# Patient Record
Sex: FEMALE | Race: WHITE | Employment: UNEMPLOYED | URBAN - METROPOLITAN AREA
[De-identification: names, ages, dates, MRNs, and addresses within clinical notes are randomized per-mention and may not be internally consistent; named-entity substitution may affect disease eponyms.]

---

## 2017-01-03 ENCOUNTER — HOSPITAL ENCOUNTER (OUTPATIENT)
Dept: MRI IMAGING | Age: 62
Discharge: HOME | End: 2017-01-03
Attending: INTERNAL MEDICINE
Payer: COMMERCIAL

## 2017-01-03 DIAGNOSIS — M62.81 MUSCULAR WEAKNESS: ICD-10-CM

## 2017-01-03 PROCEDURE — 70551 MRI BRAIN STEM W/O DYE: CPT

## 2017-01-13 ENCOUNTER — HOSPITAL ENCOUNTER (OUTPATIENT)
Dept: WOUND CARE | Age: 62
Discharge: HOME | End: 2017-01-13
Payer: COMMERCIAL

## 2017-01-13 PROCEDURE — 97597 DBRDMT OPN WND 1ST 20 CM/<: CPT | Performed by: PODIATRIST

## 2017-01-30 ENCOUNTER — HOSPITAL ENCOUNTER (OUTPATIENT)
Dept: MRI IMAGING | Age: 62
Discharge: HOME | End: 2017-01-30
Attending: PODIATRIST
Payer: COMMERCIAL

## 2017-01-30 DIAGNOSIS — L97.519: ICD-10-CM

## 2017-01-30 PROCEDURE — 73718 MRI LOWER EXTREMITY W/O DYE: CPT

## 2017-02-01 ENCOUNTER — HOSPITAL ENCOUNTER (OUTPATIENT)
Dept: WOUND CARE | Age: 62
Discharge: HOME | End: 2017-02-01
Payer: COMMERCIAL

## 2017-02-01 DIAGNOSIS — Z71.2 ENCOUNTER TO DISCUSS TEST RESULTS: ICD-10-CM

## 2017-02-01 DIAGNOSIS — L97.519 DIABETIC ULCER OF RIGHT FOOT ASSOCIATED WITH OTHER SPECIFIED DIABETES MELLITUS: ICD-10-CM

## 2017-02-01 DIAGNOSIS — I10 HYPERTENSION, UNCONTROLLED: ICD-10-CM

## 2017-02-01 DIAGNOSIS — L89.95: ICD-10-CM

## 2017-02-01 DIAGNOSIS — E13.621 DIABETIC ULCER OF RIGHT FOOT ASSOCIATED WITH OTHER SPECIFIED DIABETES MELLITUS: ICD-10-CM

## 2017-02-01 PROCEDURE — 11042 DBRDMT SUBQ TIS 1ST 20SQCM/<: CPT | Performed by: FAMILY MEDICINE

## 2017-02-01 PROCEDURE — 11721 DEBRIDE NAIL 6 OR MORE: CPT | Performed by: FAMILY MEDICINE

## 2017-02-01 RX ORDER — LIDOCAINE HYDROCHLORIDE 20 MG/ML
JELLY TOPICAL
Status: COMPLETED | OUTPATIENT
Start: 2017-02-01 | End: 2017-02-01

## 2017-02-01 RX ADMIN — LIDOCAINE HYDROCHLORIDE: 20 JELLY TOPICAL at 15:16

## 2017-02-02 NOTE — PROGRESS NOTES
[address] [place]   WOUND CARE PROGRESS NOTE       Name:  Livia Coon   MR#:  [id number]   :  1955   Account #:  [de-identified]        Date of Adm:  2017       DATE OF SERVICE:  2017    SUBJECTIVE: The patient comes in today. She is a patient of   Dr. River Wright, who was not able to see her because of her 's   work schedule who drives her to her appointment. She is staying off of   her feet as much as she can. She has been using Medihoney gel and   is reporting no significant tenderness or pain in the foot. Her blood   sugars are running in the 220s and she notes that she had her blood   sugar medication readjusted this previous week and she accidentally   forgot to take her blood pressure medication today. She is not having   any other significant concerns but would like to know the results of her   MRI that was just done. ALLERGIES: AS PER [employer]. PAST MEDICAL HISTORY/FAMILY HISTORY/SURGICAL HISTORY:   Reviewed, as per [facility]. OBJECTIVE   VITAL SIGNS: Temperature 97.6, pulse 93, respiratory 14, blood   pressure 178/84. EXTREMITIES: The right lateral toe measures at the last office visit   was 0.8 x 0.4 x 0.1; today measuring 0.6 x 0.4 x 0.1. The right medial   4th and 5th toe and the inner web at the last office visit was 0.2 x 0.1 x   0.1; today it is measuring larger at 0.6 x 0.2 x 0.1, however, this is not   a wound. It is scabbed over quite nicely and is more consistent with a   pressure injury, on the 5th toe greater than the 4th inner toe. ASSESSMENT    1. This is a diabetic foot ulcer. 2. Diabetes mellitus uncontrolled. 3. Hypertension, uncontrolled.        PROCEDURE: Excisional debridement was done of the diabetic foot   wound with Betadine and the patient is advised to get better control of   her blood sugars, advised a blood sugar to help with healing between   6 and 6.5, and advised her to go home and take her blood pressure   medication. MRI reviewed. There is evidence of osteomyelitis and she   will follow up in 2 to 3 weeks. PROCEDURE NOTE: The patient was prepped and draped in the   usual manner for the [address] [place] [address]. Using a 5 mm curet the   wound was excisionally debrided of all necrotic tissue to subcutaneous   level. The preop measurement was 0.6 x 0.4 x 01 with postop   measurement of 0.65 x 0.45 x 0.15. She tolerated the procedure very   well. Betadine was applied to the wound and advised to offload the   pressure in-between the 4th and the 5th digit to get a small either piece   of gauze or to get a callus-like sponge to help with some of the   pressure, to monitor it very closely over the next couple of days while   she is  Taking it and if she has anly allergic reaction it itch she is to stop it immediately.          MD BRENDA Purvis / JOSH   D:  02/01/2017   16:49   T:  02/02/2017   00:18   Job #:  [id number]

## 2017-02-15 ENCOUNTER — HOSPITAL ENCOUNTER (OUTPATIENT)
Dept: WOUND CARE | Age: 62
Discharge: HOME | End: 2017-02-15
Payer: COMMERCIAL

## 2017-02-15 DIAGNOSIS — L89.90 PRESSURE INJURY OF SKIN: ICD-10-CM

## 2017-02-15 DIAGNOSIS — L60.8 ACQUIRED DEFORMITY OF TOENAIL: ICD-10-CM

## 2017-02-15 DIAGNOSIS — E08.621 DIABETIC FOOT ULCER ASSOCIATED WITH DIABETES MELLITUS DUE TO UNDERLYING CONDITION, UNSPECIFIED LATERALITY: ICD-10-CM

## 2017-02-15 DIAGNOSIS — E13.42 DIABETIC POLYNEUROPATHY ASSOCIATED WITH OTHER SPECIFIED DIABETES MELLITUS (HCC): ICD-10-CM

## 2017-02-15 DIAGNOSIS — L97.509 DIABETIC FOOT ULCER ASSOCIATED WITH DIABETES MELLITUS DUE TO UNDERLYING CONDITION, UNSPECIFIED LATERALITY: ICD-10-CM

## 2017-02-15 PROCEDURE — 11042 DBRDMT SUBQ TIS 1ST 20SQCM/<: CPT | Performed by: FAMILY MEDICINE

## 2017-02-15 RX ORDER — LIDOCAINE HYDROCHLORIDE 20 MG/ML
JELLY TOPICAL AS NEEDED
Status: DISCONTINUED | OUTPATIENT
Start: 2017-02-15 | End: 2017-02-19 | Stop reason: HOSPADM

## 2017-02-15 RX ADMIN — LIDOCAINE HYDROCHLORIDE: 20 JELLY TOPICAL at 14:22

## 2017-02-15 NOTE — PROGRESS NOTES
[address] [place]   WOUND CARE PROGRESS NOTE       Name:  Clint Graham   MR#:  [id number]   :  1955   Account #:  [de-identified]        Date of Adm:  02/15/2017       DATE OF SERVICE:  02/15/2017    SUBJECTIVE: The patient comes in today for followup of her diabetic   foot ulcers. She has been using Betadine and trying to offload the   pressure with a callus cushion in between her 4th and 5th toes. She is   not having any significant fevers, no chills and no other concerns. ALLERGIES: ZESTORETIC. REVIEW OF SYSTEMS: As per HPI above. Otherwise, nothing new to   add. OBJECTIVE: Temperature 97.4, respirations 18, blood pressure   155/82, pulse 85. Right lateral 5th toe measurement at the last office visit was 0.6 x 0.4 x   0.1, today measuring 0.5 x 0.6 x 0.1. In the right web of the 5th toe and   the 4th toe at the last office visit was 0.8 x 0.6 x 0.1, today is   epithelialized quite nicely. We will record the wound measurements as   zero. The wounds actually look to be fairly dry with no   significant MASD (moisture-associated skin damage). Pulses are   excellent. ASSESSMENT: Diabetic foot ulcer and pressure injury. PLAN: We will continue Betadine. The pressure injury in between her   toes is healed quite nicely. Advised to continue using the callus   cushion, and would like to get patient set up with Dr. Rebecca Guerrero so   she can have some adequate toenail care as well as appropriate   shoes that we can offload some of the pressure that is on her toes. PROCEDURE NOTE: The patient was prepped and draped in the usual   manner for the wound care center and using a 5 mm ring curette, the   wound was excisionally debrided of all fibrinous, necrotic tissue to the   subcutaneous level. She tolerated the procedure very well.    Preoperative measurement was 0.5 x 0.6 x 0.1 with the postop   measurement of 0.6 x 0.7 x 0.2. We will cover with Betadine and   followup in 2 weeks would likely be helpful.          MD BRENDA Hood / JESSICA   D:  02/15/2017   15:53   T:  02/15/2017   16:34   Job #:  [id number]

## 2017-03-01 ENCOUNTER — HOSPITAL ENCOUNTER (OUTPATIENT)
Dept: WOUND CARE | Age: 62
Discharge: HOME | End: 2017-03-01
Payer: COMMERCIAL

## 2017-03-01 DIAGNOSIS — L97.509 DIABETIC FOOT ULCER ASSOCIATED WITH DIABETES MELLITUS DUE TO UNDERLYING CONDITION, UNSPECIFIED LATERALITY: ICD-10-CM

## 2017-03-01 DIAGNOSIS — L89.90 PRESSURE INJURY OF SKIN: ICD-10-CM

## 2017-03-01 DIAGNOSIS — E66.01 MORBID OBESITY DUE TO EXCESS CALORIES (HCC): ICD-10-CM

## 2017-03-01 DIAGNOSIS — E08.621 DIABETIC FOOT ULCER ASSOCIATED WITH DIABETES MELLITUS DUE TO UNDERLYING CONDITION, UNSPECIFIED LATERALITY: ICD-10-CM

## 2017-03-01 DIAGNOSIS — B35.1 ONYCHOMYCOSIS OF TOENAIL: ICD-10-CM

## 2017-03-01 DIAGNOSIS — M20.61 TOE DEFORMITY, RIGHT: ICD-10-CM

## 2017-03-01 PROCEDURE — 97602 WOUND(S) CARE NON-SELECTIVE: CPT | Performed by: FAMILY MEDICINE

## 2017-03-01 RX ORDER — LIDOCAINE HYDROCHLORIDE 20 MG/ML
JELLY TOPICAL AS NEEDED
Status: DISCONTINUED | OUTPATIENT
Start: 2017-03-01 | End: 2017-03-05 | Stop reason: HOSPADM

## 2017-03-15 ENCOUNTER — HOSPITAL ENCOUNTER (OUTPATIENT)
Dept: WOUND CARE | Age: 62
Discharge: HOME | End: 2017-03-15
Payer: COMMERCIAL

## 2017-03-15 DIAGNOSIS — E11.8 UNCONTROLLED TYPE 2 DIABETES MELLITUS WITH COMPLICATION, UNSPECIFIED LONG TERM INSULIN USE STATUS: ICD-10-CM

## 2017-03-15 DIAGNOSIS — L97.503: ICD-10-CM

## 2017-03-15 DIAGNOSIS — Z71.6 TOBACCO ABUSE COUNSELING: ICD-10-CM

## 2017-03-15 DIAGNOSIS — E11.621: ICD-10-CM

## 2017-03-15 DIAGNOSIS — E11.65 UNCONTROLLED TYPE 2 DIABETES MELLITUS WITH COMPLICATION, UNSPECIFIED LONG TERM INSULIN USE STATUS: ICD-10-CM

## 2017-03-15 DIAGNOSIS — E66.01 MORBID OBESITY DUE TO EXCESS CALORIES (HCC): ICD-10-CM

## 2017-03-15 DIAGNOSIS — I10 UNCONTROLLED HYPERTENSION: ICD-10-CM

## 2017-03-15 DIAGNOSIS — I87.2 STASIS DERMATITIS OF BOTH LEGS: ICD-10-CM

## 2017-03-15 PROCEDURE — 11043 DBRDMT MUSC&/FSCA 1ST 20/<: CPT | Performed by: FAMILY MEDICINE

## 2017-03-15 RX ORDER — INSULIN LISPRO 100 [IU]/ML
22 INJECTION, SOLUTION INTRAVENOUS; SUBCUTANEOUS
Status: ON HOLD | COMMUNITY
End: 2017-11-02

## 2017-03-15 RX ORDER — LIDOCAINE HYDROCHLORIDE 40 MG/ML
SOLUTION TOPICAL AS NEEDED
Status: DISCONTINUED | OUTPATIENT
Start: 2017-03-15 | End: 2017-03-19 | Stop reason: HOSPADM

## 2017-03-15 RX ADMIN — LIDOCAINE HYDROCHLORIDE: 40 SOLUTION TOPICAL at 15:21

## 2017-03-15 NOTE — PROGRESS NOTES
[address]   WOUND CARE PROGRESS NOTE       Name:  Rigo Sosa   MR#:  [id number]   :  1955   Account #:  [de-identified]        Date of Adm:  03/15/2017       DATE OF SERVICE:      SUBJECTIVE: The patient comes in today for followup of her non-  healing diabetic foot ulcer. She states that she has been using the   Santyl and has a zero copay per her  who thinks that there is   a new blister forming from the callus pad adjacent to the wound in-  between her fourth and fifth toes. She is not having any other   significant symptoms. Her pain is well controlled and her    states that she saw her PCP about a week ago. REVIEW OF SYSTEMS: She denies any chest pain. No shortness of   breath. No back pain. No neurological symptoms. Otherwise, review of   systems are negative as per HPI above. OBJECTIVE: Temperature 98.4, pulse of 81, respiratory 16, blood   pressure of 180/101 on the left with a blood pressure measurement of   180/82 on the right. The fight foot toe actually looks much better. The   fibrinous necrotic tissue was starting to improve. The last office visit   measurement was 0.5 x 0.5 x 0.1, today measuring 0.4 x 0.4 x 0.1. There is quite a bit of fibrinous necrotic tissue . ASSESSMENT AND PLAN:   1. Diabetic foot ulcer. 2. Uncontrolled hypertension. 3. Uncontrolled diabetes mellitus. 4. Morbid obesity. PLAN: We will continue Santyl. She is to offload as much as she is   able. We will stop the callus pad and change to gauze in between the   toes. She is advised to see her PCP ASAP regarding her uncontrolled   hypertension and for further management of her diabetes. Weight loss,   of course, is encouraged.      PROCEDURE NOTE: The patient was prepped and draped in the usual   manner for the wound care center and using a 5 mm ring curette, the   wound was excisionally debrided of all fibrinous and necrotic tissue. Excisional debridement was done to the wound with a preop measurement   of 0.4 x 0.4 x 0.1 with a postop measurement of 0.5 x 0.5 x 0.2. She   tolerated the procedure very well, was given written as well as verbal   instructions, and will follow up in 2 weeks.          MD Alex Manning / Del.Spray   D:  03/15/2017   15:46   T:  03/15/2017   16:06   Job #:  [id number]

## 2017-03-29 ENCOUNTER — HOSPITAL ENCOUNTER (OUTPATIENT)
Dept: WOUND CARE | Age: 62
Discharge: HOME | End: 2017-03-29
Payer: COMMERCIAL

## 2017-03-29 DIAGNOSIS — E08.621 DIABETIC ULCER OF RIGHT FOOT ASSOCIATED WITH DIABETES MELLITUS DUE TO UNDERLYING CONDITION (HCC): ICD-10-CM

## 2017-03-29 DIAGNOSIS — L97.519 DIABETIC ULCER OF RIGHT FOOT ASSOCIATED WITH DIABETES MELLITUS DUE TO UNDERLYING CONDITION (HCC): ICD-10-CM

## 2017-03-29 PROCEDURE — 11043 DBRDMT MUSC&/FSCA 1ST 20/<: CPT | Performed by: FAMILY MEDICINE

## 2017-03-29 PROCEDURE — 11042 DBRDMT SUBQ TIS 1ST 20SQCM/<: CPT | Performed by: FAMILY MEDICINE

## 2017-03-30 RX ORDER — GABAPENTIN 300 MG/1
300 CAPSULE ORAL 3 TIMES DAILY
COMMUNITY
End: 2017-07-24

## 2017-03-30 NOTE — PROGRESS NOTES
[address] [address]   WOUND CARE PROGRESS NOTE       Name:  Dharmesh Fisher   MR#:  [id number]   :  1955   Account #:  [de-identified]        Date of Adm:  2017       DATE OF SERVICE:  2017    SUBJECTIVE: The patient comes in today for followup of her   nonhealing wound on her right lower extremity, diabetic ulcer and foot   wound. States that she saw Dr. Andrew Camp who pared down 1 of her   wounds that had been healed, but she is not having any other   significant issues. She has been cleansing her wound, or actually her    has been cleansing her wound with Betadine and using   Santyl to the big toe as directed. He has been using gauze in between   the toes, but there was some slight amount of moisture in the toe, but   not soaking wet, the gauze was just slightly moistened. She is not   having any significant pain and has no other concerns. ALLERGIES: AS PER [employer]. REVIEW OF SYSTEMS: As per HPI above, otherwise nothing new to   add. OBJECTIVE   VITAL SIGNS: Temperature is 98.2, heart rate is 78, respiratory rate is   16 and a blood pressure of 162/82. EXTREMITIES: The right lateral 5th toe at the last office visit was 0.4 x   0.4 x 0.1, today measuring 1.0 x 0.8 x 0.1. The right 4th toe at the last   office visit had healed, but today is measuring 0.9 x 0.7 x 0.2; and the   right 5th toe had also healed, today measuring 1.4 x 0.8 x 0.1. There is   some slight moisture associated skin damage noted between the 4th   and the 5th toe. No signs of significant infection, but there is a   significant amount of fibrinous, necrotic tissue to the wound. ASSESSMENT: Diabetic foot ulcer to the 5th toe along with a pressure   injury to the 4th and 5th toe interweb. PLAN: Excisional debridement was done, subcutaneous and muscle   layer. Advised the patient to continue using the Santyl on the 5th toe.    Will continue using Betadine and saline solution to clean in between   the 4th and the 5th toe. Will likely need to change to EndoForm at the   next office visit if it continues to look as well as it is now. She is advised   to follow up in 1 week. PROCEDURE NOTE: The patient was prepped and draped in the   usual manner for the wound care center and using a 5-mm ring   curette, the wound was excisionally debrided of all fibrinous and   necrotic tissue to reveal a fresh pink wound bed. The lateral 5th toe   measured 1.0 x 0.8 x 0.1, it was excised to the muscle measuring 1.1 x   0.9 x 0.1. The right 4th toe was excised to the subcutaneous level   measuring 0.9 x 0.7 x 0.2 with a postop measurement of 0.9 x 0.75 x   0.25. The right 5th toe with a measurement of 1.4 x 0.8 x 0.1 was   excised with a postop measurement of 1.5 x 0.9 x 0.2 to the muscular   layer. She tolerated procedure very well. Will apply Santyl to the 5th   toe, as well as Betadine to the interweb of the 4th and 5th digit. She was given written as well as verbal instructions, follow-up in 1   week.         MD BRENDA Torres / CULLEN   D:  03/29/2017   17:12   T:  03/30/2017   01:35   Job #:  [id number]

## 2017-04-05 ENCOUNTER — HOSPITAL ENCOUNTER (OUTPATIENT)
Dept: WOUND CARE | Age: 62
Discharge: HOME | End: 2017-04-05
Payer: COMMERCIAL

## 2017-04-05 DIAGNOSIS — L97.502 DIABETIC ULCER OF OTHER PART OF FOOT ASSOCIATED WITH DIABETES MELLITUS DUE TO UNDERLYING CONDITION, WITH FAT LAYER EXPOSED, UNSPECIFIED LATERALITY (HCC): ICD-10-CM

## 2017-04-05 DIAGNOSIS — E08.621 DIABETIC ULCER OF OTHER PART OF FOOT ASSOCIATED WITH DIABETES MELLITUS DUE TO UNDERLYING CONDITION, WITH FAT LAYER EXPOSED, UNSPECIFIED LATERALITY (HCC): ICD-10-CM

## 2017-04-05 PROCEDURE — 11042 DBRDMT SUBQ TIS 1ST 20SQCM/<: CPT | Performed by: FAMILY MEDICINE

## 2017-04-05 RX ORDER — LIDOCAINE HYDROCHLORIDE 20 MG/ML
JELLY TOPICAL AS NEEDED
Status: DISCONTINUED | OUTPATIENT
Start: 2017-04-05 | End: 2017-04-09 | Stop reason: HOSPADM

## 2017-04-05 RX ADMIN — LIDOCAINE HYDROCHLORIDE: 20 JELLY TOPICAL at 16:01

## 2017-04-19 ENCOUNTER — HOSPITAL ENCOUNTER (OUTPATIENT)
Dept: WOUND CARE | Age: 62
Discharge: HOME | End: 2017-04-19
Payer: COMMERCIAL

## 2017-04-19 DIAGNOSIS — L97.909 VENOUS STASIS ULCER (HCC): ICD-10-CM

## 2017-04-19 DIAGNOSIS — E13.42 DIABETIC POLYNEUROPATHY ASSOCIATED WITH OTHER SPECIFIED DIABETES MELLITUS (HCC): ICD-10-CM

## 2017-04-19 DIAGNOSIS — I83.009 VENOUS STASIS ULCER (HCC): ICD-10-CM

## 2017-04-19 PROCEDURE — 11042 DBRDMT SUBQ TIS 1ST 20SQCM/<: CPT | Performed by: FAMILY MEDICINE

## 2017-04-19 RX ORDER — LIDOCAINE HYDROCHLORIDE 20 MG/ML
JELLY TOPICAL ONCE
Status: COMPLETED | OUTPATIENT
Start: 2017-04-19 | End: 2017-04-19

## 2017-04-19 RX ADMIN — LIDOCAINE HYDROCHLORIDE: 20 JELLY TOPICAL at 16:00

## 2017-05-03 ENCOUNTER — HOSPITAL ENCOUNTER (OUTPATIENT)
Dept: WOUND CARE | Age: 62
Discharge: HOME | End: 2017-05-03
Payer: COMMERCIAL

## 2017-05-03 DIAGNOSIS — E11.49 DIABETIC NEUROPATHY WITH NEUROLOGIC COMPLICATION (HCC): ICD-10-CM

## 2017-05-03 DIAGNOSIS — L97.502 DIABETIC ULCER OF TOE ASSOCIATED WITH DIABETES MELLITUS DUE TO UNDERLYING CONDITION, WITH FAT LAYER EXPOSED, UNSPECIFIED LATERALITY (HCC): ICD-10-CM

## 2017-05-03 DIAGNOSIS — E08.621 DIABETIC ULCER OF TOE ASSOCIATED WITH DIABETES MELLITUS DUE TO UNDERLYING CONDITION, WITH FAT LAYER EXPOSED, UNSPECIFIED LATERALITY (HCC): ICD-10-CM

## 2017-05-03 DIAGNOSIS — E11.40 DIABETIC NEUROPATHY WITH NEUROLOGIC COMPLICATION (HCC): ICD-10-CM

## 2017-05-03 PROCEDURE — 11042 DBRDMT SUBQ TIS 1ST 20SQCM/<: CPT | Performed by: FAMILY MEDICINE

## 2017-05-03 RX ORDER — LIDOCAINE HYDROCHLORIDE 20 MG/ML
JELLY TOPICAL ONCE
Status: COMPLETED | OUTPATIENT
Start: 2017-05-03 | End: 2017-05-03

## 2017-05-03 RX ADMIN — LIDOCAINE HYDROCHLORIDE: 20 JELLY TOPICAL at 16:00

## 2017-05-03 NOTE — PROGRESS NOTES
[address]   WOUND CARE PROGRESS NOTE       Name:  Nolan Sims   MR#:  [id number]   :  1955   Account #:  [de-identified]        Date of Adm:  2017       DATE OF SERVICE:  2017    SUBJECTIVE:  The patient comes in today for followup of her   nonhealing wounds on her foot, diabetic ulcers. She has been using   the Betadine as directed, not having any significant concerns and her    has been doing her wound care without any significant   issues. OBJECTIVE:  Temperature 97.8, pulse 77, respiratory 16, blood   pressure 156/82. The right lateral 5th toe at the last office visit was 0.8 x 1.0 x 0.1. Today, is healed. The lateral 4th toe at the last office visit was 0.4 x 0.5   x 0.2. Today, measuring 0.4 x 0.4 x 0.2. The medial 5th toe at the last   office visit was 0.5 x 0.7 x 0.1. Today, is healed. The lateral 4th toe still has a significant amount of necrotic fibrinous   tissue noted. There is no significant tenderness and pulses are intact. ASSESSMENT AND PLAN:  Diabetic foot ulcer. The medial fifth toe   and the lateral fifth toe on the right are healed. However, the lateral   fourth toe is not. Excisional debridement was done to the   subcutaneous level. She is to continue following the current treatment   plan and will follow up in 2 weeks. PROCEDURE NOTE:  The patient was prepped and draped in the   usual manner for the [address] [place] [address], and using a 5 mm ring   curette, the wound was excisionally debrided of all fibrinous necrotic   tissue revealing fresh pink wound bed to the subcutaneous level. The   measurement postop was 0.45 x 0.45 x 0.25 with a preop   measurement of 0.4 x 0.4 x 0.2. Subcutaneous excisional debridement   is the procedure. Complications:  None. Blood loss is scant. The   procedure was well tolerated.   She was given written as well as verbal instructions. Will follow up in 2 weeks.          MD BRENDA López / NB   D:  05/03/2017   09:37   T:  05/03/2017   10:26   Job #:  [id number]

## 2017-05-24 ENCOUNTER — HOSPITAL ENCOUNTER (OUTPATIENT)
Dept: WOUND CARE | Age: 62
End: 2017-05-24
Payer: COMMERCIAL

## 2017-06-19 ENCOUNTER — OFFICE VISIT (OUTPATIENT)
Dept: SURGERY | Age: 62
End: 2017-06-19

## 2017-06-19 VITALS
HEIGHT: 61 IN | HEART RATE: 84 BPM | OXYGEN SATURATION: 100 % | SYSTOLIC BLOOD PRESSURE: 187 MMHG | RESPIRATION RATE: 14 BRPM | DIASTOLIC BLOOD PRESSURE: 100 MMHG | WEIGHT: 226 LBS | BODY MASS INDEX: 42.67 KG/M2

## 2017-06-19 DIAGNOSIS — L03.315 CELLULITIS OF PERINEUM: Primary | ICD-10-CM

## 2017-06-19 RX ORDER — SULFAMETHOXAZOLE AND TRIMETHOPRIM 800; 160 MG/1; MG/1
1 TABLET ORAL 2 TIMES DAILY
Qty: 20 TAB | Refills: 0 | Status: SHIPPED | OUTPATIENT
Start: 2017-06-19 | End: 2017-06-29

## 2017-06-19 NOTE — MR AVS SNAPSHOT
Visit Information Date & Time Provider Department Dept. Phone Encounter #  
 6/19/2017  4:00 PM Avery Schrader MD [facility] [id number] Your Appointments 7/24/2017  9:00 AM  
FOLLOW UP 10 with [facility] ([facility]) Appt Note: 6w fasting [address] [id number] [address] [facility] [address] [place] [address] Upcoming Health Maintenance Date Due Hepatitis C Screening 1955 LIPID PANEL Q1 1955 FOOT EXAM Q1 6/3/1964 MICROALBUMIN Q1 6/3/1964 EYE EXAM RETINAL OR DILATED Q1 6/3/1964 DTaP/Tdap/Td series (1 - Tdap) 6/3/1975 PAP AKA CERVICAL CYTOLOGY 6/3/1975 FOBT Q 1 YEAR AGE 50-75 6/3/2004 ZOSTER VACCINE AGE 60> 6/3/2014 BREAST CANCER SCRN MAMMOGRAM 6/11/2015 HEMOGLOBIN A1C Q6M 4/1/2016 INFLUENZA AGE 9 TO ADULT 8/1/2017 Allergies as of 6/19/2017  Review Complete On: 6/19/2017 By: Aurlamonte Glassing Severity Noted Reaction Type Reactions Zestril [Lisinopril]  01/26/2011    Swelling Current Immunizations  Reviewed on 10/3/2015 Name Date Influenza Vaccine (Quad) PF 10/7/2015  9:51 AM  
 Pneumococcal Vaccine (Unspecified Type) 1/1/2011 Not reviewed this visit Vitals BP Pulse Resp Height(growth percentile) Weight(growth percentile) SpO2  
 (!) 187/100 (BP 1 Location: Left arm, BP Patient Position: Sitting) 84 14 5' 1\" (1.549 m) 226 lb (102.5 kg) 100% BMI OB Status Smoking Status 42.7 kg/m2 Postmenopausal Never Smoker BMI and BSA Data Body Mass Index Body Surface Area 42.7 kg/m 2 2.1 m 2 Preferred Pharmacy Pharmacy Name Phone [phone] - [facility] [place], [address] AT [place] [address] [phone] Your Updated Medication List  
  
   
 This list is accurate as of: 6/19/17  5:05 PM.  Always use your most recent med list.  
  
  
  
  
 acetaminophen 325 mg tablet Commonly known as:  TYLENOL Take 2 Tabs by mouth every four (4) hours as needed. AGGRENOX  mg per SR capsule Generic drug:  aspirin-dipyridamole Take 1 Cap by mouth two (2) times a day. allopurinol 300 mg tablet Commonly known as:  Gerold See Take  by mouth daily. atenolol 50 mg tablet Commonly known as:  TENORMIN Take  by mouth daily. atorvastatin 40 mg tablet Commonly known as:  LIPITOR Take  by mouth daily. bumetanide 0.5 mg tablet Commonly known as:  Kyle Felecia Take  by mouth two (2) times a day. CARDIZEM  mg ER capsule Generic drug:  dilTIAZem Take 360 mg by mouth daily. Cholecalciferol (Vitamin D3) 2,000 unit Cap capsule Commonly known as:  VITAMIN D3 Take 1 Cap by mouth daily. collagenase 250 unit/gram ointment Commonly known as:  SANTYL Apply 1 Tube to affected area daily. gabapentin 300 mg capsule Commonly known as:  NEURONTIN Take 300 mg by mouth three (3) times daily. glipiZIDE 10 mg tablet Commonly known as:  Aaliyah Nailer Take 10 mg by mouth two (2) times a day. hydrALAZINE 50 mg tablet Commonly known as:  APRESOLINE Take 1 Tab by mouth three (3) times daily. insulin lispro 100 unit/mL kwikpen Commonly known as:  HUMALOG  
22 Units by SubCUTAneous route three (3) times daily (with meals). L. acidoph & paracasei- S therm- Bifido 8 billion cell Cap cap Commonly known as:  ELVA-Q/RISAQUAD Take 1 Cap by mouth daily. LEVEMIR 100 unit/mL injection Generic drug:  insulin detemir 60 Units by SubCUTAneous route two (2) times a day. LYRICA 75 mg capsule Generic drug:  pregabalin Take 75 mg by mouth two (2) times a day. omeprazole 40 mg capsule Commonly known as:  PRILOSEC Take 40 mg by mouth daily. trimethoprim-sulfamethoxazole 160-800 mg per tablet Commonly known as:  BACTRIM DS, SEPTRA DS Take 1 Tab by mouth two (2) times a day for 10 days. Prescriptions Sent to Pharmacy Refills  
 trimethoprim-sulfamethoxazole (BACTRIM DS, SEPTRA DS) 160-800 mg per tablet 0 Sig: Take 1 Tab by mouth two (2) times a day for 10 days. Class: Normal  
 Pharmacy: [facility] [address] [place] [address] [address] [place] [address] #: [phone] Route: Oral  
  
Introducing [facility]! Mariana Murrieta introduces [email or web address] patient portal. Now you can access parts of your medical record, email your doctor's office, and request medication refills online. 1. In your internet browser, go to [email or web address] 2. Click on the First Time User? Click Here link in the Sign In box. You will see the New Member Sign Up page. 3. Enter your [email or web address] Access Code exactly as it appears below. You will not need to use this code after youve completed the sign-up process. If you do not sign up before the expiration date, you must request a new code. · [email or web address] Access Code: [email or web address] Expires: 9/17/2017  4:40 PM 
 
4. Enter the last four digits of your Social Security Number (xxxx) and Date of Birth (mm/dd/yyyy) as indicated and click Submit. You will be taken to the next sign-up page. 5. Create a Outline Appt ID. This will be your [employer] login ID and cannot be changed, so think of one that is secure and easy to remember. 6. Create a [employer] password. You can change your password at any time. 7. Enter your Password Reset Question and Answer. This can be used at a later time if you forget your password. 8. Enter your e-mail address. You will receive e-mail notification when new information is available in [address] 9. Click Sign Up. You can now view and download portions of your medical record. 10. Click the Download Summary menu link to download a portable copy of your medical information. If you have questions, please visit the Frequently Asked Questions section of the [employer] website. Remember, [employer] is NOT to be used for urgent needs. For medical emergencies, dial 911. Now available from your iPhone and Android! Please provide this summary of care documentation to your next provider. Your primary care clinician is listed as Cain Manzano. If you have any questions after today's visit, please call [phone].

## 2017-06-19 NOTE — PROGRESS NOTES
Surgery H&P    Subjective:   Patient 60 y.o.  female wheelchair bound presents with painful erythematous swelling in the right perineal area 5 days ago. patient has been putting warm compresses but no drainage. Patient was seen in PCP office 3 days ago and was started on Keflex. No F/C/S. Patient reports no improvement on Keflex. Denies any recent trauma to this area. Past Medical & Surgical History:  Past Medical History:   Diagnosis Date    CKD (chronic kidney disease) stage 2, GFR 60-89 ml/min     Diabetes (HCC)     Gastroparesis     GERD (gastroesophageal reflux disease)     Gout     HTN (hypertension)     Hyperlipemia     Hyperparathyroidism ([address]     Nausea & vomiting     Obesity     Sciatica     right    Stroke ([facility]     right parietal, left caudate    Thyroid disease     para thyroid(high calcium)      Past Surgical History:   Procedure Laterality Date    CHEST SURGERY PROCEDURE UNLISTED  10/24/13    PARATHYROID EXPLORATION     DELIVERY       HX  SECTION      HX CHOLECYSTECTOMY      REMOVAL GALLBLADDER         Social History:  Social History     Social History    Marital status:      Spouse name: N/A    Number of children: N/A    Years of education: N/A     Occupational History    Not on file.      Social History Main Topics    Smoking status: Never Smoker    Smokeless tobacco: Never Used    Alcohol use Yes      Comment: rare    Drug use: Yes     Special: Prescription, OTC    Sexual activity: Not on file     Other Topics Concern    Not on file     Social History Narrative        Family History:  Family History   Problem Relation Age of Onset    Cancer Mother      cervical    Diabetes Mother     Hypertension Mother     Stroke Mother 79    Diabetes Father     Hypertension Father     Kidney Disease Father     Diabetes Brother     Hypertension Brother     Stroke Brother 60        Medications:  Current Outpatient Prescriptions Medication Sig    trimethoprim-sulfamethoxazole (BACTRIM DS, SEPTRA DS) 160-800 mg per tablet Take 1 Tab by mouth two (2) times a day for 10 days.  gabapentin (NEURONTIN) 300 mg capsule Take 300 mg by mouth three (3) times daily.  insulin detemir (LEVEMIR) 100 unit/mL injection 60 Units by SubCUTAneous route two (2) times a day.  insulin lispro (HUMALOG) 100 unit/mL kwikpen 22 Units by SubCUTAneous route three (3) times daily (with meals).  collagenase (SANTYL) 250 unit/gram ointment Apply 1 Tube to affected area daily.  hydrALAZINE (APRESOLINE) 50 mg tablet Take 1 Tab by mouth three (3) times daily. (Patient taking differently: Take 50 mg by mouth two (2) times a day.)    acetaminophen (TYLENOL) 325 mg tablet Take 2 Tabs by mouth every four (4) hours as needed.  L. acidoph & paracasei- S therm- Bifido (ELVA-Q) 8 billion cell cap cap Take 1 Cap by mouth daily.  allopurinol (ZYLOPRIM) 300 mg tablet Take  by mouth daily.  atenolol (TENORMIN) 50 mg tablet Take  by mouth daily.  pregabalin (LYRICA) 75 mg capsule Take 75 mg by mouth two (2) times a day.  atorvastatin (LIPITOR) 40 mg tablet Take  by mouth daily.  bumetanide (BUMEX) 0.5 mg tablet Take  by mouth two (2) times a day.  Cholecalciferol, Vitamin D3, 2,000 unit cap Take 1 Cap by mouth daily.  omeprazole (PRILOSEC) 40 mg capsule Take 40 mg by mouth daily.  dipyridamole-aspirin (AGGRENOX) 200-25 mg per SR capsule Take 1 Cap by mouth two (2) times a day.  diltiazem (CARDIZEM CD) 360 mg ER capsule Take 360 mg by mouth daily.  glipiZIDE (GLUCOTROL) 10 mg tablet Take 10 mg by mouth two (2) times a day. No current facility-administered medications for this visit. Allergies: Allergies   Allergen Reactions    Zestril [Lisinopril] Swelling       Review of Systems  A comprehensive review of systems was negative except for that written in the HPI.     Objective:     Exam:    Visit Vitals    BP (!) 187/100 (BP 1 Location: Left arm, BP Patient Position: Sitting)    Pulse 84    Resp 14    Ht 5' 1\" (1.549 m)    Wt 102.5 kg (226 lb)    SpO2 100%    BMI 42.7 kg/m2     General appearance: alert, cooperative, no distress, appears stated age  Lungs: clear to auscultation bilaterally  Heart: regular rate and rhythm  Skin: Skin color, texture, turgor normal  Right posterior perineal area with 2 x 3 cm induration with erythema. No fluctuance. 2 mm ulcer at the center. No drainage. Bedside US performed and no fluid collection noted. Assessment:     Right perineal cellulitis    Plan:     No obvious abscess to drain.   Add Bactrim  Continue warm compress  RTC if no improvement

## 2017-07-24 ENCOUNTER — OFFICE VISIT (OUTPATIENT)
Dept: INTERNAL MEDICINE CLINIC | Age: 62
End: 2017-07-24

## 2017-07-24 VITALS — DIASTOLIC BLOOD PRESSURE: 78 MMHG | HEIGHT: 61 IN | SYSTOLIC BLOOD PRESSURE: 128 MMHG

## 2017-07-24 DIAGNOSIS — E13.42 DIABETIC POLYNEUROPATHY ASSOCIATED WITH OTHER SPECIFIED DIABETES MELLITUS (HCC): ICD-10-CM

## 2017-07-24 DIAGNOSIS — E78.5 HYPERLIPIDEMIA, UNSPECIFIED HYPERLIPIDEMIA TYPE: ICD-10-CM

## 2017-07-24 DIAGNOSIS — I10 HTN (HYPERTENSION), MALIGNANT: ICD-10-CM

## 2017-07-24 DIAGNOSIS — I63.9 CEREBROVASCULAR ACCIDENT (CVA), UNSPECIFIED MECHANISM (HCC): ICD-10-CM

## 2017-07-24 PROBLEM — G62.9 PERIPHERAL NEUROPATHY: Status: ACTIVE | Noted: 2017-07-24

## 2017-07-24 PROBLEM — L97.519 ULCER OF RIGHT FIFTH TOE DUE TO DIABETES MELLITUS (HCC): Status: ACTIVE | Noted: 2017-07-24

## 2017-07-24 PROBLEM — R60.0 BILATERAL EDEMA OF LOWER EXTREMITY: Status: ACTIVE | Noted: 2017-07-24

## 2017-07-24 PROBLEM — K76.0 FATTY LIVER: Status: ACTIVE | Noted: 2017-07-24

## 2017-07-24 PROBLEM — E11.621 ULCER OF RIGHT FIFTH TOE DUE TO DIABETES MELLITUS (HCC): Status: ACTIVE | Noted: 2017-07-24

## 2017-07-24 PROBLEM — R10.31 SEVERE RIGHT GROIN PAIN: Status: ACTIVE | Noted: 2017-07-24

## 2017-07-24 PROBLEM — R11.2 N&V (NAUSEA AND VOMITING): Status: ACTIVE | Noted: 2017-07-24

## 2017-07-24 PROBLEM — I12.9 STAGE 2 CHRONIC KIDNEY DISEASE DUE TO BENIGN HYPERTENSION: Status: ACTIVE | Noted: 2017-07-24

## 2017-07-24 PROBLEM — I50.41: Status: ACTIVE | Noted: 2017-07-24

## 2017-07-24 PROBLEM — E11.22 CKD STAGE 2 DUE TO TYPE 2 DIABETES MELLITUS (HCC): Status: ACTIVE | Noted: 2017-07-24

## 2017-07-24 PROBLEM — I35.0 AORTIC STENOSIS, MILD: Status: ACTIVE | Noted: 2017-07-24

## 2017-07-24 PROBLEM — I50.32 CHRONIC DIASTOLIC CHF (CONGESTIVE HEART FAILURE), NYHA CLASS 3 (HCC): Status: ACTIVE | Noted: 2017-07-24

## 2017-07-24 PROBLEM — R00.0 SINUS TACHYCARDIA: Status: ACTIVE | Noted: 2017-07-24

## 2017-07-24 PROBLEM — N18.2 CKD STAGE 2 DUE TO TYPE 2 DIABETES MELLITUS (HCC): Status: ACTIVE | Noted: 2017-07-24

## 2017-07-24 PROBLEM — M54.50 LOW BACK PAIN: Status: ACTIVE | Noted: 2017-07-24

## 2017-07-24 PROBLEM — N18.2 STAGE 2 CHRONIC KIDNEY DISEASE DUE TO BENIGN HYPERTENSION: Status: ACTIVE | Noted: 2017-07-24

## 2017-07-24 PROBLEM — D64.9 ANEMIA: Status: ACTIVE | Noted: 2017-07-24

## 2017-07-24 PROBLEM — M25.559 PELVIC JOINT PAIN: Status: ACTIVE | Noted: 2017-07-24

## 2017-07-24 PROBLEM — G47.00 INSOMNIA: Status: ACTIVE | Noted: 2017-07-24

## 2017-07-24 PROBLEM — I87.2 STASIS DERMATITIS: Status: ACTIVE | Noted: 2017-07-24

## 2017-07-24 LAB
ALBUMIN SERPL-MCNC: 3.4 G/DL (ref 3.9–5.4)
ALKALINE PHOS POC: 139 U/L (ref 38–126)
ALT SERPL-CCNC: 28 U/L (ref 9–52)
AST SERPL-CCNC: 16 U/L (ref 14–36)
BUN BLD-MCNC: 42 MG/DL (ref 7–17)
CALCIUM BLD-MCNC: 9.6 MG/DL (ref 8.4–10.2)
CHLORIDE BLD-SCNC: 107 MMOL/L (ref 98–107)
CHOLEST SERPL-MCNC: 155 MG/DL (ref 0–200)
CO2 POC: 27 MMOL/L (ref 22–32)
CREAT BLD-MCNC: 1.1 MG/DL (ref 0.7–1.2)
EGFR (POC): 53.4
GLUCOSE POC: 201 MG/DL (ref 65–105)
HDLC SERPL-MCNC: 32 MG/DL (ref 35–130)
HGB BLD-MCNC: 8.5 G/DL
LDL CHOLESTEROL POC: 68 MG/DL (ref 0–130)
POTASSIUM SERPL-SCNC: 5.3 MMOL/L (ref 3.6–5)
PROT SERPL-MCNC: 6 G/DL (ref 6.3–8.2)
SODIUM SERPL-SCNC: 145 MMOL/L (ref 137–145)
TCHOL/HDL RATIO (POC): 4.8 (ref 0–4)
TOTAL BILIRUBIN POC: 0.5 MG/DL (ref 0.2–1.3)
TRIGL SERPL-MCNC: 275 MG/DL (ref 0–200)
VLDLC SERPL CALC-MCNC: 55 MG/DL

## 2017-07-24 RX ORDER — MONTELUKAST SODIUM 4 MG/1
1 TABLET, CHEWABLE ORAL 2 TIMES DAILY
COMMUNITY
End: 2018-02-24 | Stop reason: SDUPTHER

## 2017-07-24 RX ORDER — MUPIROCIN 20 MG/G
OINTMENT TOPICAL DAILY
COMMUNITY
End: 2017-07-24

## 2017-07-24 RX ORDER — CYCLOBENZAPRINE HCL 5 MG
5 TABLET ORAL 2 TIMES DAILY
COMMUNITY
End: 2017-11-02

## 2017-07-24 NOTE — PROGRESS NOTES
Could not reconcile medications, because patient did not know them without her list.        1. Have you been to the ER, urgent care clinic since your last visit? Hospitalized since your last visit? No    2. Have you seen or consulted any other health care providers outside of the [address] [place] [address] since your last visit? Include any pap smears or colon screening.  No

## 2017-07-24 NOTE — PROGRESS NOTES
Subjective:   Shirley Hallman is a 60 y.o. female      Chief Complaint   Patient presents with    Follow-up     6 wk        History of present illness:  Ms. Elton Calhoun returns to the office for follow up. In some ways she is better and in some ways not. She actually looks better. Her blood pressure is improved. She says her blood sugars have been improved as well, although she just recently returned from vacation, where she was not as prudent with her diet. She is still not ambulating, remains in a wheelchair, and I do not find her very motivated to pursue physical therapy and get stronger. She will still go to physical therapy occasionally, but not on a regular basis. She has now developed an ulcer on the lateral aspect of the right fifth toe, which looks as though it needs debridement. She has a routine podiatrist, whom she is scheduled to see and that will really have to be in his purview to be taken care of. In the meantime, they are cleansing it daily and applying betadine as a drying agent. I think this is adequate therapy for right now. If she does not get this attended to she could lose the toe, however. I have made this very clear to her. We have also talked at length about the need to pursue physical therapy and try to get stronger, or she will be wheelchair bound with all the attendant potential problems that entails, including pressure sores, etc.  I hope that I was graphic enough with her today that it will strike a cord and she will begin to try to rehabilitate. She denies new problems with chest pain or shortness of breath. She has noted no increasing lower extremity edema. She denies symptoms of hyper or hypoglycemia. She denies other cardiorespiratory, GI or  symptoms.         Patient Active Problem List   Diagnosis Code    Hypercalcemia E83.52    Diabetes ([facility] E11.9    Obesity E66.9    Right leg weakness R29.898    Diabetes mellitus type II, uncontrolled ([address] E11.65    HTN (hypertension), malignant I10    CKD (chronic kidney disease) stage 3, GFR 30-59 ml/min N18.3    Hyperlipemia E78.5    Diabetic polyneuropathy (HCC) E11.42    Stroke ([facility]) I63.9    ASCVD (arteriosclerotic cardiovascular disease) I25.10    Acute encephalopathy G93.40    Aphasia R47.01    SBO (small bowel obstruction) ([facility]) K56.69    Insomnia G47.00    Sinus tachycardia R00.0    Anemia D64.9    Severe right groin pain R10.30    N&V (nausea and vomiting) R11.2    Ulcer of right fifth toe due to diabetes mellitus ([facility]) E11.621, L97.519    Peripheral neuropathy ([facility]) G62.9    Chronic diastolic CHF (congestive heart failure), NYHA class 3 ([facility]) I50.32    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 2 diabetes mellitus ([facility]) E11.22, N18.2    Stage 2 chronic kidney disease due to benign hypertension I12.9, N18.2    Hypertension I10    BMI 45.0-49.9, adult ([facility]) Z68.42    Acute combined systolic and diastolic heart failure, NYHA class 2 ([facility]) I50.41    Fatty liver K76.0    Stasis dermatitis I87.2    Low back pain M54.5    Aortic stenosis, mild I35.0    Pelvic joint pain M25.559    Bilateral edema of lower extremity R60.0      Past Medical History:   Diagnosis Date    Acute combined systolic and diastolic heart failure, NYHA class 2 ([address] 7/24/2017    Anemia 7/24/2017    Aortic stenosis, mild 7/24/2017    Bilateral edema of lower extremity 7/24/2017    BMI 45.0-49.9, adult ([facility]) 7/24/2017    Chronic diastolic CHF (congestive heart failure), NYHA class 3 ([address] 7/24/2017    CKD (chronic kidney disease) stage 2, GFR 60-89 ml/min     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 2 diabetes mellitus ([address] 7/24/2017    Diabetes ([address]     Fatty liver 7/24/2017    Gastroparesis     GERD (gastroesophageal reflux disease)     Gout     HTN (hypertension)     Hyperlipemia     Hyperparathyroidism ([address]     Hypertension 7/24/2017    Insomnia 7/24/2017    Low back pain 7/24/2017    N&V (nausea and vomiting) 7/24/2017    Nausea & vomiting     Obesity     Pelvic joint pain 7/24/2017    Peripheral neuropathy ([facility] 7/24/2017    Sciatica     right    Severe right groin pain 7/24/2017    Sinus tachycardia 7/24/2017    Stage 2 chronic kidney disease due to benign hypertension 7/24/2017    Stasis dermatitis 7/24/2017    Stroke ([facility] 2001    right parietal, left caudate    Thyroid disease     para thyroid(high calcium)    Ulcer of right fifth toe due to diabetes mellitus ([facility] 7/24/2017      Allergies   Allergen Reactions    Zestril [Lisinopril] Swelling      Family History   Problem Relation Age of Onset    Cancer Mother      cervical    Diabetes Mother     Hypertension Mother     Stroke Mother 79    Diabetes Father     Hypertension Father     Kidney Disease Father     Diabetes Brother     Hypertension Brother     Stroke Brother 61      Social History     Social History    Marital status:      Spouse name: N/A    Number of children: N/A    Years of education: N/A     Occupational History    Not on file. Social History Main Topics    Smoking status: Never Smoker    Smokeless tobacco: Never Used    Alcohol use Yes      Comment: rare    Drug use: Yes     Special: Prescription, OTC    Sexual activity: Not on file     Other Topics Concern    Not on file     Social History Narrative     Prior to Admission medications    Medication Sig Start Date End Date Taking? Authorizing Provider   PEN NEEDLE by Does Not Apply route. Yes Historical Provider   SYRINGE AND NEEDLE,INSULIN,1ML (BD INSULIN SYRINGE) by Does Not Apply route. Yes Historical Provider   BLOOD SUGAR DIAGNOSTIC (FREESTYLE LITE STRIPS) by In Vitro route. Yes Historical Provider   cyclobenzaprine (FLEXERIL) 5 mg tablet Take 5 mg by mouth two (2) times a day. Yes Historical Provider   colestipol (COLESTID) 1 gram tablet Take 1 g by mouth two (2) times a day. Yes Historical Provider   Bifidobacterium Infantis (ALIGN) 4 mg cap Take  by mouth.    Yes Historical Provider   insulin detemir (LEVEMIR) 100 unit/mL injection 60 Units by SubCUTAneous route two (2) times a day. Yes Historical Provider   insulin lispro (HUMALOG) 100 unit/mL kwikpen 22 Units by SubCUTAneous route three (3) times daily (with meals). Yes Historical Provider   hydrALAZINE (APRESOLINE) 50 mg tablet Take 1 Tab by mouth three (3) times daily. Patient taking differently: Take 50 mg by mouth two (2) times a day. 9/16/16  Yes Penelope Odonnell II, MD   acetaminophen (TYLENOL) 325 mg tablet Take 2 Tabs by mouth every four (4) hours as needed. 10/13/15  Yes Humble Olivia MD   allopurinol (ZYLOPRIM) 300 mg tablet Take  by mouth daily. Yes Historical Provider   atenolol (TENORMIN) 50 mg tablet Take  by mouth daily. Yes Historical Provider   pregabalin (LYRICA) 75 mg capsule Take 75 mg by mouth two (2) times a day. Yes Historical Provider   atorvastatin (LIPITOR) 40 mg tablet Take  by mouth daily. Yes Historical Provider   bumetanide (BUMEX) 0.5 mg tablet Take  by mouth two (2) times a day. Yes Historical Provider   Cholecalciferol, Vitamin D3, 2,000 unit cap Take 1 Cap by mouth daily. Yes Historical Provider   omeprazole (PRILOSEC) 40 mg capsule Take 40 mg by mouth daily. Yes Historical Provider   dipyridamole-aspirin (AGGRENOX) 200-25 mg per SR capsule Take 1 Cap by mouth two (2) times a day. Yes Harjeet Corley MD   diltiazem (CARDIZEM CD) 360 mg ER capsule Take 360 mg by mouth daily. Yes Historical Provider   glipiZIDE (GLUCOTROL) 10 mg tablet Take 10 mg by mouth two (2) times a day. Yes Historical Provider        Review of Systems              Constitutional:  She denies fever, weight loss, sweats or fatigue. HEENT:  No blurred or double vision, headache or dizziness. No difficulty with swallowing, taste, speech or smell. Respiratory:  No cough, wheezing or shortness of breath. No sputum production.   Cardiac:  Denies chest pain, palpitations, unexplained indigestion, syncope, edema, PND or orthopnea. GI:  No changes in bowel movements, no abdominal pain, no bloating, anorexia, nausea, vomiting or heartburn. :  No frequency or dysuria. Denies incontinence or sexual dysfunction. Extremities:  No joint pain, stiffness or swelling. Skin:  No recent rashes or mole changes. Ulcer right 5th toe  Neurological:  Weakness right leg; numbness feet    Objective:     Vitals:    07/24/17 0941   BP: 128/78   Height: 5' 1\" (1.549 m)       There is no height or weight on file to calculate BMI. Physical Examination:              General Appearance:  Well-developed, well-nourished, no acute  distress. HEENT:      Ears:  The TMs and ear canals were clear. Eyes:  The pupillary responses were normal.  Extraocular muscle function intact. Lids and conjunctiva not injected. Funduscopic exam revealed sharp disc margins. Pharynx:  Clear with teeth in good repair. No masses were noted. Neck:  Supple without thyromegaly or adenopathy. No JVD noted. No carotid                bruits. Lungs:  Clear to auscultation and percussion. Cardiac:  Regular rate and rhythm without murmur. PMI not displaced. No gallop, rub or click. GI: nontender w/o mass. Normal BS's. Extremities:  No clubbing, cyanosis or edema. Ulcer lateral aspect 5th toe  Skin:  No rash or unusual mole changes noted. Lymph Nodes:  None felt in the cervical, supraclavicular, axillary or inguinal region. Neurological:  Cranial nerves II-XII grossly intact. Weakness 4/5 right leg. Unable to ambulate. Assessment/Plan:   Impressions:      ICD-10-CM ICD-9-CM    1. Uncontrolled type 2 diabetes mellitus with other specified complication (HCC) D08.96 250.82 AMB POC COMPREHENSIVE METABOLIC PANEL    K36.28  AMB POC HEMOGLOBIN (HGB)   2. Hyperlipidemia, unspecified hyperlipidemia type E78.5 272.4 AMB POC LIPID PROFILE      AMB POC HEMOGLOBIN (HGB)   3.  HTN (hypertension), malignant I10 401.0    4. Diabetic polyneuropathy associated with other specified diabetes mellitus (HCC) E13.42 250.60 AMB POC HEMOGLOBIN (HGB)     357.2    5. Cerebrovascular accident (CVA), unspecified mechanism ([facility] I63.9 434.91 AMB POC HEMOGLOBIN (HGB)        Plan:  1. Continue present meds  2. Lifestyle modifications including Na restriction, low carb/fat diet, weight reduction and exercise (at least a walking program). 3. Return in about 3 months (around 10/24/2017) for Diabetes, BP check. 4. Resume PT  5. See podiatrist  6.  Betadine to 5th toe ulcer until sees podiatrist    Orders Placed This Encounter    AMB POC LIPID PROFILE    AMB POC COMPREHENSIVE METABOLIC PANEL    AMB POC HEMOGLOBIN (HGB)       Samara Choe MD

## 2017-07-28 NOTE — PROGRESS NOTES
Tried to leave a message to have patient call back, patient's voice mail box is full on her cell phone and her home phone there is no answer.

## 2017-07-31 RX ORDER — ASPIRIN AND DIPYRIDAMOLE 25; 200 MG/1; MG/1
CAPSULE, EXTENDED RELEASE ORAL
Qty: 60 CAP | Refills: 0 | Status: SHIPPED | OUTPATIENT
Start: 2017-07-31 | End: 2017-09-03 | Stop reason: SDUPTHER

## 2017-08-01 NOTE — PROGRESS NOTES
Patient informed that A1C improved to 8. 5! .  Blood sugar, liver and kidney function normal.  Lipids excellent.  LDL 68.

## 2017-09-24 RX ORDER — HYDRALAZINE HYDROCHLORIDE 50 MG/1
TABLET, FILM COATED ORAL
Qty: 100 TAB | Refills: 0 | Status: SHIPPED | OUTPATIENT
Start: 2017-09-24 | End: 2017-11-19 | Stop reason: SDUPTHER

## 2017-10-11 ENCOUNTER — OFFICE VISIT (OUTPATIENT)
Dept: INTERNAL MEDICINE CLINIC | Age: 62
End: 2017-10-11

## 2017-10-11 VITALS
SYSTOLIC BLOOD PRESSURE: 144 MMHG | OXYGEN SATURATION: 94 % | TEMPERATURE: 98.3 F | HEIGHT: 61 IN | DIASTOLIC BLOOD PRESSURE: 72 MMHG | HEART RATE: 74 BPM

## 2017-10-11 DIAGNOSIS — I10 ESSENTIAL HYPERTENSION: ICD-10-CM

## 2017-10-11 DIAGNOSIS — N18.30 CKD (CHRONIC KIDNEY DISEASE) STAGE 3, GFR 30-59 ML/MIN (HCC): ICD-10-CM

## 2017-10-11 DIAGNOSIS — I87.2 VENOUS STASIS DERMATITIS OF BOTH LOWER EXTREMITIES: ICD-10-CM

## 2017-10-11 DIAGNOSIS — I50.32 CHRONIC DIASTOLIC CHF (CONGESTIVE HEART FAILURE), NYHA CLASS 3 (HCC): ICD-10-CM

## 2017-10-11 DIAGNOSIS — L03.119 CELLULITIS OF LOWER EXTREMITY, UNSPECIFIED LATERALITY: Primary | ICD-10-CM

## 2017-10-11 PROBLEM — M10.9 GOUT: Status: ACTIVE | Noted: 2017-10-11

## 2017-10-11 PROBLEM — Z00.00 PE (PHYSICAL EXAM), ANNUAL: Status: ACTIVE | Noted: 2017-10-11

## 2017-10-11 PROBLEM — R11.2 N&V (NAUSEA AND VOMITING): Status: RESOLVED | Noted: 2017-07-24 | Resolved: 2017-10-11

## 2017-10-11 PROBLEM — K21.9 GERD (GASTROESOPHAGEAL REFLUX DISEASE): Status: ACTIVE | Noted: 2017-10-11

## 2017-10-11 PROBLEM — Z86.39 HISTORY OF HYPERPARATHYROIDISM: Status: ACTIVE | Noted: 2017-10-11

## 2017-10-11 PROBLEM — I50.41: Status: RESOLVED | Noted: 2017-07-24 | Resolved: 2017-10-11

## 2017-10-11 LAB
BUN BLD-MCNC: 44 MG/DL (ref 7–17)
CALCIUM BLD-MCNC: 9.2 MG/DL (ref 8.4–10.2)
CHLORIDE BLD-SCNC: 112 MMOL/L (ref 98–107)
CO2 POC: 23 MMOL/L (ref 22–32)
CREAT BLD-MCNC: 1.4 MG/DL (ref 0.7–1.2)
EGFR (POC): 39.9
GLUCOSE POC: 128 MG/DL (ref 65–105)
GRAN# POC: 8.3 K/UL (ref 2–7.8)
GRAN% POC: 82.7 % (ref 37–92)
HCT VFR BLD CALC: 32.7 % (ref 37–51)
HGB BLD-MCNC: 10.5 G/DL (ref 12–18)
LY# POC: 1.1 K/UL (ref 0.6–4.1)
LY% POC: 11.7 % (ref 10–58.5)
MCH RBC QN: 30 PG (ref 26–32)
MCHC RBC-ENTMCNC: 32 G/DL (ref 30–36)
MCV RBC: 94 FL (ref 80–97)
MID #, POC: 0.5 K/UL (ref 0–1.8)
MID% POC: 5.6 % (ref 0.1–24)
PLATELET # BLD: 308 K/UL (ref 140–440)
POTASSIUM SERPL-SCNC: 4.9 MMOL/L (ref 3.6–5)
RBC # BLD: 3.49 M/UL (ref 4.2–6.3)
SODIUM SERPL-SCNC: 151 MMOL/L (ref 137–145)
WBC # BLD: 9.9 K/UL (ref 4.1–10.9)

## 2017-10-11 RX ORDER — CEPHALEXIN 500 MG/1
500 CAPSULE ORAL 4 TIMES DAILY
Qty: 40 CAP | Refills: 0 | Status: SHIPPED | OUTPATIENT
Start: 2017-10-11 | End: 2017-10-19

## 2017-10-11 RX ORDER — TRIAMCINOLONE ACETONIDE 1 MG/G
CREAM TOPICAL 2 TIMES DAILY
Qty: 80 G | Refills: 3 | Status: SHIPPED | OUTPATIENT
Start: 2017-10-11 | End: 2017-11-02

## 2017-10-11 NOTE — PROGRESS NOTES
Reviewed record in preparation for visit and have obtained necessary documentation. Identified pt with two pt identifiers(name and ). Chief Complaint   Patient presents with    Leg Pain     red and blisters        Coordination of Care Questionnaire:  :     1) Have you been to an emergency room, urgent care clinic since your last visit? No     Hospitalized since your last visit? No               2) Have you seen or consulted any other health care providers outside of [address] [place] [address] since your last visit?  No

## 2017-10-11 NOTE — PROGRESS NOTES
Subjective:   Gricel Acosta is a 60 y.o. female      Chief Complaint   Patient presents with    Leg Pain     red and blisters        History of present illness:  Ms. Shawn Gutierrez returns shortly before her regularly scheduled appointment because of increasing problems with a red, blistering rash of her lower extremities. She's always had moderate edema, which is more brawny than anything. She does have what appears to be a stasis dermatitis with blisters that are now weeping. There is probably some infectious component to this as well. She's had no fever or chills. She's not had any more significant edema than we're seeing today. She's not been doing anything local to her legs at this point. Today the legs were scrubbed vigorously so that all the blisters were essentially broken and good granulation base was noted beneath the blisters. Her  was instructed in similar care and then application of triamcinolone cream and we will also give her an antibiotic. She was also complaining of shortness of breath at the time of the visit and chest xray was obtained, which did not really reveal anything consistent with pneumonia or congestive heart failure. I suspect this is more deconditioning/hypoventilation related to her obesity. At this point I'd like to see her back in a week's time to see how they've done with the current wound care and medications. Her  will help her with the wound care. I've asked them to get back on a probiotic since she's had issues with C-difficile in the past.  We also asked her to increase her Colestid back to twice a day, whereas she had only been taking one tablet maximum daily. She's not been checking her blood sugars very frequently, but says that they are better than usual.  That will be checked again when she returns for her regular follow up. For now we will just go forward with the wound care and antibiotics and see her back in a week's time.         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 2 diabetes mellitus ([facility] 07/24/2017     Priority: 2 - Two    Stage 2 chronic kidney disease due to benign hypertension 07/24/2017     Priority: 2 - Two    Hypertension 07/24/2017     Priority: 2 - Two    Bilateral edema of lower extremity 07/24/2017     Priority: 2 - Two    Acute encephalopathy 10/01/2015     Priority: 2 - Two    ASCVD (arteriosclerotic cardiovascular disease) 05/31/2014     Priority: 2 - Two    Diabetic polyneuropathy ([facility] 04/20/2013     Priority: 2 - Two    Diabetes mellitus type II, uncontrolled ([facility] 04/18/2013     Priority: 2 - Two    Diabetic gastroparesis ([facility]      Priority: 2 - Two    Diabetes ([facility] 01/26/2011     Priority: 2 - Two    GERD (gastroesophageal reflux disease) 10/11/2017     Priority: 3 - Three    Severe right groin pain 07/24/2017     Priority: 3 - Three    Fatty liver 07/24/2017     Priority: 3 - Three    Stasis dermatitis 07/24/2017     Priority: 3 - Three    Aortic stenosis, mild 07/24/2017     Priority: 3 - Three    CKD (chronic kidney disease) stage 3, GFR 30-59 ml/min      Priority: 3 - Three    Hyperlipemia      Priority: 3 - Three    Obesity 01/26/2011     Priority: 3 - Three    Gout 10/11/2017     Priority: 4 - Four    History of hyperparathyroidism 10/11/2017     Priority: 4 - Four    Sinus tachycardia 07/24/2017     Priority: 4 - Four    Low back pain 07/24/2017     Priority: 4 - Four    Insomnia 07/24/2017     Priority: 5 - Five    BMI 45.0-49.9, adult ([facility] 07/24/2017 Priority: 5 - Five    PE (physical exam), annual 10/11/2017      Past Surgical History:   Procedure Laterality Date    CHEST SURGERY PROCEDURE UNLISTED  10/24/13    PARATHYROID EXPLORATION     DELIVERY       HX  SECTION      HX CHOLECYSTECTOMY      HX OTHER SURGICAL      I&D right thigh abscess      Allergies   Allergen Reactions    Zestril [Lisinopril] Swelling      Family History   Problem Relation Age of Onset    Cancer Mother      cervical    Diabetes Mother     Hypertension Mother     Stroke Mother 79    Diabetes Father     Hypertension Father     Kidney Disease Father     Diabetes Brother     Hypertension Brother     Stroke Brother 61      Social History     Social History    Marital status:      Spouse name: N/A    Number of children: N/A    Years of education: N/A     Occupational History    Not on file. Social History Main Topics    Smoking status: Never Smoker    Smokeless tobacco: Never Used    Alcohol use Yes      Comment: rare    Drug use: Yes     Special: Prescription, OTC    Sexual activity: Not on file     Other Topics Concern    Not on file     Social History Narrative     Outpatient Prescriptions Marked as Taking for the 10/11/17 encounter (Office Visit) with Karin Powers MD   Medication Sig Dispense Refill    cephALEXin (KEFLEX) 500 mg capsule Take 1 Cap by mouth four (4) times daily. 40 Cap 0    triamcinolone acetonide (KENALOG) 0.1 % topical cream Apply  to affected area two (2) times a day. use thin layer 80 g 3    hydrALAZINE (APRESOLINE) 50 mg tablet TAKE 1 TABLET BY MOUTH THREE TIMES DAILY 100 Tab 0    aspirin-dipyridamole (AGGRENOX)  mg per SR capsule TAKE 1 CAPSULE BY MOUTH TWICE DAILY( EVERY TWELVE HOURS) 60 Cap 5    PEN NEEDLE by Does Not Apply route.  SYRINGE AND NEEDLE,INSULIN,1ML (BD INSULIN SYRINGE) by Does Not Apply route.  BLOOD SUGAR DIAGNOSTIC (FREESTYLE LITE STRIPS) by In Vitro route.       cyclobenzaprine (FLEXERIL) 5 mg tablet Take 5 mg by mouth two (2) times a day.  colestipol (COLESTID) 1 gram tablet Take 1 g by mouth two (2) times a day.  Bifidobacterium Infantis (ALIGN) 4 mg cap Take  by mouth.  insulin detemir (LEVEMIR) 100 unit/mL injection 60 Units by SubCUTAneous route two (2) times a day.  insulin lispro (HUMALOG) 100 unit/mL kwikpen 22 Units by SubCUTAneous route three (3) times daily (with meals).  acetaminophen (TYLENOL) 325 mg tablet Take 2 Tabs by mouth every four (4) hours as needed.  allopurinol (ZYLOPRIM) 300 mg tablet Take  by mouth daily.  atenolol (TENORMIN) 50 mg tablet Take  by mouth daily.  pregabalin (LYRICA) 75 mg capsule Take 75 mg by mouth two (2) times a day.  atorvastatin (LIPITOR) 40 mg tablet Take  by mouth daily.  bumetanide (BUMEX) 0.5 mg tablet Take  by mouth two (2) times a day.  Cholecalciferol, Vitamin D3, 2,000 unit cap Take 1 Cap by mouth daily.  omeprazole (PRILOSEC) 40 mg capsule Take 40 mg by mouth daily.  diltiazem (CARDIZEM CD) 360 mg ER capsule Take 360 mg by mouth daily.  glipiZIDE (GLUCOTROL) 10 mg tablet Take 10 mg by mouth two (2) times a day. Review of Systems              Constitutional:  She denies fever, weight loss, sweats or fatigue. HEENT:  No blurred or double vision, headache or dizziness. No difficulty with swallowing, taste, speech or smell. Respiratory:  No cough, wheezing or shortness of breath. No sputum production. Cardiac:  Denies chest pain, palpitations, unexplained indigestion, syncope, edema, PND or orthopnea. GI:  No changes in bowel movements, no abdominal pain, no bloating, anorexia, nausea, vomiting or heartburn. :  No frequency or dysuria. Denies incontinence. Extremities:  No joint pain, stiffness or swelling. Skin: blistering red rash on legs.   Neurological: lower extremity weakness R>L leg    Objective:     Vitals:    10/11/17 1308 BP: 144/72   Pulse: 74   Temp: 98.3 °F (36.8 °C)   TempSrc: Oral   SpO2: 94%   Height: 5' 1\" (1.549 m)   PainSc:   0 - No pain       There is no height or weight on file to calculate BMI. Physical Examination:              General Appearance:  Well-developed, well-nourished, no acute  distress. HEENT:      Ears:  The TMs and ear canals were clear. Eyes:  The pupillary responses were normal.  Extraocular muscle function intact. Lids and conjunctiva not injected. Neck:  Supple without thyromegaly or adenopathy. No JVD noted. Lungs:  Clear to auscultation and percussion. Cardiac:  Regular rate and rhythm without murmur. GI: nontender w/o mass. Normal BS's. Extremities:  No clubbing, cyanosis or edema. Skin:  Stasis dermatitis and cellulitis both legs with bullae and weeping. Bullae scrubbed away. Neurological:  Grossly normal.            Assessment/Plan:   Impressions:      ICD-10-CM ICD-9-CM    1. Cellulitis of lower extremity, unspecified laterality L03.119 682.6 AMB POC COMPLETE CBC,AUTOMATED ENTER   2. Venous stasis dermatitis of both lower extremities I87.2 454.1 AMB POC COMPLETE CBC,AUTOMATED ENTER   3. Chronic diastolic CHF (congestive heart failure), NYHA class 3 ([facility]) I50.32 428.32 AMB POC BASIC METABOLIC PANEL      XR CHEST SNGL V   4. CKD (chronic kidney disease) stage 3, GFR 30-59 ml/min N18.3 585.3 AMB POC BASIC METABOLIC PANEL   5. Essential hypertension I10 401.9 AMB POC BASIC METABOLIC PANEL   6. Uncontrolled type 2 diabetes mellitus with other specified complication, with long-term current use of insulin ([facility]) E11.69 250.82 AMB POC BASIC METABOLIC PANEL    G57.68 Z84.45     Z79.4          Plan:  1. Continue present meds  2. Lifestyle modifications including Na restriction, low carb/fat diet, weight reduction and exercise (at least a walking program).   3. Wound care with srudding and drying legs and applying Triamcinilone BID        Follow-up Disposition:  Return in about 1 week (around 10/18/2017). Orders Placed This Encounter    XR CHEST SNGL V     Standing Status:   Future     Number of Occurrences:   1     Standing Expiration Date:   11/11/2018     Order Specific Question:   Reason for Exam     Answer:   sob    AMB POC BASIC METABOLIC PANEL    AMB POC COMPLETE CBC,AUTOMATED ENTER    cephALEXin (KEFLEX) 500 mg capsule     Sig: Take 1 Cap by mouth four (4) times daily. Dispense:  40 Cap     Refill:  0    triamcinolone acetonide (KENALOG) 0.1 % topical cream     Sig: Apply  to affected area two (2) times a day.  use thin layer     Dispense:  80 g     Refill:  3       Bharathi Paez MD

## 2017-10-13 NOTE — PROGRESS NOTES
Patient informed that . Kidney's OK. Hgb a bit low. Needs Slow FE daily. Na a bit high.   Push fluids

## 2017-10-16 RX ORDER — ATORVASTATIN CALCIUM 40 MG/1
TABLET, FILM COATED ORAL
Qty: 90 TAB | Refills: 0 | Status: SHIPPED | OUTPATIENT
Start: 2017-10-16 | End: 2018-01-27 | Stop reason: SDUPTHER

## 2017-10-19 ENCOUNTER — OFFICE VISIT (OUTPATIENT)
Dept: INTERNAL MEDICINE CLINIC | Age: 62
End: 2017-10-19

## 2017-10-19 VITALS
OXYGEN SATURATION: 95 % | TEMPERATURE: 97.8 F | SYSTOLIC BLOOD PRESSURE: 140 MMHG | DIASTOLIC BLOOD PRESSURE: 84 MMHG | HEART RATE: 76 BPM

## 2017-10-19 DIAGNOSIS — R60.0 BILATERAL EDEMA OF LOWER EXTREMITY: ICD-10-CM

## 2017-10-19 DIAGNOSIS — I87.2 VENOUS STASIS DERMATITIS OF BOTH LOWER EXTREMITIES: ICD-10-CM

## 2017-10-19 DIAGNOSIS — G63 POLYNEUROPATHY ASSOCIATED WITH UNDERLYING DISEASE (HCC): ICD-10-CM

## 2017-10-19 DIAGNOSIS — Z23 ENCOUNTER FOR IMMUNIZATION: Primary | ICD-10-CM

## 2017-10-19 NOTE — PROGRESS NOTES
Reviewed record in preparation for visit and have obtained necessary documentation. Identified pt with two pt identifiers(name and ). Chief Complaint   Patient presents with    Follow-up     1 wk        Coordination of Care Questionnaire:  :     1) Have you been to an emergency room, urgent care clinic since your last visit? No    Hospitalized since your last visit? No              2) Have you seen or consulted any other health care providers outside of [address] [place] [address] since your last visit? No            Velind Citizen who present for routine immunization, flu shot. She denies any symptoms , reactions or allergies that would exclude them from being immunized today. Risks and adverse reactions were discussed and the VIS was given to them. All questions were addressed. She was observed for 15 min post injection. There were no reactions observed.     Cathy Akins LPN

## 2017-10-19 NOTE — PROGRESS NOTES
Subjective:   Yenifer Esparza is a 60 y.o. female      Chief Complaint   Patient presents with    Follow-up     1 wk        History of present illness:  Ms. Sharyle Shorter returns in follow up. She was in last week with very significant stasis dermatitis changes, particularly involving her left leg more so than her right. She continues with weakness in both legs, right more so than left.  has been scrubbing her legs with soap and water daily and applying Triamcinolone. She is also completing a course of Keflex for any superimposed cellulitis. She's had no fever or chills. We did get lab work last visit and found her hemoglobin down to 10.9 and I suggested she start Slow Fe, which she has not done as yet. Her sodium was a bit high and we asked her to push fluids as well. For now will continue the local care. She is due back in three weeks time, at which point she'll be due check of her A1c, kidney function, sodium and hemoglobin again. She has remained compliant with her other medications. She has no other new issues today.         Patient Active Problem List    Diagnosis Date Noted    Chronic diastolic CHF (congestive heart failure), NYHA class 3 ([facility] 07/24/2017     Priority: 1 - One    Stroke ([facility] 05/30/2014     Priority: 1 - One    Right leg weakness 04/18/2013     Priority: 1 - One    Anemia 07/24/2017     Priority: 2 - Two    Ulcer of right fifth toe due to diabetes mellitus ([facility] 07/24/2017     Priority: 2 - Two    Peripheral neuropathy 07/24/2017     Priority: 2 - Two    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 2 diabetes mellitus ([facility] 07/24/2017     Priority: 2 - Two    Stage 2 chronic kidney disease due to benign hypertension 07/24/2017     Priority: 2 - Two    Hypertension 07/24/2017     Priority: 2 - Two    Bilateral edema of lower extremity 07/24/2017     Priority: 2 - Two    Acute encephalopathy 10/01/2015     Priority: 2 - Two    ASCVD (arteriosclerotic cardiovascular disease) 05/31/2014 Priority: 2 - Two    Diabetic polyneuropathy ([facility] 2013     Priority: 2 - Two    Diabetes mellitus type II, uncontrolled ([facility] 2013     Priority: 2 - Two    Diabetic gastroparesis ([facility]      Priority: 2 - Two    Diabetes ([facility] 2011     Priority: 2 - Two    GERD (gastroesophageal reflux disease) 10/11/2017     Priority: 3 - Three    Severe right groin pain 2017     Priority: 3 - Three    Fatty liver 2017     Priority: 3 - Three    Stasis dermatitis 2017     Priority: 3 - Three    Aortic stenosis, mild 2017     Priority: 3 - Three    CKD (chronic kidney disease) stage 3, GFR 30-59 ml/min      Priority: 3 - Three    Hyperlipemia      Priority: 3 - Three    Obesity 2011     Priority: 3 - Three    Gout 10/11/2017     Priority: 4 - Four    History of hyperparathyroidism 10/11/2017     Priority: 4 - Four    Sinus tachycardia 2017     Priority: 4 - Four    Low back pain 2017     Priority: 4 - Four    Insomnia 2017     Priority: 5 - Five    BMI 45.0-49.9, adult ([facility] 2017     Priority: 5 - Five    PE (physical exam), annual 10/11/2017      Past Surgical History:   Procedure Laterality Date    CHEST SURGERY PROCEDURE UNLISTED  10/24/13    PARATHYROID EXPLORATION     DELIVERY       HX  SECTION      HX CHOLECYSTECTOMY      HX OTHER SURGICAL      I&D right thigh abscess      Allergies   Allergen Reactions    Zestril [Lisinopril] Swelling      Family History   Problem Relation Age of Onset    Cancer Mother      cervical    Diabetes Mother     Hypertension Mother     Stroke Mother 79    Diabetes Father     Hypertension Father     Kidney Disease Father     Diabetes Brother     Hypertension Brother     Stroke Brother 61      Social History     Social History    Marital status:      Spouse name: N/A    Number of children: N/A    Years of education: N/A     Occupational History    Not on file. Social History Main Topics    Smoking status: Never Smoker    Smokeless tobacco: Never Used    Alcohol use Yes      Comment: rare    Drug use: Yes     Special: Prescription, OTC    Sexual activity: Not on file     Other Topics Concern    Not on file     Social History Narrative          Review of Systems              Constitutional:  She denies fever, weight loss, sweats or fatigue. HEENT:  No blurred or double vision, headache or dizziness. No difficulty with swallowing, taste, speech or smell. Respiratory:  No cough, wheezing or shortness of breath. No sputum production. Cardiac:  Denies chest pain, palpitations, unexplained indigestion, syncope, edema, PND or orthopnea. GI:  No changes in bowel movements, no abdominal pain, no bloating, anorexia, nausea, vomiting or heartburn. :  No frequency or dysuria. Denies incontinence. Extremities:  No joint pain, stiffness or swelling. Skin:  Rash on legs. Neurological:  Leg weakness    Objective:     Vitals:    10/19/17 1613   BP: 140/84   Pulse: 76   Temp: 97.8 °F (36.6 °C)   TempSrc: Oral   SpO2: 95%       There is no height or weight on file to calculate BMI. Physical Examination:              General Appearance:  Well-developed, well-nourished, no acute  distress. HEENT:      Ears:  The TMs and ear canals were clear. Eyes:  The pupillary responses were normal.  Extraocular muscle function intact. Lids and conjunctiva not injected. Neck:  Supple without thyromegaly or adenopathy. No JVD noted. Lungs:  Clear to auscultation and percussion. Cardiac:  Regular rate and rhythm without murmur. GI: nontender w/o mass. Normal BS's. Extremities:  No clubbing, cyanosis or edema. Skin:  marked stasis changes persist    Neurological:  Leg weakness R>L            Assessment/Plan:   Impressions:      ICD-10-CM ICD-9-CM    1. Encounter for immunization Z23 V03.89 INFLUENZA VIRUS VACCINE, HIGH DOSE SEASONAL, PRESERVATIVE FREE   2. Venous stasis dermatitis of both lower extremities I87.2 454.1    3. [facility]urop[facility] with [facility] disease [facility] G63 357.4    4. Bilateral edema of lower extremity R60.0 782. 3         Plan:  1. Continue present meds  2. Lifestyle modifications including Na restriction, low carb/fat diet, weight reduction and exercise (at least a walking program). Follow-up Disposition:  Return in about 3 weeks (around 11/9/2017) for Diabetes, BP check.       Orders Placed This Encounter    Influenza virus vaccine (FLUZONE HIGH-DOSE) 65 years and older       Sebastian Irwin MD

## 2017-10-19 NOTE — PATIENT INSTRUCTIONS
Vaccine Information Statement    Influenza (Flu) Vaccine (Inactivated or Recombinant): What you need to know    Many Vaccine Information Statements are available in Portuguese and other languages. See www.immunize.org/vis  Hojas de Información Sobre Vacunas están disponibles en Español y en muchos otros idiomas. Visite www.immunize.org/vis    1. Why get vaccinated? Influenza (flu) is a contagious disease that spreads around the [place] every year, usually between October and May. Flu is caused by influenza viruses, and is spread mainly by coughing, sneezing, and close contact. Anyone can get flu. Flu strikes suddenly and can last several days. Symptoms vary by age, but can include:   fever/chills   sore throat   muscle aches   fatigue   cough   headache    runny or stuffy nose    Flu can also lead to pneumonia and blood infections, and cause diarrhea and seizures in children. If you have a medical condition, such as heart or lung disease, flu can make it worse. Flu is more dangerous for some people. Infants and young children, people 72years of age and older, pregnant women, and people with certain health conditions or a weakened immune system are at greatest risk. Each year thousands of people in the [facility] die from flu, and many more are hospitalized. Flu vaccine can:   keep you from getting flu,   make flu less severe if you do get it, and   keep you from spreading flu to your family and other people. 2. Inactivated and recombinant flu vaccines    A dose of flu vaccine is recommended every flu season. Children 6 months through 6years of age may need two doses during the same flu season. Everyone else needs only one dose each flu season.        Some inactivated flu vaccines contain a very small amount of a mercury-based preservative called thimerosal. Studies have not shown thimerosal in vaccines to be harmful, but flu vaccines that do not contain thimerosal are available. There is no live flu virus in flu shots. They cannot cause the flu. There are many flu viruses, and they are always changing. Each year a new flu vaccine is made to protect against three or four viruses that are likely to cause disease in the upcoming flu season. But even when the vaccine doesnt exactly match these viruses, it may still provide some protection    Flu vaccine cannot prevent:   flu that is caused by a virus not covered by the vaccine, or   illnesses that look like flu but are not. It takes about 2 weeks for protection to develop after vaccination, and protection lasts through the flu season. 3. Some people should not get this vaccine    Tell the person who is giving you the vaccine:     If you have any severe, life-threatening allergies. If you ever had a life-threatening allergic reaction after a dose of flu vaccine, or have a severe allergy to any part of this vaccine, you may be advised not to get vaccinated. Most, but not all, types of flu vaccine contain a small amount of egg protein.  If you ever had Guillain-Barré Syndrome (also called GBS). Some people with a history of GBS should not get this vaccine. This should be discussed with your doctor.  If you are not feeling well. It is usually okay to get flu vaccine when you have a mild illness, but you might be asked to come back when you feel better. 4. Risks of a vaccine reaction    With any medicine, including vaccines, there is a chance of reactions. These are usually mild and go away on their own, but serious reactions are also possible. Most people who get a flu shot do not have any problems with it.      Minor problems following a flu shot include:    soreness, redness, or swelling where the shot was given     hoarseness   sore, red or itchy eyes   cough   fever   aches   headache   itching   fatigue  If these problems occur, they usually begin soon after the shot and last 1 or 2 days. More serious problems following a flu shot can include the following:     There may be a small increased risk of Guillain-Barré Syndrome (GBS) after inactivated flu vaccine. This risk has been estimated at 1 or 2 additional cases per million people vaccinated. This is much lower than the risk of severe complications from flu, which can be prevented by flu vaccine.  Young children who get the flu shot along with pneumococcal vaccine (PCV13) and/or DTaP vaccine at the same time might be slightly more likely to have a seizure caused by fever. Ask your doctor for more information. Tell your doctor if a child who is getting flu vaccine has ever had a seizure. Problems that could happen after any injected vaccine:      People sometimes faint after a medical procedure, including vaccination. Sitting or lying down for about 15 minutes can help prevent fainting, and injuries caused by a fall. Tell your doctor if you feel dizzy, or have vision changes or ringing in the ears.  Some people get severe pain in the shoulder and have difficulty moving the arm where a shot was given. This happens very rarely.  Any medication can cause a severe allergic reaction. Such reactions from a vaccine are very rare, estimated at about 1 in a million doses, and would happen within a few minutes to a few hours after the vaccination. As with any medicine, there is a very remote chance of a vaccine causing a serious injury or death. The safety of vaccines is always being monitored. For more information, visit: www.cdc.gov/vaccinesafety/    5. What if there is a serious reaction? What should I look for?  Look for anything that concerns you, such as signs of a severe allergic reaction, very high fever, or unusual behavior.     Signs of a severe allergic reaction can include hives, swelling of the face and throat, difficulty breathing, a fast heartbeat, dizziness, and weakness - usually within a few minutes to a few hours after the vaccination. What should I do?  If you think it is a severe allergic reaction or other emergency that cant wait, call 9-1-1 and get the person to the nearest hospital. Otherwise, call your doctor.  Reactions should be reported to the Vaccine Adverse Event Reporting System (VAERS). Your doctor should file this report, or you can do it yourself through  the VAERS web site at www.vaers. [facility].gov, or by calling 4-846.228.5992. VAERS does not give medical advice. 6. The National Vaccine Injury Compensation Program    The [facility] Vaccine Injury Compensation Program (VICP) is a federal program that was created to compensate people who may have been injured by certain vaccines. Persons who believe they may have been injured by a vaccine can learn about the program and about filing a claim by calling 8-103.431.6783 or visiting the [employer] website at www.[facility].gov/vaccinecompensation. There is a time limit to file a claim for compensation. 7. How can I learn more?  Ask your healthcare provider. He or she can give you the vaccine package insert or suggest other sources of information.  Call your local or state health department.  Contact the Centers for Disease Control and Prevention (CDC):  - Call 1-790.733.7171 (1-800-CDC-INFO) or  - Visit CDCs website at www.cdc.gov/flu    Vaccine Information Statement   Inactivated Influenza Vaccine   8/7/2015  42 GIANCARLO Leo Vallejo [id number]    Department of Health and Human Services  Centers for Disease Control and Prevention    Office Use Only

## 2017-10-26 ENCOUNTER — OFFICE VISIT (OUTPATIENT)
Dept: INTERNAL MEDICINE CLINIC | Age: 62
End: 2017-10-26

## 2017-10-26 ENCOUNTER — APPOINTMENT (OUTPATIENT)
Dept: GENERAL RADIOLOGY | Age: 62
DRG: 603 | End: 2017-10-26
Attending: INTERNAL MEDICINE
Payer: COMMERCIAL

## 2017-10-26 ENCOUNTER — HOSPITAL ENCOUNTER (INPATIENT)
Age: 62
LOS: 7 days | Discharge: HOME | DRG: 603 | End: 2017-11-02
Attending: INTERNAL MEDICINE | Admitting: INTERNAL MEDICINE
Payer: COMMERCIAL

## 2017-10-26 VITALS
HEART RATE: 80 BPM | TEMPERATURE: 98.1 F | OXYGEN SATURATION: 94 % | SYSTOLIC BLOOD PRESSURE: 140 MMHG | DIASTOLIC BLOOD PRESSURE: 76 MMHG

## 2017-10-26 DIAGNOSIS — R60.0 BILATERAL EDEMA OF LOWER EXTREMITY: ICD-10-CM

## 2017-10-26 DIAGNOSIS — L02.416 CELLULITIS AND ABSCESS OF LEFT LOWER EXTREMITY: ICD-10-CM

## 2017-10-26 DIAGNOSIS — E11.620: ICD-10-CM

## 2017-10-26 DIAGNOSIS — E53.8 B12 DEFICIENCY: ICD-10-CM

## 2017-10-26 DIAGNOSIS — R29.898 RIGHT LEG WEAKNESS: ICD-10-CM

## 2017-10-26 DIAGNOSIS — L03.119 CELLULITIS AND ABSCESS OF LOWER EXTREMITY: Primary | ICD-10-CM

## 2017-10-26 DIAGNOSIS — I50.30 (HFPEF) HEART FAILURE WITH PRESERVED EJECTION FRACTION (HCC): ICD-10-CM

## 2017-10-26 DIAGNOSIS — L02.419 CELLULITIS AND ABSCESS OF LOWER EXTREMITY: Primary | ICD-10-CM

## 2017-10-26 DIAGNOSIS — L03.116 CELLULITIS AND ABSCESS OF LEFT LOWER EXTREMITY: ICD-10-CM

## 2017-10-26 DIAGNOSIS — I63.9 CEREBROVASCULAR ACCIDENT (CVA), UNSPECIFIED MECHANISM (HCC): ICD-10-CM

## 2017-10-26 DIAGNOSIS — E11.65 TYPE 2 DIABETES MELLITUS WITH HYPERGLYCEMIA, WITH LONG-TERM CURRENT USE OF INSULIN (HCC): ICD-10-CM

## 2017-10-26 DIAGNOSIS — R53.1 WEAKNESS OF ONE SIDE OF BODY: ICD-10-CM

## 2017-10-26 DIAGNOSIS — Z79.4 TYPE 2 DIABETES MELLITUS WITH HYPERGLYCEMIA, WITH LONG-TERM CURRENT USE OF INSULIN (HCC): ICD-10-CM

## 2017-10-26 DIAGNOSIS — D64.9 ANEMIA, UNSPECIFIED TYPE: ICD-10-CM

## 2017-10-26 DIAGNOSIS — I10 ESSENTIAL HYPERTENSION: ICD-10-CM

## 2017-10-26 DIAGNOSIS — I50.32 CHRONIC DIASTOLIC CHF (CONGESTIVE HEART FAILURE), NYHA CLASS 3 (HCC): ICD-10-CM

## 2017-10-26 PROBLEM — R10.31 SEVERE RIGHT GROIN PAIN: Status: RESOLVED | Noted: 2017-07-24 | Resolved: 2017-10-26

## 2017-10-26 LAB
ALBUMIN SERPL-MCNC: 3.2 G/DL (ref 3.5–5)
ALBUMIN/GLOB SERPL: 0.8 {RATIO} (ref 1.1–2.2)
ALP SERPL-CCNC: 114 U/L (ref 45–117)
ALT SERPL-CCNC: 18 U/L (ref 12–78)
ANION GAP SERPL CALC-SCNC: 6 MMOL/L (ref 5–15)
APPEARANCE UR: CLEAR
APTT PPP: 24 SEC (ref 22.1–32.5)
AST SERPL-CCNC: 13 U/L (ref 15–37)
BACTERIA URNS QL MICRO: NEGATIVE /HPF
BASOPHILS # BLD: 0 K/UL (ref 0–0.1)
BASOPHILS NFR BLD: 0 % (ref 0–1)
BILIRUB SERPL-MCNC: 0.2 MG/DL (ref 0.2–1)
BILIRUB UR QL: NEGATIVE
BUN SERPL-MCNC: 44 MG/DL (ref 6–20)
BUN/CREAT SERPL: 32 (ref 12–20)
CALCIUM SERPL-MCNC: 8.8 MG/DL (ref 8.5–10.1)
CHLORIDE SERPL-SCNC: 111 MMOL/L (ref 97–108)
CO2 SERPL-SCNC: 28 MMOL/L (ref 21–32)
COLOR UR: ABNORMAL
CREAT SERPL-MCNC: 1.39 MG/DL (ref 0.55–1.02)
EOSINOPHIL # BLD: 0.4 K/UL (ref 0–0.4)
EOSINOPHIL NFR BLD: 3 % (ref 0–7)
EPITH CASTS URNS QL MICRO: ABNORMAL /LPF
ERYTHROCYTE [DISTWIDTH] IN BLOOD BY AUTOMATED COUNT: 17.7 % (ref 11.5–14.5)
GLOBULIN SER CALC-MCNC: 3.9 G/DL (ref 2–4)
GLUCOSE BLD STRIP.AUTO-MCNC: 123 MG/DL (ref 65–100)
GLUCOSE BLD STRIP.AUTO-MCNC: 159 MG/DL (ref 65–100)
GLUCOSE SERPL-MCNC: 133 MG/DL (ref 65–100)
GLUCOSE UR STRIP.AUTO-MCNC: NEGATIVE MG/DL
HCT VFR BLD AUTO: 34.4 % (ref 35–47)
HGB BLD-MCNC: 10.3 G/DL (ref 11.5–16)
HGB UR QL STRIP: NEGATIVE
INR PPP: 0.9 (ref 0.9–1.1)
KETONES UR QL STRIP.AUTO: NEGATIVE MG/DL
LEUKOCYTE ESTERASE UR QL STRIP.AUTO: NEGATIVE
LYMPHOCYTES # BLD: 1 K/UL (ref 0.8–3.5)
LYMPHOCYTES NFR BLD: 9 % (ref 12–49)
MAGNESIUM SERPL-MCNC: 2.2 MG/DL (ref 1.6–2.4)
MCH RBC QN AUTO: 28.9 PG (ref 26–34)
MCHC RBC AUTO-ENTMCNC: 29.9 G/DL (ref 30–36.5)
MCV RBC AUTO: 96.6 FL (ref 80–99)
MONOCYTES # BLD: 0.6 K/UL (ref 0–1)
MONOCYTES NFR BLD: 5 % (ref 5–13)
NEUTS SEG # BLD: 10.1 K/UL (ref 1.8–8)
NEUTS SEG NFR BLD: 83 % (ref 32–75)
NITRITE UR QL STRIP.AUTO: NEGATIVE
PH UR STRIP: 7.5 [PH] (ref 5–8)
PLATELET # BLD AUTO: 318 K/UL (ref 150–400)
POTASSIUM SERPL-SCNC: 4.8 MMOL/L (ref 3.5–5.1)
PROT SERPL-MCNC: 7.1 G/DL (ref 6.4–8.2)
PROT UR STRIP-MCNC: 300 MG/DL
PROTHROMBIN TIME: 9.4 SEC (ref 9–11.1)
RBC # BLD AUTO: 3.56 M/UL (ref 3.8–5.2)
RBC #/AREA URNS HPF: ABNORMAL /HPF (ref 0–5)
SERVICE CMNT-IMP: ABNORMAL
SERVICE CMNT-IMP: ABNORMAL
SODIUM SERPL-SCNC: 145 MMOL/L (ref 136–145)
SP GR UR REFRACTOMETRY: 1.02 (ref 1–1.03)
THERAPEUTIC RANGE,PTTT: NORMAL SECS (ref 58–77)
TSH SERPL DL<=0.05 MIU/L-ACNC: 4.75 UIU/ML (ref 0.36–3.74)
UROBILINOGEN UR QL STRIP.AUTO: 0.2 EU/DL (ref 0.2–1)
WBC # BLD AUTO: 12.2 K/UL (ref 3.6–11)
WBC URNS QL MICRO: ABNORMAL /HPF (ref 0–4)

## 2017-10-26 PROCEDURE — 85025 COMPLETE CBC W/AUTO DIFF WBC: CPT | Performed by: INTERNAL MEDICINE

## 2017-10-26 PROCEDURE — 74011000258 HC RX REV CODE- 258: Performed by: INTERNAL MEDICINE

## 2017-10-26 PROCEDURE — 83735 ASSAY OF MAGNESIUM: CPT | Performed by: INTERNAL MEDICINE

## 2017-10-26 PROCEDURE — 71020 XR CHEST PA LAT: CPT

## 2017-10-26 PROCEDURE — 80053 COMPREHEN METABOLIC PANEL: CPT | Performed by: INTERNAL MEDICINE

## 2017-10-26 PROCEDURE — 85610 PROTHROMBIN TIME: CPT | Performed by: INTERNAL MEDICINE

## 2017-10-26 PROCEDURE — 65270000029 HC RM PRIVATE

## 2017-10-26 PROCEDURE — 87186 SC STD MICRODIL/AGAR DIL: CPT | Performed by: INTERNAL MEDICINE

## 2017-10-26 PROCEDURE — 84443 ASSAY THYROID STIM HORMONE: CPT | Performed by: INTERNAL MEDICINE

## 2017-10-26 PROCEDURE — 73620 X-RAY EXAM OF FOOT: CPT

## 2017-10-26 PROCEDURE — 74011636637 HC RX REV CODE- 636/637: Performed by: INTERNAL MEDICINE

## 2017-10-26 PROCEDURE — 73630 X-RAY EXAM OF FOOT: CPT

## 2017-10-26 PROCEDURE — 93005 ELECTROCARDIOGRAM TRACING: CPT

## 2017-10-26 PROCEDURE — 82962 GLUCOSE BLOOD TEST: CPT

## 2017-10-26 PROCEDURE — 87086 URINE CULTURE/COLONY COUNT: CPT | Performed by: INTERNAL MEDICINE

## 2017-10-26 PROCEDURE — 85730 THROMBOPLASTIN TIME PARTIAL: CPT | Performed by: INTERNAL MEDICINE

## 2017-10-26 PROCEDURE — 73610 X-RAY EXAM OF ANKLE: CPT

## 2017-10-26 PROCEDURE — 74011250636 HC RX REV CODE- 250/636: Performed by: INTERNAL MEDICINE

## 2017-10-26 PROCEDURE — 87040 BLOOD CULTURE FOR BACTERIA: CPT | Performed by: INTERNAL MEDICINE

## 2017-10-26 PROCEDURE — 81001 URINALYSIS AUTO W/SCOPE: CPT | Performed by: INTERNAL MEDICINE

## 2017-10-26 PROCEDURE — 74011250637 HC RX REV CODE- 250/637: Performed by: INTERNAL MEDICINE

## 2017-10-26 PROCEDURE — 87077 CULTURE AEROBIC IDENTIFY: CPT | Performed by: INTERNAL MEDICINE

## 2017-10-26 PROCEDURE — 36415 COLL VENOUS BLD VENIPUNCTURE: CPT | Performed by: INTERNAL MEDICINE

## 2017-10-26 RX ORDER — ENOXAPARIN SODIUM 100 MG/ML
40 INJECTION SUBCUTANEOUS EVERY 24 HOURS
Status: DISCONTINUED | OUTPATIENT
Start: 2017-10-26 | End: 2017-10-27

## 2017-10-26 RX ORDER — CEFEPIME HYDROCHLORIDE 1 G/1
1 INJECTION, POWDER, FOR SOLUTION INTRAMUSCULAR; INTRAVENOUS EVERY 12 HOURS
Status: DISCONTINUED | OUTPATIENT
Start: 2017-10-26 | End: 2017-10-26

## 2017-10-26 RX ORDER — GLIPIZIDE 5 MG/1
10 TABLET ORAL 2 TIMES DAILY
Status: DISCONTINUED | OUTPATIENT
Start: 2017-10-26 | End: 2017-10-27

## 2017-10-26 RX ORDER — MONTELUKAST SODIUM 4 MG/1
1 TABLET, CHEWABLE ORAL 2 TIMES DAILY
Status: DISCONTINUED | OUTPATIENT
Start: 2017-10-26 | End: 2017-11-02 | Stop reason: HOSPADM

## 2017-10-26 RX ORDER — SODIUM CHLORIDE 0.9 % (FLUSH) 0.9 %
5-10 SYRINGE (ML) INJECTION AS NEEDED
Status: DISCONTINUED | OUTPATIENT
Start: 2017-10-26 | End: 2017-10-30

## 2017-10-26 RX ORDER — ASPIRIN AND DIPYRIDAMOLE 25; 200 MG/1; MG/1
1 CAPSULE, EXTENDED RELEASE ORAL 2 TIMES DAILY
Status: DISCONTINUED | OUTPATIENT
Start: 2017-10-26 | End: 2017-11-02 | Stop reason: HOSPADM

## 2017-10-26 RX ORDER — MAGNESIUM SULFATE 100 %
4 CRYSTALS MISCELLANEOUS AS NEEDED
Status: DISCONTINUED | OUTPATIENT
Start: 2017-10-26 | End: 2017-11-02 | Stop reason: HOSPADM

## 2017-10-26 RX ORDER — HYDRALAZINE HYDROCHLORIDE 50 MG/1
50 TABLET, FILM COATED ORAL 3 TIMES DAILY
Status: DISCONTINUED | OUTPATIENT
Start: 2017-10-26 | End: 2017-11-02 | Stop reason: HOSPADM

## 2017-10-26 RX ORDER — DILTIAZEM HYDROCHLORIDE 180 MG/1
360 CAPSULE, COATED, EXTENDED RELEASE ORAL DAILY
Status: DISCONTINUED | OUTPATIENT
Start: 2017-10-27 | End: 2017-11-02 | Stop reason: HOSPADM

## 2017-10-26 RX ORDER — ACETAMINOPHEN 325 MG/1
650 TABLET ORAL
Status: DISCONTINUED | OUTPATIENT
Start: 2017-10-26 | End: 2017-11-02 | Stop reason: HOSPADM

## 2017-10-26 RX ORDER — INSULIN GLARGINE 100 [IU]/ML
50 INJECTION, SOLUTION SUBCUTANEOUS 2 TIMES DAILY
Status: DISCONTINUED | OUTPATIENT
Start: 2017-10-26 | End: 2017-10-28

## 2017-10-26 RX ORDER — DEXTROSE 50 % IN WATER (D50W) INTRAVENOUS SYRINGE
12.5-25 AS NEEDED
Status: DISCONTINUED | OUTPATIENT
Start: 2017-10-26 | End: 2017-11-02 | Stop reason: HOSPADM

## 2017-10-26 RX ORDER — INSULIN LISPRO 100 [IU]/ML
INJECTION, SOLUTION INTRAVENOUS; SUBCUTANEOUS
Status: DISCONTINUED | OUTPATIENT
Start: 2017-10-26 | End: 2017-11-02 | Stop reason: HOSPADM

## 2017-10-26 RX ORDER — VANCOMYCIN 2 GRAM/500 ML IN 0.9 % SODIUM CHLORIDE INTRAVENOUS
2000 ONCE
Status: DISCONTINUED | OUTPATIENT
Start: 2017-10-26 | End: 2017-10-26 | Stop reason: DRUGHIGH

## 2017-10-26 RX ORDER — ATENOLOL 50 MG/1
50 TABLET ORAL DAILY
Status: DISCONTINUED | OUTPATIENT
Start: 2017-10-27 | End: 2017-11-02 | Stop reason: HOSPADM

## 2017-10-26 RX ORDER — ATORVASTATIN CALCIUM 40 MG/1
40 TABLET, FILM COATED ORAL
Status: DISCONTINUED | OUTPATIENT
Start: 2017-10-26 | End: 2017-11-02 | Stop reason: HOSPADM

## 2017-10-26 RX ORDER — ALLOPURINOL 300 MG/1
300 TABLET ORAL DAILY
Status: DISCONTINUED | OUTPATIENT
Start: 2017-10-27 | End: 2017-11-02 | Stop reason: HOSPADM

## 2017-10-26 RX ORDER — BUMETANIDE 1 MG/1
0.5 TABLET ORAL 2 TIMES DAILY
Status: DISCONTINUED | OUTPATIENT
Start: 2017-10-26 | End: 2017-10-27

## 2017-10-26 RX ORDER — PREGABALIN 25 MG/1
75 CAPSULE ORAL 2 TIMES DAILY
Status: DISCONTINUED | OUTPATIENT
Start: 2017-10-26 | End: 2017-10-30

## 2017-10-26 RX ORDER — PANTOPRAZOLE SODIUM 40 MG/1
40 TABLET, DELAYED RELEASE ORAL
Status: DISCONTINUED | OUTPATIENT
Start: 2017-10-27 | End: 2017-11-02 | Stop reason: HOSPADM

## 2017-10-26 RX ORDER — SODIUM CHLORIDE 0.9 % (FLUSH) 0.9 %
5-10 SYRINGE (ML) INJECTION EVERY 8 HOURS
Status: DISCONTINUED | OUTPATIENT
Start: 2017-10-26 | End: 2017-11-02 | Stop reason: HOSPADM

## 2017-10-26 RX ADMIN — ACETAMINOPHEN 650 MG: 325 TABLET ORAL at 22:25

## 2017-10-26 RX ADMIN — PREGABALIN 75 MG: 25 CAPSULE ORAL at 22:04

## 2017-10-26 RX ADMIN — VANCOMYCIN HYDROCHLORIDE 3000 MG: 10 INJECTION, POWDER, LYOPHILIZED, FOR SOLUTION INTRAVENOUS at 23:33

## 2017-10-26 RX ADMIN — INSULIN GLARGINE 50 UNITS: 100 INJECTION, SOLUTION SUBCUTANEOUS at 20:51

## 2017-10-26 RX ADMIN — GLIPIZIDE 10 MG: 5 TABLET ORAL at 20:28

## 2017-10-26 RX ADMIN — COLESTIPOL HYDROCHLORIDE 1 G: 1 TABLET, FILM COATED ORAL at 22:04

## 2017-10-26 RX ADMIN — ASPIRIN AND DIPYRIDAMOLE 1 CAPSULE: 25; 200 CAPSULE, EXTENDED RELEASE ORAL at 20:28

## 2017-10-26 RX ADMIN — BUMETANIDE 0.5 MG: 1 TABLET ORAL at 20:28

## 2017-10-26 RX ADMIN — HYDRALAZINE HYDROCHLORIDE 50 MG: 50 TABLET, FILM COATED ORAL at 20:38

## 2017-10-26 RX ADMIN — Medication 10 ML: at 22:06

## 2017-10-26 RX ADMIN — ATORVASTATIN CALCIUM 40 MG: 40 TABLET, FILM COATED ORAL at 20:38

## 2017-10-26 RX ADMIN — CEFEPIME HYDROCHLORIDE 2 G: 2 INJECTION, POWDER, FOR SOLUTION INTRAVENOUS at 22:06

## 2017-10-26 RX ADMIN — ENOXAPARIN SODIUM 40 MG: 40 INJECTION SUBCUTANEOUS at 20:40

## 2017-10-26 NOTE — PROGRESS NOTES
Patient arrived to room 3238. Dual skin assessement preformed with  . Red blanchable marks under breasts and on abdomen. Bilateral legs are weeping and blistering. Un healed areas on inner bilateral feet. Wound care consult placed per protocol  Called Dr Zina León office for orders. Dr Gus Hand called back. Stated he would let Dr Luly Washington know that patient had arrived.

## 2017-10-26 NOTE — IP AVS SNAPSHOT
[address] [facility] 
[phone] Patient: Azar Stephens MRN: [id number] DZA:[id number] About your hospitalization You were admitted on:  October 26, 2017 You last received care in the:  [facility] 2 GENERAL SURGERY You were discharged on:  November 2, 2017 Why you were hospitalized Your primary diagnosis was:  Cellulitis And Abscess Of Left Lower Extremity Your diagnoses also included:  Diabetes Mellitus Type Ii, Uncontrolled (Hcc), Ckd (Chronic Kidney Disease) Stage 3, Gfr 30-59 Ml/Min, Hyperlipemia, Diabetic Polyneuropathy (Hcc), Ascvd (Arteriosclerotic Cardiovascular Disease), Peripheral Neuropathy, Chronic Diastolic Chf (Congestive Heart Failure), Nyha Class 3 (Hcc), Stroke (Hcc), Dm Necrobiosis Lipoidica (Hcc), B12 Deficiency, (Hfpef) Heart Failure With Preserved Ejection Fraction (Hcc), Morbid Obesity (Hcc) Things You Need To Do (next 8 weeks) Wednesday Nov 08, 2017 WOUND CARE NEW PATIENT with Dharmesh Hanna MD at  8:15 AM  
Where:  [facility] OP WOUND CARE ([address]) Follow up with [address] [place] [address] Dr Manzo  
8:15AM; this is your outpatient wound-care clinic post-acute care follow-up appointment. Phone:  [phone] Where:  [facility], [address] [place] [id number] Thursday Nov 09, 2017 FOLLOW UP 10 with Lupe Velázquez MD at  3:30 PM  
Where:  [address] ([facility]) Discharge Orders None A check reilly indicates which time of day the medication should be taken. My Medications STOP taking these medications   
 cyclobenzaprine 5 mg tablet Commonly known as:  FLEXERIL  
   
  
 triamcinolone acetonide 0.1 % topical cream  
Commonly known as:  KENALOG  
   
  
  
TAKE these medications as instructed  Instructions Each Dose to Equal  
 Morning Noon Evening Bedtime  
 acetaminophen 325 mg tablet Commonly known as:  TYLENOL Take 2 Tabs by mouth every four (4) hours as needed. 650 mg  
    
   
   
   
  
 ALIGN 4 mg Cap Generic drug:  Bifidobacterium Infantis Your last dose was: Today at 0900. Notes to Patient:  You took Kemal Kidd this morning. Take  by mouth. allopurinol 300 mg tablet Commonly known as:  Layne Bibber Your last dose was: Today at 0900. Take  by mouth daily. ammonium lactate 12 % lotion Commonly known as:  LAC-HYDRIN  
   
 rub in to affected area well  
     
   
   
   
  
 aspirin-dipyridamole  mg per SR capsule Commonly known as:  AGGRENOX Your last dose was: Today at 0900. TAKE 1 CAPSULE BY MOUTH TWICE DAILY( EVERY TWELVE HOURS)  
     
   
   
   
  
 atenolol 50 mg tablet Commonly known as:  TENORMIN Your last dose was: Today at 0900. Take  by mouth daily. atorvastatin 40 mg tablet Commonly known as:  LIPITOR  
   
 TAKE 1 TABLET BY MOUTH EVERY DAY  
     
   
   
   
  
 BD INSULIN SYRINGE  
   
 by Does Not Apply route. bumetanide 0.5 mg tablet Commonly known as:  Cayden Gordon Notes to Patient:  Medication not given this morning. Take 4 Tabs by mouth two (2) times a day. 2 mg CARDIZEM  mg ER capsule Generic drug:  dilTIAZem Your last dose was: Today at 0900. Take 360 mg by mouth daily. 360 mg  
    
   
   
   
  
 cephALEXin 500 mg capsule Commonly known as:  Dellia Cons Take 1 Cap by mouth three (3) times daily for 3 days. 500 mg Cholecalciferol (Vitamin D3) 2,000 unit Cap capsule Commonly known as:  VITAMIN D3 Take 1 Cap by mouth daily. 1 Cap  
    
   
   
   
  
 colestipol 1 gram tablet Commonly known as:  COLESTID Take 1 g by mouth two (2) times a day. 1 g  
    
   
   
   
  
 cyanocobalamin 1,000 mcg/mL injection Commonly known as:  VITAMIN B12 Notes to Patient:  Not given this morning. 1 mL by IntraMUSCular route every seven (7) days. 1000 mcg FREESTYLE LITE STRIPS  
   
 by In Vitro route. glipiZIDE 10 mg tablet Commonly known as:  Birdie Hector Take 10 mg by mouth two (2) times a day. 10 mg  
    
   
   
   
  
 hydrALAZINE 50 mg tablet Commonly known as:  APRESOLINE Your last dose was: Today at 0900. TAKE 1 TABLET BY MOUTH THREE TIMES DAILY  
     
   
   
   
  
 insulin lispro 100 unit/mL kwikpen Commonly known as:  HUMALOG Your last dose was: Today at 0900.  
   
 10 Units by SubCUTAneous route three (3) times daily (with meals). 10 Units LEVEMIR 100 unit/mL injection Generic drug:  insulin detemir Your last dose was: Today at 0900.  
   
 60 Units by SubCUTAneous route nightly. 60 Units LYRICA 75 mg capsule Generic drug:  pregabalin Take 75 mg by mouth two (2) times a day. 75 mg  
    
   
   
   
  
 nystatin topical cream  
Commonly known as:  MYCOSTATIN Apply  to affected area two (2) times a day. Use for groin rash  
     
   
   
   
  
 omeprazole 40 mg capsule Commonly known as:  PRILOSEC Notes to Patient:  Protonix given this morning. Take 40 mg by mouth daily. 40 mg  
    
   
   
   
  
 PEN NEEDLE  
   
 by Does Not Apply route. SLOW  mg (45 mg iron) ER tablet Generic drug:  ferrous sulfate Take 142 mg by mouth Daily (before breakfast). 142 mg Where to Get Your Medications These medications were sent to [address]  [address]  [address] [facility], [address] [place] [id number] Phone:  [phone]  
  cephALEXin 500 mg capsule  
 cyanocobalamin 1,000 mcg/mL injection  
 nystatin topical cream  
  
  
  
  
Discharge Instructions [address] [id number] [phone] Patient Discharge Instructions [employer] / [id number] : 1955 Admitted 10/26/2017 Discharged: 2017 Principal Problem: 
  Cellulitis and abscess of left lower extremity (10/26/2017) Active Problems: 
  Stroke ([address] (2014) Overview: Recurrent, bilateral 
 
  Chronic diastolic CHF (congestive heart failure), NYHA class 3 ([address] (2017) Diabetes mellitus type II, uncontrolled ([address] (2013) Diabetic polyneuropathy ([address] (2013) ASCVD (arteriosclerotic cardiovascular disease) (2014) Peripheral neuropathy (2017) (HFpEF) heart failure with preserved ejection fraction ([address] (10/31/2017) Morbid obesity ([address] (2011) CKD (chronic kidney disease) stage 3, GFR 30-59 ml/min () Hyperlipemia () 
 
  B12 deficiency (10/31/2017) Allergies Allergen Reactions  Zestril [Lisinopril] Swelling · It is important that you take the medication exactly as they are prescribed. · Do not take other medications without consulting your doctor. What to do at Next Level of Care Disposition:  Home Recommended diet: Diabetic Diet Recommended activity: Activity as tolerated Wound Care:  See below Lab:  none Other:  none Oxygen:  none Information obtained by : 
I understand that if any problems occur once I am at home I am to contact my physician. I understand and acknowledge receipt of the instructions indicated above. Physician's or R.N.'s Signature                                                                  Date/Time Patient or Representative Signature                                                          Date/Time Wound Care for the lower legs: You received two bottles of the Chlorhexidine soap. Only a small amount is needed to wash the legs and then rinse with water wet wash cloth and allow the skin to air dry. If there are open wounds that need to be debrided, the open wounds need daily cleansing until the wound bed is pink, healing. Apply a foam dressing daily to these areas daily. Apply the lotion when the legs are almost completely dry for the calloused areas. Introducing \[facility] [place] [facility]\"" & HEALTH SERVICES! Lisa Mcneal introduces Pixim patient portal. Now you can access parts of your medical record, email your doctor's office, and request medication refills online. 1. In your internet browser, go to [email or web address] 2. Click on the First Time User? Click Here link in the Sign In box. You will see the New Member Sign Up page. 3. Enter your Pixim Access Code exactly as it appears below. You will not need to use this code after youve completed the sign-up process. If you do not sign up before the expiration date, you must request a new code. · Pixim Access Code: [email or web address] Expires: 1/9/2018  2:24 PM 
 
4. Enter the last four digits of your Social Security Number (xxxx) and Date of Birth (mm/dd/yyyy) as indicated and click Submit. You will be taken to the next sign-up page. 5. Create a ParentingInformert ID. This will be your ParentingInformert login ID and cannot be changed, so think of one that is secure and easy to remember. 6. Create a Lazada Indonesiahart password. You can change your password at any time. 7. Enter your Password Reset Question and Answer. This can be used at a later time if you forget your password. 8. Enter your e-mail address. You will receive e-mail notification when new information is available in [address] 9. Click Sign Up. You can now view and download portions of your medical record. 10. Click the Download Summary menu link to download a portable copy of your medical information. If you have questions, please visit the Frequently Asked Questions section of the GradFly website. Remember, GradFly is NOT to be used for urgent needs. For medical emergencies, dial 911. Now available from your iPhone and Android! Providers Seen During Your Hospitalization Provider Specialty Primary office phone Enrike Henao MD Internal Medicine [phone] Your Primary Care Physician (PCP) Primary Care Physician Office Phone Office Fax Norman Alley [phone] You are allergic to the following Allergen Reactions Zestril (Lisinopril) Swelling Recent Documentation Weight Breastfeeding? BMI OB Status Smoking Status 126.1 kg No 52.53 kg/m2 Postmenopausal Never Smoker Emergency Contacts Name Discharge Info Relation Home Work Mobile Grady Armas DISCHARGE CAREGIVER [3] Spouse [3] [phone] Cas Jacobson DISCHARGE CAREGIVER [3] Child [2]   [phone] Patient Belongings The following personal items are in your possession at time of discharge: 
  Dental Appliances: None  Visual Aid: None      Home Medications: None   Jewelry: None  Clothing: None    Other Valuables: None Please provide this summary of care documentation to your next provider. Signatures-by signing, you are acknowledging that this After Visit Summary has been reviewed with you and you have received a copy.   
  
 
  
    
    
 Patient Signature: ____________________________________________________________ Date:  ____________________________________________________________  
  
Becka Moulds Provider Signature:  ____________________________________________________________ Date:  ____________________________________________________________

## 2017-10-26 NOTE — PROGRESS NOTES
Reviewed record in preparation for visit and have obtained necessary documentation. Identified pt with two pt identifiers(name and ). Chief Complaint   Patient presents with    Skin Problem     blisters on feet        Coordination of Care Questionnaire:  :     1) Have you been to an emergency room, urgent care clinic since your last visit? No    Hospitalized since your last visit? No              2) Have you seen or consulted any other health care providers outside of [address] [place] [address] since your last visit?  No

## 2017-10-26 NOTE — PROGRESS NOTES
Spoke with Dr Naomi Tee with regard to no IV site tonight get Central line placed or PICc line in AM if not central line able to be placed tonight

## 2017-10-27 LAB
ANION GAP SERPL CALC-SCNC: 6 MMOL/L (ref 5–15)
ATRIAL RATE: 80 BPM
BUN SERPL-MCNC: 42 MG/DL (ref 6–20)
BUN/CREAT SERPL: 34 (ref 12–20)
CALCIUM SERPL-MCNC: 8.7 MG/DL (ref 8.5–10.1)
CALCULATED P AXIS, ECG09: 53 DEGREES
CALCULATED R AXIS, ECG10: 19 DEGREES
CALCULATED T AXIS, ECG11: 83 DEGREES
CHLORIDE SERPL-SCNC: 114 MMOL/L (ref 97–108)
CO2 SERPL-SCNC: 26 MMOL/L (ref 21–32)
CREAT SERPL-MCNC: 1.24 MG/DL (ref 0.55–1.02)
DIAGNOSIS, 93000: NORMAL
ERYTHROCYTE [DISTWIDTH] IN BLOOD BY AUTOMATED COUNT: 17.9 % (ref 11.5–14.5)
EST. AVERAGE GLUCOSE BLD GHB EST-MCNC: 171 MG/DL
FERRITIN SERPL-MCNC: 48 NG/ML (ref 8–252)
FOLATE SERPL-MCNC: 19.6 NG/ML (ref 5–21)
GLUCOSE BLD STRIP.AUTO-MCNC: 100 MG/DL (ref 65–100)
GLUCOSE BLD STRIP.AUTO-MCNC: 122 MG/DL (ref 65–100)
GLUCOSE BLD STRIP.AUTO-MCNC: 137 MG/DL (ref 65–100)
GLUCOSE BLD STRIP.AUTO-MCNC: 150 MG/DL (ref 65–100)
GLUCOSE SERPL-MCNC: 111 MG/DL (ref 65–100)
HBA1C MFR BLD: 7.6 % (ref 4.2–6.3)
HCT VFR BLD AUTO: 32.2 % (ref 35–47)
HGB BLD-MCNC: 9.9 G/DL (ref 11.5–16)
IRON SATN MFR SERPL: 18 % (ref 20–50)
IRON SERPL-MCNC: 45 UG/DL (ref 35–150)
MCH RBC QN AUTO: 29.4 PG (ref 26–34)
MCHC RBC AUTO-ENTMCNC: 30.7 G/DL (ref 30–36.5)
MCV RBC AUTO: 95.5 FL (ref 80–99)
P-R INTERVAL, ECG05: 164 MS
PLATELET # BLD AUTO: 294 K/UL (ref 150–400)
POTASSIUM SERPL-SCNC: 4.7 MMOL/L (ref 3.5–5.1)
Q-T INTERVAL, ECG07: 384 MS
QRS DURATION, ECG06: 76 MS
QTC CALCULATION (BEZET), ECG08: 442 MS
RBC # BLD AUTO: 3.37 M/UL (ref 3.8–5.2)
SERVICE CMNT-IMP: ABNORMAL
SERVICE CMNT-IMP: NORMAL
SODIUM SERPL-SCNC: 146 MMOL/L (ref 136–145)
TIBC SERPL-MCNC: 244 UG/DL (ref 250–450)
VENTRICULAR RATE, ECG03: 80 BPM
VIT B12 SERPL-MCNC: 241 PG/ML (ref 211–911)
WBC # BLD AUTO: 10.2 K/UL (ref 3.6–11)

## 2017-10-27 PROCEDURE — 93306 TTE W/DOPPLER COMPLETE: CPT

## 2017-10-27 PROCEDURE — 74011000250 HC RX REV CODE- 250: Performed by: INTERNAL MEDICINE

## 2017-10-27 PROCEDURE — 88305 TISSUE EXAM BY PATHOLOGIST: CPT | Performed by: INTERNAL MEDICINE

## 2017-10-27 PROCEDURE — 77030037878 HC DRSG MEPILEX >48IN BORD MOLN -B

## 2017-10-27 PROCEDURE — 77010033678 HC OXYGEN DAILY

## 2017-10-27 PROCEDURE — 36569 INSJ PICC 5 YR+ W/O IMAGING: CPT | Performed by: INTERNAL MEDICINE

## 2017-10-27 PROCEDURE — 82607 VITAMIN B-12: CPT | Performed by: INTERNAL MEDICINE

## 2017-10-27 PROCEDURE — 74011636637 HC RX REV CODE- 636/637: Performed by: INTERNAL MEDICINE

## 2017-10-27 PROCEDURE — 74011000258 HC RX REV CODE- 258: Performed by: INTERNAL MEDICINE

## 2017-10-27 PROCEDURE — 82746 ASSAY OF FOLIC ACID SERUM: CPT | Performed by: INTERNAL MEDICINE

## 2017-10-27 PROCEDURE — 77030018719 HC DRSG PTCH ANTIMIC J&J -A

## 2017-10-27 PROCEDURE — 74011250637 HC RX REV CODE- 250/637: Performed by: INTERNAL MEDICINE

## 2017-10-27 PROCEDURE — 77030011256 HC DRSG MEPILEX <16IN NO BORD MOLN -A

## 2017-10-27 PROCEDURE — C1751 CATH, INF, PER/CENT/MIDLINE: HCPCS

## 2017-10-27 PROCEDURE — 83540 ASSAY OF IRON: CPT | Performed by: INTERNAL MEDICINE

## 2017-10-27 PROCEDURE — 74011000250 HC RX REV CODE- 250: Performed by: SURGERY

## 2017-10-27 PROCEDURE — 82728 ASSAY OF FERRITIN: CPT | Performed by: INTERNAL MEDICINE

## 2017-10-27 PROCEDURE — 85027 COMPLETE CBC AUTOMATED: CPT | Performed by: INTERNAL MEDICINE

## 2017-10-27 PROCEDURE — 36415 COLL VENOUS BLD VENIPUNCTURE: CPT | Performed by: INTERNAL MEDICINE

## 2017-10-27 PROCEDURE — 74011250636 HC RX REV CODE- 250/636: Performed by: INTERNAL MEDICINE

## 2017-10-27 PROCEDURE — 76937 US GUIDE VASCULAR ACCESS: CPT

## 2017-10-27 PROCEDURE — 77030018786 HC NDL GD F/USND BARD -B

## 2017-10-27 PROCEDURE — 83036 HEMOGLOBIN GLYCOSYLATED A1C: CPT | Performed by: INTERNAL MEDICINE

## 2017-10-27 PROCEDURE — 82962 GLUCOSE BLOOD TEST: CPT

## 2017-10-27 PROCEDURE — 80048 BASIC METABOLIC PNL TOTAL CA: CPT | Performed by: INTERNAL MEDICINE

## 2017-10-27 PROCEDURE — 0HBKXZX EXCISION OF RIGHT LOWER LEG SKIN, EXTERNAL APPROACH, DIAGNOSTIC: ICD-10-PCS | Performed by: SURGERY

## 2017-10-27 PROCEDURE — 77030018836 HC SOL IRR NACL ICUM -A

## 2017-10-27 PROCEDURE — 65270000029 HC RM PRIVATE

## 2017-10-27 RX ORDER — SODIUM CHLORIDE 0.9 % (FLUSH) 0.9 %
10-40 SYRINGE (ML) INJECTION EVERY 8 HOURS
Status: DISCONTINUED | OUTPATIENT
Start: 2017-10-27 | End: 2017-11-02 | Stop reason: HOSPADM

## 2017-10-27 RX ORDER — SODIUM CHLORIDE 0.9 % (FLUSH) 0.9 %
SYRINGE (ML) INJECTION
Status: DISCONTINUED
Start: 2017-10-27 | End: 2017-11-02 | Stop reason: HOSPADM

## 2017-10-27 RX ORDER — VANCOMYCIN/0.9 % SOD CHLORIDE 1.5G/250ML
1500 PLASTIC BAG, INJECTION (ML) INTRAVENOUS EVERY 24 HOURS
Status: DISCONTINUED | OUTPATIENT
Start: 2017-10-28 | End: 2017-10-30

## 2017-10-27 RX ORDER — BUMETANIDE 0.25 MG/ML
1 INJECTION INTRAMUSCULAR; INTRAVENOUS 2 TIMES DAILY
Status: DISCONTINUED | OUTPATIENT
Start: 2017-10-27 | End: 2017-10-27

## 2017-10-27 RX ORDER — FUROSEMIDE 10 MG/ML
40 INJECTION INTRAMUSCULAR; INTRAVENOUS 2 TIMES DAILY
Status: DISCONTINUED | OUTPATIENT
Start: 2017-10-27 | End: 2017-11-01

## 2017-10-27 RX ORDER — MICONAZOLE NITRATE 20 MG/G
CREAM TOPICAL 2 TIMES DAILY
Status: DISCONTINUED | OUTPATIENT
Start: 2017-10-27 | End: 2017-11-02 | Stop reason: HOSPADM

## 2017-10-27 RX ORDER — SODIUM CHLORIDE 0.9 % (FLUSH) 0.9 %
10 SYRINGE (ML) INJECTION EVERY 24 HOURS
Status: DISCONTINUED | OUTPATIENT
Start: 2017-10-27 | End: 2017-11-02 | Stop reason: HOSPADM

## 2017-10-27 RX ORDER — HEPARIN 100 UNIT/ML
300 SYRINGE INTRAVENOUS AS NEEDED
Status: DISCONTINUED | OUTPATIENT
Start: 2017-10-27 | End: 2017-11-02 | Stop reason: HOSPADM

## 2017-10-27 RX ORDER — ENOXAPARIN SODIUM 100 MG/ML
40 INJECTION SUBCUTANEOUS EVERY 12 HOURS
Status: DISCONTINUED | OUTPATIENT
Start: 2017-10-27 | End: 2017-11-02 | Stop reason: HOSPADM

## 2017-10-27 RX ORDER — GLIPIZIDE 5 MG/1
10 TABLET ORAL
Status: DISCONTINUED | OUTPATIENT
Start: 2017-10-28 | End: 2017-10-28

## 2017-10-27 RX ORDER — SODIUM CHLORIDE 0.9 % (FLUSH) 0.9 %
10-30 SYRINGE (ML) INJECTION AS NEEDED
Status: DISCONTINUED | OUTPATIENT
Start: 2017-10-27 | End: 2017-11-02 | Stop reason: HOSPADM

## 2017-10-27 RX ORDER — LIDOCAINE HYDROCHLORIDE 10 MG/ML
2 INJECTION, SOLUTION EPIDURAL; INFILTRATION; INTRACAUDAL; PERINEURAL ONCE
Status: COMPLETED | OUTPATIENT
Start: 2017-10-27 | End: 2017-10-27

## 2017-10-27 RX ADMIN — MICONAZOLE NITRATE: 20 CREAM TOPICAL at 15:45

## 2017-10-27 RX ADMIN — CEFEPIME HYDROCHLORIDE 2 G: 2 INJECTION, POWDER, FOR SOLUTION INTRAVENOUS at 21:23

## 2017-10-27 RX ADMIN — DILTIAZEM HYDROCHLORIDE 360 MG: 180 CAPSULE, COATED, EXTENDED RELEASE ORAL at 09:04

## 2017-10-27 RX ADMIN — ASPIRIN AND DIPYRIDAMOLE 1 CAPSULE: 25; 200 CAPSULE, EXTENDED RELEASE ORAL at 09:50

## 2017-10-27 RX ADMIN — ENOXAPARIN SODIUM 40 MG: 40 INJECTION SUBCUTANEOUS at 21:24

## 2017-10-27 RX ADMIN — LIDOCAINE HYDROCHLORIDE 2 ML: 10 INJECTION, SOLUTION EPIDURAL; INFILTRATION; INTRACAUDAL; PERINEURAL at 15:46

## 2017-10-27 RX ADMIN — Medication 10 ML: at 09:53

## 2017-10-27 RX ADMIN — ACETAMINOPHEN 650 MG: 325 TABLET ORAL at 21:38

## 2017-10-27 RX ADMIN — COLESTIPOL HYDROCHLORIDE 1 G: 1 TABLET, FILM COATED ORAL at 09:04

## 2017-10-27 RX ADMIN — MICONAZOLE NITRATE: 20 CREAM TOPICAL at 21:26

## 2017-10-27 RX ADMIN — HYDRALAZINE HYDROCHLORIDE 50 MG: 50 TABLET, FILM COATED ORAL at 09:52

## 2017-10-27 RX ADMIN — ATORVASTATIN CALCIUM 40 MG: 40 TABLET, FILM COATED ORAL at 21:23

## 2017-10-27 RX ADMIN — ASPIRIN AND DIPYRIDAMOLE 1 CAPSULE: 25; 200 CAPSULE, EXTENDED RELEASE ORAL at 17:39

## 2017-10-27 RX ADMIN — HYDRALAZINE HYDROCHLORIDE 50 MG: 50 TABLET, FILM COATED ORAL at 21:24

## 2017-10-27 RX ADMIN — HYDRALAZINE HYDROCHLORIDE 50 MG: 50 TABLET, FILM COATED ORAL at 15:25

## 2017-10-27 RX ADMIN — Medication 1 CAPSULE: at 09:52

## 2017-10-27 RX ADMIN — Medication 10 ML: at 11:54

## 2017-10-27 RX ADMIN — ACETAMINOPHEN 650 MG: 325 TABLET ORAL at 10:16

## 2017-10-27 RX ADMIN — COLESTIPOL HYDROCHLORIDE 1 G: 1 TABLET, FILM COATED ORAL at 17:39

## 2017-10-27 RX ADMIN — FUROSEMIDE 40 MG: 10 INJECTION, SOLUTION INTRAMUSCULAR; INTRAVENOUS at 15:25

## 2017-10-27 RX ADMIN — ACETAMINOPHEN 650 MG: 325 TABLET ORAL at 15:25

## 2017-10-27 RX ADMIN — INSULIN LISPRO 3 UNITS: 100 INJECTION, SOLUTION INTRAVENOUS; SUBCUTANEOUS at 17:38

## 2017-10-27 RX ADMIN — ALLOPURINOL 300 MG: 300 TABLET ORAL at 09:49

## 2017-10-27 RX ADMIN — INSULIN GLARGINE 50 UNITS: 100 INJECTION, SOLUTION SUBCUTANEOUS at 09:04

## 2017-10-27 RX ADMIN — INSULIN GLARGINE 50 UNITS: 100 INJECTION, SOLUTION SUBCUTANEOUS at 17:38

## 2017-10-27 RX ADMIN — ATENOLOL 50 MG: 50 TABLET ORAL at 09:50

## 2017-10-27 RX ADMIN — BUMETANIDE 1 MG: 0.25 INJECTION INTRAMUSCULAR; INTRAVENOUS at 10:15

## 2017-10-27 RX ADMIN — ENOXAPARIN SODIUM 40 MG: 40 INJECTION SUBCUTANEOUS at 11:52

## 2017-10-27 RX ADMIN — PREGABALIN 75 MG: 25 CAPSULE ORAL at 09:53

## 2017-10-27 RX ADMIN — CEFEPIME HYDROCHLORIDE 2 G: 2 INJECTION, POWDER, FOR SOLUTION INTRAVENOUS at 09:51

## 2017-10-27 RX ADMIN — PREGABALIN 75 MG: 25 CAPSULE ORAL at 17:39

## 2017-10-27 RX ADMIN — PANTOPRAZOLE SODIUM 40 MG: 40 TABLET, DELAYED RELEASE ORAL at 07:10

## 2017-10-27 NOTE — PROGRESS NOTES
PICC (Peripherally Inserted Central Catheter) line insertion  procedure note :     Procedure explained to patient along with risks and benefits  and patient agreed to proceed. Informed consent obtained from  Patient Ashok Mcgovern. Patient teaching completed. Timeout completed. Pre-procedure assessment done. Maximum sterile barrier precautions observed throughout procedure. Lidocaine 1%  4    ml sq given prior to cannulation. Cannulated basilic  vein using ultrasound guidance and modified seldinger technique. Inserted 5  [place]ozambican dual  lumen PICC to right arm using RidePal Tip Location System and  [address]. Pt has    sinus   rhythm. PICC tip location was confirmed by 3 CG tip positioning system, indicating tall P wave and no negative deflection before P wave which would indicate that the PICC tip is properly placed in the distal SVC or at the [place]. PICC tip location was  confirmed by 2 PICC nurses and 3CG printout placed on patient's chart. Blood return verified and flushed with 20 ml normal saline in each port. Sterile dressing applied with biopatch, statLock and occlusive dressing as per protocol. Curos caps applied to each port. Patient tolerated procedure well with minimal blood loss ( less than 5 ml.)   Patient education material provided. PICC procedure performed by  : Burke Sheridan RN. PICC nurse  Assisted by :   Francisco Berumen RN  PICC nurse  Reason for access : reliable access / MD order /   /  Yanick barajas IV medication administration / Poor vascular access  Complications related to insertion  : none  X-Ray : not applicable  Notified primary nurse  Ana Vergara RN  that  PICC line can be used.    Total Trimmed Length :  39   cm   External Length : at hub  cm   PICC line site arm circumference:  37    cm   PICC catheter occupies  20   % of vein  Type of PICC: Bard Solo Power PICC   Ref # :      [id number]     Lot # :  [id number]   Expiration Date :    2018-10-31 Anitra Koch RN PICC Nurse, Vascular Access Team.

## 2017-10-27 NOTE — PROGRESS NOTES
Pharmacy Automatic Renal Dosing Protocol - Antimicrobials    Indication for Antimicrobials: cellulitis and abcess of left lower extremity    Current Regimen of Each Antimicrobial:  Vancomycin-pharmacy to dose (Start Date 10/27; Day # 2)  Cefepime 2 grams x1, then 2 gram IV q 12 hours (start date 10/26; Day #2    Previous Antimicrobial Therapy:  none    Goal Level: VANCOMYCIN TROUGH GOAL RANGE  Vancomycin goal trough: 15-20 (since patient has abcess)      Significant Cultures:   10/26: Blood: (pending)  10/26: Urine: (pending)      Paralysis, amputations, malnutrition: no    Labs:  Recent Labs      10/27/17   0212  10/26/17   1958   CREA  1.24*  1.39*   BUN  42*  44*   WBC  10.2  12.2*     Temp (24hrs), Av.4 °F (36.9 °C), Min:98 °F (36.7 °C), Max:98.8 °F (37.1 °C)      Creatinine Clearance (mL/min) or Dialysis: 31  No results found for: PCT    Impression/Plan:   - Pt received 400 ml of vancomycin 3000 mg in ns 500mls ( Actual dose received was 2400 mg) and then lost iv access. - Vancomycin 1.75gm IV q24h; will start at 9pm (2.5hr early) to compensate for incomplete load  - Continue Cefepime 2gm IV q12h  - Daily [facility]     Pharmacy will follow daily and adjust medications as appropriate for renal function and/or serum levels. Thank you,  Juanjo Redd [facility]          Recommended duration of therapy  http://[facility]/[place]/[facility]/Pharmacy/Clinical%20Companion/Duration%20of%20ABX%20therapy. docx    Renal Dosing  http://[facility]/[place]/[facility]/Pharmacy/Clinical%20Companion/Renal%20Dosing%[id number]. pdf

## 2017-10-27 NOTE — H&P
[address]   HISTORY AND PHYSICAL       Name:  Prema Hopkins   MR#:  [id number]   :  1955   Account #:  [de-identified]        Date of Adm:  10/26/2017       PATIENT PROFILE: This patient is a 79-year-old, ,    female admitted with cellulitis of both lower extremities, possibly   superimposed on necrobiosis lipoidica. HISTORY OF PRESENT ILLNESS: This patient was in her usual state   of relatively poor health until several weeks prior to an office visit on   10/11/2017 when she presented with brawny lower extremity edema   and a vesicular, red rash of both lower extremities over the anterior   tibial surfaces. It was felt consistent with a stasis dermatitis with   associated mild cellulitis. She and her  were instructed in daily   cleaning with soap and water, thorough drying, and application of   triamcinolone cream. She was also started on Keflex. She returned with modest improvement on 10/19/2017 and was   advised to continue the local wound care, but her antibiotics were   stopped for fear of redevelopment of C. difficile colitis, which she had   had for an extended period of time after a hospitalization in late    for an abscess in her right thigh associated with necrotizing fasciitis. She returned on the day of admission with plaque-like lesions with   ulcerations circumferentially around each leg below the knee as well as   erythema and large blisters on both feet. She had not been   experiencing fever or chills. Pain was relatively modest, but she does   have a peripheral neuropathy. Her pulses were felt to be intact. One of   the blisters was drained of serous fluid and cultured. She is   subsequently referred for admission. PAST MEDICAL HISTORY AND PAST SURGICAL HISTORY: Include    1.  Type 2 diabetes with poor control and associated retinopathy,   neuropathy, gastroparesis and mild nephropathy. 2. Hypertension. 3. Coronary artery disease by CT scanning. 4. Hyperlipidemia. 5. Fatty liver. 6. GERD. 7. Mild aortic stenosis. 8. Obesity. 9. Cerebrovascular disease with history of bilateral strokes. 10. Residual bilateral lower extremity weakness, right-greater-than-left   leg. 11. History of gout. 12. Hyperparathyroidism with hypercalcemia, status post   parathyroidectomy. 13. History of back pain and right leg pain with some sciatica. 14. Chronic kidney disease stage 2.    15. History of bilateral shoulder pain associated with adhesive   capsulitis that has resolved. 16. Recurrent diabetic foot ulcers. 16. Hospitalized at [facility] 10/01/2015 to 10/14/2015 with sepsis due   to necrotizing fasciitis from an abscess on the right thigh and groin. This required extensive debridement and wound VAC. Ultimately, she   [facility], but has   been home recently. 18. Status post laparoscopic cholecystectomy. 19. Status post C section. CURRENT MEDICATIONS    1. Aggrenox 1 twice a day. 2. Lipitor 40 mg daily. 3. Hydralazine 50 mg 3 tabs a day. 4. Triamcinolone 0.1% topical cream to her legs twice a day. 5. Acetaminophen 650 mg q.4h. as needed. 6. Allopurinol 300 mg daily. 7. Atenolol 50 mg daily. 8. Align 4 mg daily. 9. Bumetanide 0.5 mg twice a day. 10. Vitamin D3 2000 units daily. 11. Colestid 1 g twice a day. 12. Diltiazem 360 mg daily. 13. Ferrous sulfate 142 mg daily. 14. Glipizide 10 mg twice a day. 15. Levemir 60 units twice a day. 16. Humalog 22 units three times a day with meals. 17. Omeprazole 40 mg daily. 18. Lyrica 75 mg twice a day. ALLERGIES: ZESTRIL CAUSES ANGIOEDEMA. SOCIAL HISTORY: She is currently disabled. She does not smoke or   drink.  She remains confined to a wheelchair really dating back to her   10/2015 admission with sepsis and subsequent strokes. FAMILY HISTORY: Mother  at age 76 from complications of   strokes, diabetes, hypertension, and renal disease. Father committed   suicide at age 66; he had hypertension, diabetes, and prostate cancer. REVIEW OF SYSTEMS   CONSTITUTIONAL: Denies fever, weight loss, or sweats. HEENT: No blurred or double vision, though her visual acuity is   diminished. No difficulty with swallowing, taste, speech or smell. RESPIRATORY: Denies cough, wheezing or shortness of breath. CARDIAC: Denies chest pain, palpitations, unexplained indigestion, or   syncope. GI: Occasional issues with early satiety and poor gastric emptying. No   current abdominal pain, bloating, anorexia, nausea, or vomiting. : No frequency or dysuria. Does have issues with incontinence. EXTREMITIES: Denies joint stiffness, but does have problems as   described in the HPI with significant rash, blisters and draining over   both legs below the knee. NEUROLOGIC: Bilateral leg weakness, right-greater-than-left leg. Some back pain. PHYSICAL EXAMINATION   GENERAL: Obese, white female in no acute distress. VITAL SIGNS: Blood pressure 134/60, pulse 82, respirations 18,   oxygen saturation 92% on room air, and temperature 98.5. HEENT: Generally unremarkable. Face symmetric. NECK: Supple without adenopathy. Bilateral soft bruits. CHEST: Clear to auscultation and percussion. CARDIOVASCULAR: Regular rate and rhythm. Grade 9-0/9 systolic   murmur. ABDOMEN: Obese, nontender, without organomegaly. EXTREMITIES: Significant plaque formation with ulcerations   circumferentially around both legs below the knees with erythema of   both feet and significant blistering over the arches of both feet and   some of the toes. Right 5th toe has minor residual diabetic ulceration laterally. NEUROLOGIC: She is awake, alert and oriented to person, place and   time. Cranial nerves 2 through 12 are intact.  Upper extremity strength is reasonable. She is weak inherlowerextremities,right-greater-than-left   leg. LABORATORY DATA: All pending. IMPRESSION    1. Cellulitis of both lower extremities involving legs below her knees   and her feet: This may possibly be superimposed upon necrobiosis   lipoidica. 2. Diabetes with multiple complications. 3. Hypertension. 4. Coronary artery disease by CT scanning. 5. Hyperlipidemia. 6. Gastroesophageal reflux disease. 7. Mild aortic stenosis. 8. Cerebrovascular disease with history of bilateral strokes. 9. Chronic kidney disease stage 2.    10. Other problems as listed in the past medical history. ADMISSION PLAN    1. The patient will be admitted and fully cultured. Start her on broad-  spectrum antibiotics. 2. Wound Care will be consulted. 3. General Surgery will be consulted for possible debridement or, if   they are uncomfortable with this, will ask Dr. Aravind Winters to see. She has   seen Dr. Ryder Dougherty in the past, however. 4. Will x-ray her feet and ankles as she could have Charcot joints,   which could be contributing to her problems. 5. Will continue her usual medications. 6. Will manage her diabetes with some of her usual insulin as well as a   sliding scale. 7. Other problems will be addressed depending upon her initial   evaluation and the initial diagnostic studies and bloodwork.          Zak Galo MD      [facility]   D:  10/26/2017   20:19   T:  10/27/2017   02:43   Job #:  [id number]

## 2017-10-27 NOTE — CONSULTS
Surgery Consult    Subjective:      Jocelyn Kelly is a 60 y.o. female who presents with a two week history of bilateral lower extremity cellulitis. 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 Symptoms have been unchanged since. Tried a course of keflex without improvement. Blisters unroofed and culture sent by Dr. Marshall Code yesterday. Aggravating factors: unable to associate with any factor. Alleviating factors: nothing. The patient denies fever, chills, chest pain, shortness of breath, nausea, vomiting.     Past Medical History:   Diagnosis Date    Acute combined systolic and diastolic heart failure, NYHA class 2 ([address] 7/24/2017    Anemia 7/24/2017    Aortic stenosis, mild 7/24/2017    Bilateral edema of lower extremity 7/24/2017    BMI 45.0-49.9, adult ([address] 7/24/2017    Chronic diastolic CHF (congestive heart failure), NYHA class 3 ([address] 7/24/2017    CKD (chronic kidney disease) stage 2, GFR 60-89 ml/min     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 2 diabetes mellitus ([address] 7/24/2017    Diabetes ([address]     Fatty liver 7/24/2017    Gastroparesis     GERD (gastroesophageal reflux disease)     Gout     HTN (hypertension)     Hyperlipemia     Hyperparathyroidism ([address]     Hypertension 7/24/2017    Insomnia 7/24/2017    Low back pain 7/24/2017    N&V (nausea and vomiting) 7/24/2017    Nausea & vomiting     Obesity     Pelvic joint pain 7/24/2017    Peripheral neuropathy 7/24/2017    Sciatica     right    Severe right groin pain 7/24/2017    Sinus tachycardia 7/24/2017    Stage 2 chronic kidney disease due to benign hypertension 7/24/2017    Stasis dermatitis 7/24/2017    Stroke ([address] 2001    right parietal, left caudate    Thyroid disease     para thyroid(high calcium)    Ulcer of right fifth toe due to diabetes mellitus ([address] 7/24/2017     Past Surgical History:   Procedure Laterality Date    CHEST SURGERY PROCEDURE UNLISTED  10/24/13    PARATHYROID EXPLORATION  DELIVERY       HX  SECTION      HX CHOLECYSTECTOMY      HX OTHER SURGICAL      I&D right thigh abscess      Family History   Problem Relation Age of Onset    Cancer Mother      cervical    Diabetes Mother     Hypertension Mother     Stroke Mother 79    Diabetes Father     Hypertension Father     Kidney Disease Father     Diabetes Brother     Hypertension Brother     Stroke Brother 61     Social History     Social History    Marital status:      Spouse name: N/A    Number of children: N/A    Years of education: N/A     Social History Main Topics    Smoking status: Never Smoker    Smokeless tobacco: Never Used    Alcohol use Yes      Comment: rare    Drug use: Yes     Special: Prescription, OTC    Sexual activity: Not on file     Other Topics Concern    Not on file     Social History Narrative      Current Facility-Administered Medications   Medication Dose Route Frequency    [START ON 10/28/2017] vancomycin (VANCOCIN) 1500 mg in  ml infusion  1,500 mg IntraVENous Q24H    cefepime (MAXIPIME) 2 g in 0.9% sodium chloride (MBP/ADV) 100 mL  2 g IntraVENous Q12H    [START ON 10/28/2017] glipiZIDE (GLUCOTROL) tablet 10 mg  10 mg Oral ACB    sodium chloride (NS) flush 10-30 mL  10-30 mL InterCATHeter PRN    sodium chloride (NS) flush 10 mL  10 mL InterCATHeter Q24H    sodium chloride (NS) flush 10-40 mL  10-40 mL InterCATHeter Q8H    heparin (porcine) pf 300 Units  300 Units InterCATHeter PRN    sodium chloride (NS) 0.9 % flush        enoxaparin (LOVENOX) injection 40 mg  40 mg SubCUTAneous Q12H    miconazole (SECURA) 2 % extra thick cream   Topical BID    furosemide (LASIX) injection 40 mg  40 mg IntraVENous BID    lidocaine (XYLOCAINE) 10 mg/mL (1 %) injection 2 mL  2 mL IntraDERMal ONCE    acetaminophen (TYLENOL) tablet 650 mg  650 mg Oral Q4H PRN    allopurinol (ZYLOPRIM) tablet 300 mg  300 mg Oral DAILY    aspirin-dipyridamole (AGGRENOX)  mg per capsule 1 Cap  1 Cap Oral BID    atenolol (TENORMIN) tablet 50 mg  50 mg Oral DAILY    atorvastatin (LIPITOR) tablet 40 mg  40 mg Oral QHS    colestipol (COLESTID) tablet 1 g  1 g Oral BID    dilTIAZem CD (CARDIZEM CD) capsule 360 mg  360 mg Oral DAILY    hydrALAZINE (APRESOLINE) tablet 50 mg  50 mg Oral TID    pregabalin (LYRICA) capsule 75 mg  75 mg Oral BID    sodium chloride (NS) flush 5-10 mL  5-10 mL IntraVENous Q8H    sodium chloride (NS) flush 5-10 mL  5-10 mL IntraVENous PRN    lactobac ac& pc-s.therm-b.anim (ELVA Q/RISAQUAD)  1 Cap Oral DAILY    pantoprazole (PROTONIX) tablet 40 mg  40 mg Oral ACB    insulin glargine (LANTUS) injection 50 Units  50 Units SubCUTAneous BID    insulin lispro (HUMALOG) injection   SubCUTAneous AC&HS    glucose chewable tablet 16 g  4 Tab Oral PRN    dextrose (D50W) injection syrg 12.5-25 g  12.5-25 g IntraVENous PRN    glucagon (GLUCAGEN) injection 1 mg  1 mg IntraMUSCular PRN      Allergies   Allergen Reactions    Zestril [Lisinopril] Swelling       Review of Systems:  A comprehensive review of systems was negative except for that written in the History of Present Illness. Objective:      Patient Vitals for the past 8 hrs:   BP Temp Pulse Resp SpO2   10/27/17 1420 120/48 98.1 °F (36.7 °C) 71 18 94 %   10/27/17 1050 148/62 98.5 °F (36.9 °C) 82 18 -       Temp (24hrs), Av.3 °F (36.8 °C), Min:98 °F (36.7 °C), Max:98.8 °F (37.1 °C)      Physical Exam:  GENERAL: alert, cooperative, no distress, appears stated age, LYMPHATIC: Cervical, supraclavicular, and axillary nodes normal. , THROAT & NECK: normal and no erythema or exudates noted. , LUNG: clear to auscultation bilaterally, HEART: regular rate and rhythm. ABDOMEN: soft, non-tender. Bowel sounds normal. No masses,  no organomegaly, EXTREMITIES:  extremities normal, atraumatic, no cyanosis or edema, SKIN: mild cellulitis billateral LE. Edema. Incised and drained thick blisters on bottom of left foot. No abscess. Red papules pretibial area with some chronic venus stasis changes as well. A few weeping areas.  , NEUROLOGIC: negative    Xrays: soft tissue edema. Assessment:     Cellulitis B LE with chronic appearing rash and associated thick blisters. Plan:       Agree with wound care and discussed with and documented by wound care nurse, Adilene Ansari RN. Dr. Darren Graham raises the possibility of necrobiosis lipoidica. This clearly is more than simple cellulitis. Will proceed with a skin biopsy to help aid in the diagnosis. I had an extensive discussion with Rehan Blackburn and her  regarding the risks, benefits, and alternatives of proceeding with a skin biopsy. Risks of surgery including the risk of anesthesia, bleeding, infection, poor healing, injury to underlying structures, and the need for further surgery were discussed and they are in agreement to proceed. Remove biopsy stitch in 2 weeks. F/u on pathology. Dr. Luly Hollingsworth covering for me over the weekend. Will see on request.    Thank you for this consult. Signed By: Gelacio Polanco MD     October 27, 2017             PROCEDURE NOTE      Rehan Blackburn is a 60 y.o. female we are doing a bedside full thickness skin biopsy 1.5 cm x 0.5 cm right leg. The risks, benefits, and alternatives were explained and consent was obtained for the procedure. And the site was marked. The area was sterile prepped and draped in the usual manner. 1% lidocaine with epinephrine was infiltrated into the skin and soft tissue surrounding the lesion. An elliptical incision was made to try to include the periphery of one of the lesions. Full thickness excision was made to include minimal underlying tissues. Hemostasis was noted. The wound was closed with a single horizontal mattress 4-0 nylon.; followed by gauze and tape. Wound care instructions were given. She tolerated the procedure without difficulty.     Length of excision including margins: 1.5 cm  Greatest length of excision:  1.5 cm

## 2017-10-27 NOTE — ROUTINE PROCESS
Bedside and Verbal shift change report given to Naman Neil (oncoming nurse) by Cait Mcdonald RN (offgoing nurse). Report included the following information SBAR, Kardex, Intake/Output, MAR and Recent Results.

## 2017-10-27 NOTE — PROGRESS NOTES
Pharmacy Automatic Renal Dosing Protocol - Antimicrobials    Indication for Antimicrobials: cellulitis and abcess of left lower extremity    Current Regimen of Each Antimicrobial:  Vancomycin-pharmacy to dose(Start Date 10/26; Day # 1)  Cefepime 2 grams x1, then 1 gram IV q 12 hours (start date 10/26; Day #1    Previous Antimicrobial Therapy:  none    Goal Level: VANCOMYCIN TROUGH GOAL RANGE  Vancomycin goal trough: 15-20 (since patient has abcess)      Significant Cultures:   10/26-blood-pending  10/26-urine-pending    Paralysis, amputations, malnutrition: no    Labs:  Recent Labs      10/26/17   1958   CREA  1.39*   BUN  44*   WBC  12.2*     Temp (24hrs), Av.1 °F (36.7 °C), Min:98 °F (36.7 °C), Max:98.1 °F (36.7 °C)      Creatinine Clearance (mL/min) or Dialysis: 31  No results found for: PCT    Impression/Plan: Will give Vancomycin 3000 mg IV x1 and then 1500 mg IV q 24 hr.  Will change Cefepime to 2 grams IV q 24 hr for renal function     Pharmacy will follow daily and adjust medications as appropriate for renal function and/or serum levels.     Thank you,  Mayo Orourke, PHARMD

## 2017-10-27 NOTE — PROGRESS NOTES
Bedside shift change report given to Cookie RN (oncoming nurse) by Sandeep Vu RN (offgoing nurse). Report included the following information SBAR, Kardex, Procedure Summary, Intake/Output, MAR and Recent Results.

## 2017-10-27 NOTE — PROGRESS NOTES
Iv infiltrated in left arm  Pt. Stated that her left arm was hurting and large and swollen around mid arm. Iv Stopped.  And will notify dr. Drew Phoenix in am

## 2017-10-27 NOTE — PROGRESS NOTES
Primary Nurse Sheri Pina RN and Yolis Mejia RN performed a dual skin assessment on this patient  Juan score is 17 redness to right abdomen and bilateral lower leg blisters and weeping

## 2017-10-27 NOTE — PROGRESS NOTES
[address] [place] [address]  [phone]      Medical Progress Note      NAME: Laly Rolon   :  1955  MRM:  [id number]    Date/Time: 10/27/2017  7:46 AM      Subjective:     H&P dictated but not on chart yet. Awaiting wound care assessment and surgical evaluation re debridement. IV access a problem and needs PICC line. No F/C. SOB with lying flat and CXR shows some interstitial edema. Bumex increased and will get ECHO. She's anemic and will get HILLS of that. BS's currently adequate. Xrays of feet and ankles not suggestive of Charcot joints. I/m still not sure what wounds on legs are. .. ? necrobiosis lipoidica. Past Medical History reviewed and unchanged from Admission History and Physical and/or prior progress note/electronic medical record    Medications reviewed. ROS:      General: negative for fever, chills, sweats; positive for weakness  Ear Nose and Throat: negative for rhinorrhea, pharyngitis, otalgia, tinnitus, speech or swallowing difficulties  Respiratory:  negative for cough, sputum production,  Cardiology:  negative for chest pain, palpitations; positive orthopnea, PND, edema  Gastrointestinal: negative for abdominal pain, N/V, dysphagia, change in bowel habits, bleeding  Genitourinary: negative for frequency, urgency, dysuria, hematuria, incontinence  Dermatological: positive for rash  Neurological: positive for bilateral LE weakness    Objective:     Last 24hrs VS reviewed since prior progress note.  Most recent are:    Visit Vitals    /74 (BP 1 Location: Right arm, BP Patient Position: At rest)    Pulse 83    Temp 98.8 °F (37.1 °C)    Resp 16    Wt 278 lb (126.1 kg)    SpO2 95%    Breastfeeding No    BMI 52.53 kg/m2     SpO2 Readings from Last 6 Encounters:   10/27/17 95%   10/26/17 94%   10/19/17 95%   10/11/17 94%   17 100%   16 96%    O2 Flow Rate (L/min): 1 l/min     Intake/Output Summary (Last 24 hours) at 10/27/17 0746  Last data filed at 10/27/17 0526   Gross per 24 hour   Intake              250 ml   Output             1000 ml   Net             -750 ml          Physical Exam:    General appearance: alert, cooperative, no distress, appears stated age  Eyes: negative  Neck: supple, symmetrical, trachea midline, no adenopathy, thyroid: not enlarged, symmetric, no tenderness/mass/nodules, no carotid bruit and no JVD  Lungs: clear to auscultation bilaterally  Heart: regular rate and rhythm, S1, S2 normal, Grade 2/6 systolic murmur  Abdomen: obese, NT, no palpable mass  Extremities: brawny edema and plaques with superficial ulcerations; feet red with extensive bullae formation  Neurologic: LE weakness    Data Review:    Lab:    BMP:   Lab Results   Component Value Date/Time     (H) 10/27/2017 02:12 AM    K 4.7 10/27/2017 02:12 AM     (H) 10/27/2017 02:12 AM    CO2 26 10/27/2017 02:12 AM    AGAP 6 10/27/2017 02:12 AM     (H) 10/27/2017 02:12 AM    BUN 42 (H) 10/27/2017 02:12 AM    CREA 1.24 (H) 10/27/2017 02:12 AM    GFRAA 53 (L) 10/27/2017 02:12 AM    GFRNA 44 (L) 10/27/2017 02:12 AM     CBC:   Lab Results   Component Value Date/Time    WBC 10.2 10/27/2017 02:12 AM    HGB 9.9 (L) 10/27/2017 02:12 AM    HCT 32.2 (L) 10/27/2017 02:12 AM     10/27/2017 02:12 AM     Recent Glucose Results:   Lab Results   Component Value Date/Time     (H) 10/27/2017 02:12 AM     (H) 10/26/2017 07:58 PM     All labs reviewed in past 24 hours    Other pertinent lab: MG 2.2    Imaging:    Reviewed.   CXR, foot and ankle Xrays      Assessment / Plan:     Principal Problem:    Cellulitis and abscess of left lower extremity (10/26/2017)    Active Problems:    Stroke ([address] (5/30/2014)      Overview: Recurrent, bilateral      Chronic diastolic CHF (congestive heart failure), NYHA class 3 ([address] (7/24/2017)      Diabetes mellitus type II, uncontrolled ([facility] (4/18/2013)      Diabetic polyneuropathy ([facility] (4/20/2013)      ASCVD (arteriosclerotic cardiovascular disease) (5/31/2014)      Peripheral neuropathy (7/24/2017)      DM necrobiosis lipoidica ([facility] (10/26/2017)      Overview: questionable      CKD (chronic kidney disease) stage 3, GFR 30-59 ml/min ()      Hyperlipemia ()        1. ECHO  2. Wound care and surgery evaluation  3. Increase Bumex  4. Anemia W/U  5. Check BNP  6. SSI  7. PICC line  8. Continue antibiotics  9.  Follow labs             Care Plan discussed with: Patient and Family    Discussed:  Care Plan and D/C Planning    Prophylaxis:  Lovenox and H2B/PPI    Disposition:  ???  ___________________________________________________    Attending Physician: Luna Singer MD

## 2017-10-27 NOTE — PROGRESS NOTES
Problem: Falls - Risk of  Goal: *Absence of Falls  Document Missael Fall Risk and appropriate interventions in the flowsheet.    Outcome: Progressing Towards Goal  Fall Risk Interventions:  Mobility Interventions: Assess mobility with egress test, Communicate number of staff needed for ambulation/transfer, Patient to call before getting OOB         Medication Interventions: Evaluate medications/consider consulting pharmacy, Patient to call before getting OOB    Elimination Interventions: Call light in reach, Patient to call for help with toileting needs, Toilet paper/wipes in reach, Toileting schedule/hourly rounds ([facility])

## 2017-10-27 NOTE — WOUND CARE
Wound Care consult: Chart reviewed and patient assessed for her skin lesions that were present on admission. Dr. Kathrin Antoine has been treating patient for these lesions but they continue to weep and blister at times (improve and then blister again). 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 Patient does have diabetes and her last known A1C was 8.5 per the patient. She only checks her glucose a few times per week. Pt. Is physically debilitated from lengthy hospitalizations in the past few years including an episode of necrotizing fascitis that required long term wound care and re-hab for therapy. See the H&P by Dr. Kathrin Antoine. Assessment: The posterior lower legs have a raw open lesion on each side Right worse then the left but the left is more painful for the patient. The plantar aspect of the feet and some of her toes have a desicated blister where the skin was incised by Dr. Kathrin Antoine (these were thick skinned blisters according the patient's ). The outer skin is white and macerated looking and the wound bed is the same. It is difficult to see the true base of the wound. Patient's  was also concerned about a kind of rash / raw skin blister that is forming on her left labia and her left upper thigh. Patient tends to sit up all of the time due to her breathing issues so her pannus skin is very moist and red and she has a few satellite lesions consistent with yeast dermatitis. Treatment today: Antifungal cream was ordered for the groin (Secura Antifingal) which has the miconazole 2% as well as zinc oxide cream combined. Apply twice per day. The legs and feet were cleansed with the CHG soap and water and then dried well. Foam dressings applied to the open areas only and the rest left open to air. The heels are floated on pillows. Patient's  was given some information about practical home care of the skin / moist dermatitis.    [facility] Regis Gutierrez RN, BSN, [employer]

## 2017-10-27 NOTE — PROGRESS NOTES
Physical Therapy Screening:    [facility] therapy based on results obtained during the nursing admission assessment. The patients chart was reviewed and the patient is appropriate for a skilled therapy evaluation if there is a decline in functional mobility from baseline. Please order a consult for physical therapy if you are in agreement and would like an evaluation to be completed. Thank you.     Alycia Cruz, PT

## 2017-10-27 NOTE — CDMP QUERY
Patient is noted to have a BMI of 52.53. Please clarify if this patient is: The [address] [place] [address] has issued a statement indicating that, \"Individuals who are overweight, obese, or morbidly obese are at an increased risk for certain medical conditions when compared to persons of normal weight. Therefore, these conditions are always clinically significant and reportable when documented by the provider. \"    =>Obesity (BMI of 30-39.9)  => Morbid Obesity  (BMI 40 or greater)  =>Overweight (BMI 25-29.9)  => Other weight status (specify  status)  => Unable to determine        Presentation: ht 5' 1\" Wt 278lbs BMI 52.53 kg/m           Please clarify and document your clinical opinion in the progress notes and discharge summary, including the definitive and or presumptive diagnosis, (suspected or probable), related to the above clinical findings. Please include clinical findings supporting your diagnosis.    Thank [address] [place] [address], [address] [place] [address] Rd     [phone]

## 2017-10-27 NOTE — PROGRESS NOTES
Patient and  report cultures of legs done by Dr. Afia Tafoya in the office today did not reculture.   Dr. Afia Tafoya said single set of Blood cultures is all that is needed

## 2017-10-28 LAB
ANION GAP SERPL CALC-SCNC: 5 MMOL/L (ref 5–15)
BNP SERPL-MCNC: 807 PG/ML (ref 0–125)
BUN SERPL-MCNC: 38 MG/DL (ref 6–20)
BUN/CREAT SERPL: 30 (ref 12–20)
CALCIUM SERPL-MCNC: 8.5 MG/DL (ref 8.5–10.1)
CHLORIDE SERPL-SCNC: 113 MMOL/L (ref 97–108)
CO2 SERPL-SCNC: 28 MMOL/L (ref 21–32)
CREAT SERPL-MCNC: 1.28 MG/DL (ref 0.55–1.02)
ERYTHROCYTE [DISTWIDTH] IN BLOOD BY AUTOMATED COUNT: 17.8 % (ref 11.5–14.5)
GLUCOSE BLD STRIP.AUTO-MCNC: 125 MG/DL (ref 65–100)
GLUCOSE BLD STRIP.AUTO-MCNC: 135 MG/DL (ref 65–100)
GLUCOSE BLD STRIP.AUTO-MCNC: 135 MG/DL (ref 65–100)
GLUCOSE BLD STRIP.AUTO-MCNC: 151 MG/DL (ref 65–100)
GLUCOSE BLD STRIP.AUTO-MCNC: 73 MG/DL (ref 65–100)
GLUCOSE BLD STRIP.AUTO-MCNC: 75 MG/DL (ref 65–100)
GLUCOSE SERPL-MCNC: 81 MG/DL (ref 65–100)
HCT VFR BLD AUTO: 30.4 % (ref 35–47)
HGB BLD-MCNC: 9.3 G/DL (ref 11.5–16)
MCH RBC QN AUTO: 29.3 PG (ref 26–34)
MCHC RBC AUTO-ENTMCNC: 30.6 G/DL (ref 30–36.5)
MCV RBC AUTO: 95.9 FL (ref 80–99)
PLATELET # BLD AUTO: 270 K/UL (ref 150–400)
POTASSIUM SERPL-SCNC: 4.3 MMOL/L (ref 3.5–5.1)
RBC # BLD AUTO: 3.17 M/UL (ref 3.8–5.2)
SERVICE CMNT-IMP: ABNORMAL
SERVICE CMNT-IMP: NORMAL
SERVICE CMNT-IMP: NORMAL
SODIUM SERPL-SCNC: 146 MMOL/L (ref 136–145)
WBC # BLD AUTO: 8 K/UL (ref 3.6–11)

## 2017-10-28 PROCEDURE — 83880 ASSAY OF NATRIURETIC PEPTIDE: CPT | Performed by: INTERNAL MEDICINE

## 2017-10-28 PROCEDURE — 85027 COMPLETE CBC AUTOMATED: CPT | Performed by: INTERNAL MEDICINE

## 2017-10-28 PROCEDURE — 80048 BASIC METABOLIC PNL TOTAL CA: CPT | Performed by: INTERNAL MEDICINE

## 2017-10-28 PROCEDURE — 36415 COLL VENOUS BLD VENIPUNCTURE: CPT | Performed by: INTERNAL MEDICINE

## 2017-10-28 PROCEDURE — 65270000029 HC RM PRIVATE

## 2017-10-28 PROCEDURE — 74011636637 HC RX REV CODE- 636/637: Performed by: INTERNAL MEDICINE

## 2017-10-28 PROCEDURE — 74011000250 HC RX REV CODE- 250: Performed by: INTERNAL MEDICINE

## 2017-10-28 PROCEDURE — 74011250636 HC RX REV CODE- 250/636: Performed by: INTERNAL MEDICINE

## 2017-10-28 PROCEDURE — 74011000258 HC RX REV CODE- 258: Performed by: INTERNAL MEDICINE

## 2017-10-28 PROCEDURE — 82962 GLUCOSE BLOOD TEST: CPT

## 2017-10-28 PROCEDURE — 74011250637 HC RX REV CODE- 250/637: Performed by: INTERNAL MEDICINE

## 2017-10-28 RX ORDER — INSULIN GLARGINE 100 [IU]/ML
30 INJECTION, SOLUTION SUBCUTANEOUS
Status: DISCONTINUED | OUTPATIENT
Start: 2017-10-28 | End: 2017-11-02 | Stop reason: HOSPADM

## 2017-10-28 RX ORDER — CYANOCOBALAMIN 1000 UG/ML
1000 INJECTION, SOLUTION INTRAMUSCULAR; SUBCUTANEOUS ONCE
Status: COMPLETED | OUTPATIENT
Start: 2017-10-28 | End: 2017-10-28

## 2017-10-28 RX ADMIN — MICONAZOLE NITRATE: 20 CREAM TOPICAL at 21:00

## 2017-10-28 RX ADMIN — Medication 10 ML: at 22:31

## 2017-10-28 RX ADMIN — ENOXAPARIN SODIUM 40 MG: 40 INJECTION SUBCUTANEOUS at 09:09

## 2017-10-28 RX ADMIN — MICONAZOLE NITRATE: 20 CREAM TOPICAL at 09:12

## 2017-10-28 RX ADMIN — HYDRALAZINE HYDROCHLORIDE 50 MG: 50 TABLET, FILM COATED ORAL at 22:25

## 2017-10-28 RX ADMIN — ASPIRIN AND DIPYRIDAMOLE 1 CAPSULE: 25; 200 CAPSULE, EXTENDED RELEASE ORAL at 18:08

## 2017-10-28 RX ADMIN — FUROSEMIDE 40 MG: 10 INJECTION, SOLUTION INTRAMUSCULAR; INTRAVENOUS at 09:06

## 2017-10-28 RX ADMIN — Medication 10 ML: at 11:39

## 2017-10-28 RX ADMIN — CEFEPIME HYDROCHLORIDE 2 G: 2 INJECTION, POWDER, FOR SOLUTION INTRAVENOUS at 09:06

## 2017-10-28 RX ADMIN — Medication 10 ML: at 09:10

## 2017-10-28 RX ADMIN — VANCOMYCIN HYDROCHLORIDE 1500 MG: 10 INJECTION, POWDER, LYOPHILIZED, FOR SOLUTION INTRAVENOUS at 00:52

## 2017-10-28 RX ADMIN — Medication 10 ML: at 07:03

## 2017-10-28 RX ADMIN — ASPIRIN AND DIPYRIDAMOLE 1 CAPSULE: 25; 200 CAPSULE, EXTENDED RELEASE ORAL at 09:08

## 2017-10-28 RX ADMIN — DILTIAZEM HYDROCHLORIDE 360 MG: 180 CAPSULE, COATED, EXTENDED RELEASE ORAL at 09:09

## 2017-10-28 RX ADMIN — Medication 10 ML: at 00:53

## 2017-10-28 RX ADMIN — ENOXAPARIN SODIUM 40 MG: 40 INJECTION SUBCUTANEOUS at 22:25

## 2017-10-28 RX ADMIN — ALLOPURINOL 300 MG: 300 TABLET ORAL at 09:07

## 2017-10-28 RX ADMIN — FUROSEMIDE 40 MG: 10 INJECTION, SOLUTION INTRAMUSCULAR; INTRAVENOUS at 18:05

## 2017-10-28 RX ADMIN — Medication 10 ML: at 22:32

## 2017-10-28 RX ADMIN — PREGABALIN 75 MG: 25 CAPSULE ORAL at 18:08

## 2017-10-28 RX ADMIN — Medication 1 CAPSULE: at 09:08

## 2017-10-28 RX ADMIN — PANTOPRAZOLE SODIUM 40 MG: 40 TABLET, DELAYED RELEASE ORAL at 09:07

## 2017-10-28 RX ADMIN — COLESTIPOL HYDROCHLORIDE 1 G: 1 TABLET, FILM COATED ORAL at 09:08

## 2017-10-28 RX ADMIN — PREGABALIN 75 MG: 25 CAPSULE ORAL at 09:08

## 2017-10-28 RX ADMIN — ACETAMINOPHEN 650 MG: 325 TABLET ORAL at 22:25

## 2017-10-28 RX ADMIN — CYANOCOBALAMIN 1000 MCG: 1000 INJECTION, SOLUTION INTRAMUSCULAR at 11:38

## 2017-10-28 RX ADMIN — DEXTROSE MONOHYDRATE 25 G: 25 INJECTION, SOLUTION INTRAVENOUS at 07:43

## 2017-10-28 RX ADMIN — Medication 10 ML: at 18:06

## 2017-10-28 RX ADMIN — Medication 10 ML: at 07:02

## 2017-10-28 RX ADMIN — ATENOLOL 50 MG: 50 TABLET ORAL at 09:06

## 2017-10-28 RX ADMIN — INSULIN GLARGINE 30 UNITS: 100 INJECTION, SOLUTION SUBCUTANEOUS at 22:25

## 2017-10-28 RX ADMIN — CEFEPIME HYDROCHLORIDE 2 G: 2 INJECTION, POWDER, FOR SOLUTION INTRAVENOUS at 22:25

## 2017-10-28 RX ADMIN — COLESTIPOL HYDROCHLORIDE 1 G: 1 TABLET, FILM COATED ORAL at 18:08

## 2017-10-28 RX ADMIN — ATORVASTATIN CALCIUM 40 MG: 40 TABLET, FILM COATED ORAL at 22:25

## 2017-10-28 RX ADMIN — HYDRALAZINE HYDROCHLORIDE 50 MG: 50 TABLET, FILM COATED ORAL at 09:09

## 2017-10-28 RX ADMIN — HYDRALAZINE HYDROCHLORIDE 50 MG: 50 TABLET, FILM COATED ORAL at 18:08

## 2017-10-28 NOTE — PROGRESS NOTES
Dr. Cynthia Kruse notified of pt's blood glucose levels this am of 73, 75, 135. Call back number [phone] given. Order given to hold lantus 50 units and glucotrol.

## 2017-10-28 NOTE — PROGRESS NOTES
PROGRESS NOTE    NAME:  Jocelyn Kelly   :   1955   MRN:   [id number]     Date/Time:  10/28/2017 9:44 AM  Subjective:   History:  Feeling better. denies dyspnea. CXR shows interstitial edema. B12 level is low. hypoglycemic this am-given D50.       Medications reviewed:  Current Facility-Administered Medications   Medication Dose Route Frequency    insulin glargine (LANTUS) injection 30 Units  30 Units SubCUTAneous QHS    cyanocobalamin (VITAMIN B12) injection 1,000 mcg  1,000 mcg IntraMUSCular ONCE    vancomycin (VANCOCIN) 1500 mg in  ml infusion  1,500 mg IntraVENous Q24H    cefepime (MAXIPIME) 2 g in 0.9% sodium chloride (MBP/ADV) 100 mL  2 g IntraVENous Q12H    sodium chloride (NS) flush 10-30 mL  10-30 mL InterCATHeter PRN    sodium chloride (NS) flush 10 mL  10 mL InterCATHeter Q24H    sodium chloride (NS) flush 10-40 mL  10-40 mL InterCATHeter Q8H    heparin (porcine) pf 300 Units  300 Units InterCATHeter PRN    sodium chloride (NS) 0.9 % flush        enoxaparin (LOVENOX) injection 40 mg  40 mg SubCUTAneous Q12H    miconazole (SECURA) 2 % extra thick cream   Topical BID    furosemide (LASIX) injection 40 mg  40 mg IntraVENous BID    acetaminophen (TYLENOL) tablet 650 mg  650 mg Oral Q4H PRN    allopurinol (ZYLOPRIM) tablet 300 mg  300 mg Oral DAILY    aspirin-dipyridamole (AGGRENOX)  mg per capsule 1 Cap  1 Cap Oral BID    atenolol (TENORMIN) tablet 50 mg  50 mg Oral DAILY    atorvastatin (LIPITOR) tablet 40 mg  40 mg Oral QHS    colestipol (COLESTID) tablet 1 g  1 g Oral BID    dilTIAZem CD (CARDIZEM CD) capsule 360 mg  360 mg Oral DAILY    hydrALAZINE (APRESOLINE) tablet 50 mg  50 mg Oral TID    pregabalin (LYRICA) capsule 75 mg  75 mg Oral BID    sodium chloride (NS) flush 5-10 mL  5-10 mL IntraVENous Q8H    sodium chloride (NS) flush 5-10 mL  5-10 mL IntraVENous PRN    lactobac ac& pc-s.therm-b.anim (ELVA Q/RISAQUAD)  1 Cap Oral DAILY    pantoprazole (PROTONIX) tablet 40 mg  40 mg Oral ACB    insulin lispro (HUMALOG) injection   SubCUTAneous AC&HS    glucose chewable tablet 16 g  4 Tab Oral PRN    dextrose (D50W) injection syrg 12.5-25 g  12.5-25 g IntraVENous PRN    glucagon (GLUCAGEN) injection 1 mg  1 mg IntraMUSCular PRN        Objective:   Vitals:  Visit Vitals    /74 (BP 1 Location: Left arm, BP Patient Position: At rest)    Pulse 77    Temp 98.3 °F (36.8 °C)    Resp 18    Wt 278 lb (126.1 kg)    SpO2 95%    Breastfeeding No    BMI 52.53 kg/m2    O2 Flow Rate (L/min): 1 l/min O2 Device: Nasal cannula Temp (24hrs), Av.2 °F (36.8 °C), Min:98 °F (36.7 °C), Max:98.5 °F (36.9 °C)      Last 24hr Input/Output:    Intake/Output Summary (Last 24 hours) at 10/28/17 0904  Last data filed at 10/27/17 5089   Gross per 24 hour   Intake              250 ml   Output             1300 ml   Net            -1050 ml        PHYSICAL EXAM:  General:     Alert, cooperative, no distress, appears stated age. Head:    Normocephalic, without obvious abnormality, atraumatic. Eyes:    Conjunctivae/corneas clear. PERRLA  Nose:   Nares normal. No drainage or sinus tenderness. Throat:     Lips, mucosa, and tongue normal.  No Thrush  Neck:   Supple, symmetrical,  no adenopathy, thyroid: non tender     no carotid bruit and no JVD. Back:     Symmetric,  No CVA tenderness. Lungs:    Clear to auscultation bilaterally. No Wheezing or Rhonchi. No rales. Heart:    Regular rate and rhythm,  no murmur, rub or gallop. Abdomen:    Soft, non-tender. Not distended. Bowel sounds normal. No masses  Extremities: Legs look better  Lymph nodes:  Cervical, supraclavicular normal.  Neurologic:  Normal strength, Alert and oriented X 3.    Skin:                No rash      Lab Data Reviewed:    Recent Results (from the past 24 hour(s))   IRON PROFILE    Collection Time: 10/27/17 10:19 AM   Result Value Ref Range    Iron 45 35 - 150 ug/dL    TIBC 244 (L) 250 - 450 ug/dL    Iron % saturation 18 (L) 20 - 50 %   FERRITIN    Collection Time: 10/27/17 10:19 AM   Result Value Ref Range    Ferritin 48 8 - 252 NG/ML   VITAMIN B12    Collection Time: 10/27/17 10:19 AM   Result Value Ref Range    Vitamin B12 241 211 - 911 pg/mL   FOLATE    Collection Time: 10/27/17 10:19 AM   Result Value Ref Range    Folate 19.6 5.0 - 21.0 ng/mL   HEMOGLOBIN A1C WITH EAG    Collection Time: 10/27/17 10:19 AM   Result Value Ref Range    Hemoglobin A1c 7.6 (H) 4.2 - 6.3 %    Est. average glucose 171 mg/dL   GLUCOSE, POC    Collection Time: 10/27/17 11:33 AM   Result Value Ref Range    Glucose (POC) 137 (H) 65 - 100 mg/dL    Performed by Jaime Julien \"María\"    GLUCOSE, POC    Collection Time: 10/27/17  3:44 PM   Result Value Ref Range    Glucose (POC) 150 (H) 65 - 100 mg/dL    Performed by Danny Miranda    GLUCOSE, POC    Collection Time: 10/27/17  9:16 PM   Result Value Ref Range    Glucose (POC) 122 (H) 65 - 100 mg/dL    Performed by Danny Miranda    CBC W/O DIFF    Collection Time: 10/28/17  4:29 AM   Result Value Ref Range    WBC 8.0 3.6 - 11.0 K/uL    RBC 3.17 (L) 3.80 - 5.20 M/uL    HGB 9.3 (L) 11.5 - 16.0 g/dL    HCT 30.4 (L) 35.0 - 47.0 %    MCV 95.9 80.0 - 99.0 FL    MCH 29.3 26.0 - 34.0 PG    MCHC 30.6 30.0 - 36.5 g/dL    RDW 17.8 (H) 11.5 - 14.5 %    PLATELET 365 381 - 581 K/uL   METABOLIC PANEL, BASIC    Collection Time: 10/28/17  4:29 AM   Result Value Ref Range    Sodium 146 (H) 136 - 145 mmol/L    Potassium 4.3 3.5 - 5.1 mmol/L    Chloride 113 (H) 97 - 108 mmol/L    CO2 28 21 - 32 mmol/L    Anion gap 5 5 - 15 mmol/L    Glucose 81 65 - 100 mg/dL    BUN 38 (H) 6 - 20 MG/DL    Creatinine 1.28 (H) 0.55 - 1.02 MG/DL    BUN/Creatinine ratio 30 (H) 12 - 20      GFR est AA 51 (L) >60 ml/min/1.73m2    GFR est non-AA 42 (L) >60 ml/min/1.73m2    Calcium 8.5 8.5 - 10.1 MG/DL   NT-PRO BNP    Collection Time: 10/28/17  4:29 AM   Result Value Ref Range    NT pro- (H) 0 - 125 PG/ML   GLUCOSE, POC    Collection Time: 10/28/17  7:25 AM   Result Value Ref Range    Glucose (POC) 73 65 - 100 mg/dL    Performed by Dionna Grady, POC    Collection Time: 10/28/17  7:39 AM   Result Value Ref Range    Glucose (POC) 75 65 - 100 mg/dL    Performed by Dionna Grady, POC    Collection Time: 10/28/17  7:54 AM   Result Value Ref Range    Glucose (POC) 135 (H) 65 - 100 mg/dL    Performed by Maria C Clemente          Assessment/Plan:     Principal Problem:    Cellulitis and abscess of left lower extremity (10/26/2017)    Active Problems:    Diabetes mellitus type II, uncontrolled ([address] (4/18/2013)      CKD (chronic kidney disease) stage 3, GFR 30-59 ml/min ()      Hyperlipemia ()      Diabetic polyneuropathy ([facility] (4/20/2013)      Stroke ([facility] (5/30/2014)      Overview: Recurrent, bilateral      ASCVD (arteriosclerotic cardiovascular disease) (5/31/2014)      Peripheral neuropathy (7/24/2017)      Chronic diastolic CHF (congestive heart failure), NYHA class 3 ([address] (7/24/2017)      DM necrobiosis lipoidica ([facility] (10/26/2017)      Overview: questionable       ___________________________________________________  PLAN:    1. Continue cefepime/vanc   2. Continue IV lasix  3.   Decrease insulin;discontinue glipizide  4.start B 12 shots            ___________________________________________________    Attending Physician: Jeanette Motley MD

## 2017-10-28 NOTE — ROUTINE PROCESS
Bedside and Verbal shift change report given to Ana M RN (oncoming nurse) by Dori Bains RN (offgoing nurse). Report included the following information SBAR, Kardex, Intake/Output, MAR and Recent Results.

## 2017-10-28 NOTE — PROGRESS NOTES
PICC site and dressing check 24 hours post insertion :     PICC line site and dressing clean, dry and intact. No complications noted. Swati Johansen RN. BSN. CMSRMATT. SHARITA, PICC Nurse.  Vascular Access Team

## 2017-10-29 ENCOUNTER — APPOINTMENT (OUTPATIENT)
Dept: GENERAL RADIOLOGY | Age: 62
DRG: 603 | End: 2017-10-29
Attending: INTERNAL MEDICINE
Payer: COMMERCIAL

## 2017-10-29 LAB
ANION GAP SERPL CALC-SCNC: 8 MMOL/L (ref 5–15)
BACTERIA SPEC CULT: ABNORMAL
BACTERIA SPEC CULT: ABNORMAL
BASOPHILS # BLD: 0 K/UL (ref 0–0.1)
BASOPHILS NFR BLD: 0 % (ref 0–1)
BUN SERPL-MCNC: 36 MG/DL (ref 6–20)
BUN/CREAT SERPL: 29 (ref 12–20)
CALCIUM SERPL-MCNC: 8.6 MG/DL (ref 8.5–10.1)
CC UR VC: ABNORMAL
CHLORIDE SERPL-SCNC: 112 MMOL/L (ref 97–108)
CO2 SERPL-SCNC: 26 MMOL/L (ref 21–32)
CREAT SERPL-MCNC: 1.26 MG/DL (ref 0.55–1.02)
EOSINOPHIL # BLD: 0.4 K/UL (ref 0–0.4)
EOSINOPHIL NFR BLD: 4 % (ref 0–7)
ERYTHROCYTE [DISTWIDTH] IN BLOOD BY AUTOMATED COUNT: 17.7 % (ref 11.5–14.5)
GLUCOSE BLD STRIP.AUTO-MCNC: 124 MG/DL (ref 65–100)
GLUCOSE BLD STRIP.AUTO-MCNC: 138 MG/DL (ref 65–100)
GLUCOSE BLD STRIP.AUTO-MCNC: 196 MG/DL (ref 65–100)
GLUCOSE BLD STRIP.AUTO-MCNC: 94 MG/DL (ref 65–100)
GLUCOSE SERPL-MCNC: 102 MG/DL (ref 65–100)
HCT VFR BLD AUTO: 28.7 % (ref 35–47)
HGB BLD-MCNC: 8.8 G/DL (ref 11.5–16)
LYMPHOCYTES # BLD: 1 K/UL (ref 0.8–3.5)
LYMPHOCYTES NFR BLD: 12 % (ref 12–49)
MCH RBC QN AUTO: 29.7 PG (ref 26–34)
MCHC RBC AUTO-ENTMCNC: 30.7 G/DL (ref 30–36.5)
MCV RBC AUTO: 97 FL (ref 80–99)
MONOCYTES # BLD: 0.7 K/UL (ref 0–1)
MONOCYTES NFR BLD: 8 % (ref 5–13)
NEUTS SEG # BLD: 6 K/UL (ref 1.8–8)
NEUTS SEG NFR BLD: 76 % (ref 32–75)
PLATELET # BLD AUTO: 245 K/UL (ref 150–400)
POTASSIUM SERPL-SCNC: 4.1 MMOL/L (ref 3.5–5.1)
RBC # BLD AUTO: 2.96 M/UL (ref 3.8–5.2)
SERVICE CMNT-IMP: ABNORMAL
SERVICE CMNT-IMP: NORMAL
SODIUM SERPL-SCNC: 146 MMOL/L (ref 136–145)
WBC # BLD AUTO: 8 K/UL (ref 3.6–11)

## 2017-10-29 PROCEDURE — 85025 COMPLETE CBC W/AUTO DIFF WBC: CPT | Performed by: INTERNAL MEDICINE

## 2017-10-29 PROCEDURE — 74011250636 HC RX REV CODE- 250/636: Performed by: INTERNAL MEDICINE

## 2017-10-29 PROCEDURE — 36415 COLL VENOUS BLD VENIPUNCTURE: CPT | Performed by: INTERNAL MEDICINE

## 2017-10-29 PROCEDURE — 74011000258 HC RX REV CODE- 258: Performed by: INTERNAL MEDICINE

## 2017-10-29 PROCEDURE — 80202 ASSAY OF VANCOMYCIN: CPT | Performed by: INTERNAL MEDICINE

## 2017-10-29 PROCEDURE — 71010 XR CHEST PORT: CPT

## 2017-10-29 PROCEDURE — 74011636637 HC RX REV CODE- 636/637: Performed by: INTERNAL MEDICINE

## 2017-10-29 PROCEDURE — 74011250637 HC RX REV CODE- 250/637: Performed by: INTERNAL MEDICINE

## 2017-10-29 PROCEDURE — 80048 BASIC METABOLIC PNL TOTAL CA: CPT | Performed by: INTERNAL MEDICINE

## 2017-10-29 PROCEDURE — 65660000000 HC RM CCU STEPDOWN

## 2017-10-29 PROCEDURE — 82962 GLUCOSE BLOOD TEST: CPT

## 2017-10-29 RX ORDER — CYANOCOBALAMIN 1000 UG/ML
1000 INJECTION, SOLUTION INTRAMUSCULAR; SUBCUTANEOUS ONCE
Status: COMPLETED | OUTPATIENT
Start: 2017-10-29 | End: 2017-10-29

## 2017-10-29 RX ORDER — POLYETHYLENE GLYCOL 3350 17 G/17G
17 POWDER, FOR SOLUTION ORAL DAILY
Status: DISCONTINUED | OUTPATIENT
Start: 2017-10-29 | End: 2017-11-02 | Stop reason: HOSPADM

## 2017-10-29 RX ORDER — DOCUSATE SODIUM 100 MG/1
100 CAPSULE, LIQUID FILLED ORAL 2 TIMES DAILY
Status: DISCONTINUED | OUTPATIENT
Start: 2017-10-29 | End: 2017-11-02 | Stop reason: HOSPADM

## 2017-10-29 RX ADMIN — HYDRALAZINE HYDROCHLORIDE 50 MG: 50 TABLET, FILM COATED ORAL at 09:18

## 2017-10-29 RX ADMIN — MICONAZOLE NITRATE: 20 CREAM TOPICAL at 09:21

## 2017-10-29 RX ADMIN — PANTOPRAZOLE SODIUM 40 MG: 40 TABLET, DELAYED RELEASE ORAL at 09:18

## 2017-10-29 RX ADMIN — ATORVASTATIN CALCIUM 40 MG: 40 TABLET, FILM COATED ORAL at 21:29

## 2017-10-29 RX ADMIN — VANCOMYCIN HYDROCHLORIDE 1500 MG: 10 INJECTION, POWDER, LYOPHILIZED, FOR SOLUTION INTRAVENOUS at 00:30

## 2017-10-29 RX ADMIN — ATENOLOL 50 MG: 50 TABLET ORAL at 09:18

## 2017-10-29 RX ADMIN — HYDRALAZINE HYDROCHLORIDE 50 MG: 50 TABLET, FILM COATED ORAL at 17:20

## 2017-10-29 RX ADMIN — CEFEPIME HYDROCHLORIDE 2 G: 2 INJECTION, POWDER, FOR SOLUTION INTRAVENOUS at 09:15

## 2017-10-29 RX ADMIN — COLESTIPOL HYDROCHLORIDE 1 G: 1 TABLET, FILM COATED ORAL at 17:19

## 2017-10-29 RX ADMIN — DOCUSATE SODIUM 100 MG: 100 CAPSULE, LIQUID FILLED ORAL at 21:29

## 2017-10-29 RX ADMIN — CEFEPIME HYDROCHLORIDE 2 G: 2 INJECTION, POWDER, FOR SOLUTION INTRAVENOUS at 21:29

## 2017-10-29 RX ADMIN — Medication 10 ML: at 06:44

## 2017-10-29 RX ADMIN — COLESTIPOL HYDROCHLORIDE 1 G: 1 TABLET, FILM COATED ORAL at 09:16

## 2017-10-29 RX ADMIN — POLYETHYLENE GLYCOL 3350 17 G: 17 POWDER, FOR SOLUTION ORAL at 21:00

## 2017-10-29 RX ADMIN — Medication 10 ML: at 09:20

## 2017-10-29 RX ADMIN — CYANOCOBALAMIN 1000 MCG: 1000 INJECTION, SOLUTION INTRAMUSCULAR at 12:18

## 2017-10-29 RX ADMIN — FUROSEMIDE 40 MG: 10 INJECTION, SOLUTION INTRAMUSCULAR; INTRAVENOUS at 17:19

## 2017-10-29 RX ADMIN — DILTIAZEM HYDROCHLORIDE 360 MG: 180 CAPSULE, COATED, EXTENDED RELEASE ORAL at 09:17

## 2017-10-29 RX ADMIN — HYDRALAZINE HYDROCHLORIDE 50 MG: 50 TABLET, FILM COATED ORAL at 21:29

## 2017-10-29 RX ADMIN — Medication 1 CAPSULE: at 09:16

## 2017-10-29 RX ADMIN — PREGABALIN 75 MG: 25 CAPSULE ORAL at 17:20

## 2017-10-29 RX ADMIN — INSULIN GLARGINE 30 UNITS: 100 INJECTION, SOLUTION SUBCUTANEOUS at 21:33

## 2017-10-29 RX ADMIN — VANCOMYCIN HYDROCHLORIDE 1500 MG: 10 INJECTION, POWDER, LYOPHILIZED, FOR SOLUTION INTRAVENOUS at 23:39

## 2017-10-29 RX ADMIN — ASPIRIN AND DIPYRIDAMOLE 1 CAPSULE: 25; 200 CAPSULE, EXTENDED RELEASE ORAL at 09:18

## 2017-10-29 RX ADMIN — FUROSEMIDE 40 MG: 10 INJECTION, SOLUTION INTRAMUSCULAR; INTRAVENOUS at 09:18

## 2017-10-29 RX ADMIN — MICONAZOLE NITRATE: 20 CREAM TOPICAL at 21:37

## 2017-10-29 RX ADMIN — Medication 10 ML: at 09:19

## 2017-10-29 RX ADMIN — ALLOPURINOL 300 MG: 300 TABLET ORAL at 09:18

## 2017-10-29 RX ADMIN — ENOXAPARIN SODIUM 40 MG: 40 INJECTION SUBCUTANEOUS at 09:16

## 2017-10-29 RX ADMIN — ASPIRIN AND DIPYRIDAMOLE 1 CAPSULE: 25; 200 CAPSULE, EXTENDED RELEASE ORAL at 17:20

## 2017-10-29 RX ADMIN — PREGABALIN 75 MG: 25 CAPSULE ORAL at 09:16

## 2017-10-29 RX ADMIN — ENOXAPARIN SODIUM 40 MG: 40 INJECTION SUBCUTANEOUS at 21:28

## 2017-10-29 NOTE — ROUTINE PROCESS
Bedside and Verbal shift change report given to [facility] ([facility] nurse) by Mark Anthony Ann RN (offgoing nurse). Report included the following information SBAR, Kardex, Intake/Output, MAR and Recent Results.

## 2017-10-29 NOTE — PROGRESS NOTES
PROGRESS NOTE    NAME:  Tasha Garcia   :   1955   MRN:   [id number]     Date/Time:  10/29/2017 10:16 AM  Subjective:   History: short of breath yesterday. feels better today.       Medications reviewed:  Current Facility-Administered Medications   Medication Dose Route Frequency    cyanocobalamin (VITAMIN B12) injection 1,000 mcg  1,000 mcg IntraMUSCular ONCE    insulin glargine (LANTUS) injection 30 Units  30 Units SubCUTAneous QHS    vancomycin (VANCOCIN) 1500 mg in  ml infusion  1,500 mg IntraVENous Q24H    cefepime (MAXIPIME) 2 g in 0.9% sodium chloride (MBP/ADV) 100 mL  2 g IntraVENous Q12H    sodium chloride (NS) flush 10-30 mL  10-30 mL InterCATHeter PRN    sodium chloride (NS) flush 10 mL  10 mL InterCATHeter Q24H    sodium chloride (NS) flush 10-40 mL  10-40 mL InterCATHeter Q8H    heparin (porcine) pf 300 Units  300 Units InterCATHeter PRN    sodium chloride (NS) 0.9 % flush        enoxaparin (LOVENOX) injection 40 mg  40 mg SubCUTAneous Q12H    miconazole (SECURA) 2 % extra thick cream   Topical BID    furosemide (LASIX) injection 40 mg  40 mg IntraVENous BID    acetaminophen (TYLENOL) tablet 650 mg  650 mg Oral Q4H PRN    allopurinol (ZYLOPRIM) tablet 300 mg  300 mg Oral DAILY    aspirin-dipyridamole (AGGRENOX)  mg per capsule 1 Cap  1 Cap Oral BID    atenolol (TENORMIN) tablet 50 mg  50 mg Oral DAILY    atorvastatin (LIPITOR) tablet 40 mg  40 mg Oral QHS    colestipol (COLESTID) tablet 1 g  1 g Oral BID    dilTIAZem CD (CARDIZEM CD) capsule 360 mg  360 mg Oral DAILY    hydrALAZINE (APRESOLINE) tablet 50 mg  50 mg Oral TID    pregabalin (LYRICA) capsule 75 mg  75 mg Oral BID    sodium chloride (NS) flush 5-10 mL  5-10 mL IntraVENous Q8H    sodium chloride (NS) flush 5-10 mL  5-10 mL IntraVENous PRN    lactobac ac& pc-s.therm-b.anim (ELVA Q/RISAQUAD)  1 Cap Oral DAILY    pantoprazole (PROTONIX) tablet 40 mg  40 mg Oral ACB    insulin lispro (HUMALOG) injection   SubCUTAneous AC&HS    glucose chewable tablet 16 g  4 Tab Oral PRN    dextrose (D50W) injection syrg 12.5-25 g  12.5-25 g IntraVENous PRN    glucagon (GLUCAGEN) injection 1 mg  1 mg IntraMUSCular PRN        Objective:   Vitals:  Visit Vitals    /56 (BP 1 Location: Left arm, BP Patient Position: At rest)    Pulse 75    Temp 97.7 °F (36.5 °C)    Resp 20    Wt 278 lb (126.1 kg)    SpO2 97%    Breastfeeding No    BMI 52.53 kg/m2    O2 Flow Rate (L/min): 1 l/min O2 Device: Nasal cannula Temp (24hrs), Av °F (36.7 °C), Min:97.7 °F (36.5 °C), Max:98.2 °F (36.8 °C)      Last 24hr Input/Output:    Intake/Output Summary (Last 24 hours) at 10/29/17 1016  Last data filed at 10/29/17 0856   Gross per 24 hour   Intake              240 ml   Output                0 ml   Net              240 ml        PHYSICAL EXAM:  General:     Alert, cooperative, no distress, appears stated age. Head:    Normocephalic, without obvious abnormality, atraumatic. Eyes:    Conjunctivae/corneas clear. PERRLA  Nose:   Nares normal. No drainage or sinus tenderness. Throat:     Lips, mucosa, and tongue normal.  No Thrush  Neck:   Supple, symmetrical,  no adenopathy, thyroid: non tender     no carotid bruit and no JVD. Back:     Symmetric,  No CVA tenderness. Lungs:    Clear to auscultation bilaterally. No Wheezing or Rhonchi. No rales. Heart:    Regular rate and rhythm,  no murmur, rub or gallop. Abdomen:    Soft, non-tender. Not distended. Bowel sounds normal. No masses  Extremities:  legs improving  Lymph nodes:  Cervical, supraclavicular normal.  Neurologic:  Normal strength, Alert and oriented X 3.    Skin:                No rash      Lab Data Reviewed:    Recent Results (from the past 24 hour(s))   GLUCOSE, POC    Collection Time: 10/28/17 11:24 AM   Result Value Ref Range    Glucose (POC) 125 (H) 65 - 100 mg/dL    Performed by Dionna Grady, POC    Collection Time: 10/28/17  4:48 PM   Result Value Ref Range    Glucose (POC) 135 (H) 65 - 100 mg/dL    Performed by Wen Pen    GLUCOSE, POC    Collection Time: 10/28/17  9:23 PM   Result Value Ref Range    Glucose (POC) 151 (H) 65 - 100 mg/dL    Performed by Ricky Spurling ([facility])    METABOLIC PANEL, BASIC    Collection Time: 10/29/17  4:43 AM   Result Value Ref Range    Sodium 146 (H) 136 - 145 mmol/L    Potassium 4.1 3.5 - 5.1 mmol/L    Chloride 112 (H) 97 - 108 mmol/L    CO2 26 21 - 32 mmol/L    Anion gap 8 5 - 15 mmol/L    Glucose 102 (H) 65 - 100 mg/dL    BUN 36 (H) 6 - 20 MG/DL    Creatinine 1.26 (H) 0.55 - 1.02 MG/DL    BUN/Creatinine ratio 29 (H) 12 - 20      GFR est AA 52 (L) >60 ml/min/1.73m2    GFR est non-AA 43 (L) >60 ml/min/1.73m2    Calcium 8.6 8.5 - 10.1 MG/DL   CBC WITH AUTOMATED DIFF    Collection Time: 10/29/17  4:43 AM   Result Value Ref Range    WBC 8.0 3.6 - 11.0 K/uL    RBC 2.96 (L) 3.80 - 5.20 M/uL    HGB 8.8 (L) 11.5 - 16.0 g/dL    HCT 28.7 (L) 35.0 - 47.0 %    MCV 97.0 80.0 - 99.0 FL    MCH 29.7 26.0 - 34.0 PG    MCHC 30.7 30.0 - 36.5 g/dL    RDW 17.7 (H) 11.5 - 14.5 %    PLATELET 597 517 - 647 K/uL    NEUTROPHILS 76 (H) 32 - 75 %    LYMPHOCYTES 12 12 - 49 %    MONOCYTES 8 5 - 13 %    EOSINOPHILS 4 0 - 7 %    BASOPHILS 0 0 - 1 %    ABS. NEUTROPHILS 6.0 1.8 - 8.0 K/UL    ABS. LYMPHOCYTES 1.0 0.8 - 3.5 K/UL    ABS. MONOCYTES 0.7 0.0 - 1.0 K/UL    ABS. EOSINOPHILS 0.4 0.0 - 0.4 K/UL    ABS.  BASOPHILS 0.0 0.0 - 0.1 K/UL   GLUCOSE, POC    Collection Time: 10/29/17  7:53 AM   Result Value Ref Range    Glucose (POC) 94 65 - 100 mg/dL    Performed by Ying Lancaster          Assessment/Plan:     Principal Problem:    Cellulitis and abscess of left lower extremity (10/26/2017)    Active Problems:    Diabetes mellitus type II, uncontrolled ([facility] (4/18/2013)      CKD (chronic kidney disease) stage 3, GFR 30-59 ml/min ()      Hyperlipemia ()      Diabetic polyneuropathy ([facility] (4/20/2013)      Stroke ([facility] (5/30/2014)      Overview: Recurrent, bilateral      ASCVD (arteriosclerotic cardiovascular disease) (5/31/2014)      Peripheral neuropathy (7/24/2017)      Chronic diastolic CHF (congestive heart failure), NYHA class 3 ([facility] (7/24/2017)      DM necrobiosis lipoidica ([facility] (10/26/2017)      Overview: questionable       ___________________________________________________  PLAN:    1.recheck CXR    2.  Continue antibiotics & wound care  3.  :  4.            ___________________________________________________    Attending Physician: Brent Edmonds MD

## 2017-10-29 NOTE — PROGRESS NOTES
Bedside and Verbal shift change report given to Cookie SEGOVIA (oncoming nurse) by Kylie Barnes (offgoing nurse). Report included the following information SBAR, Intake/Output, MAR, Accordion and Recent Results.

## 2017-10-30 LAB
ANION GAP SERPL CALC-SCNC: 6 MMOL/L (ref 5–15)
BACTERIA SPEC AEROBE CULT: NORMAL
BASOPHILS # BLD: 0 K/UL (ref 0–0.1)
BASOPHILS NFR BLD: 0 % (ref 0–1)
BUN SERPL-MCNC: 38 MG/DL (ref 6–20)
BUN/CREAT SERPL: 29 (ref 12–20)
CALCIUM SERPL-MCNC: 8.4 MG/DL (ref 8.5–10.1)
CHLORIDE SERPL-SCNC: 111 MMOL/L (ref 97–108)
CO2 SERPL-SCNC: 29 MMOL/L (ref 21–32)
CREAT SERPL-MCNC: 1.29 MG/DL (ref 0.55–1.02)
DATE LAST DOSE: ABNORMAL
DIFFERENTIAL METHOD BLD: ABNORMAL
EOSINOPHIL # BLD: 0.3 K/UL (ref 0–0.4)
EOSINOPHIL NFR BLD: 4 % (ref 0–7)
ERYTHROCYTE [DISTWIDTH] IN BLOOD BY AUTOMATED COUNT: 17.5 % (ref 11.5–14.5)
GLUCOSE BLD STRIP.AUTO-MCNC: 151 MG/DL (ref 65–100)
GLUCOSE BLD STRIP.AUTO-MCNC: 171 MG/DL (ref 65–100)
GLUCOSE BLD STRIP.AUTO-MCNC: 198 MG/DL (ref 65–100)
GLUCOSE SERPL-MCNC: 163 MG/DL (ref 65–100)
HCT VFR BLD AUTO: 28.1 % (ref 35–47)
HGB BLD-MCNC: 8.8 G/DL (ref 11.5–16)
LYMPHOCYTES # BLD: 0.7 K/UL (ref 0.8–3.5)
LYMPHOCYTES NFR BLD: 9 % (ref 12–49)
MCH RBC QN AUTO: 30.4 PG (ref 26–34)
MCHC RBC AUTO-ENTMCNC: 31.3 G/DL (ref 30–36.5)
MCV RBC AUTO: 97.2 FL (ref 80–99)
MONOCYTES # BLD: 0.8 K/UL (ref 0–1)
MONOCYTES NFR BLD: 10 % (ref 5–13)
NEUTS SEG # BLD: 6.3 K/UL (ref 1.8–8)
NEUTS SEG NFR BLD: 77 % (ref 32–75)
PLATELET # BLD AUTO: 221 K/UL (ref 150–400)
POTASSIUM SERPL-SCNC: 4.3 MMOL/L (ref 3.5–5.1)
RBC # BLD AUTO: 2.89 M/UL (ref 3.8–5.2)
RBC MORPH BLD: ABNORMAL
RBC MORPH BLD: ABNORMAL
REPORTED DOSE,DOSE: ABNORMAL UNITS
REPORTED DOSE/TIME,TMG: ABNORMAL
SERVICE CMNT-IMP: ABNORMAL
SODIUM SERPL-SCNC: 146 MMOL/L (ref 136–145)
VANCOMYCIN TROUGH SERPL-MCNC: 25.9 UG/ML (ref 5–10)
WBC # BLD AUTO: 8.1 K/UL (ref 3.6–11)

## 2017-10-30 PROCEDURE — 36415 COLL VENOUS BLD VENIPUNCTURE: CPT | Performed by: INTERNAL MEDICINE

## 2017-10-30 PROCEDURE — 74011000258 HC RX REV CODE- 258: Performed by: INTERNAL MEDICINE

## 2017-10-30 PROCEDURE — 65660000000 HC RM CCU STEPDOWN

## 2017-10-30 PROCEDURE — 74011250637 HC RX REV CODE- 250/637: Performed by: INTERNAL MEDICINE

## 2017-10-30 PROCEDURE — 82962 GLUCOSE BLOOD TEST: CPT

## 2017-10-30 PROCEDURE — 85025 COMPLETE CBC W/AUTO DIFF WBC: CPT | Performed by: INTERNAL MEDICINE

## 2017-10-30 PROCEDURE — 36592 COLLECT BLOOD FROM PICC: CPT

## 2017-10-30 PROCEDURE — 74011250636 HC RX REV CODE- 250/636: Performed by: INTERNAL MEDICINE

## 2017-10-30 PROCEDURE — 74011636637 HC RX REV CODE- 636/637: Performed by: INTERNAL MEDICINE

## 2017-10-30 PROCEDURE — 80048 BASIC METABOLIC PNL TOTAL CA: CPT | Performed by: INTERNAL MEDICINE

## 2017-10-30 PROCEDURE — 77010033678 HC OXYGEN DAILY

## 2017-10-30 RX ORDER — PREGABALIN 75 MG/1
75 CAPSULE ORAL 2 TIMES DAILY
Status: DISCONTINUED | OUTPATIENT
Start: 2017-10-30 | End: 2017-11-02 | Stop reason: HOSPADM

## 2017-10-30 RX ADMIN — ENOXAPARIN SODIUM 40 MG: 40 INJECTION SUBCUTANEOUS at 09:38

## 2017-10-30 RX ADMIN — ASPIRIN AND DIPYRIDAMOLE 1 CAPSULE: 25; 200 CAPSULE, EXTENDED RELEASE ORAL at 09:38

## 2017-10-30 RX ADMIN — INSULIN LISPRO 3 UNITS: 100 INJECTION, SOLUTION INTRAVENOUS; SUBCUTANEOUS at 09:41

## 2017-10-30 RX ADMIN — CEFEPIME HYDROCHLORIDE 2 G: 2 INJECTION, POWDER, FOR SOLUTION INTRAVENOUS at 09:40

## 2017-10-30 RX ADMIN — PANTOPRAZOLE SODIUM 40 MG: 40 TABLET, DELAYED RELEASE ORAL at 07:07

## 2017-10-30 RX ADMIN — PREGABALIN 75 MG: 75 CAPSULE ORAL at 18:07

## 2017-10-30 RX ADMIN — Medication 10 ML: at 13:31

## 2017-10-30 RX ADMIN — DILTIAZEM HYDROCHLORIDE 360 MG: 180 CAPSULE, COATED, EXTENDED RELEASE ORAL at 09:40

## 2017-10-30 RX ADMIN — Medication 30 ML: at 03:01

## 2017-10-30 RX ADMIN — PREGABALIN 75 MG: 75 CAPSULE ORAL at 09:40

## 2017-10-30 RX ADMIN — Medication 40 ML: at 07:08

## 2017-10-30 RX ADMIN — HYDRALAZINE HYDROCHLORIDE 50 MG: 50 TABLET, FILM COATED ORAL at 09:39

## 2017-10-30 RX ADMIN — ATORVASTATIN CALCIUM 40 MG: 40 TABLET, FILM COATED ORAL at 21:54

## 2017-10-30 RX ADMIN — ALLOPURINOL 300 MG: 300 TABLET ORAL at 09:38

## 2017-10-30 RX ADMIN — CEFEPIME HYDROCHLORIDE 2 G: 2 INJECTION, POWDER, FOR SOLUTION INTRAVENOUS at 21:49

## 2017-10-30 RX ADMIN — ASPIRIN AND DIPYRIDAMOLE 1 CAPSULE: 25; 200 CAPSULE, EXTENDED RELEASE ORAL at 18:07

## 2017-10-30 RX ADMIN — HYDRALAZINE HYDROCHLORIDE 50 MG: 50 TABLET, FILM COATED ORAL at 16:47

## 2017-10-30 RX ADMIN — HYDRALAZINE HYDROCHLORIDE 50 MG: 50 TABLET, FILM COATED ORAL at 21:54

## 2017-10-30 RX ADMIN — Medication 10 ML: at 09:43

## 2017-10-30 RX ADMIN — FUROSEMIDE 40 MG: 10 INJECTION, SOLUTION INTRAMUSCULAR; INTRAVENOUS at 09:38

## 2017-10-30 RX ADMIN — COLESTIPOL HYDROCHLORIDE 1 G: 1 TABLET, FILM COATED ORAL at 09:39

## 2017-10-30 RX ADMIN — Medication 1 CAPSULE: at 09:38

## 2017-10-30 RX ADMIN — MICONAZOLE NITRATE: 20 CREAM TOPICAL at 23:32

## 2017-10-30 RX ADMIN — Medication 30 ML: at 23:32

## 2017-10-30 RX ADMIN — INSULIN LISPRO 3 UNITS: 100 INJECTION, SOLUTION INTRAVENOUS; SUBCUTANEOUS at 16:47

## 2017-10-30 RX ADMIN — FUROSEMIDE 40 MG: 10 INJECTION, SOLUTION INTRAMUSCULAR; INTRAVENOUS at 16:47

## 2017-10-30 RX ADMIN — COLESTIPOL HYDROCHLORIDE 1 G: 1 TABLET, FILM COATED ORAL at 18:07

## 2017-10-30 RX ADMIN — MICONAZOLE NITRATE: 20 CREAM TOPICAL at 09:00

## 2017-10-30 RX ADMIN — INSULIN GLARGINE 30 UNITS: 100 INJECTION, SOLUTION SUBCUTANEOUS at 22:00

## 2017-10-30 RX ADMIN — ATENOLOL 50 MG: 50 TABLET ORAL at 09:39

## 2017-10-30 RX ADMIN — ENOXAPARIN SODIUM 40 MG: 40 INJECTION SUBCUTANEOUS at 21:51

## 2017-10-30 NOTE — PROGRESS NOTES
Pt is a 61 y/o  female admitted for cellulitis and abscess of left lower extremity. Pt lives with spouse in a one story home with three steps (ramp) to entrance of residence. Pt requires assistance with ADLs and does not drive. Pt had previous HH with unknown facility. Pt has no previous SNF providers. Pt has wheelchair (which she is primarily wheelchair bound) and has access to walker which she doesn't use. Pt prefers to use [facility] at the intersection of [facility] and [address]. Pt will be transported at discharge by spouse. CM met with pt to complete initial assessment. Pt is alert and oriented to person, place,time and situation. CM will continue to follow pt to assist with discharge plans as needed. Care Management Interventions  PCP Verified by CM: Yes (Dr. Ruth Helm )  Mode of Transport at Discharge:  Other (see comment) (private vehicle )  Transition of Care Consult (CM Consult): Discharge Planning  Discharge Durable Medical Equipment: No (Pt has wheelchair adn walker which she doesn't use )  Health Maintenance Reviewed: Yes  Physical Therapy Consult: No  Occupational Therapy Consult: No  Speech Therapy Consult: No  Current Support Network: Lives with Spouse  Confirm Follow Up Transport: Family  Plan discussed with Pt/Family/Caregiver: Yes  Discharge Location  Discharge Placement: Home with family assistance    Ramsey Soulier, MSW, [address] [place] [address]   [phone]

## 2017-10-30 NOTE — PROGRESS NOTES
General Surgery End of Shift Nursing Note    Bedside shift change report given to Martha Barboza (oncoming nurse) by Sebastian Prakash RN (offgoing nurse). Report included the following information SBAR, Kardex, OR Summary, Intake/Output, MAR, Recent Results and Med Rec Status. Shift worked:   7p-7a   Significant changes during shift:    none   Non-emergent issues for physician to address:   none     Number times ambulated in hallway past shift: 0    Number of times OOB to chair past shift: 0    Pain Management:  Current medication: none  Patient states pain is manageable on current pain medication: YES    GI:    Current diet:  DIET DIABETIC CONSISTENT CARB Regular    Tolerating current diet: YES  Passing flatus: YES  Last Bowel Movement: yesterday   Appearance: loose    Respiratory:    Incentive Spirometer at bedside: YES  Patient instructed on use: YES    Patient Safety:    Falls Score: 2  Bed Alarm On? No  Sitter?  No    Anca Solsi RN

## 2017-10-30 NOTE — PROGRESS NOTES
PROGRESS NOTE    NAME:  Lennox Patee   :   1955   MRN:   [id number]     Date/Time:  10/30/2017 7:14 AM  Subjective:   History:  CXR shows resolution of interstitial edema.legs feel better.        Medications reviewed:  Current Facility-Administered Medications   Medication Dose Route Frequency    pregabalin (LYRICA) capsule 75 mg  75 mg Oral BID    polyethylene glycol (MIRALAX) packet 17 g  17 g Oral DAILY    docusate sodium (COLACE) capsule 100 mg  100 mg Oral BID    insulin glargine (LANTUS) injection 30 Units  30 Units SubCUTAneous QHS    vancomycin (VANCOCIN) 1500 mg in  ml infusion  1,500 mg IntraVENous Q24H    cefepime (MAXIPIME) 2 g in 0.9% sodium chloride (MBP/ADV) 100 mL  2 g IntraVENous Q12H    sodium chloride (NS) flush 10-30 mL  10-30 mL InterCATHeter PRN    sodium chloride (NS) flush 10 mL  10 mL InterCATHeter Q24H    sodium chloride (NS) flush 10-40 mL  10-40 mL InterCATHeter Q8H    heparin (porcine) pf 300 Units  300 Units InterCATHeter PRN    sodium chloride (NS) 0.9 % flush        enoxaparin (LOVENOX) injection 40 mg  40 mg SubCUTAneous Q12H    miconazole (SECURA) 2 % extra thick cream   Topical BID    furosemide (LASIX) injection 40 mg  40 mg IntraVENous BID    acetaminophen (TYLENOL) tablet 650 mg  650 mg Oral Q4H PRN    allopurinol (ZYLOPRIM) tablet 300 mg  300 mg Oral DAILY    aspirin-dipyridamole (AGGRENOX)  mg per capsule 1 Cap  1 Cap Oral BID    atenolol (TENORMIN) tablet 50 mg  50 mg Oral DAILY    atorvastatin (LIPITOR) tablet 40 mg  40 mg Oral QHS    colestipol (COLESTID) tablet 1 g  1 g Oral BID    dilTIAZem CD (CARDIZEM CD) capsule 360 mg  360 mg Oral DAILY    hydrALAZINE (APRESOLINE) tablet 50 mg  50 mg Oral TID    sodium chloride (NS) flush 5-10 mL  5-10 mL IntraVENous Q8H    lactobac ac& pc-s.therm-b.anim (ELVA Q/RISAQUAD)  1 Cap Oral DAILY    pantoprazole (PROTONIX) tablet 40 mg  40 mg Oral ACB    insulin lispro (HUMALOG) injection SubCUTAneous AC&HS    glucose chewable tablet 16 g  4 Tab Oral PRN    dextrose (D50W) injection syrg 12.5-25 g  12.5-25 g IntraVENous PRN    glucagon (GLUCAGEN) injection 1 mg  1 mg IntraMUSCular PRN        Objective:   Vitals:  Visit Vitals    /72    Pulse 74    Temp 98 °F (36.7 °C)    Resp 18    Wt 278 lb (126.1 kg)    SpO2 95%    Breastfeeding No    BMI 52.53 kg/m2    O2 Flow Rate (L/min): 2 l/min O2 Device: Nasal cannula Temp (24hrs), Av.4 °F (36.9 °C), Min:98 °F (36.7 °C), Max:99 °F (37.2 °C)      Last 24hr Input/Output:    Intake/Output Summary (Last 24 hours) at 10/30/17 0714  Last data filed at 10/29/17 1522   Gross per 24 hour   Intake             1880 ml   Output                0 ml   Net             1880 ml        PHYSICAL EXAM:  General:     Alert, cooperative, no distress, appears stated age. Head:    Normocephalic, without obvious abnormality, atraumatic. Eyes:    Conjunctivae/corneas clear. PERRLA  Nose:   Nares normal. No drainage or sinus tenderness. Throat:     Lips, mucosa, and tongue normal.  No Thrush  Neck:   Supple, symmetrical,  no adenopathy, thyroid: non tender     no carotid bruit and no JVD. Back:     Symmetric,  No CVA tenderness. Lungs:    Clear to auscultation bilaterally. No Wheezing or Rhonchi. No rales. Heart:    Regular rate and rhythm,  no murmur, rub or gallop. Abdomen:    Soft, non-tender. Not distended. Bowel sounds normal. No masses  Extremities:  evegny erythema  Lymph nodes:  Cervical, supraclavicular normal.  Neurologic:  Normal strength, Alert and oriented X 3.    Skin:                No rash      Lab Data Reviewed:    Recent Results (from the past 24 hour(s))   GLUCOSE, POC    Collection Time: 10/29/17  7:53 AM   Result Value Ref Range    Glucose (POC) 94 65 - 100 mg/dL    Performed by Kirstin Mejia    GLUCOSE, POC    Collection Time: 10/29/17 11:17 AM   Result Value Ref Range    Glucose (POC) 124 (H) 65 - 100 mg/dL    Performed by Francois Perez Nadyaisa    GLUCOSE, POC    Collection Time: 10/29/17  4:01 PM   Result Value Ref Range    Glucose (POC) 138 (H) 65 - 100 mg/dL    Performed by Belkys Prather    GLUCOSE, POC    Collection Time: 10/29/17  9:34 PM   Result Value Ref Range    Glucose (POC) 196 (H) 65 - 100 mg/dL    Performed by Anastasia Cline (PCT)    Carmita Hdz    Collection Time: 10/29/17 11:31 PM   Result Value Ref Range    Vancomycin,trough 25.9 (HH) 5.0 - 10.0 ug/mL    Reported dose date: NOT PROVIDED      Reported dose time: NOT PROVIDED      Reported dose: NOT PROVIDED UNITS   METABOLIC PANEL, BASIC    Collection Time: 10/30/17  2:55 AM   Result Value Ref Range    Sodium 146 (H) 136 - 145 mmol/L    Potassium 4.3 3.5 - 5.1 mmol/L    Chloride 111 (H) 97 - 108 mmol/L    CO2 29 21 - 32 mmol/L    Anion gap 6 5 - 15 mmol/L    Glucose 163 (H) 65 - 100 mg/dL    BUN 38 (H) 6 - 20 MG/DL    Creatinine 1.29 (H) 0.55 - 1.02 MG/DL    BUN/Creatinine ratio 29 (H) 12 - 20      GFR est AA 51 (L) >60 ml/min/1.73m2    GFR est non-AA 42 (L) >60 ml/min/1.73m2    Calcium 8.4 (L) 8.5 - 10.1 MG/DL   CBC WITH AUTOMATED DIFF    Collection Time: 10/30/17  2:55 AM   Result Value Ref Range    WBC 8.1 3.6 - 11.0 K/uL    RBC 2.89 (L) 3.80 - 5.20 M/uL    HGB 8.8 (L) 11.5 - 16.0 g/dL    HCT 28.1 (L) 35.0 - 47.0 %    MCV 97.2 80.0 - 99.0 FL    MCH 30.4 26.0 - 34.0 PG    MCHC 31.3 30.0 - 36.5 g/dL    RDW 17.5 (H) 11.5 - 14.5 %    PLATELET 287 887 - 275 K/uL    NEUTROPHILS 77 (H) 32 - 75 %    LYMPHOCYTES 9 (L) 12 - 49 %    MONOCYTES 10 5 - 13 %    EOSINOPHILS 4 0 - 7 %    BASOPHILS 0 0 - 1 %    ABS. NEUTROPHILS 6.3 1.8 - 8.0 K/UL    ABS. LYMPHOCYTES 0.7 (L) 0.8 - 3.5 K/UL    ABS. MONOCYTES 0.8 0.0 - 1.0 K/UL    ABS. EOSINOPHILS 0.3 0.0 - 0.4 K/UL    ABS.  BASOPHILS 0.0 0.0 - 0.1 K/UL    RBC COMMENTS ANISOCYTOSIS  1+        RBC COMMENTS POLYCHROMASIA  PRESENT        DF SMEAR SCANNED           Assessment/Plan:     Principal Problem:    Cellulitis and abscess of left lower extremity (10/26/2017)    Active Problems:    Diabetes mellitus type II, uncontrolled ([facility] (4/18/2013)      CKD (chronic kidney disease) stage 3, GFR 30-59 ml/min ()      Hyperlipemia ()      Diabetic polyneuropathy ([facility] (4/20/2013)      Stroke ([facility] (5/30/2014)      Overview: Recurrent, bilateral      ASCVD (arteriosclerotic cardiovascular disease) (5/31/2014)      Peripheral neuropathy (7/24/2017)      Chronic diastolic CHF (congestive heart failure), NYHA class 3 ([facility] (7/24/2017)      DM necrobiosis lipoidica ([facility] (10/26/2017)      Overview: questionable       ___________________________________________________  PLAN:    1.  Continue antibiotics pending biopsy results   2.   3.  :  4.            ___________________________________________________    Attending Physician: Kandi Camarena MD

## 2017-10-30 NOTE — PROGRESS NOTES
General Surgery End of Shift Nursing Note    Bedside shift change report given to Gloria Mcwilliams RN (oncoming nurse) by Sonia Helm RN (offgoing nurse). Report included the following information SBAR, Kardex, Intake/Output, MAR and Recent Results. Shift worked:   7a-7p   Significant changes during shift:       Non-emergent issues for physician to address:        Number times ambulated in hallway past shift: 0. Up to bedside commode. Number of times OOB to chair past shift: 0    Pain Management:  Current medication: Tylenol  Patient states pain is manageable on current pain medication: YES    GI:    Current diet:  DIET DIABETIC CONSISTENT CARB Regular    Tolerating current diet: YES  Passing flatus: YES  Last Bowel Movement: yesterday   Appearance:     Respiratory:    Incentive Spirometer at bedside: NO  Patient instructed on use: NO    Patient Safety:    Falls Score: 3  Bed Alarm On? No  Sitter?  No    Sandeep Shepard

## 2017-10-30 NOTE — PROGRESS NOTES
Vancomycin trough of 25.9 called to pharmacist Quita Mohs, who will inform oncoming shift to make adjustments

## 2017-10-30 NOTE — PROGRESS NOTES
Pharmacy Automatic Renal Dosing Protocol - Antimicrobials     Indication for Antimicrobials: cellulitis and abcess of left lower extremity     Current Regimens:  Vancomycin 1500mg IV q24h - started 10/26 day 5  Cefepime 2 gram IV q 12 hours (start date 10/26; Day #4)     Previous Antimicrobial Therapy:  none     Vancomycin goal trough: 15-20 (since patient has abcess)     Microbiology Results (Current encounter)   Date/Time Order Name Specimen Source Lab Status   10/26/17 2202 CULTURE, URINE Clean catch Final result   10/26/17 1958 CULTURE, BLOOD Blood Preliminary result     Paralysis, amputations, malnutrition: no     Labs:  Estimated Creatinine Clearance: 55.7 mL/min (based on Cr of 1.29). Estimated Creatinine Clearance (using IBW): 33.7 mL/min  Recent Labs      10/30/17   0255  10/29/17   0443  10/28/17   0429   CREA  1.29*  1.26*  1.28*   BUN  38*  36*  38*   WBC  8.1  8.0  8.0   NA  146*  146*  146*   K  4.3  4.1  4.3   CA  8.4*  8.6  8.5   HGB  8.8*  8.8*  9.3*   HCT  28.1*  28.7*  30.4*   PLT  221  245  270     Temp (24hrs), Av.2 ° F (36.8 ° C), Min:98 ° F (36.7 ° C), Max:98.3 ° F (36.8 ° C)    Creatinine Clearance (mL/min) or Dialysis: ~34 ml/min  No results found for: PCT     Impression/Plan:   Afebrile, renal stable  UCx Enterobacter Cloacae, Citrobacter Braakii 30K  Bx pending - ng thusfar  Reported Vancomycin trough  of 25.9 with actual trough of 25.8  T1/2 16 hours  Adjusted Vancomycin to 1000mg IV q24h for an estimated trough at Css of 17.5. Next dose 10/31 11:00. Recommend discontinue Vancomycin once BCx final.        Pharmacy will follow daily and adjust medications as appropriate for renal function and/or serum levels.     Thank you,  Norma Hwang, [facility]

## 2017-10-31 ENCOUNTER — APPOINTMENT (OUTPATIENT)
Dept: GENERAL RADIOLOGY | Age: 62
DRG: 603 | End: 2017-10-31
Attending: INTERNAL MEDICINE
Payer: COMMERCIAL

## 2017-10-31 PROBLEM — E53.8 B12 DEFICIENCY: Status: ACTIVE | Noted: 2017-10-31

## 2017-10-31 PROBLEM — I50.30 (HFPEF) HEART FAILURE WITH PRESERVED EJECTION FRACTION (HCC): Status: ACTIVE | Noted: 2017-10-31

## 2017-10-31 LAB
ANION GAP SERPL CALC-SCNC: 7 MMOL/L (ref 5–15)
BUN SERPL-MCNC: 37 MG/DL (ref 6–20)
BUN/CREAT SERPL: 28 (ref 12–20)
CALCIUM SERPL-MCNC: 8.8 MG/DL (ref 8.5–10.1)
CHLORIDE SERPL-SCNC: 111 MMOL/L (ref 97–108)
CO2 SERPL-SCNC: 27 MMOL/L (ref 21–32)
CREAT SERPL-MCNC: 1.34 MG/DL (ref 0.55–1.02)
GLUCOSE BLD STRIP.AUTO-MCNC: 143 MG/DL (ref 65–100)
GLUCOSE BLD STRIP.AUTO-MCNC: 154 MG/DL (ref 65–100)
GLUCOSE BLD STRIP.AUTO-MCNC: 159 MG/DL (ref 65–100)
GLUCOSE BLD STRIP.AUTO-MCNC: 187 MG/DL (ref 65–100)
GLUCOSE SERPL-MCNC: 149 MG/DL (ref 65–100)
POTASSIUM SERPL-SCNC: 4.2 MMOL/L (ref 3.5–5.1)
SERVICE CMNT-IMP: ABNORMAL
SODIUM SERPL-SCNC: 145 MMOL/L (ref 136–145)

## 2017-10-31 PROCEDURE — 74011250637 HC RX REV CODE- 250/637: Performed by: INTERNAL MEDICINE

## 2017-10-31 PROCEDURE — 80048 BASIC METABOLIC PNL TOTAL CA: CPT | Performed by: INTERNAL MEDICINE

## 2017-10-31 PROCEDURE — 74011636637 HC RX REV CODE- 636/637: Performed by: INTERNAL MEDICINE

## 2017-10-31 PROCEDURE — 65660000000 HC RM CCU STEPDOWN

## 2017-10-31 PROCEDURE — 74011250636 HC RX REV CODE- 250/636: Performed by: INTERNAL MEDICINE

## 2017-10-31 PROCEDURE — 36415 COLL VENOUS BLD VENIPUNCTURE: CPT | Performed by: INTERNAL MEDICINE

## 2017-10-31 PROCEDURE — 82962 GLUCOSE BLOOD TEST: CPT

## 2017-10-31 PROCEDURE — 71010 XR CHEST PORT: CPT

## 2017-10-31 PROCEDURE — 74011000258 HC RX REV CODE- 258: Performed by: INTERNAL MEDICINE

## 2017-10-31 RX ORDER — CYANOCOBALAMIN 1000 UG/ML
1000 INJECTION, SOLUTION INTRAMUSCULAR; SUBCUTANEOUS DAILY
Status: DISCONTINUED | OUTPATIENT
Start: 2017-10-31 | End: 2017-11-02 | Stop reason: HOSPADM

## 2017-10-31 RX ORDER — HALOBETASOL PROPIONATE 0.5 MG/G
CREAM TOPICAL 2 TIMES DAILY
Status: DISCONTINUED | OUTPATIENT
Start: 2017-10-31 | End: 2017-11-02 | Stop reason: HOSPADM

## 2017-10-31 RX ADMIN — POLYETHYLENE GLYCOL 3350 17 G: 17 POWDER, FOR SOLUTION ORAL at 09:46

## 2017-10-31 RX ADMIN — Medication 10 ML: at 18:09

## 2017-10-31 RX ADMIN — ATORVASTATIN CALCIUM 40 MG: 40 TABLET, FILM COATED ORAL at 21:19

## 2017-10-31 RX ADMIN — FUROSEMIDE 40 MG: 10 INJECTION, SOLUTION INTRAMUSCULAR; INTRAVENOUS at 16:00

## 2017-10-31 RX ADMIN — MICONAZOLE NITRATE 1 EACH: 20 CREAM TOPICAL at 22:02

## 2017-10-31 RX ADMIN — FUROSEMIDE 40 MG: 10 INJECTION, SOLUTION INTRAMUSCULAR; INTRAVENOUS at 09:48

## 2017-10-31 RX ADMIN — ASPIRIN AND DIPYRIDAMOLE 1 CAPSULE: 25; 200 CAPSULE, EXTENDED RELEASE ORAL at 18:07

## 2017-10-31 RX ADMIN — CEFEPIME HYDROCHLORIDE 2 G: 2 INJECTION, POWDER, FOR SOLUTION INTRAVENOUS at 09:46

## 2017-10-31 RX ADMIN — MICONAZOLE NITRATE: 20 CREAM TOPICAL at 09:00

## 2017-10-31 RX ADMIN — HYDRALAZINE HYDROCHLORIDE 50 MG: 50 TABLET, FILM COATED ORAL at 09:48

## 2017-10-31 RX ADMIN — INSULIN GLARGINE 30 UNITS: 100 INJECTION, SOLUTION SUBCUTANEOUS at 22:01

## 2017-10-31 RX ADMIN — CEFEPIME HYDROCHLORIDE 2 G: 2 INJECTION, POWDER, FOR SOLUTION INTRAVENOUS at 21:02

## 2017-10-31 RX ADMIN — VANCOMYCIN HYDROCHLORIDE 1000 MG: 1 INJECTION, POWDER, LYOPHILIZED, FOR SOLUTION INTRAVENOUS at 11:52

## 2017-10-31 RX ADMIN — COLESTIPOL HYDROCHLORIDE 1 G: 1 TABLET, FILM COATED ORAL at 09:48

## 2017-10-31 RX ADMIN — PANTOPRAZOLE SODIUM 40 MG: 40 TABLET, DELAYED RELEASE ORAL at 07:03

## 2017-10-31 RX ADMIN — HYDRALAZINE HYDROCHLORIDE 50 MG: 50 TABLET, FILM COATED ORAL at 21:19

## 2017-10-31 RX ADMIN — ATENOLOL 50 MG: 50 TABLET ORAL at 09:49

## 2017-10-31 RX ADMIN — ASPIRIN AND DIPYRIDAMOLE 1 CAPSULE: 25; 200 CAPSULE, EXTENDED RELEASE ORAL at 09:47

## 2017-10-31 RX ADMIN — ENOXAPARIN SODIUM 40 MG: 40 INJECTION SUBCUTANEOUS at 09:42

## 2017-10-31 RX ADMIN — ACETAMINOPHEN 650 MG: 325 TABLET ORAL at 22:01

## 2017-10-31 RX ADMIN — HYDRALAZINE HYDROCHLORIDE 50 MG: 50 TABLET, FILM COATED ORAL at 18:07

## 2017-10-31 RX ADMIN — ALLOPURINOL 300 MG: 300 TABLET ORAL at 09:48

## 2017-10-31 RX ADMIN — INSULIN LISPRO 3 UNITS: 100 INJECTION, SOLUTION INTRAVENOUS; SUBCUTANEOUS at 11:46

## 2017-10-31 RX ADMIN — INSULIN LISPRO 3 UNITS: 100 INJECTION, SOLUTION INTRAVENOUS; SUBCUTANEOUS at 07:30

## 2017-10-31 RX ADMIN — Medication 1 CAPSULE: at 09:48

## 2017-10-31 RX ADMIN — Medication 10 ML: at 21:05

## 2017-10-31 RX ADMIN — HALOBETASOL PROPIONATE: 0.5 CREAM TOPICAL at 18:00

## 2017-10-31 RX ADMIN — DILTIAZEM HYDROCHLORIDE 360 MG: 180 CAPSULE, COATED, EXTENDED RELEASE ORAL at 09:47

## 2017-10-31 RX ADMIN — HALOBETASOL PROPIONATE: 0.5 CREAM TOPICAL at 09:00

## 2017-10-31 RX ADMIN — COLESTIPOL HYDROCHLORIDE 1 G: 1 TABLET, FILM COATED ORAL at 18:07

## 2017-10-31 RX ADMIN — Medication 10 ML: at 21:04

## 2017-10-31 RX ADMIN — PREGABALIN 75 MG: 75 CAPSULE ORAL at 09:47

## 2017-10-31 RX ADMIN — CYANOCOBALAMIN 1000 MCG: 1000 INJECTION, SOLUTION INTRAMUSCULAR at 09:43

## 2017-10-31 RX ADMIN — Medication 40 ML: at 05:04

## 2017-10-31 RX ADMIN — PREGABALIN 75 MG: 75 CAPSULE ORAL at 18:07

## 2017-10-31 RX ADMIN — INSULIN LISPRO 3 UNITS: 100 INJECTION, SOLUTION INTRAVENOUS; SUBCUTANEOUS at 18:35

## 2017-10-31 RX ADMIN — ENOXAPARIN SODIUM 40 MG: 40 INJECTION SUBCUTANEOUS at 21:19

## 2017-10-31 NOTE — PROGRESS NOTES
Attempted to see pt for therapy services. Pt and her  were present.  and pt confirm that pt has been getting to bedside commode and chair and that pt is at baseline. They wish to continue OP PT if possible but also educated on home health if needed. Pt is able to perform UB ADLS without assist at w/c level. Holds to something while  performs byron/anal care and pulls up and down pants.  performs LB dressing and LB ADLs for pt. Wheelchair does not fit in bathroom but pt can walk short distance with assist.  They are declining therapy services in the acute setting due to the above. They have all needed DME at home. Will sign off. Kwame Welch, PT, DPT

## 2017-10-31 NOTE — PROGRESS NOTES
Attempted to see pt for therapy services. Pt and her  were present.  and pt confirm that pt has been getting to bedside commode and chair and that pt is at baseline. They wish to continue OP PT if possible but also educated on home health if needed. Pt is able to perform UB ADLS without assist at w/c level. Holds to something while  performs byron/anal care and pulls up and down pants.  performs LB dressing and LB ADLs for pt. Wheelchair does not fit in bathroom but pt can walk short distance with assist.  They are declining therapy services in the acute setting due to the above. They have all needed DME at home. Will sign off.

## 2017-10-31 NOTE — PROGRESS NOTES
CM contacted Karo Louie at [facility] to schedule follow-up care for the pt post-discharge. Information entered into pt's AVS. CM will continue to follow to ensure additional discharge needs are met and change appointment as needed.      DANIA Stanley, [address] [place] [address]  [phone]

## 2017-10-31 NOTE — PROGRESS NOTES
[address] [place] [address]  [phone]      Medical Progress Note      NAME: Laly Rolon   :  1955  MRM:  [id number]    Date/Time: 10/31/2017  7:28 AM      Subjective:     Legs look much better. Breathing better. Awaiting leg bx. Wound care continues. B12 deficient. CXR improved. Echo with moderate AS and normal EF. Past Medical History reviewed and unchanged from Admission History and Physical and/or prior progress note/electronic medical record    Medications reviewed. ROS:      General: negative for fever, chills, sweats; positive for weakness  Ear Nose and Throat: negative for rhinorrhea, pharyngitis, otalgia, tinnitus, speech or swallowing difficulties  Respiratory:  negative for cough, sputum production,  Cardiology:  negative for chest pain, palpitations; positive orthopnea, PND, edema  Gastrointestinal: negative for abdominal pain, N/V, dysphagia, change in bowel habits, bleeding  Genitourinary: negative for frequency, urgency, dysuria, hematuria, incontinence  Dermatological: positive for rash  Neurological: positive for bilateral LE weakness    Objective:     Last 24hrs VS reviewed since prior progress note.  Most recent are:    Visit Vitals    /57 (BP 1 Location: Left arm, BP Patient Position: At rest)    Pulse 74    Temp 97.9 °F (36.6 °C)    Resp 18    Wt 278 lb (126.1 kg)    SpO2 92%    Breastfeeding No    BMI 52.53 kg/m2     SpO2 Readings from Last 6 Encounters:   10/31/17 92%   10/26/17 94%   10/19/17 95%   10/11/17 94%   17 100%   16 96%    O2 Flow Rate (L/min): 2 l/min       Intake/Output Summary (Last 24 hours) at 10/31/17 0734  Last data filed at 10/31/17 0509   Gross per 24 hour   Intake                0 ml   Output              400 ml   Net             -400 ml          Physical Exam:    General appearance: alert, cooperative, no distress, appears stated age  Eyes: negative  Neck: supple, symmetrical, trachea midline, no adenopathy, thyroid: not enlarged, symmetric, no tenderness/mass/nodules, no carotid bruit and no JVD  Lungs: clear to auscultation bilaterally  Heart: regular rate and rhythm, S1, S2 normal, Grade 2/6 systolic murmur  Abdomen: obese, NT, no palpable mass  Extremities: brawny edema and plaques with superficial ulcerations; foot wounds dressed  Neurologic: LE weakness    Data Review:    Lab:    BMP:   Lab Results   Component Value Date/Time     10/31/2017 05:02 AM    K 4.2 10/31/2017 05:02 AM     (H) 10/31/2017 05:02 AM    CO2 27 10/31/2017 05:02 AM    AGAP 7 10/31/2017 05:02 AM     (H) 10/31/2017 05:02 AM    BUN 37 (H) 10/31/2017 05:02 AM    CREA 1.34 (H) 10/31/2017 05:02 AM    GFRAA 48 (L) 10/31/2017 05:02 AM    GFRNA 40 (L) 10/31/2017 05:02 AM     CBC:   No results found for: WBC, HGB, HGBEXT, HCT, HCTEXT, PLT, PLTEXT, HGBEXT, HCTEXT, PLTEXT  Recent Glucose Results:   Lab Results   Component Value Date/Time     (H) 10/31/2017 05:02 AM     All labs reviewed in past 24 hours    Other pertinent lab: MG 2.2    Imaging:    Reviewed. CXR, foot and ankle Xrays      Assessment / Plan:     Principal Problem:    Cellulitis and abscess of left lower extremity (10/26/2017)    Active Problems:    Stroke ([address] (5/30/2014)      Overview: Recurrent, bilateral      Chronic diastolic CHF (congestive heart failure), NYHA class 3 ([address] (7/24/2017)      Diabetes mellitus type II, uncontrolled ([address] (4/18/2013)      Diabetic polyneuropathy ([address] (4/20/2013)      ASCVD (arteriosclerotic cardiovascular disease) (5/31/2014)      Peripheral neuropathy (7/24/2017)      DM necrobiosis lipoidica ([address] (10/26/2017)      Overview: questionable      (HFpEF) heart failure with preserved ejection fraction ([address] (10/31/2017)      CKD (chronic kidney disease) stage 3, GFR 30-59 ml/min ()      Hyperlipemia ()      B12 deficiency (10/31/2017)        1. Wound care clinic referral  2.  Wound care   3. Continue Lasix  4. Replete B12  5. SSI  6. PICC line  7. Continue antibiotics  8. Follow labs  9.  PT/OT             Care Plan discussed with: Patient and Family    Discussed:  Care Plan and D/C Planning    Prophylaxis:  Lovenox and H2B/PPI    Disposition:  ???  ___________________________________________________    Attending Physician: Emma Sandra MD

## 2017-10-31 NOTE — PROGRESS NOTES
General Surgery End of Shift Nursing Note    Bedside shift change report given to [address] [place] [address] (oncoming nurse) by Heath Simon RN (offgoing nurse). Report included the following information SBAR, Kardex, Intake/Output, MAR and Recent Results. Shift worked:   7p-7a   Significant changes during shift:    none   Non-emergent issues for physician to address:   0     Number times ambulated in hallway past shift: 0    Number of times OOB to chair past shift: 0    Pain Management:  Current medication: pt not c/o pain  Patient states pain is manageable on current pain medication: YES    GI:    Current diet:  DIET DIABETIC CONSISTENT CARB Regular    Tolerating current diet: YES  Passing flatus: YES  Last Bowel Movement: Sunday  Appearance: loose    Respiratory:    Incentive Spirometer at bedside: YES  Patient instructed on use: YES    Patient Safety:    Falls Score: 3  Bed Alarm On? No  Sitter?  No    Nuris Silva RN

## 2017-11-01 PROBLEM — E11.620: Status: RESOLVED | Noted: 2017-10-26 | Resolved: 2017-11-01

## 2017-11-01 LAB
ANION GAP SERPL CALC-SCNC: 7 MMOL/L (ref 5–15)
BACTERIA SPEC CULT: NORMAL
BUN SERPL-MCNC: 35 MG/DL (ref 6–20)
BUN/CREAT SERPL: 28 (ref 12–20)
CALCIUM SERPL-MCNC: 8.8 MG/DL (ref 8.5–10.1)
CHLORIDE SERPL-SCNC: 110 MMOL/L (ref 97–108)
CO2 SERPL-SCNC: 27 MMOL/L (ref 21–32)
CREAT SERPL-MCNC: 1.27 MG/DL (ref 0.55–1.02)
ERYTHROCYTE [DISTWIDTH] IN BLOOD BY AUTOMATED COUNT: 17.4 % (ref 11.5–14.5)
GLUCOSE BLD STRIP.AUTO-MCNC: 118 MG/DL (ref 65–100)
GLUCOSE BLD STRIP.AUTO-MCNC: 160 MG/DL (ref 65–100)
GLUCOSE BLD STRIP.AUTO-MCNC: 172 MG/DL (ref 65–100)
GLUCOSE SERPL-MCNC: 128 MG/DL (ref 65–100)
HCT VFR BLD AUTO: 29.1 % (ref 35–47)
HGB BLD-MCNC: 8.8 G/DL (ref 11.5–16)
MCH RBC QN AUTO: 28.8 PG (ref 26–34)
MCHC RBC AUTO-ENTMCNC: 30.2 G/DL (ref 30–36.5)
MCV RBC AUTO: 95.1 FL (ref 80–99)
PLATELET # BLD AUTO: 227 K/UL (ref 150–400)
POTASSIUM SERPL-SCNC: 3.8 MMOL/L (ref 3.5–5.1)
RBC # BLD AUTO: 3.06 M/UL (ref 3.8–5.2)
SERVICE CMNT-IMP: ABNORMAL
SERVICE CMNT-IMP: NORMAL
SODIUM SERPL-SCNC: 144 MMOL/L (ref 136–145)
WBC # BLD AUTO: 7.8 K/UL (ref 3.6–11)

## 2017-11-01 PROCEDURE — 77030011256 HC DRSG MEPILEX <16IN NO BORD MOLN -A

## 2017-11-01 PROCEDURE — 36415 COLL VENOUS BLD VENIPUNCTURE: CPT | Performed by: INTERNAL MEDICINE

## 2017-11-01 PROCEDURE — 65660000000 HC RM CCU STEPDOWN

## 2017-11-01 PROCEDURE — 74011636637 HC RX REV CODE- 636/637: Performed by: INTERNAL MEDICINE

## 2017-11-01 PROCEDURE — 80048 BASIC METABOLIC PNL TOTAL CA: CPT | Performed by: INTERNAL MEDICINE

## 2017-11-01 PROCEDURE — 74011000258 HC RX REV CODE- 258: Performed by: INTERNAL MEDICINE

## 2017-11-01 PROCEDURE — 82962 GLUCOSE BLOOD TEST: CPT

## 2017-11-01 PROCEDURE — 74011250636 HC RX REV CODE- 250/636: Performed by: INTERNAL MEDICINE

## 2017-11-01 PROCEDURE — 74011250637 HC RX REV CODE- 250/637: Performed by: INTERNAL MEDICINE

## 2017-11-01 PROCEDURE — 85027 COMPLETE CBC AUTOMATED: CPT | Performed by: INTERNAL MEDICINE

## 2017-11-01 RX ORDER — AMMONIUM LACTATE 12 G/100G
LOTION TOPICAL 2 TIMES DAILY
Status: DISCONTINUED | OUTPATIENT
Start: 2017-11-01 | End: 2017-11-02 | Stop reason: HOSPADM

## 2017-11-01 RX ORDER — BUMETANIDE 1 MG/1
2 TABLET ORAL DAILY
Status: DISCONTINUED | OUTPATIENT
Start: 2017-11-01 | End: 2017-11-01

## 2017-11-01 RX ORDER — BUMETANIDE 1 MG/1
2 TABLET ORAL DAILY
Status: DISCONTINUED | OUTPATIENT
Start: 2017-11-01 | End: 2017-11-02 | Stop reason: HOSPADM

## 2017-11-01 RX ADMIN — Medication 10 ML: at 22:25

## 2017-11-01 RX ADMIN — HYDRALAZINE HYDROCHLORIDE 50 MG: 50 TABLET, FILM COATED ORAL at 17:16

## 2017-11-01 RX ADMIN — ATENOLOL 50 MG: 50 TABLET ORAL at 10:33

## 2017-11-01 RX ADMIN — CYANOCOBALAMIN 1000 MCG: 1000 INJECTION, SOLUTION INTRAMUSCULAR at 10:33

## 2017-11-01 RX ADMIN — VANCOMYCIN HYDROCHLORIDE 1000 MG: 1 INJECTION, POWDER, LYOPHILIZED, FOR SOLUTION INTRAVENOUS at 14:12

## 2017-11-01 RX ADMIN — HALOBETASOL PROPIONATE: 0.5 CREAM TOPICAL at 09:00

## 2017-11-01 RX ADMIN — PREGABALIN 75 MG: 75 CAPSULE ORAL at 17:15

## 2017-11-01 RX ADMIN — HYDRALAZINE HYDROCHLORIDE 50 MG: 50 TABLET, FILM COATED ORAL at 10:33

## 2017-11-01 RX ADMIN — COLESTIPOL HYDROCHLORIDE 1 G: 1 TABLET, FILM COATED ORAL at 17:15

## 2017-11-01 RX ADMIN — ENOXAPARIN SODIUM 40 MG: 40 INJECTION SUBCUTANEOUS at 21:28

## 2017-11-01 RX ADMIN — ENOXAPARIN SODIUM 40 MG: 40 INJECTION SUBCUTANEOUS at 10:33

## 2017-11-01 RX ADMIN — INSULIN GLARGINE 30 UNITS: 100 INJECTION, SOLUTION SUBCUTANEOUS at 22:26

## 2017-11-01 RX ADMIN — Medication 10 ML: at 22:26

## 2017-11-01 RX ADMIN — ATORVASTATIN CALCIUM 40 MG: 40 TABLET, FILM COATED ORAL at 21:28

## 2017-11-01 RX ADMIN — INSULIN LISPRO 3 UNITS: 100 INJECTION, SOLUTION INTRAVENOUS; SUBCUTANEOUS at 13:08

## 2017-11-01 RX ADMIN — BUMETANIDE 2 MG: 1 TABLET ORAL at 17:15

## 2017-11-01 RX ADMIN — COLESTIPOL HYDROCHLORIDE 1 G: 1 TABLET, FILM COATED ORAL at 10:33

## 2017-11-01 RX ADMIN — PREGABALIN 75 MG: 75 CAPSULE ORAL at 10:33

## 2017-11-01 RX ADMIN — PANTOPRAZOLE SODIUM 40 MG: 40 TABLET, DELAYED RELEASE ORAL at 10:33

## 2017-11-01 RX ADMIN — INSULIN LISPRO 3 UNITS: 100 INJECTION, SOLUTION INTRAVENOUS; SUBCUTANEOUS at 17:13

## 2017-11-01 RX ADMIN — HALOBETASOL PROPIONATE: 0.5 CREAM TOPICAL at 18:00

## 2017-11-01 RX ADMIN — CEFEPIME HYDROCHLORIDE 2 G: 2 INJECTION, POWDER, FOR SOLUTION INTRAVENOUS at 10:33

## 2017-11-01 RX ADMIN — ASPIRIN AND DIPYRIDAMOLE 1 CAPSULE: 25; 200 CAPSULE, EXTENDED RELEASE ORAL at 17:15

## 2017-11-01 RX ADMIN — Medication: at 21:19

## 2017-11-01 RX ADMIN — Medication: at 14:17

## 2017-11-01 RX ADMIN — DILTIAZEM HYDROCHLORIDE 360 MG: 180 CAPSULE, COATED, EXTENDED RELEASE ORAL at 10:33

## 2017-11-01 RX ADMIN — Medication 10 ML: at 14:13

## 2017-11-01 RX ADMIN — ALLOPURINOL 300 MG: 300 TABLET ORAL at 10:34

## 2017-11-01 RX ADMIN — CEFEPIME HYDROCHLORIDE 2 G: 2 INJECTION, POWDER, FOR SOLUTION INTRAVENOUS at 21:28

## 2017-11-01 RX ADMIN — MICONAZOLE NITRATE: 20 CREAM TOPICAL at 22:25

## 2017-11-01 RX ADMIN — MICONAZOLE NITRATE: 20 CREAM TOPICAL at 09:00

## 2017-11-01 RX ADMIN — Medication 1 CAPSULE: at 10:33

## 2017-11-01 RX ADMIN — HYDRALAZINE HYDROCHLORIDE 50 MG: 50 TABLET, FILM COATED ORAL at 21:28

## 2017-11-01 RX ADMIN — ASPIRIN AND DIPYRIDAMOLE 1 CAPSULE: 25; 200 CAPSULE, EXTENDED RELEASE ORAL at 10:34

## 2017-11-01 NOTE — WOUND CARE
Wound care nurse spoke with the patient and her  about what kind of wound care is needed going forward at home. Pt. Is not home bound so she cannot do home care. Her  takes care of her skin and any other issues at home. He just wanted guidance on what to purchase for any raw areas that occur, as they usually do and he wants to be prepared. I gave him some dressings for the left leg as well as some foam dressing samples with names and where to purchase the supplies. Assessment: the only open area is on the lateral left leg with a small amount of slough but the area is very small. This is self limiting and will benefit from a daily cleansing and dressing change. I also recommended some Lac-Hydrin lotion to treat the lichenified skin on the legs. This is also available over the counter. Her wounds on the posterior legs and the plantar aspect of both feet are now closed after being drained and are now healing. Pt. Is ready to discharge from a wound care perspective.   Jermaine Villegas RN, BSN, [employer]

## 2017-11-01 NOTE — ROUTINE PROCESS
Pt has been refusing turn team throughout day. Pt has been relying on  for all of needs during her stay despite having been educated that her nursing team can help her with all of her needs. Pt will only get up to bed side commode with  and refuses to get up with a nurse. Pt will only get on a bed pan with a nurse.

## 2017-11-01 NOTE — PROGRESS NOTES
Bedside verbal report given to [address] [place] [address] (oncoming nurse) with SBAR, Kardex, MAR, I&O, and recent results reviewed. Juan 17, Gino 3.

## 2017-11-01 NOTE — PROGRESS NOTES
CM contacted pt's PCP office in order to schedule post-discharge follow-up appointment.  Appointment scheduled for 11/9/17 @ 3:30PM. Information entered into pt's AVS.     DANIA Oviedo, [address] [place] [address]  [phone]

## 2017-11-01 NOTE — PROGRESS NOTES
Pharmacy Automatic Renal Dosing Protocol - Antimicrobials     Indication for Antimicrobials: cellulitis and abcess of left lower extremity     Current Regimens:  Vancomycin 1500mg IV q24h - started 10/26 day 7  Cefepime 2 gram IV q12h - started 10/26 Day 7  Previous Antimicrobial Therapy:  none     Vancomycin goal trough: 15-20 (since patient has abcess)           Microbiology Results (Current encounter)   Date/Time Order Name Specimen Source Lab Status   10/26/17 2202 CULTURE, URINE Clean catch Final result   10/26/17 1958 CULTURE, BLOOD Blood Preliminary result      Paralysis, amputations, malnutrition: no  Estimated Creatinine Clearance: 56.6 mL/min (based on Cr of 1.27). Estimated Creatinine Clearance (using IBW): 34.2 mL/min  Recent Labs      17   0356  10/31/17   0502  10/30/17   0255   CREA  1.27*  1.34*  1.29*   BUN  35*  37*  38*   WBC  7.8   --   8.1   NA  144  145  146*   K  3.8  4.2  4.3   CA  8.8  8.8  8.4*   HGB  8.8*   --   8.8*   HCT  29.1*   --   28.1*   PLT  227   --   221     Temp (24hrs), Av.3 °F (36.8 °C), Min:97.6 °F (36.4 °C), Max:98.8 °F (37.1 °C)    No results found for: PCT     Impression/Plan:   Afebrile, renal stable  UCx Enterobacter Cloacae, Citrobacter Braakii 30K  Bx pending - ng thusfar  Reported Vancomycin trough  of 25.9 with actual trough of 25.8  T1/2 16 hours  Vancomycin was adjusted on 10/30 to 1000mg IV q24h for an estimated trough at Css of 17.5. Ordered Vancomycin trough before  11am dose. Consider discontinue Vancomycin and Cefepime today since UTI treatment completed and BCx are final with [id number]. Pharmacy will follow daily and adjust medications as appropriate for renal function and/or serum levels.     Thank you,  Conchita Wright, [facility]

## 2017-11-01 NOTE — PROGRESS NOTES
Spiritual Care Assessment/Progress Notes    Roel Wood [id number]  [id number]    1955  60 y.o.  female    Patient Telephone Number: [phone] (home)   Zoroastrianism Affiliation: [facility]   Language: English   Extended Emergency Contact Information  Primary Emergency Contact: [address], [address] Phone: [phone]  Mobile Phone: [phone]  Relation: Spouse  Secondary Emergency Contact: [address]  Mobile Phone: [phone]  Relation: Child   Patient Active Problem List    Diagnosis Date Noted    B12 deficiency 10/31/2017    (HFpEF) heart failure with preserved ejection fraction ([address] 10/31/2017    Cellulitis and abscess of left lower extremity 10/26/2017    DM necrobiosis lipoidica ([address] 10/26/2017    PE (physical exam), annual 10/11/2017    Gout 10/11/2017    History of hyperparathyroidism 10/11/2017    GERD (gastroesophageal reflux disease) 10/11/2017    Insomnia 07/24/2017    Sinus tachycardia 07/24/2017    Anemia 07/24/2017    Ulcer of right fifth toe due to diabetes mellitus ([address] 07/24/2017    Peripheral neuropathy 07/24/2017    Chronic diastolic CHF (congestive heart failure), NYHA class 3 ([address] 07/24/2017    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 2 diabetes mellitus ([address] 07/24/2017    Stage 2 chronic kidney disease due to benign hypertension 07/24/2017    Hypertension 07/24/2017    BMI 45.0-49.9, adult ([address] 07/24/2017    Fatty liver 07/24/2017    Stasis dermatitis 07/24/2017    Low back pain 07/24/2017    Aortic stenosis, mild 07/24/2017    Bilateral edema of lower extremity 07/24/2017    ASCVD (arteriosclerotic cardiovascular disease) 05/31/2014    Stroke ([address] 05/30/2014    Diabetic polyneuropathy ([address] 04/20/2013    Right leg weakness 04/18/2013    Diabetes mellitus type II, uncontrolled ([address] 04/18/2013    CKD (chronic kidney disease) stage 3, GFR 30-59 ml/min     Hyperlipemia  Diabetic [facility]roparesis [facility]     Diabetes ([facility] 01/26/2011    Obesity 01/26/2011        Date: 11/1/2017       Level of Synagogue/Spiritual Activity:  []         Involved in anuradha tradition/spiritual practice    []         Not involved in anuradha tradition/spiritual practice  [x]         Spiritually oriented    []         Claims no spiritual orientation    []         seeking spiritual identity  []         Feels alienated from Hinduism practice/tradition  []         Feels angry about Hinduism practice/tradition  []         Spirituality/Hinduism tradition  a resource for coping at this time. []         Not able to assess due to medical condition    Services Provided Today:  []         crisis intervention    []         reading Scriptures  [x]         spiritual assessment    []         prayer  [x]         empathic listening/emotional support  []         rites and rituals (cite in comments)  []         life review     []         Hinduism support  []         theological development   []         advocacy  []         ethical dialog     []         blessing  []         bereavement support    []         support to family  []         anticipatory grief support   []         help with AMD  []         spiritual guidance    []         meditation      Spiritual Care Needs  []         Emotional Support  []         Spiritual/Synagogue Care  []         Loss/Adjustment  []         Advocacy/Referral                /Ethics  [x]         No needs expressed at               this time  []         Other: (note in               comments)  [address]  []         Follow up visits with               pt/family  []         Provide materials  []         Schedule sacraments  []         Contact Community               Clergy  [x]         Follow up as needed  []         Other: (note in               comments)   Initial spiritual Assessment in 2182. Pt accompanied by .  explored how pt was feeling and coping.  Pt reported progress and discharge taking place tomorrow. Celebrated with pt. Pt did not express any needs at this time and indicated she was pleased with care received. Pronounced continued blessings. ERIC Aguirre Div

## 2017-11-01 NOTE — DISCHARGE INSTRUCTIONS
[address]  [phone]      Patient Discharge Instructions    Alexis Combs / [id number] : 1955    Admitted 10/26/2017 Discharged: 2017     Principal Problem:    Cellulitis and abscess of left lower extremity (10/26/2017)    Active Problems:    Stroke [facility]) (2014)      Overview: Recurrent, bilateral      Chronic diastolic CHF (congestive heart failure), NYHA class 3 ([address] (2017)      Diabetes mellitus type II, uncontrolled ([address] (2013)      Diabetic polyneuropathy ([address] (2013)      ASCVD (arteriosclerotic cardiovascular disease) (2014)      Peripheral neuropathy (2017)      (HFpEF) heart failure with preserved ejection fraction ([facility] (10/31/2017)      Morbid obesity ([address] (2011)      CKD (chronic kidney disease) stage 3, GFR 30-59 ml/min ()      Hyperlipemia ()      B12 deficiency (10/31/2017)          Allergies   Allergen Reactions    Zestril [Lisinopril] Swelling       · It is important that you take the medication exactly as they are prescribed. · Do not take other medications without consulting your doctor. What to do at Next Level of Care    Disposition:  Home    Recommended diet: Diabetic Diet    Recommended activity: Activity as tolerated    Wound Care:  See below    Lab:  none    Other:  none    Oxygen:  none          Information obtained by :  I understand that if any problems occur once I am at home I am to contact my physician. I understand and acknowledge receipt of the instructions indicated above.                                                                                                                                            Physician's or R.N.'s Signature                                                                  Date/Time Patient or Representative Signature                                                          Date/Time        Wound Care for the lower legs: You received two bottles of the Chlorhexidine soap. Only a small amount is needed to wash the legs and then rinse with water wet wash cloth and allow the skin to air dry. If there are open wounds that need to be debrided, the open wounds need daily cleansing until the wound bed is pink, healing. Apply a foam dressing daily to these areas daily. Apply the lotion when the legs are almost completely dry for the calloused areas.

## 2017-11-01 NOTE — PROGRESS NOTES
Nutrition Services      Nutrition Screen:  Wt Readings from Last 10 Encounters:   10/26/17 126.1 kg (278 lb)   06/19/17 102.5 kg (226 lb)   09/14/16 100.2 kg (220 lb 14.4 oz)   01/12/16 90.3 kg (199 lb)   12/11/15 90.5 kg (199 lb 9.6 oz)   11/13/15 96.2 kg (212 lb)   10/01/15 96.4 kg (212 lb 8 oz)   05/30/14 93.9 kg (207 lb)   10/24/13 85.9 kg (189 lb 5 oz)   10/18/13 86.5 kg (190 lb 11.2 oz)     Body mass index is 52.53 kg/(m^2). Supplements:                        _____ ordered ______  declined. __ __  Pt is nutritionally stable at this time, will rescreen in 7 days. __x__    Pt is at nutritional risk and will be rescreened in 3-5 days. __ __  Pt is at moderate or high nutritional risk, will refer to RD for assessment.        Ranjit Saenz  Dietetic Technician, Registered

## 2017-11-01 NOTE — PROGRESS NOTES
[address] [place] [address]  [phone]      Medical Progress Note      NAME: Rosemary Alicea   :  1955  MRM:  [id number]    Date/Time: 2017  7:06 AM      Subjective:     Legs continue to improve. Breathing better. Prelim on skin bx does not look like necrobiosis. Wound care continues. B12 deficient. CXR improved. Echo with moderate AS and normal EF. Need to solidify plans for wound care at home and supplies. Will change to po diuretic today and at discharge tomorrow will change to po antibiotic. Past Medical History reviewed and unchanged from Admission History and Physical and/or prior progress note/electronic medical record    Medications reviewed. ROS:      General: negative for fever, chills, sweats; positive for weakness  Ear Nose and Throat: negative for rhinorrhea, pharyngitis, otalgia, tinnitus, speech or swallowing difficulties  Respiratory:  negative for cough, sputum production,  Cardiology:  negative for chest pain, palpitations; positive orthopnea, PND, edema  Gastrointestinal: negative for abdominal pain, N/V, dysphagia, change in bowel habits, bleeding  Genitourinary: negative for frequency, urgency, dysuria, hematuria, incontinence  Dermatological: positive for rash  Neurological: positive for bilateral LE weakness    Objective:     Last 24hrs VS reviewed since prior progress note.  Most recent are:    Visit Vitals    /60 (BP 1 Location: Left arm, BP Patient Position: At rest)    Pulse 65    Temp 98.2 °F (36.8 °C)    Resp 18    Wt 278 lb (126.1 kg)    SpO2 100%    Breastfeeding No    BMI 52.53 kg/m2     SpO2 Readings from Last 6 Encounters:   17 100%   10/26/17 94%   10/19/17 95%   10/11/17 94%   17 100%   16 96%    O2 Flow Rate (L/min): 2 l/min       Intake/Output Summary (Last 24 hours) at 17 0706  Last data filed at 10/31/17 1502   Gross per 24 hour   Intake                0 ml Output              700 ml   Net             -700 ml          Physical Exam:    General appearance: alert, cooperative, no distress, appears stated age  Eyes: negative  Neck: supple, symmetrical, trachea midline, no adenopathy, thyroid: not enlarged, symmetric, no tenderness/mass/nodules, no carotid bruit and no JVD  Lungs: clear to auscultation bilaterally  Heart: regular rate and rhythm, S1, S2 normal, Grade 2/6 systolic murmur  Abdomen: obese, NT, no palpable mass  Extremities: brawny edema and plaques overall improved; foot wounds dressed  Neurologic: LE weakness    Data Review:    Lab:    BMP:   Lab Results   Component Value Date/Time     11/01/2017 03:56 AM    K 3.8 11/01/2017 03:56 AM     (H) 11/01/2017 03:56 AM    CO2 27 11/01/2017 03:56 AM    AGAP 7 11/01/2017 03:56 AM     (H) 11/01/2017 03:56 AM    BUN 35 (H) 11/01/2017 03:56 AM    CREA 1.27 (H) 11/01/2017 03:56 AM    GFRAA 52 (L) 11/01/2017 03:56 AM    GFRNA 43 (L) 11/01/2017 03:56 AM     CBC:   Lab Results   Component Value Date/Time    WBC 7.8 11/01/2017 03:56 AM    HGB 8.8 (L) 11/01/2017 03:56 AM    HCT 29.1 (L) 11/01/2017 03:56 AM     11/01/2017 03:56 AM     Recent Glucose Results:   Lab Results   Component Value Date/Time     (H) 11/01/2017 03:56 AM     All labs reviewed in past 24 hours    Other pertinent lab: none  Imaging:    Reviewed.   CXR clear      Assessment / Plan:     Principal Problem:    Cellulitis and abscess of left lower extremity (10/26/2017)    Active Problems:    Stroke ([address] (5/30/2014)      Overview: Recurrent, bilateral      Chronic diastolic CHF (congestive heart failure), NYHA class 3 ([address] (7/24/2017)      Diabetes mellitus type II, uncontrolled ([address] (4/18/2013)      Diabetic polyneuropathy ([address] (4/20/2013)      ASCVD (arteriosclerotic cardiovascular disease) (5/31/2014)      Peripheral neuropathy (7/24/2017)      DM necrobiosis lipoidica ([facility] (10/26/2017)      Overview: questionable (HFpEF) heart failure with preserved ejection fraction ([facility] (10/31/2017)      CKD (chronic kidney disease) stage 3, GFR 30-59 ml/min ()      Hyperlipemia ()      B12 deficiency (10/31/2017)        1. Wound care clinic referral  2. Wound care to see again re rec's for home and supplies  3. Change to po Bumex  4. Replete B12  5. SSI  6. PICC line  7. Continue antibiotics  8.  Follow labs             Care Plan discussed with: Patient and Family    Discussed:  Care Plan and D/C Planning    Prophylaxis:  Lovenox and H2B/PPI    Disposition:  Home  ___________________________________________________    Attending Physician: Destiny Fish MD

## 2017-11-02 VITALS
WEIGHT: 278 LBS | OXYGEN SATURATION: 99 % | RESPIRATION RATE: 14 BRPM | TEMPERATURE: 98.8 F | DIASTOLIC BLOOD PRESSURE: 71 MMHG | HEART RATE: 76 BPM | BODY MASS INDEX: 52.53 KG/M2 | SYSTOLIC BLOOD PRESSURE: 159 MMHG

## 2017-11-02 LAB
ANION GAP SERPL CALC-SCNC: 6 MMOL/L (ref 5–15)
BUN SERPL-MCNC: 32 MG/DL (ref 6–20)
BUN/CREAT SERPL: 27 (ref 12–20)
CALCIUM SERPL-MCNC: 8.7 MG/DL (ref 8.5–10.1)
CHLORIDE SERPL-SCNC: 111 MMOL/L (ref 97–108)
CO2 SERPL-SCNC: 27 MMOL/L (ref 21–32)
CREAT SERPL-MCNC: 1.17 MG/DL (ref 0.55–1.02)
ERYTHROCYTE [DISTWIDTH] IN BLOOD BY AUTOMATED COUNT: 17.4 % (ref 11.5–14.5)
GLUCOSE BLD STRIP.AUTO-MCNC: 147 MG/DL (ref 65–100)
GLUCOSE BLD STRIP.AUTO-MCNC: 178 MG/DL (ref 65–100)
GLUCOSE SERPL-MCNC: 150 MG/DL (ref 65–100)
HCT VFR BLD AUTO: 30.1 % (ref 35–47)
HGB BLD-MCNC: 9.3 G/DL (ref 11.5–16)
MCH RBC QN AUTO: 29.5 PG (ref 26–34)
MCHC RBC AUTO-ENTMCNC: 30.9 G/DL (ref 30–36.5)
MCV RBC AUTO: 95.6 FL (ref 80–99)
PLATELET # BLD AUTO: 209 K/UL (ref 150–400)
POTASSIUM SERPL-SCNC: 3.7 MMOL/L (ref 3.5–5.1)
RBC # BLD AUTO: 3.15 M/UL (ref 3.8–5.2)
SERVICE CMNT-IMP: ABNORMAL
SERVICE CMNT-IMP: ABNORMAL
SODIUM SERPL-SCNC: 144 MMOL/L (ref 136–145)
WBC # BLD AUTO: 9.1 K/UL (ref 3.6–11)

## 2017-11-02 PROCEDURE — 85027 COMPLETE CBC AUTOMATED: CPT | Performed by: INTERNAL MEDICINE

## 2017-11-02 PROCEDURE — 36415 COLL VENOUS BLD VENIPUNCTURE: CPT | Performed by: INTERNAL MEDICINE

## 2017-11-02 PROCEDURE — 74011636637 HC RX REV CODE- 636/637: Performed by: INTERNAL MEDICINE

## 2017-11-02 PROCEDURE — 80048 BASIC METABOLIC PNL TOTAL CA: CPT | Performed by: INTERNAL MEDICINE

## 2017-11-02 PROCEDURE — 82962 GLUCOSE BLOOD TEST: CPT

## 2017-11-02 PROCEDURE — 74011250637 HC RX REV CODE- 250/637: Performed by: INTERNAL MEDICINE

## 2017-11-02 RX ORDER — INSULIN LISPRO 100 [IU]/ML
10 INJECTION, SOLUTION INTRAVENOUS; SUBCUTANEOUS
Qty: 1 PACKAGE | Status: SHIPPED | COMMUNITY
Start: 2017-11-02 | End: 2018-01-01

## 2017-11-02 RX ORDER — AMMONIUM LACTATE 12 G/100G
LOTION TOPICAL
Status: SHIPPED | COMMUNITY
Start: 2017-11-02 | End: 2018-02-26

## 2017-11-02 RX ORDER — BUMETANIDE 0.5 MG/1
2 TABLET ORAL 2 TIMES DAILY
Status: SHIPPED | COMMUNITY
Start: 2017-11-02 | End: 2017-11-08 | Stop reason: SDUPTHER

## 2017-11-02 RX ORDER — NYSTATIN 100000 U/G
CREAM TOPICAL 2 TIMES DAILY
Qty: 15 G | Refills: 0 | Status: SHIPPED | OUTPATIENT
Start: 2017-11-02 | End: 2018-02-19 | Stop reason: SDUPTHER

## 2017-11-02 RX ORDER — CEPHALEXIN 500 MG/1
500 CAPSULE ORAL 3 TIMES DAILY
Qty: 9 CAP | Refills: 0 | Status: SHIPPED | OUTPATIENT
Start: 2017-11-02 | End: 2017-11-05

## 2017-11-02 RX ORDER — CYANOCOBALAMIN 1000 UG/ML
1000 INJECTION, SOLUTION INTRAMUSCULAR; SUBCUTANEOUS
Qty: 1 VIAL | Refills: 0 | Status: SHIPPED | OUTPATIENT
Start: 2017-11-02 | End: 2018-01-01

## 2017-11-02 RX ORDER — CYANOCOBALAMIN 1000 UG/ML
1000 INJECTION, SOLUTION INTRAMUSCULAR; SUBCUTANEOUS
Qty: 1 VIAL | Refills: 0 | Status: SHIPPED
Start: 2017-11-02 | End: 2017-11-02

## 2017-11-02 RX ADMIN — ASPIRIN AND DIPYRIDAMOLE 1 CAPSULE: 25; 200 CAPSULE, EXTENDED RELEASE ORAL at 09:03

## 2017-11-02 RX ADMIN — ATENOLOL 50 MG: 50 TABLET ORAL at 09:06

## 2017-11-02 RX ADMIN — Medication 1 CAPSULE: at 09:03

## 2017-11-02 RX ADMIN — DILTIAZEM HYDROCHLORIDE 360 MG: 180 CAPSULE, COATED, EXTENDED RELEASE ORAL at 09:07

## 2017-11-02 RX ADMIN — PANTOPRAZOLE SODIUM 40 MG: 40 TABLET, DELAYED RELEASE ORAL at 07:30

## 2017-11-02 RX ADMIN — INSULIN LISPRO 3 UNITS: 100 INJECTION, SOLUTION INTRAVENOUS; SUBCUTANEOUS at 08:56

## 2017-11-02 RX ADMIN — PREGABALIN 75 MG: 75 CAPSULE ORAL at 09:06

## 2017-11-02 RX ADMIN — COLESTIPOL HYDROCHLORIDE 1 G: 1 TABLET, FILM COATED ORAL at 09:06

## 2017-11-02 RX ADMIN — ALLOPURINOL 300 MG: 300 TABLET ORAL at 09:07

## 2017-11-02 RX ADMIN — HYDRALAZINE HYDROCHLORIDE 50 MG: 50 TABLET, FILM COATED ORAL at 09:08

## 2017-11-02 NOTE — ROUTINE PROCESS
General Surgery End of Shift Nursing Note    Bedside shift change report given to Cheryl Schafer (oncoming nurse) by Radha Rodrigez, RN and Kosta Darnell RN (offgoing nurse). Report included the following information SBAR, Kardex, Intake/Output and MAR. Shift worked:   7 PM to 7 AM   Significant changes during shift:    PT reported 0/10 pain during the shift and did not require or request medication. PT's  stays with her at all times and takes care of her needs, to include toileting. PT had a quiet night and is expecting to be discharged home 11/2/2017. Non-emergent issues for physician to address:   NA     Number times ambulated in hallway past shift: 0    Number of times OOB to chair past shift: 0    Pain Management:  Current medication: None  Patient states pain is manageable on current pain medication: YES    GI:    Current diet:  DIET DIABETIC CONSISTENT CARB Regular    Tolerating current diet: YES  Passing flatus: NO  Last Bowel Movement: yesterday   Appearance: Watery    Respiratory:    Incentive Spirometer at bedside: NO  Patient instructed on use: YES    Patient Safety:    Falls Score: 4  Bed Alarm On? No  Sitter?  No    Shelia Acosta

## 2017-11-02 NOTE — PROGRESS NOTES
[address] [place] [address]  [phone]      Medical Progress Note      NAME: Leopoldo Fine   :  1955  MRM:  [id number]    Date/Time: 2017  7:05 AM      Subjective:     Legs continue to improve. Breathing better. Skin bx does not look like necrobiosis. Wound care continues. B12 deficient. CXR improved. Echo with moderate AS and normal EF. D/C today. Has woubd care instructions and will give abx only short term at home     Past Medical History reviewed and unchanged from Admission History and Physical and/or prior progress note/electronic medical record    Medications reviewed. ROS:      General: negative for fever, chills, sweats; positive for weakness  Ear Nose and Throat: negative for rhinorrhea, pharyngitis, otalgia, tinnitus, speech or swallowing difficulties  Respiratory:  negative for cough, sputum production,  Cardiology:  negative for chest pain, palpitations; positive orthopnea, PND, edema  Gastrointestinal: negative for abdominal pain, N/V, dysphagia, change in bowel habits, bleeding  Genitourinary: negative for frequency, urgency, dysuria, hematuria, incontinence  Dermatological: positive for rash  Neurological: positive for bilateral LE weakness    Objective:     Last 24hrs VS reviewed since prior progress note.  Most recent are:    Visit Vitals    /59 (BP 1 Location: Left arm, BP Patient Position: At rest)    Pulse 70    Temp 98.4 °F (36.9 °C)    Resp 18    Wt 278 lb (126.1 kg)    SpO2 97%    Breastfeeding No    BMI 52.53 kg/m2     SpO2 Readings from Last 6 Encounters:   17 97%   10/26/17 94%   10/19/17 95%   10/11/17 94%   17 100%   16 96%    O2 Flow Rate (L/min): 2 l/min       Intake/Output Summary (Last 24 hours) at 17 0705  Last data filed at 17 0739   Gross per 24 hour   Intake               50 ml   Output                0 ml   Net               50 ml          Physical Exam:    General appearance: alert, cooperative, no distress, appears stated age  Eyes: negative  Neck: supple, symmetrical, trachea midline, no adenopathy, thyroid: not enlarged, symmetric, no tenderness/mass/nodules, no carotid bruit and no JVD  Lungs: clear to auscultation bilaterally  Heart: regular rate and rhythm, S1, S2 normal, Grade 2/6 systolic murmur  Abdomen: obese, NT, no palpable mass  Extremities: brawny edema and plaques overall improved; foot wounds dressed  Neurologic: LE weakness    Data Review:    Lab:    BMP:   Lab Results   Component Value Date/Time     11/02/2017 02:11 AM    K 3.7 11/02/2017 02:11 AM     (H) 11/02/2017 02:11 AM    CO2 27 11/02/2017 02:11 AM    AGAP 6 11/02/2017 02:11 AM     (H) 11/02/2017 02:11 AM    BUN 32 (H) 11/02/2017 02:11 AM    CREA 1.17 (H) 11/02/2017 02:11 AM    GFRAA 57 (L) 11/02/2017 02:11 AM    GFRNA 47 (L) 11/02/2017 02:11 AM     CBC:   Lab Results   Component Value Date/Time    WBC 9.1 11/02/2017 02:11 AM    HGB 9.3 (L) 11/02/2017 02:11 AM    HCT 30.1 (L) 11/02/2017 02:11 AM     11/02/2017 02:11 AM     Recent Glucose Results:   Lab Results   Component Value Date/Time     (H) 11/02/2017 02:11 AM     All labs reviewed in past 24 hours    Other pertinent lab: none  Imaging:    Reviewed.   CXR clear      Assessment / Plan:     Principal Problem:    Cellulitis and abscess of left lower extremity (10/26/2017)    Active Problems:    Stroke ([address] (5/30/2014)      Overview: Recurrent, bilateral      Chronic diastolic CHF (congestive heart failure), NYHA class 3 ([address] (7/24/2017)      Diabetes mellitus type II, uncontrolled ([address] (4/18/2013)      Diabetic polyneuropathy ([facility] (4/20/2013)      ASCVD (arteriosclerotic cardiovascular disease) (5/31/2014)      Peripheral neuropathy (7/24/2017)      (HFpEF) heart failure with preserved ejection fraction ([facility] (10/31/2017)      Morbid obesity ([facility] (1/26/2011)      CKD (chronic kidney disease) stage 3, GFR 30-59 ml/min ()      Hyperlipemia ()      B12 deficiency (10/31/2017)        1. Wound care as describedl  2. Changed to po Bumex  3. Repleted B12  4. D/C PICC line  5. Continue antibiotics po  6.  D/C today             Care Plan discussed with: Patient and Family    Discussed:  Care Plan and D/C Planning    Prophylaxis:  Lovenox and H2B/PPI    Disposition:  Home  ___________________________________________________    Attending Physician: Lupe Velázquez MD

## 2017-11-02 NOTE — PROGRESS NOTES
Discharge instructions reviewed with patient and , understanding verbalized. Old PICC line site assessed, clean, dry, and intact. Patient escorted to main entrance via wheelchair and discharged home with .

## 2017-11-02 NOTE — PROGRESS NOTES
Pt is a 61 yo  female admitted on 10/26/17 for cellulitis and abscess of the left lower extremity. Pt to discharge home today with spouse by private vehicle. Pt's outpatient wound care and PCP follow-up appointments scheduled. All information entered into pt AVS. Pt has no additional discharge needs at this time. Care Management Interventions  PCP Verified by CM: Yes (Dr. Alexandria Valencia )  Mode of Transport at Discharge:  Other (see comment) (Private vehicle with spouse)  Transition of Care Consult (CM Consult): Discharge Planning  Discharge Durable Medical Equipment: No (Pt has wheelchair and RW which she doesn't use )  Health Maintenance Reviewed: Yes  Physical Therapy Consult: Yes  Occupational Therapy Consult: Yes  Speech Therapy Consult: No  Current Support Network: Lives with Spouse (Pt lives in a one-story house with a ramp)  Confirm Follow Up Transport: Family  Plan discussed with Pt/Family/Caregiver: Yes  Discharge Location  Discharge Placement: Home with family assistance    DANIA Dalton, [address] [place] [address]  [phone]

## 2017-11-02 NOTE — DISCHARGE SUMMARY
Vargas Friedman, [facility]   DISCHARGE SUMMARY       Name:  Reji Arriaza   MR#:  [id number]   :  1955   Account #:  [de-identified]        Date of Adm:  10/26/2017       PATIENT PROFILE: The patient is a 76-year-old ,    female admitted with cellulitis of both lower extremities. HISTORY OF PRESENT ILLNESS: The patient was in her usual state   of relatively poor health over the several days prior to an office visit on   10/11/2017 when she presented with bilateral lower extremity edema and a   vesicular red rash of both lower extremities over the anterior tibial   surfaces. It was felt consistent with stasis dermatitis with associated   mild cellulitis and some lichenification. She and her  were   instructed in daily cleansing with soap and water, thorough drying and   application of Triamcinolone cream. She was also started on Keflex. She returned with modest improvement on 10/19/2017 and was advised to   continue the local wound care. Her antibiotics were stopped for fear of   redevelopment of C difficile colitis which she had had for an extended   period of time after a hospitalization in late  for an abscess in her   right thigh associated with necrotizing fasciitis. She returned on the day of admission with plaque-like lesions with   ulceration circumferentially around each leg below the knee as well as   erythema and large blisters on both feet. She had not been   experiencing fever or chills. Pain was relatively modest but she does   have a peripheral neuropathy, which may have obscured that. Her   pulses were felt to be intact. One of the blisters was drained of serous   fluid and cultured. She was subsequently referred for admission.       Other problems were as detailed in the admission history and physical.    PHYSICAL EXAMINATION   GENERAL: At the time of admission, she appeared an obese white female in no acute distress. VITAL SIGNS: Blood pressure 134/60, pulse 82, respirations 18, O2   saturation 92% on room air, temperature 98.5. HEENT: Generally unremarkable, face symmetric. NECK: Supple without adenopathy. Bilateral soft carotid bruits. CHEST: Clear to P and A. CARDIOVASCULAR: Regular rate and rhythm, grade 7-1/8 systolic   murmur. ABDOMEN: Obese, nontender without organomegaly. EXTREMITIES: Significant plaque formation and ulcerations   circumferentially around both legs below the knees with erythema of   both feet and significant blistering over the arches of both feet and   some of the toes. The right fifth toe had a minor residual diabetic   ulceration laterally. NEUROLOGIC: She was weak in her lower extremities, right greater than   left leg. LABORATORY: Blood sugars were initially high, but generally fell   below 200 throughout the remainder of the hospitalization. Initial   hemoglobin 10.3, WBC 12,200. By the time of discharge, white blood   count 9100. Urinalysis clear. INR 0.9. Initial CMP remarkable for BUN   of 44, creatinine of 1.39. By the time of discharge, BUN 32, creatinine   1.17. NT-proBNP 807. Vitamin B12 low at 247, folate normal at 19.6. A1c 7.6. Iron 45, ferritin 48. TSH 4.75. Urine culture grew Enterobacter   and Citrobacter sensitive to cephalosporins. Her blood cultures, no   growth. Wound culture, no growth. Skin biopsy revealed findings   consistent with stasis dermatitis, and not consistent with   necrobiosis. Ankle and foot x-rays revealed no abnormalities. Initial   chest x-ray revealed some interstitial edema. Last chest x-ray on   10/31/2017 revealed no acute findings. EKG: Sinus rhythm. Nonspecific changes. Echocardiogram revealed an EF of 55% to 60%   with mild to moderate aortic stenosis. HOSPITAL COURSE: The patient was admitted, started on cefepime   and vancomycin for what was felt to be a cellulitis.  She was also started on   wound care. Diuretics were increased due to the interstitial edema   seen on the initial chest x-ray. She was seen in consultation by Dr. Meagan Underwood who did the skin biopsy. She steadily improved throughout the   hospitalization with resolution of most of the open areas on her legs. She still had significant plaque-like changes over the anterior tibial   surfaces by the time of discharge, but this was felt manageable with   Lac-Hydrin and wound care. With the return of the biopsy negative and   her progress made with wound care and resolution of the redness and   the cellulitis, it was felt that maximum hospital benefit had been   reached. FINAL DIAGNOSES   1. Cellulitis of both legs and feet. 2. Type 2 diabetes with diabetic polyneuropathy. 3. History of stroke with residual leg weakness. 4. Chronic diastolic congestive heart failure, improved with increased   diuresis. 5. B12 deficiency. 6. Morbid obesity. 7. Chronic kidney disease stage III. 8. Hyperlipidemia. PROCEDURES: Echocardiogram and skin biopsy. CONSULTATIONS: Dr. Meagan Underwood. CONDITION AT DISCHARGE: Improved. DISCHARGE PLANS   1. Discharged to home. 2. Diabetic diet. 3. Activity as tolerated. 4. Wound care to consist of cleansing with chlorhexidine soap and then   rinsing with water and a wet washcloth and allowing the skin to air   drive. For open wounds, they will be debrided until the wound bed is   pink and healing and a foam dressing will be applied daily to those   areas. Lac-Hydrin lotion will then be applied to the almost completely   dry skin for the calloused areas. 5. She will be followed up in 7 days' time in the office. MEDICATIONS TO INCLUDE   1. Acetaminophen 650 mg every 4 hours as needed. 2. Align 4 mg daily. 3. Allopurinol 300 mg daily. 4. Lac-Hydrin as indicated above. 5. Aggrenox 1 twice a day. 6. Atenolol 50 mg daily. 7. Atorvastatin 40 mg daily. 8. Bumetanide 0.5 mg tablets 4 a day.    9. Cardizem 360 mg daily. 10. Cephalexin 500 mg 3 times a day for 3 days. 11. Vitamin D 2000 units daily. 12. Colestipol 1 gram twice a day. 13. Vitamin B12 1000 mcg IM weekly. 14. Glipizide 10 mg twice a day. 15. Hydralazine 50 mg 3 times a day. 16. Humalog 10 units 3 times a day with meals. 17. Levemir 60 units at bedtime. 18. Lyrica 75 mg twice a day. 19. Nystatin topical cream applied to the rash in her groin twice a day. 20. Omeprazole 40 mg daily. 21. Slow Fe 142 mg daily.         MD KARSON Tristan / NB   D:  11/02/2017   07:21   T:  11/02/2017   14:28   Job #:  [id number]

## 2017-11-08 ENCOUNTER — HOSPITAL ENCOUNTER (OUTPATIENT)
Dept: WOUND CARE | Age: 62
Discharge: HOME | End: 2017-11-08

## 2017-11-08 RX ORDER — BUMETANIDE 2 MG/1
TABLET ORAL
Qty: 90 TAB | Refills: 3 | Status: SHIPPED | OUTPATIENT
Start: 2017-11-08 | End: 2018-01-01

## 2017-11-08 NOTE — TELEPHONE ENCOUNTER
Requested Prescriptions     Pending Prescriptions Disp Refills    bumetanide (BUMEX) 2 mg tablet 90 Tab 3     Sig: Take 1  Tab daily

## 2017-11-09 ENCOUNTER — OFFICE VISIT (OUTPATIENT)
Dept: INTERNAL MEDICINE CLINIC | Age: 62
End: 2017-11-09

## 2017-11-09 VITALS
HEIGHT: 61 IN | OXYGEN SATURATION: 93 % | DIASTOLIC BLOOD PRESSURE: 92 MMHG | SYSTOLIC BLOOD PRESSURE: 140 MMHG | HEART RATE: 70 BPM

## 2017-11-09 DIAGNOSIS — E53.8 B12 DEFICIENCY: ICD-10-CM

## 2017-11-09 DIAGNOSIS — I87.2 VENOUS STASIS DERMATITIS OF BOTH LOWER EXTREMITIES: ICD-10-CM

## 2017-11-09 DIAGNOSIS — I50.32 CHRONIC DIASTOLIC CHF (CONGESTIVE HEART FAILURE), NYHA CLASS 3 (HCC): Primary | ICD-10-CM

## 2017-11-09 DIAGNOSIS — E53.8 VITAMIN B 12 DEFICIENCY: ICD-10-CM

## 2017-11-09 PROBLEM — L03.116 CELLULITIS AND ABSCESS OF LEFT LOWER EXTREMITY: Status: RESOLVED | Noted: 2017-10-26 | Resolved: 2017-11-09

## 2017-11-09 PROBLEM — L02.416 CELLULITIS AND ABSCESS OF LEFT LOWER EXTREMITY: Status: RESOLVED | Noted: 2017-10-26 | Resolved: 2017-11-09

## 2017-11-09 RX ORDER — CYANOCOBALAMIN 1000 UG/ML
1000 INJECTION, SOLUTION INTRAMUSCULAR; SUBCUTANEOUS ONCE
Qty: 1 ML | Refills: 0 | Status: SHIPPED | COMMUNITY
Start: 2017-11-09 | End: 2017-11-09

## 2017-11-09 NOTE — PROGRESS NOTES
Reviewed record in preparation for visit and have obtained necessary documentation. Identified pt with two pt identifiers(name and ). Chief Complaint   Patient presents with   [facility] Follow Up        Coordination of Care Questionnaire:  :     1) Have you been to an emergency room, urgent care clinic since your last visit? No    Hospitalized since your last visit? No              2) Have you seen or consulted any other health care providers outside of [address] [place] [address] since your last visit? No      Ronnie Alaniz who present for B-12 injection. She denies any symptoms , reactions or allergies that would exclude them from being injected today. Risks and adverse reactions were discussed. All questions were addressed. She was observed for 15 min post injection. There were no reactions observed.     Adair Adler LPN

## 2017-11-09 NOTE — PROGRESS NOTES
Subjective:   Leopoldo Fine is a 60 y.o. female      Chief Complaint   Patient presents with   [facility] Follow Up        History of present illness:  Ms. Tonia Andre returns from her recent hospitalization. She was hospitalized with a cellulitis, but also had issues with severe lower extremity edema and stasis changes with lichenification. She responded well to wound care in the hospital, including application of Lac Hydrin. She has been seen in the wound center since discharge and they have added compression hose as after she got home and began hanging her legs down rather than having them up in the bed as she was in the hospital, she began to have some increased edema, as well as some early blister formation again. She is still sleeping on the sofaays with her legs hanging down, which I repeatedly tried to get her to refrain from doing, but she simply won't follow that advice. As a result she needs to keep the compression hose on constantly except when the legs are being washed and treated with the Lac Hydrin. In the hospital she was doing better with an A1c that had improved to 7.6. She was still anemic. Renal function was stable, even after she was diuresed vigorously by increasing her Bumex to 2 mg a day. Will continue local wound care and compression hose and see her back in two weeks time. She'll be due lab work, including CBC and BMP at that point.         Patient Active Problem List    Diagnosis Date Noted    Chronic diastolic CHF (congestive heart failure), NYHA class 3 ([facility] 07/24/2017     Priority: 1 - One    Stroke ([facility] 05/30/2014     Priority: 1 - One    Right leg weakness 04/18/2013     Priority: 1 - One    Venous stasis dermatitis of both lower extremities 11/09/2017     Priority: 2 - Two    (HFpEF) heart failure with preserved ejection fraction ([address] 10/31/2017     Priority: 2 - Two    Anemia 07/24/2017     Priority: 2 - Two    Ulcer of right fifth toe due to diabetes mellitus ([facility] 2017     Priority: 2 - Two    Peripheral neuropathy 2017     Priority: 2 - Two    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 2 diabetes mellitus ([address] 2017     Priority: 2 - Two    Stage 2 chronic kidney disease due to benign hypertension 2017     Priority: 2 - Two    Hypertension 2017     Priority: 2 - Two    Bilateral edema of lower extremity 2017     Priority: 2 - Two    ASCVD (arteriosclerotic cardiovascular disease) 2014     Priority: 2 - Two    Diabetic polyneuropathy ([facility] 2013     Priority: 2 - Two    Diabetes mellitus type II, uncontrolled ([facility] 2013     Priority: 2 - Two    Diabetic gastroparesis ([facility]      Priority: 2 - Two    Diabetes ([facility] 2011     Priority: 2 - Two    B12 deficiency 10/31/2017     Priority: 3 - Three    GERD (gastroesophageal reflux disease) 10/11/2017     Priority: 3 - Three    Fatty liver 2017     Priority: 3 - Three    Stasis dermatitis 2017     Priority: 3 - Three    Aortic stenosis, mild 2017     Priority: 3 - Three    CKD (chronic kidney disease) stage 3, GFR 30-59 ml/min      Priority: 3 - Three    Hyperlipemia      Priority: 3 - Three    Morbid obesity ([facility] 2011     Priority: 3 - Three    Gout 10/11/2017     Priority: 4 - Four    History of hyperparathyroidism 10/11/2017     Priority: 4 - Four    Sinus tachycardia 2017     Priority: 4 - Four    Low back pain 2017     Priority: 4 - Four    Insomnia 2017     Priority: 5 - Five    BMI 45.0-49.9, adult ([facility] 2017     Priority: 5 - Five    PE (physical exam), annual 10/11/2017      Past Surgical History:   Procedure Laterality Date    CHEST SURGERY PROCEDURE UNLISTED  10/24/13    PARATHYROID EXPLORATION     DELIVERY       HX  SECTION      HX CHOLECYSTECTOMY      HX OTHER SURGICAL      I&D right thigh abscess      Allergies   Allergen Reactions    Zestril [Lisinopril] Swelling      Family History   Problem Relation Age of Onset    Cancer Mother      cervical    Diabetes Mother     Hypertension Mother     Stroke Mother 79    Diabetes Father     Hypertension Father     Kidney Disease Father     Diabetes Brother     Hypertension Brother     Stroke Brother 61      Social History     Social History    Marital status:      Spouse name: N/A    Number of children: N/A    Years of education: N/A     Occupational History    Not on file. Social History Main Topics    Smoking status: Never Smoker    Smokeless tobacco: Never Used    Alcohol use Yes      Comment: rare    Drug use: Yes     Special: Prescription, OTC    Sexual activity: Not on file     Other Topics Concern    Not on file     Social History Narrative     Outpatient Prescriptions Marked as Taking for the 11/9/17 encounter (Office Visit) with Adam Spencer MD   Medication Sig Dispense Refill    cyanocobalamin (VITAMIN B-12) 1,000 mcg/mL injection 1 mL by IntraMUSCular route once for 1 dose. 1 mL 0    bumetanide (BUMEX) 2 mg tablet Take 1  Tab daily 90 Tab 3    ammonium lactate (LAC-HYDRIN) 12 % lotion rub in to affected area well      insulin detemir (LEVEMIR) 100 unit/mL injection 60 Units by SubCUTAneous route nightly.  insulin lispro (HUMALOG) 100 unit/mL kwikpen 10 Units by SubCUTAneous route three (3) times daily (with meals). 1 Package     nystatin (MYCOSTATIN) topical cream Apply  to affected area two (2) times a day. Use for groin rash 15 g 0    cyanocobalamin (VITAMIN B12) 1,000 mcg/mL injection 1 mL by IntraMUSCular route every seven (7) days. 1 Vial 0    ferrous sulfate (SLOW FE) 142 mg (45 mg iron) ER tablet Take 142 mg by mouth Daily (before breakfast).       atorvastatin (LIPITOR) 40 mg tablet TAKE 1 TABLET BY MOUTH EVERY DAY 90 Tab 0    hydrALAZINE (APRESOLINE) 50 mg tablet TAKE 1 TABLET BY MOUTH THREE TIMES DAILY 100 Tab 0    aspirin-dipyridamole (AGGRENOX)  mg per SR capsule TAKE 1 CAPSULE BY MOUTH TWICE DAILY( EVERY TWELVE HOURS) 60 Cap 5    PEN NEEDLE by Does Not Apply route.  SYRINGE AND NEEDLE,INSULIN,1ML (BD INSULIN SYRINGE) by Does Not Apply route.  BLOOD SUGAR DIAGNOSTIC (FREESTYLE LITE STRIPS) by In Vitro route.  colestipol (COLESTID) 1 gram tablet Take 1 g by mouth two (2) times a day.  Bifidobacterium Infantis (ALIGN) 4 mg cap Take  by mouth.  acetaminophen (TYLENOL) 325 mg tablet Take 2 Tabs by mouth every four (4) hours as needed.  allopurinol (ZYLOPRIM) 300 mg tablet Take  by mouth daily.  atenolol (TENORMIN) 50 mg tablet Take  by mouth daily.  pregabalin (LYRICA) 75 mg capsule Take 75 mg by mouth two (2) times a day.  Cholecalciferol, Vitamin D3, 2,000 unit cap Take 1 Cap by mouth daily.  omeprazole (PRILOSEC) 40 mg capsule Take 40 mg by mouth daily.  diltiazem (CARDIZEM CD) 360 mg ER capsule Take 360 mg by mouth daily.  glipiZIDE (GLUCOTROL) 10 mg tablet Take 10 mg by mouth two (2) times a day. Review of Systems              Constitutional:  She denies fever, weight loss, sweats or fatigue. HEENT:  No blurred or double vision, headache or dizziness. No difficulty with swallowing, taste, speech or smell. Respiratory:  No cough, wheezing or shortness of breath. No sputum production. Cardiac:  Denies chest pain, palpitations, unexplained indigestion, syncope, edema, PND or orthopnea. GI:  No changes in bowel movements, no abdominal pain, no bloating, anorexia, nausea, vomiting or heartburn. :  No frequency or dysuria. Denies incontinence. Extremities:  No joint pain, stiffness or swelling. Skin:  Positive rash. Neurological: weakness legs    Objective:     Vitals:    11/09/17 1536   BP: (!) 140/92   Pulse: 70   SpO2: 93%   Height: 5' 1\" (1.549 m)       There is no height or weight on file to calculate BMI.    Physical Examination:              General Appearance:  Well-developed, well-nourished, no acute  distress. HEENT:      Ears:  The TMs and ear canals were clear. Eyes:  The pupillary responses were normal.  Extraocular muscle function intact. Lids and conjunctiva not injected. Neck:  Supple without thyromegaly or adenopathy. No JVD noted. Lungs:  Clear to auscultation and percussion. Cardiac:  Regular rate and rhythm without murmur. GI: nontender w/o mass. Normal BS's. Extremities: stasis changes with lichenification. Skin:  Erythema with stasis changes. Neurological:  Grossly normal.            Assessment/Plan:   Impressions:      ICD-10-CM ICD-9-CM    1. Chronic diastolic CHF (congestive heart failure), NYHA class 3 ([facility]) I50.32 428.32    2. Venous stasis dermatitis of both lower extremities I87.2 454.1    3. B12 deficiency E53.8 266.2    4. Vitamin B 12 deficiency E53.8 266.2 cyanocobalamin (VITAMIN B-12) 1,000 mcg/mL injection        Plan:  1. Continue present meds  2. Lifestyle modifications including Na restriction, low carb/fat diet, weight reduction and exercise (at least a walking program). 3. Continue wound care and compression hose        Follow-up Disposition:  Return in about 2 weeks (around 2017). Orders Placed This Encounter    cyanocobalamin (VITAMIN B-12) 1,000 mcg/mL injection     Si mL by IntraMUSCular route once for 1 dose.      Dispense:  1 mL     Refill:  0     Order Specific Question:   Site     Answer:   LEFT DELTOID     Order Specific Question:   Expiration Date     Answer:   2019     Order Specific Question:   Lot#     Answer:   [id number]     Order Specific Question:        Answer:   Jimmy Torres MD

## 2017-11-12 RX ORDER — ALLOPURINOL 300 MG/1
TABLET ORAL
Qty: 90 TAB | Refills: 0 | Status: SHIPPED | OUTPATIENT
Start: 2017-11-12 | End: 2018-02-10 | Stop reason: SDUPTHER

## 2017-11-20 RX ORDER — HYDRALAZINE HYDROCHLORIDE 50 MG/1
TABLET, FILM COATED ORAL
Qty: 100 TAB | Refills: 0 | Status: SHIPPED | OUTPATIENT
Start: 2017-11-20 | End: 2018-01-06 | Stop reason: SDUPTHER

## 2017-11-24 ENCOUNTER — OFFICE VISIT (OUTPATIENT)
Dept: INTERNAL MEDICINE CLINIC | Age: 62
End: 2017-11-24

## 2017-11-24 VITALS
OXYGEN SATURATION: 93 % | HEART RATE: 71 BPM | SYSTOLIC BLOOD PRESSURE: 153 MMHG | TEMPERATURE: 98 F | HEIGHT: 61 IN | DIASTOLIC BLOOD PRESSURE: 82 MMHG

## 2017-11-24 DIAGNOSIS — K76.0 FATTY LIVER: ICD-10-CM

## 2017-11-24 DIAGNOSIS — N18.2 STAGE 2 CHRONIC KIDNEY DISEASE DUE TO BENIGN HYPERTENSION: ICD-10-CM

## 2017-11-24 DIAGNOSIS — I12.9 STAGE 2 CHRONIC KIDNEY DISEASE DUE TO BENIGN HYPERTENSION: ICD-10-CM

## 2017-11-24 DIAGNOSIS — I50.32 CHRONIC DIASTOLIC CHF (CONGESTIVE HEART FAILURE), NYHA CLASS 3 (HCC): Primary | ICD-10-CM

## 2017-11-24 DIAGNOSIS — I87.2 VENOUS STASIS DERMATITIS OF BOTH LOWER EXTREMITIES: ICD-10-CM

## 2017-11-24 DIAGNOSIS — D64.9 ANEMIA, UNSPECIFIED TYPE: ICD-10-CM

## 2017-11-24 DIAGNOSIS — I10 ESSENTIAL HYPERTENSION: ICD-10-CM

## 2017-11-24 LAB
ALBUMIN SERPL-MCNC: 3.5 G/DL (ref 3.9–5.4)
ALKALINE PHOS POC: 134 U/L (ref 38–126)
ALT SERPL-CCNC: 25 U/L (ref 9–52)
AST SERPL-CCNC: 13 U/L (ref 14–36)
BUN BLD-MCNC: 49 MG/DL (ref 7–17)
CALCIUM BLD-MCNC: 9.6 MG/DL (ref 8.4–10.2)
CHLORIDE BLD-SCNC: 104 MMOL/L (ref 98–107)
CO2 POC: 28 MMOL/L (ref 22–32)
CREAT BLD-MCNC: 1.3 MG/DL (ref 0.7–1.2)
EGFR (POC): 43.6
GLUCOSE POC: 447 MG/DL (ref 65–105)
GRAN# POC: 6.9 K/UL (ref 2–7.8)
GRAN% POC: 85.2 % (ref 37–92)
HCT VFR BLD CALC: 32.1 % (ref 37–51)
HGB BLD-MCNC: 10.6 G/DL (ref 12–18)
LY# POC: 0.9 K/UL (ref 0.6–4.1)
LY% POC: 11.1 % (ref 10–58.5)
MCH RBC QN: 30.4 PG (ref 26–32)
MCHC RBC-ENTMCNC: 33 G/DL (ref 30–36)
MCV RBC: 92 FL (ref 80–97)
MID #, POC: 0.3 K/UL (ref 0–1.8)
MID% POC: 3.7 % (ref 0.1–24)
PLATELET # BLD: 251 K/UL (ref 140–440)
POTASSIUM SERPL-SCNC: 5.4 MMOL/L (ref 3.6–5)
PROT SERPL-MCNC: 6 G/DL (ref 6.3–8.2)
RBC # BLD: 3.48 M/UL (ref 4.2–6.3)
SODIUM SERPL-SCNC: 144 MMOL/L (ref 137–145)
TOTAL BILIRUBIN POC: 0.3 MG/DL (ref 0.2–1.3)
WBC # BLD: 8.1 K/UL (ref 4.1–10.9)

## 2017-11-24 NOTE — PROGRESS NOTES
Reviewed record in preparation for visit and have obtained necessary documentation. Identified pt with two pt identifiers(name and ). Chief Complaint   Patient presents with    Follow-up     2wks        Coordination of Care Questionnaire:  :     1) Have you been to an emergency room, urgent care clinic since your last visit? No     Hospitalized since your last visit? No               2) Have you seen or consulted any other health care providers outside of [address] [place] [address] since your last visit?  No

## 2017-11-24 NOTE — PROGRESS NOTES
Subjective:   Laly Rolon is a 60 y.o. female      Chief Complaint   Patient presents with    Follow-up     2wks        History of present illness:  Ms. Allen Deluna returns in follow up. Her legs are looking better. Her edema is less. The blisters are healing. The stasis changes have diminished. She's not been as active in the last month or so as she had been previously and therefore she has lost some of her strength. She can only get up with her walker for 10-15 feet at this time. She is mostly wheelchair bound. She is able to lie flat at nighttime and sleep more than she was previously since we have diuresed her as well. Her last check here she was still anemic, but her hemoglobin improved to a small degree. Will check that again today, as well as renal function and potassium. I have asked her and her  to return in a month's time and to not wrap her legs before they come in this time so we can get an even better look at them on that day. She says her blood sugars have been stable. Her last P2S was certainly better and we will see if that holds true going forward.         Patient Active Problem List    Diagnosis Date Noted    Chronic diastolic CHF (congestive heart failure), NYHA class 3 ([address] 07/24/2017     Priority: 1 - One    Stroke ([address] 05/30/2014     Priority: 1 - One    Right leg weakness 04/18/2013     Priority: 1 - One    Venous stasis dermatitis of both lower extremities 11/09/2017     Priority: 2 - Two    (HFpEF) heart failure with preserved ejection fraction ([address] 10/31/2017     Priority: 2 - Two    Anemia 07/24/2017     Priority: 2 - Two    Ulcer of right fifth toe due to diabetes mellitus ([address] 07/24/2017     Priority: 2 - Two    Peripheral neuropathy 07/24/2017     Priority: 2 - Two    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 2 diabetes mellitus ([address] 07/24/2017     Priority: 2 - Two    Stage 2 chronic kidney disease due to benign hypertension 07/24/2017     Priority: 2 - Two    Hypertension 2017     Priority: 2 - Two    Bilateral edema of lower extremity 2017     Priority: 2 - Two    ASCVD (arteriosclerotic cardiovascular disease) 2014     Priority: 2 - Two    Diabetic polyneuropathy ([facility] 2013     Priority: 2 - Two    Diabetes mellitus type II, uncontrolled ([facility] 2013     Priority: 2 - Two    Diabetic gastroparesis ([facility]      Priority: 2 - Two    Diabetes ([facility] 2011     Priority: 2 - Two    B12 deficiency 10/31/2017     Priority: 3 - Three    GERD (gastroesophageal reflux disease) 10/11/2017     Priority: 3 - Three    Fatty liver 2017     Priority: 3 - Three    Stasis dermatitis 2017     Priority: 3 - Three    Aortic stenosis, mild 2017     Priority: 3 - Three    CKD (chronic kidney disease) stage 3, GFR 30-59 ml/min      Priority: 3 - Three    Hyperlipemia      Priority: 3 - Three    Morbid obesity ([facility] 2011     Priority: 3 - Three    Gout 10/11/2017     Priority: 4 - Four    History of hyperparathyroidism 10/11/2017     Priority: 4 - Four    Sinus tachycardia 2017     Priority: 4 - Four    Low back pain 2017     Priority: 4 - Four    Insomnia 2017     Priority: 5 - Five    BMI 45.0-49.9, adult ([facility] 2017     Priority: 5 - Five    PE (physical exam), annual 10/11/2017      Past Surgical History:   Procedure Laterality Date    CHEST SURGERY PROCEDURE UNLISTED  10/24/13    PARATHYROID EXPLORATION     DELIVERY       HX  SECTION      HX CHOLECYSTECTOMY      HX OTHER SURGICAL      I&D right thigh abscess      Allergies   Allergen Reactions    Zestril [Lisinopril] Swelling      Family History   Problem Relation Age of Onset    Cancer Mother      cervical    Diabetes Mother     Hypertension Mother     Stroke Mother 79    Diabetes Father     Hypertension Father     Kidney Disease Father     Diabetes Brother     Hypertension Brother     Stroke Brother 61 Social History     Social History    Marital status:      Spouse name: N/A    Number of children: N/A    Years of education: N/A     Occupational History    Not on file. Social History Main Topics    Smoking status: Never Smoker    Smokeless tobacco: Never Used    Alcohol use Yes      Comment: rare    Drug use: Yes     Special: Prescription, OTC    Sexual activity: Not on file     Other Topics Concern    Not on file     Social History Narrative     Outpatient Prescriptions Marked as Taking for the 11/24/17 encounter (Office Visit) with Emma Sandra MD   Medication Sig Dispense Refill    hydrALAZINE (APRESOLINE) 50 mg tablet TAKE 1 TABLET BY MOUTH THREE TIMES DAILY 100 Tab 0    allopurinol (ZYLOPRIM) 300 mg tablet TAKE 1 TABLET BY MOUTH EVERY DAY 90 Tab 0    bumetanide (BUMEX) 2 mg tablet Take 1  Tab daily 90 Tab 3    ammonium lactate (LAC-HYDRIN) 12 % lotion rub in to affected area well      insulin detemir (LEVEMIR) 100 unit/mL injection 60 Units by SubCUTAneous route nightly.  insulin lispro (HUMALOG) 100 unit/mL kwikpen 10 Units by SubCUTAneous route three (3) times daily (with meals). 1 Package     nystatin (MYCOSTATIN) topical cream Apply  to affected area two (2) times a day. Use for groin rash 15 g 0    cyanocobalamin (VITAMIN B12) 1,000 mcg/mL injection 1 mL by IntraMUSCular route every seven (7) days. 1 Vial 0    ferrous sulfate (SLOW FE) 142 mg (45 mg iron) ER tablet Take 142 mg by mouth Daily (before breakfast).  atorvastatin (LIPITOR) 40 mg tablet TAKE 1 TABLET BY MOUTH EVERY DAY 90 Tab 0    aspirin-dipyridamole (AGGRENOX)  mg per SR capsule TAKE 1 CAPSULE BY MOUTH TWICE DAILY( EVERY TWELVE HOURS) 60 Cap 5    PEN NEEDLE by Does Not Apply route.  SYRINGE AND NEEDLE,INSULIN,1ML (BD INSULIN SYRINGE) by Does Not Apply route.  BLOOD SUGAR DIAGNOSTIC (FREESTYLE LITE STRIPS) by In Vitro route.       colestipol (COLESTID) 1 gram tablet Take 1 g by mouth two (2) times a day.  Bifidobacterium Infantis (ALIGN) 4 mg cap Take  by mouth.  acetaminophen (TYLENOL) 325 mg tablet Take 2 Tabs by mouth every four (4) hours as needed.  atenolol (TENORMIN) 50 mg tablet Take  by mouth daily.  pregabalin (LYRICA) 75 mg capsule Take 75 mg by mouth two (2) times a day.  Cholecalciferol, Vitamin D3, 2,000 unit cap Take 1 Cap by mouth daily.  omeprazole (PRILOSEC) 40 mg capsule Take 40 mg by mouth daily.  diltiazem (CARDIZEM CD) 360 mg ER capsule Take 360 mg by mouth daily.  glipiZIDE (GLUCOTROL) 10 mg tablet Take 10 mg by mouth two (2) times a day. Review of Systems              Constitutional:  She denies fever, weight loss, sweats or fatigue. HEENT:  No blurred or double vision, headache or dizziness. No difficulty with swallowing, taste, speech or smell. Respiratory:  No cough, wheezing or shortness of breath. No sputum production. Cardiac:  Denies chest pain, palpitations, unexplained indigestion, syncope, edema, PND or orthopnea. GI:  No changes in bowel movements, no abdominal pain, no bloating, anorexia, nausea, vomiting or heartburn. :  No frequency or dysuria. Denies incontinence. Extremities:  No joint pain, stiffness or swelling. Skin: stasis derm improving  Neurological: LE weakness    Objective:     Vitals:    11/24/17 1054   BP: 153/82   Pulse: 71   Temp: 98 °F (36.7 °C)   TempSrc: Oral   SpO2: 93%   Height: 5' 1\" (1.549 m)   PainSc:   0 - No pain       There is no height or weight on file to calculate BMI. Physical Examination:              General Appearance:  Well-developed, well-nourished, no acute  distress. HEENT:      Ears:  The TMs and ear canals were clear. Eyes:  The pupillary responses were normal.  Extraocular muscle function intact. Lids and conjunctiva not injected. Neck:  Supple without thyromegaly or adenopathy. No JVD noted.   Lungs:  Clear to auscultation and percussion. Cardiac:  Regular rate and rhythm without murmur. GI: nontender w/o mass. Normal BS's. Extremities:  Dressings on legs. Skin:  No rash or unusual mole changes noted. Neurological:  Lower extremity weaknessl. Assessment/Plan:   Impressions:      ICD-10-CM ICD-9-CM    1. Chronic diastolic CHF (congestive heart failure), NYHA class 3 ([facility]) I50.32 428.32 AMB POC COMPREHENSIVE METABOLIC PANEL   2. Uncontrolled type 2 diabetes mellitus with other specified complication, with long-term current use of insulin (HCC) E11.69 250.82 AMB POC COMPREHENSIVE METABOLIC PANEL    E56.07 Q23.72     Z79.4     3. Stage 2 chronic kidney disease due to benign hypertension I12.9 403.10 AMB POC COMPREHENSIVE METABOLIC PANEL    P26.4 870.6    4. Essential hypertension I10 401.9 AMB POC COMPREHENSIVE METABOLIC PANEL   5. Fatty liver K76.0 571.8 AMB POC COMPREHENSIVE METABOLIC PANEL   6. Venous stasis dermatitis of both lower extremities I87.2 454.1 AMB POC COMPLETE CBC,AUTOMATED ENTER   7. Anemia, unspecified type D64.9 285.9 AMB POC COMPLETE CBC,AUTOMATED ENTER        Plan:  1. Continue present meds  Lifestyle modifications including Na restriction, low carb/fat diet, weight reduction and exercise (at least a walking program). Continue wound care      Follow-up Disposition:  Return in about 1 month (around 12/24/2017).       Orders Placed This Encounter    FABIÁN Jung MD

## 2017-12-01 NOTE — PROGRESS NOTES
Patient informed that Blood sugar, liver and kidney function normal except BS high > 400.   DIET!!!  hgb better 10.6

## 2017-12-31 RX ORDER — DILTIAZEM HYDROCHLORIDE 360 MG/1
CAPSULE, EXTENDED RELEASE ORAL
Qty: 30 CAP | Refills: 0 | Status: SHIPPED | OUTPATIENT
Start: 2017-12-31 | End: 2018-02-03 | Stop reason: SDUPTHER

## 2018-01-01 ENCOUNTER — TELEPHONE (OUTPATIENT)
Dept: INTERNAL MEDICINE CLINIC | Age: 63
End: 2018-01-01

## 2018-01-01 ENCOUNTER — HOME CARE VISIT (OUTPATIENT)
Dept: SCHEDULING | Facility: HOME HEALTH | Age: 63
End: 2018-01-01
Payer: COMMERCIAL

## 2018-01-01 ENCOUNTER — HOSPICE ADMISSION (OUTPATIENT)
Dept: HOSPICE | Facility: HOSPICE | Age: 63
End: 2018-01-01

## 2018-01-01 ENCOUNTER — HOSPITAL ENCOUNTER (EMERGENCY)
Age: 63
Discharge: HOME | End: 2018-03-24
Attending: EMERGENCY MEDICINE
Payer: COMMERCIAL

## 2018-01-01 ENCOUNTER — OFFICE VISIT (OUTPATIENT)
Dept: INTERNAL MEDICINE CLINIC | Age: 63
End: 2018-01-01

## 2018-01-01 ENCOUNTER — HOME CARE VISIT (OUTPATIENT)
Dept: HOME HEALTH SERVICES | Facility: HOME HEALTH | Age: 63
End: 2018-01-01
Payer: COMMERCIAL

## 2018-01-01 ENCOUNTER — APPOINTMENT (OUTPATIENT)
Dept: GENERAL RADIOLOGY | Age: 63
DRG: 616 | End: 2018-01-01
Attending: EMERGENCY MEDICINE
Payer: COMMERCIAL

## 2018-01-01 ENCOUNTER — APPOINTMENT (OUTPATIENT)
Dept: GENERAL RADIOLOGY | Age: 63
DRG: 616 | End: 2018-01-01
Attending: INTERNAL MEDICINE
Payer: COMMERCIAL

## 2018-01-01 ENCOUNTER — APPOINTMENT (OUTPATIENT)
Dept: GENERAL RADIOLOGY | Age: 63
End: 2018-01-01
Attending: EMERGENCY MEDICINE
Payer: COMMERCIAL

## 2018-01-01 ENCOUNTER — HOME HEALTH ADMISSION (OUTPATIENT)
Dept: HOME HEALTH SERVICES | Facility: HOME HEALTH | Age: 63
End: 2018-01-01
Payer: COMMERCIAL

## 2018-01-01 ENCOUNTER — HOSPITAL ENCOUNTER (EMERGENCY)
Age: 63
Discharge: HOME | End: 2018-11-05
Attending: EMERGENCY MEDICINE
Payer: COMMERCIAL

## 2018-01-01 ENCOUNTER — HOME CARE VISIT (OUTPATIENT)
Dept: SCHEDULING | Facility: HOME HEALTH | Age: 63
End: 2018-01-01

## 2018-01-01 ENCOUNTER — DOCUMENTATION ONLY (OUTPATIENT)
Dept: INTERNAL MEDICINE CLINIC | Age: 63
End: 2018-01-01

## 2018-01-01 ENCOUNTER — APPOINTMENT (OUTPATIENT)
Dept: CT IMAGING | Age: 63
DRG: 616 | End: 2018-01-01
Attending: INTERNAL MEDICINE
Payer: COMMERCIAL

## 2018-01-01 ENCOUNTER — HOSPITAL ENCOUNTER (INPATIENT)
Age: 63
LOS: 16 days | Discharge: HOME HEALTH | DRG: 683 | End: 2018-12-17
Attending: EMERGENCY MEDICINE | Admitting: FAMILY MEDICINE
Payer: COMMERCIAL

## 2018-01-01 ENCOUNTER — ANESTHESIA EVENT (OUTPATIENT)
Dept: SURGERY | Age: 63
DRG: 616 | End: 2018-01-01
Payer: COMMERCIAL

## 2018-01-01 ENCOUNTER — APPOINTMENT (OUTPATIENT)
Dept: CT IMAGING | Age: 63
DRG: 616 | End: 2018-01-01
Attending: EMERGENCY MEDICINE
Payer: COMMERCIAL

## 2018-01-01 ENCOUNTER — APPOINTMENT (OUTPATIENT)
Dept: GENERAL RADIOLOGY | Age: 63
DRG: 683 | End: 2018-01-01
Attending: EMERGENCY MEDICINE
Payer: COMMERCIAL

## 2018-01-01 ENCOUNTER — NURSE NAVIGATOR (OUTPATIENT)
Dept: PALLATIVE CARE | Age: 63
End: 2018-01-01

## 2018-01-01 ENCOUNTER — APPOINTMENT (OUTPATIENT)
Dept: CT IMAGING | Age: 63
DRG: 683 | End: 2018-01-01
Attending: INTERNAL MEDICINE
Payer: COMMERCIAL

## 2018-01-01 ENCOUNTER — HOME CARE VISIT (OUTPATIENT)
Dept: HOSPICE | Facility: HOSPICE | Age: 63
End: 2018-01-01

## 2018-01-01 ENCOUNTER — LAB ONLY (OUTPATIENT)
Dept: INTERNAL MEDICINE CLINIC | Age: 63
End: 2018-01-01

## 2018-01-01 ENCOUNTER — APPOINTMENT (OUTPATIENT)
Dept: MRI IMAGING | Age: 63
DRG: 616 | End: 2018-01-01
Attending: PODIATRIST
Payer: COMMERCIAL

## 2018-01-01 ENCOUNTER — HOSPITAL ENCOUNTER (OUTPATIENT)
Dept: NUCLEAR MEDICINE | Age: 63
Discharge: HOME | End: 2018-08-17
Attending: INTERNAL MEDICINE
Payer: COMMERCIAL

## 2018-01-01 ENCOUNTER — HOSPITAL ENCOUNTER (INPATIENT)
Age: 63
LOS: 17 days | Discharge: HOME HEALTH | DRG: 616 | End: 2018-03-22
Attending: EMERGENCY MEDICINE | Admitting: INTERNAL MEDICINE
Payer: COMMERCIAL

## 2018-01-01 ENCOUNTER — HOME CARE VISIT (OUTPATIENT)
Dept: HOME HEALTH SERVICES | Facility: HOME HEALTH | Age: 63
End: 2018-01-01

## 2018-01-01 ENCOUNTER — ANESTHESIA (OUTPATIENT)
Dept: SURGERY | Age: 63
DRG: 616 | End: 2018-01-01
Payer: COMMERCIAL

## 2018-01-01 ENCOUNTER — APPOINTMENT (OUTPATIENT)
Dept: NUCLEAR MEDICINE | Age: 63
DRG: 616 | End: 2018-01-01
Attending: EMERGENCY MEDICINE
Payer: COMMERCIAL

## 2018-01-01 ENCOUNTER — TELEPHONE (OUTPATIENT)
Dept: PALLATIVE CARE | Age: 63
End: 2018-01-01

## 2018-01-01 VITALS
OXYGEN SATURATION: 96 % | DIASTOLIC BLOOD PRESSURE: 76 MMHG | TEMPERATURE: 98.1 F | SYSTOLIC BLOOD PRESSURE: 115 MMHG | HEART RATE: 65 BPM

## 2018-01-01 VITALS
SYSTOLIC BLOOD PRESSURE: 142 MMHG | HEART RATE: 86 BPM | OXYGEN SATURATION: 96 % | RESPIRATION RATE: 20 BRPM | DIASTOLIC BLOOD PRESSURE: 88 MMHG | TEMPERATURE: 96.8 F

## 2018-01-01 VITALS
DIASTOLIC BLOOD PRESSURE: 84 MMHG | SYSTOLIC BLOOD PRESSURE: 156 MMHG | OXYGEN SATURATION: 96 % | TEMPERATURE: 98.4 F | HEART RATE: 98 BPM | HEIGHT: 60 IN

## 2018-01-01 VITALS
OXYGEN SATURATION: 94 % | HEART RATE: 78 BPM | SYSTOLIC BLOOD PRESSURE: 208 MMHG | DIASTOLIC BLOOD PRESSURE: 110 MMHG | RESPIRATION RATE: 14 BRPM

## 2018-01-01 VITALS
SYSTOLIC BLOOD PRESSURE: 140 MMHG | HEART RATE: 53 BPM | OXYGEN SATURATION: 92 % | DIASTOLIC BLOOD PRESSURE: 68 MMHG | TEMPERATURE: 98 F | RESPIRATION RATE: 17 BRPM

## 2018-01-01 VITALS
WEIGHT: 293 LBS | DIASTOLIC BLOOD PRESSURE: 80 MMHG | HEART RATE: 67 BPM | BODY MASS INDEX: 57.52 KG/M2 | TEMPERATURE: 98 F | OXYGEN SATURATION: 96 % | RESPIRATION RATE: 18 BRPM | HEIGHT: 60 IN | SYSTOLIC BLOOD PRESSURE: 138 MMHG

## 2018-01-01 VITALS
WEIGHT: 210.1 LBS | TEMPERATURE: 97.5 F | SYSTOLIC BLOOD PRESSURE: 110 MMHG | RESPIRATION RATE: 16 BRPM | HEIGHT: 60 IN | BODY MASS INDEX: 41.25 KG/M2 | HEART RATE: 65 BPM | DIASTOLIC BLOOD PRESSURE: 76 MMHG | OXYGEN SATURATION: 93 %

## 2018-01-01 VITALS
TEMPERATURE: 98.3 F | BODY MASS INDEX: 57.52 KG/M2 | HEIGHT: 60 IN | HEART RATE: 66 BPM | RESPIRATION RATE: 20 BRPM | DIASTOLIC BLOOD PRESSURE: 79 MMHG | SYSTOLIC BLOOD PRESSURE: 139 MMHG | WEIGHT: 293 LBS | OXYGEN SATURATION: 91 %

## 2018-01-01 VITALS
RESPIRATION RATE: 18 BRPM | TEMPERATURE: 97.8 F | DIASTOLIC BLOOD PRESSURE: 60 MMHG | OXYGEN SATURATION: 96 % | SYSTOLIC BLOOD PRESSURE: 120 MMHG | HEART RATE: 66 BPM

## 2018-01-01 VITALS
TEMPERATURE: 98.5 F | DIASTOLIC BLOOD PRESSURE: 77 MMHG | RESPIRATION RATE: 18 BRPM | HEIGHT: 60 IN | HEART RATE: 63 BPM | OXYGEN SATURATION: 94 % | SYSTOLIC BLOOD PRESSURE: 132 MMHG

## 2018-01-01 VITALS
HEART RATE: 50 BPM | TEMPERATURE: 98.2 F | SYSTOLIC BLOOD PRESSURE: 126 MMHG | HEIGHT: 60 IN | OXYGEN SATURATION: 97 % | DIASTOLIC BLOOD PRESSURE: 64 MMHG

## 2018-01-01 VITALS
OXYGEN SATURATION: 98 % | HEART RATE: 73 BPM | RESPIRATION RATE: 20 BRPM | DIASTOLIC BLOOD PRESSURE: 76 MMHG | TEMPERATURE: 97.3 F | SYSTOLIC BLOOD PRESSURE: 150 MMHG

## 2018-01-01 VITALS
DIASTOLIC BLOOD PRESSURE: 68 MMHG | TEMPERATURE: 98.4 F | RESPIRATION RATE: 18 BRPM | SYSTOLIC BLOOD PRESSURE: 140 MMHG | OXYGEN SATURATION: 93 % | HEART RATE: 83 BPM

## 2018-01-01 VITALS
SYSTOLIC BLOOD PRESSURE: 158 MMHG | HEIGHT: 60 IN | OXYGEN SATURATION: 93 % | HEART RATE: 100 BPM | TEMPERATURE: 98.7 F | DIASTOLIC BLOOD PRESSURE: 84 MMHG

## 2018-01-01 VITALS
HEIGHT: 60 IN | DIASTOLIC BLOOD PRESSURE: 76 MMHG | HEART RATE: 72 BPM | RESPIRATION RATE: 16 BRPM | SYSTOLIC BLOOD PRESSURE: 150 MMHG | OXYGEN SATURATION: 97 % | TEMPERATURE: 98.9 F

## 2018-01-01 VITALS
SYSTOLIC BLOOD PRESSURE: 158 MMHG | DIASTOLIC BLOOD PRESSURE: 98 MMHG | OXYGEN SATURATION: 91 % | TEMPERATURE: 97 F | HEART RATE: 78 BPM

## 2018-01-01 VITALS
TEMPERATURE: 100.4 F | SYSTOLIC BLOOD PRESSURE: 184 MMHG | OXYGEN SATURATION: 79 % | DIASTOLIC BLOOD PRESSURE: 86 MMHG | HEART RATE: 83 BPM | RESPIRATION RATE: 26 BRPM

## 2018-01-01 VITALS
WEIGHT: 293 LBS | HEIGHT: 60 IN | DIASTOLIC BLOOD PRESSURE: 77 MMHG | TEMPERATURE: 98.4 F | OXYGEN SATURATION: 97 % | HEART RATE: 65 BPM | RESPIRATION RATE: 18 BRPM | SYSTOLIC BLOOD PRESSURE: 125 MMHG | BODY MASS INDEX: 57.52 KG/M2

## 2018-01-01 VITALS
TEMPERATURE: 98 F | HEART RATE: 67 BPM | TEMPERATURE: 97.3 F | RESPIRATION RATE: 18 BRPM | SYSTOLIC BLOOD PRESSURE: 180 MMHG | OXYGEN SATURATION: 99 % | RESPIRATION RATE: 18 BRPM | OXYGEN SATURATION: 95 % | DIASTOLIC BLOOD PRESSURE: 80 MMHG | HEART RATE: 74 BPM | DIASTOLIC BLOOD PRESSURE: 90 MMHG | SYSTOLIC BLOOD PRESSURE: 150 MMHG

## 2018-01-01 VITALS
TEMPERATURE: 97 F | DIASTOLIC BLOOD PRESSURE: 70 MMHG | HEART RATE: 68 BPM | RESPIRATION RATE: 20 BRPM | OXYGEN SATURATION: 95 % | SYSTOLIC BLOOD PRESSURE: 138 MMHG

## 2018-01-01 VITALS
RESPIRATION RATE: 18 BRPM | TEMPERATURE: 97.7 F | DIASTOLIC BLOOD PRESSURE: 45 MMHG | SYSTOLIC BLOOD PRESSURE: 130 MMHG | HEART RATE: 64 BPM | OXYGEN SATURATION: 94 %

## 2018-01-01 VITALS
SYSTOLIC BLOOD PRESSURE: 126 MMHG | DIASTOLIC BLOOD PRESSURE: 82 MMHG | RESPIRATION RATE: 14 BRPM | OXYGEN SATURATION: 97 % | TEMPERATURE: 98.3 F | HEART RATE: 82 BPM

## 2018-01-01 VITALS
DIASTOLIC BLOOD PRESSURE: 76 MMHG | HEART RATE: 73 BPM | TEMPERATURE: 97.3 F | OXYGEN SATURATION: 98 % | SYSTOLIC BLOOD PRESSURE: 150 MMHG

## 2018-01-01 VITALS
OXYGEN SATURATION: 95 % | HEART RATE: 67 BPM | DIASTOLIC BLOOD PRESSURE: 78 MMHG | RESPIRATION RATE: 18 BRPM | SYSTOLIC BLOOD PRESSURE: 136 MMHG | TEMPERATURE: 98.5 F

## 2018-01-01 VITALS
DIASTOLIC BLOOD PRESSURE: 67 MMHG | SYSTOLIC BLOOD PRESSURE: 140 MMHG | WEIGHT: 293 LBS | RESPIRATION RATE: 16 BRPM | HEART RATE: 96 BPM | BODY MASS INDEX: 58.59 KG/M2 | TEMPERATURE: 98.2 F | OXYGEN SATURATION: 100 %

## 2018-01-01 VITALS
RESPIRATION RATE: 18 BRPM | HEART RATE: 93 BPM | SYSTOLIC BLOOD PRESSURE: 140 MMHG | DIASTOLIC BLOOD PRESSURE: 68 MMHG | OXYGEN SATURATION: 93 % | TEMPERATURE: 98.4 F

## 2018-01-01 VITALS
SYSTOLIC BLOOD PRESSURE: 138 MMHG | OXYGEN SATURATION: 91 % | DIASTOLIC BLOOD PRESSURE: 65 MMHG | OXYGEN SATURATION: 95 % | SYSTOLIC BLOOD PRESSURE: 130 MMHG | TEMPERATURE: 97.8 F | HEART RATE: 51 BPM | DIASTOLIC BLOOD PRESSURE: 70 MMHG | RESPIRATION RATE: 20 BRPM | HEART RATE: 68 BPM

## 2018-01-01 VITALS
OXYGEN SATURATION: 99 % | DIASTOLIC BLOOD PRESSURE: 89 MMHG | HEIGHT: 60 IN | HEART RATE: 91 BPM | SYSTOLIC BLOOD PRESSURE: 166 MMHG | TEMPERATURE: 98.2 F

## 2018-01-01 VITALS
DIASTOLIC BLOOD PRESSURE: 72 MMHG | TEMPERATURE: 98 F | HEART RATE: 54 BPM | OXYGEN SATURATION: 94 % | SYSTOLIC BLOOD PRESSURE: 140 MMHG

## 2018-01-01 VITALS
HEART RATE: 70 BPM | TEMPERATURE: 98.1 F | RESPIRATION RATE: 16 BRPM | WEIGHT: 293 LBS | BODY MASS INDEX: 57.52 KG/M2 | OXYGEN SATURATION: 98 % | HEIGHT: 60 IN | SYSTOLIC BLOOD PRESSURE: 154 MMHG | DIASTOLIC BLOOD PRESSURE: 66 MMHG

## 2018-01-01 VITALS
SYSTOLIC BLOOD PRESSURE: 130 MMHG | DIASTOLIC BLOOD PRESSURE: 70 MMHG | HEART RATE: 53 BPM | HEART RATE: 63 BPM | SYSTOLIC BLOOD PRESSURE: 140 MMHG | OXYGEN SATURATION: 92 % | OXYGEN SATURATION: 94 % | TEMPERATURE: 97.8 F | RESPIRATION RATE: 17 BRPM | DIASTOLIC BLOOD PRESSURE: 68 MMHG | TEMPERATURE: 98 F

## 2018-01-01 VITALS
HEART RATE: 50 BPM | OXYGEN SATURATION: 91 % | TEMPERATURE: 98.5 F | DIASTOLIC BLOOD PRESSURE: 65 MMHG | SYSTOLIC BLOOD PRESSURE: 130 MMHG | RESPIRATION RATE: 20 BRPM

## 2018-01-01 VITALS
HEART RATE: 69 BPM | TEMPERATURE: 98.7 F | SYSTOLIC BLOOD PRESSURE: 130 MMHG | RESPIRATION RATE: 18 BRPM | OXYGEN SATURATION: 96 % | DIASTOLIC BLOOD PRESSURE: 70 MMHG

## 2018-01-01 VITALS
SYSTOLIC BLOOD PRESSURE: 150 MMHG | OXYGEN SATURATION: 93 % | DIASTOLIC BLOOD PRESSURE: 90 MMHG | HEART RATE: 93 BPM | RESPIRATION RATE: 20 BRPM | TEMPERATURE: 97.3 F

## 2018-01-01 VITALS
RESPIRATION RATE: 17 BRPM | HEART RATE: 71 BPM | OXYGEN SATURATION: 92 % | SYSTOLIC BLOOD PRESSURE: 160 MMHG | DIASTOLIC BLOOD PRESSURE: 73 MMHG | TEMPERATURE: 97.6 F

## 2018-01-01 VITALS — TEMPERATURE: 99.2 F | OXYGEN SATURATION: 92 % | HEART RATE: 69 BPM

## 2018-01-01 VITALS
HEIGHT: 60 IN | TEMPERATURE: 98.4 F | OXYGEN SATURATION: 93 % | SYSTOLIC BLOOD PRESSURE: 144 MMHG | DIASTOLIC BLOOD PRESSURE: 79 MMHG | HEART RATE: 70 BPM | RESPIRATION RATE: 18 BRPM | BODY MASS INDEX: 57.52 KG/M2 | WEIGHT: 293 LBS

## 2018-01-01 VITALS
RESPIRATION RATE: 18 BRPM | OXYGEN SATURATION: 97 % | DIASTOLIC BLOOD PRESSURE: 78 MMHG | HEART RATE: 75 BPM | SYSTOLIC BLOOD PRESSURE: 144 MMHG | TEMPERATURE: 97.5 F

## 2018-01-01 VITALS
DIASTOLIC BLOOD PRESSURE: 100 MMHG | RESPIRATION RATE: 19 BRPM | HEART RATE: 78 BPM | TEMPERATURE: 98.3 F | SYSTOLIC BLOOD PRESSURE: 210 MMHG | OXYGEN SATURATION: 94 %

## 2018-01-01 VITALS
SYSTOLIC BLOOD PRESSURE: 126 MMHG | OXYGEN SATURATION: 97 % | DIASTOLIC BLOOD PRESSURE: 68 MMHG | RESPIRATION RATE: 18 BRPM | TEMPERATURE: 97.4 F | HEART RATE: 61 BPM

## 2018-01-01 VITALS
OXYGEN SATURATION: 95 % | RESPIRATION RATE: 18 BRPM | HEART RATE: 86 BPM | DIASTOLIC BLOOD PRESSURE: 100 MMHG | TEMPERATURE: 97.6 F | SYSTOLIC BLOOD PRESSURE: 150 MMHG

## 2018-01-01 VITALS
HEART RATE: 79 BPM | SYSTOLIC BLOOD PRESSURE: 178 MMHG | DIASTOLIC BLOOD PRESSURE: 92 MMHG | OXYGEN SATURATION: 86 % | TEMPERATURE: 97.6 F

## 2018-01-01 VITALS
OXYGEN SATURATION: 96 % | RESPIRATION RATE: 18 BRPM | SYSTOLIC BLOOD PRESSURE: 130 MMHG | DIASTOLIC BLOOD PRESSURE: 70 MMHG | HEART RATE: 69 BPM | TEMPERATURE: 98.7 F

## 2018-01-01 VITALS — OXYGEN SATURATION: 90 % | HEART RATE: 86 BPM | DIASTOLIC BLOOD PRESSURE: 100 MMHG | SYSTOLIC BLOOD PRESSURE: 160 MMHG

## 2018-01-01 VITALS
OXYGEN SATURATION: 94 % | DIASTOLIC BLOOD PRESSURE: 80 MMHG | HEART RATE: 74 BPM | SYSTOLIC BLOOD PRESSURE: 142 MMHG | TEMPERATURE: 98.8 F

## 2018-01-01 VITALS
SYSTOLIC BLOOD PRESSURE: 156 MMHG | TEMPERATURE: 98.2 F | DIASTOLIC BLOOD PRESSURE: 80 MMHG | OXYGEN SATURATION: 98 % | RESPIRATION RATE: 20 BRPM | HEART RATE: 78 BPM

## 2018-01-01 VITALS
OXYGEN SATURATION: 96 % | HEART RATE: 68 BPM | SYSTOLIC BLOOD PRESSURE: 132 MMHG | TEMPERATURE: 98.1 F | DIASTOLIC BLOOD PRESSURE: 78 MMHG

## 2018-01-01 VITALS
HEART RATE: 75 BPM | DIASTOLIC BLOOD PRESSURE: 73 MMHG | OXYGEN SATURATION: 95 % | RESPIRATION RATE: 18 BRPM | TEMPERATURE: 97.8 F | SYSTOLIC BLOOD PRESSURE: 119 MMHG

## 2018-01-01 VITALS
RESPIRATION RATE: 18 BRPM | TEMPERATURE: 98.4 F | HEART RATE: 82 BPM | DIASTOLIC BLOOD PRESSURE: 80 MMHG | OXYGEN SATURATION: 96 % | SYSTOLIC BLOOD PRESSURE: 140 MMHG

## 2018-01-01 VITALS
DIASTOLIC BLOOD PRESSURE: 82 MMHG | TEMPERATURE: 97.9 F | RESPIRATION RATE: 10 BRPM | HEART RATE: 67 BPM | OXYGEN SATURATION: 93 % | SYSTOLIC BLOOD PRESSURE: 155 MMHG

## 2018-01-01 DIAGNOSIS — R00.1 BRADYCARDIA: Primary | ICD-10-CM

## 2018-01-01 DIAGNOSIS — I50.32 CHRONIC DIASTOLIC CHF (CONGESTIVE HEART FAILURE), NYHA CLASS 3 (HCC): ICD-10-CM

## 2018-01-01 DIAGNOSIS — R09.02 HYPOXIA: Primary | ICD-10-CM

## 2018-01-01 DIAGNOSIS — Z79.4 TYPE 2 DIABETES MELLITUS WITH DIABETIC NEUROPATHY, WITH LONG-TERM CURRENT USE OF INSULIN (HCC): ICD-10-CM

## 2018-01-01 DIAGNOSIS — D64.9 ANEMIA, UNSPECIFIED TYPE: ICD-10-CM

## 2018-01-01 DIAGNOSIS — I10 ESSENTIAL HYPERTENSION: ICD-10-CM

## 2018-01-01 DIAGNOSIS — I63.9 CEREBROVASCULAR ACCIDENT (CVA), UNSPECIFIED MECHANISM (HCC): ICD-10-CM

## 2018-01-01 DIAGNOSIS — L97.519 ULCER OF RIGHT FOOT, UNSPECIFIED ULCER STAGE (HCC): Primary | ICD-10-CM

## 2018-01-01 DIAGNOSIS — E11.40 TYPE 2 DIABETES MELLITUS WITH DIABETIC NEUROPATHY, WITH LONG-TERM CURRENT USE OF INSULIN (HCC): ICD-10-CM

## 2018-01-01 DIAGNOSIS — Z71.89 GOALS OF CARE, COUNSELING/DISCUSSION: ICD-10-CM

## 2018-01-01 DIAGNOSIS — E66.2 OBESITY HYPOVENTILATION SYNDROME (HCC): ICD-10-CM

## 2018-01-01 DIAGNOSIS — A41.9 SEPSIS, DUE TO UNSPECIFIED ORGANISM: ICD-10-CM

## 2018-01-01 DIAGNOSIS — E11.21 NEPHROTIC SYNDROME DUE TO DIABETES MELLITUS (HCC): ICD-10-CM

## 2018-01-01 DIAGNOSIS — R62.7 FTT (FAILURE TO THRIVE) IN ADULT: Primary | ICD-10-CM

## 2018-01-01 DIAGNOSIS — N30.00 ACUTE CYSTITIS WITHOUT HEMATURIA: Primary | ICD-10-CM

## 2018-01-01 DIAGNOSIS — I63.9 CEREBROVASCULAR ACCIDENT (CVA), UNSPECIFIED MECHANISM (HCC): Primary | ICD-10-CM

## 2018-01-01 DIAGNOSIS — E11.43 GASTROPARESIS DIABETICORUM (HCC): Primary | ICD-10-CM

## 2018-01-01 DIAGNOSIS — R10.84 GENERALIZED ABDOMINAL PAIN: ICD-10-CM

## 2018-01-01 DIAGNOSIS — K76.0 FATTY LIVER: ICD-10-CM

## 2018-01-01 DIAGNOSIS — T87.89 DELAYED SURGICAL WOUND HEALING OF TOE AMPUTATION STUMP (HCC): Primary | ICD-10-CM

## 2018-01-01 DIAGNOSIS — E11.43 DIABETIC GASTROPARESIS (HCC): ICD-10-CM

## 2018-01-01 DIAGNOSIS — E66.01 MORBID OBESITY (HCC): ICD-10-CM

## 2018-01-01 DIAGNOSIS — B35.6 TINEA CRURIS: Primary | ICD-10-CM

## 2018-01-01 DIAGNOSIS — I50.30 (HFPEF) HEART FAILURE WITH PRESERVED EJECTION FRACTION (HCC): ICD-10-CM

## 2018-01-01 DIAGNOSIS — E11.43 DIABETIC GASTROPARESIS (HCC): Primary | ICD-10-CM

## 2018-01-01 DIAGNOSIS — K31.84 DIABETIC GASTROPARESIS (HCC): ICD-10-CM

## 2018-01-01 DIAGNOSIS — I50.32 CHRONIC DIASTOLIC CHF (CONGESTIVE HEART FAILURE), NYHA CLASS 3 (HCC): Primary | ICD-10-CM

## 2018-01-01 DIAGNOSIS — R52 PAIN: ICD-10-CM

## 2018-01-01 DIAGNOSIS — R09.02 HYPOXIA: ICD-10-CM

## 2018-01-01 DIAGNOSIS — K31.84 DIABETIC GASTROPARESIS (HCC): Primary | ICD-10-CM

## 2018-01-01 DIAGNOSIS — N18.2 CKD STAGE 2 DUE TO TYPE 2 DIABETES MELLITUS (HCC): ICD-10-CM

## 2018-01-01 DIAGNOSIS — L97.511 SKIN ULCER OF RIGHT FOOT, LIMITED TO BREAKDOWN OF SKIN (HCC): Primary | ICD-10-CM

## 2018-01-01 DIAGNOSIS — K21.00 GASTROESOPHAGEAL REFLUX DISEASE WITH ESOPHAGITIS: ICD-10-CM

## 2018-01-01 DIAGNOSIS — E87.5 HYPERKALEMIA: Primary | ICD-10-CM

## 2018-01-01 DIAGNOSIS — Z79.4 TYPE 2 DIABETES MELLITUS WITH HYPERGLYCEMIA, WITH LONG-TERM CURRENT USE OF INSULIN (HCC): ICD-10-CM

## 2018-01-01 DIAGNOSIS — R50.9 FEVER, UNSPECIFIED FEVER CAUSE: ICD-10-CM

## 2018-01-01 DIAGNOSIS — L24.9 IRRITANT CONTACT DERMATITIS, UNSPECIFIED TRIGGER: ICD-10-CM

## 2018-01-01 DIAGNOSIS — L89.150 PRESSURE INJURY OF SACRAL REGION, UNSTAGEABLE (HCC): ICD-10-CM

## 2018-01-01 DIAGNOSIS — G63 POLYNEUROPATHY ASSOCIATED WITH UNDERLYING DISEASE (HCC): ICD-10-CM

## 2018-01-01 DIAGNOSIS — R62.7 FTT (FAILURE TO THRIVE) IN ADULT: ICD-10-CM

## 2018-01-01 DIAGNOSIS — R11.0 NAUSEA: ICD-10-CM

## 2018-01-01 DIAGNOSIS — Z71.89 ADVANCED CARE PLANNING/COUNSELING DISCUSSION: ICD-10-CM

## 2018-01-01 DIAGNOSIS — R11.2 NAUSEA AND VOMITING IN ADULT: ICD-10-CM

## 2018-01-01 DIAGNOSIS — I69.320 CVA, OLD, APHASIA: ICD-10-CM

## 2018-01-01 DIAGNOSIS — I87.2 VENOUS STASIS DERMATITIS OF BOTH LOWER EXTREMITIES: ICD-10-CM

## 2018-01-01 DIAGNOSIS — N18.30 CKD (CHRONIC KIDNEY DISEASE) STAGE 3, GFR 30-59 ML/MIN (HCC): ICD-10-CM

## 2018-01-01 DIAGNOSIS — E11.22 CKD STAGE 2 DUE TO TYPE 2 DIABETES MELLITUS (HCC): ICD-10-CM

## 2018-01-01 DIAGNOSIS — F32.A DEPRESSION, UNSPECIFIED DEPRESSION TYPE: ICD-10-CM

## 2018-01-01 DIAGNOSIS — M86.271 SUBACUTE OSTEOMYELITIS OF RIGHT FOOT (HCC): ICD-10-CM

## 2018-01-01 DIAGNOSIS — E11.65 TYPE 2 DIABETES MELLITUS WITH HYPERGLYCEMIA, WITH LONG-TERM CURRENT USE OF INSULIN (HCC): ICD-10-CM

## 2018-01-01 DIAGNOSIS — R62.7 FAILURE TO THRIVE IN ADULT: ICD-10-CM

## 2018-01-01 DIAGNOSIS — E86.0 DEHYDRATION: Primary | ICD-10-CM

## 2018-01-01 DIAGNOSIS — L97.519 ULCER OF RIGHT FIFTH TOE DUE TO DIABETES MELLITUS (HCC): ICD-10-CM

## 2018-01-01 DIAGNOSIS — N17.9 ACUTE RENAL FAILURE, UNSPECIFIED ACUTE RENAL FAILURE TYPE (HCC): ICD-10-CM

## 2018-01-01 DIAGNOSIS — E11.621 ULCER OF RIGHT FIFTH TOE DUE TO DIABETES MELLITUS (HCC): ICD-10-CM

## 2018-01-01 DIAGNOSIS — K31.84 GASTROPARESIS DIABETICORUM (HCC): Primary | ICD-10-CM

## 2018-01-01 DIAGNOSIS — E11.43 GASTROPARESIS DIABETICORUM (HCC): ICD-10-CM

## 2018-01-01 DIAGNOSIS — M86.271 SUBACUTE OSTEOMYELITIS OF RIGHT FOOT (HCC): Primary | ICD-10-CM

## 2018-01-01 DIAGNOSIS — E66.2 OBESITY HYPOVENTILATION SYNDROME (HCC): Primary | ICD-10-CM

## 2018-01-01 DIAGNOSIS — K31.84 GASTROPARESIS DIABETICORUM (HCC): ICD-10-CM

## 2018-01-01 DIAGNOSIS — E11.42 DIABETIC POLYNEUROPATHY ASSOCIATED WITH TYPE 2 DIABETES MELLITUS (HCC): ICD-10-CM

## 2018-01-01 DIAGNOSIS — I35.0 AORTIC STENOSIS, MILD: ICD-10-CM

## 2018-01-01 DIAGNOSIS — N17.9 AKI (ACUTE KIDNEY INJURY) (HCC): ICD-10-CM

## 2018-01-01 DIAGNOSIS — N28.9 ACUTE RENAL INSUFFICIENCY: ICD-10-CM

## 2018-01-01 DIAGNOSIS — R07.9 CHEST PAIN, UNSPECIFIED TYPE: ICD-10-CM

## 2018-01-01 DIAGNOSIS — E78.5 HYPERLIPIDEMIA, UNSPECIFIED HYPERLIPIDEMIA TYPE: ICD-10-CM

## 2018-01-01 DIAGNOSIS — N76.4 VULVAR ABSCESS: ICD-10-CM

## 2018-01-01 DIAGNOSIS — F33.2 SEVERE EPISODE OF RECURRENT MAJOR DEPRESSIVE DISORDER, WITHOUT PSYCHOTIC FEATURES (HCC): ICD-10-CM

## 2018-01-01 DIAGNOSIS — N30.00 ACUTE CYSTITIS WITHOUT HEMATURIA: ICD-10-CM

## 2018-01-01 LAB
ABO + RH BLD: NORMAL
ALBUMIN SERPL-MCNC: 2.2 G/DL (ref 3.5–5)
ALBUMIN SERPL-MCNC: 2.2 G/DL (ref 3.5–5)
ALBUMIN SERPL-MCNC: 2.3 G/DL (ref 3.5–5)
ALBUMIN SERPL-MCNC: 2.5 G/DL (ref 3.5–5)
ALBUMIN SERPL-MCNC: 2.6 G/DL (ref 3.5–5)
ALBUMIN SERPL-MCNC: 3.1 G/DL (ref 3.6–4.8)
ALBUMIN SERPL-MCNC: 3.2 G/DL (ref 3.9–5.4)
ALBUMIN SERPL-MCNC: 3.4 G/DL (ref 3.6–4.8)
ALBUMIN/GLOB SERPL: 0.5 {RATIO} (ref 1.1–2.2)
ALBUMIN/GLOB SERPL: 0.6 {RATIO} (ref 1.1–2.2)
ALBUMIN/GLOB SERPL: 0.7 {RATIO} (ref 1.1–2.2)
ALBUMIN/GLOB SERPL: 1.2 {RATIO} (ref 1.2–2.2)
ALBUMIN/GLOB SERPL: 1.2 {RATIO} (ref 1.2–2.2)
ALKALINE PHOS POC: 92 U/L (ref 38–126)
ALP SERPL-CCNC: 111 U/L (ref 45–117)
ALP SERPL-CCNC: 115 U/L (ref 45–117)
ALP SERPL-CCNC: 131 IU/L (ref 39–117)
ALP SERPL-CCNC: 131 IU/L (ref 39–117)
ALP SERPL-CCNC: 74 U/L (ref 45–117)
ALP SERPL-CCNC: 77 U/L (ref 45–117)
ALP SERPL-CCNC: 91 U/L (ref 45–117)
ALT SERPL-CCNC: 10 IU/L (ref 0–32)
ALT SERPL-CCNC: 10 IU/L (ref 0–32)
ALT SERPL-CCNC: 11 U/L (ref 12–78)
ALT SERPL-CCNC: 12 U/L (ref 12–78)
ALT SERPL-CCNC: 15 U/L (ref 12–78)
ALT SERPL-CCNC: 16 U/L (ref 12–78)
ALT SERPL-CCNC: 20 U/L (ref 12–78)
ALT SERPL-CCNC: 31 U/L (ref 9–52)
AMMONIA PLAS-SCNC: 20 UMOL/L
AMORPH CRY URNS QL MICRO: ABNORMAL
ANION GAP SERPL CALC-SCNC: 10 MMOL/L (ref 5–15)
ANION GAP SERPL CALC-SCNC: 14 MMOL/L (ref 5–15)
ANION GAP SERPL CALC-SCNC: 16 MMOL/L (ref 5–15)
ANION GAP SERPL CALC-SCNC: 5 MMOL/L (ref 5–15)
ANION GAP SERPL CALC-SCNC: 6 MMOL/L (ref 5–15)
ANION GAP SERPL CALC-SCNC: 7 MMOL/L (ref 5–15)
ANION GAP SERPL CALC-SCNC: 7 MMOL/L (ref 5–15)
ANION GAP SERPL CALC-SCNC: 8 MMOL/L (ref 5–15)
ANION GAP SERPL CALC-SCNC: 9 MMOL/L (ref 5–15)
APPEARANCE UR: ABNORMAL
APPEARANCE UR: CLEAR
ARTERIAL PATENCY WRIST A: ABNORMAL
ARTERIAL PATENCY WRIST A: YES
AST SERPL-CCNC: 10 U/L (ref 15–37)
AST SERPL-CCNC: 13 IU/L (ref 0–40)
AST SERPL-CCNC: 13 U/L (ref 14–36)
AST SERPL-CCNC: 13 U/L (ref 15–37)
AST SERPL-CCNC: 15 IU/L (ref 0–40)
AST SERPL-CCNC: 18 U/L (ref 15–37)
AST SERPL-CCNC: 23 U/L (ref 15–37)
AST SERPL-CCNC: 5 U/L (ref 15–37)
ATRIAL RATE: 79 BPM
ATRIAL RATE: 90 BPM
BACTERIA SPEC CULT: ABNORMAL
BACTERIA SPEC CULT: NORMAL
BACTERIA UA POCT, BACTPOCT: NORMAL
BACTERIA UR CULT: ABNORMAL
BACTERIA URNS QL MICRO: ABNORMAL /HPF
BACTERIA URNS QL MICRO: NEGATIVE /HPF
BASE DEFICIT BLDA-SCNC: 1.2 MMOL/L
BASE DEFICIT BLDA-SCNC: 6.6 MMOL/L
BASOPHILS # BLD: 0 K/UL (ref 0–0.1)
BASOPHILS # BLD: 0.1 K/UL (ref 0–0.1)
BASOPHILS NFR BLD: 0 % (ref 0–1)
BASOPHILS NFR BLD: 1 % (ref 0–1)
BDY SITE: ABNORMAL
BDY SITE: ABNORMAL
BILIRUB SERPL-MCNC: 0.3 MG/DL (ref 0.2–1)
BILIRUB SERPL-MCNC: 0.3 MG/DL (ref 0.2–1)
BILIRUB SERPL-MCNC: 0.3 MG/DL (ref 0–1.2)
BILIRUB SERPL-MCNC: 0.4 MG/DL (ref 0.2–1)
BILIRUB SERPL-MCNC: 0.4 MG/DL (ref 0.2–1)
BILIRUB SERPL-MCNC: 0.4 MG/DL (ref 0–1.2)
BILIRUB SERPL-MCNC: 0.5 MG/DL (ref 0.2–1)
BILIRUB UR QL CFM: NEGATIVE
BILIRUB UR QL STRIP: NEGATIVE
BILIRUB UR QL: NEGATIVE
BLD PROD TYP BPU: NORMAL
BLD PROD TYP BPU: NORMAL
BLOOD GROUP ANTIBODIES SERPL: NORMAL
BNP SERPL-MCNC: 3544 PG/ML (ref 0–125)
BPU ID: NORMAL
BPU ID: NORMAL
BREATHS.SPONTANEOUS ON VENT: 20
BREATHS.SPONTANEOUS ON VENT: 21
BUN BLD-MCNC: 32 MG/DL (ref 7–17)
BUN BLD-MCNC: 33 MG/DL (ref 7–17)
BUN BLD-MCNC: 34 MG/DL (ref 7–17)
BUN BLD-MCNC: 61 MG/DL (ref 7–17)
BUN SERPL-MCNC: 17 MG/DL (ref 8–27)
BUN SERPL-MCNC: 23 MG/DL (ref 6–20)
BUN SERPL-MCNC: 24 MG/DL (ref 6–20)
BUN SERPL-MCNC: 24 MG/DL (ref 8–27)
BUN SERPL-MCNC: 25 MG/DL (ref 6–20)
BUN SERPL-MCNC: 25 MG/DL (ref 6–20)
BUN SERPL-MCNC: 25 MG/DL (ref 8–27)
BUN SERPL-MCNC: 26 MG/DL (ref 6–20)
BUN SERPL-MCNC: 26 MG/DL (ref 6–20)
BUN SERPL-MCNC: 27 MG/DL (ref 6–20)
BUN SERPL-MCNC: 27 MG/DL (ref 6–20)
BUN SERPL-MCNC: 29 MG/DL (ref 6–20)
BUN SERPL-MCNC: 30 MG/DL (ref 6–20)
BUN SERPL-MCNC: 31 MG/DL (ref 6–20)
BUN SERPL-MCNC: 32 MG/DL (ref 6–20)
BUN SERPL-MCNC: 33 MG/DL (ref 6–20)
BUN SERPL-MCNC: 34 MG/DL (ref 6–20)
BUN SERPL-MCNC: 36 MG/DL (ref 6–20)
BUN SERPL-MCNC: 39 MG/DL (ref 6–20)
BUN SERPL-MCNC: 41 MG/DL (ref 6–20)
BUN SERPL-MCNC: 42 MG/DL (ref 6–20)
BUN SERPL-MCNC: 42 MG/DL (ref 6–20)
BUN SERPL-MCNC: 43 MG/DL (ref 6–20)
BUN SERPL-MCNC: 44 MG/DL (ref 6–20)
BUN SERPL-MCNC: 44 MG/DL (ref 6–20)
BUN SERPL-MCNC: 45 MG/DL (ref 6–20)
BUN SERPL-MCNC: 47 MG/DL (ref 6–20)
BUN SERPL-MCNC: 50 MG/DL (ref 6–20)
BUN SERPL-MCNC: 50 MG/DL (ref 6–20)
BUN SERPL-MCNC: 51 MG/DL (ref 6–20)
BUN SERPL-MCNC: 54 MG/DL (ref 6–20)
BUN/CREAT SERPL: 11 (ref 12–20)
BUN/CREAT SERPL: 12 (ref 12–20)
BUN/CREAT SERPL: 12 (ref 12–20)
BUN/CREAT SERPL: 13 (ref 12–20)
BUN/CREAT SERPL: 13 (ref 12–20)
BUN/CREAT SERPL: 14 (ref 12–20)
BUN/CREAT SERPL: 14 (ref 12–20)
BUN/CREAT SERPL: 14 (ref 12–28)
BUN/CREAT SERPL: 15 (ref 12–20)
BUN/CREAT SERPL: 15 (ref 12–20)
BUN/CREAT SERPL: 16 (ref 12–20)
BUN/CREAT SERPL: 17 (ref 12–20)
BUN/CREAT SERPL: 17 (ref 12–20)
BUN/CREAT SERPL: 18 (ref 12–28)
BUN/CREAT SERPL: 19 (ref 12–20)
BUN/CREAT SERPL: 20 (ref 12–20)
BUN/CREAT SERPL: 21 (ref 12–20)
BUN/CREAT SERPL: 21 (ref 12–28)
BUN/CREAT SERPL: 22 (ref 12–20)
BUN/CREAT SERPL: 23 (ref 12–20)
BUN/CREAT SERPL: 27 (ref 12–20)
BUN/CREAT SERPL: 28 (ref 12–20)
CALCIUM BLD-MCNC: 8.7 MG/DL (ref 8.4–10.2)
CALCIUM BLD-MCNC: 9 MG/DL (ref 8.4–10.2)
CALCIUM BLD-MCNC: 9.2 MG/DL (ref 8.4–10.2)
CALCIUM BLD-MCNC: 9.6 MG/DL (ref 8.4–10.2)
CALCIUM SERPL-MCNC: 7.2 MG/DL (ref 8.5–10.1)
CALCIUM SERPL-MCNC: 7.2 MG/DL (ref 8.5–10.1)
CALCIUM SERPL-MCNC: 7.4 MG/DL (ref 8.5–10.1)
CALCIUM SERPL-MCNC: 7.5 MG/DL (ref 8.5–10.1)
CALCIUM SERPL-MCNC: 7.6 MG/DL (ref 8.5–10.1)
CALCIUM SERPL-MCNC: 7.6 MG/DL (ref 8.5–10.1)
CALCIUM SERPL-MCNC: 7.8 MG/DL (ref 8.5–10.1)
CALCIUM SERPL-MCNC: 7.9 MG/DL (ref 8.5–10.1)
CALCIUM SERPL-MCNC: 8 MG/DL (ref 8.5–10.1)
CALCIUM SERPL-MCNC: 8.1 MG/DL (ref 8.5–10.1)
CALCIUM SERPL-MCNC: 8.1 MG/DL (ref 8.5–10.1)
CALCIUM SERPL-MCNC: 8.2 MG/DL (ref 8.5–10.1)
CALCIUM SERPL-MCNC: 8.3 MG/DL (ref 8.5–10.1)
CALCIUM SERPL-MCNC: 8.3 MG/DL (ref 8.7–10.3)
CALCIUM SERPL-MCNC: 8.4 MG/DL (ref 8.5–10.1)
CALCIUM SERPL-MCNC: 8.4 MG/DL (ref 8.5–10.1)
CALCIUM SERPL-MCNC: 8.5 MG/DL (ref 8.5–10.1)
CALCIUM SERPL-MCNC: 8.6 MG/DL (ref 8.5–10.1)
CALCIUM SERPL-MCNC: 8.6 MG/DL (ref 8.5–10.1)
CALCIUM SERPL-MCNC: 9.1 MG/DL (ref 8.7–10.3)
CALCIUM SERPL-MCNC: 9.1 MG/DL (ref 8.7–10.3)
CALCULATED P AXIS, ECG09: 42 DEGREES
CALCULATED P AXIS, ECG09: 69 DEGREES
CALCULATED R AXIS, ECG10: 10 DEGREES
CALCULATED R AXIS, ECG10: 16 DEGREES
CALCULATED T AXIS, ECG11: 156 DEGREES
CALCULATED T AXIS, ECG11: 50 DEGREES
CASTS UA POCT: 0
CC UR VC: ABNORMAL
CC UR VC: NORMAL
CHLORIDE BLD-SCNC: 105 MMOL/L (ref 98–107)
CHLORIDE BLD-SCNC: 108 MMOL/L (ref 98–107)
CHLORIDE BLD-SCNC: 108 MMOL/L (ref 98–107)
CHLORIDE BLD-SCNC: 109 MMOL/L (ref 98–107)
CHLORIDE SERPL-SCNC: 105 MMOL/L (ref 96–106)
CHLORIDE SERPL-SCNC: 107 MMOL/L (ref 96–106)
CHLORIDE SERPL-SCNC: 109 MMOL/L (ref 96–106)
CHLORIDE SERPL-SCNC: 111 MMOL/L (ref 97–108)
CHLORIDE SERPL-SCNC: 112 MMOL/L (ref 97–108)
CHLORIDE SERPL-SCNC: 113 MMOL/L (ref 97–108)
CHLORIDE SERPL-SCNC: 114 MMOL/L (ref 97–108)
CHLORIDE SERPL-SCNC: 115 MMOL/L (ref 97–108)
CHLORIDE SERPL-SCNC: 116 MMOL/L (ref 97–108)
CHLORIDE SERPL-SCNC: 117 MMOL/L (ref 97–108)
CHLORIDE SERPL-SCNC: 117 MMOL/L (ref 97–108)
CHLORIDE SERPL-SCNC: 118 MMOL/L (ref 97–108)
CHLORIDE SERPL-SCNC: 118 MMOL/L (ref 97–108)
CHLORIDE SERPL-SCNC: 119 MMOL/L (ref 97–108)
CHLORIDE SERPL-SCNC: 119 MMOL/L (ref 97–108)
CHLORIDE SERPL-SCNC: 120 MMOL/L (ref 97–108)
CHLORIDE SERPL-SCNC: 124 MMOL/L (ref 97–108)
CK MB CFR SERPL CALC: 5.3 % (ref 0–2.5)
CK MB SERPL-MCNC: 2.1 NG/ML (ref 5–25)
CK SERPL-CCNC: 40 U/L (ref 26–192)
CK SERPL-CCNC: 76 U/L (ref 26–192)
CLUE CELLS, CLUEPOCT: NEGATIVE
CO2 POC: 25 MMOL/L (ref 22–32)
CO2 POC: 25 MMOL/L (ref 22–32)
CO2 POC: 29 MMOL/L (ref 22–32)
CO2 POC: 31 MMOL/L (ref 22–32)
CO2 SERPL-SCNC: 15 MMOL/L (ref 21–32)
CO2 SERPL-SCNC: 16 MMOL/L (ref 21–32)
CO2 SERPL-SCNC: 17 MMOL/L (ref 21–32)
CO2 SERPL-SCNC: 18 MMOL/L (ref 21–32)
CO2 SERPL-SCNC: 18 MMOL/L (ref 21–32)
CO2 SERPL-SCNC: 19 MMOL/L (ref 20–29)
CO2 SERPL-SCNC: 19 MMOL/L (ref 20–29)
CO2 SERPL-SCNC: 19 MMOL/L (ref 21–32)
CO2 SERPL-SCNC: 20 MMOL/L (ref 21–32)
CO2 SERPL-SCNC: 21 MMOL/L (ref 21–32)
CO2 SERPL-SCNC: 22 MMOL/L (ref 21–32)
CO2 SERPL-SCNC: 23 MMOL/L (ref 21–32)
CO2 SERPL-SCNC: 24 MMOL/L (ref 21–32)
CO2 SERPL-SCNC: 25 MMOL/L (ref 20–29)
CO2 SERPL-SCNC: 25 MMOL/L (ref 21–32)
CO2 SERPL-SCNC: 26 MMOL/L (ref 21–32)
CO2 SERPL-SCNC: 28 MMOL/L (ref 21–32)
CO2 SERPL-SCNC: 28 MMOL/L (ref 21–32)
COLOR UR: ABNORMAL
COLOR UR: YELLOW
CREAT BLD-MCNC: 1.3 MG/DL (ref 0.7–1.2)
CREAT BLD-MCNC: 1.5 MG/DL (ref 0.7–1.2)
CREAT BLD-MCNC: 1.6 MG/DL (ref 0.7–1.2)
CREAT BLD-MCNC: 1.8 MG/DL (ref 0.7–1.2)
CREAT SERPL-MCNC: 1.08 MG/DL (ref 0.55–1.02)
CREAT SERPL-MCNC: 1.15 MG/DL (ref 0.55–1.02)
CREAT SERPL-MCNC: 1.16 MG/DL (ref 0.55–1.02)
CREAT SERPL-MCNC: 1.17 MG/DL (ref 0.57–1)
CREAT SERPL-MCNC: 1.21 MG/DL (ref 0.57–1)
CREAT SERPL-MCNC: 1.25 MG/DL (ref 0.55–1.02)
CREAT SERPL-MCNC: 1.26 MG/DL (ref 0.55–1.02)
CREAT SERPL-MCNC: 1.31 MG/DL (ref 0.55–1.02)
CREAT SERPL-MCNC: 1.31 MG/DL (ref 0.57–1)
CREAT SERPL-MCNC: 1.49 MG/DL (ref 0.55–1.02)
CREAT SERPL-MCNC: 1.66 MG/DL (ref 0.55–1.02)
CREAT SERPL-MCNC: 1.72 MG/DL (ref 0.55–1.02)
CREAT SERPL-MCNC: 1.78 MG/DL (ref 0.55–1.02)
CREAT SERPL-MCNC: 1.78 MG/DL (ref 0.55–1.02)
CREAT SERPL-MCNC: 1.84 MG/DL (ref 0.55–1.02)
CREAT SERPL-MCNC: 1.86 MG/DL (ref 0.55–1.02)
CREAT SERPL-MCNC: 1.87 MG/DL (ref 0.55–1.02)
CREAT SERPL-MCNC: 1.9 MG/DL (ref 0.55–1.02)
CREAT SERPL-MCNC: 1.91 MG/DL (ref 0.55–1.02)
CREAT SERPL-MCNC: 1.96 MG/DL (ref 0.55–1.02)
CREAT SERPL-MCNC: 1.99 MG/DL (ref 0.55–1.02)
CREAT SERPL-MCNC: 2.01 MG/DL (ref 0.55–1.02)
CREAT SERPL-MCNC: 2.1 MG/DL (ref 0.55–1.02)
CREAT SERPL-MCNC: 2.1 MG/DL (ref 0.55–1.02)
CREAT SERPL-MCNC: 2.2 MG/DL (ref 0.55–1.02)
CREAT SERPL-MCNC: 2.27 MG/DL (ref 0.55–1.02)
CREAT SERPL-MCNC: 2.27 MG/DL (ref 0.55–1.02)
CREAT SERPL-MCNC: 2.53 MG/DL (ref 0.55–1.02)
CREAT SERPL-MCNC: 2.75 MG/DL (ref 0.55–1.02)
CREAT SERPL-MCNC: 2.93 MG/DL (ref 0.55–1.02)
CREAT SERPL-MCNC: 2.96 MG/DL (ref 0.55–1.02)
CREAT SERPL-MCNC: 2.97 MG/DL (ref 0.55–1.02)
CREAT SERPL-MCNC: 2.99 MG/DL (ref 0.55–1.02)
CREAT SERPL-MCNC: 3.06 MG/DL (ref 0.55–1.02)
CREAT SERPL-MCNC: 3.06 MG/DL (ref 0.55–1.02)
CREAT UR-MCNC: 59.8 MG/DL
CREAT UR-MCNC: 86.1 MG/DL
CROSSMATCH RESULT,%XM: NORMAL
CROSSMATCH RESULT,%XM: NORMAL
CRYSTALS UA POCT, CRYSPOCT: NEGATIVE
D DIMER PPP FEU-MCNC: 2.2 MG/L FEU (ref 0–0.65)
DATE LAST DOSE: ABNORMAL
DATE LAST DOSE: ABNORMAL
DIAGNOSIS, 93000: NORMAL
DIAGNOSIS, 93000: NORMAL
DIFFERENTIAL METHOD BLD: ABNORMAL
EGFR (POC): 29.4
EGFR (POC): 33.7
EGFR (POC): 36.7
EGFR (POC): 43.6
EOSINOPHIL # BLD: 0.1 K/UL (ref 0–0.4)
EOSINOPHIL # BLD: 0.2 K/UL (ref 0–0.4)
EOSINOPHIL # BLD: 0.3 K/UL (ref 0–0.4)
EOSINOPHIL # BLD: 0.4 K/UL (ref 0–0.4)
EOSINOPHIL # BLD: 0.4 K/UL (ref 0–0.4)
EOSINOPHIL #/AREA URNS HPF: NEGATIVE /[HPF]
EOSINOPHIL NFR BLD: 1 % (ref 0–7)
EOSINOPHIL NFR BLD: 2 % (ref 0–7)
EOSINOPHIL NFR BLD: 3 % (ref 0–7)
EOSINOPHIL NFR BLD: 4 % (ref 0–7)
EOSINOPHIL NFR BLD: 4 % (ref 0–7)
EPITH CASTS URNS QL MICRO: ABNORMAL /LPF
EPITHELIAL CELLS POCT: NORMAL
ERYTHROCYTE [DISTWIDTH] IN BLOOD BY AUTOMATED COUNT: 14.5 % (ref 11.5–14.5)
ERYTHROCYTE [DISTWIDTH] IN BLOOD BY AUTOMATED COUNT: 14.9 % (ref 11.5–14.5)
ERYTHROCYTE [DISTWIDTH] IN BLOOD BY AUTOMATED COUNT: 14.9 % (ref 11.5–14.5)
ERYTHROCYTE [DISTWIDTH] IN BLOOD BY AUTOMATED COUNT: 15.4 % (ref 11.5–14.5)
ERYTHROCYTE [DISTWIDTH] IN BLOOD BY AUTOMATED COUNT: 15.5 % (ref 11.5–14.5)
ERYTHROCYTE [DISTWIDTH] IN BLOOD BY AUTOMATED COUNT: 15.8 % (ref 11.5–14.5)
ERYTHROCYTE [DISTWIDTH] IN BLOOD BY AUTOMATED COUNT: 15.8 % (ref 11.5–14.5)
ERYTHROCYTE [DISTWIDTH] IN BLOOD BY AUTOMATED COUNT: 15.9 % (ref 11.5–14.5)
ERYTHROCYTE [DISTWIDTH] IN BLOOD BY AUTOMATED COUNT: 16.7 % (ref 11.5–14.5)
ERYTHROCYTE [DISTWIDTH] IN BLOOD BY AUTOMATED COUNT: 16.7 % (ref 11.5–14.5)
ERYTHROCYTE [DISTWIDTH] IN BLOOD BY AUTOMATED COUNT: 16.8 % (ref 11.5–14.5)
ERYTHROCYTE [DISTWIDTH] IN BLOOD BY AUTOMATED COUNT: 16.9 % (ref 11.5–14.5)
ERYTHROCYTE [DISTWIDTH] IN BLOOD BY AUTOMATED COUNT: 17 % (ref 11.5–14.5)
ERYTHROCYTE [DISTWIDTH] IN BLOOD BY AUTOMATED COUNT: 17 % (ref 11.5–14.5)
ERYTHROCYTE [DISTWIDTH] IN BLOOD BY AUTOMATED COUNT: 17.1 % (ref 11.5–14.5)
ERYTHROCYTE [DISTWIDTH] IN BLOOD BY AUTOMATED COUNT: 17.1 % (ref 11.5–14.5)
ERYTHROCYTE [DISTWIDTH] IN BLOOD BY AUTOMATED COUNT: 17.2 % (ref 11.5–14.5)
ERYTHROCYTE [DISTWIDTH] IN BLOOD BY AUTOMATED COUNT: 17.3 % (ref 11.5–14.5)
ERYTHROCYTE [DISTWIDTH] IN BLOOD BY AUTOMATED COUNT: 17.3 % (ref 11.5–14.5)
ERYTHROCYTE [DISTWIDTH] IN BLOOD BY AUTOMATED COUNT: 17.4 % (ref 11.5–14.5)
ERYTHROCYTE [SEDIMENTATION RATE] IN BLOOD: 61 MM/HR (ref 0–30)
ERYTHROCYTE [SEDIMENTATION RATE] IN BLOOD: 77 MM/HR (ref 0–30)
EST. AVERAGE GLUCOSE BLD GHB EST-MCNC: 252 MG/DL
FIO2 ON VENT: 21 %
FLUAV AG NPH QL IA: NEGATIVE
FLUBV AG NOSE QL IA: NEGATIVE
GAS FLOW.O2 O2 DELIVERY SYS: 4 L/MIN
GLOBULIN SER CALC-MCNC: 2.6 G/DL (ref 1.5–4.5)
GLOBULIN SER CALC-MCNC: 2.9 G/DL (ref 1.5–4.5)
GLOBULIN SER CALC-MCNC: 3.1 G/DL (ref 2–4)
GLOBULIN SER CALC-MCNC: 3.4 G/DL (ref 2–4)
GLOBULIN SER CALC-MCNC: 3.7 G/DL (ref 2–4)
GLOBULIN SER CALC-MCNC: 4.2 G/DL (ref 2–4)
GLOBULIN SER CALC-MCNC: 4.3 G/DL (ref 2–4)
GLUCOSE BLD STRIP.AUTO-MCNC: 108 MG/DL (ref 65–100)
GLUCOSE BLD STRIP.AUTO-MCNC: 111 MG/DL (ref 65–100)
GLUCOSE BLD STRIP.AUTO-MCNC: 113 MG/DL (ref 65–100)
GLUCOSE BLD STRIP.AUTO-MCNC: 114 MG/DL (ref 65–100)
GLUCOSE BLD STRIP.AUTO-MCNC: 115 MG/DL (ref 65–100)
GLUCOSE BLD STRIP.AUTO-MCNC: 118 MG/DL (ref 65–100)
GLUCOSE BLD STRIP.AUTO-MCNC: 120 MG/DL (ref 65–100)
GLUCOSE BLD STRIP.AUTO-MCNC: 124 MG/DL (ref 65–100)
GLUCOSE BLD STRIP.AUTO-MCNC: 126 MG/DL (ref 65–100)
GLUCOSE BLD STRIP.AUTO-MCNC: 128 MG/DL (ref 65–100)
GLUCOSE BLD STRIP.AUTO-MCNC: 132 MG/DL (ref 65–100)
GLUCOSE BLD STRIP.AUTO-MCNC: 133 MG/DL (ref 65–100)
GLUCOSE BLD STRIP.AUTO-MCNC: 134 MG/DL (ref 65–100)
GLUCOSE BLD STRIP.AUTO-MCNC: 135 MG/DL (ref 65–100)
GLUCOSE BLD STRIP.AUTO-MCNC: 136 MG/DL (ref 65–100)
GLUCOSE BLD STRIP.AUTO-MCNC: 136 MG/DL (ref 65–100)
GLUCOSE BLD STRIP.AUTO-MCNC: 137 MG/DL (ref 65–100)
GLUCOSE BLD STRIP.AUTO-MCNC: 139 MG/DL (ref 65–100)
GLUCOSE BLD STRIP.AUTO-MCNC: 140 MG/DL (ref 65–100)
GLUCOSE BLD STRIP.AUTO-MCNC: 141 MG/DL (ref 65–100)
GLUCOSE BLD STRIP.AUTO-MCNC: 141 MG/DL (ref 65–100)
GLUCOSE BLD STRIP.AUTO-MCNC: 143 MG/DL (ref 65–100)
GLUCOSE BLD STRIP.AUTO-MCNC: 144 MG/DL (ref 65–100)
GLUCOSE BLD STRIP.AUTO-MCNC: 146 MG/DL (ref 65–100)
GLUCOSE BLD STRIP.AUTO-MCNC: 148 MG/DL (ref 65–100)
GLUCOSE BLD STRIP.AUTO-MCNC: 149 MG/DL (ref 65–100)
GLUCOSE BLD STRIP.AUTO-MCNC: 149 MG/DL (ref 65–100)
GLUCOSE BLD STRIP.AUTO-MCNC: 151 MG/DL (ref 65–100)
GLUCOSE BLD STRIP.AUTO-MCNC: 151 MG/DL (ref 65–100)
GLUCOSE BLD STRIP.AUTO-MCNC: 154 MG/DL (ref 65–100)
GLUCOSE BLD STRIP.AUTO-MCNC: 155 MG/DL (ref 65–100)
GLUCOSE BLD STRIP.AUTO-MCNC: 156 MG/DL (ref 65–100)
GLUCOSE BLD STRIP.AUTO-MCNC: 156 MG/DL (ref 65–100)
GLUCOSE BLD STRIP.AUTO-MCNC: 158 MG/DL (ref 65–100)
GLUCOSE BLD STRIP.AUTO-MCNC: 159 MG/DL (ref 65–100)
GLUCOSE BLD STRIP.AUTO-MCNC: 161 MG/DL (ref 65–100)
GLUCOSE BLD STRIP.AUTO-MCNC: 161 MG/DL (ref 65–100)
GLUCOSE BLD STRIP.AUTO-MCNC: 163 MG/DL (ref 65–100)
GLUCOSE BLD STRIP.AUTO-MCNC: 166 MG/DL (ref 65–100)
GLUCOSE BLD STRIP.AUTO-MCNC: 167 MG/DL (ref 65–100)
GLUCOSE BLD STRIP.AUTO-MCNC: 167 MG/DL (ref 65–100)
GLUCOSE BLD STRIP.AUTO-MCNC: 168 MG/DL (ref 65–100)
GLUCOSE BLD STRIP.AUTO-MCNC: 168 MG/DL (ref 65–100)
GLUCOSE BLD STRIP.AUTO-MCNC: 169 MG/DL (ref 65–100)
GLUCOSE BLD STRIP.AUTO-MCNC: 169 MG/DL (ref 65–100)
GLUCOSE BLD STRIP.AUTO-MCNC: 170 MG/DL (ref 65–100)
GLUCOSE BLD STRIP.AUTO-MCNC: 171 MG/DL (ref 65–100)
GLUCOSE BLD STRIP.AUTO-MCNC: 171 MG/DL (ref 65–100)
GLUCOSE BLD STRIP.AUTO-MCNC: 173 MG/DL (ref 65–100)
GLUCOSE BLD STRIP.AUTO-MCNC: 176 MG/DL (ref 65–100)
GLUCOSE BLD STRIP.AUTO-MCNC: 178 MG/DL (ref 65–100)
GLUCOSE BLD STRIP.AUTO-MCNC: 179 MG/DL (ref 65–100)
GLUCOSE BLD STRIP.AUTO-MCNC: 180 MG/DL (ref 65–100)
GLUCOSE BLD STRIP.AUTO-MCNC: 181 MG/DL (ref 65–100)
GLUCOSE BLD STRIP.AUTO-MCNC: 183 MG/DL (ref 65–100)
GLUCOSE BLD STRIP.AUTO-MCNC: 185 MG/DL (ref 65–100)
GLUCOSE BLD STRIP.AUTO-MCNC: 186 MG/DL (ref 65–100)
GLUCOSE BLD STRIP.AUTO-MCNC: 188 MG/DL (ref 65–100)
GLUCOSE BLD STRIP.AUTO-MCNC: 192 MG/DL (ref 65–100)
GLUCOSE BLD STRIP.AUTO-MCNC: 194 MG/DL (ref 65–100)
GLUCOSE BLD STRIP.AUTO-MCNC: 195 MG/DL (ref 65–100)
GLUCOSE BLD STRIP.AUTO-MCNC: 196 MG/DL (ref 65–100)
GLUCOSE BLD STRIP.AUTO-MCNC: 198 MG/DL (ref 65–100)
GLUCOSE BLD STRIP.AUTO-MCNC: 201 MG/DL (ref 65–100)
GLUCOSE BLD STRIP.AUTO-MCNC: 202 MG/DL (ref 65–100)
GLUCOSE BLD STRIP.AUTO-MCNC: 203 MG/DL (ref 65–100)
GLUCOSE BLD STRIP.AUTO-MCNC: 205 MG/DL (ref 65–100)
GLUCOSE BLD STRIP.AUTO-MCNC: 206 MG/DL (ref 65–100)
GLUCOSE BLD STRIP.AUTO-MCNC: 208 MG/DL (ref 65–100)
GLUCOSE BLD STRIP.AUTO-MCNC: 208 MG/DL (ref 65–100)
GLUCOSE BLD STRIP.AUTO-MCNC: 209 MG/DL (ref 65–100)
GLUCOSE BLD STRIP.AUTO-MCNC: 210 MG/DL (ref 65–100)
GLUCOSE BLD STRIP.AUTO-MCNC: 211 MG/DL (ref 65–100)
GLUCOSE BLD STRIP.AUTO-MCNC: 212 MG/DL (ref 65–100)
GLUCOSE BLD STRIP.AUTO-MCNC: 213 MG/DL (ref 65–100)
GLUCOSE BLD STRIP.AUTO-MCNC: 214 MG/DL (ref 65–100)
GLUCOSE BLD STRIP.AUTO-MCNC: 215 MG/DL (ref 65–100)
GLUCOSE BLD STRIP.AUTO-MCNC: 217 MG/DL (ref 65–100)
GLUCOSE BLD STRIP.AUTO-MCNC: 219 MG/DL (ref 65–100)
GLUCOSE BLD STRIP.AUTO-MCNC: 221 MG/DL (ref 65–100)
GLUCOSE BLD STRIP.AUTO-MCNC: 222 MG/DL (ref 65–100)
GLUCOSE BLD STRIP.AUTO-MCNC: 223 MG/DL (ref 65–100)
GLUCOSE BLD STRIP.AUTO-MCNC: 223 MG/DL (ref 65–100)
GLUCOSE BLD STRIP.AUTO-MCNC: 229 MG/DL (ref 65–100)
GLUCOSE BLD STRIP.AUTO-MCNC: 231 MG/DL (ref 65–100)
GLUCOSE BLD STRIP.AUTO-MCNC: 232 MG/DL (ref 65–100)
GLUCOSE BLD STRIP.AUTO-MCNC: 233 MG/DL (ref 65–100)
GLUCOSE BLD STRIP.AUTO-MCNC: 239 MG/DL (ref 65–100)
GLUCOSE BLD STRIP.AUTO-MCNC: 241 MG/DL (ref 65–100)
GLUCOSE BLD STRIP.AUTO-MCNC: 248 MG/DL (ref 65–100)
GLUCOSE BLD STRIP.AUTO-MCNC: 256 MG/DL (ref 65–100)
GLUCOSE BLD STRIP.AUTO-MCNC: 256 MG/DL (ref 65–100)
GLUCOSE BLD STRIP.AUTO-MCNC: 257 MG/DL (ref 65–100)
GLUCOSE BLD STRIP.AUTO-MCNC: 257 MG/DL (ref 65–100)
GLUCOSE BLD STRIP.AUTO-MCNC: 258 MG/DL (ref 65–100)
GLUCOSE BLD STRIP.AUTO-MCNC: 263 MG/DL (ref 65–100)
GLUCOSE BLD STRIP.AUTO-MCNC: 263 MG/DL (ref 65–100)
GLUCOSE BLD STRIP.AUTO-MCNC: 271 MG/DL (ref 65–100)
GLUCOSE BLD STRIP.AUTO-MCNC: 275 MG/DL (ref 65–100)
GLUCOSE BLD STRIP.AUTO-MCNC: 277 MG/DL (ref 65–100)
GLUCOSE BLD STRIP.AUTO-MCNC: 280 MG/DL (ref 65–100)
GLUCOSE BLD STRIP.AUTO-MCNC: 284 MG/DL (ref 65–100)
GLUCOSE BLD STRIP.AUTO-MCNC: 287 MG/DL (ref 65–100)
GLUCOSE BLD STRIP.AUTO-MCNC: 66 MG/DL (ref 65–100)
GLUCOSE BLD STRIP.AUTO-MCNC: 69 MG/DL (ref 65–100)
GLUCOSE BLD STRIP.AUTO-MCNC: 72 MG/DL (ref 65–100)
GLUCOSE BLD STRIP.AUTO-MCNC: 73 MG/DL (ref 65–100)
GLUCOSE BLD STRIP.AUTO-MCNC: 73 MG/DL (ref 65–100)
GLUCOSE BLD STRIP.AUTO-MCNC: 74 MG/DL (ref 65–100)
GLUCOSE BLD STRIP.AUTO-MCNC: 78 MG/DL (ref 65–100)
GLUCOSE BLD STRIP.AUTO-MCNC: 79 MG/DL (ref 65–100)
GLUCOSE BLD STRIP.AUTO-MCNC: 80 MG/DL (ref 65–100)
GLUCOSE BLD STRIP.AUTO-MCNC: 81 MG/DL (ref 65–100)
GLUCOSE BLD STRIP.AUTO-MCNC: 82 MG/DL (ref 65–100)
GLUCOSE BLD STRIP.AUTO-MCNC: 83 MG/DL (ref 65–100)
GLUCOSE BLD STRIP.AUTO-MCNC: 84 MG/DL (ref 65–100)
GLUCOSE BLD STRIP.AUTO-MCNC: 84 MG/DL (ref 65–100)
GLUCOSE BLD STRIP.AUTO-MCNC: 85 MG/DL (ref 65–100)
GLUCOSE BLD STRIP.AUTO-MCNC: 92 MG/DL (ref 65–100)
GLUCOSE BLD STRIP.AUTO-MCNC: 92 MG/DL (ref 65–100)
GLUCOSE BLD STRIP.AUTO-MCNC: 95 MG/DL (ref 65–100)
GLUCOSE BLD STRIP.AUTO-MCNC: 95 MG/DL (ref 65–100)
GLUCOSE POC: 134 MG/DL (ref 65–105)
GLUCOSE POC: 149 MG/DL (ref 65–105)
GLUCOSE POC: 289 MG/DL (ref 65–105)
GLUCOSE POC: 332 MG/DL (ref 65–105)
GLUCOSE SERPL-MCNC: 109 MG/DL (ref 65–99)
GLUCOSE SERPL-MCNC: 122 MG/DL (ref 65–100)
GLUCOSE SERPL-MCNC: 126 MG/DL (ref 65–100)
GLUCOSE SERPL-MCNC: 126 MG/DL (ref 65–100)
GLUCOSE SERPL-MCNC: 134 MG/DL (ref 65–99)
GLUCOSE SERPL-MCNC: 135 MG/DL (ref 65–100)
GLUCOSE SERPL-MCNC: 137 MG/DL (ref 65–100)
GLUCOSE SERPL-MCNC: 143 MG/DL (ref 65–100)
GLUCOSE SERPL-MCNC: 145 MG/DL (ref 65–100)
GLUCOSE SERPL-MCNC: 149 MG/DL (ref 65–100)
GLUCOSE SERPL-MCNC: 150 MG/DL (ref 65–99)
GLUCOSE SERPL-MCNC: 151 MG/DL (ref 65–100)
GLUCOSE SERPL-MCNC: 152 MG/DL (ref 65–100)
GLUCOSE SERPL-MCNC: 156 MG/DL (ref 65–100)
GLUCOSE SERPL-MCNC: 156 MG/DL (ref 65–100)
GLUCOSE SERPL-MCNC: 161 MG/DL (ref 65–100)
GLUCOSE SERPL-MCNC: 162 MG/DL (ref 65–100)
GLUCOSE SERPL-MCNC: 164 MG/DL (ref 65–100)
GLUCOSE SERPL-MCNC: 167 MG/DL (ref 65–100)
GLUCOSE SERPL-MCNC: 169 MG/DL (ref 65–100)
GLUCOSE SERPL-MCNC: 176 MG/DL (ref 65–100)
GLUCOSE SERPL-MCNC: 183 MG/DL (ref 65–100)
GLUCOSE SERPL-MCNC: 194 MG/DL (ref 65–100)
GLUCOSE SERPL-MCNC: 194 MG/DL (ref 65–100)
GLUCOSE SERPL-MCNC: 201 MG/DL (ref 65–100)
GLUCOSE SERPL-MCNC: 206 MG/DL (ref 65–100)
GLUCOSE SERPL-MCNC: 214 MG/DL (ref 65–100)
GLUCOSE SERPL-MCNC: 216 MG/DL (ref 65–100)
GLUCOSE SERPL-MCNC: 233 MG/DL (ref 65–100)
GLUCOSE SERPL-MCNC: 238 MG/DL (ref 65–100)
GLUCOSE SERPL-MCNC: 239 MG/DL (ref 65–100)
GLUCOSE SERPL-MCNC: 269 MG/DL (ref 65–100)
GLUCOSE SERPL-MCNC: 67 MG/DL (ref 65–100)
GLUCOSE SERPL-MCNC: 75 MG/DL (ref 65–100)
GLUCOSE SERPL-MCNC: 91 MG/DL (ref 65–100)
GLUCOSE UR STRIP.AUTO-MCNC: 250 MG/DL
GLUCOSE UR STRIP.AUTO-MCNC: NEGATIVE MG/DL
GLUCOSE UR STRIP.AUTO-MCNC: NEGATIVE MG/DL
GLUCOSE UR-MCNC: NORMAL MG/DL
GRAM STN SPEC: ABNORMAL
GRAN CASTS URNS QL MICRO: ABNORMAL /LPF
GRAN# POC: 6.1 K/UL (ref 2–7.8)
GRAN# POC: 6.7 K/UL (ref 2–7.8)
GRAN# POC: 7.2 K/UL (ref 2–7.8)
GRAN# POC: 8.3 K/UL (ref 2–7.8)
GRAN# POC: 8.5 K/UL (ref 2–7.8)
GRAN% POC: 83.4 % (ref 37–92)
GRAN% POC: 84.6 % (ref 37–92)
GRAN% POC: 84.7 % (ref 37–92)
GRAN% POC: 85.6 % (ref 37–92)
GRAN% POC: 86.5 % (ref 37–92)
HBA1C MFR BLD HPLC: 6.4 % (ref 4.5–5.7)
HBA1C MFR BLD: 10.4 % (ref 4.2–6.3)
HCO3 BLDA-SCNC: 18 MMOL/L (ref 22–26)
HCO3 BLDA-SCNC: 23 MMOL/L (ref 22–26)
HCT VFR BLD AUTO: 23.8 % (ref 35–47)
HCT VFR BLD AUTO: 25 % (ref 35–47)
HCT VFR BLD AUTO: 27.1 % (ref 35–47)
HCT VFR BLD AUTO: 27.2 % (ref 35–47)
HCT VFR BLD AUTO: 28 % (ref 35–47)
HCT VFR BLD AUTO: 28.3 % (ref 35–47)
HCT VFR BLD AUTO: 28.7 % (ref 35–47)
HCT VFR BLD AUTO: 28.7 % (ref 35–47)
HCT VFR BLD AUTO: 28.8 % (ref 35–47)
HCT VFR BLD AUTO: 28.8 % (ref 35–47)
HCT VFR BLD AUTO: 30.1 % (ref 35–47)
HCT VFR BLD AUTO: 30.1 % (ref 35–47)
HCT VFR BLD AUTO: 30.3 % (ref 35–47)
HCT VFR BLD AUTO: 30.7 % (ref 35–47)
HCT VFR BLD AUTO: 31 % (ref 35–47)
HCT VFR BLD AUTO: 31 % (ref 35–47)
HCT VFR BLD AUTO: 31.2 % (ref 35–47)
HCT VFR BLD AUTO: 31.5 % (ref 35–47)
HCT VFR BLD AUTO: 33.1 % (ref 35–47)
HCT VFR BLD AUTO: 33.9 % (ref 35–47)
HCT VFR BLD AUTO: 33.9 % (ref 35–47)
HCT VFR BLD AUTO: 34 % (ref 35–47)
HCT VFR BLD AUTO: 34 % (ref 35–47)
HCT VFR BLD AUTO: 34.9 % (ref 35–47)
HCT VFR BLD AUTO: 35.5 % (ref 35–47)
HCT VFR BLD AUTO: 42.8 % (ref 35–47)
HCT VFR BLD AUTO: 43.1 % (ref 35–47)
HCT VFR BLD CALC: 26.7 % (ref 37–51)
HCT VFR BLD CALC: 28.1 % (ref 37–51)
HCT VFR BLD CALC: 28.6 % (ref 37–51)
HCT VFR BLD CALC: 30.1 % (ref 37–51)
HCT VFR BLD CALC: 35.7 % (ref 37–51)
HGB BLD-MCNC: 10.1 G/DL (ref 11.5–16)
HGB BLD-MCNC: 10.6 G/DL (ref 11.5–16)
HGB BLD-MCNC: 10.6 G/DL (ref 11.5–16)
HGB BLD-MCNC: 10.7 G/DL (ref 11.5–16)
HGB BLD-MCNC: 10.8 G/DL (ref 11.5–16)
HGB BLD-MCNC: 10.9 G/DL (ref 11.5–16)
HGB BLD-MCNC: 10.9 G/DL (ref 11.5–16)
HGB BLD-MCNC: 11.4 G/DL (ref 12–18)
HGB BLD-MCNC: 12.9 G/DL (ref 11.5–16)
HGB BLD-MCNC: 13.8 G/DL (ref 11.5–16)
HGB BLD-MCNC: 7.1 G/DL (ref 11.5–16)
HGB BLD-MCNC: 7.4 G/DL (ref 11.5–16)
HGB BLD-MCNC: 8.1 G/DL (ref 11.5–16)
HGB BLD-MCNC: 8.2 G/DL (ref 11.5–16)
HGB BLD-MCNC: 8.4 G/DL (ref 11.5–16)
HGB BLD-MCNC: 8.5 G/DL (ref 11.5–16)
HGB BLD-MCNC: 8.6 G/DL (ref 11.5–16)
HGB BLD-MCNC: 8.6 G/DL (ref 12–18)
HGB BLD-MCNC: 8.7 G/DL (ref 11.5–16)
HGB BLD-MCNC: 9 G/DL (ref 11.5–16)
HGB BLD-MCNC: 9 G/DL (ref 12–18)
HGB BLD-MCNC: 9.1 G/DL (ref 11.5–16)
HGB BLD-MCNC: 9.2 G/DL (ref 11.5–16)
HGB BLD-MCNC: 9.3 G/DL (ref 12–18)
HGB BLD-MCNC: 9.5 G/DL (ref 11.5–16)
HGB BLD-MCNC: 9.6 G/DL (ref 11.5–16)
HGB BLD-MCNC: 9.6 G/DL (ref 12–18)
HGB UR QL STRIP: ABNORMAL
HGB UR QL STRIP: NEGATIVE
IMM GRANULOCYTES # BLD: 0 K/UL (ref 0–0.04)
IMM GRANULOCYTES # BLD: 0.1 K/UL (ref 0–0.04)
IMM GRANULOCYTES # BLD: 0.2 K/UL (ref 0–0.04)
IMM GRANULOCYTES NFR BLD AUTO: 0 % (ref 0–0.5)
IMM GRANULOCYTES NFR BLD AUTO: 1 % (ref 0–0.5)
IMM GRANULOCYTES NFR BLD AUTO: 2 % (ref 0–0.5)
KETONES P FAST UR STRIP-MCNC: NORMAL MG/DL
KETONES UR QL STRIP.AUTO: 15 MG/DL
KETONES UR QL STRIP.AUTO: ABNORMAL MG/DL
KETONES UR QL STRIP.AUTO: NEGATIVE MG/DL
LACTATE SERPL-SCNC: 0.7 MMOL/L (ref 0.4–2)
LACTATE SERPL-SCNC: 0.8 MMOL/L (ref 0.4–2)
LACTATE SERPL-SCNC: 1.1 MMOL/L (ref 0.4–2)
LEUKOCYTE ESTERASE UR QL STRIP.AUTO: ABNORMAL
LEUKOCYTE ESTERASE UR QL STRIP.AUTO: NEGATIVE
LY# POC: 0.7 K/UL (ref 0.6–4.1)
LY# POC: 0.8 K/UL (ref 0.6–4.1)
LY# POC: 0.9 K/UL (ref 0.6–4.1)
LY% POC: 10 % (ref 10–58.5)
LY% POC: 10.1 % (ref 10–58.5)
LY% POC: 10.2 % (ref 10–58.5)
LY% POC: 10.5 % (ref 10–58.5)
LY% POC: 8 % (ref 10–58.5)
LYMPHOCYTES # BLD: 0.4 K/UL (ref 0.8–3.5)
LYMPHOCYTES # BLD: 0.7 K/UL (ref 0.8–3.5)
LYMPHOCYTES # BLD: 0.7 K/UL (ref 0.8–3.5)
LYMPHOCYTES # BLD: 0.8 K/UL (ref 0.8–3.5)
LYMPHOCYTES # BLD: 0.8 K/UL (ref 0.8–3.5)
LYMPHOCYTES NFR BLD: 6 % (ref 12–49)
LYMPHOCYTES NFR BLD: 6 % (ref 12–49)
LYMPHOCYTES NFR BLD: 7 % (ref 12–49)
LYMPHOCYTES NFR BLD: 8 % (ref 12–49)
LYMPHOCYTES NFR BLD: 9 % (ref 12–49)
MAGNESIUM SERPL-MCNC: 1.3 MG/DL (ref 1.6–2.4)
MAGNESIUM SERPL-MCNC: 1.8 MG/DL (ref 1.6–2.4)
MCH RBC QN AUTO: 28.4 PG (ref 26–34)
MCH RBC QN AUTO: 28.5 PG (ref 26–34)
MCH RBC QN AUTO: 28.7 PG (ref 26–34)
MCH RBC QN AUTO: 28.7 PG (ref 26–34)
MCH RBC QN AUTO: 28.8 PG (ref 26–34)
MCH RBC QN AUTO: 28.9 PG (ref 26–34)
MCH RBC QN AUTO: 29 PG (ref 26–34)
MCH RBC QN AUTO: 29.1 PG (ref 26–34)
MCH RBC QN AUTO: 29.2 PG (ref 26–34)
MCH RBC QN AUTO: 29.2 PG (ref 26–34)
MCH RBC QN AUTO: 29.3 PG (ref 26–34)
MCH RBC QN AUTO: 29.4 PG (ref 26–34)
MCH RBC QN AUTO: 29.5 PG (ref 26–34)
MCH RBC QN: 28.7 PG (ref 26–32)
MCH RBC QN: 28.7 PG (ref 26–32)
MCH RBC QN: 29.3 PG (ref 26–32)
MCH RBC QN: 29.5 PG (ref 26–32)
MCH RBC QN: 29.5 PG (ref 26–32)
MCHC RBC AUTO-ENTMCNC: 29.2 G/DL (ref 30–36.5)
MCHC RBC AUTO-ENTMCNC: 29.4 G/DL (ref 30–36.5)
MCHC RBC AUTO-ENTMCNC: 29.6 G/DL (ref 30–36.5)
MCHC RBC AUTO-ENTMCNC: 29.7 G/DL (ref 30–36.5)
MCHC RBC AUTO-ENTMCNC: 29.8 G/DL (ref 30–36.5)
MCHC RBC AUTO-ENTMCNC: 29.9 G/DL (ref 30–36.5)
MCHC RBC AUTO-ENTMCNC: 30 G/DL (ref 30–36.5)
MCHC RBC AUTO-ENTMCNC: 30 G/DL (ref 30–36.5)
MCHC RBC AUTO-ENTMCNC: 30.1 G/DL (ref 30–36.5)
MCHC RBC AUTO-ENTMCNC: 30.2 G/DL (ref 30–36.5)
MCHC RBC AUTO-ENTMCNC: 30.2 G/DL (ref 30–36.5)
MCHC RBC AUTO-ENTMCNC: 30.3 G/DL (ref 30–36.5)
MCHC RBC AUTO-ENTMCNC: 30.4 G/DL (ref 30–36.5)
MCHC RBC AUTO-ENTMCNC: 30.5 G/DL (ref 30–36.5)
MCHC RBC AUTO-ENTMCNC: 30.6 G/DL (ref 30–36.5)
MCHC RBC AUTO-ENTMCNC: 30.7 G/DL (ref 30–36.5)
MCHC RBC AUTO-ENTMCNC: 31 G/DL (ref 30–36.5)
MCHC RBC AUTO-ENTMCNC: 31.2 G/DL (ref 30–36.5)
MCHC RBC AUTO-ENTMCNC: 31.2 G/DL (ref 30–36.5)
MCHC RBC AUTO-ENTMCNC: 31.3 G/DL (ref 30–36.5)
MCHC RBC AUTO-ENTMCNC: 31.6 G/DL (ref 30–36.5)
MCHC RBC AUTO-ENTMCNC: 31.8 G/DL (ref 30–36.5)
MCHC RBC AUTO-ENTMCNC: 32.2 G/DL (ref 30–36.5)
MCHC RBC-ENTMCNC: 31.8 G/DL (ref 30–36)
MCHC RBC-ENTMCNC: 31.8 G/DL (ref 30–36)
MCHC RBC-ENTMCNC: 31.9 G/DL (ref 30–36)
MCHC RBC-ENTMCNC: 32.4 G/DL (ref 30–36)
MCHC RBC-ENTMCNC: 32.5 G/DL (ref 30–36)
MCV RBC AUTO: 90.7 FL (ref 80–99)
MCV RBC AUTO: 92.1 FL (ref 80–99)
MCV RBC AUTO: 92.4 FL (ref 80–99)
MCV RBC AUTO: 92.6 FL (ref 80–99)
MCV RBC AUTO: 93.2 FL (ref 80–99)
MCV RBC AUTO: 93.3 FL (ref 80–99)
MCV RBC AUTO: 94.2 FL (ref 80–99)
MCV RBC AUTO: 94.4 FL (ref 80–99)
MCV RBC AUTO: 94.9 FL (ref 80–99)
MCV RBC AUTO: 95.2 FL (ref 80–99)
MCV RBC AUTO: 95.4 FL (ref 80–99)
MCV RBC AUTO: 95.8 FL (ref 80–99)
MCV RBC AUTO: 96.1 FL (ref 80–99)
MCV RBC AUTO: 96.3 FL (ref 80–99)
MCV RBC AUTO: 96.3 FL (ref 80–99)
MCV RBC AUTO: 96.4 FL (ref 80–99)
MCV RBC AUTO: 96.5 FL (ref 80–99)
MCV RBC AUTO: 96.5 FL (ref 80–99)
MCV RBC AUTO: 96.6 FL (ref 80–99)
MCV RBC AUTO: 96.9 FL (ref 80–99)
MCV RBC AUTO: 97 FL (ref 80–99)
MCV RBC AUTO: 97.1 FL (ref 80–99)
MCV RBC AUTO: 97.8 FL (ref 80–99)
MCV RBC AUTO: 98 FL (ref 80–99)
MCV RBC AUTO: 98 FL (ref 80–99)
MCV RBC AUTO: 98.4 FL (ref 80–99)
MCV RBC AUTO: 98.4 FL (ref 80–99)
MCV RBC: 90 FL (ref 80–97)
MCV RBC: 90 FL (ref 80–97)
MCV RBC: 91 FL (ref 80–97)
MCV RBC: 91 FL (ref 80–97)
MCV RBC: 92 FL (ref 80–97)
MID #, POC: 0.3 K/UL (ref 0–1.8)
MID #, POC: 0.3 K/UL (ref 0–1.8)
MID #, POC: 0.5 K/UL (ref 0–1.8)
MID% POC: 3.9 % (ref 0.1–24)
MID% POC: 5.2 % (ref 0.1–24)
MID% POC: 5.2 % (ref 0.1–24)
MID% POC: 5.5 % (ref 0.1–24)
MID% POC: 6.6 % (ref 0.1–24)
MONOCYTES # BLD: 0.4 K/UL (ref 0–1)
MONOCYTES # BLD: 0.7 K/UL (ref 0–1)
MONOCYTES # BLD: 0.8 K/UL (ref 0–1)
MONOCYTES # BLD: 0.8 K/UL (ref 0–1)
MONOCYTES # BLD: 1.5 K/UL (ref 0–1)
MONOCYTES NFR BLD: 10 % (ref 5–13)
MONOCYTES NFR BLD: 6 % (ref 5–13)
MONOCYTES NFR BLD: 7 % (ref 5–13)
MONOCYTES NFR BLD: 8 % (ref 5–13)
MONOCYTES NFR BLD: 9 % (ref 5–13)
MUCOUS THREADS URNS QL MICRO: ABNORMAL /LPF
MUCOUS THREADS URNS QL MICRO: ABNORMAL /LPF
MUCUS UA POCT, MUCPOCT: NORMAL
NEUTS SEG # BLD: 11.2 K/UL (ref 1.8–8)
NEUTS SEG # BLD: 6.3 K/UL (ref 1.8–8)
NEUTS SEG # BLD: 6.9 K/UL (ref 1.8–8)
NEUTS SEG # BLD: 7.7 K/UL (ref 1.8–8)
NEUTS SEG # BLD: 8.4 K/UL (ref 1.8–8)
NEUTS SEG NFR BLD: 75 % (ref 32–75)
NEUTS SEG NFR BLD: 80 % (ref 32–75)
NEUTS SEG NFR BLD: 81 % (ref 32–75)
NEUTS SEG NFR BLD: 81 % (ref 32–75)
NEUTS SEG NFR BLD: 86 % (ref 32–75)
NITRITE UR QL STRIP.AUTO: NEGATIVE
NRBC # BLD: 0 K/UL (ref 0–0.01)
NRBC # BLD: 0.02 K/UL (ref 0–0.01)
NRBC BLD-RTO: 0 PER 100 WBC
NRBC BLD-RTO: 0.2 PER 100 WBC
NRBC BLD-RTO: 0.3 PER 100 WBC
P-R INTERVAL, ECG05: 126 MS
P-R INTERVAL, ECG05: 136 MS
PCO2 BLDA: 34 MMHG (ref 35–45)
PCO2 BLDA: 36 MMHG (ref 35–45)
PH BLDA: 7.34 [PH] (ref 7.35–7.45)
PH BLDA: 7.42 [PH] (ref 7.35–7.45)
PH UR STRIP: 5 [PH] (ref 5–8)
PH UR STRIP: 5.5 [PH] (ref 5–8)
PH UR STRIP: 5.5 [PH] (ref 5–8)
PH UR STRIP: 6 [PH] (ref 5–7)
PH UR STRIP: 6 [PH] (ref 5–8)
PH UR STRIP: 7 [PH] (ref 5–8)
PHOSPHATE SERPL-MCNC: 2.5 MG/DL (ref 2.6–4.7)
PHOSPHATE SERPL-MCNC: 3.5 MG/DL (ref 2.6–4.7)
PHOSPHATE SERPL-MCNC: 4.7 MG/DL (ref 2.6–4.7)
PLATELET # BLD AUTO: 205 K/UL (ref 150–400)
PLATELET # BLD AUTO: 213 K/UL (ref 150–400)
PLATELET # BLD AUTO: 214 K/UL (ref 150–400)
PLATELET # BLD AUTO: 225 K/UL (ref 150–400)
PLATELET # BLD AUTO: 233 K/UL (ref 150–400)
PLATELET # BLD AUTO: 240 K/UL (ref 150–400)
PLATELET # BLD AUTO: 249 K/UL (ref 150–400)
PLATELET # BLD AUTO: 253 K/UL (ref 150–400)
PLATELET # BLD AUTO: 255 K/UL (ref 150–400)
PLATELET # BLD AUTO: 255 K/UL (ref 150–400)
PLATELET # BLD AUTO: 258 K/UL (ref 150–400)
PLATELET # BLD AUTO: 259 K/UL (ref 150–400)
PLATELET # BLD AUTO: 261 K/UL (ref 150–400)
PLATELET # BLD AUTO: 264 K/UL (ref 150–400)
PLATELET # BLD AUTO: 270 K/UL (ref 150–400)
PLATELET # BLD AUTO: 271 K/UL (ref 150–400)
PLATELET # BLD AUTO: 277 K/UL (ref 150–400)
PLATELET # BLD AUTO: 277 K/UL (ref 150–400)
PLATELET # BLD AUTO: 284 K/UL (ref 150–400)
PLATELET # BLD AUTO: 288 K/UL (ref 150–400)
PLATELET # BLD AUTO: 289 K/UL (ref 150–400)
PLATELET # BLD AUTO: 301 K/UL (ref 150–400)
PLATELET # BLD AUTO: 301 K/UL (ref 150–400)
PLATELET # BLD AUTO: 302 K/UL (ref 150–400)
PLATELET # BLD AUTO: 312 K/UL (ref 150–400)
PLATELET # BLD AUTO: 315 K/UL (ref 150–400)
PLATELET # BLD AUTO: 334 K/UL (ref 150–400)
PLATELET # BLD: 229 K/UL (ref 140–440)
PLATELET # BLD: 231 K/UL (ref 140–440)
PLATELET # BLD: 244 K/UL (ref 140–440)
PLATELET # BLD: 268 K/UL (ref 140–440)
PLATELET # BLD: 361 K/UL (ref 140–440)
PMV BLD AUTO: 11.2 FL (ref 8.9–12.9)
PMV BLD AUTO: 11.3 FL (ref 8.9–12.9)
PMV BLD AUTO: 11.3 FL (ref 8.9–12.9)
PMV BLD AUTO: 11.4 FL (ref 8.9–12.9)
PMV BLD AUTO: 11.5 FL (ref 8.9–12.9)
PMV BLD AUTO: 11.6 FL (ref 8.9–12.9)
PMV BLD AUTO: 11.8 FL (ref 8.9–12.9)
PMV BLD AUTO: 11.8 FL (ref 8.9–12.9)
PMV BLD AUTO: 11.9 FL (ref 8.9–12.9)
PMV BLD AUTO: 11.9 FL (ref 8.9–12.9)
PMV BLD AUTO: 12.1 FL (ref 8.9–12.9)
PMV BLD AUTO: 12.2 FL (ref 8.9–12.9)
PMV BLD AUTO: 12.3 FL (ref 8.9–12.9)
PO2 BLDA: 77 MMHG (ref 80–100)
PO2 BLDA: 91 MMHG (ref 80–100)
POTASSIUM SERPL-SCNC: 2.9 MMOL/L (ref 3.5–5.2)
POTASSIUM SERPL-SCNC: 3.3 MMOL/L (ref 3.5–5.1)
POTASSIUM SERPL-SCNC: 3.4 MMOL/L (ref 3.5–5.1)
POTASSIUM SERPL-SCNC: 3.4 MMOL/L (ref 3.5–5.1)
POTASSIUM SERPL-SCNC: 3.5 MMOL/L (ref 3.5–5.1)
POTASSIUM SERPL-SCNC: 3.6 MMOL/L (ref 3.5–5.1)
POTASSIUM SERPL-SCNC: 3.6 MMOL/L (ref 3.6–5)
POTASSIUM SERPL-SCNC: 3.7 MMOL/L (ref 3.5–5.1)
POTASSIUM SERPL-SCNC: 3.7 MMOL/L (ref 3.5–5.1)
POTASSIUM SERPL-SCNC: 3.8 MMOL/L (ref 3.5–5.1)
POTASSIUM SERPL-SCNC: 3.9 MMOL/L (ref 3.5–5.1)
POTASSIUM SERPL-SCNC: 4 MMOL/L (ref 3.5–5.1)
POTASSIUM SERPL-SCNC: 4.1 MMOL/L (ref 3.5–5.1)
POTASSIUM SERPL-SCNC: 4.1 MMOL/L (ref 3.5–5.2)
POTASSIUM SERPL-SCNC: 4.2 MMOL/L (ref 3.5–5.1)
POTASSIUM SERPL-SCNC: 4.2 MMOL/L (ref 3.5–5.1)
POTASSIUM SERPL-SCNC: 4.3 MMOL/L (ref 3.5–5.1)
POTASSIUM SERPL-SCNC: 4.4 MMOL/L (ref 3.5–5.1)
POTASSIUM SERPL-SCNC: 4.5 MMOL/L (ref 3.5–5.1)
POTASSIUM SERPL-SCNC: 4.5 MMOL/L (ref 3.5–5.1)
POTASSIUM SERPL-SCNC: 4.5 MMOL/L (ref 3.6–5)
POTASSIUM SERPL-SCNC: 4.6 MMOL/L (ref 3.5–5.1)
POTASSIUM SERPL-SCNC: 4.8 MMOL/L (ref 3.5–5.1)
POTASSIUM SERPL-SCNC: 4.8 MMOL/L (ref 3.5–5.1)
POTASSIUM SERPL-SCNC: 4.8 MMOL/L (ref 3.5–5.2)
POTASSIUM SERPL-SCNC: 5.4 MMOL/L (ref 3.6–5)
POTASSIUM SERPL-SCNC: 5.5 MMOL/L (ref 3.6–5)
POTASSIUM SERPL-SCNC: 6.4 MMOL/L (ref 3.6–5)
PROT SERPL-MCNC: 5.3 G/DL (ref 6.4–8.2)
PROT SERPL-MCNC: 5.7 G/DL (ref 6.3–8.2)
PROT SERPL-MCNC: 5.7 G/DL (ref 6.4–8.2)
PROT SERPL-MCNC: 5.7 G/DL (ref 6–8.5)
PROT SERPL-MCNC: 6.3 G/DL (ref 6.4–8.2)
PROT SERPL-MCNC: 6.3 G/DL (ref 6–8.5)
PROT SERPL-MCNC: 6.5 G/DL (ref 6.4–8.2)
PROT SERPL-MCNC: 6.7 G/DL (ref 6.4–8.2)
PROT UR QL STRIP: NORMAL
PROT UR STRIP-MCNC: 100 MG/DL
PROT UR STRIP-MCNC: 300 MG/DL
PROT UR STRIP-MCNC: 300 MG/DL
PROT UR STRIP-MCNC: >300 MG/DL
PROT UR STRIP-MCNC: >300 MG/DL
PROT UR-MCNC: 290 MG/DL (ref 0–11.9)
PROT UR-MCNC: 475 MG/DL (ref 0–11.9)
PROT/CREAT UR-RTO: 3.4
PROT/CREAT UR-RTO: 7.9
Q-T INTERVAL, ECG07: 374 MS
Q-T INTERVAL, ECG07: 400 MS
QRS DURATION, ECG06: 78 MS
QRS DURATION, ECG06: 82 MS
QTC CALCULATION (BEZET), ECG08: 428 MS
QTC CALCULATION (BEZET), ECG08: 489 MS
RBC # BLD AUTO: 2.47 M/UL (ref 3.8–5.2)
RBC # BLD AUTO: 2.61 M/UL (ref 3.8–5.2)
RBC # BLD AUTO: 2.82 M/UL (ref 3.8–5.2)
RBC # BLD AUTO: 2.82 M/UL (ref 3.8–5.2)
RBC # BLD AUTO: 2.92 M/UL (ref 3.8–5.2)
RBC # BLD AUTO: 2.93 M/UL (ref 3.8–5.2)
RBC # BLD AUTO: 2.94 M/UL (ref 3.8–5.2)
RBC # BLD AUTO: 2.97 M/UL (ref 3.8–5.2)
RBC # BLD AUTO: 2.97 M/UL (ref 3.8–5.2)
RBC # BLD AUTO: 2.99 M/UL (ref 3.8–5.2)
RBC # BLD AUTO: 3.07 M/UL (ref 3.8–5.2)
RBC # BLD AUTO: 3.12 M/UL (ref 3.8–5.2)
RBC # BLD AUTO: 3.14 M/UL (ref 3.8–5.2)
RBC # BLD AUTO: 3.15 M/UL (ref 3.8–5.2)
RBC # BLD AUTO: 3.19 M/UL (ref 3.8–5.2)
RBC # BLD AUTO: 3.2 M/UL (ref 3.8–5.2)
RBC # BLD AUTO: 3.24 M/UL (ref 3.8–5.2)
RBC # BLD AUTO: 3.29 M/UL (ref 3.8–5.2)
RBC # BLD AUTO: 3.43 M/UL (ref 3.8–5.2)
RBC # BLD AUTO: 3.65 M/UL (ref 3.8–5.2)
RBC # BLD AUTO: 3.67 M/UL (ref 3.8–5.2)
RBC # BLD AUTO: 3.67 M/UL (ref 3.8–5.2)
RBC # BLD AUTO: 3.68 M/UL (ref 3.8–5.2)
RBC # BLD AUTO: 3.74 M/UL (ref 3.8–5.2)
RBC # BLD AUTO: 3.74 M/UL (ref 3.8–5.2)
RBC # BLD AUTO: 4.52 M/UL (ref 3.8–5.2)
RBC # BLD AUTO: 4.72 M/UL (ref 3.8–5.2)
RBC # BLD: 2.93 M/UL (ref 4.2–6.3)
RBC # BLD: 3.06 M/UL (ref 4.2–6.3)
RBC # BLD: 3.15 M/UL (ref 4.2–6.3)
RBC # BLD: 3.34 M/UL (ref 4.2–6.3)
RBC # BLD: 3.97 M/UL (ref 4.2–6.3)
RBC #/AREA URNS HPF: ABNORMAL /HPF (ref 0–5)
RBC MORPH BLD: ABNORMAL
RBC UA POCT, RBCPOCT: NORMAL
REPORTED DOSE,DOSE: ABNORMAL UNITS
REPORTED DOSE,DOSE: ABNORMAL UNITS
REPORTED DOSE/TIME,TMG: 200
REPORTED DOSE/TIME,TMG: ABNORMAL
SAO2 % BLD: 96 % (ref 92–97)
SAO2 % BLD: 97 % (ref 92–97)
SAO2% DEVICE SAO2% SENSOR NAME: ABNORMAL
SAO2% DEVICE SAO2% SENSOR NAME: ABNORMAL
SERVICE CMNT-IMP: ABNORMAL
SERVICE CMNT-IMP: NORMAL
SODIUM SERPL-SCNC: 141 MMOL/L (ref 136–145)
SODIUM SERPL-SCNC: 142 MMOL/L (ref 136–145)
SODIUM SERPL-SCNC: 142 MMOL/L (ref 136–145)
SODIUM SERPL-SCNC: 143 MMOL/L (ref 136–145)
SODIUM SERPL-SCNC: 144 MMOL/L (ref 136–145)
SODIUM SERPL-SCNC: 144 MMOL/L (ref 137–145)
SODIUM SERPL-SCNC: 145 MMOL/L (ref 136–145)
SODIUM SERPL-SCNC: 146 MMOL/L (ref 136–145)
SODIUM SERPL-SCNC: 146 MMOL/L (ref 137–145)
SODIUM SERPL-SCNC: 146 MMOL/L (ref 137–145)
SODIUM SERPL-SCNC: 147 MMOL/L (ref 136–145)
SODIUM SERPL-SCNC: 147 MMOL/L (ref 137–145)
SODIUM SERPL-SCNC: 148 MMOL/L (ref 134–144)
SODIUM SERPL-SCNC: 148 MMOL/L (ref 136–145)
SODIUM SERPL-SCNC: 148 MMOL/L (ref 136–145)
SODIUM SERPL-SCNC: 149 MMOL/L (ref 134–144)
SODIUM SERPL-SCNC: 149 MMOL/L (ref 134–144)
SODIUM SERPL-SCNC: 149 MMOL/L (ref 136–145)
SODIUM SERPL-SCNC: 149 MMOL/L (ref 136–145)
SODIUM SERPL-SCNC: 152 MMOL/L (ref 136–145)
SP GR UR REFRACTOMETRY: 1.01 (ref 1–1.03)
SP GR UR REFRACTOMETRY: 1.02 (ref 1–1.03)
SP GR UR STRIP: 1.01 (ref 1.01–1.02)
SPECIMEN EXP DATE BLD: NORMAL
SPECIMEN SITE: ABNORMAL
SPECIMEN SITE: ABNORMAL
STATUS OF UNIT,%ST: NORMAL
STATUS OF UNIT,%ST: NORMAL
TOTAL BILIRUBIN POC: 0.5 MG/DL (ref 0.2–1.3)
TRICH UA POCT, TRICHPOC: NEGATIVE
TROPONIN I SERPL-MCNC: 0.04 NG/ML
TROPONIN I SERPL-MCNC: 0.1 NG/ML
TSH SERPL DL<=0.05 MIU/L-ACNC: 1.91 UIU/ML (ref 0.36–3.74)
UA UROBILINOGEN AMB POC: NORMAL (ref 0.2–1)
UA: UC IF INDICATED,UAUC: ABNORMAL
UA: UC IF INDICATED,UAUC: ABNORMAL
UNIT DIVISION, %UDIV: 0
UNIT DIVISION, %UDIV: 0
URINALYSIS CLARITY POC: CLEAR
URINALYSIS COLOR POC: NORMAL
URINE BLOOD POC: NORMAL
URINE CULT COMMENT, POCT: NORMAL
URINE LEUKOCYTES POC: NORMAL
URINE NITRITES POC: POSITIVE
UROBILINOGEN UR QL STRIP.AUTO: 0.2 EU/DL (ref 0.2–1)
VANCOMYCIN SERPL-MCNC: 15.3 UG/ML
VANCOMYCIN SERPL-MCNC: 22.5 UG/ML
VANCOMYCIN SERPL-MCNC: 28 UG/ML
VANCOMYCIN SERPL-MCNC: 34.5 UG/ML
VANCOMYCIN SERPL-MCNC: 43.3 UG/ML
VANCOMYCIN TROUGH SERPL-MCNC: 18.1 UG/ML (ref 5–10)
VANCOMYCIN TROUGH SERPL-MCNC: 28.4 UG/ML (ref 5–10)
VENTRICULAR RATE, ECG03: 79 BPM
VENTRICULAR RATE, ECG03: 90 BPM
WBC # BLD AUTO: 10.1 K/UL (ref 3.6–11)
WBC # BLD AUTO: 10.3 K/UL (ref 3.6–11)
WBC # BLD AUTO: 10.3 K/UL (ref 3.6–11)
WBC # BLD AUTO: 10.5 K/UL (ref 3.6–11)
WBC # BLD AUTO: 10.7 K/UL (ref 3.6–11)
WBC # BLD AUTO: 10.7 K/UL (ref 3.6–11)
WBC # BLD AUTO: 11.1 K/UL (ref 3.6–11)
WBC # BLD AUTO: 11.1 K/UL (ref 3.6–11)
WBC # BLD AUTO: 11.3 K/UL (ref 3.6–11)
WBC # BLD AUTO: 13.9 K/UL (ref 3.6–11)
WBC # BLD AUTO: 6.3 K/UL (ref 3.6–11)
WBC # BLD AUTO: 6.6 K/UL (ref 3.6–11)
WBC # BLD AUTO: 6.6 K/UL (ref 3.6–11)
WBC # BLD AUTO: 7 K/UL (ref 3.6–11)
WBC # BLD AUTO: 7.3 K/UL (ref 3.6–11)
WBC # BLD AUTO: 8.1 K/UL (ref 3.6–11)
WBC # BLD AUTO: 8.3 K/UL (ref 3.6–11)
WBC # BLD AUTO: 8.3 K/UL (ref 3.6–11)
WBC # BLD AUTO: 8.4 K/UL (ref 3.6–11)
WBC # BLD AUTO: 8.7 K/UL (ref 3.6–11)
WBC # BLD AUTO: 9.2 K/UL (ref 3.6–11)
WBC # BLD AUTO: 9.3 K/UL (ref 3.6–11)
WBC # BLD AUTO: 9.4 K/UL (ref 3.6–11)
WBC # BLD AUTO: 9.6 K/UL (ref 3.6–11)
WBC # BLD AUTO: 9.6 K/UL (ref 3.6–11)
WBC # BLD AUTO: 9.7 K/UL (ref 3.6–11)
WBC # BLD AUTO: 9.7 K/UL (ref 3.6–11)
WBC # BLD: 7.1 K/UL (ref 4.1–10.9)
WBC # BLD: 7.9 K/UL (ref 4.1–10.9)
WBC # BLD: 8.3 K/UL (ref 4.1–10.9)
WBC # BLD: 9.7 K/UL (ref 4.1–10.9)
WBC # BLD: 9.7 K/UL (ref 4.1–10.9)
WBC UA POCT, WBCPOCT: NORMAL
WBC URNS QL MICRO: >100 /HPF (ref 0–4)
WBC URNS QL MICRO: ABNORMAL /HPF (ref 0–4)
WBC URNS QL MICRO: ABNORMAL /HPF (ref 0–4)
YEAST BUDDING URNS QL: PRESENT
YEAST UA POCT, YEASTPOC: NEGATIVE
YEAST URNS QL MICRO: PRESENT

## 2018-01-01 PROCEDURE — 74011250636 HC RX REV CODE- 250/636: Performed by: INTERNAL MEDICINE

## 2018-01-01 PROCEDURE — 36592 COLLECT BLOOD FROM PICC: CPT

## 2018-01-01 PROCEDURE — 74011250637 HC RX REV CODE- 250/637: Performed by: FAMILY MEDICINE

## 2018-01-01 PROCEDURE — 74011636637 HC RX REV CODE- 636/637: Performed by: FAMILY MEDICINE

## 2018-01-01 PROCEDURE — 36430 TRANSFUSION BLD/BLD COMPNT: CPT

## 2018-01-01 PROCEDURE — 74011250637 HC RX REV CODE- 250/637: Performed by: INTERNAL MEDICINE

## 2018-01-01 PROCEDURE — G0299 HHS/HOSPICE OF RN EA 15 MIN: HCPCS

## 2018-01-01 PROCEDURE — 36600 WITHDRAWAL OF ARTERIAL BLOOD: CPT

## 2018-01-01 PROCEDURE — 81003 URINALYSIS AUTO W/O SCOPE: CPT

## 2018-01-01 PROCEDURE — 80048 BASIC METABOLIC PNL TOTAL CA: CPT

## 2018-01-01 PROCEDURE — 80202 ASSAY OF VANCOMYCIN: CPT

## 2018-01-01 PROCEDURE — 65660000000 HC RM CCU STEPDOWN

## 2018-01-01 PROCEDURE — 82962 GLUCOSE BLOOD TEST: CPT

## 2018-01-01 PROCEDURE — G8978 MOBILITY CURRENT STATUS: HCPCS

## 2018-01-01 PROCEDURE — 0Y6M0ZF DETACHMENT AT RIGHT FOOT, PARTIAL 5TH RAY, OPEN APPROACH: ICD-10-PCS | Performed by: PODIATRIST

## 2018-01-01 PROCEDURE — G0152 HHCP-SERV OF OT,EA 15 MIN: HCPCS

## 2018-01-01 PROCEDURE — G8980 MOBILITY D/C STATUS: HCPCS

## 2018-01-01 PROCEDURE — 97530 THERAPEUTIC ACTIVITIES: CPT

## 2018-01-01 PROCEDURE — 36415 COLL VENOUS BLD VENIPUNCTURE: CPT

## 2018-01-01 PROCEDURE — 97110 THERAPEUTIC EXERCISES: CPT

## 2018-01-01 PROCEDURE — 80053 COMPREHEN METABOLIC PANEL: CPT | Performed by: INTERNAL MEDICINE

## 2018-01-01 PROCEDURE — 74011000250 HC RX REV CODE- 250: Performed by: INTERNAL MEDICINE

## 2018-01-01 PROCEDURE — G0300 HHS/HOSPICE OF LPN EA 15 MIN: HCPCS

## 2018-01-01 PROCEDURE — 74011636637 HC RX REV CODE- 636/637: Performed by: INTERNAL MEDICINE

## 2018-01-01 PROCEDURE — 73630 X-RAY EXAM OF FOOT: CPT

## 2018-01-01 PROCEDURE — 97530 THERAPEUTIC ACTIVITIES: CPT | Performed by: OCCUPATIONAL THERAPIST

## 2018-01-01 PROCEDURE — 83605 ASSAY OF LACTIC ACID: CPT

## 2018-01-01 PROCEDURE — 77030011943

## 2018-01-01 PROCEDURE — 74011250636 HC RX REV CODE- 250/636: Performed by: FAMILY MEDICINE

## 2018-01-01 PROCEDURE — 36415 COLL VENOUS BLD VENIPUNCTURE: CPT | Performed by: INTERNAL MEDICINE

## 2018-01-01 PROCEDURE — C9113 INJ PANTOPRAZOLE SODIUM, VIA: HCPCS | Performed by: INTERNAL MEDICINE

## 2018-01-01 PROCEDURE — 85027 COMPLETE CBC AUTOMATED: CPT

## 2018-01-01 PROCEDURE — 97112 NEUROMUSCULAR REEDUCATION: CPT

## 2018-01-01 PROCEDURE — 81001 URINALYSIS AUTO W/SCOPE: CPT | Performed by: EMERGENCY MEDICINE

## 2018-01-01 PROCEDURE — 92610 EVALUATE SWALLOWING FUNCTION: CPT

## 2018-01-01 PROCEDURE — 76937 US GUIDE VASCULAR ACCESS: CPT

## 2018-01-01 PROCEDURE — 82550 ASSAY OF CK (CPK): CPT

## 2018-01-01 PROCEDURE — 93005 ELECTROCARDIOGRAM TRACING: CPT

## 2018-01-01 PROCEDURE — 77030018836 HC SOL IRR NACL ICUM -A

## 2018-01-01 PROCEDURE — G0151 HHCP-SERV OF PT,EA 15 MIN: HCPCS

## 2018-01-01 PROCEDURE — 96365 THER/PROPH/DIAG IV INF INIT: CPT

## 2018-01-01 PROCEDURE — 77030011255 HC DSG AQUACEL AG BMS -A

## 2018-01-01 PROCEDURE — 0QBN0ZZ EXCISION OF RIGHT METATARSAL, OPEN APPROACH: ICD-10-PCS | Performed by: PODIATRIST

## 2018-01-01 PROCEDURE — 87075 CULTR BACTERIA EXCEPT BLOOD: CPT | Performed by: PODIATRIST

## 2018-01-01 PROCEDURE — 84484 ASSAY OF TROPONIN QUANT: CPT | Performed by: EMERGENCY MEDICINE

## 2018-01-01 PROCEDURE — 77030019895 HC PCKNG STRP IODO -A

## 2018-01-01 PROCEDURE — 74011000258 HC RX REV CODE- 258: Performed by: INTERNAL MEDICINE

## 2018-01-01 PROCEDURE — 82803 BLOOD GASES ANY COMBINATION: CPT

## 2018-01-01 PROCEDURE — 96367 TX/PROPH/DG ADDL SEQ IV INF: CPT

## 2018-01-01 PROCEDURE — 93971 EXTREMITY STUDY: CPT

## 2018-01-01 PROCEDURE — 77010033678 HC OXYGEN DAILY

## 2018-01-01 PROCEDURE — 74011250636 HC RX REV CODE- 250/636: Performed by: PODIATRIST

## 2018-01-01 PROCEDURE — 80048 BASIC METABOLIC PNL TOTAL CA: CPT | Performed by: PODIATRIST

## 2018-01-01 PROCEDURE — 87205 SMEAR GRAM STAIN: CPT | Performed by: INTERNAL MEDICINE

## 2018-01-01 PROCEDURE — 85025 COMPLETE CBC W/AUTO DIFF WBC: CPT | Performed by: EMERGENCY MEDICINE

## 2018-01-01 PROCEDURE — 77030038269 HC DRN EXT URIN PURWCK BARD -A

## 2018-01-01 PROCEDURE — 84100 ASSAY OF PHOSPHORUS: CPT | Performed by: INTERNAL MEDICINE

## 2018-01-01 PROCEDURE — 77030034849

## 2018-01-01 PROCEDURE — 80053 COMPREHEN METABOLIC PANEL: CPT | Performed by: EMERGENCY MEDICINE

## 2018-01-01 PROCEDURE — 97163 PT EVAL HIGH COMPLEX 45 MIN: CPT

## 2018-01-01 PROCEDURE — G8988 SELF CARE GOAL STATUS: HCPCS | Performed by: OCCUPATIONAL THERAPIST

## 2018-01-01 PROCEDURE — 71045 X-RAY EXAM CHEST 1 VIEW: CPT

## 2018-01-01 PROCEDURE — 85027 COMPLETE CBC AUTOMATED: CPT | Performed by: INTERNAL MEDICINE

## 2018-01-01 PROCEDURE — 99283 EMERGENCY DEPT VISIT LOW MDM: CPT

## 2018-01-01 PROCEDURE — 87205 SMEAR GRAM STAIN: CPT

## 2018-01-01 PROCEDURE — 93306 TTE W/DOPPLER COMPLETE: CPT

## 2018-01-01 PROCEDURE — 77030018719 HC DRSG PTCH ANTIMIC J&J -A

## 2018-01-01 PROCEDURE — A4320 IRRIGATION TRAY: HCPCS

## 2018-01-01 PROCEDURE — 30233N1 TRANSFUSION OF NONAUTOLOGOUS RED BLOOD CELLS INTO PERIPHERAL VEIN, PERCUTANEOUS APPROACH: ICD-10-PCS | Performed by: INTERNAL MEDICINE

## 2018-01-01 PROCEDURE — 87086 URINE CULTURE/COLONY COUNT: CPT

## 2018-01-01 PROCEDURE — 87077 CULTURE AEROBIC IDENTIFY: CPT | Performed by: INTERNAL MEDICINE

## 2018-01-01 PROCEDURE — 85025 COMPLETE CBC W/AUTO DIFF WBC: CPT | Performed by: INTERNAL MEDICINE

## 2018-01-01 PROCEDURE — 77030018786 HC NDL GD F/USND BARD -B

## 2018-01-01 PROCEDURE — 80202 ASSAY OF VANCOMYCIN: CPT | Performed by: INTERNAL MEDICINE

## 2018-01-01 PROCEDURE — 84443 ASSAY THYROID STIM HORMONE: CPT

## 2018-01-01 PROCEDURE — 74011250636 HC RX REV CODE- 250/636: Performed by: ANESTHESIOLOGY

## 2018-01-01 PROCEDURE — 93922 UPR/L XTREMITY ART 2 LEVELS: CPT

## 2018-01-01 PROCEDURE — 80048 BASIC METABOLIC PNL TOTAL CA: CPT | Performed by: INTERNAL MEDICINE

## 2018-01-01 PROCEDURE — 51798 US URINE CAPACITY MEASURE: CPT

## 2018-01-01 PROCEDURE — 81001 URINALYSIS AUTO W/SCOPE: CPT | Performed by: INTERNAL MEDICINE

## 2018-01-01 PROCEDURE — 84156 ASSAY OF PROTEIN URINE: CPT

## 2018-01-01 PROCEDURE — 74011250636 HC RX REV CODE- 250/636: Performed by: EMERGENCY MEDICINE

## 2018-01-01 PROCEDURE — 82140 ASSAY OF AMMONIA: CPT

## 2018-01-01 PROCEDURE — 77030005538 HC CATH URETH FOL44 BARD -B

## 2018-01-01 PROCEDURE — 96374 THER/PROPH/DIAG INJ IV PUSH: CPT

## 2018-01-01 PROCEDURE — 84100 ASSAY OF PHOSPHORUS: CPT

## 2018-01-01 PROCEDURE — 85027 COMPLETE CBC AUTOMATED: CPT | Performed by: PODIATRIST

## 2018-01-01 PROCEDURE — 76060000035 HC ANESTHESIA 2 TO 2.5 HR: Performed by: PODIATRIST

## 2018-01-01 PROCEDURE — 74011000250 HC RX REV CODE- 250

## 2018-01-01 PROCEDURE — 99285 EMERGENCY DEPT VISIT HI MDM: CPT

## 2018-01-01 PROCEDURE — 88305 TISSUE EXAM BY PATHOLOGIST: CPT | Performed by: PODIATRIST

## 2018-01-01 PROCEDURE — 74011000250 HC RX REV CODE- 250: Performed by: FAMILY MEDICINE

## 2018-01-01 PROCEDURE — 97162 PT EVAL MOD COMPLEX 30 MIN: CPT

## 2018-01-01 PROCEDURE — G8979 MOBILITY GOAL STATUS: HCPCS

## 2018-01-01 PROCEDURE — 97167 OT EVAL HIGH COMPLEX 60 MIN: CPT | Performed by: OCCUPATIONAL THERAPIST

## 2018-01-01 PROCEDURE — 83735 ASSAY OF MAGNESIUM: CPT | Performed by: PODIATRIST

## 2018-01-01 PROCEDURE — 77030020847 HC STATLOK BARD -A

## 2018-01-01 PROCEDURE — 83036 HEMOGLOBIN GLYCOSYLATED A1C: CPT

## 2018-01-01 PROCEDURE — 94664 DEMO&/EVAL PT USE INHALER: CPT

## 2018-01-01 PROCEDURE — 70450 CT HEAD/BRAIN W/O DYE: CPT

## 2018-01-01 PROCEDURE — 36569 INSJ PICC 5 YR+ W/O IMAGING: CPT | Performed by: INTERNAL MEDICINE

## 2018-01-01 PROCEDURE — A4354 CATH INSERTION TRAY W/BAG: HCPCS

## 2018-01-01 PROCEDURE — G8987 SELF CARE CURRENT STATUS: HCPCS

## 2018-01-01 PROCEDURE — 74011000258 HC RX REV CODE- 258: Performed by: EMERGENCY MEDICINE

## 2018-01-01 PROCEDURE — 74011250637 HC RX REV CODE- 250/637: Performed by: SURGERY

## 2018-01-01 PROCEDURE — 85652 RBC SED RATE AUTOMATED: CPT | Performed by: EMERGENCY MEDICINE

## 2018-01-01 PROCEDURE — 87185 SC STD ENZYME DETCJ PER NZM: CPT | Performed by: PODIATRIST

## 2018-01-01 PROCEDURE — 36415 COLL VENOUS BLD VENIPUNCTURE: CPT | Performed by: PODIATRIST

## 2018-01-01 PROCEDURE — 74011250636 HC RX REV CODE- 250/636

## 2018-01-01 PROCEDURE — 0HDAXZZ EXTRACTION OF INGUINAL SKIN, EXTERNAL APPROACH: ICD-10-PCS | Performed by: SURGERY

## 2018-01-01 PROCEDURE — 97165 OT EVAL LOW COMPLEX 30 MIN: CPT

## 2018-01-01 PROCEDURE — P9016 RBC LEUKOCYTES REDUCED: HCPCS | Performed by: INTERNAL MEDICINE

## 2018-01-01 PROCEDURE — 36415 COLL VENOUS BLD VENIPUNCTURE: CPT | Performed by: EMERGENCY MEDICINE

## 2018-01-01 PROCEDURE — 71250 CT THORAX DX C-: CPT

## 2018-01-01 PROCEDURE — A6260 WOUND CLEANSER ANY TYPE/SIZE: HCPCS

## 2018-01-01 PROCEDURE — A4334 URINARY CATH LEG STRAP: HCPCS

## 2018-01-01 PROCEDURE — 87086 URINE CULTURE/COLONY COUNT: CPT | Performed by: EMERGENCY MEDICINE

## 2018-01-01 PROCEDURE — 97537 COMMUNITY/WORK REINTEGRATION: CPT

## 2018-01-01 PROCEDURE — 74011000250 HC RX REV CODE- 250: Performed by: PODIATRIST

## 2018-01-01 PROCEDURE — 82803 BLOOD GASES ANY COMBINATION: CPT | Performed by: EMERGENCY MEDICINE

## 2018-01-01 PROCEDURE — 97535 SELF CARE MNGMENT TRAINING: CPT

## 2018-01-01 PROCEDURE — 84484 ASSAY OF TROPONIN QUANT: CPT

## 2018-01-01 PROCEDURE — 77030011640 HC PAD GRND REM COVD -A: Performed by: PODIATRIST

## 2018-01-01 PROCEDURE — 86923 COMPATIBILITY TEST ELECTRIC: CPT | Performed by: INTERNAL MEDICINE

## 2018-01-01 PROCEDURE — 77030018846 HC SOL IRR STRL H20 ICUM -A

## 2018-01-01 PROCEDURE — G0155 HHCP-SVS OF CSW,EA 15 MIN: HCPCS

## 2018-01-01 PROCEDURE — A9540 TC99M MAA: HCPCS

## 2018-01-01 PROCEDURE — 87186 SC STD MICRODIL/AGAR DIL: CPT

## 2018-01-01 PROCEDURE — 87804 INFLUENZA ASSAY W/OPTIC: CPT | Performed by: INTERNAL MEDICINE

## 2018-01-01 PROCEDURE — 73620 X-RAY EXAM OF FOOT: CPT

## 2018-01-01 PROCEDURE — A6216 NON-STERILE GAUZE<=16 SQ IN: HCPCS

## 2018-01-01 PROCEDURE — 80053 COMPREHEN METABOLIC PANEL: CPT

## 2018-01-01 PROCEDURE — 76010000131 HC OR TIME 2 TO 2.5 HR: Performed by: PODIATRIST

## 2018-01-01 PROCEDURE — A6446 CONFORM BAND S W>=3" <5"/YD: HCPCS

## 2018-01-01 PROCEDURE — 94640 AIRWAY INHALATION TREATMENT: CPT

## 2018-01-01 PROCEDURE — C1751 CATH, INF, PER/CENT/MIDLINE: HCPCS

## 2018-01-01 PROCEDURE — 85652 RBC SED RATE AUTOMATED: CPT | Performed by: INTERNAL MEDICINE

## 2018-01-01 PROCEDURE — 96361 HYDRATE IV INFUSION ADD-ON: CPT

## 2018-01-01 PROCEDURE — P9047 ALBUMIN (HUMAN), 25%, 50ML: HCPCS | Performed by: INTERNAL MEDICINE

## 2018-01-01 PROCEDURE — 77030031139 HC SUT VCRL2 J&J -A: Performed by: PODIATRIST

## 2018-01-01 PROCEDURE — 87040 BLOOD CULTURE FOR BACTERIA: CPT | Performed by: EMERGENCY MEDICINE

## 2018-01-01 PROCEDURE — 87205 SMEAR GRAM STAIN: CPT | Performed by: PODIATRIST

## 2018-01-01 PROCEDURE — 83605 ASSAY OF LACTIC ACID: CPT | Performed by: EMERGENCY MEDICINE

## 2018-01-01 PROCEDURE — 74011000255 HC RX REV CODE- 255: Performed by: INTERNAL MEDICINE

## 2018-01-01 PROCEDURE — 97161 PT EVAL LOW COMPLEX 20 MIN: CPT

## 2018-01-01 PROCEDURE — 77030002916 HC SUT ETHLN J&J -A: Performed by: PODIATRIST

## 2018-01-01 PROCEDURE — 77030029684 HC NEB SM VOL KT MONA -A

## 2018-01-01 PROCEDURE — G8989 SELF CARE D/C STATUS: HCPCS | Performed by: OCCUPATIONAL THERAPIST

## 2018-01-01 PROCEDURE — A4357 BEDSIDE DRAINAGE BAG: HCPCS

## 2018-01-01 PROCEDURE — 88311 DECALCIFY TISSUE: CPT | Performed by: PODIATRIST

## 2018-01-01 PROCEDURE — 74018 RADEX ABDOMEN 1 VIEW: CPT

## 2018-01-01 PROCEDURE — 96366 THER/PROPH/DIAG IV INF ADDON: CPT

## 2018-01-01 PROCEDURE — 83735 ASSAY OF MAGNESIUM: CPT | Performed by: INTERNAL MEDICINE

## 2018-01-01 PROCEDURE — 77030011256 HC DRSG MEPILEX <16IN NO BORD MOLN -A

## 2018-01-01 PROCEDURE — 74176 CT ABD & PELVIS W/O CONTRAST: CPT

## 2018-01-01 PROCEDURE — P9045 ALBUMIN (HUMAN), 5%, 250 ML: HCPCS

## 2018-01-01 PROCEDURE — 36600 WITHDRAWAL OF ARTERIAL BLOOD: CPT | Performed by: EMERGENCY MEDICINE

## 2018-01-01 PROCEDURE — A9541 TC99M SULFUR COLLOID: HCPCS

## 2018-01-01 PROCEDURE — A6407 PACKING STRIPS, NON-IMPREG: HCPCS

## 2018-01-01 PROCEDURE — 87077 CULTURE AEROBIC IDENTIFY: CPT

## 2018-01-01 PROCEDURE — 82550 ASSAY OF CK (CPK): CPT | Performed by: EMERGENCY MEDICINE

## 2018-01-01 PROCEDURE — 83735 ASSAY OF MAGNESIUM: CPT

## 2018-01-01 PROCEDURE — 81001 URINALYSIS AUTO W/SCOPE: CPT

## 2018-01-01 PROCEDURE — 87186 SC STD MICRODIL/AGAR DIL: CPT | Performed by: INTERNAL MEDICINE

## 2018-01-01 PROCEDURE — 87075 CULTR BACTERIA EXCEPT BLOOD: CPT | Performed by: INTERNAL MEDICINE

## 2018-01-01 PROCEDURE — 85379 FIBRIN DEGRADATION QUANT: CPT | Performed by: EMERGENCY MEDICINE

## 2018-01-01 PROCEDURE — 77030009526 HC GEL CARSYN MDII -A

## 2018-01-01 PROCEDURE — 87186 SC STD MICRODIL/AGAR DIL: CPT | Performed by: PODIATRIST

## 2018-01-01 PROCEDURE — 73718 MRI LOWER EXTREMITY W/O DYE: CPT

## 2018-01-01 PROCEDURE — 83880 ASSAY OF NATRIURETIC PEPTIDE: CPT | Performed by: EMERGENCY MEDICINE

## 2018-01-01 PROCEDURE — 77030006788 HC BLD SAW OSC STRY -B: Performed by: PODIATRIST

## 2018-01-01 PROCEDURE — G8987 SELF CARE CURRENT STATUS: HCPCS | Performed by: OCCUPATIONAL THERAPIST

## 2018-01-01 PROCEDURE — 85025 COMPLETE CBC W/AUTO DIFF WBC: CPT

## 2018-01-01 PROCEDURE — A4338 INDWELLING CATHETER LATEX: HCPCS

## 2018-01-01 PROCEDURE — 86900 BLOOD TYPING SEROLOGIC ABO: CPT | Performed by: INTERNAL MEDICINE

## 2018-01-01 PROCEDURE — 77030011841 HC SUT PLN COVD -A: Performed by: PODIATRIST

## 2018-01-01 PROCEDURE — 76450000000

## 2018-01-01 PROCEDURE — A9270 NON-COVERED ITEM OR SERVICE: HCPCS

## 2018-01-01 PROCEDURE — 74011250637 HC RX REV CODE- 250/637: Performed by: EMERGENCY MEDICINE

## 2018-01-01 PROCEDURE — 76210000006 HC OR PH I REC 0.5 TO 1 HR: Performed by: PODIATRIST

## 2018-01-01 PROCEDURE — 84156 ASSAY OF PROTEIN URINE: CPT | Performed by: INTERNAL MEDICINE

## 2018-01-01 PROCEDURE — 77030000032 HC CUF TRNQT ZIMM -B: Performed by: PODIATRIST

## 2018-01-01 PROCEDURE — A4649 SURGICAL SUPPLIES: HCPCS

## 2018-01-01 PROCEDURE — 87077 CULTURE AEROBIC IDENTIFY: CPT | Performed by: PODIATRIST

## 2018-01-01 RX ORDER — ALLOPURINOL 100 MG/1
100 TABLET ORAL
Status: DISCONTINUED | OUTPATIENT
Start: 2018-01-01 | End: 2018-01-01

## 2018-01-01 RX ORDER — ONDANSETRON 2 MG/ML
4 INJECTION INTRAMUSCULAR; INTRAVENOUS
Status: DISCONTINUED | OUTPATIENT
Start: 2018-01-01 | End: 2018-01-01 | Stop reason: HOSPADM

## 2018-01-01 RX ORDER — POLYETHYLENE GLYCOL 3350 17 G/17G
17 POWDER, FOR SOLUTION ORAL DAILY
Status: SHIPPED | COMMUNITY
Start: 2018-01-01

## 2018-01-01 RX ORDER — SODIUM CHLORIDE 0.9 % (FLUSH) 0.9 %
10 SYRINGE (ML) INJECTION EVERY 24 HOURS
Status: DISCONTINUED | OUTPATIENT
Start: 2018-01-01 | End: 2018-01-01 | Stop reason: HOSPADM

## 2018-01-01 RX ORDER — POTASSIUM CHLORIDE 14.9 MG/ML
10 INJECTION INTRAVENOUS
Status: DISCONTINUED | OUTPATIENT
Start: 2018-01-01 | End: 2018-01-01

## 2018-01-01 RX ORDER — ALLOPURINOL 300 MG/1
150 TABLET ORAL DAILY
Qty: 90 TAB | Refills: 0 | Status: SHIPPED | OUTPATIENT
Start: 2018-01-01 | End: 2018-01-01

## 2018-01-01 RX ORDER — METOCLOPRAMIDE HYDROCHLORIDE 5 MG/ML
10 INJECTION INTRAMUSCULAR; INTRAVENOUS
Status: COMPLETED | OUTPATIENT
Start: 2018-01-01 | End: 2018-01-01

## 2018-01-01 RX ORDER — PREGABALIN 25 MG/1
75 CAPSULE ORAL 2 TIMES DAILY
Status: DISCONTINUED | OUTPATIENT
Start: 2018-01-01 | End: 2018-01-01 | Stop reason: HOSPADM

## 2018-01-01 RX ORDER — BUMETANIDE 2 MG/1
2 TABLET ORAL DAILY
Status: SHIPPED | COMMUNITY
Start: 2018-01-01

## 2018-01-01 RX ORDER — MELATONIN
2000 DAILY
Status: DISCONTINUED | OUTPATIENT
Start: 2018-01-01 | End: 2018-01-01

## 2018-01-01 RX ORDER — FENTANYL CITRATE 50 UG/ML
25 INJECTION, SOLUTION INTRAMUSCULAR; INTRAVENOUS
Status: DISCONTINUED | OUTPATIENT
Start: 2018-01-01 | End: 2018-01-01 | Stop reason: HOSPADM

## 2018-01-01 RX ORDER — POTASSIUM CHLORIDE 20 MEQ/1
20 TABLET, EXTENDED RELEASE ORAL DAILY
Status: DISCONTINUED | OUTPATIENT
Start: 2018-01-01 | End: 2018-01-01

## 2018-01-01 RX ORDER — PANTOPRAZOLE SODIUM 40 MG/1
TABLET, DELAYED RELEASE ORAL
Qty: 30 TAB | Refills: 0 | Status: SHIPPED | OUTPATIENT
Start: 2018-01-01

## 2018-01-01 RX ORDER — DEXTROSE MONOHYDRATE 50 MG/ML
75 INJECTION, SOLUTION INTRAVENOUS CONTINUOUS
Status: DISCONTINUED | OUTPATIENT
Start: 2018-01-01 | End: 2018-01-01

## 2018-01-01 RX ORDER — INSULIN GLARGINE 100 [IU]/ML
50 INJECTION, SOLUTION SUBCUTANEOUS
Status: DISCONTINUED | OUTPATIENT
Start: 2018-01-01 | End: 2018-01-01

## 2018-01-01 RX ORDER — METOCLOPRAMIDE 5 MG/1
5 TABLET ORAL 4 TIMES DAILY
Qty: 100 TAB | Refills: 0 | Status: SHIPPED | OUTPATIENT
Start: 2018-01-01 | End: 2018-01-01 | Stop reason: SDUPTHER

## 2018-01-01 RX ORDER — SODIUM CHLORIDE AND POTASSIUM CHLORIDE .9; .15 G/100ML; G/100ML
SOLUTION INTRAVENOUS CONTINUOUS
Status: DISCONTINUED | OUTPATIENT
Start: 2018-01-01 | End: 2018-01-01

## 2018-01-01 RX ORDER — ATENOLOL 50 MG/1
50 TABLET ORAL DAILY
Status: ON HOLD | COMMUNITY
End: 2018-01-01

## 2018-01-01 RX ORDER — BUMETANIDE 1 MG/1
3 TABLET ORAL
Status: DISCONTINUED | OUTPATIENT
Start: 2018-01-01 | End: 2018-01-01

## 2018-01-01 RX ORDER — TRAMADOL HYDROCHLORIDE 50 MG/1
50 TABLET ORAL
Qty: 50 TAB | Refills: 0 | Status: SHIPPED | OUTPATIENT
Start: 2018-01-01 | End: 2019-01-01 | Stop reason: SDUPTHER

## 2018-01-01 RX ORDER — INSULIN DETEMIR 100 [IU]/ML
INJECTION, SOLUTION SUBCUTANEOUS
Qty: 45 ML | Refills: 0 | Status: ON HOLD | OUTPATIENT
Start: 2018-01-01 | End: 2018-01-01 | Stop reason: SDUPTHER

## 2018-01-01 RX ORDER — SODIUM BICARBONATE 650 MG/1
650 TABLET ORAL 2 TIMES DAILY
Qty: 60 TAB | Refills: 5 | Status: SHIPPED | OUTPATIENT
Start: 2018-01-01

## 2018-01-01 RX ORDER — VANCOMYCIN/0.9 % SOD CHLORIDE 1.5G/250ML
1500 PLASTIC BAG, INJECTION (ML) INTRAVENOUS
Status: DISCONTINUED | OUTPATIENT
Start: 2018-01-01 | End: 2018-01-01

## 2018-01-01 RX ORDER — LEVOFLOXACIN 250 MG/1
250 TABLET ORAL DAILY
Qty: 7 TAB | Refills: 0 | Status: SHIPPED | OUTPATIENT
Start: 2018-01-01 | End: 2018-01-01

## 2018-01-01 RX ORDER — ONDANSETRON 4 MG/1
4 TABLET, ORALLY DISINTEGRATING ORAL
Qty: 15 TAB | Refills: 0 | Status: SHIPPED | OUTPATIENT
Start: 2018-01-01

## 2018-01-01 RX ORDER — ALLOPURINOL 100 MG/1
100 TABLET ORAL DAILY
Status: DISCONTINUED | OUTPATIENT
Start: 2018-01-01 | End: 2018-01-01 | Stop reason: HOSPADM

## 2018-01-01 RX ORDER — VANCOMYCIN 2 GRAM/500 ML IN 0.9 % SODIUM CHLORIDE INTRAVENOUS
2000
Status: DISCONTINUED | OUTPATIENT
Start: 2018-01-01 | End: 2018-01-01

## 2018-01-01 RX ORDER — LEVOFLOXACIN 500 MG/1
500 TABLET, FILM COATED ORAL
Status: DISCONTINUED | OUTPATIENT
Start: 2018-01-01 | End: 2018-01-01

## 2018-01-01 RX ORDER — ENOXAPARIN SODIUM 100 MG/ML
40 INJECTION SUBCUTANEOUS EVERY 24 HOURS
Status: DISCONTINUED | OUTPATIENT
Start: 2018-01-01 | End: 2018-01-01

## 2018-01-01 RX ORDER — HYDRALAZINE HYDROCHLORIDE 50 MG/1
TABLET, FILM COATED ORAL
Qty: 100 TAB | Refills: 0 | Status: SHIPPED | OUTPATIENT
Start: 2018-01-01 | End: 2018-01-01

## 2018-01-01 RX ORDER — ATORVASTATIN CALCIUM 40 MG/1
TABLET, FILM COATED ORAL
Qty: 90 TAB | Refills: 0 | Status: SHIPPED | OUTPATIENT
Start: 2018-01-01 | End: 2018-01-01

## 2018-01-01 RX ORDER — MONTELUKAST SODIUM 4 MG/1
1 TABLET, CHEWABLE ORAL DAILY
Status: DISCONTINUED | OUTPATIENT
Start: 2018-01-01 | End: 2018-01-01 | Stop reason: HOSPADM

## 2018-01-01 RX ORDER — INSULIN GLARGINE 100 [IU]/ML
36 INJECTION, SOLUTION SUBCUTANEOUS
Status: DISCONTINUED | OUTPATIENT
Start: 2018-01-01 | End: 2018-01-01 | Stop reason: HOSPADM

## 2018-01-01 RX ORDER — SODIUM BICARBONATE 650 MG/1
650 TABLET ORAL 2 TIMES DAILY
Status: DISCONTINUED | OUTPATIENT
Start: 2018-01-01 | End: 2018-01-01 | Stop reason: HOSPADM

## 2018-01-01 RX ORDER — ATORVASTATIN CALCIUM 40 MG/1
TABLET, FILM COATED ORAL
Qty: 90 TAB | Refills: 0 | Status: SHIPPED | OUTPATIENT
Start: 2018-01-01 | End: 2018-01-01 | Stop reason: SDUPTHER

## 2018-01-01 RX ORDER — NYSTATIN 100000 U/G
CREAM TOPICAL 2 TIMES DAILY
Qty: 60 G | Refills: 2 | Status: SHIPPED | OUTPATIENT
Start: 2018-01-01 | End: 2018-01-01

## 2018-01-01 RX ORDER — METOCLOPRAMIDE 5 MG/1
TABLET ORAL
Qty: 100 TAB | Refills: 0 | Status: SHIPPED | OUTPATIENT
Start: 2018-01-01 | End: 2018-01-01

## 2018-01-01 RX ORDER — INSULIN GLARGINE 100 [IU]/ML
25 INJECTION, SOLUTION SUBCUTANEOUS
Status: DISCONTINUED | OUTPATIENT
Start: 2018-01-01 | End: 2018-01-01

## 2018-01-01 RX ORDER — MAGNESIUM SULFATE 100 %
4 CRYSTALS MISCELLANEOUS AS NEEDED
Status: DISCONTINUED | OUTPATIENT
Start: 2018-01-01 | End: 2018-01-01 | Stop reason: HOSPADM

## 2018-01-01 RX ORDER — ATENOLOL 50 MG/1
50 TABLET ORAL DAILY
Status: DISCONTINUED | OUTPATIENT
Start: 2018-01-01 | End: 2018-01-01 | Stop reason: HOSPADM

## 2018-01-01 RX ORDER — SODIUM CHLORIDE 0.9 % (FLUSH) 0.9 %
10 SYRINGE (ML) INJECTION EVERY 24 HOURS
Status: CANCELLED | OUTPATIENT
Start: 2018-01-01

## 2018-01-01 RX ORDER — ATORVASTATIN CALCIUM 40 MG/1
40 TABLET, FILM COATED ORAL DAILY
Status: DISCONTINUED | OUTPATIENT
Start: 2018-01-01 | End: 2018-01-01 | Stop reason: HOSPADM

## 2018-01-01 RX ORDER — MICONAZOLE NITRATE 20 MG/G
CREAM TOPICAL 2 TIMES DAILY
Status: DISCONTINUED | OUTPATIENT
Start: 2018-01-01 | End: 2018-01-01

## 2018-01-01 RX ORDER — SODIUM CHLORIDE 0.9 % (FLUSH) 0.9 %
10-40 SYRINGE (ML) INJECTION EVERY 8 HOURS
Status: CANCELLED | OUTPATIENT
Start: 2018-01-01

## 2018-01-01 RX ORDER — DILTIAZEM HYDROCHLORIDE 180 MG/1
360 CAPSULE, COATED, EXTENDED RELEASE ORAL DAILY
Status: DISCONTINUED | OUTPATIENT
Start: 2018-01-01 | End: 2018-01-01 | Stop reason: HOSPADM

## 2018-01-01 RX ORDER — ALLOPURINOL 300 MG/1
150 TABLET ORAL DAILY
Status: DISCONTINUED | OUTPATIENT
Start: 2018-01-01 | End: 2018-01-01

## 2018-01-01 RX ORDER — LINEZOLID 600 MG/1
600 TABLET, FILM COATED ORAL EVERY 12 HOURS
Status: DISCONTINUED | OUTPATIENT
Start: 2018-01-01 | End: 2018-01-01

## 2018-01-01 RX ORDER — SODIUM CHLORIDE 0.9 % (FLUSH) 0.9 %
5-10 SYRINGE (ML) INJECTION AS NEEDED
Status: DISCONTINUED | OUTPATIENT
Start: 2018-01-01 | End: 2018-01-01 | Stop reason: HOSPADM

## 2018-01-01 RX ORDER — ACETAMINOPHEN 325 MG/1
650 TABLET ORAL
Status: DISCONTINUED | OUTPATIENT
Start: 2018-01-01 | End: 2018-01-01 | Stop reason: HOSPADM

## 2018-01-01 RX ORDER — VANCOMYCIN HYDROCHLORIDE
1250
Status: DISCONTINUED | OUTPATIENT
Start: 2018-01-01 | End: 2018-01-01

## 2018-01-01 RX ORDER — POTASSIUM CHLORIDE 1.5 G/1.77G
20 POWDER, FOR SOLUTION ORAL DAILY
Status: DISCONTINUED | OUTPATIENT
Start: 2018-01-01 | End: 2018-01-01 | Stop reason: HOSPADM

## 2018-01-01 RX ORDER — METOCLOPRAMIDE 5 MG/1
5 TABLET ORAL 4 TIMES DAILY
Status: ON HOLD | COMMUNITY
End: 2018-01-01

## 2018-01-01 RX ORDER — DOCUSATE SODIUM 100 MG/1
100 CAPSULE, LIQUID FILLED ORAL 2 TIMES DAILY
Status: DISCONTINUED | OUTPATIENT
Start: 2018-01-01 | End: 2018-01-01 | Stop reason: HOSPADM

## 2018-01-01 RX ORDER — SODIUM CHLORIDE 9 MG/ML
75 INJECTION, SOLUTION INTRAVENOUS CONTINUOUS
Status: DISCONTINUED | OUTPATIENT
Start: 2018-01-01 | End: 2018-01-01

## 2018-01-01 RX ORDER — SODIUM CHLORIDE 9 MG/ML
50 INJECTION, SOLUTION INTRAVENOUS
Status: DISCONTINUED | OUTPATIENT
Start: 2018-01-01 | End: 2018-01-01

## 2018-01-01 RX ORDER — DIPHENHYDRAMINE HYDROCHLORIDE 50 MG/ML
12.5 INJECTION, SOLUTION INTRAMUSCULAR; INTRAVENOUS AS NEEDED
Status: DISCONTINUED | OUTPATIENT
Start: 2018-01-01 | End: 2018-01-01 | Stop reason: HOSPADM

## 2018-01-01 RX ORDER — ESCITALOPRAM OXALATE 10 MG/1
10 TABLET ORAL DAILY
Status: DISCONTINUED | OUTPATIENT
Start: 2018-01-01 | End: 2018-01-01

## 2018-01-01 RX ORDER — POTASSIUM CHLORIDE 20 MEQ/1
20 TABLET, EXTENDED RELEASE ORAL 2 TIMES DAILY
Qty: 60 TAB | Refills: 3 | Status: SHIPPED | OUTPATIENT
Start: 2018-01-01 | End: 2018-01-01

## 2018-01-01 RX ORDER — HYDRALAZINE HYDROCHLORIDE 50 MG/1
25 TABLET, FILM COATED ORAL 3 TIMES DAILY
Qty: 100 TAB | Refills: 0 | Status: SHIPPED | COMMUNITY
Start: 2018-01-01 | End: 2018-01-01

## 2018-01-01 RX ORDER — CHLORPHENIRAMINE MALEATE 4 MG
TABLET ORAL 2 TIMES DAILY
Qty: 15 G | Refills: 0 | Status: SHIPPED | COMMUNITY
Start: 2018-01-01 | End: 2018-01-01

## 2018-01-01 RX ORDER — SODIUM CHLORIDE 0.9 % (FLUSH) 0.9 %
5-10 SYRINGE (ML) INJECTION EVERY 8 HOURS
Status: DISCONTINUED | OUTPATIENT
Start: 2018-01-01 | End: 2018-01-01 | Stop reason: HOSPADM

## 2018-01-01 RX ORDER — MIRTAZAPINE 15 MG/1
15 TABLET, FILM COATED ORAL
Status: DISCONTINUED | OUTPATIENT
Start: 2018-01-01 | End: 2018-01-01 | Stop reason: HOSPADM

## 2018-01-01 RX ORDER — METOCLOPRAMIDE HYDROCHLORIDE 5 MG/ML
5 INJECTION INTRAMUSCULAR; INTRAVENOUS
Status: DISCONTINUED | OUTPATIENT
Start: 2018-01-01 | End: 2018-01-01

## 2018-01-01 RX ORDER — CHLORHEXIDINE GLUCONATE 4 G/100ML
SOLUTION TOPICAL EVERY OTHER DAY
COMMUNITY
End: 2018-01-01

## 2018-01-01 RX ORDER — BACITRACIN 500 UNIT/G
1 PACKET (EA) TOPICAL AS NEEDED
Status: CANCELLED | OUTPATIENT
Start: 2018-01-01

## 2018-01-01 RX ORDER — INSULIN GLARGINE 100 [IU]/ML
20 INJECTION, SOLUTION SUBCUTANEOUS
Status: DISCONTINUED | OUTPATIENT
Start: 2018-01-01 | End: 2018-01-01

## 2018-01-01 RX ORDER — HYDRALAZINE HYDROCHLORIDE 20 MG/ML
INJECTION INTRAMUSCULAR; INTRAVENOUS AS NEEDED
Status: DISCONTINUED | OUTPATIENT
Start: 2018-01-01 | End: 2018-01-01 | Stop reason: HOSPADM

## 2018-01-01 RX ORDER — NYSTATIN 100000 U/G
CREAM TOPICAL 2 TIMES DAILY
Status: DISCONTINUED | OUTPATIENT
Start: 2018-01-01 | End: 2018-01-01 | Stop reason: CLARIF

## 2018-01-01 RX ORDER — NYSTATIN 100000 U/G
CREAM TOPICAL
Qty: 30 G | Refills: 5 | Status: SHIPPED | OUTPATIENT
Start: 2018-01-01

## 2018-01-01 RX ORDER — MIDAZOLAM HYDROCHLORIDE 1 MG/ML
INJECTION, SOLUTION INTRAMUSCULAR; INTRAVENOUS AS NEEDED
Status: DISCONTINUED | OUTPATIENT
Start: 2018-01-01 | End: 2018-01-01 | Stop reason: HOSPADM

## 2018-01-01 RX ORDER — ASPIRIN AND DIPYRIDAMOLE 25; 200 MG/1; MG/1
1 CAPSULE, EXTENDED RELEASE ORAL EVERY 12 HOURS
Status: DISCONTINUED | OUTPATIENT
Start: 2018-01-01 | End: 2018-01-01 | Stop reason: HOSPADM

## 2018-01-01 RX ORDER — ASPIRIN AND DIPYRIDAMOLE 25; 200 MG/1; MG/1
CAPSULE, EXTENDED RELEASE ORAL
Qty: 60 CAP | Refills: 0 | Status: SHIPPED | OUTPATIENT
Start: 2018-01-01 | End: 2018-01-01 | Stop reason: SDUPTHER

## 2018-01-01 RX ORDER — INSULIN GLARGINE 100 [IU]/ML
30 INJECTION, SOLUTION SUBCUTANEOUS
Status: DISCONTINUED | OUTPATIENT
Start: 2018-01-01 | End: 2018-01-01

## 2018-01-01 RX ORDER — ALLOPURINOL 300 MG/1
150 TABLET ORAL DAILY
Qty: 90 TAB | Refills: 0 | Status: SHIPPED | COMMUNITY
Start: 2018-01-01 | End: 2018-01-01

## 2018-01-01 RX ORDER — SODIUM CHLORIDE 0.9 % (FLUSH) 0.9 %
10-30 SYRINGE (ML) INJECTION AS NEEDED
Status: DISCONTINUED | OUTPATIENT
Start: 2018-01-01 | End: 2018-01-01 | Stop reason: HOSPADM

## 2018-01-01 RX ORDER — HYDROCODONE BITARTRATE AND ACETAMINOPHEN 5; 325 MG/1; MG/1
1 TABLET ORAL
Status: DISCONTINUED | OUTPATIENT
Start: 2018-01-01 | End: 2018-01-01 | Stop reason: HOSPADM

## 2018-01-01 RX ORDER — BUPIVACAINE HYDROCHLORIDE 5 MG/ML
INJECTION, SOLUTION EPIDURAL; INTRACAUDAL AS NEEDED
Status: DISCONTINUED | OUTPATIENT
Start: 2018-01-01 | End: 2018-01-01 | Stop reason: HOSPADM

## 2018-01-01 RX ORDER — PANTOPRAZOLE SODIUM 40 MG/1
TABLET, DELAYED RELEASE ORAL
Qty: 30 TAB | Refills: 0 | Status: SHIPPED | OUTPATIENT
Start: 2018-01-01 | End: 2018-01-01 | Stop reason: SDUPTHER

## 2018-01-01 RX ORDER — VANCOMYCIN 2 GRAM/500 ML IN 0.9 % SODIUM CHLORIDE INTRAVENOUS
2000
Status: COMPLETED | OUTPATIENT
Start: 2018-01-01 | End: 2018-01-01

## 2018-01-01 RX ORDER — ALBUMIN HUMAN 50 G/1000ML
SOLUTION INTRAVENOUS AS NEEDED
Status: DISCONTINUED | OUTPATIENT
Start: 2018-01-01 | End: 2018-01-01

## 2018-01-01 RX ORDER — POTASSIUM CHLORIDE 20 MEQ/1
20 TABLET, EXTENDED RELEASE ORAL 2 TIMES DAILY
Status: DISCONTINUED | OUTPATIENT
Start: 2018-01-01 | End: 2018-01-01

## 2018-01-01 RX ORDER — IPRATROPIUM BROMIDE AND ALBUTEROL SULFATE 2.5; .5 MG/3ML; MG/3ML
3 SOLUTION RESPIRATORY (INHALATION)
Status: DISCONTINUED | OUTPATIENT
Start: 2018-01-01 | End: 2018-01-01

## 2018-01-01 RX ORDER — HYDRALAZINE HYDROCHLORIDE 50 MG/1
50 TABLET, FILM COATED ORAL 3 TIMES DAILY
Status: DISCONTINUED | OUTPATIENT
Start: 2018-01-01 | End: 2018-01-01

## 2018-01-01 RX ORDER — NYSTATIN 100000 U/G
CREAM TOPICAL 2 TIMES DAILY
Status: DISCONTINUED | OUTPATIENT
Start: 2018-01-01 | End: 2018-01-01 | Stop reason: HOSPADM

## 2018-01-01 RX ORDER — ATORVASTATIN CALCIUM 40 MG/1
40 TABLET, FILM COATED ORAL
Status: DISCONTINUED | OUTPATIENT
Start: 2018-01-01 | End: 2018-01-01

## 2018-01-01 RX ORDER — NYSTATIN 100000 U/G
CREAM TOPICAL 2 TIMES DAILY
Qty: 30 G | Refills: 0 | Status: SHIPPED | OUTPATIENT
Start: 2018-01-01 | End: 2018-01-01 | Stop reason: SDUPTHER

## 2018-01-01 RX ORDER — FLUCONAZOLE 2 MG/ML
200 INJECTION, SOLUTION INTRAVENOUS EVERY 24 HOURS
Status: DISCONTINUED | OUTPATIENT
Start: 2018-01-01 | End: 2018-01-01

## 2018-01-01 RX ORDER — FUROSEMIDE 10 MG/ML
80 INJECTION INTRAMUSCULAR; INTRAVENOUS ONCE
Status: COMPLETED | OUTPATIENT
Start: 2018-01-01 | End: 2018-01-01

## 2018-01-01 RX ORDER — MONTELUKAST SODIUM 4 MG/1
1 TABLET, CHEWABLE ORAL
Status: DISCONTINUED | OUTPATIENT
Start: 2018-01-01 | End: 2018-01-01

## 2018-01-01 RX ORDER — GLYCOPYRROLATE 0.2 MG/ML
INJECTION INTRAMUSCULAR; INTRAVENOUS AS NEEDED
Status: DISCONTINUED | OUTPATIENT
Start: 2018-01-01 | End: 2018-01-01 | Stop reason: HOSPADM

## 2018-01-01 RX ORDER — LEVOFLOXACIN 5 MG/ML
750 INJECTION, SOLUTION INTRAVENOUS EVERY 24 HOURS
Status: DISCONTINUED | OUTPATIENT
Start: 2018-01-01 | End: 2018-01-01 | Stop reason: SDUPTHER

## 2018-01-01 RX ORDER — CEPHALEXIN 500 MG/1
500 CAPSULE ORAL 3 TIMES DAILY
Qty: 21 CAP | Refills: 0 | Status: SHIPPED | OUTPATIENT
Start: 2018-01-01 | End: 2018-01-01

## 2018-01-01 RX ORDER — INSULIN LISPRO 100 [IU]/ML
INJECTION, SOLUTION INTRAVENOUS; SUBCUTANEOUS
Qty: 1 VIAL | Status: SHIPPED | COMMUNITY
Start: 2018-01-01

## 2018-01-01 RX ORDER — LABETALOL HYDROCHLORIDE 5 MG/ML
INJECTION, SOLUTION INTRAVENOUS AS NEEDED
Status: DISCONTINUED | OUTPATIENT
Start: 2018-01-01 | End: 2018-01-01 | Stop reason: HOSPADM

## 2018-01-01 RX ORDER — CEFAZOLIN SODIUM/WATER 2 G/20 ML
SYRINGE (ML) INTRAVENOUS
Status: DISCONTINUED
Start: 2018-01-01 | End: 2018-01-01

## 2018-01-01 RX ORDER — SODIUM CHLORIDE 9 MG/ML
250 INJECTION, SOLUTION INTRAVENOUS AS NEEDED
Status: DISCONTINUED | OUTPATIENT
Start: 2018-01-01 | End: 2018-01-01 | Stop reason: HOSPADM

## 2018-01-01 RX ORDER — METOCLOPRAMIDE 5 MG/1
TABLET ORAL
Qty: 100 TAB | Refills: 0 | Status: SHIPPED | OUTPATIENT
Start: 2018-01-01 | End: 2018-01-01 | Stop reason: SDUPTHER

## 2018-01-01 RX ORDER — ATENOLOL 50 MG/1
TABLET ORAL
Qty: 90 TAB | Refills: 0 | Status: SHIPPED | OUTPATIENT
Start: 2018-01-01

## 2018-01-01 RX ORDER — ALBUMIN HUMAN 250 G/1000ML
12.5 SOLUTION INTRAVENOUS ONCE
Status: COMPLETED | OUTPATIENT
Start: 2018-01-01 | End: 2018-01-01

## 2018-01-01 RX ORDER — BUMETANIDE 0.25 MG/ML
2 INJECTION INTRAMUSCULAR; INTRAVENOUS ONCE
Status: COMPLETED | OUTPATIENT
Start: 2018-01-01 | End: 2018-01-01

## 2018-01-01 RX ORDER — DEXTROSE, SODIUM CHLORIDE, AND POTASSIUM CHLORIDE 5; .45; .075 G/100ML; G/100ML; G/100ML
100 INJECTION INTRAVENOUS CONTINUOUS
Status: DISCONTINUED | OUTPATIENT
Start: 2018-01-01 | End: 2018-01-01

## 2018-01-01 RX ORDER — MIRTAZAPINE 15 MG/1
15 TABLET, FILM COATED ORAL
Qty: 30 TAB | Refills: 5 | Status: SHIPPED | OUTPATIENT
Start: 2018-01-01

## 2018-01-01 RX ORDER — HEPARIN SODIUM 5000 [USP'U]/ML
7500 INJECTION, SOLUTION INTRAVENOUS; SUBCUTANEOUS EVERY 8 HOURS
Status: DISCONTINUED | OUTPATIENT
Start: 2018-01-01 | End: 2018-01-01 | Stop reason: HOSPADM

## 2018-01-01 RX ORDER — ENOXAPARIN SODIUM 100 MG/ML
40 INJECTION SUBCUTANEOUS EVERY 12 HOURS
Status: DISCONTINUED | OUTPATIENT
Start: 2018-01-01 | End: 2018-01-01

## 2018-01-01 RX ORDER — SODIUM CHLORIDE, SODIUM LACTATE, POTASSIUM CHLORIDE, CALCIUM CHLORIDE 600; 310; 30; 20 MG/100ML; MG/100ML; MG/100ML; MG/100ML
25 INJECTION, SOLUTION INTRAVENOUS CONTINUOUS
Status: DISCONTINUED | OUTPATIENT
Start: 2018-01-01 | End: 2018-01-01 | Stop reason: HOSPADM

## 2018-01-01 RX ORDER — FLUCONAZOLE 200 MG/1
200 TABLET ORAL DAILY
Qty: 5 TAB | Refills: 0 | Status: SHIPPED | OUTPATIENT
Start: 2018-01-01 | End: 2018-01-01

## 2018-01-01 RX ORDER — ACETAMINOPHEN 325 MG/1
650 TABLET ORAL
Status: DISCONTINUED | OUTPATIENT
Start: 2018-01-01 | End: 2018-01-01

## 2018-01-01 RX ORDER — AMMONIUM LACTATE 12 G/100G
1 LOTION TOPICAL DAILY
Status: SHIPPED | COMMUNITY
Start: 2018-01-01

## 2018-01-01 RX ORDER — BUMETANIDE 1 MG/1
2 TABLET ORAL DAILY
Status: DISCONTINUED | OUTPATIENT
Start: 2018-01-01 | End: 2018-01-01 | Stop reason: HOSPADM

## 2018-01-01 RX ORDER — SODIUM CHLORIDE 0.9 % (FLUSH) 0.9 %
10-40 SYRINGE (ML) INJECTION EVERY 8 HOURS
Status: DISCONTINUED | OUTPATIENT
Start: 2018-01-01 | End: 2018-01-01 | Stop reason: HOSPADM

## 2018-01-01 RX ORDER — HEPARIN 100 UNIT/ML
300 SYRINGE INTRAVENOUS AS NEEDED
Status: DISCONTINUED | OUTPATIENT
Start: 2018-01-01 | End: 2018-01-01 | Stop reason: HOSPADM

## 2018-01-01 RX ORDER — SODIUM CHLORIDE 0.9 % (FLUSH) 0.9 %
10 SYRINGE (ML) INJECTION
Status: DISCONTINUED | OUTPATIENT
Start: 2018-01-01 | End: 2018-01-01

## 2018-01-01 RX ORDER — POLYMYXIN B SULFATE AND TRIMETHOPRIM 1; 10000 MG/ML; [USP'U]/ML
1 SOLUTION OPHTHALMIC 2 TIMES DAILY
Status: DISCONTINUED | OUTPATIENT
Start: 2018-01-01 | End: 2018-01-01 | Stop reason: HOSPADM

## 2018-01-01 RX ORDER — MIRTAZAPINE 15 MG/1
7.5 TABLET, FILM COATED ORAL
Status: DISCONTINUED | OUTPATIENT
Start: 2018-01-01 | End: 2018-01-01

## 2018-01-01 RX ORDER — METOCLOPRAMIDE 5 MG/1
5 TABLET ORAL 4 TIMES DAILY
Qty: 40 TAB | Refills: 0 | Status: SHIPPED | OUTPATIENT
Start: 2018-01-01 | End: 2018-01-01 | Stop reason: SDUPTHER

## 2018-01-01 RX ORDER — INSULIN GLARGINE 100 [IU]/ML
10 INJECTION, SOLUTION SUBCUTANEOUS
Status: DISCONTINUED | OUTPATIENT
Start: 2018-01-01 | End: 2018-01-01

## 2018-01-01 RX ORDER — DEXTROSE 50 % IN WATER (D50W) INTRAVENOUS SYRINGE
12.5-25 AS NEEDED
Status: DISCONTINUED | OUTPATIENT
Start: 2018-01-01 | End: 2018-01-01 | Stop reason: SDUPTHER

## 2018-01-01 RX ORDER — BUMETANIDE 1 MG/1
1 TABLET ORAL DAILY
Status: DISCONTINUED | OUTPATIENT
Start: 2018-01-01 | End: 2018-01-01

## 2018-01-01 RX ORDER — BUMETANIDE 0.25 MG/ML
1 INJECTION INTRAMUSCULAR; INTRAVENOUS ONCE
Status: COMPLETED | OUTPATIENT
Start: 2018-01-01 | End: 2018-01-01

## 2018-01-01 RX ORDER — INSULIN LISPRO 100 [IU]/ML
INJECTION, SOLUTION INTRAVENOUS; SUBCUTANEOUS
Qty: 1 VIAL | Status: SHIPPED | COMMUNITY
Start: 2018-01-01 | End: 2018-01-01

## 2018-01-01 RX ORDER — LEVOFLOXACIN 750 MG/1
750 TABLET ORAL
Status: DISCONTINUED | OUTPATIENT
Start: 2018-01-01 | End: 2018-01-01

## 2018-01-01 RX ORDER — SODIUM CHLORIDE 0.9 % (FLUSH) 0.9 %
10 SYRINGE (ML) INJECTION AS NEEDED
Status: DISCONTINUED | OUTPATIENT
Start: 2018-01-01 | End: 2018-01-01 | Stop reason: HOSPADM

## 2018-01-01 RX ORDER — MONTELUKAST SODIUM 4 MG/1
1 TABLET, CHEWABLE ORAL DAILY
COMMUNITY
End: 2018-01-01

## 2018-01-01 RX ORDER — ASPIRIN AND DIPYRIDAMOLE 25; 200 MG/1; MG/1
1 CAPSULE, EXTENDED RELEASE ORAL 2 TIMES DAILY
Status: DISCONTINUED | OUTPATIENT
Start: 2018-01-01 | End: 2018-01-01

## 2018-01-01 RX ORDER — AMMONIUM LACTATE 12 G/100G
LOTION TOPICAL 2 TIMES DAILY
Status: DISCONTINUED | OUTPATIENT
Start: 2018-01-01 | End: 2018-01-01 | Stop reason: HOSPADM

## 2018-01-01 RX ORDER — BARIUM SULFATE 20 MG/ML
900 SUSPENSION ORAL ONCE
Status: COMPLETED | OUTPATIENT
Start: 2018-01-01 | End: 2018-01-01

## 2018-01-01 RX ORDER — PANTOPRAZOLE SODIUM 40 MG/1
40 TABLET, DELAYED RELEASE ORAL
Status: DISCONTINUED | OUTPATIENT
Start: 2018-01-01 | End: 2018-01-01 | Stop reason: HOSPADM

## 2018-01-01 RX ORDER — INSULIN GLARGINE 100 [IU]/ML
24 INJECTION, SOLUTION SUBCUTANEOUS
Status: DISCONTINUED | OUTPATIENT
Start: 2018-01-01 | End: 2018-01-01

## 2018-01-01 RX ORDER — HEPARIN SODIUM 5000 [USP'U]/ML
5000 INJECTION, SOLUTION INTRAVENOUS; SUBCUTANEOUS EVERY 12 HOURS
Status: DISCONTINUED | OUTPATIENT
Start: 2018-01-01 | End: 2018-01-01 | Stop reason: HOSPADM

## 2018-01-01 RX ORDER — HYDROMORPHONE HYDROCHLORIDE 1 MG/ML
0.2 INJECTION, SOLUTION INTRAMUSCULAR; INTRAVENOUS; SUBCUTANEOUS
Status: DISCONTINUED | OUTPATIENT
Start: 2018-01-01 | End: 2018-01-01 | Stop reason: HOSPADM

## 2018-01-01 RX ORDER — ASPIRIN AND DIPYRIDAMOLE 25; 200 MG/1; MG/1
1 CAPSULE, EXTENDED RELEASE ORAL 2 TIMES DAILY
Status: DISCONTINUED | OUTPATIENT
Start: 2018-01-01 | End: 2018-01-01 | Stop reason: HOSPADM

## 2018-01-01 RX ORDER — HYDROMORPHONE HYDROCHLORIDE 2 MG/ML
0.5 INJECTION, SOLUTION INTRAMUSCULAR; INTRAVENOUS; SUBCUTANEOUS ONCE
Status: COMPLETED | OUTPATIENT
Start: 2018-01-01 | End: 2018-01-01

## 2018-01-01 RX ORDER — VANCOMYCIN 1.75 GRAM/500 ML IN 0.9 % SODIUM CHLORIDE INTRAVENOUS
1750 EVERY 24 HOURS
Status: DISCONTINUED | OUTPATIENT
Start: 2018-01-01 | End: 2018-01-01

## 2018-01-01 RX ORDER — INSULIN GLARGINE 100 [IU]/ML
6 INJECTION, SOLUTION SUBCUTANEOUS DAILY
Status: DISCONTINUED | OUTPATIENT
Start: 2018-01-01 | End: 2018-01-01 | Stop reason: HOSPADM

## 2018-01-01 RX ORDER — NYSTATIN 100000 [USP'U]/G
POWDER TOPICAL 2 TIMES DAILY
Qty: 60 G | Refills: 5 | Status: SHIPPED | OUTPATIENT
Start: 2018-01-01 | End: 2018-01-01

## 2018-01-01 RX ORDER — ALBUMIN HUMAN 50 G/1000ML
SOLUTION INTRAVENOUS
Status: DISCONTINUED | OUTPATIENT
Start: 2018-01-01 | End: 2018-01-01 | Stop reason: HOSPADM

## 2018-01-01 RX ORDER — VANCOMYCIN 1.75 GRAM/500 ML IN 0.9 % SODIUM CHLORIDE INTRAVENOUS
1750
Status: DISCONTINUED | OUTPATIENT
Start: 2018-01-01 | End: 2018-01-01

## 2018-01-01 RX ORDER — HYDRALAZINE HYDROCHLORIDE 50 MG/1
25 TABLET, FILM COATED ORAL 3 TIMES DAILY
Qty: 100 TAB | Refills: 0 | Status: SHIPPED | OUTPATIENT
Start: 2018-01-01 | End: 2018-01-01

## 2018-01-01 RX ORDER — LEVOFLOXACIN 5 MG/ML
750 INJECTION, SOLUTION INTRAVENOUS ONCE
Status: COMPLETED | OUTPATIENT
Start: 2018-01-01 | End: 2018-01-01

## 2018-01-01 RX ORDER — SODIUM CHLORIDE 0.9 % (FLUSH) 0.9 %
10-30 SYRINGE (ML) INJECTION AS NEEDED
Status: CANCELLED | OUTPATIENT
Start: 2018-01-01

## 2018-01-01 RX ORDER — POLYETHYLENE GLYCOL 3350 17 G/17G
17 POWDER, FOR SOLUTION ORAL DAILY
Status: DISCONTINUED | OUTPATIENT
Start: 2018-01-01 | End: 2018-01-01 | Stop reason: HOSPADM

## 2018-01-01 RX ORDER — BUMETANIDE 1 MG/1
3 TABLET ORAL DAILY
Status: DISCONTINUED | OUTPATIENT
Start: 2018-01-01 | End: 2018-01-01

## 2018-01-01 RX ORDER — KETAMINE HYDROCHLORIDE 10 MG/ML
INJECTION, SOLUTION INTRAMUSCULAR; INTRAVENOUS AS NEEDED
Status: DISCONTINUED | OUTPATIENT
Start: 2018-01-01 | End: 2018-01-01 | Stop reason: HOSPADM

## 2018-01-01 RX ORDER — ENOXAPARIN SODIUM 100 MG/ML
40 INJECTION SUBCUTANEOUS EVERY 24 HOURS
Status: DISCONTINUED | OUTPATIENT
Start: 2018-01-01 | End: 2018-01-01 | Stop reason: DRUGHIGH

## 2018-01-01 RX ORDER — MORPHINE SULFATE 2 MG/ML
2 INJECTION, SOLUTION INTRAMUSCULAR; INTRAVENOUS
Status: DISCONTINUED | OUTPATIENT
Start: 2018-01-01 | End: 2018-01-01 | Stop reason: HOSPADM

## 2018-01-01 RX ORDER — MUPIROCIN 20 MG/G
OINTMENT TOPICAL 2 TIMES DAILY
Status: DISCONTINUED | OUTPATIENT
Start: 2018-01-01 | End: 2018-01-01 | Stop reason: HOSPADM

## 2018-01-01 RX ORDER — CEPHALEXIN 500 MG/1
500 CAPSULE ORAL 4 TIMES DAILY
Qty: 28 CAP | Refills: 0 | Status: SHIPPED | OUTPATIENT
Start: 2018-01-01 | End: 2018-01-01

## 2018-01-01 RX ORDER — INSULIN GLARGINE 100 [IU]/ML
15 INJECTION, SOLUTION SUBCUTANEOUS
Status: DISCONTINUED | OUTPATIENT
Start: 2018-01-01 | End: 2018-01-01

## 2018-01-01 RX ORDER — TRAMADOL HYDROCHLORIDE 50 MG/1
50-100 TABLET ORAL
Status: SHIPPED | COMMUNITY
Start: 2018-01-01 | End: 2018-01-01 | Stop reason: SDUPTHER

## 2018-01-01 RX ORDER — LIDOCAINE HYDROCHLORIDE 10 MG/ML
0.1 INJECTION, SOLUTION EPIDURAL; INFILTRATION; INTRACAUDAL; PERINEURAL AS NEEDED
Status: DISCONTINUED | OUTPATIENT
Start: 2018-01-01 | End: 2018-01-01 | Stop reason: HOSPADM

## 2018-01-01 RX ORDER — ACETAMINOPHEN 500 MG
1000 TABLET ORAL
Status: COMPLETED | OUTPATIENT
Start: 2018-01-01 | End: 2018-01-01

## 2018-01-01 RX ORDER — NYSTATIN 100000 [USP'U]/G
POWDER TOPICAL 2 TIMES DAILY
Status: DISCONTINUED | OUTPATIENT
Start: 2018-01-01 | End: 2018-01-01 | Stop reason: HOSPADM

## 2018-01-01 RX ORDER — PANTOPRAZOLE SODIUM 40 MG/1
40 TABLET, DELAYED RELEASE ORAL DAILY
Qty: 30 TAB | Refills: 1 | Status: SHIPPED | OUTPATIENT
Start: 2018-01-01 | End: 2018-01-01 | Stop reason: SDUPTHER

## 2018-01-01 RX ORDER — DILTIAZEM HYDROCHLORIDE 240 MG/1
240 CAPSULE, COATED, EXTENDED RELEASE ORAL DAILY
Qty: 90 CAP | Refills: 3 | Status: SHIPPED | OUTPATIENT
Start: 2018-01-01

## 2018-01-01 RX ORDER — CHLORPHENIRAMINE MALEATE 4 MG
TABLET ORAL 2 TIMES DAILY
Status: DISCONTINUED | OUTPATIENT
Start: 2018-01-01 | End: 2018-01-01 | Stop reason: HOSPADM

## 2018-01-01 RX ORDER — PREGABALIN 75 MG/1
CAPSULE ORAL
Qty: 180 CAP | Refills: 0 | Status: SHIPPED | OUTPATIENT
Start: 2018-01-01 | End: 2018-01-01

## 2018-01-01 RX ORDER — BUMETANIDE 1 MG/1
1 TABLET ORAL 2 TIMES DAILY
Status: DISCONTINUED | OUTPATIENT
Start: 2018-01-01 | End: 2018-01-01 | Stop reason: HOSPADM

## 2018-01-01 RX ORDER — METOCLOPRAMIDE 10 MG/1
5 TABLET ORAL 4 TIMES DAILY
Status: DISCONTINUED | OUTPATIENT
Start: 2018-01-01 | End: 2018-01-01

## 2018-01-01 RX ORDER — MAGNESIUM SULFATE 100 %
4 CRYSTALS MISCELLANEOUS AS NEEDED
Status: DISCONTINUED | OUTPATIENT
Start: 2018-01-01 | End: 2018-01-01 | Stop reason: SDUPTHER

## 2018-01-01 RX ORDER — DEXTROSE 50 % IN WATER (D50W) INTRAVENOUS SYRINGE
25-50 AS NEEDED
Status: DISCONTINUED | OUTPATIENT
Start: 2018-01-01 | End: 2018-01-01 | Stop reason: HOSPADM

## 2018-01-01 RX ORDER — BLOOD-GLUCOSE METER
KIT MISCELLANEOUS
Qty: 100 STRIP | Refills: 0 | Status: SHIPPED | OUTPATIENT
Start: 2018-01-01

## 2018-01-01 RX ORDER — BUMETANIDE 0.25 MG/ML
2 INJECTION INTRAMUSCULAR; INTRAVENOUS ONCE
Status: DISCONTINUED | OUTPATIENT
Start: 2018-01-01 | End: 2018-01-01

## 2018-01-01 RX ORDER — INSULIN LISPRO 100 [IU]/ML
INJECTION, SOLUTION INTRAVENOUS; SUBCUTANEOUS
Status: DISCONTINUED | OUTPATIENT
Start: 2018-01-01 | End: 2018-01-01 | Stop reason: HOSPADM

## 2018-01-01 RX ORDER — DEXTROSE MONOHYDRATE 50 MG/ML
50 INJECTION, SOLUTION INTRAVENOUS CONTINUOUS
Status: DISCONTINUED | OUTPATIENT
Start: 2018-01-01 | End: 2018-01-01

## 2018-01-01 RX ORDER — METOCLOPRAMIDE HYDROCHLORIDE 5 MG/5ML
10 SOLUTION ORAL
Status: DISCONTINUED | OUTPATIENT
Start: 2018-01-01 | End: 2018-01-01 | Stop reason: HOSPADM

## 2018-01-01 RX ORDER — HYDRALAZINE HYDROCHLORIDE 25 MG/1
25 TABLET, FILM COATED ORAL 3 TIMES DAILY
Status: DISCONTINUED | OUTPATIENT
Start: 2018-01-01 | End: 2018-01-01 | Stop reason: HOSPADM

## 2018-01-01 RX ORDER — GLIPIZIDE 10 MG/1
10 TABLET ORAL 2 TIMES DAILY
COMMUNITY
End: 2018-01-01

## 2018-01-01 RX ORDER — DILTIAZEM HYDROCHLORIDE 180 MG/1
360 CAPSULE, COATED, EXTENDED RELEASE ORAL
Status: COMPLETED | OUTPATIENT
Start: 2018-01-01 | End: 2018-01-01

## 2018-01-01 RX ORDER — DEXTROSE 50 % IN WATER (D50W) INTRAVENOUS SYRINGE
12.5-25 AS NEEDED
Status: DISCONTINUED | OUTPATIENT
Start: 2018-01-01 | End: 2018-01-01 | Stop reason: HOSPADM

## 2018-01-01 RX ORDER — ASPIRIN AND DIPYRIDAMOLE 25; 200 MG/1; MG/1
CAPSULE, EXTENDED RELEASE ORAL
Qty: 60 CAP | Refills: 3 | Status: SHIPPED | OUTPATIENT
Start: 2018-01-01

## 2018-01-01 RX ORDER — FLUCONAZOLE 200 MG/1
200 TABLET ORAL DAILY
Status: COMPLETED | OUTPATIENT
Start: 2018-01-01 | End: 2018-01-01

## 2018-01-01 RX ORDER — ATENOLOL 50 MG/1
50 TABLET ORAL
Status: COMPLETED | OUTPATIENT
Start: 2018-01-01 | End: 2018-01-01

## 2018-01-01 RX ORDER — LEVOFLOXACIN 5 MG/ML
750 INJECTION, SOLUTION INTRAVENOUS
Status: DISCONTINUED | OUTPATIENT
Start: 2018-01-01 | End: 2018-01-01

## 2018-01-01 RX ORDER — DOCUSATE SODIUM 100 MG/1
100 CAPSULE, LIQUID FILLED ORAL
Status: DISCONTINUED | OUTPATIENT
Start: 2018-01-01 | End: 2018-01-01 | Stop reason: HOSPADM

## 2018-01-01 RX ORDER — METRONIDAZOLE 500 MG/100ML
500 INJECTION, SOLUTION INTRAVENOUS EVERY 8 HOURS
Status: DISCONTINUED | OUTPATIENT
Start: 2018-01-01 | End: 2018-01-01

## 2018-01-01 RX ORDER — LANOLIN ALCOHOL/MO/W.PET/CERES
325 CREAM (GRAM) TOPICAL
Status: DISCONTINUED | OUTPATIENT
Start: 2018-01-01 | End: 2018-01-01

## 2018-01-01 RX ORDER — HEPARIN 100 UNIT/ML
300 SYRINGE INTRAVENOUS AS NEEDED
Status: CANCELLED | OUTPATIENT
Start: 2018-01-01

## 2018-01-01 RX ORDER — MUPIROCIN 20 MG/G
OINTMENT TOPICAL 2 TIMES DAILY
Qty: 22 G | Refills: 0 | Status: SHIPPED | OUTPATIENT
Start: 2018-01-01

## 2018-01-01 RX ORDER — MONTELUKAST SODIUM 4 MG/1
1 TABLET, CHEWABLE ORAL DAILY
Status: DISCONTINUED | OUTPATIENT
Start: 2018-01-01 | End: 2018-01-01

## 2018-01-01 RX ORDER — TRAMADOL HYDROCHLORIDE 50 MG/1
50-100 TABLET ORAL
Status: DISCONTINUED | OUTPATIENT
Start: 2018-01-01 | End: 2018-01-01 | Stop reason: HOSPADM

## 2018-01-01 RX ORDER — PREGABALIN 25 MG/1
75 CAPSULE ORAL 2 TIMES DAILY
Status: DISCONTINUED | OUTPATIENT
Start: 2018-01-01 | End: 2018-01-01

## 2018-01-01 RX ORDER — CHLORPHENIRAMINE MALEATE 4 MG
TABLET ORAL 2 TIMES DAILY
Status: DISCONTINUED | OUTPATIENT
Start: 2018-01-01 | End: 2018-01-01 | Stop reason: CLARIF

## 2018-01-01 RX ORDER — SODIUM CHLORIDE 9 MG/ML
100 INJECTION, SOLUTION INTRAVENOUS CONTINUOUS
Status: DISPENSED | OUTPATIENT
Start: 2018-01-01 | End: 2018-01-01

## 2018-01-01 RX ORDER — SODIUM CHLORIDE 0.9 % (FLUSH) 0.9 %
20 SYRINGE (ML) INJECTION EVERY 24 HOURS
Status: DISCONTINUED | OUTPATIENT
Start: 2018-01-01 | End: 2018-01-01

## 2018-01-01 RX ORDER — POTASSIUM CHLORIDE 1.5 G/1.77G
20 POWDER, FOR SOLUTION ORAL DAILY
Qty: 30 PACKET | Refills: 5 | Status: SHIPPED | OUTPATIENT
Start: 2018-01-01

## 2018-01-01 RX ORDER — SODIUM CHLORIDE 0.9 % (FLUSH) 0.9 %
5-10 SYRINGE (ML) INJECTION AS NEEDED
Status: DISCONTINUED | OUTPATIENT
Start: 2018-01-01 | End: 2018-01-01

## 2018-01-01 RX ORDER — ACETAMINOPHEN 500 MG
1000 TABLET ORAL
COMMUNITY

## 2018-01-01 RX ORDER — DILTIAZEM HYDROCHLORIDE 240 MG/1
240 CAPSULE, COATED, EXTENDED RELEASE ORAL DAILY
Status: DISCONTINUED | OUTPATIENT
Start: 2018-01-01 | End: 2018-01-01 | Stop reason: HOSPADM

## 2018-01-01 RX ORDER — METOCLOPRAMIDE HYDROCHLORIDE 5 MG/5ML
10 SOLUTION ORAL
Qty: 1000 ML | Refills: 5 | Status: SHIPPED | OUTPATIENT
Start: 2018-01-01

## 2018-01-01 RX ORDER — IPRATROPIUM BROMIDE AND ALBUTEROL SULFATE 2.5; .5 MG/3ML; MG/3ML
3 SOLUTION RESPIRATORY (INHALATION)
Status: DISCONTINUED | OUTPATIENT
Start: 2018-01-01 | End: 2018-01-01 | Stop reason: HOSPADM

## 2018-01-01 RX ORDER — BUMETANIDE 0.25 MG/ML
2 INJECTION INTRAMUSCULAR; INTRAVENOUS ONCE
Status: DISCONTINUED | OUTPATIENT
Start: 2018-01-01 | End: 2018-01-01 | Stop reason: RX

## 2018-01-01 RX ADMIN — SODIUM CHLORIDE 75 ML/HR: 900 INJECTION, SOLUTION INTRAVENOUS at 11:37

## 2018-01-01 RX ADMIN — Medication 10 ML: at 05:53

## 2018-01-01 RX ADMIN — HEPARIN SODIUM 5000 UNITS: 5000 INJECTION, SOLUTION INTRAVENOUS; SUBCUTANEOUS at 09:27

## 2018-01-01 RX ADMIN — INSULIN LISPRO 2 UNITS: 100 INJECTION, SOLUTION INTRAVENOUS; SUBCUTANEOUS at 11:03

## 2018-01-01 RX ADMIN — ONDANSETRON 4 MG: 2 INJECTION INTRAMUSCULAR; INTRAVENOUS at 13:36

## 2018-01-01 RX ADMIN — ASPIRIN AND DIPYRIDAMOLE 1 CAPSULE: 25; 200 CAPSULE, EXTENDED RELEASE ORAL at 20:46

## 2018-01-01 RX ADMIN — METOCLOPRAMIDE HYDROCHLORIDE 10 MG: 5 SOLUTION ORAL at 18:20

## 2018-01-01 RX ADMIN — SODIUM CHLORIDE 40 MG: 9 INJECTION, SOLUTION INTRAMUSCULAR; INTRAVENOUS; SUBCUTANEOUS at 22:26

## 2018-01-01 RX ADMIN — Medication 10 ML: at 22:22

## 2018-01-01 RX ADMIN — INSULIN LISPRO 1 UNITS: 100 INJECTION, SOLUTION INTRAVENOUS; SUBCUTANEOUS at 22:51

## 2018-01-01 RX ADMIN — POLYMYXIN B SULFATE AND TRIMETHOPRIM SULFATE 1 DROP: 10000; 1 SOLUTION/ DROPS OPHTHALMIC at 10:24

## 2018-01-01 RX ADMIN — ASPIRIN AND DIPYRIDAMOLE 1 CAPSULE: 25; 200 CAPSULE, EXTENDED RELEASE ORAL at 09:40

## 2018-01-01 RX ADMIN — NYSTATIN: 100000 POWDER TOPICAL at 20:24

## 2018-01-01 RX ADMIN — ASPIRIN AND DIPYRIDAMOLE 1 CAPSULE: 25; 200 CAPSULE, EXTENDED RELEASE ORAL at 22:11

## 2018-01-01 RX ADMIN — Medication: at 18:00

## 2018-01-01 RX ADMIN — MICONAZOLE NITRATE: 20 CREAM TOPICAL at 09:25

## 2018-01-01 RX ADMIN — NYSTATIN: 100000 POWDER TOPICAL at 09:33

## 2018-01-01 RX ADMIN — SODIUM BICARBONATE 650 MG: 650 TABLET ORAL at 09:40

## 2018-01-01 RX ADMIN — PANTOPRAZOLE SODIUM 40 MG: 40 TABLET, DELAYED RELEASE ORAL at 08:59

## 2018-01-01 RX ADMIN — HYDRALAZINE HYDROCHLORIDE 50 MG: 50 TABLET, FILM COATED ORAL at 17:13

## 2018-01-01 RX ADMIN — METOCLOPRAMIDE HYDROCHLORIDE 10 MG: 5 SOLUTION ORAL at 17:38

## 2018-01-01 RX ADMIN — ENOXAPARIN SODIUM 40 MG: 40 INJECTION SUBCUTANEOUS at 21:10

## 2018-01-01 RX ADMIN — ACETAMINOPHEN 650 MG: 160 SOLUTION ORAL at 21:19

## 2018-01-01 RX ADMIN — PREGABALIN 75 MG: 25 CAPSULE ORAL at 17:42

## 2018-01-01 RX ADMIN — METOCLOPRAMIDE HYDROCHLORIDE 10 MG: 5 SOLUTION ORAL at 14:14

## 2018-01-01 RX ADMIN — DEXTROSE MONOHYDRATE AND POTASSIUM CHLORIDE: 5; .149 INJECTION, SOLUTION INTRAVENOUS at 16:20

## 2018-01-01 RX ADMIN — METOCLOPRAMIDE HYDROCHLORIDE 10 MG: 5 SOLUTION ORAL at 08:27

## 2018-01-01 RX ADMIN — ATORVASTATIN CALCIUM 40 MG: 40 TABLET, FILM COATED ORAL at 10:13

## 2018-01-01 RX ADMIN — SODIUM CHLORIDE 40 MG: 9 INJECTION, SOLUTION INTRAMUSCULAR; INTRAVENOUS; SUBCUTANEOUS at 11:06

## 2018-01-01 RX ADMIN — INSULIN LISPRO 3 UNITS: 100 INJECTION, SOLUTION INTRAVENOUS; SUBCUTANEOUS at 22:43

## 2018-01-01 RX ADMIN — Medication 10 ML: at 21:26

## 2018-01-01 RX ADMIN — Medication 10 ML: at 23:37

## 2018-01-01 RX ADMIN — CLOTRIMAZOLE: 1 CREAM TOPICAL at 11:02

## 2018-01-01 RX ADMIN — Medication 10 ML: at 14:26

## 2018-01-01 RX ADMIN — Medication: at 15:17

## 2018-01-01 RX ADMIN — INSULIN LISPRO 1 UNITS: 100 INJECTION, SOLUTION INTRAVENOUS; SUBCUTANEOUS at 21:35

## 2018-01-01 RX ADMIN — INSULIN LISPRO 2 UNITS: 100 INJECTION, SOLUTION INTRAVENOUS; SUBCUTANEOUS at 12:49

## 2018-01-01 RX ADMIN — HEPARIN SODIUM 7500 UNITS: 5000 INJECTION INTRAVENOUS; SUBCUTANEOUS at 10:15

## 2018-01-01 RX ADMIN — METOCLOPRAMIDE HYDROCHLORIDE 10 MG: 5 SOLUTION ORAL at 07:21

## 2018-01-01 RX ADMIN — PIPERACILLIN SODIUM,TAZOBACTAM SODIUM 3.38 G: 3; .375 INJECTION, POWDER, FOR SOLUTION INTRAVENOUS at 13:43

## 2018-01-01 RX ADMIN — NYSTATIN: 100000 CREAM TOPICAL at 20:00

## 2018-01-01 RX ADMIN — CLOTRIMAZOLE: 1 CREAM TOPICAL at 09:33

## 2018-01-01 RX ADMIN — ONDANSETRON 4 MG: 2 INJECTION INTRAMUSCULAR; INTRAVENOUS at 15:27

## 2018-01-01 RX ADMIN — NYSTATIN: 100000 CREAM TOPICAL at 10:52

## 2018-01-01 RX ADMIN — METOCLOPRAMIDE HYDROCHLORIDE 10 MG: 5 SOLUTION ORAL at 10:55

## 2018-01-01 RX ADMIN — HEPARIN SODIUM 7500 UNITS: 5000 INJECTION INTRAVENOUS; SUBCUTANEOUS at 10:05

## 2018-01-01 RX ADMIN — Medication: at 19:26

## 2018-01-01 RX ADMIN — NYSTATIN: 100000 CREAM TOPICAL at 18:00

## 2018-01-01 RX ADMIN — IPRATROPIUM BROMIDE AND ALBUTEROL SULFATE 3 ML: .5; 3 SOLUTION RESPIRATORY (INHALATION) at 01:49

## 2018-01-01 RX ADMIN — ASPIRIN AND DIPYRIDAMOLE 1 CAPSULE: 25; 200 CAPSULE, EXTENDED RELEASE ORAL at 09:22

## 2018-01-01 RX ADMIN — ACETAMINOPHEN 650 MG: 160 SOLUTION ORAL at 10:57

## 2018-01-01 RX ADMIN — Medication 10 ML: at 17:32

## 2018-01-01 RX ADMIN — PIPERACILLIN SODIUM,TAZOBACTAM SODIUM 3.38 G: 3; .375 INJECTION, POWDER, FOR SOLUTION INTRAVENOUS at 04:02

## 2018-01-01 RX ADMIN — HYDROCODONE BITARTRATE AND ACETAMINOPHEN 1 TABLET: 5; 325 TABLET ORAL at 02:24

## 2018-01-01 RX ADMIN — MIDAZOLAM HYDROCHLORIDE 1 MG: 1 INJECTION, SOLUTION INTRAMUSCULAR; INTRAVENOUS at 15:14

## 2018-01-01 RX ADMIN — NYSTATIN: 100000 POWDER TOPICAL at 11:06

## 2018-01-01 RX ADMIN — BUMETANIDE 2 MG: 0.25 INJECTION INTRAMUSCULAR; INTRAVENOUS at 13:36

## 2018-01-01 RX ADMIN — SODIUM CHLORIDE 75 ML/HR: 900 INJECTION, SOLUTION INTRAVENOUS at 18:13

## 2018-01-01 RX ADMIN — Medication 10 ML: at 05:01

## 2018-01-01 RX ADMIN — NYSTATIN: 100000 POWDER TOPICAL at 21:58

## 2018-01-01 RX ADMIN — FLUCONAZOLE 200 MG: 200 TABLET ORAL at 12:07

## 2018-01-01 RX ADMIN — NYSTATIN: 100000 CREAM TOPICAL at 17:41

## 2018-01-01 RX ADMIN — ATORVASTATIN CALCIUM 40 MG: 40 TABLET, FILM COATED ORAL at 09:11

## 2018-01-01 RX ADMIN — HEPARIN SODIUM 5000 UNITS: 5000 INJECTION, SOLUTION INTRAVENOUS; SUBCUTANEOUS at 22:12

## 2018-01-01 RX ADMIN — ONDANSETRON 4 MG: 2 INJECTION INTRAMUSCULAR; INTRAVENOUS at 13:11

## 2018-01-01 RX ADMIN — HYDRALAZINE HYDROCHLORIDE 50 MG: 50 TABLET, FILM COATED ORAL at 23:48

## 2018-01-01 RX ADMIN — ATORVASTATIN CALCIUM 40 MG: 40 TABLET, FILM COATED ORAL at 10:53

## 2018-01-01 RX ADMIN — MIRTAZAPINE 7.5 MG: 15 TABLET, FILM COATED ORAL at 21:34

## 2018-01-01 RX ADMIN — METOCLOPRAMIDE HYDROCHLORIDE 10 MG: 5 SOLUTION ORAL at 10:18

## 2018-01-01 RX ADMIN — ONDANSETRON 4 MG: 2 INJECTION INTRAMUSCULAR; INTRAVENOUS at 17:51

## 2018-01-01 RX ADMIN — ATORVASTATIN CALCIUM 40 MG: 40 TABLET, FILM COATED ORAL at 21:46

## 2018-01-01 RX ADMIN — Medication 10 ML: at 06:00

## 2018-01-01 RX ADMIN — Medication 10 ML: at 06:08

## 2018-01-01 RX ADMIN — HEPARIN SODIUM 7500 UNITS: 5000 INJECTION INTRAVENOUS; SUBCUTANEOUS at 12:07

## 2018-01-01 RX ADMIN — SODIUM CHLORIDE: 450 INJECTION, SOLUTION INTRAVENOUS at 04:21

## 2018-01-01 RX ADMIN — HYDRALAZINE HYDROCHLORIDE 50 MG: 50 TABLET, FILM COATED ORAL at 22:21

## 2018-01-01 RX ADMIN — METOCLOPRAMIDE HYDROCHLORIDE 10 MG: 5 SOLUTION ORAL at 14:06

## 2018-01-01 RX ADMIN — Medication 10 ML: at 22:32

## 2018-01-01 RX ADMIN — COLESTIPOL HYDROCHLORIDE 1 G: 1 TABLET, FILM COATED ORAL at 10:53

## 2018-01-01 RX ADMIN — POLYMYXIN B SULFATE AND TRIMETHOPRIM SULFATE 1 DROP: 10000; 1 SOLUTION/ DROPS OPHTHALMIC at 19:05

## 2018-01-01 RX ADMIN — Medication 10 ML: at 06:02

## 2018-01-01 RX ADMIN — METOCLOPRAMIDE 5 MG: 5 INJECTION, SOLUTION INTRAMUSCULAR; INTRAVENOUS at 09:53

## 2018-01-01 RX ADMIN — INSULIN LISPRO 3 UNITS: 100 INJECTION, SOLUTION INTRAVENOUS; SUBCUTANEOUS at 18:13

## 2018-01-01 RX ADMIN — INSULIN LISPRO 5 UNITS: 100 INJECTION, SOLUTION INTRAVENOUS; SUBCUTANEOUS at 12:28

## 2018-01-01 RX ADMIN — ATORVASTATIN CALCIUM 40 MG: 40 TABLET, FILM COATED ORAL at 09:08

## 2018-01-01 RX ADMIN — Medication 30 ML: at 04:08

## 2018-01-01 RX ADMIN — ALLOPURINOL 100 MG: 100 TABLET ORAL at 09:47

## 2018-01-01 RX ADMIN — PANTOPRAZOLE SODIUM 40 MG: 40 TABLET, DELAYED RELEASE ORAL at 18:32

## 2018-01-01 RX ADMIN — PREGABALIN 75 MG: 25 CAPSULE ORAL at 10:51

## 2018-01-01 RX ADMIN — INSULIN LISPRO 3 UNITS: 100 INJECTION, SOLUTION INTRAVENOUS; SUBCUTANEOUS at 12:28

## 2018-01-01 RX ADMIN — HEPARIN SODIUM 7500 UNITS: 5000 INJECTION INTRAVENOUS; SUBCUTANEOUS at 18:08

## 2018-01-01 RX ADMIN — Medication 10 ML: at 18:32

## 2018-01-01 RX ADMIN — NYSTATIN: 100000 CREAM TOPICAL at 09:10

## 2018-01-01 RX ADMIN — CLOTRIMAZOLE: 1 CREAM TOPICAL at 09:00

## 2018-01-01 RX ADMIN — CLOTRIMAZOLE: 1 CREAM TOPICAL at 18:27

## 2018-01-01 RX ADMIN — Medication 20 ML: at 16:35

## 2018-01-01 RX ADMIN — Medication 1 CAPSULE: at 09:55

## 2018-01-01 RX ADMIN — Medication 10 ML: at 15:12

## 2018-01-01 RX ADMIN — ATENOLOL 50 MG: 50 TABLET ORAL at 10:22

## 2018-01-01 RX ADMIN — BUMETANIDE 3 MG: 1 TABLET ORAL at 09:29

## 2018-01-01 RX ADMIN — POLYMYXIN B SULFATE AND TRIMETHOPRIM SULFATE 1 DROP: 10000; 1 SOLUTION/ DROPS OPHTHALMIC at 17:12

## 2018-01-01 RX ADMIN — POLYMYXIN B SULFATE AND TRIMETHOPRIM SULFATE 1 DROP: 10000; 1 SOLUTION/ DROPS OPHTHALMIC at 09:27

## 2018-01-01 RX ADMIN — HEPARIN SODIUM 7500 UNITS: 5000 INJECTION INTRAVENOUS; SUBCUTANEOUS at 04:02

## 2018-01-01 RX ADMIN — INSULIN GLARGINE 50 UNITS: 100 INJECTION, SOLUTION SUBCUTANEOUS at 21:20

## 2018-01-01 RX ADMIN — Medication 10 ML: at 14:08

## 2018-01-01 RX ADMIN — MUPIROCIN: 20 OINTMENT TOPICAL at 10:03

## 2018-01-01 RX ADMIN — POLYMYXIN B SULFATE AND TRIMETHOPRIM SULFATE 1 DROP: 10000; 1 SOLUTION/ DROPS OPHTHALMIC at 09:58

## 2018-01-01 RX ADMIN — TRAMADOL HYDROCHLORIDE 50 MG: 50 TABLET, FILM COATED ORAL at 01:11

## 2018-01-01 RX ADMIN — ASPIRIN AND DIPYRIDAMOLE 1 CAPSULE: 25; 200 CAPSULE, EXTENDED RELEASE ORAL at 08:25

## 2018-01-01 RX ADMIN — SODIUM CHLORIDE 40 MG: 9 INJECTION, SOLUTION INTRAMUSCULAR; INTRAVENOUS; SUBCUTANEOUS at 11:24

## 2018-01-01 RX ADMIN — INSULIN GLARGINE 15 UNITS: 100 INJECTION, SOLUTION SUBCUTANEOUS at 21:10

## 2018-01-01 RX ADMIN — NYSTATIN: 100000 CREAM TOPICAL at 10:19

## 2018-01-01 RX ADMIN — PIPERACILLIN SODIUM,TAZOBACTAM SODIUM 3.38 G: 3; .375 INJECTION, POWDER, FOR SOLUTION INTRAVENOUS at 02:47

## 2018-01-01 RX ADMIN — Medication 10 ML: at 21:36

## 2018-01-01 RX ADMIN — NYSTATIN: 100000 CREAM TOPICAL at 18:08

## 2018-01-01 RX ADMIN — Medication: at 10:22

## 2018-01-01 RX ADMIN — INSULIN LISPRO 1 UNITS: 100 INJECTION, SOLUTION INTRAVENOUS; SUBCUTANEOUS at 22:50

## 2018-01-01 RX ADMIN — Medication 10 ML: at 22:45

## 2018-01-01 RX ADMIN — HEPARIN SODIUM 7500 UNITS: 5000 INJECTION INTRAVENOUS; SUBCUTANEOUS at 10:55

## 2018-01-01 RX ADMIN — CLOTRIMAZOLE: 1 CREAM TOPICAL at 09:31

## 2018-01-01 RX ADMIN — ASPIRIN AND DIPYRIDAMOLE 1 CAPSULE: 25; 200 CAPSULE, EXTENDED RELEASE ORAL at 09:44

## 2018-01-01 RX ADMIN — Medication 30 ML: at 05:49

## 2018-01-01 RX ADMIN — METOCLOPRAMIDE HYDROCHLORIDE 10 MG: 5 SOLUTION ORAL at 17:27

## 2018-01-01 RX ADMIN — MICONAZOLE NITRATE: 20 CREAM TOPICAL at 15:23

## 2018-01-01 RX ADMIN — ALLOPURINOL 100 MG: 100 TABLET ORAL at 08:27

## 2018-01-01 RX ADMIN — ATORVASTATIN CALCIUM 40 MG: 40 TABLET, FILM COATED ORAL at 09:28

## 2018-01-01 RX ADMIN — SODIUM BICARBONATE 650 MG: 650 TABLET ORAL at 17:27

## 2018-01-01 RX ADMIN — Medication 10 ML: at 14:15

## 2018-01-01 RX ADMIN — Medication 10 ML: at 13:20

## 2018-01-01 RX ADMIN — PIPERACILLIN SODIUM AND TAZOBACTAM SODIUM 3.38 G: .375; 3 INJECTION, POWDER, LYOPHILIZED, FOR SOLUTION INTRAVENOUS at 09:23

## 2018-01-01 RX ADMIN — Medication: at 09:00

## 2018-01-01 RX ADMIN — INSULIN LISPRO 2 UNITS: 100 INJECTION, SOLUTION INTRAVENOUS; SUBCUTANEOUS at 12:58

## 2018-01-01 RX ADMIN — CLOTRIMAZOLE: 1 CREAM TOPICAL at 23:45

## 2018-01-01 RX ADMIN — METOCLOPRAMIDE HYDROCHLORIDE 10 MG: 5 SOLUTION ORAL at 17:14

## 2018-01-01 RX ADMIN — AMPICILLIN SODIUM AND SULBACTAM SODIUM 3 G: 2; 1 INJECTION, POWDER, FOR SOLUTION INTRAMUSCULAR; INTRAVENOUS at 10:56

## 2018-01-01 RX ADMIN — PIPERACILLIN SODIUM,TAZOBACTAM SODIUM 3.38 G: 3; .375 INJECTION, POWDER, FOR SOLUTION INTRAVENOUS at 02:17

## 2018-01-01 RX ADMIN — MUPIROCIN: 20 OINTMENT TOPICAL at 18:24

## 2018-01-01 RX ADMIN — INSULIN LISPRO 1 UNITS: 100 INJECTION, SOLUTION INTRAVENOUS; SUBCUTANEOUS at 21:48

## 2018-01-01 RX ADMIN — ACETAMINOPHEN 650 MG: 325 TABLET ORAL at 01:07

## 2018-01-01 RX ADMIN — Medication 10 ML: at 22:11

## 2018-01-01 RX ADMIN — HYDRALAZINE HYDROCHLORIDE 50 MG: 50 TABLET, FILM COATED ORAL at 09:09

## 2018-01-01 RX ADMIN — POLYMYXIN B SULFATE AND TRIMETHOPRIM SULFATE 1 DROP: 10000; 1 SOLUTION/ DROPS OPHTHALMIC at 18:40

## 2018-01-01 RX ADMIN — POTASSIUM CHLORIDE 20 MEQ: 1.5 POWDER, FOR SOLUTION ORAL at 09:44

## 2018-01-01 RX ADMIN — HYDRALAZINE HYDROCHLORIDE 50 MG: 50 TABLET, FILM COATED ORAL at 09:05

## 2018-01-01 RX ADMIN — PIPERACILLIN SODIUM,TAZOBACTAM SODIUM 3.38 G: 3; .375 INJECTION, POWDER, FOR SOLUTION INTRAVENOUS at 02:41

## 2018-01-01 RX ADMIN — IPRATROPIUM BROMIDE AND ALBUTEROL SULFATE 3 ML: .5; 3 SOLUTION RESPIRATORY (INHALATION) at 09:14

## 2018-01-01 RX ADMIN — ACETAMINOPHEN 650 MG: 325 TABLET ORAL at 10:48

## 2018-01-01 RX ADMIN — FLUCONAZOLE 200 MG: 200 TABLET ORAL at 10:22

## 2018-01-01 RX ADMIN — PREGABALIN 75 MG: 25 CAPSULE ORAL at 18:04

## 2018-01-01 RX ADMIN — ASPIRIN AND EXTENDED-RELEASE DIPYRIDAMOLE 1 CAPSULE: 25; 200 CAPSULE ORAL at 17:05

## 2018-01-01 RX ADMIN — INSULIN GLARGINE 6 UNITS: 100 INJECTION, SOLUTION SUBCUTANEOUS at 09:49

## 2018-01-01 RX ADMIN — MUPIROCIN: 20 OINTMENT TOPICAL at 19:48

## 2018-01-01 RX ADMIN — GLYCOPYRROLATE 0.2 MG: 0.2 INJECTION INTRAMUSCULAR; INTRAVENOUS at 15:14

## 2018-01-01 RX ADMIN — Medication 10 ML: at 13:02

## 2018-01-01 RX ADMIN — CLOTRIMAZOLE: 10 CREAM TOPICAL at 10:36

## 2018-01-01 RX ADMIN — NYSTATIN: 100000 POWDER TOPICAL at 17:58

## 2018-01-01 RX ADMIN — INSULIN LISPRO 3 UNITS: 100 INJECTION, SOLUTION INTRAVENOUS; SUBCUTANEOUS at 17:09

## 2018-01-01 RX ADMIN — HYDROMORPHONE HYDROCHLORIDE 0.5 MG: 2 INJECTION INTRAMUSCULAR; INTRAVENOUS; SUBCUTANEOUS at 22:23

## 2018-01-01 RX ADMIN — INSULIN LISPRO 2 UNITS: 100 INJECTION, SOLUTION INTRAVENOUS; SUBCUTANEOUS at 07:30

## 2018-01-01 RX ADMIN — PREGABALIN 75 MG: 25 CAPSULE ORAL at 17:16

## 2018-01-01 RX ADMIN — Medication: at 10:21

## 2018-01-01 RX ADMIN — ASPIRIN AND DIPYRIDAMOLE 1 CAPSULE: 25; 200 CAPSULE, EXTENDED RELEASE ORAL at 10:22

## 2018-01-01 RX ADMIN — CLOTRIMAZOLE: 10 CREAM TOPICAL at 19:53

## 2018-01-01 RX ADMIN — TRAMADOL HYDROCHLORIDE 50 MG: 50 TABLET, FILM COATED ORAL at 20:37

## 2018-01-01 RX ADMIN — DILTIAZEM HYDROCHLORIDE 240 MG: 240 CAPSULE, COATED, EXTENDED RELEASE ORAL at 10:22

## 2018-01-01 RX ADMIN — HEPARIN SODIUM 7500 UNITS: 5000 INJECTION INTRAVENOUS; SUBCUTANEOUS at 17:04

## 2018-01-01 RX ADMIN — INSULIN LISPRO 2 UNITS: 100 INJECTION, SOLUTION INTRAVENOUS; SUBCUTANEOUS at 09:23

## 2018-01-01 RX ADMIN — ASPIRIN AND DIPYRIDAMOLE 1 CAPSULE: 25; 200 CAPSULE, EXTENDED RELEASE ORAL at 21:21

## 2018-01-01 RX ADMIN — KETAMINE HYDROCHLORIDE 10 MG: 10 INJECTION, SOLUTION INTRAMUSCULAR; INTRAVENOUS at 16:31

## 2018-01-01 RX ADMIN — INSULIN LISPRO 2 UNITS: 100 INJECTION, SOLUTION INTRAVENOUS; SUBCUTANEOUS at 18:22

## 2018-01-01 RX ADMIN — ATENOLOL 50 MG: 50 TABLET ORAL at 08:52

## 2018-01-01 RX ADMIN — MUPIROCIN: 20 OINTMENT TOPICAL at 10:21

## 2018-01-01 RX ADMIN — HYDROCODONE BITARTRATE AND ACETAMINOPHEN 1 TABLET: 5; 325 TABLET ORAL at 04:31

## 2018-01-01 RX ADMIN — NYSTATIN: 100000 CREAM TOPICAL at 19:27

## 2018-01-01 RX ADMIN — ATENOLOL 50 MG: 50 TABLET ORAL at 09:28

## 2018-01-01 RX ADMIN — SODIUM CHLORIDE 100 ML/HR: 900 INJECTION, SOLUTION INTRAVENOUS at 22:33

## 2018-01-01 RX ADMIN — LINEZOLID 600 MG: 600 TABLET, FILM COATED ORAL at 22:14

## 2018-01-01 RX ADMIN — ATENOLOL 50 MG: 50 TABLET ORAL at 09:56

## 2018-01-01 RX ADMIN — VANCOMYCIN HYDROCHLORIDE 1500 MG: 10 INJECTION, POWDER, LYOPHILIZED, FOR SOLUTION INTRAVENOUS at 10:20

## 2018-01-01 RX ADMIN — ASPIRIN AND DIPYRIDAMOLE 1 CAPSULE: 25; 200 CAPSULE, EXTENDED RELEASE ORAL at 21:47

## 2018-01-01 RX ADMIN — HEPARIN SODIUM 7500 UNITS: 5000 INJECTION INTRAVENOUS; SUBCUTANEOUS at 09:27

## 2018-01-01 RX ADMIN — SODIUM BICARBONATE 650 MG: 650 TABLET ORAL at 18:35

## 2018-01-01 RX ADMIN — HYDRALAZINE HYDROCHLORIDE 50 MG: 50 TABLET, FILM COATED ORAL at 17:08

## 2018-01-01 RX ADMIN — NYSTATIN: 100000 POWDER TOPICAL at 19:26

## 2018-01-01 RX ADMIN — ENOXAPARIN SODIUM 40 MG: 40 INJECTION SUBCUTANEOUS at 08:25

## 2018-01-01 RX ADMIN — ESCITALOPRAM OXALATE 10 MG: 10 TABLET ORAL at 08:30

## 2018-01-01 RX ADMIN — HYDROCODONE BITARTRATE AND ACETAMINOPHEN 1 TABLET: 5; 325 TABLET ORAL at 16:19

## 2018-01-01 RX ADMIN — Medication 10 ML: at 15:05

## 2018-01-01 RX ADMIN — INSULIN GLARGINE 6 UNITS: 100 INJECTION, SOLUTION SUBCUTANEOUS at 08:45

## 2018-01-01 RX ADMIN — ATENOLOL 50 MG: 50 TABLET ORAL at 09:06

## 2018-01-01 RX ADMIN — DILTIAZEM HYDROCHLORIDE 240 MG: 240 CAPSULE, COATED, EXTENDED RELEASE ORAL at 08:27

## 2018-01-01 RX ADMIN — HEPARIN SODIUM 7500 UNITS: 5000 INJECTION INTRAVENOUS; SUBCUTANEOUS at 17:33

## 2018-01-01 RX ADMIN — DILTIAZEM HYDROCHLORIDE 180 MG: 180 CAPSULE, COATED, EXTENDED RELEASE ORAL at 09:27

## 2018-01-01 RX ADMIN — INSULIN LISPRO 2 UNITS: 100 INJECTION, SOLUTION INTRAVENOUS; SUBCUTANEOUS at 08:20

## 2018-01-01 RX ADMIN — HEPARIN SODIUM 7500 UNITS: 5000 INJECTION INTRAVENOUS; SUBCUTANEOUS at 17:55

## 2018-01-01 RX ADMIN — ASPIRIN AND EXTENDED-RELEASE DIPYRIDAMOLE 1 CAPSULE: 25; 200 CAPSULE ORAL at 09:30

## 2018-01-01 RX ADMIN — DILTIAZEM HYDROCHLORIDE 240 MG: 240 CAPSULE, COATED, EXTENDED RELEASE ORAL at 08:25

## 2018-01-01 RX ADMIN — HYDRALAZINE HYDROCHLORIDE 50 MG: 50 TABLET, FILM COATED ORAL at 21:57

## 2018-01-01 RX ADMIN — SODIUM BICARBONATE 650 MG: 650 TABLET ORAL at 08:59

## 2018-01-01 RX ADMIN — PREGABALIN 75 MG: 25 CAPSULE ORAL at 17:33

## 2018-01-01 RX ADMIN — BUMETANIDE 1 MG: 1 TABLET ORAL at 09:11

## 2018-01-01 RX ADMIN — ASPIRIN AND DIPYRIDAMOLE 1 CAPSULE: 25; 200 CAPSULE, EXTENDED RELEASE ORAL at 10:10

## 2018-01-01 RX ADMIN — Medication: at 23:00

## 2018-01-01 RX ADMIN — ASPIRIN AND DIPYRIDAMOLE 1 CAPSULE: 25; 200 CAPSULE, EXTENDED RELEASE ORAL at 08:52

## 2018-01-01 RX ADMIN — POLYMYXIN B SULFATE AND TRIMETHOPRIM SULFATE 1 DROP: 10000; 1 SOLUTION/ DROPS OPHTHALMIC at 09:44

## 2018-01-01 RX ADMIN — HYDRALAZINE HYDROCHLORIDE 50 MG: 50 TABLET, FILM COATED ORAL at 22:08

## 2018-01-01 RX ADMIN — Medication: at 00:43

## 2018-01-01 RX ADMIN — HEPARIN SODIUM 7500 UNITS: 5000 INJECTION INTRAVENOUS; SUBCUTANEOUS at 17:15

## 2018-01-01 RX ADMIN — INSULIN LISPRO 2 UNITS: 100 INJECTION, SOLUTION INTRAVENOUS; SUBCUTANEOUS at 21:34

## 2018-01-01 RX ADMIN — LINEZOLID 600 MG: 600 TABLET, FILM COATED ORAL at 09:57

## 2018-01-01 RX ADMIN — NYSTATIN: 100000 CREAM TOPICAL at 19:50

## 2018-01-01 RX ADMIN — CLOTRIMAZOLE: 1 CREAM TOPICAL at 18:00

## 2018-01-01 RX ADMIN — INSULIN LISPRO 2 UNITS: 100 INJECTION, SOLUTION INTRAVENOUS; SUBCUTANEOUS at 21:19

## 2018-01-01 RX ADMIN — INSULIN GLARGINE 6 UNITS: 100 INJECTION, SOLUTION SUBCUTANEOUS at 11:00

## 2018-01-01 RX ADMIN — MUPIROCIN: 20 OINTMENT TOPICAL at 17:08

## 2018-01-01 RX ADMIN — DILTIAZEM HYDROCHLORIDE 240 MG: 240 CAPSULE, COATED, EXTENDED RELEASE ORAL at 08:59

## 2018-01-01 RX ADMIN — HYDROCODONE BITARTRATE AND ACETAMINOPHEN 1 TABLET: 5; 325 TABLET ORAL at 17:30

## 2018-01-01 RX ADMIN — PIPERACILLIN SODIUM,TAZOBACTAM SODIUM 3.38 G: 3; .375 INJECTION, POWDER, FOR SOLUTION INTRAVENOUS at 13:33

## 2018-01-01 RX ADMIN — ONDANSETRON HYDROCHLORIDE 4 MG: 2 INJECTION, SOLUTION INTRAMUSCULAR; INTRAVENOUS at 09:38

## 2018-01-01 RX ADMIN — MUPIROCIN: 20 OINTMENT TOPICAL at 18:15

## 2018-01-01 RX ADMIN — ESCITALOPRAM OXALATE 10 MG: 10 TABLET ORAL at 10:26

## 2018-01-01 RX ADMIN — SODIUM BICARBONATE 650 MG: 650 TABLET ORAL at 18:08

## 2018-01-01 RX ADMIN — PREGABALIN 75 MG: 25 CAPSULE ORAL at 18:30

## 2018-01-01 RX ADMIN — LEVOFLOXACIN 750 MG: 750 TABLET, FILM COATED ORAL at 17:07

## 2018-01-01 RX ADMIN — MICONAZOLE NITRATE: 20 CREAM TOPICAL at 14:55

## 2018-01-01 RX ADMIN — POLYMYXIN B SULFATE AND TRIMETHOPRIM SULFATE 1 DROP: 10000; 1 SOLUTION/ DROPS OPHTHALMIC at 09:16

## 2018-01-01 RX ADMIN — BUMETANIDE 2 MG: 1 TABLET ORAL at 08:52

## 2018-01-01 RX ADMIN — POTASSIUM CHLORIDE 20 MEQ: 20 TABLET, EXTENDED RELEASE ORAL at 17:07

## 2018-01-01 RX ADMIN — AMPICILLIN SODIUM AND SULBACTAM SODIUM 3 G: 2; 1 INJECTION, POWDER, FOR SOLUTION INTRAMUSCULAR; INTRAVENOUS at 11:12

## 2018-01-01 RX ADMIN — Medication 10 ML: at 15:00

## 2018-01-01 RX ADMIN — SODIUM CHLORIDE 40 MG: 9 INJECTION, SOLUTION INTRAMUSCULAR; INTRAVENOUS; SUBCUTANEOUS at 08:46

## 2018-01-01 RX ADMIN — MUPIROCIN: 20 OINTMENT TOPICAL at 11:44

## 2018-01-01 RX ADMIN — ONDANSETRON 4 MG: 2 INJECTION INTRAMUSCULAR; INTRAVENOUS at 20:05

## 2018-01-01 RX ADMIN — NYSTATIN: 100000 POWDER TOPICAL at 11:01

## 2018-01-01 RX ADMIN — HYDRALAZINE HYDROCHLORIDE 25 MG: 25 TABLET, FILM COATED ORAL at 08:22

## 2018-01-01 RX ADMIN — Medication 10 ML: at 05:20

## 2018-01-01 RX ADMIN — ENOXAPARIN SODIUM 40 MG: 40 INJECTION SUBCUTANEOUS at 09:30

## 2018-01-01 RX ADMIN — HEPARIN SODIUM 7500 UNITS: 5000 INJECTION INTRAVENOUS; SUBCUTANEOUS at 18:35

## 2018-01-01 RX ADMIN — POLYMYXIN B SULFATE AND TRIMETHOPRIM SULFATE 1 DROP: 10000; 1 SOLUTION/ DROPS OPHTHALMIC at 11:46

## 2018-01-01 RX ADMIN — ONDANSETRON 4 MG: 2 INJECTION INTRAMUSCULAR; INTRAVENOUS at 08:36

## 2018-01-01 RX ADMIN — Medication 10 ML: at 23:47

## 2018-01-01 RX ADMIN — NYSTATIN: 100000 CREAM TOPICAL at 09:33

## 2018-01-01 RX ADMIN — Medication 10 ML: at 13:45

## 2018-01-01 RX ADMIN — HEPARIN SODIUM 7500 UNITS: 5000 INJECTION INTRAVENOUS; SUBCUTANEOUS at 10:23

## 2018-01-01 RX ADMIN — POLYMYXIN B SULFATE AND TRIMETHOPRIM SULFATE 1 DROP: 10000; 1 SOLUTION/ DROPS OPHTHALMIC at 17:55

## 2018-01-01 RX ADMIN — NYSTATIN: 100000 CREAM TOPICAL at 09:41

## 2018-01-01 RX ADMIN — DILTIAZEM HYDROCHLORIDE 360 MG: 180 CAPSULE, COATED, EXTENDED RELEASE ORAL at 10:12

## 2018-01-01 RX ADMIN — METOCLOPRAMIDE HYDROCHLORIDE 10 MG: 5 SOLUTION ORAL at 08:52

## 2018-01-01 RX ADMIN — INSULIN GLARGINE 50 UNITS: 100 INJECTION, SOLUTION SUBCUTANEOUS at 22:35

## 2018-01-01 RX ADMIN — POTASSIUM CHLORIDE 20 MEQ: 20 TABLET, EXTENDED RELEASE ORAL at 17:37

## 2018-01-01 RX ADMIN — ONDANSETRON HYDROCHLORIDE 4 MG: 2 INJECTION, SOLUTION INTRAMUSCULAR; INTRAVENOUS at 18:44

## 2018-01-01 RX ADMIN — PANTOPRAZOLE SODIUM 40 MG: 40 TABLET, DELAYED RELEASE ORAL at 08:21

## 2018-01-01 RX ADMIN — Medication 10 ML: at 21:35

## 2018-01-01 RX ADMIN — INSULIN GLARGINE 10 UNITS: 100 INJECTION, SOLUTION SUBCUTANEOUS at 21:58

## 2018-01-01 RX ADMIN — TRAMADOL HYDROCHLORIDE 50 MG: 50 TABLET, FILM COATED ORAL at 14:27

## 2018-01-01 RX ADMIN — PREGABALIN 75 MG: 25 CAPSULE ORAL at 09:09

## 2018-01-01 RX ADMIN — CLOTRIMAZOLE: 10 CREAM TOPICAL at 08:36

## 2018-01-01 RX ADMIN — NYSTATIN: 100000 POWDER TOPICAL at 17:44

## 2018-01-01 RX ADMIN — INSULIN LISPRO 3 UNITS: 100 INJECTION, SOLUTION INTRAVENOUS; SUBCUTANEOUS at 09:16

## 2018-01-01 RX ADMIN — PREGABALIN 75 MG: 25 CAPSULE ORAL at 10:12

## 2018-01-01 RX ADMIN — HYDROCODONE BITARTRATE AND ACETAMINOPHEN 1 TABLET: 5; 325 TABLET ORAL at 10:31

## 2018-01-01 RX ADMIN — CLOTRIMAZOLE: 1 CREAM TOPICAL at 09:11

## 2018-01-01 RX ADMIN — INSULIN LISPRO 2 UNITS: 100 INJECTION, SOLUTION INTRAVENOUS; SUBCUTANEOUS at 22:00

## 2018-01-01 RX ADMIN — BUMETANIDE 3 MG: 1 TABLET ORAL at 09:25

## 2018-01-01 RX ADMIN — Medication 10 ML: at 21:02

## 2018-01-01 RX ADMIN — DILTIAZEM HYDROCHLORIDE 240 MG: 240 CAPSULE, COATED, EXTENDED RELEASE ORAL at 09:00

## 2018-01-01 RX ADMIN — Medication 1 CAPSULE: at 10:52

## 2018-01-01 RX ADMIN — HYDRALAZINE HYDROCHLORIDE 50 MG: 50 TABLET, FILM COATED ORAL at 16:33

## 2018-01-01 RX ADMIN — DILTIAZEM HYDROCHLORIDE 240 MG: 240 CAPSULE, COATED, EXTENDED RELEASE ORAL at 09:44

## 2018-01-01 RX ADMIN — Medication 1 CAPSULE: at 09:00

## 2018-01-01 RX ADMIN — SODIUM CHLORIDE 40 MG: 9 INJECTION, SOLUTION INTRAMUSCULAR; INTRAVENOUS; SUBCUTANEOUS at 09:00

## 2018-01-01 RX ADMIN — Medication 10 ML: at 05:34

## 2018-01-01 RX ADMIN — POTASSIUM CHLORIDE 20 MEQ: 1.5 POWDER, FOR SOLUTION ORAL at 09:00

## 2018-01-01 RX ADMIN — ATENOLOL 50 MG: 50 TABLET ORAL at 08:59

## 2018-01-01 RX ADMIN — AMPICILLIN SODIUM AND SULBACTAM SODIUM 3 G: 2; 1 INJECTION, POWDER, FOR SOLUTION INTRAMUSCULAR; INTRAVENOUS at 02:27

## 2018-01-01 RX ADMIN — POLYMYXIN B SULFATE AND TRIMETHOPRIM SULFATE 1 DROP: 10000; 1 SOLUTION/ DROPS OPHTHALMIC at 08:43

## 2018-01-01 RX ADMIN — PREGABALIN 75 MG: 25 CAPSULE ORAL at 08:27

## 2018-01-01 RX ADMIN — LEVOFLOXACIN 750 MG: 750 TABLET, FILM COATED ORAL at 15:21

## 2018-01-01 RX ADMIN — NYSTATIN: 100000 POWDER TOPICAL at 10:54

## 2018-01-01 RX ADMIN — MUPIROCIN: 20 OINTMENT TOPICAL at 09:15

## 2018-01-01 RX ADMIN — INSULIN LISPRO 3 UNITS: 100 INJECTION, SOLUTION INTRAVENOUS; SUBCUTANEOUS at 12:12

## 2018-01-01 RX ADMIN — INSULIN LISPRO 2 UNITS: 100 INJECTION, SOLUTION INTRAVENOUS; SUBCUTANEOUS at 23:44

## 2018-01-01 RX ADMIN — INSULIN LISPRO 3 UNITS: 100 INJECTION, SOLUTION INTRAVENOUS; SUBCUTANEOUS at 21:01

## 2018-01-01 RX ADMIN — Medication 10 ML: at 17:19

## 2018-01-01 RX ADMIN — CLOTRIMAZOLE: 1 CREAM TOPICAL at 17:10

## 2018-01-01 RX ADMIN — INSULIN GLARGINE 6 UNITS: 100 INJECTION, SOLUTION SUBCUTANEOUS at 08:52

## 2018-01-01 RX ADMIN — KETAMINE HYDROCHLORIDE 10 MG: 10 INJECTION, SOLUTION INTRAMUSCULAR; INTRAVENOUS at 15:38

## 2018-01-01 RX ADMIN — ENOXAPARIN SODIUM 40 MG: 40 INJECTION SUBCUTANEOUS at 22:34

## 2018-01-01 RX ADMIN — HEPARIN SODIUM 7500 UNITS: 5000 INJECTION INTRAVENOUS; SUBCUTANEOUS at 01:58

## 2018-01-01 RX ADMIN — HEPARIN SODIUM 7500 UNITS: 5000 INJECTION INTRAVENOUS; SUBCUTANEOUS at 11:03

## 2018-01-01 RX ADMIN — HEPARIN SODIUM 7500 UNITS: 5000 INJECTION INTRAVENOUS; SUBCUTANEOUS at 09:06

## 2018-01-01 RX ADMIN — MICONAZOLE NITRATE: 20 CREAM TOPICAL at 09:31

## 2018-01-01 RX ADMIN — INSULIN GLARGINE 6 UNITS: 100 INJECTION, SOLUTION SUBCUTANEOUS at 10:23

## 2018-01-01 RX ADMIN — ENOXAPARIN SODIUM 40 MG: 40 INJECTION SUBCUTANEOUS at 21:42

## 2018-01-01 RX ADMIN — ATENOLOL 50 MG: 50 TABLET ORAL at 09:22

## 2018-01-01 RX ADMIN — Medication: at 09:31

## 2018-01-01 RX ADMIN — Medication 10 ML: at 13:56

## 2018-01-01 RX ADMIN — Medication 10 ML: at 05:26

## 2018-01-01 RX ADMIN — NYSTATIN: 100000 CREAM TOPICAL at 17:13

## 2018-01-01 RX ADMIN — ACETAMINOPHEN 650 MG: 325 TABLET ORAL at 21:21

## 2018-01-01 RX ADMIN — PANTOPRAZOLE SODIUM 40 MG: 40 TABLET, DELAYED RELEASE ORAL at 09:28

## 2018-01-01 RX ADMIN — SODIUM CHLORIDE 40 MG: 9 INJECTION, SOLUTION INTRAMUSCULAR; INTRAVENOUS; SUBCUTANEOUS at 20:46

## 2018-01-01 RX ADMIN — Medication 10 ML: at 05:28

## 2018-01-01 RX ADMIN — INSULIN LISPRO 3 UNITS: 100 INJECTION, SOLUTION INTRAVENOUS; SUBCUTANEOUS at 11:44

## 2018-01-01 RX ADMIN — Medication: at 17:42

## 2018-01-01 RX ADMIN — NYSTATIN: 100000 CREAM TOPICAL at 22:34

## 2018-01-01 RX ADMIN — HYDROCODONE BITARTRATE AND ACETAMINOPHEN 1 TABLET: 5; 325 TABLET ORAL at 17:32

## 2018-01-01 RX ADMIN — ONDANSETRON 4 MG: 2 INJECTION INTRAMUSCULAR; INTRAVENOUS at 10:59

## 2018-01-01 RX ADMIN — Medication 10 ML: at 14:27

## 2018-01-01 RX ADMIN — NYSTATIN: 100000 POWDER TOPICAL at 10:19

## 2018-01-01 RX ADMIN — ATORVASTATIN CALCIUM 40 MG: 40 TABLET, FILM COATED ORAL at 09:30

## 2018-01-01 RX ADMIN — POLYMYXIN B SULFATE AND TRIMETHOPRIM SULFATE 1 DROP: 10000; 1 SOLUTION/ DROPS OPHTHALMIC at 18:00

## 2018-01-01 RX ADMIN — PREGABALIN 75 MG: 25 CAPSULE ORAL at 17:20

## 2018-01-01 RX ADMIN — MUPIROCIN: 20 OINTMENT TOPICAL at 18:00

## 2018-01-01 RX ADMIN — METOCLOPRAMIDE HYDROCHLORIDE 10 MG: 5 SOLUTION ORAL at 08:59

## 2018-01-01 RX ADMIN — Medication 10 ML: at 22:03

## 2018-01-01 RX ADMIN — MICONAZOLE NITRATE: 20 CREAM TOPICAL at 08:44

## 2018-01-01 RX ADMIN — HEPARIN SODIUM 7500 UNITS: 5000 INJECTION INTRAVENOUS; SUBCUTANEOUS at 11:24

## 2018-01-01 RX ADMIN — METOCLOPRAMIDE HYDROCHLORIDE 10 MG: 5 SOLUTION ORAL at 16:51

## 2018-01-01 RX ADMIN — HEPARIN SODIUM 7500 UNITS: 5000 INJECTION INTRAVENOUS; SUBCUTANEOUS at 10:42

## 2018-01-01 RX ADMIN — HEPARIN SODIUM 7500 UNITS: 5000 INJECTION INTRAVENOUS; SUBCUTANEOUS at 03:57

## 2018-01-01 RX ADMIN — BUMETANIDE 2 MG: 1 TABLET ORAL at 09:44

## 2018-01-01 RX ADMIN — HEPARIN SODIUM 7500 UNITS: 5000 INJECTION INTRAVENOUS; SUBCUTANEOUS at 02:16

## 2018-01-01 RX ADMIN — POLYMYXIN B SULFATE AND TRIMETHOPRIM SULFATE 1 DROP: 10000; 1 SOLUTION/ DROPS OPHTHALMIC at 19:50

## 2018-01-01 RX ADMIN — Medication 10 ML: at 17:31

## 2018-01-01 RX ADMIN — ALLOPURINOL 100 MG: 100 TABLET ORAL at 09:28

## 2018-01-01 RX ADMIN — MIRTAZAPINE 7.5 MG: 15 TABLET, FILM COATED ORAL at 21:21

## 2018-01-01 RX ADMIN — BUMETANIDE 1 MG: 1 TABLET ORAL at 09:20

## 2018-01-01 RX ADMIN — HYDRALAZINE HYDROCHLORIDE 50 MG: 50 TABLET, FILM COATED ORAL at 18:26

## 2018-01-01 RX ADMIN — PANTOPRAZOLE SODIUM 40 MG: 40 TABLET, DELAYED RELEASE ORAL at 17:04

## 2018-01-01 RX ADMIN — KETAMINE HYDROCHLORIDE 10 MG: 10 INJECTION, SOLUTION INTRAMUSCULAR; INTRAVENOUS at 16:57

## 2018-01-01 RX ADMIN — Medication 10 ML: at 21:20

## 2018-01-01 RX ADMIN — SODIUM CHLORIDE 40 MG: 9 INJECTION, SOLUTION INTRAMUSCULAR; INTRAVENOUS; SUBCUTANEOUS at 21:19

## 2018-01-01 RX ADMIN — ALLOPURINOL 100 MG: 100 TABLET ORAL at 10:13

## 2018-01-01 RX ADMIN — NYSTATIN: 100000 CREAM TOPICAL at 18:24

## 2018-01-01 RX ADMIN — ATENOLOL 50 MG: 50 TABLET ORAL at 10:53

## 2018-01-01 RX ADMIN — SODIUM BICARBONATE 650 MG: 650 TABLET ORAL at 10:51

## 2018-01-01 RX ADMIN — Medication 10 ML: at 17:27

## 2018-01-01 RX ADMIN — Medication 20 ML: at 17:32

## 2018-01-01 RX ADMIN — METOCLOPRAMIDE HYDROCHLORIDE 10 MG: 5 SOLUTION ORAL at 18:28

## 2018-01-01 RX ADMIN — HEPARIN SODIUM 7500 UNITS: 5000 INJECTION INTRAVENOUS; SUBCUTANEOUS at 18:20

## 2018-01-01 RX ADMIN — PREGABALIN 75 MG: 25 CAPSULE ORAL at 11:51

## 2018-01-01 RX ADMIN — METOCLOPRAMIDE HYDROCHLORIDE 10 MG: 5 SOLUTION ORAL at 18:14

## 2018-01-01 RX ADMIN — HEPARIN SODIUM 7500 UNITS: 5000 INJECTION INTRAVENOUS; SUBCUTANEOUS at 02:46

## 2018-01-01 RX ADMIN — ASPIRIN AND EXTENDED-RELEASE DIPYRIDAMOLE 1 CAPSULE: 25; 200 CAPSULE ORAL at 09:32

## 2018-01-01 RX ADMIN — FLUCONAZOLE, SODIUM CHLORIDE 200 MG: 2 INJECTION INTRAVENOUS at 09:59

## 2018-01-01 RX ADMIN — METOCLOPRAMIDE HYDROCHLORIDE 10 MG: 5 SOLUTION ORAL at 18:09

## 2018-01-01 RX ADMIN — ASPIRIN AND DIPYRIDAMOLE 1 CAPSULE: 25; 200 CAPSULE, EXTENDED RELEASE ORAL at 09:00

## 2018-01-01 RX ADMIN — BUMETANIDE 2 MG: 1 TABLET ORAL at 10:10

## 2018-01-01 RX ADMIN — FUROSEMIDE 80 MG: 10 INJECTION, SOLUTION INTRAMUSCULAR; INTRAVENOUS at 16:02

## 2018-01-01 RX ADMIN — HEPARIN SODIUM 7500 UNITS: 5000 INJECTION INTRAVENOUS; SUBCUTANEOUS at 02:31

## 2018-01-01 RX ADMIN — DEXTROSE MONOHYDRATE 12.5 G: 500 INJECTION PARENTERAL at 17:00

## 2018-01-01 RX ADMIN — PANTOPRAZOLE SODIUM 40 MG: 40 TABLET, DELAYED RELEASE ORAL at 18:20

## 2018-01-01 RX ADMIN — METRONIDAZOLE 500 MG: 500 INJECTION, SOLUTION INTRAVENOUS at 23:29

## 2018-01-01 RX ADMIN — BARIUM SULFATE 900 ML: 20 SUSPENSION ORAL at 12:27

## 2018-01-01 RX ADMIN — ASPIRIN AND DIPYRIDAMOLE 1 CAPSULE: 25; 200 CAPSULE, EXTENDED RELEASE ORAL at 21:36

## 2018-01-01 RX ADMIN — AMPICILLIN SODIUM AND SULBACTAM SODIUM 3 G: 2; 1 INJECTION, POWDER, FOR SOLUTION INTRAMUSCULAR; INTRAVENOUS at 16:16

## 2018-01-01 RX ADMIN — Medication 10 ML: at 21:49

## 2018-01-01 RX ADMIN — POLYMYXIN B SULFATE AND TRIMETHOPRIM SULFATE 1 DROP: 10000; 1 SOLUTION/ DROPS OPHTHALMIC at 10:03

## 2018-01-01 RX ADMIN — HYDRALAZINE HYDROCHLORIDE 50 MG: 50 TABLET, FILM COATED ORAL at 22:51

## 2018-01-01 RX ADMIN — Medication 10 ML: at 06:07

## 2018-01-01 RX ADMIN — MICONAZOLE NITRATE: 20 CREAM TOPICAL at 19:32

## 2018-01-01 RX ADMIN — METOCLOPRAMIDE HYDROCHLORIDE 10 MG: 5 SOLUTION ORAL at 11:30

## 2018-01-01 RX ADMIN — HYDRALAZINE HYDROCHLORIDE 50 MG: 50 TABLET, FILM COATED ORAL at 09:26

## 2018-01-01 RX ADMIN — PIPERACILLIN SODIUM,TAZOBACTAM SODIUM 3.38 G: 3; .375 INJECTION, POWDER, FOR SOLUTION INTRAVENOUS at 13:11

## 2018-01-01 RX ADMIN — KETAMINE HYDROCHLORIDE 10 MG: 10 INJECTION, SOLUTION INTRAMUSCULAR; INTRAVENOUS at 16:16

## 2018-01-01 RX ADMIN — HYDRALAZINE HYDROCHLORIDE 50 MG: 50 TABLET, FILM COATED ORAL at 21:21

## 2018-01-01 RX ADMIN — PREGABALIN 75 MG: 25 CAPSULE ORAL at 09:11

## 2018-01-01 RX ADMIN — Medication: at 17:54

## 2018-01-01 RX ADMIN — ATENOLOL 50 MG: 50 TABLET ORAL at 10:10

## 2018-01-01 RX ADMIN — PIPERACILLIN SODIUM,TAZOBACTAM SODIUM 3.38 G: 3; .375 INJECTION, POWDER, FOR SOLUTION INTRAVENOUS at 14:14

## 2018-01-01 RX ADMIN — MUPIROCIN: 20 OINTMENT TOPICAL at 17:28

## 2018-01-01 RX ADMIN — INSULIN LISPRO 2 UNITS: 100 INJECTION, SOLUTION INTRAVENOUS; SUBCUTANEOUS at 21:59

## 2018-01-01 RX ADMIN — Medication 10 ML: at 05:45

## 2018-01-01 RX ADMIN — Medication 10 ML: at 11:08

## 2018-01-01 RX ADMIN — DILTIAZEM HYDROCHLORIDE 360 MG: 180 CAPSULE, COATED, EXTENDED RELEASE ORAL at 09:54

## 2018-01-01 RX ADMIN — ATENOLOL 50 MG: 50 TABLET ORAL at 09:10

## 2018-01-01 RX ADMIN — PREGABALIN 75 MG: 25 CAPSULE ORAL at 17:30

## 2018-01-01 RX ADMIN — SODIUM CHLORIDE 100 ML/HR: 900 INJECTION, SOLUTION INTRAVENOUS at 16:14

## 2018-01-01 RX ADMIN — METOCLOPRAMIDE HYDROCHLORIDE 10 MG: 5 SOLUTION ORAL at 12:10

## 2018-01-01 RX ADMIN — SODIUM CHLORIDE 75 ML/HR: 900 INJECTION, SOLUTION INTRAVENOUS at 14:35

## 2018-01-01 RX ADMIN — ASPIRIN AND DIPYRIDAMOLE 1 CAPSULE: 25; 200 CAPSULE, EXTENDED RELEASE ORAL at 08:59

## 2018-01-01 RX ADMIN — ASPIRIN AND DIPYRIDAMOLE 1 CAPSULE: 25; 200 CAPSULE, EXTENDED RELEASE ORAL at 21:02

## 2018-01-01 RX ADMIN — ASPIRIN AND DIPYRIDAMOLE 1 CAPSULE: 25; 200 CAPSULE, EXTENDED RELEASE ORAL at 21:19

## 2018-01-01 RX ADMIN — METRONIDAZOLE 500 MG: 500 INJECTION, SOLUTION INTRAVENOUS at 11:30

## 2018-01-01 RX ADMIN — VITAMIN D, TAB 1000IU (100/BT) 2000 UNITS: 25 TAB at 10:25

## 2018-01-01 RX ADMIN — POLYMYXIN B SULFATE AND TRIMETHOPRIM SULFATE 1 DROP: 10000; 1 SOLUTION/ DROPS OPHTHALMIC at 09:59

## 2018-01-01 RX ADMIN — PIPERACILLIN SODIUM,TAZOBACTAM SODIUM 3.38 G: 3; .375 INJECTION, POWDER, FOR SOLUTION INTRAVENOUS at 21:34

## 2018-01-01 RX ADMIN — HEPARIN SODIUM 7500 UNITS: 5000 INJECTION INTRAVENOUS; SUBCUTANEOUS at 19:03

## 2018-01-01 RX ADMIN — ONDANSETRON 4 MG: 2 INJECTION INTRAMUSCULAR; INTRAVENOUS at 07:07

## 2018-01-01 RX ADMIN — INSULIN GLARGINE 20 UNITS: 100 INJECTION, SOLUTION SUBCUTANEOUS at 21:58

## 2018-01-01 RX ADMIN — PREGABALIN 75 MG: 25 CAPSULE ORAL at 19:02

## 2018-01-01 RX ADMIN — ALLOPURINOL 100 MG: 100 TABLET ORAL at 09:08

## 2018-01-01 RX ADMIN — DILTIAZEM HYDROCHLORIDE 360 MG: 180 CAPSULE, COATED, EXTENDED RELEASE ORAL at 08:21

## 2018-01-01 RX ADMIN — DEXTROSE MONOHYDRATE, SODIUM CHLORIDE, AND POTASSIUM CHLORIDE 100 ML/HR: 50; 4.5; .745 INJECTION, SOLUTION INTRAVENOUS at 09:26

## 2018-01-01 RX ADMIN — COLESTIPOL HYDROCHLORIDE 1 G: 1 TABLET, FILM COATED ORAL at 10:26

## 2018-01-01 RX ADMIN — INSULIN LISPRO 2 UNITS: 100 INJECTION, SOLUTION INTRAVENOUS; SUBCUTANEOUS at 12:53

## 2018-01-01 RX ADMIN — INSULIN LISPRO 2 UNITS: 100 INJECTION, SOLUTION INTRAVENOUS; SUBCUTANEOUS at 17:20

## 2018-01-01 RX ADMIN — HYDRALAZINE HYDROCHLORIDE 50 MG: 50 TABLET, FILM COATED ORAL at 22:27

## 2018-01-01 RX ADMIN — HYDRALAZINE HYDROCHLORIDE 50 MG: 50 TABLET, FILM COATED ORAL at 10:52

## 2018-01-01 RX ADMIN — LABETALOL HYDROCHLORIDE 10 MG: 5 INJECTION, SOLUTION INTRAVENOUS at 15:15

## 2018-01-01 RX ADMIN — SODIUM BICARBONATE 650 MG: 650 TABLET ORAL at 08:25

## 2018-01-01 RX ADMIN — Medication: at 10:54

## 2018-01-01 RX ADMIN — FERROUS SULFATE TAB 325 MG (65 MG ELEMENTAL FE) 325 MG: 325 (65 FE) TAB at 09:53

## 2018-01-01 RX ADMIN — SODIUM CHLORIDE 40 MG: 9 INJECTION, SOLUTION INTRAMUSCULAR; INTRAVENOUS; SUBCUTANEOUS at 10:41

## 2018-01-01 RX ADMIN — INSULIN LISPRO 2 UNITS: 100 INJECTION, SOLUTION INTRAVENOUS; SUBCUTANEOUS at 18:26

## 2018-01-01 RX ADMIN — ACETAMINOPHEN 650 MG: 160 SOLUTION ORAL at 05:34

## 2018-01-01 RX ADMIN — ATENOLOL 50 MG: 50 TABLET ORAL at 09:19

## 2018-01-01 RX ADMIN — SODIUM CHLORIDE 40 MG: 9 INJECTION, SOLUTION INTRAMUSCULAR; INTRAVENOUS; SUBCUTANEOUS at 09:41

## 2018-01-01 RX ADMIN — ONDANSETRON 4 MG: 2 INJECTION INTRAMUSCULAR; INTRAVENOUS at 09:59

## 2018-01-01 RX ADMIN — Medication 10 ML: at 13:12

## 2018-01-01 RX ADMIN — ASPIRIN AND DIPYRIDAMOLE 1 CAPSULE: 25; 200 CAPSULE, EXTENDED RELEASE ORAL at 08:27

## 2018-01-01 RX ADMIN — NYSTATIN: 100000 POWDER TOPICAL at 10:51

## 2018-01-01 RX ADMIN — DILTIAZEM HYDROCHLORIDE 240 MG: 240 CAPSULE, COATED, EXTENDED RELEASE ORAL at 10:20

## 2018-01-01 RX ADMIN — HEPARIN SODIUM 7500 UNITS: 5000 INJECTION INTRAVENOUS; SUBCUTANEOUS at 18:15

## 2018-01-01 RX ADMIN — CEFTRIAXONE 1 G: 1 INJECTION, POWDER, FOR SOLUTION INTRAMUSCULAR; INTRAVENOUS at 06:48

## 2018-01-01 RX ADMIN — NYSTATIN: 100000 POWDER TOPICAL at 08:39

## 2018-01-01 RX ADMIN — HEPARIN SODIUM 7500 UNITS: 5000 INJECTION INTRAVENOUS; SUBCUTANEOUS at 02:53

## 2018-01-01 RX ADMIN — Medication 10 ML: at 15:28

## 2018-01-01 RX ADMIN — METOCLOPRAMIDE 5 MG: 5 INJECTION, SOLUTION INTRAMUSCULAR; INTRAVENOUS at 11:25

## 2018-01-01 RX ADMIN — LEVOFLOXACIN 750 MG: 750 TABLET, FILM COATED ORAL at 16:36

## 2018-01-01 RX ADMIN — NYSTATIN: 100000 POWDER TOPICAL at 10:29

## 2018-01-01 RX ADMIN — ONDANSETRON HYDROCHLORIDE 4 MG: 2 INJECTION, SOLUTION INTRAMUSCULAR; INTRAVENOUS at 22:30

## 2018-01-01 RX ADMIN — FLUCONAZOLE 200 MG: 200 TABLET ORAL at 10:20

## 2018-01-01 RX ADMIN — HYDROCODONE BITARTRATE AND ACETAMINOPHEN 1 TABLET: 5; 325 TABLET ORAL at 13:20

## 2018-01-01 RX ADMIN — PANTOPRAZOLE SODIUM 40 MG: 40 TABLET, DELAYED RELEASE ORAL at 08:26

## 2018-01-01 RX ADMIN — NYSTATIN: 100000 CREAM TOPICAL at 08:34

## 2018-01-01 RX ADMIN — Medication 10 ML: at 21:46

## 2018-01-01 RX ADMIN — MUPIROCIN: 20 OINTMENT TOPICAL at 09:59

## 2018-01-01 RX ADMIN — Medication 10 ML: at 22:31

## 2018-01-01 RX ADMIN — Medication: at 10:03

## 2018-01-01 RX ADMIN — ENOXAPARIN SODIUM 40 MG: 40 INJECTION SUBCUTANEOUS at 09:10

## 2018-01-01 RX ADMIN — HEPARIN SODIUM 7500 UNITS: 5000 INJECTION INTRAVENOUS; SUBCUTANEOUS at 11:07

## 2018-01-01 RX ADMIN — HYDROCODONE BITARTRATE AND ACETAMINOPHEN 1 TABLET: 5; 325 TABLET ORAL at 04:42

## 2018-01-01 RX ADMIN — AMPICILLIN SODIUM AND SULBACTAM SODIUM 3 G: 2; 1 INJECTION, POWDER, FOR SOLUTION INTRAMUSCULAR; INTRAVENOUS at 15:09

## 2018-01-01 RX ADMIN — CLOTRIMAZOLE: 1 CREAM TOPICAL at 10:55

## 2018-01-01 RX ADMIN — SODIUM CHLORIDE 40 MEQ: 900 INJECTION, SOLUTION INTRAVENOUS at 08:34

## 2018-01-01 RX ADMIN — INSULIN LISPRO 3 UNITS: 100 INJECTION, SOLUTION INTRAVENOUS; SUBCUTANEOUS at 12:09

## 2018-01-01 RX ADMIN — POLYMYXIN B SULFATE AND TRIMETHOPRIM SULFATE 1 DROP: 10000; 1 SOLUTION/ DROPS OPHTHALMIC at 10:55

## 2018-01-01 RX ADMIN — Medication 1 CAPSULE: at 10:12

## 2018-01-01 RX ADMIN — MUPIROCIN: 20 OINTMENT TOPICAL at 08:45

## 2018-01-01 RX ADMIN — Medication 20 ML: at 17:28

## 2018-01-01 RX ADMIN — PREGABALIN 75 MG: 25 CAPSULE ORAL at 08:21

## 2018-01-01 RX ADMIN — MUPIROCIN: 20 OINTMENT TOPICAL at 18:17

## 2018-01-01 RX ADMIN — HYDRALAZINE HYDROCHLORIDE 5 MG: 20 INJECTION INTRAMUSCULAR; INTRAVENOUS at 15:38

## 2018-01-01 RX ADMIN — Medication 10 ML: at 14:06

## 2018-01-01 RX ADMIN — ALLOPURINOL 100 MG: 100 TABLET ORAL at 09:00

## 2018-01-01 RX ADMIN — HEPARIN SODIUM 7500 UNITS: 5000 INJECTION INTRAVENOUS; SUBCUTANEOUS at 19:59

## 2018-01-01 RX ADMIN — SODIUM BICARBONATE 650 MG: 650 TABLET ORAL at 17:10

## 2018-01-01 RX ADMIN — PIPERACILLIN SODIUM AND TAZOBACTAM SODIUM 3.38 G: .375; 3 INJECTION, POWDER, LYOPHILIZED, FOR SOLUTION INTRAVENOUS at 11:15

## 2018-01-01 RX ADMIN — MICONAZOLE NITRATE: 20 CREAM TOPICAL at 18:26

## 2018-01-01 RX ADMIN — CLOTRIMAZOLE: 1 CREAM TOPICAL at 08:39

## 2018-01-01 RX ADMIN — METOCLOPRAMIDE HYDROCHLORIDE 10 MG: 5 SOLUTION ORAL at 10:57

## 2018-01-01 RX ADMIN — COLESTIPOL HYDROCHLORIDE 1 G: 1 TABLET, FILM COATED ORAL at 10:12

## 2018-01-01 RX ADMIN — HYDROCODONE BITARTRATE AND ACETAMINOPHEN 1 TABLET: 5; 325 TABLET ORAL at 07:01

## 2018-01-01 RX ADMIN — Medication: at 20:00

## 2018-01-01 RX ADMIN — VANCOMYCIN HYDROCHLORIDE 2000 MG: 10 INJECTION, POWDER, LYOPHILIZED, FOR SOLUTION INTRAVENOUS at 09:56

## 2018-01-01 RX ADMIN — NYSTATIN: 100000 CREAM TOPICAL at 11:22

## 2018-01-01 RX ADMIN — INSULIN LISPRO 1 UNITS: 100 INJECTION, SOLUTION INTRAVENOUS; SUBCUTANEOUS at 21:42

## 2018-01-01 RX ADMIN — HYDROCODONE BITARTRATE AND ACETAMINOPHEN 1 TABLET: 5; 325 TABLET ORAL at 21:18

## 2018-01-01 RX ADMIN — NYSTATIN: 100000 POWDER TOPICAL at 20:20

## 2018-01-01 RX ADMIN — Medication 10 ML: at 22:50

## 2018-01-01 RX ADMIN — Medication 10 ML: at 15:02

## 2018-01-01 RX ADMIN — NYSTATIN: 100000 CREAM TOPICAL at 11:06

## 2018-01-01 RX ADMIN — INSULIN LISPRO 2 UNITS: 100 INJECTION, SOLUTION INTRAVENOUS; SUBCUTANEOUS at 21:09

## 2018-01-01 RX ADMIN — DEXTROSE MONOHYDRATE, SODIUM CHLORIDE, AND POTASSIUM CHLORIDE 100 ML/HR: 50; 4.5; .745 INJECTION, SOLUTION INTRAVENOUS at 01:44

## 2018-01-01 RX ADMIN — METOCLOPRAMIDE HYDROCHLORIDE 10 MG: 5 SOLUTION ORAL at 17:09

## 2018-01-01 RX ADMIN — DEXTROSE MONOHYDRATE AND POTASSIUM CHLORIDE: 5; .149 INJECTION, SOLUTION INTRAVENOUS at 06:10

## 2018-01-01 RX ADMIN — SODIUM BICARBONATE: 84 INJECTION, SOLUTION INTRAVENOUS at 14:30

## 2018-01-01 RX ADMIN — CEFEPIME 2 G: 2 INJECTION, POWDER, FOR SOLUTION INTRAMUSCULAR; INTRAVENOUS at 21:35

## 2018-01-01 RX ADMIN — NYSTATIN: 100000 CREAM TOPICAL at 10:06

## 2018-01-01 RX ADMIN — POLYMYXIN B SULFATE AND TRIMETHOPRIM SULFATE 1 DROP: 10000; 1 SOLUTION/ DROPS OPHTHALMIC at 09:31

## 2018-01-01 RX ADMIN — POLYMYXIN B SULFATE AND TRIMETHOPRIM SULFATE 1 DROP: 10000; 1 SOLUTION/ DROPS OPHTHALMIC at 18:23

## 2018-01-01 RX ADMIN — INSULIN LISPRO 2 UNITS: 100 INJECTION, SOLUTION INTRAVENOUS; SUBCUTANEOUS at 16:35

## 2018-01-01 RX ADMIN — DILTIAZEM HYDROCHLORIDE 360 MG: 180 CAPSULE, COATED, EXTENDED RELEASE ORAL at 09:28

## 2018-01-01 RX ADMIN — Medication 20 ML: at 16:02

## 2018-01-01 RX ADMIN — POLYMYXIN B SULFATE AND TRIMETHOPRIM SULFATE 1 DROP: 10000; 1 SOLUTION/ DROPS OPHTHALMIC at 17:53

## 2018-01-01 RX ADMIN — SODIUM CHLORIDE AND POTASSIUM CHLORIDE: 9; 1.49 INJECTION, SOLUTION INTRAVENOUS at 10:38

## 2018-01-01 RX ADMIN — Medication 10 ML: at 21:43

## 2018-01-01 RX ADMIN — DILTIAZEM HYDROCHLORIDE 360 MG: 180 CAPSULE, COATED, EXTENDED RELEASE ORAL at 10:52

## 2018-01-01 RX ADMIN — INSULIN LISPRO 5 UNITS: 100 INJECTION, SOLUTION INTRAVENOUS; SUBCUTANEOUS at 11:58

## 2018-01-01 RX ADMIN — Medication 10 ML: at 21:19

## 2018-01-01 RX ADMIN — NYSTATIN: 100000 CREAM TOPICAL at 08:39

## 2018-01-01 RX ADMIN — CLOTRIMAZOLE: 10 CREAM TOPICAL at 19:05

## 2018-01-01 RX ADMIN — ATENOLOL 50 MG: 50 TABLET ORAL at 09:08

## 2018-01-01 RX ADMIN — MICONAZOLE NITRATE: 20 CREAM TOPICAL at 18:00

## 2018-01-01 RX ADMIN — INSULIN GLARGINE 24 UNITS: 100 INJECTION, SOLUTION SUBCUTANEOUS at 22:38

## 2018-01-01 RX ADMIN — Medication: at 21:38

## 2018-01-01 RX ADMIN — KETAMINE HYDROCHLORIDE 10 MG: 10 INJECTION, SOLUTION INTRAMUSCULAR; INTRAVENOUS at 16:47

## 2018-01-01 RX ADMIN — Medication 10 ML: at 05:07

## 2018-01-01 RX ADMIN — HYDRALAZINE HYDROCHLORIDE 50 MG: 50 TABLET, FILM COATED ORAL at 21:40

## 2018-01-01 RX ADMIN — Medication 10 ML: at 17:29

## 2018-01-01 RX ADMIN — Medication 10 ML: at 05:55

## 2018-01-01 RX ADMIN — METOCLOPRAMIDE 5 MG: 5 INJECTION, SOLUTION INTRAMUSCULAR; INTRAVENOUS at 17:02

## 2018-01-01 RX ADMIN — Medication 40 ML: at 06:00

## 2018-01-01 RX ADMIN — Medication 10 ML: at 21:33

## 2018-01-01 RX ADMIN — INSULIN LISPRO 2 UNITS: 100 INJECTION, SOLUTION INTRAVENOUS; SUBCUTANEOUS at 12:13

## 2018-01-01 RX ADMIN — PIPERACILLIN SODIUM,TAZOBACTAM SODIUM 3.38 G: 3; .375 INJECTION, POWDER, FOR SOLUTION INTRAVENOUS at 09:04

## 2018-01-01 RX ADMIN — NYSTATIN: 100000 CREAM TOPICAL at 21:16

## 2018-01-01 RX ADMIN — PANTOPRAZOLE SODIUM 40 MG: 40 TABLET, DELAYED RELEASE ORAL at 09:09

## 2018-01-01 RX ADMIN — CLOTRIMAZOLE: 1 CREAM TOPICAL at 11:05

## 2018-01-01 RX ADMIN — SODIUM CHLORIDE 40 MG: 9 INJECTION, SOLUTION INTRAMUSCULAR; INTRAVENOUS; SUBCUTANEOUS at 22:31

## 2018-01-01 RX ADMIN — NYSTATIN: 100000 POWDER TOPICAL at 11:21

## 2018-01-01 RX ADMIN — Medication: at 18:21

## 2018-01-01 RX ADMIN — HYDRALAZINE HYDROCHLORIDE 50 MG: 50 TABLET, FILM COATED ORAL at 21:41

## 2018-01-01 RX ADMIN — DEXTROSE MONOHYDRATE AND POTASSIUM CHLORIDE: 5; .149 INJECTION, SOLUTION INTRAVENOUS at 20:39

## 2018-01-01 RX ADMIN — HYDROCODONE BITARTRATE AND ACETAMINOPHEN 1 TABLET: 5; 325 TABLET ORAL at 15:00

## 2018-01-01 RX ADMIN — DEXTROSE MONOHYDRATE 50 ML/HR: 5 INJECTION, SOLUTION INTRAVENOUS at 19:33

## 2018-01-01 RX ADMIN — NYSTATIN: 100000 CREAM TOPICAL at 17:55

## 2018-01-01 RX ADMIN — Medication 1 CAPSULE: at 09:08

## 2018-01-01 RX ADMIN — HYDRALAZINE HYDROCHLORIDE 50 MG: 50 TABLET, FILM COATED ORAL at 17:42

## 2018-01-01 RX ADMIN — INSULIN LISPRO 2 UNITS: 100 INJECTION, SOLUTION INTRAVENOUS; SUBCUTANEOUS at 22:11

## 2018-01-01 RX ADMIN — MIRTAZAPINE 15 MG: 15 TABLET, FILM COATED ORAL at 22:11

## 2018-01-01 RX ADMIN — INSULIN LISPRO 5 UNITS: 100 INJECTION, SOLUTION INTRAVENOUS; SUBCUTANEOUS at 18:02

## 2018-01-01 RX ADMIN — POTASSIUM CHLORIDE 20 MEQ: 1.5 POWDER, FOR SOLUTION ORAL at 10:21

## 2018-01-01 RX ADMIN — SODIUM BICARBONATE: 84 INJECTION, SOLUTION INTRAVENOUS at 09:23

## 2018-01-01 RX ADMIN — Medication 10 ML: at 17:08

## 2018-01-01 RX ADMIN — INSULIN LISPRO 3 UNITS: 100 INJECTION, SOLUTION INTRAVENOUS; SUBCUTANEOUS at 17:38

## 2018-01-01 RX ADMIN — Medication 10 ML: at 15:22

## 2018-01-01 RX ADMIN — CLOTRIMAZOLE: 1 CREAM TOPICAL at 17:42

## 2018-01-01 RX ADMIN — BUMETANIDE 3 MG: 1 TABLET ORAL at 09:04

## 2018-01-01 RX ADMIN — HYDROCODONE BITARTRATE AND ACETAMINOPHEN 1 TABLET: 5; 325 TABLET ORAL at 22:00

## 2018-01-01 RX ADMIN — Medication 10 ML: at 06:33

## 2018-01-01 RX ADMIN — VANCOMYCIN HYDROCHLORIDE 1000 MG: 1 INJECTION, POWDER, LYOPHILIZED, FOR SOLUTION INTRAVENOUS at 08:28

## 2018-01-01 RX ADMIN — INSULIN GLARGINE 6 UNITS: 100 INJECTION, SOLUTION SUBCUTANEOUS at 08:26

## 2018-01-01 RX ADMIN — Medication 10 ML: at 22:08

## 2018-01-01 RX ADMIN — Medication 10 ML: at 15:10

## 2018-01-01 RX ADMIN — POLYMYXIN B SULFATE AND TRIMETHOPRIM SULFATE 1 DROP: 10000; 1 SOLUTION/ DROPS OPHTHALMIC at 10:22

## 2018-01-01 RX ADMIN — HYDRALAZINE HYDROCHLORIDE 50 MG: 50 TABLET, FILM COATED ORAL at 22:03

## 2018-01-01 RX ADMIN — ACETAMINOPHEN 650 MG: 325 TABLET ORAL at 21:53

## 2018-01-01 RX ADMIN — ALBUMIN HUMAN 250 ML/HR: 50 SOLUTION INTRAVENOUS at 15:14

## 2018-01-01 RX ADMIN — PIPERACILLIN SODIUM,TAZOBACTAM SODIUM 3.38 G: 3; .375 INJECTION, POWDER, FOR SOLUTION INTRAVENOUS at 02:00

## 2018-01-01 RX ADMIN — Medication 10 ML: at 18:47

## 2018-01-01 RX ADMIN — HYDROCODONE BITARTRATE AND ACETAMINOPHEN 1 TABLET: 5; 325 TABLET ORAL at 19:20

## 2018-01-01 RX ADMIN — SODIUM BICARBONATE 650 MG: 650 TABLET ORAL at 12:10

## 2018-01-01 RX ADMIN — MIRTAZAPINE 15 MG: 15 TABLET, FILM COATED ORAL at 21:42

## 2018-01-01 RX ADMIN — HEPARIN SODIUM 7500 UNITS: 5000 INJECTION INTRAVENOUS; SUBCUTANEOUS at 02:01

## 2018-01-01 RX ADMIN — POTASSIUM CHLORIDE 20 MEQ: 20 TABLET, EXTENDED RELEASE ORAL at 09:20

## 2018-01-01 RX ADMIN — COLESTIPOL HYDROCHLORIDE 1 G: 1 TABLET, FILM COATED ORAL at 09:08

## 2018-01-01 RX ADMIN — METOCLOPRAMIDE 10 MG: 5 INJECTION, SOLUTION INTRAMUSCULAR; INTRAVENOUS at 12:25

## 2018-01-01 RX ADMIN — Medication 10 ML: at 06:35

## 2018-01-01 RX ADMIN — METRONIDAZOLE 500 MG: 500 INJECTION, SOLUTION INTRAVENOUS at 16:31

## 2018-01-01 RX ADMIN — HYDRALAZINE HYDROCHLORIDE 50 MG: 50 TABLET, FILM COATED ORAL at 10:13

## 2018-01-01 RX ADMIN — DILTIAZEM HYDROCHLORIDE 360 MG: 180 CAPSULE, COATED, EXTENDED RELEASE ORAL at 23:11

## 2018-01-01 RX ADMIN — NYSTATIN: 100000 CREAM TOPICAL at 10:03

## 2018-01-01 RX ADMIN — HEPARIN SODIUM 7500 UNITS: 5000 INJECTION INTRAVENOUS; SUBCUTANEOUS at 17:30

## 2018-01-01 RX ADMIN — METOCLOPRAMIDE HYDROCHLORIDE 10 MG: 5 SOLUTION ORAL at 12:32

## 2018-01-01 RX ADMIN — ALLOPURINOL 100 MG: 100 TABLET ORAL at 09:09

## 2018-01-01 RX ADMIN — POLYMYXIN B SULFATE AND TRIMETHOPRIM SULFATE 1 DROP: 10000; 1 SOLUTION/ DROPS OPHTHALMIC at 18:16

## 2018-01-01 RX ADMIN — HYDRALAZINE HYDROCHLORIDE 50 MG: 50 TABLET, FILM COATED ORAL at 22:33

## 2018-01-01 RX ADMIN — MICONAZOLE NITRATE: 20 CREAM TOPICAL at 18:16

## 2018-01-01 RX ADMIN — MICONAZOLE NITRATE: 20 CREAM TOPICAL at 08:54

## 2018-01-01 RX ADMIN — INSULIN GLARGINE 24 UNITS: 100 INJECTION, SOLUTION SUBCUTANEOUS at 23:45

## 2018-01-01 RX ADMIN — ACETAMINOPHEN 650 MG: 325 TABLET ORAL at 02:14

## 2018-01-01 RX ADMIN — HEPARIN SODIUM 7500 UNITS: 5000 INJECTION INTRAVENOUS; SUBCUTANEOUS at 10:53

## 2018-01-01 RX ADMIN — PANTOPRAZOLE SODIUM 40 MG: 40 TABLET, DELAYED RELEASE ORAL at 17:29

## 2018-01-01 RX ADMIN — Medication 10 ML: at 18:00

## 2018-01-01 RX ADMIN — Medication 10 ML: at 13:19

## 2018-01-01 RX ADMIN — ATENOLOL 50 MG: 50 TABLET ORAL at 09:00

## 2018-01-01 RX ADMIN — HYDRALAZINE HYDROCHLORIDE 50 MG: 50 TABLET, FILM COATED ORAL at 09:08

## 2018-01-01 RX ADMIN — NYSTATIN: 100000 POWDER TOPICAL at 23:45

## 2018-01-01 RX ADMIN — MIRTAZAPINE 7.5 MG: 15 TABLET, FILM COATED ORAL at 02:46

## 2018-01-01 RX ADMIN — METOCLOPRAMIDE HYDROCHLORIDE 10 MG: 5 SOLUTION ORAL at 10:23

## 2018-01-01 RX ADMIN — BUMETANIDE 2 MG: 1 TABLET ORAL at 08:58

## 2018-01-01 RX ADMIN — ENOXAPARIN SODIUM 40 MG: 40 INJECTION SUBCUTANEOUS at 10:12

## 2018-01-01 RX ADMIN — TRAMADOL HYDROCHLORIDE 50 MG: 50 TABLET, FILM COATED ORAL at 20:32

## 2018-01-01 RX ADMIN — METOCLOPRAMIDE HYDROCHLORIDE 10 MG: 5 SOLUTION ORAL at 12:07

## 2018-01-01 RX ADMIN — PREGABALIN 75 MG: 25 CAPSULE ORAL at 10:26

## 2018-01-01 RX ADMIN — MIDAZOLAM HYDROCHLORIDE 1 MG: 1 INJECTION, SOLUTION INTRAMUSCULAR; INTRAVENOUS at 16:31

## 2018-01-01 RX ADMIN — HYDROCODONE BITARTRATE AND ACETAMINOPHEN 1 TABLET: 5; 325 TABLET ORAL at 00:00

## 2018-01-01 RX ADMIN — METOCLOPRAMIDE HYDROCHLORIDE 10 MG: 5 SOLUTION ORAL at 10:05

## 2018-01-01 RX ADMIN — CLOTRIMAZOLE: 1 CREAM TOPICAL at 22:01

## 2018-01-01 RX ADMIN — BUMETANIDE 2 MG: 1 TABLET ORAL at 10:51

## 2018-01-01 RX ADMIN — Medication 10 ML: at 16:13

## 2018-01-01 RX ADMIN — SODIUM CHLORIDE 40 MG: 9 INJECTION, SOLUTION INTRAMUSCULAR; INTRAVENOUS; SUBCUTANEOUS at 21:46

## 2018-01-01 RX ADMIN — PANTOPRAZOLE SODIUM 40 MG: 40 TABLET, DELAYED RELEASE ORAL at 18:35

## 2018-01-01 RX ADMIN — MIRTAZAPINE 15 MG: 15 TABLET, FILM COATED ORAL at 22:50

## 2018-01-01 RX ADMIN — INSULIN LISPRO 2 UNITS: 100 INJECTION, SOLUTION INTRAVENOUS; SUBCUTANEOUS at 13:10

## 2018-01-01 RX ADMIN — PREGABALIN 75 MG: 25 CAPSULE ORAL at 09:22

## 2018-01-01 RX ADMIN — Medication 10 ML: at 22:28

## 2018-01-01 RX ADMIN — ENOXAPARIN SODIUM 40 MG: 40 INJECTION SUBCUTANEOUS at 22:50

## 2018-01-01 RX ADMIN — HYDRALAZINE HYDROCHLORIDE 50 MG: 50 TABLET, FILM COATED ORAL at 11:15

## 2018-01-01 RX ADMIN — ACETAMINOPHEN 650 MG: 160 SOLUTION ORAL at 04:36

## 2018-01-01 RX ADMIN — NYSTATIN: 100000 POWDER TOPICAL at 21:16

## 2018-01-01 RX ADMIN — FUROSEMIDE 80 MG: 10 INJECTION, SOLUTION INTRAMUSCULAR; INTRAVENOUS at 12:32

## 2018-01-01 RX ADMIN — ATENOLOL 50 MG: 50 TABLET ORAL at 10:51

## 2018-01-01 RX ADMIN — BUMETANIDE 2 MG: 0.25 INJECTION INTRAMUSCULAR; INTRAVENOUS at 17:21

## 2018-01-01 RX ADMIN — NYSTATIN: 100000 CREAM TOPICAL at 17:09

## 2018-01-01 RX ADMIN — NYSTATIN: 100000 CREAM TOPICAL at 10:54

## 2018-01-01 RX ADMIN — PREGABALIN 75 MG: 25 CAPSULE ORAL at 17:05

## 2018-01-01 RX ADMIN — Medication: at 10:55

## 2018-01-01 RX ADMIN — Medication 10 ML: at 05:35

## 2018-01-01 RX ADMIN — INSULIN LISPRO 2 UNITS: 100 INJECTION, SOLUTION INTRAVENOUS; SUBCUTANEOUS at 11:42

## 2018-01-01 RX ADMIN — Medication 10 ML: at 09:35

## 2018-01-01 RX ADMIN — PREGABALIN 75 MG: 25 CAPSULE ORAL at 10:49

## 2018-01-01 RX ADMIN — DILTIAZEM HYDROCHLORIDE 360 MG: 180 CAPSULE, COATED, EXTENDED RELEASE ORAL at 09:08

## 2018-01-01 RX ADMIN — TRAMADOL HYDROCHLORIDE 50 MG: 50 TABLET, FILM COATED ORAL at 09:45

## 2018-01-01 RX ADMIN — Medication 20 ML: at 14:26

## 2018-01-01 RX ADMIN — HYDRALAZINE HYDROCHLORIDE 50 MG: 50 TABLET, FILM COATED ORAL at 23:11

## 2018-01-01 RX ADMIN — METRONIDAZOLE 500 MG: 500 INJECTION, SOLUTION INTRAVENOUS at 09:09

## 2018-01-01 RX ADMIN — SODIUM CHLORIDE 40 MG: 9 INJECTION, SOLUTION INTRAMUSCULAR; INTRAVENOUS; SUBCUTANEOUS at 08:56

## 2018-01-01 RX ADMIN — HYDRALAZINE HYDROCHLORIDE 50 MG: 50 TABLET, FILM COATED ORAL at 09:10

## 2018-01-01 RX ADMIN — TRAMADOL HYDROCHLORIDE 100 MG: 50 TABLET, FILM COATED ORAL at 02:46

## 2018-01-01 RX ADMIN — BUMETANIDE 2 MG: 1 TABLET ORAL at 10:20

## 2018-01-01 RX ADMIN — Medication 10 ML: at 08:35

## 2018-01-01 RX ADMIN — MUPIROCIN: 20 OINTMENT TOPICAL at 11:37

## 2018-01-01 RX ADMIN — POLYMYXIN B SULFATE AND TRIMETHOPRIM SULFATE 1 DROP: 10000; 1 SOLUTION/ DROPS OPHTHALMIC at 17:13

## 2018-01-01 RX ADMIN — DEXTROSE MONOHYDRATE 12.5 G: 500 INJECTION PARENTERAL at 11:30

## 2018-01-01 RX ADMIN — INSULIN GLARGINE 6 UNITS: 100 INJECTION, SOLUTION SUBCUTANEOUS at 09:56

## 2018-01-01 RX ADMIN — HYDRALAZINE HYDROCHLORIDE 50 MG: 50 TABLET, FILM COATED ORAL at 18:04

## 2018-01-01 RX ADMIN — Medication 10 ML: at 07:03

## 2018-01-01 RX ADMIN — HEPARIN SODIUM 7500 UNITS: 5000 INJECTION INTRAVENOUS; SUBCUTANEOUS at 10:01

## 2018-01-01 RX ADMIN — HYDROCODONE BITARTRATE AND ACETAMINOPHEN 1 TABLET: 5; 325 TABLET ORAL at 10:45

## 2018-01-01 RX ADMIN — METOCLOPRAMIDE HYDROCHLORIDE 10 MG: 5 SOLUTION ORAL at 10:54

## 2018-01-01 RX ADMIN — HYDRALAZINE HYDROCHLORIDE 50 MG: 50 TABLET, FILM COATED ORAL at 08:27

## 2018-01-01 RX ADMIN — HEPARIN SODIUM 7500 UNITS: 5000 INJECTION INTRAVENOUS; SUBCUTANEOUS at 02:52

## 2018-01-01 RX ADMIN — Medication 10 ML: at 09:15

## 2018-01-01 RX ADMIN — DILTIAZEM HYDROCHLORIDE 240 MG: 240 CAPSULE, COATED, EXTENDED RELEASE ORAL at 09:42

## 2018-01-01 RX ADMIN — MUPIROCIN: 20 OINTMENT TOPICAL at 08:44

## 2018-01-01 RX ADMIN — PANTOPRAZOLE SODIUM 40 MG: 40 TABLET, DELAYED RELEASE ORAL at 17:27

## 2018-01-01 RX ADMIN — ASPIRIN AND DIPYRIDAMOLE 1 CAPSULE: 25; 200 CAPSULE, EXTENDED RELEASE ORAL at 10:51

## 2018-01-01 RX ADMIN — MIRTAZAPINE 15 MG: 15 TABLET, FILM COATED ORAL at 21:02

## 2018-01-01 RX ADMIN — Medication 10 ML: at 13:34

## 2018-01-01 RX ADMIN — HEPARIN SODIUM 7500 UNITS: 5000 INJECTION INTRAVENOUS; SUBCUTANEOUS at 02:40

## 2018-01-01 RX ADMIN — INSULIN LISPRO 2 UNITS: 100 INJECTION, SOLUTION INTRAVENOUS; SUBCUTANEOUS at 09:09

## 2018-01-01 RX ADMIN — PIPERACILLIN SODIUM AND TAZOBACTAM SODIUM 3.38 G: .375; 3 INJECTION, POWDER, LYOPHILIZED, FOR SOLUTION INTRAVENOUS at 17:04

## 2018-01-01 RX ADMIN — AMPICILLIN SODIUM AND SULBACTAM SODIUM 3 G: 2; 1 INJECTION, POWDER, FOR SOLUTION INTRAMUSCULAR; INTRAVENOUS at 00:38

## 2018-01-01 RX ADMIN — NYSTATIN: 100000 CREAM TOPICAL at 17:59

## 2018-01-01 RX ADMIN — INSULIN LISPRO 2 UNITS: 100 INJECTION, SOLUTION INTRAVENOUS; SUBCUTANEOUS at 17:57

## 2018-01-01 RX ADMIN — SODIUM BICARBONATE 650 MG: 650 TABLET ORAL at 17:46

## 2018-01-01 RX ADMIN — Medication 1 CAPSULE: at 08:21

## 2018-01-01 RX ADMIN — HYDROCODONE BITARTRATE AND ACETAMINOPHEN 1 TABLET: 5; 325 TABLET ORAL at 19:23

## 2018-01-01 RX ADMIN — ATENOLOL 50 MG: 50 TABLET ORAL at 23:11

## 2018-01-01 RX ADMIN — TRAMADOL HYDROCHLORIDE 50 MG: 50 TABLET, FILM COATED ORAL at 17:55

## 2018-01-01 RX ADMIN — Medication 10 ML: at 13:11

## 2018-01-01 RX ADMIN — HEPARIN SODIUM 7500 UNITS: 5000 INJECTION INTRAVENOUS; SUBCUTANEOUS at 11:58

## 2018-01-01 RX ADMIN — HYDROCODONE BITARTRATE AND ACETAMINOPHEN 1 TABLET: 5; 325 TABLET ORAL at 21:12

## 2018-01-01 RX ADMIN — HEPARIN SODIUM 7500 UNITS: 5000 INJECTION INTRAVENOUS; SUBCUTANEOUS at 02:23

## 2018-01-01 RX ADMIN — Medication 10 ML: at 10:18

## 2018-01-01 RX ADMIN — ALBUMIN (HUMAN) 12.5 G: 0.25 INJECTION, SOLUTION INTRAVENOUS at 17:21

## 2018-01-01 RX ADMIN — ASPIRIN AND DIPYRIDAMOLE 1 CAPSULE: 25; 200 CAPSULE, EXTENDED RELEASE ORAL at 08:58

## 2018-01-01 RX ADMIN — INSULIN LISPRO 3 UNITS: 100 INJECTION, SOLUTION INTRAVENOUS; SUBCUTANEOUS at 12:10

## 2018-01-01 RX ADMIN — CLOTRIMAZOLE: 1 CREAM TOPICAL at 20:00

## 2018-01-01 RX ADMIN — PANTOPRAZOLE SODIUM 40 MG: 40 TABLET, DELAYED RELEASE ORAL at 10:22

## 2018-01-01 RX ADMIN — NYSTATIN: 100000 CREAM TOPICAL at 19:49

## 2018-01-01 RX ADMIN — NYSTATIN: 100000 POWDER TOPICAL at 18:21

## 2018-01-01 RX ADMIN — HYDRALAZINE HYDROCHLORIDE 50 MG: 50 TABLET, FILM COATED ORAL at 09:52

## 2018-01-01 RX ADMIN — METOCLOPRAMIDE 5 MG: 5 INJECTION, SOLUTION INTRAMUSCULAR; INTRAVENOUS at 11:37

## 2018-01-01 RX ADMIN — Medication: at 23:45

## 2018-01-01 RX ADMIN — NYSTATIN: 100000 CREAM TOPICAL at 19:05

## 2018-01-01 RX ADMIN — Medication 10 ML: at 06:32

## 2018-01-01 RX ADMIN — ASPIRIN AND EXTENDED-RELEASE DIPYRIDAMOLE 1 CAPSULE: 25; 200 CAPSULE ORAL at 17:57

## 2018-01-01 RX ADMIN — ASPIRIN AND DIPYRIDAMOLE 1 CAPSULE: 25; 200 CAPSULE, EXTENDED RELEASE ORAL at 22:31

## 2018-01-01 RX ADMIN — ASPIRIN AND EXTENDED-RELEASE DIPYRIDAMOLE 1 CAPSULE: 25; 200 CAPSULE ORAL at 09:28

## 2018-01-01 RX ADMIN — INSULIN LISPRO 3 UNITS: 100 INJECTION, SOLUTION INTRAVENOUS; SUBCUTANEOUS at 10:11

## 2018-01-01 RX ADMIN — ACETAMINOPHEN 650 MG: 325 TABLET ORAL at 10:27

## 2018-01-01 RX ADMIN — SODIUM CHLORIDE 40 MG: 9 INJECTION, SOLUTION INTRAMUSCULAR; INTRAVENOUS; SUBCUTANEOUS at 09:16

## 2018-01-01 RX ADMIN — ASPIRIN AND EXTENDED-RELEASE DIPYRIDAMOLE 1 CAPSULE: 25; 200 CAPSULE ORAL at 17:29

## 2018-01-01 RX ADMIN — SODIUM CHLORIDE AND POTASSIUM CHLORIDE: 9; 1.49 INJECTION, SOLUTION INTRAVENOUS at 04:50

## 2018-01-01 RX ADMIN — ATENOLOL 50 MG: 50 TABLET ORAL at 08:30

## 2018-01-01 RX ADMIN — CLOTRIMAZOLE: 1 CREAM TOPICAL at 11:21

## 2018-01-01 RX ADMIN — HEPARIN SODIUM 7500 UNITS: 5000 INJECTION INTRAVENOUS; SUBCUTANEOUS at 01:49

## 2018-01-01 RX ADMIN — ATENOLOL 50 MG: 50 TABLET ORAL at 09:40

## 2018-01-01 RX ADMIN — INSULIN LISPRO 2 UNITS: 100 INJECTION, SOLUTION INTRAVENOUS; SUBCUTANEOUS at 21:21

## 2018-01-01 RX ADMIN — SODIUM BICARBONATE 650 MG: 650 TABLET ORAL at 08:52

## 2018-01-01 RX ADMIN — ATORVASTATIN CALCIUM 40 MG: 40 TABLET, FILM COATED ORAL at 08:27

## 2018-01-01 RX ADMIN — NYSTATIN: 100000 CREAM TOPICAL at 10:01

## 2018-01-01 RX ADMIN — INSULIN LISPRO 2 UNITS: 100 INJECTION, SOLUTION INTRAVENOUS; SUBCUTANEOUS at 17:41

## 2018-01-01 RX ADMIN — METRONIDAZOLE 500 MG: 500 INJECTION, SOLUTION INTRAVENOUS at 09:18

## 2018-01-01 RX ADMIN — Medication 10 ML: at 22:43

## 2018-01-01 RX ADMIN — INSULIN LISPRO 2 UNITS: 100 INJECTION, SOLUTION INTRAVENOUS; SUBCUTANEOUS at 08:26

## 2018-01-01 RX ADMIN — CEFEPIME 2 G: 2 INJECTION, POWDER, FOR SOLUTION INTRAMUSCULAR; INTRAVENOUS at 12:52

## 2018-01-01 RX ADMIN — PIPERACILLIN SODIUM,TAZOBACTAM SODIUM 3.38 G: 3; .375 INJECTION, POWDER, FOR SOLUTION INTRAVENOUS at 13:21

## 2018-01-01 RX ADMIN — Medication: at 08:39

## 2018-01-01 RX ADMIN — Medication 1 CAPSULE: at 09:28

## 2018-01-01 RX ADMIN — INSULIN GLARGINE 50 UNITS: 100 INJECTION, SOLUTION SUBCUTANEOUS at 22:36

## 2018-01-01 RX ADMIN — Medication 10 ML: at 15:51

## 2018-01-01 RX ADMIN — HYDROCODONE BITARTRATE AND ACETAMINOPHEN 1 TABLET: 5; 325 TABLET ORAL at 02:50

## 2018-01-01 RX ADMIN — NYSTATIN: 100000 CREAM TOPICAL at 08:45

## 2018-01-01 RX ADMIN — ENOXAPARIN SODIUM 40 MG: 40 INJECTION SUBCUTANEOUS at 22:03

## 2018-01-01 RX ADMIN — Medication 10 ML: at 22:00

## 2018-01-01 RX ADMIN — HEPARIN SODIUM 7500 UNITS: 5000 INJECTION INTRAVENOUS; SUBCUTANEOUS at 02:41

## 2018-01-01 RX ADMIN — Medication: at 11:21

## 2018-01-01 RX ADMIN — DILTIAZEM HYDROCHLORIDE 360 MG: 180 CAPSULE, COATED, EXTENDED RELEASE ORAL at 09:20

## 2018-01-01 RX ADMIN — CEFEPIME 2 G: 2 INJECTION, POWDER, FOR SOLUTION INTRAMUSCULAR; INTRAVENOUS at 11:37

## 2018-01-01 RX ADMIN — ASPIRIN AND EXTENDED-RELEASE DIPYRIDAMOLE 1 CAPSULE: 25; 200 CAPSULE ORAL at 17:51

## 2018-01-01 RX ADMIN — ENOXAPARIN SODIUM 40 MG: 40 INJECTION SUBCUTANEOUS at 10:53

## 2018-01-01 RX ADMIN — SODIUM BICARBONATE 650 MG: 650 TABLET ORAL at 09:44

## 2018-01-01 RX ADMIN — HYDROCODONE BITARTRATE AND ACETAMINOPHEN 1 TABLET: 5; 325 TABLET ORAL at 17:21

## 2018-01-01 RX ADMIN — INSULIN LISPRO 1 UNITS: 100 INJECTION, SOLUTION INTRAVENOUS; SUBCUTANEOUS at 22:07

## 2018-01-01 RX ADMIN — BUMETANIDE 2 MG: 1 TABLET ORAL at 08:26

## 2018-01-01 RX ADMIN — LEVOFLOXACIN 750 MG: 5 INJECTION, SOLUTION INTRAVENOUS at 14:32

## 2018-01-01 RX ADMIN — Medication 10 ML: at 22:51

## 2018-01-01 RX ADMIN — NYSTATIN: 100000 CREAM TOPICAL at 18:41

## 2018-01-01 RX ADMIN — MIRTAZAPINE 7.5 MG: 15 TABLET, FILM COATED ORAL at 22:31

## 2018-01-01 RX ADMIN — DEXTROSE MONOHYDRATE 75 ML/HR: 5 INJECTION, SOLUTION INTRAVENOUS at 00:10

## 2018-01-01 RX ADMIN — TRAMADOL HYDROCHLORIDE 50 MG: 50 TABLET, FILM COATED ORAL at 10:12

## 2018-01-01 RX ADMIN — ENOXAPARIN SODIUM 40 MG: 40 INJECTION SUBCUTANEOUS at 21:58

## 2018-01-01 RX ADMIN — DILTIAZEM HYDROCHLORIDE 240 MG: 240 CAPSULE, COATED, EXTENDED RELEASE ORAL at 10:51

## 2018-01-01 RX ADMIN — PIPERACILLIN SODIUM,TAZOBACTAM SODIUM 3.38 G: 3; .375 INJECTION, POWDER, FOR SOLUTION INTRAVENOUS at 06:11

## 2018-01-01 RX ADMIN — PREGABALIN 75 MG: 25 CAPSULE ORAL at 18:26

## 2018-01-01 RX ADMIN — VANCOMYCIN HYDROCHLORIDE 3000 MG: 10 INJECTION, POWDER, LYOPHILIZED, FOR SOLUTION INTRAVENOUS at 01:57

## 2018-01-01 RX ADMIN — COLESTIPOL HYDROCHLORIDE 1 G: 1 TABLET, FILM COATED ORAL at 09:11

## 2018-01-01 RX ADMIN — HEPARIN SODIUM 5000 UNITS: 5000 INJECTION, SOLUTION INTRAVENOUS; SUBCUTANEOUS at 21:53

## 2018-01-01 RX ADMIN — NYSTATIN: 100000 CREAM TOPICAL at 10:22

## 2018-01-01 RX ADMIN — SODIUM CHLORIDE 40 MG: 9 INJECTION, SOLUTION INTRAMUSCULAR; INTRAVENOUS; SUBCUTANEOUS at 21:02

## 2018-01-01 RX ADMIN — NYSTATIN: 100000 POWDER TOPICAL at 09:10

## 2018-01-01 RX ADMIN — HYDRALAZINE HYDROCHLORIDE 50 MG: 50 TABLET, FILM COATED ORAL at 21:58

## 2018-01-01 RX ADMIN — METOCLOPRAMIDE HYDROCHLORIDE 10 MG: 5 SOLUTION ORAL at 16:20

## 2018-01-01 RX ADMIN — METOCLOPRAMIDE HYDROCHLORIDE 10 MG: 5 SOLUTION ORAL at 14:02

## 2018-01-01 RX ADMIN — SODIUM BICARBONATE 650 MG: 650 TABLET ORAL at 17:55

## 2018-01-01 RX ADMIN — MUPIROCIN: 20 OINTMENT TOPICAL at 09:00

## 2018-01-01 RX ADMIN — ATORVASTATIN CALCIUM 40 MG: 40 TABLET, FILM COATED ORAL at 08:21

## 2018-01-01 RX ADMIN — CLOTRIMAZOLE: 1 CREAM TOPICAL at 10:19

## 2018-01-01 RX ADMIN — SODIUM CHLORIDE 40 MG: 9 INJECTION, SOLUTION INTRAMUSCULAR; INTRAVENOUS; SUBCUTANEOUS at 21:20

## 2018-01-01 RX ADMIN — NYSTATIN: 100000 POWDER TOPICAL at 17:41

## 2018-01-01 RX ADMIN — ASPIRIN AND EXTENDED-RELEASE DIPYRIDAMOLE 1 CAPSULE: 25; 200 CAPSULE ORAL at 17:13

## 2018-01-01 RX ADMIN — INSULIN LISPRO 3 UNITS: 100 INJECTION, SOLUTION INTRAVENOUS; SUBCUTANEOUS at 18:37

## 2018-01-01 RX ADMIN — Medication 10 ML: at 22:01

## 2018-01-01 RX ADMIN — NYSTATIN: 100000 POWDER TOPICAL at 10:17

## 2018-01-01 RX ADMIN — Medication 10 ML: at 15:52

## 2018-01-01 RX ADMIN — HEPARIN SODIUM 5000 UNITS: 5000 INJECTION, SOLUTION INTRAVENOUS; SUBCUTANEOUS at 09:09

## 2018-01-01 RX ADMIN — SODIUM CHLORIDE AND POTASSIUM CHLORIDE: 9; 1.49 INJECTION, SOLUTION INTRAVENOUS at 23:26

## 2018-01-01 RX ADMIN — HEPARIN SODIUM 7500 UNITS: 5000 INJECTION INTRAVENOUS; SUBCUTANEOUS at 02:00

## 2018-01-01 RX ADMIN — NYSTATIN: 100000 POWDER TOPICAL at 15:18

## 2018-01-01 RX ADMIN — PIPERACILLIN SODIUM,TAZOBACTAM SODIUM 3.38 G: 3; .375 INJECTION, POWDER, FOR SOLUTION INTRAVENOUS at 13:10

## 2018-01-01 RX ADMIN — INSULIN LISPRO 3 UNITS: 100 INJECTION, SOLUTION INTRAVENOUS; SUBCUTANEOUS at 09:09

## 2018-01-01 RX ADMIN — Medication: at 10:06

## 2018-01-01 RX ADMIN — INSULIN LISPRO 2 UNITS: 100 INJECTION, SOLUTION INTRAVENOUS; SUBCUTANEOUS at 09:24

## 2018-01-01 RX ADMIN — Medication 10 ML: at 23:08

## 2018-01-01 RX ADMIN — Medication: at 17:59

## 2018-01-01 RX ADMIN — DOCUSATE SODIUM 100 MG: 100 CAPSULE, LIQUID FILLED ORAL at 10:51

## 2018-01-01 RX ADMIN — COLESTIPOL HYDROCHLORIDE 1 G: 1 TABLET, FILM COATED ORAL at 09:28

## 2018-01-01 RX ADMIN — PREGABALIN 75 MG: 25 CAPSULE ORAL at 17:29

## 2018-01-01 RX ADMIN — ATENOLOL 50 MG: 50 TABLET ORAL at 10:12

## 2018-01-01 RX ADMIN — HYDROCODONE BITARTRATE AND ACETAMINOPHEN 1 TABLET: 5; 325 TABLET ORAL at 20:45

## 2018-01-01 RX ADMIN — TRAMADOL HYDROCHLORIDE 50 MG: 50 TABLET, FILM COATED ORAL at 22:51

## 2018-01-01 RX ADMIN — Medication 10 ML: at 13:39

## 2018-01-01 RX ADMIN — DILTIAZEM HYDROCHLORIDE 360 MG: 180 CAPSULE, COATED, EXTENDED RELEASE ORAL at 09:10

## 2018-01-01 RX ADMIN — DILTIAZEM HYDROCHLORIDE 240 MG: 240 CAPSULE, COATED, EXTENDED RELEASE ORAL at 10:10

## 2018-01-01 RX ADMIN — INSULIN LISPRO 3 UNITS: 100 INJECTION, SOLUTION INTRAVENOUS; SUBCUTANEOUS at 13:07

## 2018-01-01 RX ADMIN — ONDANSETRON HYDROCHLORIDE 4 MG: 2 INJECTION, SOLUTION INTRAMUSCULAR; INTRAVENOUS at 11:45

## 2018-01-01 RX ADMIN — NYSTATIN: 100000 CREAM TOPICAL at 17:43

## 2018-01-01 RX ADMIN — PREGABALIN 75 MG: 25 CAPSULE ORAL at 17:51

## 2018-01-01 RX ADMIN — INSULIN LISPRO 2 UNITS: 100 INJECTION, SOLUTION INTRAVENOUS; SUBCUTANEOUS at 11:29

## 2018-01-01 RX ADMIN — HYDROCODONE BITARTRATE AND ACETAMINOPHEN 1 TABLET: 5; 325 TABLET ORAL at 21:40

## 2018-01-01 RX ADMIN — PIPERACILLIN SODIUM AND TAZOBACTAM SODIUM 3.38 G: .375; 3 INJECTION, POWDER, LYOPHILIZED, FOR SOLUTION INTRAVENOUS at 18:20

## 2018-01-01 RX ADMIN — HEPARIN SODIUM 7500 UNITS: 5000 INJECTION INTRAVENOUS; SUBCUTANEOUS at 17:09

## 2018-01-01 RX ADMIN — Medication 10 ML: at 14:39

## 2018-01-01 RX ADMIN — VANCOMYCIN HYDROCHLORIDE 1750 MG: 10 INJECTION, POWDER, LYOPHILIZED, FOR SOLUTION INTRAVENOUS at 13:17

## 2018-01-01 RX ADMIN — HYDROCODONE BITARTRATE AND ACETAMINOPHEN 1 TABLET: 5; 325 TABLET ORAL at 14:53

## 2018-01-01 RX ADMIN — SODIUM CHLORIDE: 450 INJECTION, SOLUTION INTRAVENOUS at 14:57

## 2018-01-01 RX ADMIN — AMPICILLIN SODIUM AND SULBACTAM SODIUM 3 G: 2; 1 INJECTION, POWDER, FOR SOLUTION INTRAMUSCULAR; INTRAVENOUS at 01:07

## 2018-01-01 RX ADMIN — NYSTATIN: 100000 CREAM TOPICAL at 09:00

## 2018-01-01 RX ADMIN — CLOTRIMAZOLE: 1 CREAM TOPICAL at 18:08

## 2018-01-01 RX ADMIN — KETAMINE HYDROCHLORIDE 50 MG: 10 INJECTION, SOLUTION INTRAMUSCULAR; INTRAVENOUS at 15:14

## 2018-01-01 RX ADMIN — KETAMINE HYDROCHLORIDE 10 MG: 10 INJECTION, SOLUTION INTRAMUSCULAR; INTRAVENOUS at 15:28

## 2018-01-01 RX ADMIN — HYDRALAZINE HYDROCHLORIDE 50 MG: 50 TABLET, FILM COATED ORAL at 18:30

## 2018-01-01 RX ADMIN — Medication 10 ML: at 05:47

## 2018-01-01 RX ADMIN — POLYMYXIN B SULFATE AND TRIMETHOPRIM SULFATE 1 DROP: 10000; 1 SOLUTION/ DROPS OPHTHALMIC at 19:34

## 2018-01-01 RX ADMIN — NYSTATIN: 100000 POWDER TOPICAL at 09:00

## 2018-01-01 RX ADMIN — NYSTATIN: 100000 CREAM TOPICAL at 18:21

## 2018-01-01 RX ADMIN — PIPERACILLIN SODIUM,TAZOBACTAM SODIUM 3.38 G: 3; .375 INJECTION, POWDER, FOR SOLUTION INTRAVENOUS at 14:22

## 2018-01-01 RX ADMIN — HYDRALAZINE HYDROCHLORIDE 50 MG: 50 TABLET, FILM COATED ORAL at 17:04

## 2018-01-01 RX ADMIN — METOCLOPRAMIDE HYDROCHLORIDE 10 MG: 5 SOLUTION ORAL at 09:00

## 2018-01-01 RX ADMIN — HYDRALAZINE HYDROCHLORIDE 50 MG: 50 TABLET, FILM COATED ORAL at 22:44

## 2018-01-01 RX ADMIN — ENOXAPARIN SODIUM 40 MG: 40 INJECTION SUBCUTANEOUS at 10:35

## 2018-01-01 RX ADMIN — DEXTROSE MONOHYDRATE, SODIUM CHLORIDE, AND POTASSIUM CHLORIDE 100 ML/HR: 50; 4.5; .745 INJECTION, SOLUTION INTRAVENOUS at 12:51

## 2018-01-01 RX ADMIN — VANCOMYCIN HYDROCHLORIDE 1000 MG: 1 INJECTION, POWDER, LYOPHILIZED, FOR SOLUTION INTRAVENOUS at 08:52

## 2018-01-01 RX ADMIN — HYDRALAZINE HYDROCHLORIDE 50 MG: 50 TABLET, FILM COATED ORAL at 21:10

## 2018-01-01 RX ADMIN — ASPIRIN AND DIPYRIDAMOLE 1 CAPSULE: 25; 200 CAPSULE, EXTENDED RELEASE ORAL at 20:32

## 2018-01-01 RX ADMIN — PREGABALIN 75 MG: 25 CAPSULE ORAL at 09:53

## 2018-01-01 RX ADMIN — NYSTATIN: 100000 POWDER TOPICAL at 17:12

## 2018-01-01 RX ADMIN — BUMETANIDE 1 MG: 1 TABLET ORAL at 08:21

## 2018-01-01 RX ADMIN — SODIUM CHLORIDE 1000 ML: 900 INJECTION, SOLUTION INTRAVENOUS at 13:00

## 2018-01-01 RX ADMIN — INSULIN GLARGINE 50 UNITS: 100 INJECTION, SOLUTION SUBCUTANEOUS at 21:35

## 2018-01-01 RX ADMIN — DEXTROSE MONOHYDRATE 50 ML/HR: 5 INJECTION, SOLUTION INTRAVENOUS at 07:01

## 2018-01-01 RX ADMIN — CEFEPIME 2 G: 2 INJECTION, POWDER, FOR SOLUTION INTRAMUSCULAR; INTRAVENOUS at 10:53

## 2018-01-01 RX ADMIN — ACETAMINOPHEN 650 MG: 160 SOLUTION ORAL at 21:01

## 2018-01-01 RX ADMIN — METRONIDAZOLE 500 MG: 500 INJECTION, SOLUTION INTRAVENOUS at 23:01

## 2018-01-01 RX ADMIN — HYDROCODONE BITARTRATE AND ACETAMINOPHEN 1 TABLET: 5; 325 TABLET ORAL at 09:20

## 2018-01-01 RX ADMIN — SODIUM CHLORIDE 40 MG: 9 INJECTION, SOLUTION INTRAMUSCULAR; INTRAVENOUS; SUBCUTANEOUS at 09:02

## 2018-01-01 RX ADMIN — DILTIAZEM HYDROCHLORIDE 360 MG: 180 CAPSULE, COATED, EXTENDED RELEASE ORAL at 08:27

## 2018-01-01 RX ADMIN — SODIUM CHLORIDE 1000 ML: 900 INJECTION, SOLUTION INTRAVENOUS at 05:51

## 2018-01-01 RX ADMIN — ATORVASTATIN CALCIUM 40 MG: 40 TABLET, FILM COATED ORAL at 11:14

## 2018-01-01 RX ADMIN — ENOXAPARIN SODIUM 40 MG: 40 INJECTION SUBCUTANEOUS at 09:31

## 2018-01-01 RX ADMIN — ONDANSETRON 4 MG: 2 INJECTION INTRAMUSCULAR; INTRAVENOUS at 12:29

## 2018-01-01 RX ADMIN — ENOXAPARIN SODIUM 40 MG: 40 INJECTION SUBCUTANEOUS at 21:35

## 2018-01-01 RX ADMIN — POLYMYXIN B SULFATE AND TRIMETHOPRIM SULFATE 1 DROP: 10000; 1 SOLUTION/ DROPS OPHTHALMIC at 08:41

## 2018-01-01 RX ADMIN — Medication 10 ML: at 13:31

## 2018-01-01 RX ADMIN — ASPIRIN AND EXTENDED-RELEASE DIPYRIDAMOLE 1 CAPSULE: 25; 200 CAPSULE ORAL at 08:23

## 2018-01-01 RX ADMIN — CLOTRIMAZOLE: 1 CREAM TOPICAL at 20:22

## 2018-01-01 RX ADMIN — HYDROCODONE BITARTRATE AND ACETAMINOPHEN 1 TABLET: 5; 325 TABLET ORAL at 11:35

## 2018-01-01 RX ADMIN — NYSTATIN: 100000 CREAM TOPICAL at 10:55

## 2018-01-01 RX ADMIN — Medication 10 ML: at 09:38

## 2018-01-01 RX ADMIN — NYSTATIN: 100000 CREAM TOPICAL at 15:18

## 2018-01-01 RX ADMIN — MUPIROCIN: 20 OINTMENT TOPICAL at 18:26

## 2018-01-01 RX ADMIN — Medication 20 ML: at 08:29

## 2018-01-01 RX ADMIN — ATORVASTATIN CALCIUM 40 MG: 40 TABLET, FILM COATED ORAL at 09:09

## 2018-01-01 RX ADMIN — BUMETANIDE 3 MG: 1 TABLET ORAL at 11:14

## 2018-01-01 RX ADMIN — PANTOPRAZOLE SODIUM 40 MG: 40 TABLET, DELAYED RELEASE ORAL at 16:51

## 2018-01-01 RX ADMIN — INSULIN LISPRO 5 UNITS: 100 INJECTION, SOLUTION INTRAVENOUS; SUBCUTANEOUS at 09:27

## 2018-01-01 RX ADMIN — CLOTRIMAZOLE: 1 CREAM TOPICAL at 10:29

## 2018-01-01 RX ADMIN — PANTOPRAZOLE SODIUM 40 MG: 40 TABLET, DELAYED RELEASE ORAL at 09:44

## 2018-01-01 RX ADMIN — SODIUM BICARBONATE 650 MG: 650 TABLET ORAL at 10:10

## 2018-01-01 RX ADMIN — PIPERACILLIN SODIUM,TAZOBACTAM SODIUM 3.38 G: 3; .375 INJECTION, POWDER, FOR SOLUTION INTRAVENOUS at 15:09

## 2018-01-01 RX ADMIN — NYSTATIN: 100000 CREAM TOPICAL at 17:29

## 2018-01-01 RX ADMIN — Medication 10 ML: at 22:44

## 2018-01-01 RX ADMIN — Medication 10 ML: at 16:14

## 2018-01-01 RX ADMIN — Medication 10 ML: at 06:04

## 2018-01-01 RX ADMIN — ENOXAPARIN SODIUM 40 MG: 40 INJECTION SUBCUTANEOUS at 09:18

## 2018-01-01 RX ADMIN — SODIUM BICARBONATE 650 MG: 650 TABLET ORAL at 18:20

## 2018-01-01 RX ADMIN — ONDANSETRON 4 MG: 2 INJECTION INTRAMUSCULAR; INTRAVENOUS at 09:41

## 2018-01-01 RX ADMIN — CLOTRIMAZOLE: 1 CREAM TOPICAL at 10:20

## 2018-01-01 RX ADMIN — METRONIDAZOLE 500 MG: 500 INJECTION, SOLUTION INTRAVENOUS at 17:09

## 2018-01-01 RX ADMIN — Medication 10 ML: at 18:31

## 2018-01-01 RX ADMIN — PREGABALIN 75 MG: 25 CAPSULE ORAL at 09:10

## 2018-01-01 RX ADMIN — HYDRALAZINE HYDROCHLORIDE 50 MG: 50 TABLET, FILM COATED ORAL at 17:37

## 2018-01-01 RX ADMIN — MUPIROCIN: 20 OINTMENT TOPICAL at 19:33

## 2018-01-01 RX ADMIN — PREGABALIN 75 MG: 25 CAPSULE ORAL at 17:11

## 2018-01-01 RX ADMIN — INSULIN LISPRO 2 UNITS: 100 INJECTION, SOLUTION INTRAVENOUS; SUBCUTANEOUS at 09:18

## 2018-01-01 RX ADMIN — ASPIRIN AND EXTENDED-RELEASE DIPYRIDAMOLE 1 CAPSULE: 25; 200 CAPSULE ORAL at 08:27

## 2018-01-01 RX ADMIN — METOCLOPRAMIDE 5 MG: 5 INJECTION, SOLUTION INTRAMUSCULAR; INTRAVENOUS at 17:07

## 2018-01-01 RX ADMIN — METOCLOPRAMIDE HYDROCHLORIDE 10 MG: 5 SOLUTION ORAL at 09:22

## 2018-01-01 RX ADMIN — VANCOMYCIN HYDROCHLORIDE 1750 MG: 10 INJECTION, POWDER, LYOPHILIZED, FOR SOLUTION INTRAVENOUS at 02:05

## 2018-01-01 RX ADMIN — POLYMYXIN B SULFATE AND TRIMETHOPRIM SULFATE 1 DROP: 10000; 1 SOLUTION/ DROPS OPHTHALMIC at 18:17

## 2018-01-01 RX ADMIN — PIPERACILLIN SODIUM AND TAZOBACTAM SODIUM 3.38 G: .375; 3 INJECTION, POWDER, LYOPHILIZED, FOR SOLUTION INTRAVENOUS at 02:24

## 2018-01-01 RX ADMIN — Medication: at 15:18

## 2018-01-01 RX ADMIN — ENOXAPARIN SODIUM 40 MG: 40 INJECTION SUBCUTANEOUS at 09:59

## 2018-01-01 RX ADMIN — METOCLOPRAMIDE HYDROCHLORIDE 10 MG: 5 SOLUTION ORAL at 18:35

## 2018-01-01 RX ADMIN — ALLOPURINOL 150 MG: 300 TABLET ORAL at 10:31

## 2018-01-01 RX ADMIN — INSULIN LISPRO 3 UNITS: 100 INJECTION, SOLUTION INTRAVENOUS; SUBCUTANEOUS at 17:04

## 2018-01-01 RX ADMIN — METOCLOPRAMIDE HYDROCHLORIDE 10 MG: 5 SOLUTION ORAL at 09:41

## 2018-01-01 RX ADMIN — INSULIN LISPRO 2 UNITS: 100 INJECTION, SOLUTION INTRAVENOUS; SUBCUTANEOUS at 12:54

## 2018-01-01 RX ADMIN — TRAMADOL HYDROCHLORIDE 50 MG: 50 TABLET, FILM COATED ORAL at 08:52

## 2018-01-01 RX ADMIN — HEPARIN SODIUM 7500 UNITS: 5000 INJECTION INTRAVENOUS; SUBCUTANEOUS at 17:46

## 2018-01-01 RX ADMIN — ACETAMINOPHEN 1000 MG: 500 TABLET ORAL at 12:58

## 2018-01-01 RX ADMIN — HEPARIN SODIUM 7500 UNITS: 5000 INJECTION INTRAVENOUS; SUBCUTANEOUS at 02:26

## 2018-01-01 RX ADMIN — CLOTRIMAZOLE: 1 CREAM TOPICAL at 21:16

## 2018-01-01 RX ADMIN — HYDROCODONE BITARTRATE AND ACETAMINOPHEN 1 TABLET: 5; 325 TABLET ORAL at 23:19

## 2018-01-01 RX ADMIN — SODIUM CHLORIDE AND POTASSIUM CHLORIDE: 9; 1.49 INJECTION, SOLUTION INTRAVENOUS at 14:49

## 2018-01-01 RX ADMIN — BUMETANIDE 1 MG: 0.25 INJECTION, SOLUTION INTRAMUSCULAR; INTRAVENOUS at 21:56

## 2018-01-01 RX ADMIN — ATENOLOL 50 MG: 50 TABLET ORAL at 10:19

## 2018-01-01 RX ADMIN — IPRATROPIUM BROMIDE AND ALBUTEROL SULFATE 3 ML: .5; 3 SOLUTION RESPIRATORY (INHALATION) at 07:42

## 2018-01-01 RX ADMIN — INSULIN LISPRO 2 UNITS: 100 INJECTION, SOLUTION INTRAVENOUS; SUBCUTANEOUS at 22:38

## 2018-01-01 RX ADMIN — CEFEPIME 2 G: 2 INJECTION, POWDER, FOR SOLUTION INTRAMUSCULAR; INTRAVENOUS at 09:30

## 2018-01-01 RX ADMIN — TRAMADOL HYDROCHLORIDE 50 MG: 50 TABLET, FILM COATED ORAL at 14:59

## 2018-01-01 RX ADMIN — ENOXAPARIN SODIUM 40 MG: 40 INJECTION SUBCUTANEOUS at 09:00

## 2018-01-01 RX ADMIN — CLOTRIMAZOLE: 1 CREAM TOPICAL at 15:17

## 2018-01-01 RX ADMIN — Medication: at 17:47

## 2018-01-01 RX ADMIN — SODIUM BICARBONATE 650 MG: 650 TABLET ORAL at 10:22

## 2018-01-01 RX ADMIN — Medication 30 ML: at 14:00

## 2018-01-01 RX ADMIN — Medication 10 ML: at 02:56

## 2018-01-01 RX ADMIN — HEPARIN SODIUM 7500 UNITS: 5000 INJECTION INTRAVENOUS; SUBCUTANEOUS at 17:27

## 2018-01-01 RX ADMIN — DEXTROSE MONOHYDRATE 75 ML/HR: 5 INJECTION, SOLUTION INTRAVENOUS at 08:59

## 2018-01-01 RX ADMIN — Medication: at 18:41

## 2018-01-01 RX ADMIN — CLOTRIMAZOLE: 1 CREAM TOPICAL at 18:22

## 2018-01-01 RX ADMIN — HYDRALAZINE HYDROCHLORIDE 50 MG: 50 TABLET, FILM COATED ORAL at 17:07

## 2018-01-01 RX ADMIN — HYDRALAZINE HYDROCHLORIDE 50 MG: 50 TABLET, FILM COATED ORAL at 09:28

## 2018-01-01 RX ADMIN — BUMETANIDE 3 MG: 1 TABLET ORAL at 09:33

## 2018-01-01 RX ADMIN — PIPERACILLIN SODIUM,TAZOBACTAM SODIUM 3.38 G: 3; .375 INJECTION, POWDER, FOR SOLUTION INTRAVENOUS at 22:11

## 2018-01-01 RX ADMIN — Medication 1 CAPSULE: at 09:09

## 2018-01-01 RX ADMIN — NYSTATIN: 100000 POWDER TOPICAL at 10:06

## 2018-01-01 RX ADMIN — POTASSIUM CHLORIDE 20 MEQ: 20 TABLET, EXTENDED RELEASE ORAL at 10:53

## 2018-01-01 RX ADMIN — TRAMADOL HYDROCHLORIDE 50 MG: 50 TABLET, FILM COATED ORAL at 00:11

## 2018-01-01 RX ADMIN — PIPERACILLIN SODIUM,TAZOBACTAM SODIUM 3.38 G: 3; .375 INJECTION, POWDER, FOR SOLUTION INTRAVENOUS at 22:50

## 2018-01-01 RX ADMIN — DILTIAZEM HYDROCHLORIDE 360 MG: 180 CAPSULE, COATED, EXTENDED RELEASE ORAL at 09:29

## 2018-01-01 RX ADMIN — MUPIROCIN: 20 OINTMENT TOPICAL at 09:28

## 2018-01-01 RX ADMIN — ASPIRIN AND DIPYRIDAMOLE 1 CAPSULE: 25; 200 CAPSULE, EXTENDED RELEASE ORAL at 21:34

## 2018-01-01 RX ADMIN — MUPIROCIN: 20 OINTMENT TOPICAL at 08:56

## 2018-01-01 RX ADMIN — ONDANSETRON 4 MG: 2 INJECTION INTRAMUSCULAR; INTRAVENOUS at 02:46

## 2018-01-01 RX ADMIN — HEPARIN SODIUM 7500 UNITS: 5000 INJECTION INTRAVENOUS; SUBCUTANEOUS at 17:51

## 2018-01-01 RX ADMIN — SODIUM CHLORIDE AND POTASSIUM CHLORIDE: 9; 1.49 INJECTION, SOLUTION INTRAVENOUS at 20:15

## 2018-01-01 RX ADMIN — INSULIN LISPRO 3 UNITS: 100 INJECTION, SOLUTION INTRAVENOUS; SUBCUTANEOUS at 08:57

## 2018-01-01 RX ADMIN — INSULIN LISPRO 3 UNITS: 100 INJECTION, SOLUTION INTRAVENOUS; SUBCUTANEOUS at 17:16

## 2018-01-01 RX ADMIN — VANCOMYCIN HYDROCHLORIDE 1500 MG: 10 INJECTION, POWDER, LYOPHILIZED, FOR SOLUTION INTRAVENOUS at 22:50

## 2018-01-01 RX ADMIN — SODIUM CHLORIDE 40 MG: 9 INJECTION, SOLUTION INTRAMUSCULAR; INTRAVENOUS; SUBCUTANEOUS at 08:52

## 2018-01-01 RX ADMIN — Medication 10 ML: at 05:58

## 2018-01-01 RX ADMIN — INSULIN GLARGINE 20 UNITS: 100 INJECTION, SOLUTION SUBCUTANEOUS at 22:43

## 2018-01-01 RX ADMIN — ATENOLOL 50 MG: 50 TABLET ORAL at 09:44

## 2018-01-01 RX ADMIN — ASPIRIN AND DIPYRIDAMOLE 1 CAPSULE: 25; 200 CAPSULE, EXTENDED RELEASE ORAL at 21:43

## 2018-01-01 RX ADMIN — ACETAMINOPHEN 650 MG: 325 TABLET ORAL at 22:59

## 2018-01-01 RX ADMIN — INSULIN GLARGINE 24 UNITS: 100 INJECTION, SOLUTION SUBCUTANEOUS at 22:11

## 2018-01-01 RX ADMIN — ASPIRIN AND EXTENDED-RELEASE DIPYRIDAMOLE 1 CAPSULE: 25; 200 CAPSULE ORAL at 09:04

## 2018-01-01 RX ADMIN — TRAMADOL HYDROCHLORIDE 50 MG: 50 TABLET, FILM COATED ORAL at 20:45

## 2018-01-01 RX ADMIN — PIPERACILLIN SODIUM,TAZOBACTAM SODIUM 3.38 G: 3; .375 INJECTION, POWDER, FOR SOLUTION INTRAVENOUS at 05:55

## 2018-01-01 RX ADMIN — CLOTRIMAZOLE: 1 CREAM TOPICAL at 17:43

## 2018-01-01 RX ADMIN — TRAMADOL HYDROCHLORIDE 50 MG: 50 TABLET, FILM COATED ORAL at 18:41

## 2018-01-01 RX ADMIN — NYSTATIN: 100000 CREAM TOPICAL at 09:16

## 2018-01-01 RX ADMIN — ESCITALOPRAM OXALATE 10 MG: 10 TABLET ORAL at 09:00

## 2018-01-01 RX ADMIN — CLOTRIMAZOLE: 10 CREAM TOPICAL at 10:53

## 2018-01-01 RX ADMIN — BUMETANIDE 3 MG: 1 TABLET ORAL at 10:53

## 2018-01-01 RX ADMIN — BUMETANIDE 2 MG: 0.25 INJECTION, SOLUTION INTRAMUSCULAR; INTRAVENOUS at 23:53

## 2018-01-01 RX ADMIN — MIRTAZAPINE 7.5 MG: 15 TABLET, FILM COATED ORAL at 21:36

## 2018-01-01 RX ADMIN — NYSTATIN: 100000 CREAM TOPICAL at 10:21

## 2018-01-01 RX ADMIN — PIPERACILLIN SODIUM AND TAZOBACTAM SODIUM 3.38 G: .375; 3 INJECTION, POWDER, LYOPHILIZED, FOR SOLUTION INTRAVENOUS at 02:15

## 2018-01-01 RX ADMIN — IPRATROPIUM BROMIDE AND ALBUTEROL SULFATE 3 ML: .5; 3 SOLUTION RESPIRATORY (INHALATION) at 23:19

## 2018-01-01 RX ADMIN — NYSTATIN: 100000 POWDER TOPICAL at 18:08

## 2018-01-01 RX ADMIN — HYDRALAZINE HYDROCHLORIDE 50 MG: 50 TABLET, FILM COATED ORAL at 09:19

## 2018-01-01 RX ADMIN — PREGABALIN 75 MG: 25 CAPSULE ORAL at 17:06

## 2018-01-01 RX ADMIN — MICONAZOLE NITRATE: 20 CREAM TOPICAL at 11:47

## 2018-01-01 RX ADMIN — BUMETANIDE 2 MG: 1 TABLET ORAL at 09:40

## 2018-01-01 RX ADMIN — SODIUM BICARBONATE 650 MG: 650 TABLET ORAL at 19:34

## 2018-01-01 RX ADMIN — INSULIN GLARGINE 30 UNITS: 100 INJECTION, SOLUTION SUBCUTANEOUS at 22:00

## 2018-01-01 RX ADMIN — PREGABALIN 75 MG: 25 CAPSULE ORAL at 09:28

## 2018-01-01 RX ADMIN — BUMETANIDE 3 MG: 1 TABLET ORAL at 08:27

## 2018-01-01 RX ADMIN — NYSTATIN: 100000 POWDER TOPICAL at 21:39

## 2018-01-01 RX ADMIN — DEXTROSE MONOHYDRATE AND POTASSIUM CHLORIDE: 5; .149 INJECTION, SOLUTION INTRAVENOUS at 10:21

## 2018-01-01 RX ADMIN — Medication 10 ML: at 21:21

## 2018-01-01 RX ADMIN — LINEZOLID 600 MG: 600 TABLET, FILM COATED ORAL at 21:10

## 2018-01-01 RX ADMIN — HYDROCODONE BITARTRATE AND ACETAMINOPHEN 1 TABLET: 5; 325 TABLET ORAL at 16:34

## 2018-01-01 RX ADMIN — FLUCONAZOLE 200 MG: 200 TABLET ORAL at 10:10

## 2018-01-01 RX ADMIN — PREGABALIN 75 MG: 25 CAPSULE ORAL at 17:37

## 2018-01-01 RX ADMIN — ATENOLOL 50 MG: 50 TABLET ORAL at 08:23

## 2018-01-01 RX ADMIN — BUMETANIDE 2 MG: 1 TABLET ORAL at 10:22

## 2018-01-01 RX ADMIN — INSULIN LISPRO 1 UNITS: 100 INJECTION, SOLUTION INTRAVENOUS; SUBCUTANEOUS at 22:11

## 2018-01-01 RX ADMIN — TRAMADOL HYDROCHLORIDE 50 MG: 50 TABLET, FILM COATED ORAL at 13:54

## 2018-01-01 RX ADMIN — ACETAMINOPHEN 650 MG: 160 SOLUTION ORAL at 19:46

## 2018-01-01 RX ADMIN — VANCOMYCIN HYDROCHLORIDE 2000 MG: 10 INJECTION, POWDER, LYOPHILIZED, FOR SOLUTION INTRAVENOUS at 01:58

## 2018-01-01 RX ADMIN — POTASSIUM CHLORIDE 20 MEQ: 1.5 POWDER, FOR SOLUTION ORAL at 10:10

## 2018-01-01 RX ADMIN — Medication 10 ML: at 11:50

## 2018-01-01 RX ADMIN — ASPIRIN AND EXTENDED-RELEASE DIPYRIDAMOLE 1 CAPSULE: 25; 200 CAPSULE ORAL at 09:08

## 2018-01-01 RX ADMIN — INSULIN GLARGINE 6 UNITS: 100 INJECTION, SOLUTION SUBCUTANEOUS at 10:07

## 2018-01-01 RX ADMIN — Medication 10 ML: at 21:41

## 2018-01-01 RX ADMIN — SODIUM CHLORIDE 40 MG: 9 INJECTION, SOLUTION INTRAMUSCULAR; INTRAVENOUS; SUBCUTANEOUS at 21:34

## 2018-01-01 RX ADMIN — ATORVASTATIN CALCIUM 40 MG: 40 TABLET, FILM COATED ORAL at 09:55

## 2018-01-01 RX ADMIN — SODIUM BICARBONATE 650 MG: 650 TABLET ORAL at 17:14

## 2018-01-01 RX ADMIN — FLUCONAZOLE, SODIUM CHLORIDE 200 MG: 2 INJECTION INTRAVENOUS at 10:21

## 2018-01-01 RX ADMIN — DEXTROSE MONOHYDRATE AND POTASSIUM CHLORIDE: 5; .149 INJECTION, SOLUTION INTRAVENOUS at 21:43

## 2018-01-01 RX ADMIN — POLYMYXIN B SULFATE AND TRIMETHOPRIM SULFATE 1 DROP: 10000; 1 SOLUTION/ DROPS OPHTHALMIC at 17:32

## 2018-01-01 RX ADMIN — Medication 1 CAPSULE: at 08:27

## 2018-01-01 RX ADMIN — NYSTATIN: 100000 CREAM TOPICAL at 10:29

## 2018-01-01 RX ADMIN — PANTOPRAZOLE SODIUM 40 MG: 40 TABLET, DELAYED RELEASE ORAL at 10:20

## 2018-01-01 RX ADMIN — POTASSIUM CHLORIDE 20 MEQ: 1.5 POWDER, FOR SOLUTION ORAL at 08:26

## 2018-01-01 RX ADMIN — POLYMYXIN B SULFATE AND TRIMETHOPRIM SULFATE 1 DROP: 10000; 1 SOLUTION/ DROPS OPHTHALMIC at 11:27

## 2018-01-01 RX ADMIN — Medication 10 ML: at 12:33

## 2018-01-01 RX ADMIN — HYDRALAZINE HYDROCHLORIDE 50 MG: 50 TABLET, FILM COATED ORAL at 17:30

## 2018-01-01 RX ADMIN — ENOXAPARIN SODIUM 40 MG: 40 INJECTION SUBCUTANEOUS at 09:38

## 2018-01-01 RX ADMIN — POLYMYXIN B SULFATE AND TRIMETHOPRIM SULFATE 1 DROP: 10000; 1 SOLUTION/ DROPS OPHTHALMIC at 08:53

## 2018-01-01 RX ADMIN — COLESTIPOL HYDROCHLORIDE 1 G: 1 TABLET, FILM COATED ORAL at 08:27

## 2018-01-01 RX ADMIN — POLYMYXIN B SULFATE AND TRIMETHOPRIM SULFATE 1 DROP: 10000; 1 SOLUTION/ DROPS OPHTHALMIC at 17:28

## 2018-01-01 RX ADMIN — HEPARIN SODIUM 7500 UNITS: 5000 INJECTION INTRAVENOUS; SUBCUTANEOUS at 10:49

## 2018-01-01 RX ADMIN — HYDROCODONE BITARTRATE AND ACETAMINOPHEN 1 TABLET: 5; 325 TABLET ORAL at 14:25

## 2018-01-01 RX ADMIN — ALLOPURINOL 100 MG: 100 TABLET ORAL at 09:11

## 2018-01-01 RX ADMIN — VANCOMYCIN HYDROCHLORIDE 2000 MG: 10 INJECTION, POWDER, LYOPHILIZED, FOR SOLUTION INTRAVENOUS at 13:30

## 2018-01-01 RX ADMIN — AMPICILLIN SODIUM AND SULBACTAM SODIUM 3 G: 2; 1 INJECTION, POWDER, FOR SOLUTION INTRAMUSCULAR; INTRAVENOUS at 16:13

## 2018-01-01 RX ADMIN — SODIUM CHLORIDE 40 MG: 9 INJECTION, SOLUTION INTRAMUSCULAR; INTRAVENOUS; SUBCUTANEOUS at 22:06

## 2018-01-01 RX ADMIN — INSULIN GLARGINE 6 UNITS: 100 INJECTION, SOLUTION SUBCUTANEOUS at 10:15

## 2018-01-01 RX ADMIN — Medication 1 CAPSULE: at 09:10

## 2018-01-01 RX ADMIN — HYDRALAZINE HYDROCHLORIDE 50 MG: 50 TABLET, FILM COATED ORAL at 21:35

## 2018-01-01 RX ADMIN — PREGABALIN 75 MG: 25 CAPSULE ORAL at 08:29

## 2018-01-01 RX ADMIN — DILTIAZEM HYDROCHLORIDE 240 MG: 240 CAPSULE, COATED, EXTENDED RELEASE ORAL at 10:27

## 2018-01-01 RX ADMIN — ATENOLOL 50 MG: 50 TABLET ORAL at 08:27

## 2018-01-01 RX ADMIN — HYDRALAZINE HYDROCHLORIDE 50 MG: 50 TABLET, FILM COATED ORAL at 17:21

## 2018-01-01 RX ADMIN — HYDRALAZINE HYDROCHLORIDE 50 MG: 50 TABLET, FILM COATED ORAL at 17:33

## 2018-01-01 RX ADMIN — ACETAMINOPHEN 650 MG: 160 SOLUTION ORAL at 11:42

## 2018-01-01 RX ADMIN — NYSTATIN: 100000 CREAM TOPICAL at 09:58

## 2018-01-01 RX ADMIN — HEPARIN SODIUM 7500 UNITS: 5000 INJECTION INTRAVENOUS; SUBCUTANEOUS at 18:29

## 2018-01-01 RX ADMIN — LABETALOL HYDROCHLORIDE 5 MG: 5 INJECTION, SOLUTION INTRAVENOUS at 15:38

## 2018-01-01 RX ADMIN — ALLOPURINOL 100 MG: 100 TABLET ORAL at 09:55

## 2018-01-01 RX ADMIN — ASPIRIN AND DIPYRIDAMOLE 1 CAPSULE: 25; 200 CAPSULE, EXTENDED RELEASE ORAL at 10:20

## 2018-01-01 RX ADMIN — CEFEPIME 2 G: 2 INJECTION, POWDER, FOR SOLUTION INTRAMUSCULAR; INTRAVENOUS at 13:44

## 2018-01-01 RX ADMIN — METOCLOPRAMIDE HYDROCHLORIDE 10 MG: 5 SOLUTION ORAL at 06:54

## 2018-01-01 RX ADMIN — Medication 10 ML: at 05:39

## 2018-01-01 RX ADMIN — ENOXAPARIN SODIUM 40 MG: 40 INJECTION SUBCUTANEOUS at 12:13

## 2018-01-01 RX ADMIN — ATENOLOL 50 MG: 50 TABLET ORAL at 08:25

## 2018-01-01 RX ADMIN — DEXTROSE MONOHYDRATE, SODIUM CHLORIDE, AND POTASSIUM CHLORIDE 100 ML/HR: 50; 4.5; .745 INJECTION, SOLUTION INTRAVENOUS at 22:29

## 2018-01-01 RX ADMIN — Medication 10 ML: at 22:17

## 2018-01-01 RX ADMIN — Medication 10 ML: at 05:37

## 2018-01-01 RX ADMIN — CLOTRIMAZOLE: 1 CREAM TOPICAL at 19:26

## 2018-01-01 RX ADMIN — HYDRALAZINE HYDROCHLORIDE 50 MG: 50 TABLET, FILM COATED ORAL at 17:51

## 2018-01-01 RX ADMIN — METOCLOPRAMIDE HYDROCHLORIDE 10 MG: 5 SOLUTION ORAL at 17:46

## 2018-01-01 RX ADMIN — Medication 10 ML: at 13:08

## 2018-01-01 RX ADMIN — Medication 10 ML: at 13:38

## 2018-01-01 RX ADMIN — Medication: at 10:13

## 2018-01-01 RX ADMIN — NYSTATIN: 100000 CREAM TOPICAL at 10:23

## 2018-01-01 RX ADMIN — SODIUM BICARBONATE: 84 INJECTION, SOLUTION INTRAVENOUS at 13:54

## 2018-01-01 RX ADMIN — ATENOLOL 50 MG: 50 TABLET ORAL at 09:09

## 2018-01-01 RX ADMIN — POTASSIUM CHLORIDE 20 MEQ: 1.5 POWDER, FOR SOLUTION ORAL at 10:23

## 2018-01-01 RX ADMIN — SODIUM CHLORIDE 1000 ML: 900 INJECTION, SOLUTION INTRAVENOUS at 01:56

## 2018-01-01 RX ADMIN — NYSTATIN: 100000 CREAM TOPICAL at 23:45

## 2018-01-01 RX ADMIN — CLOTRIMAZOLE: 1 CREAM TOPICAL at 17:53

## 2018-01-01 RX ADMIN — INSULIN GLARGINE 6 UNITS: 100 INJECTION, SOLUTION SUBCUTANEOUS at 09:00

## 2018-01-01 RX ADMIN — NYSTATIN: 100000 POWDER TOPICAL at 17:55

## 2018-01-01 RX ADMIN — INSULIN GLARGINE 6 UNITS: 100 INJECTION, SOLUTION SUBCUTANEOUS at 09:21

## 2018-01-01 RX ADMIN — METOCLOPRAMIDE HYDROCHLORIDE 10 MG: 5 SOLUTION ORAL at 11:09

## 2018-01-01 RX ADMIN — IPRATROPIUM BROMIDE AND ALBUTEROL SULFATE 3 ML: .5; 3 SOLUTION RESPIRATORY (INHALATION) at 16:43

## 2018-01-01 RX ADMIN — SODIUM BICARBONATE 650 MG: 650 TABLET ORAL at 10:20

## 2018-01-01 RX ADMIN — ASPIRIN AND EXTENDED-RELEASE DIPYRIDAMOLE 1 CAPSULE: 25; 200 CAPSULE ORAL at 18:30

## 2018-01-01 RX ADMIN — MICONAZOLE NITRATE: 20 CREAM TOPICAL at 11:34

## 2018-01-01 RX ADMIN — NYSTATIN: 100000 POWDER TOPICAL at 18:27

## 2018-01-01 RX ADMIN — PANTOPRAZOLE SODIUM 40 MG: 40 TABLET, DELAYED RELEASE ORAL at 10:10

## 2018-01-01 RX ADMIN — SODIUM CHLORIDE 40 MG: 9 INJECTION, SOLUTION INTRAMUSCULAR; INTRAVENOUS; SUBCUTANEOUS at 21:35

## 2018-01-01 RX ADMIN — NYSTATIN: 100000 CREAM TOPICAL at 18:27

## 2018-01-01 RX ADMIN — ALLOPURINOL 100 MG: 100 TABLET ORAL at 08:23

## 2018-01-01 RX ADMIN — Medication: at 10:29

## 2018-01-01 RX ADMIN — POLYMYXIN B SULFATE AND TRIMETHOPRIM SULFATE 1 DROP: 10000; 1 SOLUTION/ DROPS OPHTHALMIC at 12:08

## 2018-01-01 RX ADMIN — ATENOLOL 50 MG: 50 TABLET ORAL at 10:46

## 2018-01-01 RX ADMIN — Medication 20 ML: at 06:00

## 2018-01-01 RX ADMIN — ACETAMINOPHEN 650 MG: 160 SOLUTION ORAL at 14:29

## 2018-01-01 RX ADMIN — ATORVASTATIN CALCIUM 40 MG: 40 TABLET, FILM COATED ORAL at 09:47

## 2018-01-01 RX ADMIN — Medication 10 ML: at 11:05

## 2018-01-01 RX ADMIN — MUPIROCIN: 20 OINTMENT TOPICAL at 18:41

## 2018-01-01 RX ADMIN — ATENOLOL 50 MG: 50 TABLET ORAL at 10:26

## 2018-01-01 RX ADMIN — PREGABALIN 75 MG: 25 CAPSULE ORAL at 17:58

## 2018-01-01 RX ADMIN — DILTIAZEM HYDROCHLORIDE 240 MG: 240 CAPSULE, COATED, EXTENDED RELEASE ORAL at 08:52

## 2018-01-01 RX ADMIN — ASPIRIN AND DIPYRIDAMOLE 1 CAPSULE: 25; 200 CAPSULE, EXTENDED RELEASE ORAL at 10:26

## 2018-01-01 RX ADMIN — DILTIAZEM HYDROCHLORIDE 240 MG: 240 CAPSULE, COATED, EXTENDED RELEASE ORAL at 09:22

## 2018-01-01 RX ADMIN — INSULIN LISPRO 3 UNITS: 100 INJECTION, SOLUTION INTRAVENOUS; SUBCUTANEOUS at 18:30

## 2018-01-02 ENCOUNTER — OFFICE VISIT (OUTPATIENT)
Dept: INTERNAL MEDICINE CLINIC | Age: 63
End: 2018-01-02

## 2018-01-02 VITALS
HEART RATE: 72 BPM | RESPIRATION RATE: 20 BRPM | DIASTOLIC BLOOD PRESSURE: 82 MMHG | SYSTOLIC BLOOD PRESSURE: 153 MMHG | OXYGEN SATURATION: 90 % | TEMPERATURE: 98.4 F

## 2018-01-02 DIAGNOSIS — N18.2 CKD STAGE 2 DUE TO TYPE 2 DIABETES MELLITUS (HCC): ICD-10-CM

## 2018-01-02 DIAGNOSIS — I63.9 CEREBROVASCULAR ACCIDENT (CVA), UNSPECIFIED MECHANISM (HCC): ICD-10-CM

## 2018-01-02 DIAGNOSIS — E11.22 CKD STAGE 2 DUE TO TYPE 2 DIABETES MELLITUS (HCC): ICD-10-CM

## 2018-01-02 DIAGNOSIS — D64.9 ANEMIA, UNSPECIFIED TYPE: ICD-10-CM

## 2018-01-02 DIAGNOSIS — I50.30 (HFPEF) HEART FAILURE WITH PRESERVED EJECTION FRACTION (HCC): ICD-10-CM

## 2018-01-02 DIAGNOSIS — E11.40 TYPE 2 DIABETES MELLITUS WITH DIABETIC NEUROPATHY, WITH LONG-TERM CURRENT USE OF INSULIN (HCC): ICD-10-CM

## 2018-01-02 DIAGNOSIS — I50.32 CHRONIC DIASTOLIC CHF (CONGESTIVE HEART FAILURE), NYHA CLASS 3 (HCC): Primary | ICD-10-CM

## 2018-01-02 DIAGNOSIS — E13.42 DIABETIC POLYNEUROPATHY ASSOCIATED WITH OTHER SPECIFIED DIABETES MELLITUS (HCC): ICD-10-CM

## 2018-01-02 DIAGNOSIS — Z79.4 TYPE 2 DIABETES MELLITUS WITH DIABETIC NEUROPATHY, WITH LONG-TERM CURRENT USE OF INSULIN (HCC): ICD-10-CM

## 2018-01-02 LAB
BUN BLD-MCNC: 54 MG/DL (ref 7–17)
CALCIUM BLD-MCNC: 9.3 MG/DL (ref 8.4–10.2)
CHLORIDE BLD-SCNC: 101 MMOL/L (ref 98–107)
CO2 POC: 28 MMOL/L (ref 22–32)
CREAT BLD-MCNC: 1.3 MG/DL (ref 0.7–1.2)
EGFR (POC): 43.6
GLUCOSE POC: 358 MG/DL (ref 65–105)
GRAN# POC: 7.5 K/UL (ref 2–7.8)
GRAN% POC: 80.4 % (ref 37–92)
HCT VFR BLD CALC: 35.3 % (ref 37–51)
HGB BLD-MCNC: 11.4 G/DL (ref 12–18)
LY# POC: 1.1 K/UL (ref 0.6–4.1)
LY% POC: 13 % (ref 10–58.5)
MCH RBC QN: 30.2 PG (ref 26–32)
MCHC RBC-ENTMCNC: 32.2 G/DL (ref 30–36)
MCV RBC: 94 FL (ref 80–97)
MID #, POC: 0.6 K/UL (ref 0–1.8)
MID% POC: 6.6 % (ref 0.1–24)
PLATELET # BLD: 258 K/UL (ref 140–440)
POTASSIUM SERPL-SCNC: 5.5 MMOL/L (ref 3.6–5)
RBC # BLD: 3.77 M/UL (ref 4.2–6.3)
SODIUM SERPL-SCNC: 139 MMOL/L (ref 137–145)
WBC # BLD: 9.2 K/UL (ref 4.1–10.9)

## 2018-01-02 NOTE — PROGRESS NOTES
Reviewed record in preparation for visit and have obtained necessary documentation. Identified pt with two pt identifiers(name and ).     Chief Complaint   Patient presents with    Follow-up     BLE swelling        Coordination of Care Questionnaire:  :     1) Have you been to an emergency room, urgent care clinic since your last visit? no    Hospitalized since your last visit? no      2) Have you seen or consulted any other health care providers outside of [address] [place] [address] since your last visit? no

## 2018-01-02 NOTE — PROGRESS NOTES
Subjective:   Durwood Angelucci is a 60 y.o. female      Chief Complaint   Patient presents with    Follow-up     BLE swelling        History of present illness:  Ms. Zully Hdz returns in follow up. She has done better since last visit with decreasing lower extremity edema and decreasing lichenification of her anterior tibial surfaces. She is still experiencing problems with orthopnea and we talked about possibly increasing her diuretic. She is reluctant to do this unless she can still take it in the evening because her  is not around to help her toilet during the day and she can often wet if she gets too much in terms of the diuretic. She has a difficult time getting to the bathroom herself. Blood sugar was significantly elevated last visit and I wonder whether we are now undershooting her insulin requirement. We had actually decreased it because of such good control while she was in the hospital, but clearly we are not there to monitor her now and she's not done as well in terms of her diet. We will see where she stands relative to this blood work and make adjustments from there. She denies other symptoms of congestive heart failure. She's had no recurrent TIA or stroke-like symptoms. We are looking at her labs relative to her diabetes, diabetic neuropathy and CKD, as well as her significant morbid obesity. She's also been anemic and that is being rechecked as well.         Patient Active Problem List    Diagnosis Date Noted    Chronic diastolic CHF (congestive heart failure), NYHA class 3 ([address] 07/24/2017     Priority: 1 - One    Stroke ([address] 05/30/2014     Priority: 1 - One    Right leg weakness 04/18/2013     Priority: 1 - One    Venous stasis dermatitis of both lower extremities 11/09/2017     Priority: 2 - Two    (HFpEF) heart failure with preserved ejection fraction ([address] 10/31/2017     Priority: 2 - Two    Anemia 07/24/2017     Priority: 2 - Two    Ulcer of right fifth toe due to diabetes mellitus ([facility] 2017     Priority: 2 - Two    Peripheral neuropathy 2017     Priority: 2 - Two    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 2 diabetes mellitus ([address] 2017     Priority: 2 - Two    Stage 2 chronic kidney disease due to benign hypertension 2017     Priority: 2 - Two    Hypertension 2017     Priority: 2 - Two    Bilateral edema of lower extremity 2017     Priority: 2 - Two    ASCVD (arteriosclerotic cardiovascular disease) 2014     Priority: 2 - Two    Diabetic polyneuropathy ([address] 2013     Priority: 2 - Two    Diabetes mellitus type II, uncontrolled ([address] 2013     Priority: 2 - Two    Diabetic gastroparesis ([address]      Priority: 2 - Two    Diabetes ([facility] 2011     Priority: 2 - Two    B12 deficiency 10/31/2017     Priority: 3 - Three    GERD (gastroesophageal reflux disease) 10/11/2017     Priority: 3 - Three    Fatty liver 2017     Priority: 3 - Three    Stasis dermatitis 2017     Priority: 3 - Three    Aortic stenosis, mild 2017     Priority: 3 - Three    CKD (chronic kidney disease) stage 3, GFR 30-59 ml/min      Priority: 3 - Three    Hyperlipemia      Priority: 3 - Three    Morbid obesity ([facility] 2011     Priority: 3 - Three    Gout 10/11/2017     Priority: 4 - Four    History of hyperparathyroidism 10/11/2017     Priority: 4 - Four    Sinus tachycardia 2017     Priority: 4 - Four    Low back pain 2017     Priority: 4 - Four    Insomnia 2017     Priority: 5 - Five    BMI 45.0-49.9, adult ([address] 2017     Priority: 5 - Five    Type 2 diabetes mellitus with diabetic neuropathy ([facility] 2018    PE (physical exam), annual 10/11/2017      Past Surgical History:   Procedure Laterality Date    CHEST SURGERY PROCEDURE UNLISTED  10/24/13    PARATHYROID EXPLORATION     DELIVERY       HX  SECTION      HX CHOLECYSTECTOMY      HX OTHER SURGICAL      I&D right thigh abscess      Allergies   Allergen Reactions    Zestril [Lisinopril] Swelling      Family History   Problem Relation Age of Onset    Cancer Mother      cervical    Diabetes Mother     Hypertension Mother     Stroke Mother 79    Diabetes Father     Hypertension Father     Kidney Disease Father     Diabetes Brother     Hypertension Brother     Stroke Brother 61      Social History     Social History    Marital status:      Spouse name: N/A    Number of children: N/A    Years of education: N/A     Occupational History    Not on file. Social History Main Topics    Smoking status: Never Smoker    Smokeless tobacco: Never Used    Alcohol use Yes      Comment: rare    Drug use: Yes     Special: Prescription, OTC    Sexual activity: Not on file     Other Topics Concern    Not on file     Social History Narrative     Outpatient Prescriptions Marked as Taking for the 1/2/18 encounter (Office Visit) with Randolph Olivarez MD   Medication Sig Dispense Refill    dilTIAZem (TIAZAC) 360 mg ER capsule TAKE 1 CAPSULE BY MOUTH EVERY DAY 30 Cap 0    LYRICA 75 mg capsule TAKE ONE CAPSULE BY MOUTH DAILY AT LUNCH TIME AND AGAIN AT BEDTIME. 180 Cap 0    hydrALAZINE (APRESOLINE) 50 mg tablet TAKE 1 TABLET BY MOUTH THREE TIMES DAILY 100 Tab 0    allopurinol (ZYLOPRIM) 300 mg tablet TAKE 1 TABLET BY MOUTH EVERY DAY 90 Tab 0    bumetanide (BUMEX) 2 mg tablet Take 1  Tab daily 90 Tab 3    ammonium lactate (LAC-HYDRIN) 12 % lotion rub in to affected area well      insulin detemir (LEVEMIR) 100 unit/mL injection 60 Units by SubCUTAneous route nightly.  insulin lispro (HUMALOG) 100 unit/mL kwikpen 10 Units by SubCUTAneous route three (3) times daily (with meals). 1 Package     nystatin (MYCOSTATIN) topical cream Apply  to affected area two (2) times a day. Use for groin rash 15 g 0    cyanocobalamin (VITAMIN B12) 1,000 mcg/mL injection 1 mL by IntraMUSCular route every seven (7) days.  1 Vial 0    ferrous sulfate (SLOW FE) 142 mg (45 mg iron) ER tablet Take 142 mg by mouth Daily (before breakfast).  atorvastatin (LIPITOR) 40 mg tablet TAKE 1 TABLET BY MOUTH EVERY DAY 90 Tab 0    aspirin-dipyridamole (AGGRENOX)  mg per SR capsule TAKE 1 CAPSULE BY MOUTH TWICE DAILY( EVERY TWELVE HOURS) 60 Cap 5    PEN NEEDLE by Does Not Apply route.  SYRINGE AND NEEDLE,INSULIN,1ML (BD INSULIN SYRINGE) by Does Not Apply route.  BLOOD SUGAR DIAGNOSTIC (FREESTYLE LITE STRIPS) by In Vitro route.  colestipol (COLESTID) 1 gram tablet Take 1 g by mouth two (2) times a day.  Bifidobacterium Infantis (ALIGN) 4 mg cap Take  by mouth.  acetaminophen (TYLENOL) 325 mg tablet Take 2 Tabs by mouth every four (4) hours as needed.  atenolol (TENORMIN) 50 mg tablet Take  by mouth daily.  Cholecalciferol, Vitamin D3, 2,000 unit cap Take 1 Cap by mouth daily.  omeprazole (PRILOSEC) 40 mg capsule Take 40 mg by mouth daily.  glipiZIDE (GLUCOTROL) 10 mg tablet Take 10 mg by mouth two (2) times a day. Review of Systems              Constitutional:  She denies fever, weight loss, sweats or fatigue. HEENT:  No blurred or double vision, headache or dizziness. No difficulty with swallowing, taste, speech or smell. Respiratory:  No cough, wheezing or shortness of breath. No sputum production. Cardiac:  Denies chest pain, palpitations, unexplained indigestion, syncope. Still has orthopnea. GI:  No changes in bowel movements, no abdominal pain, no bloating, anorexia, nausea, vomiting or heartburn. :  No frequency or dysuria. Denies incontinence. Extremities:  No joint pain, stiffness or swelling. Skin:  No recent rashes or mole changes. Neurological:  No numbness, tingling, burning paresthesias or loss of motor strength. No syncope, dizziness, frequent headaches or memory loss.       Objective:     Vitals:    01/02/18 1609   BP: 153/82   Pulse: 72   Resp: 20   Temp: 98.4 °F (36.9 °C)   TempSrc: Oral   SpO2: 90%   PainSc:   0 - No pain       There is no height or weight on file to calculate BMI. Physical Examination:              General Appearance:  Well-developed, well-nourished, no acute  distress. HEENT:      Ears:  The TMs and ear canals were clear. Eyes:  The pupillary responses were normal.  Extraocular muscle function intact. Lids and conjunctiva not injected. Neck:  Supple without thyromegaly or adenopathy. No JVD noted. Lungs:  Clear to auscultation and percussion. Cardiac:  Regular rate and rhythm without murmur. GI: nontender w/o mass. Normal BS's. Extremities: less edema and less lichenification of anterior tibial surfaces. Skin:  No rash or unusual mole changes noted. Neurological:  Grossly normal.            Assessment/Plan:   Impressions:      ICD-10-CM ICD-9-CM    1. Chronic diastolic CHF (congestive heart failure), NYHA class 3 ([facility]) I50.32 428.32 AMB POC BASIC METABOLIC PANEL   2. Cerebrovascular accident (CVA), unspecified mechanism ([facility] I63.9 434.91 AMB POC BASIC METABOLIC PANEL   3. (HFpEF) heart failure with preserved ejection fraction (HCC) I50.30 428.9 AMB POC BASIC METABOLIC PANEL   4. 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 2 diabetes mellitus ([facility]) E11.22 250.40 AMB POC BASIC METABOLIC PANEL    Z98.5 740.7    5. Diabetic polyneuropathy associated with other specified diabetes mellitus ([facility]) E13.42 250.60 AMB POC BASIC METABOLIC PANEL     403.5    6. BMI 45.0-49.9, adult ([facility]) Z68.42 V85.42 AMB POC BASIC METABOLIC PANEL   7. Type 2 diabetes mellitus with diabetic neuropathy, with long-term current use of insulin ([facility]) E11.40 250.60 AMB POC BASIC METABOLIC PANEL    Q37.0 671.9      V58.67    8. Anemia, unspecified type D64.9 285.9 AMB POC COMPLETE CBC,AUTOMATED ENTER        Plan:  1. Continue present meds  2. Lifestyle modifications including Na restriction, low carb/fat diet, weight reduction and exercise (at least a walking program).   ? Increase insulin and diuretic depending on labs          Follow-up Disposition:  Return in about 6 weeks (around 2/13/2018).       Orders Placed This Encounter    AMB Kimmie Bean MD

## 2018-01-03 NOTE — PROGRESS NOTES
Kidney function good. Increase Bumex to 3 mg daily (2 mg tab; 1.5 tabs). Hgb better. BS still very high.   Increase Levemir 50 units BID

## 2018-01-07 RX ORDER — HYDRALAZINE HYDROCHLORIDE 50 MG/1
TABLET, FILM COATED ORAL
Qty: 100 TAB | Refills: 0 | Status: SHIPPED | OUTPATIENT
Start: 2018-01-07 | End: 2018-01-01 | Stop reason: SDUPTHER

## 2018-01-07 RX ORDER — ATENOLOL 50 MG/1
TABLET ORAL
Qty: 90 TAB | Refills: 0 | Status: SHIPPED | OUTPATIENT
Start: 2018-01-07 | End: 2018-01-01 | Stop reason: DRUGHIGH

## 2018-01-08 RX ORDER — HYDRALAZINE HYDROCHLORIDE 50 MG/1
TABLET, FILM COATED ORAL
Qty: 100 TAB | Refills: 0 | Status: ON HOLD | OUTPATIENT
Start: 2018-01-08 | End: 2018-01-01

## 2018-01-22 RX ORDER — PEN NEEDLE, DIABETIC 31 GX5/16"
NEEDLE, DISPOSABLE MISCELLANEOUS
Qty: 180 PEN NEEDLE | Refills: 3 | Status: SHIPPED | OUTPATIENT
Start: 2018-01-22 | End: 2018-01-01

## 2018-01-28 RX ORDER — ATORVASTATIN CALCIUM 40 MG/1
TABLET, FILM COATED ORAL
Qty: 90 TAB | Refills: 0 | Status: SHIPPED | OUTPATIENT
Start: 2018-01-28 | End: 2018-01-01 | Stop reason: SDUPTHER

## 2018-02-02 ENCOUNTER — OFFICE VISIT (OUTPATIENT)
Dept: INTERNAL MEDICINE CLINIC | Age: 63
End: 2018-02-02

## 2018-02-02 VITALS
HEART RATE: 69 BPM | BODY MASS INDEX: 52.49 KG/M2 | TEMPERATURE: 98.2 F | HEIGHT: 61 IN | WEIGHT: 278 LBS | DIASTOLIC BLOOD PRESSURE: 72 MMHG | SYSTOLIC BLOOD PRESSURE: 158 MMHG | OXYGEN SATURATION: 91 %

## 2018-02-02 DIAGNOSIS — I87.2 VENOUS STASIS DERMATITIS OF BOTH LOWER EXTREMITIES: Primary | ICD-10-CM

## 2018-02-02 RX ORDER — CHLORHEXIDINE GLUCONATE 4 G/100ML
SOLUTION TOPICAL
Qty: 946 ML | Refills: 1 | Status: SHIPPED | OUTPATIENT
Start: 2018-02-02 | End: 2018-01-01 | Stop reason: DRUGHIGH

## 2018-02-02 NOTE — PROGRESS NOTES
Subjective:     Ms. Nestor Hand comes to the office today with issues with her feet. She has developed thick blisters on her feet similar to what she had on her legs when hospitalized in November. This was treated with CHG soap cleansing and ultimately the application of Lac-Hydrin when the broken down areas had healed. She has thick walled blisters, which were debrided roughly with 4x4s and hydrogen peroxide with pink granulation base to most of them, though there is still some lichenification and thickened skin surrounding them for the most part. I have instructed her  in Chlorhexidine, scrubbing twice a day, and allowing these to air dry. When the wounds have healed over he can begin using the Lac Hydrin. He continues to use Chlorhexidine on her legs, as well as Lac Hydrin and they continue to look good, but now the feet look almost exactly like her anterior tibial surfaces did. We will check this again in ten days time. I think it is fortunate that she probably does have good circulation in her feet, though pulses were not easily palpable today. Her last ABIs were approximately a year ago with good pressures in her feet.         Patient Active Problem List    Diagnosis Date Noted    Chronic diastolic CHF (congestive heart failure), NYHA class 3 ([facility] 07/24/2017     Priority: 1 - One    Stroke ([address] 05/30/2014     Priority: 1 - One    Right leg weakness 04/18/2013     Priority: 1 - One    Venous stasis dermatitis of both lower extremities 11/09/2017     Priority: 2 - Two    (HFpEF) heart failure with preserved ejection fraction ([address] 10/31/2017     Priority: 2 - Two    Anemia 07/24/2017     Priority: 2 - Two    Ulcer of right fifth toe due to diabetes mellitus ([address] 07/24/2017     Priority: 2 - Two    Peripheral neuropathy 07/24/2017     Priority: 2 - Two    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 2 diabetes mellitus ([address] 07/24/2017     Priority: 2 - Two    Stage 2 chronic kidney disease due to benign hypertension 2017     Priority: 2 - Two    Hypertension 2017     Priority: 2 - Two    Bilateral edema of lower extremity 2017     Priority: 2 - Two    ASCVD (arteriosclerotic cardiovascular disease) 2014     Priority: 2 - Two    Diabetic polyneuropathy ([facility] 2013     Priority: 2 - Two    Diabetes mellitus type II, uncontrolled ([facility] 2013     Priority: 2 - Two    Diabetic gastroparesis ([facility]      Priority: 2 - Two    Diabetes ([facility] 2011     Priority: 2 - Two    B12 deficiency 10/31/2017     Priority: 3 - Three    GERD (gastroesophageal reflux disease) 10/11/2017     Priority: 3 - Three    Fatty liver 2017     Priority: 3 - Three    Stasis dermatitis 2017     Priority: 3 - Three    Aortic stenosis, mild 2017     Priority: 3 - Three    CKD (chronic kidney disease) stage 3, GFR 30-59 ml/min      Priority: 3 - Three    Hyperlipemia      Priority: 3 - Three    Morbid obesity ([facility] 2011     Priority: 3 - Three    Gout 10/11/2017     Priority: 4 - Four    History of hyperparathyroidism 10/11/2017     Priority: 4 - Four    Sinus tachycardia 2017     Priority: 4 - Four    Low back pain 2017     Priority: 4 - Four    Insomnia 2017     Priority: 5 - Five    BMI 45.0-49.9, adult ([facility] 2017     Priority: 5 - Five    Type 2 diabetes mellitus with diabetic neuropathy ([facility] 2018    PE (physical exam), annual 10/11/2017     Past Surgical History:   Procedure Laterality Date    CHEST SURGERY PROCEDURE UNLISTED  10/24/13    PARATHYROID EXPLORATION     DELIVERY       HX  SECTION      HX CHOLECYSTECTOMY      HX OTHER SURGICAL      I&D right thigh abscess      Social History   Substance Use Topics    Smoking status: Never Smoker    Smokeless tobacco: Never Used    Alcohol use Yes      Comment: rare     Allergies   Allergen Reactions    Zestril [Lisinopril] Swelling     Outpatient Prescriptions Marked as Taking for the 2/2/18 encounter (Office Visit) with Boone Mcdaniel MD   Medication Sig Dispense Refill    chlorhexidine (HIBICLENS) 4 % liquid scrub wounds twice a day and allow air dry 946 mL 1    atorvastatin (LIPITOR) 40 mg tablet TAKE 1 TABLET BY MOUTH EVERY DAY 90 Tab 0    BD INSULIN PEN NEEDLE UF SHORT 31 gauge x 5/16\" ndle USE TWICE DAILY AS DIRECTED. 180 Pen Needle 3    hydrALAZINE (APRESOLINE) 50 mg tablet TAKE 1 TABLET BY MOUTH THREE TIMES DAILY 100 Tab 0    atenolol (TENORMIN) 50 mg tablet TAKE 1 TABLET BY MOUTH EVERY NIGHT AT BEDTIME. 90 Tab 0    hydrALAZINE (APRESOLINE) 50 mg tablet TAKE 1 TABLET BY MOUTH THREE TIMES DAILY 100 Tab 0    dilTIAZem (TIAZAC) 360 mg ER capsule TAKE 1 CAPSULE BY MOUTH EVERY DAY 30 Cap 0    LYRICA 75 mg capsule TAKE ONE CAPSULE BY MOUTH DAILY AT LUNCH TIME AND AGAIN AT BEDTIME. 180 Cap 0    allopurinol (ZYLOPRIM) 300 mg tablet TAKE 1 TABLET BY MOUTH EVERY DAY 90 Tab 0    bumetanide (BUMEX) 2 mg tablet Take 1  Tab daily 90 Tab 3    ammonium lactate (LAC-HYDRIN) 12 % lotion rub in to affected area well      insulin detemir (LEVEMIR) 100 unit/mL injection 60 Units by SubCUTAneous route nightly.  insulin lispro (HUMALOG) 100 unit/mL kwikpen 10 Units by SubCUTAneous route three (3) times daily (with meals). 1 Package     nystatin (MYCOSTATIN) topical cream Apply  to affected area two (2) times a day. Use for groin rash 15 g 0    cyanocobalamin (VITAMIN B12) 1,000 mcg/mL injection 1 mL by IntraMUSCular route every seven (7) days. 1 Vial 0    ferrous sulfate (SLOW FE) 142 mg (45 mg iron) ER tablet Take 142 mg by mouth Daily (before breakfast).  aspirin-dipyridamole (AGGRENOX)  mg per SR capsule TAKE 1 CAPSULE BY MOUTH TWICE DAILY( EVERY TWELVE HOURS) 60 Cap 5    PEN NEEDLE by Does Not Apply route.  SYRINGE AND NEEDLE,INSULIN,1ML (BD INSULIN SYRINGE) by Does Not Apply route.       BLOOD SUGAR DIAGNOSTIC (FREESTYLE LITE STRIPS) by In Vitro route.  colestipol (COLESTID) 1 gram tablet Take 1 g by mouth two (2) times a day.  Bifidobacterium Infantis (ALIGN) 4 mg cap Take  by mouth.  acetaminophen (TYLENOL) 325 mg tablet Take 2 Tabs by mouth every four (4) hours as needed.  Cholecalciferol, Vitamin D3, 2,000 unit cap Take 1 Cap by mouth daily.  omeprazole (PRILOSEC) 40 mg capsule Take 40 mg by mouth daily.  glipiZIDE (GLUCOTROL) 10 mg tablet Take 10 mg by mouth two (2) times a day. Review of Systems:  GEN: no weight loss, weight gain, fatigue or night sweats  CV: no PND, orthopnea, or palpitations  Resp: no dyspnea on exertion, no cough  Abd: no nausea, vomiting or diarrhea  Endocrine: no hair loss, excessive thirst or polyuria  Neurological ROS: no TIA or stroke symptoms      Objective:     Visit Vitals    /72 (BP 1 Location: Left arm, BP Patient Position: Sitting)    Pulse 69    Temp 98.2 °F (36.8 °C) (Oral)    Ht 5' 1\" (1.549 m)    Wt 278 lb (126.1 kg)    SpO2 91%    BMI 52.53 kg/m2     General:   alert, cooperative and no distress   Eyes: conjunctivae/sclerae clear. PERRL   Mouth:  No oral lesions, no pharyngeal erythema, no exudates   Skin: Thick blisters on feet debrided with 4x4 and H2O2   Heart: S1 and S2 normal,no murmurs noted    Lungs: Clear to auscultation bilaterally, no increased work of breathing   Abdomen: Soft, nontender. Normal bowel sounds   Extremities: No edema or cyanosis                                Assessment/Plan:       ICD-10-CM ICD-9-CM    1. Venous stasis dermatitis of both lower extremities I87.2 454.1 chlorhexidine (HIBICLENS) 4 % liquid       Orders Placed This Encounter    chlorhexidine (HIBICLENS) 4 % liquid     Sig: scrub wounds twice a day and allow air dry     Dispense:  946 mL     Refill:  1       Follow-up Disposition:  Return in about 10 days (around 2/12/2018).

## 2018-02-02 NOTE — PROGRESS NOTES
Reviewed record in preparation for visit and have obtained necessary documentation. Identified pt with two pt identifiers(name and ). Chief Complaint   Patient presents with    Skin Problem     both feet        Coordination of Care Questionnaire:  :     1) Have you been to an emergency room, urgent care clinic since your last visit? No     Hospitalized since your last visit? No               2) Have you seen or consulted any other health care providers outside of [facility] since your last visit?  No

## 2018-02-03 RX ORDER — DILTIAZEM HYDROCHLORIDE 360 MG/1
CAPSULE, EXTENDED RELEASE ORAL
Qty: 30 CAP | Refills: 0 | Status: SHIPPED | OUTPATIENT
Start: 2018-02-03 | End: 2018-01-01 | Stop reason: DRUGHIGH

## 2018-02-10 RX ORDER — ALLOPURINOL 300 MG/1
TABLET ORAL
Qty: 90 TAB | Refills: 0 | Status: ON HOLD | OUTPATIENT
Start: 2018-02-10 | End: 2018-01-01

## 2018-02-13 ENCOUNTER — OFFICE VISIT (OUTPATIENT)
Dept: INTERNAL MEDICINE CLINIC | Age: 63
End: 2018-02-13

## 2018-02-13 VITALS
DIASTOLIC BLOOD PRESSURE: 90 MMHG | OXYGEN SATURATION: 90 % | HEIGHT: 61 IN | BODY MASS INDEX: 52.49 KG/M2 | SYSTOLIC BLOOD PRESSURE: 152 MMHG | HEART RATE: 84 BPM | WEIGHT: 278 LBS

## 2018-02-13 DIAGNOSIS — I63.9 CEREBROVASCULAR ACCIDENT (CVA), UNSPECIFIED MECHANISM (HCC): ICD-10-CM

## 2018-02-13 DIAGNOSIS — I87.2 VENOUS STASIS DERMATITIS OF BOTH LOWER EXTREMITIES: Primary | ICD-10-CM

## 2018-02-13 DIAGNOSIS — Z79.4 TYPE 2 DIABETES MELLITUS WITH DIABETIC NEUROPATHY, WITH LONG-TERM CURRENT USE OF INSULIN (HCC): ICD-10-CM

## 2018-02-13 DIAGNOSIS — G63 POLYNEUROPATHY ASSOCIATED WITH UNDERLYING DISEASE (HCC): ICD-10-CM

## 2018-02-13 DIAGNOSIS — E11.40 TYPE 2 DIABETES MELLITUS WITH DIABETIC NEUROPATHY, WITH LONG-TERM CURRENT USE OF INSULIN (HCC): ICD-10-CM

## 2018-02-13 DIAGNOSIS — I10 ESSENTIAL HYPERTENSION: ICD-10-CM

## 2018-02-13 DIAGNOSIS — R60.0 BILATERAL EDEMA OF LOWER EXTREMITY: ICD-10-CM

## 2018-02-13 NOTE — PROGRESS NOTES
Reviewed record in preparation for visit and have obtained necessary documentation. Identified pt with two pt identifiers(name and ). Chief Complaint   Patient presents with    Follow-up        Coordination of Care Questionnaire:  :     1) Have you been to an emergency room, urgent care clinic since your last visit? No     Hospitalized since your last visit? No               2) Have you seen or consulted any other health care providers outside of [address] [place] [address] since your last visit?  No

## 2018-02-13 NOTE — PROGRESS NOTES
Subjective:     Ms. Derek Aschoff comes to the office today with continued issues with her feet. She had developed thick blisters on her feet similar to what she had on her legs when hospitalized in November. The  thick walled blisters,  were debrided roughly with 4x4s and hydrogen peroxide with pink granulation base to most of them, though there was still some lichenification and thickened skin surrounding them for the most part. They were instructed in Chlorhexidine, scrubbing twice a day, and allowing these to air dry. When the wounds had healed over her  was to begin using the Lac Hydrin. He was to continue to use Chlorhexidine on her legs, as well as Lac Hydrin  She now returns with a lot of the blistering having resolved, but still very thickened skin over the dorsal affect of each foot. I've instructed them in continued cleansing with Chlorhexidine with application of Lac Hydrin, which he had not really started after the last visit here. In addition they have now become greatly concerned about her inability to ambulate at all. This has been a progressive problem since her stroke in December, 2016 and she really has not participated in any physical therapy over this period of time and has simply gotten heavier and weaker over that period of time. I'm not sure what we have to offer her given her size and inability to move much at all. She certainly doesn't have anything acute that would put her in the hospital at this point. Will get home health to see if they can begin any degree of physical therapy. It would also be nice if they took a look at the wounds on her legs to offer any advice there as well. She's coming due for her three month follow up regardeing her diabetes and will get her back in two weeks time fasting for all the blood work there.  She's had some increased difficulties with breathing, particularly with lying down, but her lungs are clear and I suspect it is more related to her body habitus and obesity-hypoventilation syndrome. She is sleeping in a chair as she can't lie back w/o getting SOB  She needs a hospital bed to allow for body positioning not feasible in a regular bed. In, particular she need the [facility] elevated > 30 degrees at all times to allow for adequate breathing. I am very concerned that something acute will happen to her, but I don't see anything that suggests hospitalization is warranted. If things do worsen she probably will require hospitalization for optimization of her medications, etc. We can then see if physical therapy can be engendered, but I am not particularly hopeful. Her  agrees that he's been enabling of her lack of activity and that is going to make any rehabilitation process difficult at least.  We will see where they  two weeks time. Will get home health to see her in the meantime and try to get a hospital bed.         Patient Active Problem List    Diagnosis Date Noted    Chronic diastolic CHF (congestive heart failure), NYHA class 3 ([facility] 07/24/2017     Priority: 1 - One    Stroke ([facility] 05/30/2014     Priority: 1 - One    Right leg weakness 04/18/2013     Priority: 1 - One    Venous stasis dermatitis of both lower extremities 11/09/2017     Priority: 2 - Two    (HFpEF) heart failure with preserved ejection fraction ([address] 10/31/2017     Priority: 2 - Two    Anemia 07/24/2017     Priority: 2 - Two    Ulcer of right fifth toe due to diabetes mellitus ([address] 07/24/2017     Priority: 2 - Two    Peripheral neuropathy 07/24/2017     Priority: 2 - Two    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 2 diabetes mellitus ([facility] 07/24/2017     Priority: 2 - Two    Stage 2 chronic kidney disease due to benign hypertension 07/24/2017     Priority: 2 - Two    Hypertension 07/24/2017     Priority: 2 - Two    Bilateral edema of lower extremity 07/24/2017     Priority: 2 - Two    ASCVD (arteriosclerotic cardiovascular disease) 05/31/2014     Priority: 2 - Two    Diabetic polyneuropathy ([facility] 2013     Priority: 2 - Two    Diabetes mellitus type II, uncontrolled ([facility] 2013     Priority: 2 - Two    Diabetic gastroparesis ([facility]      Priority: 2 - Two    Diabetes ([facility] 2011     Priority: 2 - Two    B12 deficiency 10/31/2017     Priority: 3 - Three    GERD (gastroesophageal reflux disease) 10/11/2017     Priority: 3 - Three    Fatty liver 2017     Priority: 3 - Three    Stasis dermatitis 2017     Priority: 3 - Three    Aortic stenosis, mild 2017     Priority: 3 - Three    CKD (chronic kidney disease) stage 3, GFR 30-59 ml/min      Priority: 3 - Three    Hyperlipemia      Priority: 3 - Three    Morbid obesity ([facility] 2011     Priority: 3 - Three    Gout 10/11/2017     Priority: 4 - Four    History of hyperparathyroidism 10/11/2017     Priority: 4 - Four    Sinus tachycardia 2017     Priority: 4 - Four    Low back pain 2017     Priority: 4 - Four    Insomnia 2017     Priority: 5 - Five    BMI 45.0-49.9, adult ([facility] 2017     Priority: 5 - Five    Type 2 diabetes mellitus with diabetic neuropathy ([facility] 2018    PE (physical exam), annual 10/11/2017     Past Surgical History:   Procedure Laterality Date    CHEST SURGERY PROCEDURE UNLISTED  10/24/13    PARATHYROID EXPLORATION     DELIVERY       HX  SECTION      HX CHOLECYSTECTOMY      HX OTHER SURGICAL      I&D right thigh abscess      Social History   Substance Use Topics    Smoking status: Never Smoker    Smokeless tobacco: Never Used    Alcohol use Yes      Comment: rare     Allergies   Allergen Reactions    Zestril [Lisinopril] Swelling     Outpatient Prescriptions Marked as Taking for the 18 encounter (Office Visit) with Danny Garcia MD   Medication Sig Dispense Refill    allopurinol (ZYLOPRIM) 300 mg tablet TAKE 1 TABLET BY MOUTH EVERY DAY 90 Tab 0    dilTIAZem (TIAZAC) 360 mg ER capsule TAKE 1 CAPSULE BY MOUTH EVERY DAY 30 Cap 0    chlorhexidine (HIBICLENS) 4 % liquid scrub wounds twice a day and allow air dry 946 mL 1    atorvastatin (LIPITOR) 40 mg tablet TAKE 1 TABLET BY MOUTH EVERY DAY 90 Tab 0    BD INSULIN PEN NEEDLE UF SHORT 31 gauge x 5/16\" ndle USE TWICE DAILY AS DIRECTED. 180 Pen Needle 3    hydrALAZINE (APRESOLINE) 50 mg tablet TAKE 1 TABLET BY MOUTH THREE TIMES DAILY 100 Tab 0    atenolol (TENORMIN) 50 mg tablet TAKE 1 TABLET BY MOUTH EVERY NIGHT AT BEDTIME. 90 Tab 0    hydrALAZINE (APRESOLINE) 50 mg tablet TAKE 1 TABLET BY MOUTH THREE TIMES DAILY 100 Tab 0    LYRICA 75 mg capsule TAKE ONE CAPSULE BY MOUTH DAILY AT LUNCH TIME AND AGAIN AT BEDTIME. 180 Cap 0    bumetanide (BUMEX) 2 mg tablet Take 1  Tab daily 90 Tab 3    ammonium lactate (LAC-HYDRIN) 12 % lotion rub in to affected area well      insulin detemir (LEVEMIR) 100 unit/mL injection 60 Units by SubCUTAneous route nightly.  insulin lispro (HUMALOG) 100 unit/mL kwikpen 10 Units by SubCUTAneous route three (3) times daily (with meals). 1 Package     nystatin (MYCOSTATIN) topical cream Apply  to affected area two (2) times a day. Use for groin rash 15 g 0    cyanocobalamin (VITAMIN B12) 1,000 mcg/mL injection 1 mL by IntraMUSCular route every seven (7) days. 1 Vial 0    ferrous sulfate (SLOW FE) 142 mg (45 mg iron) ER tablet Take 142 mg by mouth Daily (before breakfast).  aspirin-dipyridamole (AGGRENOX)  mg per SR capsule TAKE 1 CAPSULE BY MOUTH TWICE DAILY( EVERY TWELVE HOURS) 60 Cap 5    PEN NEEDLE by Does Not Apply route.  SYRINGE AND NEEDLE,INSULIN,1ML (BD INSULIN SYRINGE) by Does Not Apply route.  BLOOD SUGAR DIAGNOSTIC (FREESTYLE LITE STRIPS) by In Vitro route.  colestipol (COLESTID) 1 gram tablet Take 1 g by mouth two (2) times a day.  Bifidobacterium Infantis (ALIGN) 4 mg cap Take  by mouth.  acetaminophen (TYLENOL) 325 mg tablet Take 2 Tabs by mouth every four (4) hours as needed.       Cholecalciferol, Vitamin D3, 2,000 unit cap Take 1 Cap by mouth daily.  omeprazole (PRILOSEC) 40 mg capsule Take 40 mg by mouth daily.  glipiZIDE (GLUCOTROL) 10 mg tablet Take 10 mg by mouth two (2) times a day. Review of Systems:  GEN: no weight loss, weight gain, fatigue or night sweats  CV: + PND, orthopnea, but no palpitations palpitations  Resp: no dyspnea on exertion, no cough  Abd: no nausea, vomiting or diarrhea  Endocrine: no hair loss, excessive thirst or polyuria  Neurological ROS:bilateral LE weakness R>L leg      Objective:     Visit Vitals    /90 (BP 1 Location: Left arm, BP Patient Position: Sitting)    Pulse 84    Ht 5' 1\" (1.549 m)    Wt 278 lb (126.1 kg)    SpO2 90%    BMI 52.53 kg/m2     General:   alert, cooperative and no distress   Eyes: conjunctivae/sclerae clear. PERRL   Mouth:  No oral lesions, no pharyngeal erythema, no exudates   Skin: Thick blisters on feet debrided with 4x4 and H2O2   Heart: S1 and S2 normal,no murmurs noted    Lungs: Clear to auscultation bilaterally, no increased work of breathing   Abdomen: Morbidly obese. Normal bowel sounds   Extremities: 2+ edema and marked stasis changes                                Assessment/Plan:       ICD-10-CM ICD-9-CM    1. Venous stasis dermatitis of both lower extremities I87.2 454.1    2. Bilateral edema of lower extremity R60.0 782.3    3. Essential hypertension I10 401.9    4. Polyneuropathy associated with underlying disease ([facility] G63 357.4    5. Type 2 diabetes mellitus with diabetic neuropathy, with long-term current use of insulin (HCC) E11.40 250.60     Z79.4 357.2      V58.67    6. Cerebrovascular accident (CVA), unspecified mechanism ([address] I63.9 434.91      Continue present meds including resuming diuretic as prescribed     PT and wound care    Continue current wound care as prescribed    Hospital bed    No orders of the defined types were placed in this encounter.       Follow-up Disposition:  Return in about 2 weeks (around 2/27/2018) for Fasting.

## 2018-02-14 DIAGNOSIS — G63 POLYNEUROPATHY ASSOCIATED WITH UNDERLYING DISEASE (HCC): ICD-10-CM

## 2018-02-14 DIAGNOSIS — I87.2 VENOUS STASIS DERMATITIS OF BOTH LOWER EXTREMITIES: ICD-10-CM

## 2018-02-14 DIAGNOSIS — R29.898 RIGHT LEG WEAKNESS: ICD-10-CM

## 2018-02-14 DIAGNOSIS — E11.40 TYPE 2 DIABETES MELLITUS WITH DIABETIC NEUROPATHY, WITH LONG-TERM CURRENT USE OF INSULIN (HCC): Primary | ICD-10-CM

## 2018-02-14 DIAGNOSIS — Z79.4 TYPE 2 DIABETES MELLITUS WITH DIABETIC NEUROPATHY, WITH LONG-TERM CURRENT USE OF INSULIN (HCC): Primary | ICD-10-CM

## 2018-02-16 ENCOUNTER — HOME HEALTH ADMISSION (OUTPATIENT)
Dept: HOME HEALTH SERVICES | Facility: HOME HEALTH | Age: 63
End: 2018-02-16
Payer: COMMERCIAL

## 2018-02-18 ENCOUNTER — HOME CARE VISIT (OUTPATIENT)
Dept: SCHEDULING | Facility: HOME HEALTH | Age: 63
End: 2018-02-18
Payer: COMMERCIAL

## 2018-02-18 VITALS — HEIGHT: 60 IN | BODY MASS INDEX: 54.58 KG/M2 | WEIGHT: 278 LBS

## 2018-02-18 PROCEDURE — G0299 HHS/HOSPICE OF RN EA 15 MIN: HCPCS

## 2018-02-19 PROBLEM — E66.2 OBESITY HYPOVENTILATION SYNDROME (HCC): Status: ACTIVE | Noted: 2018-02-19

## 2018-02-19 RX ORDER — NYSTATIN 100000 U/G
CREAM TOPICAL 2 TIMES DAILY
Qty: 30 G | Refills: 1 | Status: SHIPPED | OUTPATIENT
Start: 2018-02-19 | End: 2018-01-01

## 2018-02-19 NOTE — TELEPHONE ENCOUNTER
Patients  called stating that Ms Derek Aschoff is having a rash in her groin area, wants to get a refill on the nystatin topical cream

## 2018-02-20 DIAGNOSIS — I50.32 CHRONIC DIASTOLIC CHF (CONGESTIVE HEART FAILURE), NYHA CLASS 3 (HCC): ICD-10-CM

## 2018-02-20 DIAGNOSIS — I63.9 CEREBROVASCULAR ACCIDENT (CVA), UNSPECIFIED MECHANISM (HCC): ICD-10-CM

## 2018-02-20 DIAGNOSIS — E66.2 OBESITY HYPOVENTILATION SYNDROME (HCC): Primary | ICD-10-CM

## 2018-02-21 ENCOUNTER — HOME CARE VISIT (OUTPATIENT)
Dept: HOME HEALTH SERVICES | Facility: HOME HEALTH | Age: 63
End: 2018-02-21
Payer: COMMERCIAL

## 2018-02-22 ENCOUNTER — HOME CARE VISIT (OUTPATIENT)
Dept: SCHEDULING | Facility: HOME HEALTH | Age: 63
End: 2018-02-22
Payer: COMMERCIAL

## 2018-02-22 PROCEDURE — G0151 HHCP-SERV OF PT,EA 15 MIN: HCPCS

## 2018-02-23 ENCOUNTER — HOME CARE VISIT (OUTPATIENT)
Dept: SCHEDULING | Facility: HOME HEALTH | Age: 63
End: 2018-02-23
Payer: COMMERCIAL

## 2018-02-23 PROCEDURE — G0300 HHS/HOSPICE OF LPN EA 15 MIN: HCPCS

## 2018-02-24 VITALS
SYSTOLIC BLOOD PRESSURE: 184 MMHG | HEART RATE: 73 BPM | DIASTOLIC BLOOD PRESSURE: 76 MMHG | TEMPERATURE: 98.1 F | RESPIRATION RATE: 18 BRPM | OXYGEN SATURATION: 93 %

## 2018-02-25 RX ORDER — MONTELUKAST SODIUM 4 MG/1
TABLET, CHEWABLE ORAL
Qty: 90 TAB | Refills: 0 | Status: SHIPPED | OUTPATIENT
Start: 2018-02-25 | End: 2018-01-01 | Stop reason: DRUGHIGH

## 2018-02-26 VITALS
RESPIRATION RATE: 20 BRPM | HEART RATE: 65 BPM | TEMPERATURE: 98 F | OXYGEN SATURATION: 94 % | SYSTOLIC BLOOD PRESSURE: 140 MMHG | DIASTOLIC BLOOD PRESSURE: 82 MMHG

## 2018-02-27 ENCOUNTER — HOME CARE VISIT (OUTPATIENT)
Dept: SCHEDULING | Facility: HOME HEALTH | Age: 63
End: 2018-02-27
Payer: COMMERCIAL

## 2018-02-27 VITALS
OXYGEN SATURATION: 93 % | HEART RATE: 70 BPM | DIASTOLIC BLOOD PRESSURE: 76 MMHG | TEMPERATURE: 98.3 F | RESPIRATION RATE: 18 BRPM | SYSTOLIC BLOOD PRESSURE: 174 MMHG

## 2018-02-27 PROCEDURE — G0300 HHS/HOSPICE OF LPN EA 15 MIN: HCPCS

## 2018-02-27 PROCEDURE — G0151 HHCP-SERV OF PT,EA 15 MIN: HCPCS

## 2018-02-28 VITALS
HEART RATE: 72 BPM | RESPIRATION RATE: 16 BRPM | DIASTOLIC BLOOD PRESSURE: 70 MMHG | SYSTOLIC BLOOD PRESSURE: 140 MMHG | TEMPERATURE: 97.8 F

## 2018-03-05 PROBLEM — A41.9 SEPSIS (HCC): Status: ACTIVE | Noted: 2018-01-01

## 2018-03-05 NOTE — PROGRESS NOTES
Pharmacy Clarification of Prior to Admission Medication Regimen     The patient was interviewed regarding clarification of the prior to admission medication regimen. Patient's  was present in room and obtained permission from patient to discuss drug regimen with visitor(s) present. Patient was questioned regarding use of any other inhalers, topical products, over the counter medications, herbal medications, vitamin products or ophthalmic/nasal/otic medication use. Information Obtained From: Rx Query, patient, and patient's     Pertinent Pharmacy Findings: None        PTA medication list was corrected to the following:     Prior to Admission Medications   Prescriptions Last Dose Informant Patient Reported? Taking? Bifidobacterium Infantis 4 mg cap 3/4/2018 at Unknown time Significant Other Yes Yes   Sig: Take 4 mg by mouth daily. Cholecalciferol, Vitamin D3, 2,000 unit cap 3/4/2018 at Unknown time Significant Other Yes Yes   Sig: Take 1 Cap by mouth daily. LYRICA 75 mg capsule 3/4/2018 at Unknown time Significant Other No Yes   Sig: TAKE ONE CAPSULE BY MOUTH DAILY AT LUNCH TIME AND AGAIN AT BEDTIME. UBIDECARENONE (COQ-10 PO) 3/4/2018 at Unknown time Significant Other Yes Yes   Sig: Take 1 Tab by mouth daily. acetaminophen (TYLENOL EXTRA STRENGTH) 500 mg tablet 3/4/2018 at Unknown time Significant Other Yes Yes   Sig: Take 1,000 mg by mouth every six (6) hours as needed for Pain. allopurinol (ZYLOPRIM) 300 mg tablet 3/4/2018 at Unknown time Significant Other No Yes   Sig: TAKE 1 TABLET BY MOUTH EVERY DAY   ammonium lactate (LAC-HYDRIN) 12 % lotion 3/4/2018 at Unknown time Significant Other Yes Yes   Sig: Apply 1 Each to affected area every other day.  rub in to affected area well   aspirin-dipyridamole (AGGRENOX)  mg per SR capsule 3/4/2018 at Unknown time Significant Other No Yes   Sig: TAKE 1 CAPSULE BY MOUTH TWICE DAILY( EVERY TWELVE HOURS)   atenolol (TENORMIN) 50 mg tablet 3/4/2018 at Unknown time Significant Other Yes Yes   Sig: Take 50 mg by mouth daily. atorvastatin (LIPITOR) 40 mg tablet 3/4/2018 at Unknown time Significant Other No Yes   Sig: TAKE 1 TABLET BY MOUTH EVERY DAY   bumetanide (BUMEX) 2 mg tablet 3/4/2018 at Unknown time Significant Other Yes Yes   Sig: Take 3 mg by mouth daily. chlorhexidine (HIBICLENS) 4 % liquid 3/4/2018 at Unknown time Significant Other Yes Yes   Sig: Apply  to affected area every other day. colestipol (COLESTID) 1 gram tablet 3/4/2018 at Unknown time Significant Other Yes Yes   Sig: Take 1 g by mouth daily. dilTIAZem (TIAZAC) 360 mg ER capsule 3/4/2018 at Unknown time Significant Other No Yes   Sig: TAKE 1 CAPSULE BY MOUTH EVERY DAY   ferrous sulfate (SLOW FE) 142 mg (45 mg iron) ER tablet 3/4/2018 at Unknown time Significant Other Yes Yes   Sig: Take 142 mg by mouth Daily (before breakfast). glipiZIDE (GLUCOTROL) 10 mg tablet 3/4/2018 at Unknown time Significant Other Yes Yes   Sig: Take 10 mg by mouth two (2) times a day. hydrALAZINE (APRESOLINE) 50 mg tablet 3/4/2018 at Unknown time Significant Other No Yes   Sig: TAKE 1 TABLET BY MOUTH THREE TIMES DAILY   insulin detemir U-100 (LEVEMIR FLEXTOUCH U-100 INSULN) 100 unit/mL (3 mL) inpn 3/4/2018 at Unknown time Significant Other Yes Yes   Si Units by SubCUTAneous route two (2) times a day. insulin lispro (HUMALOG) 100 unit/mL kwikpen 3/4/2018 at Unknown time Significant Other Yes Yes   Sig: 10 Units by SubCUTAneous route three (3) times daily (with meals). nystatin (MYCOSTATIN) topical cream 3/4/2018 at Unknown time Significant Other No Yes   Sig: Apply  to affected area two (2) times a day.  Use for groin rash      Facility-Administered Medications: None          Thank you,  [facility]  Medication History Pharmacy Technician

## 2018-03-05 NOTE — ED NOTES
Fluid resuscitation bolus ordered. 1L NS infusing at 200 ml/hr. Per off-going RN, this was discussed with MD Jakob Adair due to pt's extensive history and contraindications for receiving the entire amount.

## 2018-03-05 NOTE — IP AVS SNAPSHOT
[address] [facility] 
[phone] Patient: Avni Parker MRN: [id number] VFI:[birth year] About your hospitalization You were admitted on:  March 5, 2018 You last received care in the:  [address] You were discharged on:  March 22, 2018 Why you were hospitalized Your primary diagnosis was:  Not on File Your diagnoses also included:  Sepsis ([facility]), Diabetic Gastroparesis (Hcc), Anemia, Ulcer Of Right Fifth Toe Due To Diabetes Mellitus (Hcc), Hypertension, Bmi 45.0-49.9, Adult (Hcc), Bilateral Edema Of Lower Extremity, Aortic Stenosis, Mild, Fatty Liver, (Hfpef) Heart Failure With Preserved Ejection Fraction (Hcc), Chronic Diastolic Chf (Congestive Heart Failure), Nyha Class 3 (Hcc), Ckd (Chronic Kidney Disease) Stage 3, Gfr 30-59 Ml/Min, Diabetes Mellitus Type Ii, Uncontrolled ([facility]), Morbid Obesity ([facility]), Venous Stasis Dermatitis Of Both Lower Extremities, Obesity Hypoventilation Syndrome ([facility]), Type 2 Diabetes Mellitus With Diabetic Neuropathy ([facility]), Cva, Old, Aphasia, Subacute Osteomyelitis Of Right Foot (Hcc), Bean (Acute Kidney Injury) ([facility]) Follow-up Information Follow up With Details Comments Contact Info Jono Peng MD In 1 week  [address] 
[phone] [facility] [phone] Discharge Orders None A check reilly indicates which time of day the medication should be taken. My Medications START taking these medications Instructions Each Dose to Equal  
 Morning Noon Evening Bedtime  
 clotrimazole 1 % topical cream  
Commonly known as:  Genevive Bickers Your last dose was: Your next dose is:    
   
   
 Apply  to affected area two (2) times a day. insulin lispro 100 unit/mL injection Commonly known as:  HUMALOG Replaces:  insulin lispro 100 unit/mL kwikpen Your last dose was: Your next dose is: For BS 3x/day before meals  140-199=2 units           200-249=3 units 250-299=5 units 300-349=7 units 350 or greater = Call MD  
     
   
   
   
  
 pantoprazole 40 mg tablet Commonly known as:  PROTONIX Your last dose was: Your next dose is: Take 1 Tab by mouth daily. 40 mg  
    
   
   
   
  
 zinc oxide-cod liver oil 40 % paste Commonly known as:  Irma Justice Your last dose was: Your next dose is:    
   
   
 Apply  to affected area as needed for Skin Irritation. CHANGE how you take these medications Instructions Each Dose to Equal  
 Morning Noon Evening Bedtime  
 allopurinol 300 mg tablet Commonly known as:  Grady Brown What changed:  See the new instructions. Your last dose was: Your next dose is: Take 0.5 Tabs by mouth daily. 150 mg  
    
   
   
   
  
 ammonium lactate 12 % lotion Commonly known as:  LAC-HYDRIN What changed:  when to take this Your last dose was: Your next dose is:    
   
   
 Apply 1 Each to affected area daily. rub in to affected area well 1 Each  
    
   
   
   
  
 atenolol 50 mg tablet Commonly known as:  TENORMIN What changed:  Another medication with the same name was removed. Continue taking this medication, and follow the directions you see here. Your last dose was: Your next dose is: Take 50 mg by mouth daily. 50 mg  
    
   
   
   
  
 bumetanide 2 mg tablet Commonly known as:  Donata Sarita What changed:  how much to take Your last dose was: Your next dose is: Take 1 Tab by mouth daily. 2 mg  
    
   
   
   
  
 colestipol 1 gram tablet Commonly known as:  COLESTID What changed:  Another medication with the same name was removed. Continue taking this medication, and follow the directions you see here. Your last dose was: Your next dose is: Take 1 g by mouth daily. 1 g  
    
   
   
   
  
 hydrALAZINE 50 mg tablet Commonly known as:  APRESOLINE What changed:  See the new instructions. Your last dose was: Your next dose is: Take 0.5 Tabs by mouth three (3) times daily. 25 mg  
    
   
   
   
  
 LEVEMIR FLEXTOUCH U-100 INSULN 100 unit/mL (3 mL) Inpn Generic drug:  insulin detemir U-100 What changed:   
- how much to take - Another medication with the same name was removed. Continue taking this medication, and follow the directions you see here. Your last dose was: Your next dose is:    
   
   
 20 Units by SubCUTAneous route two (2) times a day. 20 Units * nystatin topical cream  
Commonly known as:  MYCOSTATIN What changed:  Another medication with the same name was added. Make sure you understand how and when to take each. Your last dose was: Your next dose is:    
   
   
 Apply  to affected area two (2) times a day. Use for groin rash * nystatin powder Commonly known as:  MYCOSTATIN What changed: You were already taking a medication with the same name, and this prescription was added. Make sure you understand how and when to take each. Your last dose was: Your next dose is:    
   
   
 Apply  to affected area two (2) times a day. TYLENOL EXTRA STRENGTH 500 mg tablet Generic drug:  acetaminophen What changed:  Another medication with the same name was removed. Continue taking this medication, and follow the directions you see here. Your last dose was: Your next dose is: Take 1,000 mg by mouth every six (6) hours as needed for Pain. 1000 mg * Notice:   This list has 2 medication(s) that are the same as other medications prescribed for you. Read the directions carefully, and ask your doctor or other care provider to review them with you. CONTINUE taking these medications Instructions Each Dose to Equal  
 Morning Noon Evening Bedtime  
 aspirin-dipyridamole  mg per SR capsule Commonly known as:  AGGRENOX Your last dose was: Your next dose is: TAKE 1 CAPSULE BY MOUTH TWICE DAILY( EVERY TWELVE HOURS)  
     
   
   
   
  
 atorvastatin 40 mg tablet Commonly known as:  LIPITOR Your last dose was: Your next dose is: TAKE 1 TABLET BY MOUTH EVERY DAY Bifidobacterium Infantis 4 mg Cap Your last dose was: Your next dose is: Take 4 mg by mouth daily. 4 mg Cholecalciferol (Vitamin D3) 2,000 unit Cap capsule Commonly known as:  VITAMIN D3 Your last dose was: Your next dose is: Take 1 Cap by mouth daily. 1 Cap  
    
   
   
   
  
 dilTIAZem 360 mg ER capsule Commonly known as:  [facility] Your last dose was: Your next dose is: TAKE 1 CAPSULE BY MOUTH EVERY DAY  
     
   
   
   
  
 LYRICA 75 mg capsule Generic drug:  pregabalin Your last dose was: Your next dose is: TAKE ONE CAPSULE BY MOUTH DAILY AT LUNCH TIME AND AGAIN AT BEDTIME. SLOW  mg (45 mg iron) ER tablet Generic drug:  ferrous sulfate Your last dose was: Your next dose is: Take 142 mg by mouth Daily (before breakfast). 142 mg  
    
   
   
   
  
  
STOP taking these medications   
 chlorhexidine 4 % liquid Commonly known as:  HIBICLENS  
   
  
 COQ-10 PO  
   
  
 glipiZIDE 10 mg tablet Commonly known as:  GLUCOTROL  
   
  
 insulin lispro 100 unit/mL kwikpen Commonly known as:  HUMALOG Replaced by:  insulin lispro 100 unit/mL injection Where to Get Your Medications These medications were sent to [address] [facility], [facility]  [address]  [address] [facility], [address] [place] [id number] Phone:  [phone]  
  nystatin powder  
 pantoprazole 40 mg tablet Discharge Instructions [address] [address] 
[phone] Patient Discharge Instructions Alphonso Phoenix / [id number] : 1955 Admitted 3/5/2018 Discharged: 3/22/2018 Active Problems: 
  Chronic diastolic CHF (congestive heart failure), NYHA class 3 ([address] (2017) Obesity hypoventilation syndrome ([address] (2018) Sepsis ([address] (3/5/2018) GIRISH (acute kidney injury) ([address] (3/16/2018) Diabetes mellitus type II, uncontrolled ([address] (2013) Diabetic gastroparesis ([address] () Anemia (2017) Ulcer of right fifth toe due to diabetes mellitus ([address] (2017) Hypertension (2017) Bilateral edema of lower extremity (2017) (HFpEF) heart failure with preserved ejection fraction ([address] (10/31/2017) Venous stasis dermatitis of both lower extremities (2017) CVA, old, aphasia (3/9/2018) Subacute osteomyelitis of right foot ([address] (3/12/2018) Morbid obesity ([address] (2011) CKD (chronic kidney disease) stage 3, GFR 30-59 ml/min () Fatty liver (2017) Aortic stenosis, mild (2017) BMI 45.0-49.9, adult ([address] (2017) Type 2 diabetes mellitus with diabetic neuropathy ([address] (2018) Allergies Allergen Reactions  Zestril [Lisinopril] Swelling · It is important that you take the medication exactly as they are prescribed. · Do not take other medications without consulting your doctor. What to do at Next Level of Care Disposition:  Home Recommended diet: Diabetic Diet Recommended activity: Activity as tolerated, HH PT/OT/ST Wound Care:  Wound Care R foot: Loosly pack wound right foot with saline moistened Nu Gauze 1/2 inch strip, apply Hydrogel to outer wounds, cover with 4x4s and wrap with gauze rolls. Change every 12 hours. Lab:  none Other:  Check BS's before meals Oxygen:  2-3 lpm nasal cannula Information obtained by : 
I understand that if any problems occur once I am at home I am to contact my physician. I understand and acknowledge receipt of the instructions indicated above. Physician's or R.N.'s Signature                                                                  Date/Time Patient or Representative Signature                                                          Date/Time Introducing [facility]! Rosalva Her introduces [employer] patient portal. Now you can access parts of your medical record, email your doctor's office, and request medication refills online. 1. In your internet browser, go to [email or web address] 2. Click on the First Time User? Click Here link in the Sign In box. You will see the New Member Sign Up page. 3. Enter your [employer] Access Code exactly as it appears below. You will not need to use this code after youve completed the sign-up process. If you do not sign up before the expiration date, you must request a new code. · [employer] Access Code: [email or web address] Expires: 5/3/2018 12:16 PM 
 
4. Enter the last four digits of your Social Security Number (xxxx) and Date of Birth (mm/dd/yyyy) as indicated and click Submit. You will be taken to the next sign-up page. 5. Create a OrderWithMe ID. This will be your [employer] login ID and cannot be changed, so think of one that is secure and easy to remember. 6. Create a [employer] password. You can change your password at any time. 7. Enter your Password Reset Question and Answer. This can be used at a later time if you forget your password. 8. Enter your e-mail address. You will receive e-mail notification when new information is available in [address] 9. Click Sign Up. You can now view and download portions of your medical record. 10. Click the Download Summary menu link to download a portable copy of your medical information. If you have questions, please visit the Frequently Asked Questions section of the [employer] website. Remember, [employer]Me is NOT to be used for urgent needs. For medical emergencies, dial 911. Now available from your iPhone and Android! Providers Seen During Your Hospitalization Provider Specialty Primary office phone Burke Hale. Benton Giron MD Emergency Medicine [phone] Moreno Johnson MD Internal Medicine [phone] Your Primary Care Physician (PCP) Primary Care Physician Office Phone Office Fax Deborah Painterce [phone] You are allergic to the following Allergen Reactions Zestril (Lisinopril) Swelling Recent Documentation Height Weight BMI OB Status Smoking Status 1.524 m 137 kg 59 kg/m2 Postmenopausal Never Smoker Emergency Contacts Name Discharge Info Relation Home Work Mobile ArmasGrady DISCHARGE CAREGIVER [3] Spouse [3] [phone] Cas Jacobson DISCHARGE CAREGIVER [3] Child [2]   [phone] Patient Belongings The following personal items are in your possession at time of discharge: 
  Dental Appliances: None  Visual Aid: Glasses      Home Medications: None   Jewelry: Sent home  Clothing: At bedside    Other Valuables: None Please provide this summary of care documentation to your next provider. Signatures-by signing, you are acknowledging that this After Visit Summary has been reviewed with you and you have received a copy. Patient Signature:  ____________________________________________________________ Date:  ____________________________________________________________  
  
Larey Clutter Provider Signature:  ____________________________________________________________ Date:  ____________________________________________________________

## 2018-03-05 NOTE — ED NOTES
Assumed care of pt from Deja Nolen RN at bedside with verbal report consisting of Situation, Background, Assessment, and Recommendations (SBAR). Pt is A&O x 4. Pt just returned from CT. Pt resting comfortably on the stretcher in a position of comfort. Call bell within reach. Side rails x 2. Cardiac monitor x 3. Stretcher locked in the lowest position. Concerns and questions addressed at this time. Pt in no acute distress at this the time. Will continue to monitor.

## 2018-03-05 NOTE — H&P
Hospitalist Admission Note    NAME: Franky Will   :  1955   MRN:  [id number]     Date/Time:  3/5/2018 6:23 PM    Patient PCP: Ilana Gates MD  ________________________________________________________________________    My assessment of this patient's clinical condition and my plan of care is as follows. Assessment / Plan:  Sepsis, altered mental status  -febrile with tachypnea  -suspect infection however unclear source, no white count, chest imaging without infiltrate, unable to get urine sample due to anatomy. XR of foot neg for osteomyelitis, no acute changes on skin to suggest cellulitis  -will continue levaquin empirically  -follow blood cultures  -send urine culture when patient able to urinate  -check flu test  -if work up negative may consider MRI of diabetic foot ulcer to look for deeper infection    Acute hypoxic respiratory failure  -RR 28, O2 86 on room air  -unclear etiology, CT chest without infiltrate or significant edema, V/Q scan negative  -consider bronchitis  -will start nebs  -wean O2 as tolerated    CKD3  -stable    DM2  -continue insulin  -hold glipizide    Chronic diastolic heart failure  -will continue bumex    Obesity  Body mass index is 54.51 kg/(m^2). Code Status: Full  Surrogate Decision Maker:     DVT Prophylaxis: lovenox  Baseline: does not ambulate, lives with         Subjective:   CHIEF COMPLAINT:   Chief Complaint   Patient presents with    Shortness of Breath     arrived via EMS for c/o shortness of breath worsening from yesterday. HISTORY OF PRESENT ILLNESS:     Hipolito Carmichael is a 60 y.o.  female with history of heart failure, CKD, DM2, obesity, who presents with altered mental status this morning. Patient has not been answering questions appropriately and feeling confused. Also noted some SOB but not significantly. Denies any recent illness, no URI symptoms, no cough, no wheezing.  Has been eating less for months now but diet has not changed acutely. No sick contacts. Her chronic leg erythema is not worse from baseline, her right foot ulcer does hurt but not more than normal.     We were asked to admit for work up and evaluation of the above problems.      Past Medical History:   Diagnosis Date    Acute combined systolic and diastolic heart failure, NYHA class 2 ([address] 2017    Anemia 2017    Aortic stenosis, mild 2017    Bilateral edema of lower extremity 2017    BMI 45.0-49.9, adult ([facility]) 2017    Chronic diastolic CHF (congestive heart failure), NYHA class 3 ([address] 2017    CKD (chronic kidney disease) stage 2, GFR 60-89 ml/min     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 2 diabetes mellitus ([address] 2017    Diabetes ([address]     Fatty liver 2017    Gastroparesis     GERD (gastroesophageal reflux disease)     Gout     HTN (hypertension)     Hyperlipemia     Hyperparathyroidism ([address]     Hypertension 2017    Insomnia 2017    Low back pain 2017    N&V (nausea and vomiting) 2017    Nausea & vomiting     Obesity     Pelvic joint pain 2017    Peripheral neuropathy 2017    Sciatica     right    Severe right groin pain 2017    Sinus tachycardia 2017    Stage 2 chronic kidney disease due to benign hypertension 2017    Stasis dermatitis 2017    Stroke ([address] 2001    right parietal, left caudate    Thyroid disease     para thyroid(high calcium)    Ulcer of right fifth toe due to diabetes mellitus ([address] 2017        Past Surgical History:   Procedure Laterality Date    CHEST SURGERY PROCEDURE UNLISTED  10/24/13    PARATHYROID EXPLORATION     DELIVERY       HX  SECTION      HX CHOLECYSTECTOMY      HX OTHER SURGICAL      I&D right thigh abscess       Social History   Substance Use Topics    Smoking status: Never Smoker    Smokeless tobacco: Never Used    Alcohol use Yes      Comment: rare        Family History   Problem Relation Age of Onset    Cancer Mother      cervical    Diabetes Mother     Hypertension Mother     Stroke Mother 79    Diabetes Father     Hypertension Father     Kidney Disease Father     Diabetes Brother     Hypertension Brother     Stroke Brother 60     Allergies   Allergen Reactions    Zestril [Lisinopril] Swelling        Prior to Admission medications    Medication Sig Start Date End Date Taking? Authorizing Provider   acetaminophen (TYLENOL EXTRA STRENGTH) 500 mg tablet Take 1,000 mg by mouth every six (6) hours as needed for Pain. Yes Historical Provider   atenolol (TENORMIN) 50 mg tablet Take 50 mg by mouth daily. Yes Historical Provider   chlorhexidine (HIBICLENS) 4 % liquid Apply  to affected area every other day. Yes Historical Provider   colestipol (COLESTID) 1 gram tablet Take 1 g by mouth daily. Yes Historical Provider   glipiZIDE (GLUCOTROL) 10 mg tablet Take 10 mg by mouth two (2) times a day. Yes Historical Provider   insulin detemir U-100 (LEVEMIR FLEXTOUCH U-100 INSULN) 100 unit/mL (3 mL) inpn 50 Units by SubCUTAneous route two (2) times a day. Yes Historical Provider   ammonium lactate (LAC-HYDRIN) 12 % lotion Apply 1 Each to affected area every other day. rub in to affected area well 11/2/17  Yes Ilana Gates MD   Bifidobacterium Infantis 4 mg cap Take 4 mg by mouth daily. Yes Historical Provider   nystatin (MYCOSTATIN) topical cream Apply  to affected area two (2) times a day. Use for groin rash 2/19/18  Yes Ilana Gates MD   [facility]) 2 mg tablet Take 3 mg by mouth daily. Yes Ilana Gates MD   UBIDECARENONE (COQ-10 PO) Take 1 Tab by mouth daily.    Yes Historical Provider   allopurinol (ZYLOPRIM) 300 mg tablet TAKE 1 TABLET BY MOUTH EVERY DAY 2/10/18  Yes Ilana Gates MD   dilTIAZem [place] [facility]) 360 mg ER capsule TAKE 1 CAPSULE BY MOUTH EVERY DAY 2/3/18  Yes Ilana Gates MD   atorvastatin (LIPITOR) 40 mg tablet TAKE 1 TABLET BY MOUTH EVERY DAY 1/28/18  Yes Gabrielle Mackenzie MD   hydrALAZINE (APRESOLINE) 50 mg tablet TAKE 1 TABLET BY MOUTH THREE TIMES DAILY 1/8/18  Yes Liliya Loza II, MD   LYRICA 75 mg capsule TAKE ONE CAPSULE BY MOUTH DAILY AT LUNCH TIME AND AGAIN AT BEDTIME. 12/26/17  Yes Gabrielle Mackenzie MD   insulin lispro (HUMALOG) 100 unit/mL kwikpen 10 Units by SubCUTAneous route three (3) times daily (with meals). 11/2/17  Yes Gabrielle Mackenzie MD   ferrous sulfate (SLOW FE) 142 mg (45 mg iron) ER tablet Take 142 mg by mouth Daily (before breakfast). Yes Historical Provider   aspirin-dipyridamole (AGGRENOX)  mg per SR capsule TAKE 1 CAPSULE BY MOUTH TWICE DAILY( EVERY TWELVE HOURS) 9/4/17  Yes Gabrielle Mackenzie MD   Cholecalciferol, Vitamin D3, 2,000 unit cap Take 1 Cap by mouth daily.    Yes Historical Provider       REVIEW OF SYSTEMS:       Total of 12 systems reviewed as follows:       POSITIVE= underlined text  Negative = text not underlined  General:  fever, chills, sweats, generalized weakness, weight loss/gain,      loss of appetite   Eyes:    blurred vision, eye pain, loss of vision, double vision  ENT:    rhinorrhea, pharyngitis   Respiratory:   cough, sputum production, SOB, PIPER, wheezing, pleuritic pain   Cardiology:   chest pain, palpitations, orthopnea, PND, edema, syncope   Gastrointestinal:  abdominal pain , N/V, diarrhea, dysphagia, constipation, bleeding   Genitourinary:  frequency, urgency, dysuria, hematuria, incontinence   Muskuloskeletal :  arthralgia, myalgia, back pain  Hematology:  easy bruising, nose or gum bleeding, lymphadenopathy   Dermatological: rash, ulceration, pruritis, color change / jaundice  Endocrine:   hot flashes or polydipsia   Neurological:  headache, dizziness, confusion, focal weakness, paresthesia,     Speech difficulties, memory loss, gait difficulty  Psychological: Feelings of anxiety, depression, agitation    Objective:   VITALS:    Patient Vitals for the past 12 hrs:   Temp Pulse Resp BP SpO2   03/05/18 1730 - 68 21 168/77 95 %   03/05/18 1700 - 72 26 168/71 96 %   03/05/18 1658 - 72 21 180/72 95 %   03/05/18 1630 - 73 23 168/74 95 %   03/05/18 1600 - 72 23 174/74 93 %   03/05/18 1542 - 73 23 179/73 92 %   03/05/18 1430 - 76 20 157/71 95 %   03/05/18 1415 - 75 27 169/72 95 %   03/05/18 1345 - 77 28 173/78 96 %   03/05/18 1315 - 78 23 174/70 97 %   03/05/18 1306 - 79 28 183/72 95 %   03/05/18 1256 - 81 22 - 96 %   03/05/18 1225 - 82 27 - 95 %   03/05/18 1147 (!) 101.9 °F (38.8 °C) - - - -   03/05/18 1134 99.8 °F (37.7 °C) 81 - - (!) 86 %   03/05/18 1131 - 82 15 196/71 100 %   03/05/18 1130 - - 25 196/71 98 %         PHYSICAL EXAM:    General:    Alert, cooperative, no distress, appears stated age. HEENT: Atraumatic, anicteric sclerae, pink conjunctivae     No oral ulcers, oral mucosa moist, throat clear, dentition fair  Neck:  Supple, symmetrical  Lungs:   Decreased breath sounds bilaterally, do not appreciate wheezes or rales  Chest wall:  No tenderness, no accessory muscle use. Heart:   Regular rhythm, no murmur, no edema  Abdomen:   Soft, non-tender, not distended, bowel sounds normal, very mild erythema under pannus not warm or tender  Extremities: No cyanosis, no clubbing, warm, well perfused  Skin:     Chronic erythema, of bilateral extremities, not warm, not tender, chronic diabetic ulcer  Psych:  Good insight, not depressed, not anxious or agitated.   Neurologic: EOMs intact, face symmetric, no aphasia or slurred speech, mild confusion, limited strength generally but no focal weakness    _______________________________________________________________________  Care Plan discussed with:    Comments   Patient x    Family  x    RN x    Care Manager                    Consultant:      _______________________________________________________________________  Expected  Disposition:   Home with Family    HH/PT/OT/RN x   SNF/LTC    SAMIR ________________________________________________________________________  TOTAL TIME:  60 Minutes    Critical Care Provided     Minutes non procedure based      Comments     Reviewed previous records   >50% of visit spent in counseling and coordination of care  Discussion with patient and/or family and questions answered       ________________________________________________________________________  Usman Richey MD    Procedures: see electronic medical records for all procedures/Xrays and details which were not copied into this note but were reviewed prior to creation of Plan. LAB DATA REVIEWED:    Recent Results (from the past 24 hour(s))   CBC WITH AUTOMATED DIFF    Collection Time: 03/05/18 12:14 PM   Result Value Ref Range    WBC 7.3 3.6 - 11.0 K/uL    RBC 3.43 (L) 3.80 - 5.20 M/uL    HGB 10.1 (L) 11.5 - 16.0 g/dL    HCT 33.1 (L) 35.0 - 47.0 %    MCV 96.5 80.0 - 99.0 FL    MCH 29.4 26.0 - 34.0 PG    MCHC 30.5 30.0 - 36.5 g/dL    RDW 17.1 (H) 11.5 - 14.5 %    PLATELET 018 404 - 915 K/uL    MPV 11.2 8.9 - 12.9 FL    NRBC 0.0 0  WBC    ABSOLUTE NRBC 0.00 0.00 - 0.01 K/uL    NEUTROPHILS 86 (H) 32 - 75 %    LYMPHOCYTES 6 (L) 12 - 49 %    MONOCYTES 6 5 - 13 %    EOSINOPHILS 1 0 - 7 %    BASOPHILS 1 0 - 1 %    IMMATURE GRANULOCYTES 0 0.0 - 0.5 %    ABS. NEUTROPHILS 6.3 1.8 - 8.0 K/UL    ABS. LYMPHOCYTES 0.4 (L) 0.8 - 3.5 K/UL    ABS. MONOCYTES 0.4 0.0 - 1.0 K/UL    ABS. EOSINOPHILS 0.1 0.0 - 0.4 K/UL    ABS. BASOPHILS 0.1 0.0 - 0.1 K/UL    ABS. IMM.  GRANS. 0.0 0.00 - 0.04 K/UL    DF SMEAR SCANNED      RBC COMMENTS ANISOCYTOSIS  1+       METABOLIC PANEL, COMPREHENSIVE    Collection Time: 03/05/18 12:14 PM   Result Value Ref Range    Sodium 145 136 - 145 mmol/L    Potassium 4.8 3.5 - 5.1 mmol/L    Chloride 113 (H) 97 - 108 mmol/L    CO2 26 21 - 32 mmol/L    Anion gap 6 5 - 15 mmol/L    Glucose 152 (H) 65 - 100 mg/dL    BUN 24 (H) 6 - 20 MG/DL    Creatinine 1.26 (H) 0.55 - 1.02 MG/DL    BUN/Creatinine ratio 19 12 - 20      GFR est AA 52 (L) >60 ml/min/1.73m2    GFR est non-AA 43 (L) >60 ml/min/1.73m2    Calcium 8.4 (L) 8.5 - 10.1 MG/DL    Bilirubin, total 0.5 0.2 - 1.0 MG/DL    ALT (SGPT) 20 12 - 78 U/L    AST (SGOT) 23 15 - 37 U/L    Alk.  phosphatase 111 45 - 117 U/L    Protein, total 6.7 6.4 - 8.2 g/dL    Albumin 2.5 (L) 3.5 - 5.0 g/dL    Globulin 4.2 (H) 2.0 - 4.0 g/dL    A-G Ratio 0.6 (L) 1.1 - 2.2     CK W/ REFLX CKMB    Collection Time: 03/05/18 12:14 PM   Result Value Ref Range    CK 76 26 - 192 U/L   TROPONIN I    Collection Time: 03/05/18 12:14 PM   Result Value Ref Range    Troponin-I, Qt. 0.04 <0.05 ng/mL   D DIMER    Collection Time: 03/05/18 12:14 PM   Result Value Ref Range    D-dimer 2.20 (H) 0.00 - 0.65 mg/L FEU   NT-PRO BNP    Collection Time: 03/05/18 12:14 PM   Result Value Ref Range    NT pro-BNP 3544 (H) 0 - 125 PG/ML   LACTIC ACID    Collection Time: 03/05/18 12:15 PM   Result Value Ref Range    Lactic acid 0.8 0.4 - 2.0 MMOL/L   BLOOD GAS, ARTERIAL    Collection Time: 03/05/18  1:10 PM   Result Value Ref Range    pH 7.42 7.35 - 7.45      PCO2 36 35.0 - 45.0 mmHg    PO2 77 (L) 80 - 100 mmHg    O2 SAT 96 92 - 97 %    BICARBONATE 23 22 - 26 mmol/L    BASE DEFICIT 1.2 mmol/L    O2 METHOD NASAL O2      O2 FLOW RATE 4.00 L/min    SPONTANEOUS RATE 21.0      Sample source ARTERIAL      SITE LEFT RADIAL      LILLIE'S TEST YES

## 2018-03-05 NOTE — ED NOTES
Pt refusing 2nd attempt to straight cath. Checked placement of pure wick and will continue to monitor to obtain urine sample.

## 2018-03-05 NOTE — IP AVS SNAPSHOT
[address] 
[phone] Patient: Yana Burnette MRN: [id number] QVC:[id number] A check reilly indicates which time of day the medication should be taken. My Medications START taking these medications Instructions Each Dose to Equal  
 Morning Noon Evening Bedtime  
 clotrimazole 1 % topical cream  
Commonly known as:  Roxana King Your last dose was: Your next dose is:    
   
   
 Apply  to affected area two (2) times a day. insulin lispro 100 unit/mL injection Commonly known as:  HUMALOG Replaces:  insulin lispro 100 unit/mL kwikpen Your last dose was: Your next dose is: For BS 3x/day before meals  140-199=2 units           200-249=3 units 250-299=5 units 300-349=7 units 350 or greater = Call MD  
     
   
   
   
  
 pantoprazole 40 mg tablet Commonly known as:  PROTONIX Your last dose was: Your next dose is: Take 1 Tab by mouth daily. 40 mg  
    
   
   
   
  
 zinc oxide-cod liver oil 40 % paste Commonly known as:  Janell Rojas Your last dose was: Your next dose is:    
   
   
 Apply  to affected area as needed for Skin Irritation. CHANGE how you take these medications Instructions Each Dose to Equal  
 Morning Noon Evening Bedtime  
 allopurinol 300 mg tablet Commonly known as:  Kylee Clark What changed:  See the new instructions. Your last dose was: Your next dose is: Take 0.5 Tabs by mouth daily. 150 mg  
    
   
   
   
  
 ammonium lactate 12 % lotion Commonly known as:  LAC-HYDRIN What changed:  when to take this Your last dose was: Your next dose is:    
   
   
 Apply 1 Each to affected area daily. rub in to affected area well 1 Each  
    
   
   
   
  
 atenolol 50 mg tablet Commonly known as:  TENORMIN What changed:  Another medication with the same name was removed. Continue taking this medication, and follow the directions you see here. Your last dose was: Your next dose is: Take 50 mg by mouth daily. 50 mg  
    
   
   
   
  
 bumetanide 2 mg tablet Commonly known as:  Aaron Perry What changed:  how much to take Your last dose was: Your next dose is: Take 1 Tab by mouth daily. 2 mg  
    
   
   
   
  
 colestipol 1 gram tablet Commonly known as:  COLESTID What changed:  Another medication with the same name was removed. Continue taking this medication, and follow the directions you see here. Your last dose was: Your next dose is: Take 1 g by mouth daily. 1 g  
    
   
   
   
  
 hydrALAZINE 50 mg tablet Commonly known as:  APRESOLINE What changed:  See the new instructions. Your last dose was: Your next dose is: Take 0.5 Tabs by mouth three (3) times daily. 25 mg  
    
   
   
   
  
 LEVEMIR FLEXTOUCH U-100 INSULN 100 unit/mL (3 mL) Inpn Generic drug:  insulin detemir U-100 What changed:   
- how much to take - Another medication with the same name was removed. Continue taking this medication, and follow the directions you see here. Your last dose was: Your next dose is:    
   
   
 20 Units by SubCUTAneous route two (2) times a day. 20 Units * nystatin topical cream  
Commonly known as:  MYCOSTATIN What changed:  Another medication with the same name was added. Make sure you understand how and when to take each. Your last dose was: Your next dose is:    
   
   
 Apply  to affected area two (2) times a day. Use for groin rash * nystatin powder Commonly known as:  MYCOSTATIN What changed:   You were already taking a medication with the same name, and this prescription was added. Make sure you understand how and when to take each. Your last dose was: Your next dose is:    
   
   
 Apply  to affected area two (2) times a day. TYLENOL EXTRA STRENGTH 500 mg tablet Generic drug:  acetaminophen What changed:  Another medication with the same name was removed. Continue taking this medication, and follow the directions you see here. Your last dose was: Your next dose is: Take 1,000 mg by mouth every six (6) hours as needed for Pain. 1000 mg * Notice: This list has 2 medication(s) that are the same as other medications prescribed for you. Read the directions carefully, and ask your doctor or other care provider to review them with you. CONTINUE taking these medications Instructions Each Dose to Equal  
 Morning Noon Evening Bedtime  
 aspirin-dipyridamole  mg per SR capsule Commonly known as:  AGGRENOX Your last dose was: Your next dose is: TAKE 1 CAPSULE BY MOUTH TWICE DAILY( EVERY TWELVE HOURS)  
     
   
   
   
  
 atorvastatin 40 mg tablet Commonly known as:  LIPITOR Your last dose was: Your next dose is: TAKE 1 TABLET BY MOUTH EVERY DAY Bifidobacterium Infantis 4 mg Cap Your last dose was: Your next dose is: Take 4 mg by mouth daily. 4 mg Cholecalciferol (Vitamin D3) 2,000 unit Cap capsule Commonly known as:  VITAMIN D3 Your last dose was: Your next dose is: Take 1 Cap by mouth daily. 1 Cap  
    
   
   
   
  
 dilTIAZem 360 mg ER capsule Commonly known as:  [facility] Your last dose was: Your next dose is: TAKE 1 CAPSULE BY MOUTH EVERY DAY  
     
   
   
   
  
 LYRICA 75 mg capsule Generic drug:  pregabalin Your last dose was: Your next dose is: TAKE ONE CAPSULE BY MOUTH DAILY AT LUNCH TIME AND AGAIN AT BEDTIME. SLOW  mg (45 mg iron) ER tablet Generic drug:  ferrous sulfate Your last dose was: Your next dose is: Take 142 mg by mouth Daily (before breakfast). 142 mg  
    
   
   
   
  
  
STOP taking these medications   
 chlorhexidine 4 % liquid Commonly known as:  HIBICLENS  
   
  
 COQ-10 PO  
   
  
 glipiZIDE 10 mg tablet Commonly known as:  GLUCOTROL  
   
  
 insulin lispro 100 unit/mL kwikpen Commonly known as:  HUMALOG Replaced by:  insulin lispro 100 unit/mL injection Where to Get Your Medications These medications were sent to [address] [place] [address], [address] [place] [address] [address]  [address], [address] [address] Phone:  [phone]  
  nystatin powder  
 pantoprazole 40 mg tablet

## 2018-03-05 NOTE — ED NOTES
Pt's O2 sats had decreased to 86% on 2L via NC. Titrated O2 up to 4L and repositioned NC. Pt's sats sitting at 94%.

## 2018-03-05 NOTE — ED PROVIDER NOTES
EMERGENCY DEPARTMENT HISTORY AND PHYSICAL EXAM      Date: 3/5/2018  Patient Name: Sonja Freire    History of Presenting Illness     Chief Complaint   Patient presents with    Shortness of Breath     arrived via EMS for c/o shortness of breath worsening from yesterday. History Provided By: Patient's , patient    HPI: Sonja Freire, 60 y.o. female with PMHx significant for CKD, DM, HTN, peripheral neuropathy, gastroparesis, HLD, presents via EMS to the ED with cc of SOB this morning worsening from yesterday. Pt reports an episode of moderate CP this morning. Pt's spouse reports pt was more confused this morning than usual.  He also reports pt had nausea yesterday morning after breakfast and did not eat anything else all day. Pt's spouse states pt has trouble choosing the right word at baseline but that he is usually able to understand her meaning. He states that this morning pt had more difficulty with words and that he could not understand her. He reports physical therapy came to the house this morning and found pt's O2 sats were low. He states the therapist called him and recommended taking pt to ED. He reports that pt does not wear O2 at home. Pt's spouse states that pt's blood sugar was 130 this morning. Per EMS, pt's blood sugar was slightly elevated en route but good. Pt's spouse states that wound care comes to the home to check and wrap pt's legs; he states their last visit was yesterday. Pt denies any CP now. She denies cough. Pt denies tobacco use. PCP: Jacolyn Spatz, MD    There are no other complaints, changes, or physical findings at this time. Current Facility-Administered Medications   Medication Dose Route Frequency Provider Last Rate Last Dose    sodium chloride 0.9 % bolus infusion 500 mL  500 mL IntraVENous ONCE Betito Damian MD        sodium chloride (NS) flush 5-10 mL  5-10 mL IntraVENous PRN Ruthann Hand.  Lashaun Damian MD   10 mL at 03/05/18 1302     Current Outpatient Prescriptions   Medication Sig Dispense Refill    acetaminophen (TYLENOL EXTRA STRENGTH) 500 mg tablet Take 1,000 mg by mouth every six (6) hours as needed for Pain.  atenolol (TENORMIN) 50 mg tablet Take 50 mg by mouth daily.  chlorhexidine (HIBICLENS) 4 % liquid Apply  to affected area every other day.  colestipol (COLESTID) 1 gram tablet Take 1 g by mouth daily.  glipiZIDE (GLUCOTROL) 10 mg tablet Take 10 mg by mouth two (2) times a day.  insulin detemir U-100 (LEVEMIR FLEXTOUCH U-100 INSULN) 100 unit/mL (3 mL) inpn 50 Units by SubCUTAneous route two (2) times a day.  ammonium lactate (LAC-HYDRIN) 12 % lotion Apply 1 Each to affected area every other day. rub in to affected area well      Bifidobacterium Infantis 4 mg cap Take 4 mg by mouth daily.  nystatin (MYCOSTATIN) topical cream Apply  to affected area two (2) times a day. Use for groin rash 30 g 1    bumetanide (BUMEX) 2 mg tablet Take 3 mg by mouth daily.  UBIDECARENONE (COQ-10 PO) Take 1 Tab by mouth daily.  allopurinol (ZYLOPRIM) 300 mg tablet TAKE 1 TABLET BY MOUTH EVERY DAY 90 Tab 0    dilTIAZem (TIAZAC) 360 mg ER capsule TAKE 1 CAPSULE BY MOUTH EVERY DAY 30 Cap 0    atorvastatin (LIPITOR) 40 mg tablet TAKE 1 TABLET BY MOUTH EVERY DAY 90 Tab 0    hydrALAZINE (APRESOLINE) 50 mg tablet TAKE 1 TABLET BY MOUTH THREE TIMES DAILY 100 Tab 0    LYRICA 75 mg capsule TAKE ONE CAPSULE BY MOUTH DAILY AT LUNCH TIME AND AGAIN AT BEDTIME. 180 Cap 0    insulin lispro (HUMALOG) 100 unit/mL kwikpen 10 Units by SubCUTAneous route three (3) times daily (with meals). 1 Package     ferrous sulfate (SLOW FE) 142 mg (45 mg iron) ER tablet Take 142 mg by mouth Daily (before breakfast).  aspirin-dipyridamole (AGGRENOX)  mg per SR capsule TAKE 1 CAPSULE BY MOUTH TWICE DAILY( EVERY TWELVE HOURS) 60 Cap 5    Cholecalciferol, Vitamin D3, 2,000 unit cap Take 1 Cap by mouth daily.          Past History Past Medical History:  Past Medical History:   Diagnosis Date    Acute combined systolic and diastolic heart failure, NYHA class 2 ([address] 2017    Anemia 2017    Aortic stenosis, mild 2017    Bilateral edema of lower extremity 2017    BMI 45.0-49.9, adult ([address] 2017    Chronic diastolic CHF (congestive heart failure), NYHA class 3 ([address] 2017    CKD (chronic kidney disease) stage 2, GFR 60-89 ml/min     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 2 diabetes mellitus ([address] 2017    Diabetes ([address]     Fatty liver 2017    Gastroparesis     GERD (gastroesophageal reflux disease)     Gout     HTN (hypertension)     Hyperlipemia     Hyperparathyroidism ([address]     Hypertension 2017    Insomnia 2017    Low back pain 2017    N&V (nausea and vomiting) 2017    Nausea & vomiting     Obesity     Pelvic joint pain 2017    Peripheral neuropathy 2017    Sciatica     right    Severe right groin pain 2017    Sinus tachycardia 2017    Stage 2 chronic kidney disease due to benign hypertension 2017    Stasis dermatitis 2017    Stroke ([facility] 2001    right parietal, left caudate    Thyroid disease     para thyroid(high calcium)    Ulcer of right fifth toe due to diabetes mellitus ([address] 2017       Past Surgical History:  Past Surgical History:   Procedure Laterality Date    CHEST SURGERY PROCEDURE UNLISTED  10/24/13    PARATHYROID EXPLORATION     DELIVERY       HX  SECTION      HX CHOLECYSTECTOMY      HX OTHER SURGICAL      I&D right thigh abscess       Family History:  Family History   Problem Relation Age of Onset    Cancer Mother      cervical    Diabetes Mother     Hypertension Mother     Stroke Mother 79    Diabetes Father     Hypertension Father     Kidney Disease Father     Diabetes Brother     Hypertension Brother     Stroke Brother 61       Social History:  Social History   Substance Use Topics  Smoking status: Never Smoker    Smokeless tobacco: Never Used    Alcohol use Yes      Comment: rare       Allergies: Allergies   Allergen Reactions    Zestril [Lisinopril] Swelling         Review of Systems   Review of Systems   Constitutional: Negative for chills and fever. HENT: Negative for congestion. Eyes: Negative for visual disturbance. Respiratory: Positive for shortness of breath. Negative for cough and chest tightness. Cardiovascular: Positive for chest pain. Negative for leg swelling. Gastrointestinal: Positive for nausea. Negative for abdominal pain and vomiting. Endocrine: Negative for polyuria. Genitourinary: Negative for dysuria and frequency. Musculoskeletal: Negative for myalgias. Skin: Negative for color change. Allergic/Immunologic: Negative for immunocompromised state. Neurological: Negative for numbness. Psychiatric/Behavioral: Positive for confusion. Physical Exam   Physical Exam  Nursing note and vitals reviewed.   General appearance: malaise, appears fatigued  Eyes: PERRL, EOMI, conjunctiva normal, anicteric sclera  HEENT: mucous membranes dry, oropharynx is clear, neck supple  Pulmonary: breath sounds diminished throughout, tachypneic, slight increase work of breathing  Cardiac: normal rate and regular rhythm, no murmurs, gallops, or rubs, DP pulses palpable, 2+ radial pulses  Abdomen: soft, nontender, nondistended, bowel sounds present  MSK: bilater LE compression dressing, chronic appearing edema bilat LE,   Neuro: Alert, answers questions appropriately  Skin: capillary refill brisk, nails dystrophic, skin warm to touch, chronic stasis changes to BL LEs; hypertrophic keratosis LEs especially R foot; chronic appearing wound sole of lateral border of R foot, blister lateral R foot    Diagnostic Study Results     Labs -     Recent Results (from the past 12 hour(s))   EKG, 12 LEAD, INITIAL    Collection Time: 03/05/18 11:47 AM   Result Value Ref Range Ventricular Rate 79 BPM    Atrial Rate 79 BPM    P-R Interval 136 ms    QRS Duration 78 ms    Q-T Interval 374 ms    QTC Calculation (Bezet) 428 ms    Calculated P Axis 42 degrees    Calculated R Axis 10 degrees    Calculated T Axis 50 degrees    Diagnosis       Normal sinus rhythm  Normal ECG  When compared with ECG of 26-OCT-2017 20:18,  No significant change was found     CBC WITH AUTOMATED DIFF    Collection Time: 03/05/18 12:14 PM   Result Value Ref Range    WBC 7.3 3.6 - 11.0 K/uL    RBC 3.43 (L) 3.80 - 5.20 M/uL    HGB 10.1 (L) 11.5 - 16.0 g/dL    HCT 33.1 (L) 35.0 - 47.0 %    MCV 96.5 80.0 - 99.0 FL    MCH 29.4 26.0 - 34.0 PG    MCHC 30.5 30.0 - 36.5 g/dL    RDW 17.1 (H) 11.5 - 14.5 %    PLATELET 246 458 - 726 K/uL    MPV 11.2 8.9 - 12.9 FL    NRBC 0.0 0  WBC    ABSOLUTE NRBC 0.00 0.00 - 0.01 K/uL    NEUTROPHILS 86 (H) 32 - 75 %    LYMPHOCYTES 6 (L) 12 - 49 %    MONOCYTES 6 5 - 13 %    EOSINOPHILS 1 0 - 7 %    BASOPHILS 1 0 - 1 %    IMMATURE GRANULOCYTES 0 0.0 - 0.5 %    ABS. NEUTROPHILS 6.3 1.8 - 8.0 K/UL    ABS. LYMPHOCYTES 0.4 (L) 0.8 - 3.5 K/UL    ABS. MONOCYTES 0.4 0.0 - 1.0 K/UL    ABS. EOSINOPHILS 0.1 0.0 - 0.4 K/UL    ABS. BASOPHILS 0.1 0.0 - 0.1 K/UL    ABS. IMM. GRANS. 0.0 0.00 - 0.04 K/UL    DF SMEAR SCANNED      RBC COMMENTS ANISOCYTOSIS  1+       METABOLIC PANEL, COMPREHENSIVE    Collection Time: 03/05/18 12:14 PM   Result Value Ref Range    Sodium 145 136 - 145 mmol/L    Potassium 4.8 3.5 - 5.1 mmol/L    Chloride 113 (H) 97 - 108 mmol/L    CO2 26 21 - 32 mmol/L    Anion gap 6 5 - 15 mmol/L    Glucose 152 (H) 65 - 100 mg/dL    BUN 24 (H) 6 - 20 MG/DL    Creatinine 1.26 (H) 0.55 - 1.02 MG/DL    BUN/Creatinine ratio 19 12 - 20      GFR est AA 52 (L) >60 ml/min/1.73m2    GFR est non-AA 43 (L) >60 ml/min/1.73m2    Calcium 8.4 (L) 8.5 - 10.1 MG/DL    Bilirubin, total 0.5 0.2 - 1.0 MG/DL    ALT (SGPT) 20 12 - 78 U/L    AST (SGOT) 23 15 - 37 U/L    Alk.  phosphatase 111 45 - 117 U/L    Protein, total 6.7 6.4 - 8.2 g/dL    Albumin 2.5 (L) 3.5 - 5.0 g/dL    Globulin 4.2 (H) 2.0 - 4.0 g/dL    A-G Ratio 0.6 (L) 1.1 - 2.2     CK W/ REFLX CKMB    Collection Time: 03/05/18 12:14 PM   Result Value Ref Range    CK 76 26 - 192 U/L   TROPONIN I    Collection Time: 03/05/18 12:14 PM   Result Value Ref Range    Troponin-I, Qt. 0.04 <0.05 ng/mL   D DIMER    Collection Time: 03/05/18 12:14 PM   Result Value Ref Range    D-dimer 2.20 (H) 0.00 - 0.65 mg/L FEU   NT-PRO BNP    Collection Time: 03/05/18 12:14 PM   Result Value Ref Range    NT pro-BNP 3544 (H) 0 - 125 PG/ML   LACTIC ACID    Collection Time: 03/05/18 12:15 PM   Result Value Ref Range    Lactic acid 0.8 0.4 - 2.0 MMOL/L   BLOOD GAS, ARTERIAL    Collection Time: 03/05/18  1:10 PM   Result Value Ref Range    pH 7.42 7.35 - 7.45      PCO2 36 35.0 - 45.0 mmHg    PO2 77 (L) 80 - 100 mmHg    O2 SAT 96 92 - 97 %    BICARBONATE 23 22 - 26 mmol/L    BASE DEFICIT 1.2 mmol/L    O2 METHOD NASAL O2      O2 FLOW RATE 4.00 L/min    SPONTANEOUS RATE 21.0      Sample source ARTERIAL      SITE LEFT RADIAL      LILLIE'S TEST YES         Radiologic Studies -   CT Results  (Last 48 hours)               03/05/18 1458  CT CHEST WO CONT Final result    Impression:   impression: Small bilateral pleural reaction or effusion. No other significant   findings or changes. Narrative:  INDICATION: Dyspnea on exertion. COMPARISON: 2013       CONTRAST: None. TECHNIQUE:  5 mm axial images were obtained through the chest. Coronal and   sagittal reconstructions were generated. CT dose reduction was achieved through   use of a standardized protocol tailored for this examination and automatic   exposure control for dose modulation. The absence of intravenous contrast reduces the sensitivity for evaluation of   the mediastinum and upper abdominal organs. FINDINGS:       Slightly prominent lymph nodes in the mediastinum are stable.  This is with the   limitation of no intravenous contrast. Is a small left pleural effusion and a   small right pleural reaction. There is no shift or pneumothorax. There is no   axilla abnormalities demonstrated. There is some vascular calcification in the   heart. There is no pericardial effusion. There is no parenchymal mass. CXR Results  (Last 48 hours)               03/05/18 1315  XR CHEST PORT Final result    Impression:  IMPRESSION: No acute abnormality. Narrative:  EXAM:  XR CHEST PORT. INDICATION: Hypoxia and chest pain. COMPARISON: 10/31/2017. FINDINGS:    A portable AP radiograph of the chest was obtained at 1313 hours. The patient is   rotated to the right. Lines and tubes: The patient is on a cardiac monitor and nasal oxygen. Lungs: The lungs are clear. Pleura: There is no pneumothorax or pleural effusion. Mediastinum: The cardiac and mediastinal contours and pulmonary vascularity are   normal. The cardiac size seems smaller. Bones and soft tissues: The bones and soft tissues are grossly within normal   limits. Study Result      EXAM:  XR FOOT RT AP/LAT     INDICATION:   wound over lateral R foot, hx DM, febrile.     COMPARISON:  None.     FINDINGS:  Two views of the right foot demonstrate no fracture or other acute  osseous or articular abnormality. Prominent soft tissue swelling. No opaque  foreign body or gas in the soft tissue, small vessel calcifications suggest  diabetes. Calcaneal spurs. There is no radiological evidence to suggest  osteomyelitis. Mild DJD metatarsophalangeal joint.     IMPRESSION  IMPRESSION:  No acute bony abnormality. Study Result      Clinical History: Shortness of breath     Comparison to chest radiograph dated 3/5/2018     Ventilation: Ventilation images were obtained in the posterior projection  utilizing 10.0 mCi Xe-133 gas. There is normal tracer distribution.  No  significant tracer retention is noted.     Perfusion: Perfusion images were obtained in 4 projections utilizing 10.0 mCi  Tc-99 M MAA. There is normal tracer distribution without mismatched defect.     IMPRESSION  IMPRESSION: Normal VQ scan. Medical Decision Making   I am the first provider for this patient. I reviewed the vital signs, available nursing notes, past medical history, past surgical history, family history and social history. Vital Signs-Reviewed the patient's vital signs. Patient Vitals for the past 12 hrs:   Temp Pulse Resp BP SpO2   03/05/18 1730 - 68 21 168/77 95 %   03/05/18 1700 - 72 26 168/71 96 %   03/05/18 1658 - 72 21 180/72 95 %   03/05/18 1630 - 73 23 168/74 95 %   03/05/18 1600 - 72 23 174/74 93 %   03/05/18 1542 - 73 23 179/73 92 %   03/05/18 1430 - 76 20 157/71 95 %   03/05/18 1415 - 75 27 169/72 95 %   03/05/18 1345 - 77 28 173/78 96 %   03/05/18 1315 - 78 23 174/70 97 %   03/05/18 1306 - 79 28 183/72 95 %   03/05/18 1256 - 81 22 - 96 %   03/05/18 1225 - 82 27 - 95 %   03/05/18 1147 (!) 101.9 °F (38.8 °C) - - - -   03/05/18 1134 99.8 °F (37.7 °C) 81 - - (!) 86 %   03/05/18 1131 - 82 15 196/71 100 %   03/05/18 1130 - - 25 196/71 98 %       EKG interpretation: (Preliminary) 11:47 AM  Rhythm: normal sinus rhythm; and regular . Rate (approx.): 79; Axis: normal; OR interval: 136; QRS interval: 78; QT/QTc: 374/428; ST/T wave: no ST elevation or depression. Written by Jayesh Slater ED Scribshe, as dictated by Mihaela Ramirez MD    Records Reviewed: Old Medical Records    Provider Notes (Medical Decision Making):   DDx: PNA, UTI, PE, pleural effusion, cellulitis    ED Course:   Initial assessment performed. The patients presenting problems have been discussed, and they are in agreement with the care plan formulated and outlined with them. I have encouraged them to ask questions as they arise throughout their visit. 12:00 PM - I suspect that this patient has an active infection.     12:00 PM - The patient met criteria for severe sepsis at this time.      PROVIDER SEPSIS PHYSICAL EXAM EVAL  Vital signs reviewed (see nursing documentation for further details):  Vitals:    03/05/18 1630 03/05/18 1658 03/05/18 1700 03/05/18 1730   BP: 168/74 180/72 168/71 168/77   Pulse: 73 72 72 68   Resp: 23 21 26 21   Temp:       SpO2: 95% 95% 96% 95%       Cardiac exam:Regular Rate    Pulmonary exam:Increased work of breathing and diminished breath sounds    Peripheral pulses:normal radial    Capillary refill:Normal    Skin exam:no pallor    Exam performed byBetito Wright MD      12:30 PM  Initial delay in evaluation secondary to difficult IV access. 2:15 PM   Spouse/pt updated on findings thus far. 3:05 PM  Spouse reports pt is not to have IV contrast under any circumstances per her PMD. Renal function borderline, reviewed prior lab results, d/w Dr. Anthony Sanchez who felt pt could receive contrast to eval for PE. Given his concern over renal function, will obtain non-contrast CT chest, and check V/Q. Consult Note:  3:27 PM  Chidi Gómez MD spoke with Meseret Short MD  Specialty: Hospitalist  Discussed pt's hx, disposition, and available diagnostic and imaging results. Dr. Octavio Merlin will admit. Critical Care Time:   3:55 PM    IMPENDING DETERIORATION -Respiratory and Cardiovascular  ASSOCIATED RISK FACTORS - Hypoxia  MANAGEMENT- Bedside Assessment and Supervision of Care  INTERPRETATION -  Xrays, CT Scan, Blood Gases, ECG and Blood Pressure  INTERVENTIONS - fluids, oxygen, broad spectrum Abx  CASE REVIEW - Hospitalist, Nursing and Family  TREATMENT RESPONSE -Stable  PERFORMED BY - Self    NOTES   :  I have spent 40 minutes of critical care time involved in lab review, consultations with specialist, family decision- making, bedside attention and documentation. During this entire length of time I was immediately available to the patient . Chidi Gómez MD    Disposition:  Admit Note:  5:30 PM  Pt is being admitted by Dr. Shawna Silverman.  The results of their tests and reason(s) for their admission have been discussed with pt and/or available family. They convey agreement and understanding for the need to be admitted and for admission diagnosis. Diagnosis     Clinical Impression:   1. Hypoxia    2. Fever, unspecified fever cause    3. Chest pain, unspecified type        Attestations: This note is prepared by Tomas Sotelo, acting as a Scribe for MD Isidra Knight MD: The scribe's documentation has been prepared under my direction and personally reviewed by me in its entirety. I confirm that the notes above accurately reflects all work, treatment, procedures, and medical decision making performed by me.

## 2018-03-05 NOTE — ED NOTES
Dressings removed from bilateral lower legs. Right leg- Dry thick skin noted right calf. 3rd, 4th,5th toes brownish in color and swollen with thick drainage. Bloody drainage to great toe. Unstagable 1 inch wound to base of 5th toe with foul discharge. Intact blister noted to arch of foot. Left leg- Dry red thick skin noted to entire lower extremity.

## 2018-03-05 NOTE — ED TRIAGE NOTES
Pt arrived via EMS for Shortness of breath and nauseas for the past day worsening this morning. Pt is confused on arrived to ED. EMS reports pt had SPO2 of 80% on room air. EMS placed pt on 6lnc with Saturation of 100%. Pt has bilateral lower extremity wounds and poor hygiene. Pt gets wound care Every other day by wound care nurse.  is sole provider for pt. Pt states mild right sided chest pain. Pt hot to touch. PT meets SIRS criteria with SPO2 85% on room air, RR 35.

## 2018-03-05 NOTE — ED NOTES
Cath urine attempted by RN without success. PurWick placed for urine sample. IV attempted also without success. Call placed for ultrasound IV,.

## 2018-03-05 NOTE — ED NOTES
Yeast collection sores noted under bilateral breasts. Redness and sores noted perineal area. Bandages sores noted to bilateral lower legs.

## 2018-03-06 NOTE — PROGRESS NOTES
Enoxaparin dose adjusted to 40 mg Q12H per protocol for BMI >40 and CrCl > 30ml/min.     Thank you,  Too Mosqueda, PHARMD

## 2018-03-06 NOTE — PROGRESS NOTES
[facility]  Preliminary Report: Ankle Brachial Index    Pressure (mmHg) Right    Left    Brachial:  142   140  Ankle PTA:  nc   nc  Ankle DPA:  165   nc  Great Toe:  102   157    Waveform:  Right   Left  CFA:       Popliteal:      PTA:   Triphasic  Triphasic  DPA:   Triphasic  Triphasic  Great Toe:  Pulsatile  Pulsatile    Right MARYJO:   1.16  Left MARYJO: nc  Right TBI: 0.72  Left TBI: 1.11    Final report to follow.     CFA = Common Femoral Artery  PTA = Posterior Tibial Artery  DPA = Dorsalis Pedis Artery  MARYJO = Ankle Brachial Index  TBI = Toe Brachial Index  NC = Non-compressible

## 2018-03-06 NOTE — ED NOTES
TRANSFER - OUT REPORT:    Verbal report given to [address] on [address]  being transferred to [id number] for routine progression of care       Report consisted of patients Situation, Background, Assessment and   Recommendations(SBAR). Information from the following report(s) SBAR, Kardex, ED Summary, [facility] and Recent Results was reviewed with the receiving nurse. Lines:   Peripheral IV 03/05/18 Left Antecubital (Active)   Site Assessment Clean, dry, & intact 3/5/2018  2:06 PM   Phlebitis Assessment 0 3/5/2018  2:06 PM   Infiltration Assessment 0 3/5/2018  2:06 PM   Dressing Status Clean, dry, & intact 3/5/2018  2:06 PM        Opportunity for questions and clarification was provided.       Patient transported with:   [employer]

## 2018-03-06 NOTE — ROUTINE PROCESS
Primary Nurse Jumana Key and Michelle Sin RN performed a dual skin assessment on this patient Impairment noted- see wound doc flow sheet  Juan score is 13.    1. Bilaterally under breasts and in groin area: moderate excoriation, pink and red in color, shiny appearance, fungal smelling odor. 2. Right gluteal cleft:  Red and pink in color- blanchable. Periwound area red with blanching. Calloused texture to wound. Rough to touch. Closed. Right below, one very small open area- MASD? Pink in color, no drainage. 3.  Sacral/coccyx area: red and pink in color. Blanchable. 4. BL lower extremities: Induration, reddened, firm to touch. 2+ pitting edema. Pt family member states \"they weep while at home\". I have not assessed any weeping to lower legs. Blanchable. 5. Heels red but blanchable. 6. Right outer foot: blister/unstageable ulcer. Quarter sized; yellow in appearance, foul odor, no drainage, shiny appearance. 7.  Bilateral feet, underneath toes: cracked, weeping, oozing. Foul odor. Yellow and green in appearance. Shiny, small amount of draingage. 8.  Three middle toes on right foot: green appearance, oozing yellowish drainage underneath with cracking.     9. Nails: LONG AND VERY THICK

## 2018-03-06 NOTE — PROGRESS NOTES
PT consult received and chart reviewed. Author familiar with patient and spouse from prior admissions. Entered room where consulted with spouse, patient (largely inattentive, not responding whatsoever to author, as author attempted to speak and spouse whenever possible) on recent functioning within the home. Per spouse, patient continues to rely heavily on his support, requiring '90% or more' physical assistance for all mobility including bed mobility (regular bed - spouse has placed several pillows/bolsters to elevate the head), transfers (squat pivot, with spouse placing his arms below patients as he declines use of a gait belt) bed<>couch<>WC<>BSC. Patient does not ambulate, having not done so 'since mid last year' (per notation in 10/17, patient was walking short distances). She has recently begun x2/wk [facility] PT, where they have started LE exercises with home exercise programming provided but spouse/patient unable to perform 2/2 lack of time. Therapy is not attempting any further activity. Spouse reports adequate care of patient, including dependence for all ADL's (she can self feed with tray of cut-up food depending 'on the day'), within the home including wound care on the weekends when [facility] wound care RN does not visit. He does leave her for 'about four hours a day during the week' as he works in the AM, reporting that she is typically without needs during this time. He declined pursuit of [facility] aide for this time frame as he reports bad experience with this in the past.        Spouse went on to speak in length on goals ahead as he is eager to take patient home. He does not want to take patient to a facility at this time 'despite the doctor'. He has recently requested to receive a hospital bed within the home, but has encountered obstacles to receipt (encouraged patient to speak to CM).  Spouse declines any other DME, including walter lift, despite author's encouragement as spouse reported desire 'for some type of device to help me' with mobility. He would [facility] for [facility] to [facility] and skilled RN for wound care needs. Author then spoke to potential to participate in caregiver training for optimization of mobility between spouse and patient; he declined this stating he is able to care for patient without concern for injury to himself. As such, no skilled acute PT needs are indicated at this time. Patient should remain on PMT ahead to promote bed mobility and transfers (walter lift) as able. Spouse notified of this and voiced understanding. Time spent: 21 minutes.      Sol Hernandez, PT, DPT, [facility]

## 2018-03-06 NOTE — PROGRESS NOTES
ADULT PROTOCOL: JET AEROSOL  REASSESSMENT    Patient  Avni Parker     60 y.o.   female     3/6/2018  4:48 PM    Breath Sounds Pre Procedure: Right Breath Sounds: Diminished                               Left Breath Sounds: Diminished    Breath Sounds Post Procedure: Right Breath Sounds: Diminished                                 Left Breath Sounds: Diminished    Breathing pattern: Pre procedure Breathing Pattern: Regular          Post procedure Breathing Pattern: Regular    Heart Rate: Pre procedure Pulse: 54           Post procedure Pulse: 58    Resp Rate: Pre procedure Respirations: 16           Post procedure Respirations: 16      Cough: Pre procedure Cough: Non-productive               Post procedure Cough: Non-productive    Oxygen: O2 Device: Nasal cannula   Flow rate (L/min) 4 lpm     Changed: NO    SpO2: Pre procedure SpO2: 92 %   with oxygen              Post procedure SpO2: 94 %  with oxygen    Nebulizer Therapy: Current medications Aerosolized Medications: DuoNeb      Changed: NO    Smoking History: never    Problem List:   Patient Active Problem List   Diagnosis Code    Diabetes ([facility] E11.9    Morbid obesity ([facility] E66.01    Right leg weakness R29.898    Diabetes mellitus type II, uncontrolled ([facility] E11.65    CKD (chronic kidney disease) stage 3, GFR 30-59 ml/min N18.3    Hyperlipemia E78.5    Diabetic gastroparesis (HCC) E11.43, K31.84    Diabetic polyneuropathy ([facility]) E11.42    Stroke ([facility]) I63.9    ASCVD (arteriosclerotic cardiovascular disease) I25.10    Insomnia G47.00    Sinus tachycardia R00.0    Anemia D64.9    Ulcer of right fifth toe due to diabetes mellitus (HCC) E11.621, L97.519    Peripheral neuropathy G62.9    Chronic diastolic CHF (congestive heart failure), NYHA class 3 ([facility]) I50.32    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 2 diabetes mellitus (HCC) E11.22, N18.2    Stage 2 chronic kidney disease due to benign hypertension I12.9, N18.2    Hypertension I10    BMI 45.0-49.9, adult ([facility] Z68.42    Fatty liver K76.0    Stasis dermatitis I87.2    Low back pain M54.5    Aortic stenosis, mild I35.0    Bilateral edema of lower extremity R60.0    PE (physical exam), annual Z00.00    Gout M10.9    History of hyperparathyroidism Z86.39    GERD (gastroesophageal reflux disease) K21.9    B12 deficiency E53.8    (HFpEF) heart failure with preserved ejection fraction (HCC) I50.30    Venous stasis dermatitis of both lower extremities I87.2    Type 2 diabetes mellitus with diabetic neuropathy (HCC) E11.40    Obesity hypoventilation syndrome ([facility] E66.2    Sepsis ([facility] A41.9       Respiratory Therapist: Leanora Purple, RT

## 2018-03-06 NOTE — PROGRESS NOTES
3/5  2030-- Pt admitted to unit from ED. Pt has not take any medications today. Will clarify with MD about medications. 2100--  Pt increased respirations, wheezing, and labored breathing. Respiratory called. MD paged twice. Will await callback. 2300--  Dr. Jake Fowler notified of pt condition and meciation rec. Orders for pt to skip allopurinol, aggrenox, lipitor, colestipol, carmen q/risaquad, lyrica, and bumex tablet. Orders to administer 2mg IV bumex NOW one time, discontinue levaquin, order vancomycin per pharmacy dosing. Pt received duo neb per RT. Will continue to monitor pt.

## 2018-03-06 NOTE — ROUTINE PROCESS
Bedside shift change report given to JULIETTE Patel (oncoming nurse) by Jayla Cardozo (offgoing nurse). Report included the following information SBAR, Kardex, Intake/Output, MAR, Accordion, Recent Results and Med Rec Status.

## 2018-03-06 NOTE — PROGRESS NOTES
Initial Nutrition Assessment:    INTERVENTIONS/RECOMMENDATIONS:   · Meals/Snacks: General/healthful diet: CCD  · Encourage PO intake  ASSESSMENT:   Chart reviewed. Pt medically noted for sepsis, altered mental status, acute hypoxic respiratory failure, CKD3, DM, obesity and chronic diastolic heart failure. PHMx of CKD, DM, HTN, peripheral neuropathy, gastroparesis, HLD and heart failure. Talked with pt and pt's  at bedside. Pt states for the past 6mo she has been in and out of the hospital and has not been able to stand or walk. Pt's  believes this to be the contributing factor to wt gain. For the past month pt has not had an appetite.  states he does all the cooking, on a typical day she will have scrambled eggs with tomato for breakfast, soup for lunch and meat and a vegetable for dinner.  states they do not consume starches due to DM.  states about 6 years ago they did a 6 week course together on diabetes education but could use a refresher. Provided verbal and written education and provided a referral to outpatient RD.  states MD encourages wt loss for pt but states if she doesn't have an appetite its okay to skip meals. Encouraged 3 meals daily. Pt wt is via bed scale, pt is not able to stand up to get weight.  is unsure of weight for the past 6mo or so. In 2015 pt weighted 212# and  states that is about normal. No further questions or concerns at this time. Diet Order: Consistent carb  % Eaten:  No data found.     Pertinent Medications: [x]Reviewed []Other: lantus, lovenox  Pertinent Labs: [x]Reviewed []Other: Gluc 140-161, Cl 115  Food Allergies: [x]None []Other   Last BM: 3/4  []Active     []Hyperactive  []Hypoactive       [] Absent BS  Skin:    [] Intact   [] Incision  [] Breakdown  [x] Other: edema, pressure injury's, fissure's    Anthropometrics:   Height: 5' (152.4 cm) Weight: 126.7 kg (279 lb 5.2 oz)   IBW (%IBW):   ( ) UBW (%UBW):   (  %) Last Weight Metrics:  Weight Loss Metrics 3/6/2018 3/5/2018 2/18/2018 2/13/2018 2/2/2018 11/24/2017 11/9/2017   Today's Wt 279 lb 5.2 oz - 278 lb 278 lb 278 lb - -   BMI - 54.55 kg/m2 54.29 kg/m2 52.53 kg/m2 52.53 kg/m2 - -       BMI: Body mass index is 54.55 kg/(m^2). This BMI is indicative of:   []Underweight    []Normal    []Overweight    [] Obesity   [x] Extreme Obesity (BMI>40)     Estimated Nutrition Needs (Based on):   1603 Kcals/day (BMR (1743) x AF 1.2 - 500) , 101 g (.8g/kg) Protein  Carbohydrate: At Least 130 g/day  Fluids: 1600 mL/day (1ml/kcal)    NUTRITION DIAGNOSES:   Problem:  Predicted suboptimal energy intake      Etiology: related to altered mental status      Signs/Symptoms: as evidenced by Pt reports poor PO      NUTRITION INTERVENTIONS:  Meals/Snacks: General/healthful diet             Nutrition Education      GOAL:   to consume 50% of meals in 5-7 days    LEARNING NEEDS (Diet, Food/Nutrient-Drug Interaction):    [] None Identified   [x] Identified and Education Provided/Documented   [] Identified and Pt declined/was not appropriate     Cultureal, Yarsanism, OR Ethnic Dietary Needs:    [x] None Identified   [] Identified and Addressed     [x] Interdisciplinary Care Plan Reviewed/Documented    [x] Discharge Planning: To consume adequate calories and protein while keeping carbohydrates consistent in diet       MONITORING /EVALUATION:   Food/Nutrient Intake Outcomes:  Total energy intake  Physical Signs/Symptoms Outcomes: Weight/weight change, Electrolyte and renal profile, GI profile, Glucose profile    NUTRITION RISK:    [x] Patient At Nutritional Risk    [] High              [] Moderate/Mild           [x]  Low     [] Patient Not At Nutritional Risk    PT SEEN FOR:    []  MD Consult: []Calorie Count      []Diabetic Diet Education        []Diet Education     []Electrolyte Management     []General Nutrition Management and Supplements     []Management of Tube Feeding     []TPN Recommendations [x]  RN Referral:  [x]MST score >=2     []Enteral/Parenteral Nutrition PTA     []Pregnant: Gestational DM or Multigestation     []Pressure Ulcer/Wound Care needs        []  Low BMI  []  JASON Onofre  Pager [phone]  Weekend Pager [phone]

## 2018-03-06 NOTE — PROGRESS NOTES
[address] [place] [address]  [phone]      Medical Progress Note      NAME: Wade Kat   :  1955  MRM:  [id number]    Date/Time: 3/6/2018  7:49 AM      Subjective:     Events leading to admission noted. Presented like Sepsis altho source not clear. Morbidly obese and now has obesity hypoventilation syndrome. Hygiene has been terrible and has multiple areas of early pressure injuries and more significant right foot wound. Has refused PT/OT for months until recently and now bed and chair bound. Diabetes has been poorly controlled as well. Past Medical History reviewed and unchanged from Admission History and Physical and/or prior progress note/electronic medical record    Medications reviewed. ROS:      General: postive for weakness and fever  Ear Nose and Throat: negative for rhinorrhea, pharyngitis, otalgia, tinnitus, speech or swallowing difficulties  Respiratory:  positive for SOB and wheezing  Cardiology:  positive for PND, orthopnea and edema  Gastrointestinal: positive for nausea  Genitourinary: positive for incontinence  Dermatological: positive for chronic lichenification legs and feet and pressure sore areas  Neurological: positive for bilateral LE weakness R>L and speech difficulties    Objective:     Last 24hrs VS reviewed since prior progress note.  Most recent are:    Visit Vitals    /82    Pulse 62    Temp 98.5 °F (36.9 °C)    Resp 18    Ht 5' (1.524 m)    Wt 279 lb 5.2 oz (126.7 kg)    SpO2 97%    BMI 54.55 kg/m2     SpO2 Readings from Last 6 Encounters:   18 97%   18 (!) 79%   18 92%   18 94%   18 93%   18 94%    O2 Flow Rate (L/min): 4 l/min     Intake/Output Summary (Last 24 hours) at 18 0749  Last data filed at 18 2200   Gross per 24 hour   Intake                0 ml   Output              400 ml   Net             -400 ml          Physical Exam:    General appearance: alert, cooperative, no distress, appears stated age  Eyes: negative  Neck: supple, symmetrical, trachea midline, no adenopathy, thyroid: not enlarged, symmetric, no tenderness/mass/nodules, no carotid bruit and no JVD  Lungs: scattered wheezes  Heart: regular rate and rhythm, S1, S2 normal, grade II/IV systolic murmur; No click, rub or gallop  Abdomen: soft, non-tender. Bowel sounds normal. No masses,  no organomegaly, morbidly obese  Extremities: chronic lichenification legs and feet; ulcer right fifth toe and lateral plantar surface  Neurologic: bilateral LE weakness;  ? Expressive aphasia    Data Review:    Lab:    BMP:   Lab Results   Component Value Date/Time     03/06/2018 05:31 AM    K 4.8 03/06/2018 05:31 AM     (H) 03/06/2018 05:31 AM    CO2 20 (L) 03/06/2018 05:31 AM    AGAP 9 03/06/2018 05:31 AM     (H) 03/06/2018 05:31 AM    BUN 24 (H) 03/06/2018 05:31 AM    CREA 1.25 (H) 03/06/2018 05:31 AM    GFRAA 52 (L) 03/06/2018 05:31 AM    GFRNA 43 (L) 03/06/2018 05:31 AM     CBC:   Lab Results   Component Value Date/Time    WBC 6.3 03/06/2018 05:31 AM    HGB 9.2 (L) 03/06/2018 05:31 AM    HCT 31.5 (L) 03/06/2018 05:31 AM     03/06/2018 05:31 AM     Recent Glucose Results:   Lab Results   Component Value Date/Time     (H) 03/06/2018 05:31 AM     (H) 03/05/2018 12:14 PM     All labs reviewed in past 24 hours    Other pertinent lab: none    Imaging:    Reviewed. CT's and CXr      Assessment / Plan:      Active Problems:    Chronic diastolic CHF (congestive heart failure), NYHA class 3 ([facility]) (7/24/2017)      Sepsis ([address] (3/5/2018)      Diabetes mellitus type II, uncontrolled ([address] (4/18/2013)      Diabetic gastroparesis ([facility]) ()      Diabetic polyneuropathy ([address] (4/20/2013)      Anemia (7/24/2017)      Ulcer of right fifth toe due to diabetes mellitus [facility] (7/24/2017)      Hypertension (7/24/2017)      Bilateral edema of lower extremity (7/24/2017)      (HFpEF) heart failure with preserved ejection fraction ([facility] (10/31/2017)      Morbid obesity ([facility] (1/26/2011)      CKD (chronic kidney disease) stage 3, GFR 30-59 ml/min ()      Fatty liver (7/24/2017)      Aortic stenosis, mild (7/24/2017)      BMI 45.0-49.9, adult ([facility] (7/24/2017)        1. Add cefepime pending cultures  2. Wound care  3. PT/OT/ST  4. Diurese  5. CT head  6. Echocardiogram  7. MARYJO's  8.  SSI             Care Plan discussed with: Patient and Family    Discussed:  Code Status and Care Plan    Prophylaxis:  Lovenox    Disposition:  SNF/LTC  ___________________________________________________    Attending Physician: Vandana Barksdale MD

## 2018-03-06 NOTE — CONSULTS
Podiatry History and Physical    Subjective:         Date of Consultation:  March 6, 2018    Referring Physician: Irina Du MD    Patient is a 60 y.o.  female who is being seen for wound right foot. Present for several weeks; has gotten worse despite wound care at home.  Pt began to feel feverish and nauseated and was admitted to hospital.    Patient Active Problem List    Diagnosis Date Noted    Sepsis ([address] 03/05/2018    Obesity hypoventilation syndrome ([address] 02/19/2018    Type 2 diabetes mellitus with diabetic neuropathy ([address] 01/02/2018    Venous stasis dermatitis of both lower extremities 11/09/2017    B12 deficiency 10/31/2017    (HFpEF) heart failure with preserved ejection fraction ([address] 10/31/2017    PE (physical exam), annual 10/11/2017    Gout 10/11/2017    History of hyperparathyroidism 10/11/2017    GERD (gastroesophageal reflux disease) 10/11/2017    Insomnia 07/24/2017    Sinus tachycardia 07/24/2017    Anemia 07/24/2017    Ulcer of right fifth toe due to diabetes mellitus ([address] 07/24/2017    Peripheral neuropathy 07/24/2017    Chronic diastolic CHF (congestive heart failure), NYHA class 3 ([address] 07/24/2017    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 2 diabetes mellitus ([address] 07/24/2017    Stage 2 chronic kidney disease due to benign hypertension 07/24/2017    Hypertension 07/24/2017    BMI 45.0-49.9, adult ([address] 07/24/2017    Fatty liver 07/24/2017    Stasis dermatitis 07/24/2017    Low back pain 07/24/2017    Aortic stenosis, mild 07/24/2017    Bilateral edema of lower extremity 07/24/2017    ASCVD (arteriosclerotic cardiovascular disease) 05/31/2014    Stroke ([address] 05/30/2014    Diabetic polyneuropathy ([address] 04/20/2013    Right leg weakness 04/18/2013    Diabetes mellitus type II, uncontrolled ([address] 04/18/2013    CKD (chronic kidney disease) stage 3, GFR 30-59 ml/min     Hyperlipemia     Diabetic gastroparesis ([address]     Diabetes ([address] 01/26/2011    Morbid obesity ([address] 2011     Past Medical History:   Diagnosis Date    Acute combined systolic and diastolic heart failure, NYHA class 2 ([address] 2017    Anemia 2017    Aortic stenosis, mild 2017    Bilateral edema of lower extremity 2017    BMI 45.0-49.9, adult ([address] 2017    Chronic diastolic CHF (congestive heart failure), NYHA class 3 ([address] 2017    CKD (chronic kidney disease) stage 2, GFR 60-89 ml/min     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 2 diabetes mellitus ([address] 2017    Diabetes ([address]     Fatty liver 2017    Gastroparesis     GERD (gastroesophageal reflux disease)     Gout     HTN (hypertension)     Hyperlipemia     Hyperparathyroidism ([address]     Hypertension 2017    Insomnia 2017    Low back pain 2017    N&V (nausea and vomiting) 2017    Nausea & vomiting     Obesity     Pelvic joint pain 2017    Peripheral neuropathy 2017    Sciatica     right    Severe right groin pain 2017    Sinus tachycardia 2017    Stage 2 chronic kidney disease due to benign hypertension 2017    Stasis dermatitis 2017    Stroke ([address] 2001    right parietal, left caudate    Thyroid disease     para thyroid(high calcium)    Ulcer of right fifth toe due to diabetes mellitus ([address] 2017      Family History   Problem Relation Age of Onset    Cancer Mother      cervical    Diabetes Mother     Hypertension Mother     Stroke Mother 79    Diabetes Father     Hypertension Father     Kidney Disease Father     Diabetes Brother     Hypertension Brother     Stroke Brother 61      Social History   Substance Use Topics    Smoking status: Never Smoker    Smokeless tobacco: Never Used    Alcohol use Yes      Comment: rare     Past Surgical History:   Procedure Laterality Date    CHEST SURGERY PROCEDURE UNLISTED  10/24/13    PARATHYROID EXPLORATION     DELIVERY       HX  SECTION      HX CHOLECYSTECTOMY      HX OTHER SURGICAL      I&D right thigh abscess      Prior to Admission medications    Medication Sig Start Date End Date Taking? Authorizing Provider   acetaminophen (TYLENOL EXTRA STRENGTH) 500 mg tablet Take 1,000 mg by mouth every six (6) hours as needed for Pain. Yes Historical Provider   atenolol (TENORMIN) 50 mg tablet Take 50 mg by mouth daily. Yes Historical Provider   chlorhexidine (HIBICLENS) 4 % liquid Apply  to affected area every other day. Yes Historical Provider   colestipol (COLESTID) 1 gram tablet Take 1 g by mouth daily. Yes Historical Provider   glipiZIDE (GLUCOTROL) 10 mg tablet Take 10 mg by mouth two (2) times a day. Yes Historical Provider   insulin detemir U-100 (LEVEMIR FLEXTOUCH U-100 INSULN) 100 unit/mL (3 mL) inpn 50 Units by SubCUTAneous route two (2) times a day. Yes Historical Provider   ammonium lactate (LAC-HYDRIN) 12 % lotion Apply 1 Each to affected area every other day. rub in to affected area well 11/2/17  Yes Andressa Skinner MD   Bifidobacterium Infantis 4 mg cap Take 4 mg by mouth daily. Yes Historical Provider   nystatin (MYCOSTATIN) topical cream Apply  to affected area two (2) times a day. Use for groin rash 2/19/18  Yes Andressa Skinner MD   [facility]) 2 mg tablet Take 3 mg by mouth daily. Yes Andressa Skinner MD   UBIDECARENONE (COQ-10 PO) Take 1 Tab by mouth daily.    Yes Historical Provider   allopurinol (ZYLOPRIM) 300 mg tablet TAKE 1 TABLET BY MOUTH EVERY DAY 2/10/18  Yes Andressa Skinner MD   dilTIAZem [place] [facility]) 360 mg ER capsule TAKE 1 CAPSULE BY MOUTH EVERY DAY 2/3/18  Yes Andressa Skinner MD   atorvastatin (LIPITOR) 40 mg tablet TAKE 1 TABLET BY MOUTH EVERY DAY 1/28/18  Yes Andressa Skinner MD   hydrALAZINE (APRESOLINE) 50 mg tablet TAKE 1 TABLET BY MOUTH THREE TIMES DAILY 1/8/18  Yes Nadia Hicks II, MD   LYRICA 75 mg capsule TAKE ONE CAPSULE BY MOUTH DAILY AT LUNCH TIME AND AGAIN AT BEDTIME. 12/26/17  Yes Andressa Skinner MD   insulin lispro (HUMALOG) 100 unit/mL kwikpen 10 Units by SubCUTAneous route three (3) times daily (with meals). 17  Yes Quang Cervantes MD   ferrous sulfate (SLOW FE) 142 mg (45 mg iron) ER tablet Take 142 mg by mouth Daily (before breakfast). Yes Historical Provider   aspirin-dipyridamole (AGGRENOX)  mg per SR capsule TAKE 1 CAPSULE BY MOUTH TWICE DAILY( EVERY TWELVE HOURS) 17  Yes Quang Cervantes MD   Cholecalciferol, Vitamin D3, 2,000 unit cap Take 1 Cap by mouth daily. Yes Historical Provider     Allergies   Allergen Reactions    Zestril [Lisinopril] Swelling        Review of Systems:  +Feeling of fever and nausea; Short of breath; pain R foot. Objective:     Patient Vitals for the past 8 hrs:   BP Temp Pulse Resp SpO2   18 1643 127/59 99 °F (37.2 °C) 60 19 92 %   18 1626 127/59 98.3 °F (36.8 °C) 60 18 98 %   18 1225 123/65 98.7 °F (37.1 °C) (!) 52 17 96 %     Temp (24hrs), Av.8 °F (37.1 °C), Min:98.3 °F (36.8 °C), Max:99.6 °F (37.6 °C)    Lower Extremity Exam:  +Edema and lipodermatosclerosis of feet and legs B. +Calor R foot. Superficial wounds to dermis dorsal lateral and plantar lateral R foot at the mid-shaft of the 5th metatarsal. The wound of the R 5th MPJ is necrotic. I used a #10 blade to debride the wound to 2.5 cm diameter and to a depth of 1 cm. A moderate amount of thick, white, creamy pus was encountered. There was additional necrotic tissue within the wound but the procedure was terminated because it was getting beyond what could be performed at bedside. Bone wad not directly exposed, but there was only necrotic tissue covering the 5th MPJ.  DP and PT non-palpable due to severe edema, but CFT less than 3 seconds. Sensation intact to light touch, but absent to fine touch. +TTP R lateral foot.     Data Review:   Recent Results (from the past 24 hour(s))   INFLUENZA A & B AG (RAPID TEST)    Collection Time: 18  7:38 PM   Result Value Ref Range Influenza A Antigen NEGATIVE  NEG      Influenza B Antigen NEGATIVE  NEG     GLUCOSE, POC    Collection Time: 03/05/18  9:11 PM   Result Value Ref Range    Glucose (POC) 161 (H) 65 - 100 mg/dL    Performed by Jigna Hugo (traveler)    URINALYSIS W/ REFLEX CULTURE    Collection Time: 03/05/18 10:09 PM   Result Value Ref Range    Color YELLOW/STRAW      Appearance CLEAR CLEAR      Specific gravity 1.022 1.003 - 1.030      pH (UA) 7.0 5.0 - 8.0      Protein >300 (A) NEG mg/dL    Glucose 250 (A) NEG mg/dL    Ketone NEGATIVE  NEG mg/dL    Bilirubin NEGATIVE  NEG      Blood SMALL (A) NEG      Urobilinogen 0.2 0.2 - 1.0 EU/dL    Nitrites NEGATIVE  NEG      Leukocyte Esterase NEGATIVE  NEG      WBC 5-10 0 - 4 /hpf    RBC 5-10 0 - 5 /hpf    Epithelial cells FEW FEW /lpf    Bacteria NEGATIVE  NEG /hpf    UA:UC IF INDICATED URINE CULTURE ORDERED (A) CNI      Yeast PRESENT (A) NEG     METABOLIC PANEL, BASIC    Collection Time: 03/06/18  5:31 AM   Result Value Ref Range    Sodium 144 136 - 145 mmol/L    Potassium 4.8 3.5 - 5.1 mmol/L    Chloride 115 (H) 97 - 108 mmol/L    CO2 20 (L) 21 - 32 mmol/L    Anion gap 9 5 - 15 mmol/L    Glucose 145 (H) 65 - 100 mg/dL    BUN 24 (H) 6 - 20 MG/DL    Creatinine 1.25 (H) 0.55 - 1.02 MG/DL    BUN/Creatinine ratio 19 12 - 20      GFR est AA 52 (L) >60 ml/min/1.73m2    GFR est non-AA 43 (L) >60 ml/min/1.73m2    Calcium 8.0 (L) 8.5 - 10.1 MG/DL   CBC W/O DIFF    Collection Time: 03/06/18  5:31 AM   Result Value Ref Range    WBC 6.3 3.6 - 11.0 K/uL    RBC 3.20 (L) 3.80 - 5.20 M/uL    HGB 9.2 (L) 11.5 - 16.0 g/dL    HCT 31.5 (L) 35.0 - 47.0 %    MCV 98.4 80.0 - 99.0 FL    MCH 28.8 26.0 - 34.0 PG    MCHC 29.2 (L) 30.0 - 36.5 g/dL    RDW 17.0 (H) 11.5 - 14.5 %    PLATELET 575 278 - 027 K/uL    MPV 11.4 8.9 - 12.9 FL    NRBC 0.0 0  WBC    ABSOLUTE NRBC 0.00 0.00 - 0.01 K/uL   GLUCOSE, POC    Collection Time: 03/06/18  8:54 AM   Result Value Ref Range    Glucose (POC) 140 (H) 65 - 100 mg/dL Performed by Jessica Lowe    GLUCOSE, POC    Collection Time: 03/06/18 12:28 PM   Result Value Ref Range    Glucose (POC) 143 (H) 65 - 100 mg/dL    Performed by Dany Damon, POC    Collection Time: 03/06/18  5:08 PM   Result Value Ref Range    Glucose (POC) 136 (H) 65 - 100 mg/dL    Performed by Fidelia Mcclain (PCT)          Impression:   Diabetic wound R foot  Cellulitis R foot  Abscess R foot  Osteomyelitis R 5th MPJ    Recommendation:   Ordered cultures of wound R 5th MPJ  Ordered MRI without contrast R foot, Pt has hx of claustrophobia and may need Hospitalist or Radiologist to provide sedation  Debridement of wound R foot attempted but terminated before completion due to extent of necrotic tissue  Will F/U tomorrow

## 2018-03-06 NOTE — PROGRESS NOTES
ADULT PROTOCOL: JET AEROSOL ASSESSMENT    Patient  Isis Lizarraga     60 y.o.   female     3/5/2018  11:35 PM    Breath Sounds Pre Procedure: Right Breath Sounds: Lower, Crackles                               Left Breath Sounds: Lower, Crackles    Breath Sounds Post Procedure: Right Breath Sounds: Lower, Crackles                                 Left Breath Sounds: Lower, Crackles    Breathing pattern: Pre procedure Breathing Pattern: Tachypneic          Post procedure Breathing Pattern: Tachypneic    Heart Rate: Pre procedure Pulse: 81           Post procedure Pulse: 83    Resp Rate: Pre procedure Respirations: 22           Post procedure Respirations: 22      Cough: Pre procedure Cough: Non-productive               Post procedure Cough: Non-productive    Suctioned: NO     Oxygen: O2 Device: Nasal cannula   4L     Changed: YES    SpO2: Pre procedure SpO2: 92 %   with oxygen              Post procedure SpO2: 94 %  with oxygen    Nebulizer Therapy: Current medications Aerosolized Medications: DuoNeb      Changed: NO    Smoking History: never    Problem List:   Patient Active Problem List   Diagnosis Code    Diabetes ([facility] E11.9    Morbid obesity ([facility] E66.01    Right leg weakness R29.898    Diabetes mellitus type II, uncontrolled ([facility]) E11.65    CKD (chronic kidney disease) stage 3, GFR 30-59 ml/min N18.3    Hyperlipemia E78.5    Diabetic gastroparesis ([facility]) E11.43, K31.84    Diabetic polyneuropathy ([facility]) E11.42    Stroke ([facility]) I63.9    ASCVD (arteriosclerotic cardiovascular disease) I25.10    Insomnia G47.00    Sinus tachycardia R00.0    Anemia D64.9    Ulcer of right fifth toe due to diabetes mellitus ([facility]) E11.621, L97.519    Peripheral neuropathy G62.9    Chronic diastolic CHF (congestive heart failure), NYHA class 3 ([facility]) I50.32    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 2 diabetes mellitus (HCC) E11.22, N18.2    Stage 2 chronic kidney disease due to benign hypertension I12.9, N18.2    Hypertension I10    BMI 45.0-49.9, adult ([facility]) Z68.42    Fatty liver K76.0    Stasis dermatitis I87.2    Low back pain M54.5    Aortic stenosis, mild I35.0    Bilateral edema of lower extremity R60.0    PE (physical exam), annual Z00.00    Gout M10.9    History of hyperparathyroidism Z86.39    GERD (gastroesophageal reflux disease) K21.9    B12 deficiency E53.8    (HFpEF) heart failure with preserved ejection fraction (HCC) I50.30    Venous stasis dermatitis of both lower extremities I87.2    Type 2 diabetes mellitus with diabetic neuropathy ([facility]) E11.40    Obesity hypoventilation syndrome ([facility]) E66.2    Sepsis ([facility] A41.9       Respiratory Therapist: Francesca Graham RT

## 2018-03-06 NOTE — HOME CARE
Please note that this patient was open to [facility] - INPATIENT, Skilled Nursing and Physical Therapy services at the time of hospital admission. If services will be needed at discharge, please order to resume them. Thank you.    Rita Lobato RN  [address]

## 2018-03-06 NOTE — WOUND CARE
Wound Care Nurse Consult from staff nurse for \"multiple wounds'. Patient is a 59 y/o CF admitted for sepsis, AMS, acute hypoxic resp failure with hx; stroke, bed bound, morbidly obesity, DM type 2, CHF, CKD stage 2  Patient has sepsis of unknown origin and x-ray r/o osteomyelitis in right foot, but I un-covered x3 wounds to right lateral and dorsal foot that needs to be looked at closer. I contacted Dr Randal Leyva and will place a Podiatrist consult. Right lateral, lateral-dorsal and plantar wounds:      Wounds are surrounded by thick fungal scaly skin. Bilateral toes have fissures along dorsal base and are thick with fungal, dead skin. BL dorsal feet also have some fungal thick skin. BLE have chronic venous stasis skin and there is a scalloped ring of redness under each knee. Skin folds are deep moist and smell like yeast. Right gluteal fold/upper thigh has a thick calloused area from chronic incontinence. WOUND POA CONDITIONS    Wound Foot Right;Dorsal;Lateral (Active)   DRESSING STATUS Removed 3/6/2018  3:18 PM   DRESSING TYPE Foam 3/6/2018  3:18 PM   Non-Pressure Injury Partial thickness (epider/derm) 3/6/2018  3:18 PM   Wound Length (cm) 2 cm 3/6/2018  3:18 PM   Wound Width (cm) 2 cm 3/6/2018  3:18 PM   Wound Depth (cm) 0.1 3/6/2018  3:18 PM   Wound Surface area (cm^2) 4 cm^2 3/6/2018  3:18 PM   Condition of Base Shafter 3/6/2018  3:18 PM   Condition of Edges Calloused 3/6/2018  3:18 PM   Tissue Type Pink 3/6/2018  3:18 PM   Drainage Amount  Scant 3/6/2018  3:18 PM   Wound Odor None 3/6/2018  3:18 PM   Periwound Skin Condition Other (comment); Calloused 3/6/2018  3:18 PM   Cleansing and Cleansing Agents  Dermal wound cleanser 3/6/2018  3:18 PM   Dressing Type Applied Wound gel;Silver products;Dry dressing 3/6/2018  3:18 PM   Procedure Tolerated Well 3/6/2018  3:18 PM   Number of days:1       Wound Foot Right;Plantar (Active)   DRESSING STATUS Removed 3/6/2018  3:18 PM   DRESSING TYPE Foam 3/6/2018  3:18 PM Non-Pressure Injury Full thickness (subcut/muscle) 3/6/2018  3:18 PM   Wound Length (cm) 1 cm 3/6/2018  3:18 PM   Wound Width (cm) 1 cm 3/6/2018  3:18 PM   Wound Surface area (cm^2) 1 cm^2 3/6/2018  3:18 PM   Condition of Base Eschar 3/6/2018  3:18 PM   Condition of Edges Calloused 3/6/2018  3:18 PM   Tissue Type Black 3/6/2018  3:18 PM   Tissue Type Percent Black 100 % 3/6/2018  3:18 PM   Drainage Amount  Scant 3/6/2018  3:18 PM   Drainage Color Brown 3/6/2018  3:18 PM   Wound Odor None 3/6/2018  3:18 PM   Periwound Skin Condition Calloused 3/6/2018  3:18 PM   Cleansing and Cleansing Agents  Dermal wound cleanser 3/6/2018  3:18 PM   Dressing Type Applied Wound gel;Silver products;Dry dressing 3/6/2018  3:18 PM   Procedure Tolerated Well 3/6/2018  3:18 PM   Number of days:1       Wound Toe Right;Left (Active)   DRESSING STATUS Clean, dry, and intact 3/6/2018 12:00 AM   DRESSING TYPE Open to air 3/6/2018  3:18 PM   Condition of Base Other (comment) 3/6/2018  3:18 PM   Condition of Edges Calloused 3/6/2018  3:18 PM   Tissue Type Yellow;Pink 3/5/2018  8:30 PM   Drainage Amount  None 3/6/2018  3:18 PM   Wound Odor None 3/6/2018  3:18 PM   Dressing Type Applied Open to air 3/6/2018  3:18 PM   Number of days:1       Wound Gluteal fold / cleft Right;Upper (Active)   DRESSING STATUS Clean, dry, and intact 3/5/2018  8:30 PM   DRESSING TYPE Open to air 3/6/2018  3:18 PM   Non-Pressure Injury Partial thickness (epider/derm) 3/6/2018  3:18 PM   Wound Length (cm) 3 cm 3/6/2018  3:18 PM   Wound Width (cm) 2 cm 3/6/2018  3:18 PM   Wound Surface area (cm^2) 6 cm^2 3/6/2018  3:18 PM   Condition of Edges Calloused 3/6/2018  3:18 PM   Tissue Type Red; Other (comment) 3/5/2018  8:30 PM   Dressing Type Applied Open to air 3/6/2018  3:18 PM   Number of days:1       Wound Foot Right;Lateral (Active)   DRESSING STATUS Removed 3/6/2018  3:18 PM   DRESSING TYPE Foam 3/6/2018  3:18 PM   Wound Length (cm) 1 cm 3/6/2018  3:18 PM   Wound Width (cm) 1 cm 3/6/2018  3:18 PM   Wound Depth (cm) 0.1 3/6/2018  3:18 PM   Wound Surface area (cm^2) 1 cm^2 3/6/2018  3:18 PM   Condition of [facility] 3/6/2018  3:18 PM   Condition of Edges Calloused 3/6/2018  3:18 PM   Tissue Type Yellow 3/6/2018  3:18 PM   Tissue Type Percent Black 0 % 3/6/2018  3:18 PM   Tissue Type Percent Yellow 100 3/6/2018  3:18 PM   Drainage Amount  Scant 3/6/2018  3:18 PM   Drainage Color Serous 3/6/2018  3:18 PM   Wound Odor None 3/6/2018  3:18 PM   Periwound Skin Condition Calloused 3/6/2018  3:18 PM   Cleansing and Cleansing Agents  Dermal wound cleanser 3/6/2018  3:18 PM   Dressing Type Applied Wound gel;Silver products;Dry dressing 3/6/2018  3:18 PM   Procedure Tolerated Well 3/6/2018  3:18 PM   Number of days:0           Recommend:    Bariatric bed with air: confirmation # [id number]    Podiatrist consult for infected DM wounds - try Dr Truman Dhaliwal    Skin folds and gluteal fold/buttocks: cleanse with soap and water. Pat dry. Apply ES Desitin to buttocks and anti-fungal cream and powder to skin folds. BLE: wash with soap and water daily and apply Lac-hydrin lotion to intact, dry scaly skin. Bilateral toes and tops of foot: cleanse with soap and water, dry between toes and apply Lotrimin cream.    Right dorsal and lateral DM foot wounds: daily, cleanse with dermal wound cleanser, wipe clean. Cut x3 squares of Treatsie and place a pea sized amount of Carrysen wound gel on it and cover x3 wounds. Apply dry 4x4's, ABD pad and secure with alexandrea.     [address], RN, [employer]

## 2018-03-06 NOTE — PROGRESS NOTES
Speech Pathology bedside swallow evaluation/discharge  Patient: Isis Lizarraga (89 y.o. female)  Date: 3/6/2018  Primary Diagnosis: Sepsis ([address]        Precautions:        ASSESSMENT :  Based on the objective data described below, the patient presents with impaired cognition. She did not state her name when asked. She tolerated straw sips and soft solids well. Mildly slow oral phase and suspected mild swallow delay. No coughing or choking on her diet but she did not want much po. With her impaired cognition she is at risk to aspirate and should be supervised with po. Apparently she has been feeling nauseated. Skilled therapy provided by a speech-language pathologist is not indicated at this time. PLAN :  Recommendations: May want to downgrade her diet from regular to dental soft. Discharge Recommendations: None     SUBJECTIVE:   Patient did not state her name.      OBJECTIVE:     Past Medical History:   Diagnosis Date    Acute combined systolic and diastolic heart failure, NYHA class 2 ([address] 7/24/2017    Anemia 7/24/2017    Aortic stenosis, mild 7/24/2017    Bilateral edema of lower extremity 7/24/2017    BMI 45.0-49.9, adult (HCC) 7/24/2017    Chronic diastolic CHF (congestive heart failure), NYHA class 3 ([address] 7/24/2017    CKD (chronic kidney disease) stage 2, GFR 60-89 ml/min     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 2 diabetes mellitus ([address] 7/24/2017    Diabetes ([address]     Fatty liver 7/24/2017    Gastroparesis     GERD (gastroesophageal reflux disease)     Gout     HTN (hypertension)     Hyperlipemia     Hyperparathyroidism ([address]     Hypertension 7/24/2017    Insomnia 7/24/2017    Low back pain 7/24/2017    N&V (nausea and vomiting) 7/24/2017    Nausea & vomiting     Obesity     Pelvic joint pain 7/24/2017    Peripheral neuropathy 7/24/2017    Sciatica     right    Severe right groin pain 7/24/2017    Sinus tachycardia 7/24/2017    Stage 2 chronic kidney disease due to benign hypertension 2017    Stasis dermatitis 2017    Stroke [facility]) 2001    right parietal, left caudate    Thyroid disease     para thyroid(high calcium)    Ulcer of right fifth toe due to diabetes mellitus ([facility] 2017     Past Surgical History:   Procedure Laterality Date    CHEST SURGERY PROCEDURE UNLISTED  10/24/13    PARATHYROID EXPLORATION     DELIVERY       HX  SECTION      HX CHOLECYSTECTOMY      HX OTHER SURGICAL      I&D right thigh abscess     Prior Level of Function/Home Situation: bedbound  Home Situation  Home Environment: Private residence  One/Two Story Residence: One story  Living Alone: No  Support Systems: Therapist, Other (comments) (nursing)  Patient Expects to be Discharged to[de-identified] Private residence  Current DME Used/Available at Home: Adaptive bathing aides  Diet prior to admission:   Current Diet:  regular   Cognitive and Communication Status:  Neurologic State: Confused  Orientation Level: Disoriented X4  Cognition: Follows commands  Perception: Appears intact  Perseveration: No perseveration noted     Oral Assessment:  Oral Assessment  Labial: No impairment  Dentition: Full;Natural  Lingual: No impairment  Mandible: No impairment  P.O. Trials:  Patient Position: upright in bed  Vocal quality prior to P.O.: No impairment  Consistency Presented: Thin liquid;Mechanical soft  How Presented: Self-fed/presented;Straw     Bolus Acceptance: No impairment  Bolus Formation/Control: No impairment     Propulsion: No impairment  Oral Residue: None  Initiation of Swallow: No impairment  Laryngeal Elevation: Functional  Aspiration Signs/Symptoms: None  Pharyngeal Phase Characteristics: No impairment, issues, or problems   Effective Modifications: None  Cues for Modifications: None       Oral Phase Severity: Mild  Pharyngeal Phase Severity : Mild  NOMS:   The NOMS functional outcome measure was used to quantify this patient's level of swallowing impairment.   Based on the NOMS, the patient was determined to be at level 6 for swallow function     G Codes: In compliance with CMSs Claims Based Outcome Reporting, the following G-code set was chosen for this patient based the use of the NOMS functional outcome to quantify this patient's level of swallowing impairment. Using the NOMS, the patient was determined to be at level 6 for swallow function which correlates with the CI= 1-19% level of severity. Based on the objective assessment provided within this note, the current, goal, and discharge g-codes are as follows:    Swallow  Swallowing:   Swallow Current Status CI= 1-19%   Swallow Goal Status CI= 1-19%   Swallow D/C Status CI= 1-19%      NOMS Swallowing Levels:  Level 1 (CN): NPO  Level 2 (CM): NPO but takes consistency in therapy  Level 3 (CL): Takes less than 50% of nutrition p.o. and continues with nonoral feedings; and/or safe with mod cues; and/or max diet restriction  Level 4 (CK): Safe swallow but needs mod cues; and/or mod diet restriction; and/or still requires some nonoral feeding/supplements  Level 5 (CJ): Safe swallow with min diet restriction; and/or needs min cues  Level 6 (CI): Independent with p.o.; rare cues; usually self cues; may need to avoid some foods or needs extra time  Level 7 ([address] [place] [address]): Independent for all p.o.  DARWIN. (2003). National Outcomes Measurement System (NOMS): Adult Speech-Language Pathology User's Guide. Pain:  Pain Scale 1: Numeric (0 - 10)  Pain Intensity 1: 0     After treatment:   [] Patient left in no apparent distress sitting up in chair  [x] Patient left in no apparent distress in bed  [x] Call bell left within reach  [x] Nursing notified  [x] Caregiver present  [] Bed alarm activated    COMMUNICATION/EDUCATION:   The patients plan of care including findings, recommendations, and recommended diet changes were discussed with: Registered Nurse.    [] Posted safety precautions in patient's room.   [] Patient/family have participated as able and agree with findings and recommendations. [x] Patient is unable to participate in plan of care at this time.     Thank you for this referral.  LEATHA Stovall  Time Calculation: 10 mins

## 2018-03-06 NOTE — PROCEDURES
[facility]  *** FINAL REPORT ***    Name: Ovidio Carpenter  MRN: [id number]    Inpatient  : 1955  HIS Order #: [id number]  [id number] [facility] Visit #: [id number]  Date: 06 Mar 2018    TYPE OF TEST: Peripheral Arterial Testing    REASON FOR TEST  Diminished pulses    Right Leg  Doppler:    Normal  PVR:        Normal  Ankle/Brachial: 1.16    Left Leg  Doppler:    Normal  PVR:        Normal  Ankle/Brachial:    INTERPRETATION/FINDINGS  PROCEDURE:  Ankle, brachial and digital arterial pressures, CW Doppler   waveforms and digital PPG waveforms were performed. 1. The right posterior tibial ankle/brachial index is 1.16  2. Segmental pressures in the right dorsalis pedis and the left ankle  are not measurable due to extensive arterial calcification. The  ankle/brachial indexes are therefore unreliable predictors of distal  arterial perfusion. 3. The right great toe/brachial index is 0.72 and the left great  toe/brachial index is 1. 11.    ADDITIONAL COMMENTS    I have personally reviewed the data relevant to the interpretation of  this  study. TECHNOLOGIST: Shant Monroy. GIBRAN Cherry, RDMS  Signed: 2018 11:45 AM    PHYSICIAN: Ngoc Quinonez.  Gabriele Mae MD  Signed: 2018 03:49 PM

## 2018-03-07 NOTE — PROGRESS NOTES
Occupational Therapy  Chart reviewed; cleared for tx; patient agreeable to tx; session interrupted by PICC team; will retry later as able.  Christine Stephens OTR/L

## 2018-03-07 NOTE — PROGRESS NOTES
2045--  MRI screening/checklist has been completed. Spoke with Tarah in MRI, they are only taking STAT orders right now. Will see pt in the morning. 3/7/2018  0300-- Pt has no IV access, IV leaking;  Pt only got approximately 50mL of IV vanc due tonight before leaking IV was found. Unable to stick pt due to limited access. Call placed to Dr. Francisco Felder; orders to place a PICC. Orders to get labs when PICC is inserted. No further orders at this time. Will monitor pt.

## 2018-03-07 NOTE — PROGRESS NOTES
PICC (Peripherally Inserted Central Catheter) line insertion  procedure note :     Procedure explained to patient and her  along with risks and benefits  and patient and her  agreed to proceed. Informed consent obtained from  patient. Patient teaching completed. Timeout completed. Pre-procedure assessment done. Maximum sterile barrier precautions observed throughout procedure. Lidocaine 1%  3.0    ml sq given prior to cannulation. Cannulated brachial  vein using ultrasound guidance and modified seldinger technique. Inserted 5  German double  lumen PICC to right arm using [employer] Tip Location System and  [address]. Pt has    sinus   rhythm. PICC tip location was confirmed by 3 CG tip positioning system, indicating tall P wave and no negative deflection before P wave which would indicate that the PICC tip is properly placed in the distal SVC or at the [place]. PICC tip location was  confirmed by 2 PICC nurses and 3CG printout placed on patient's chart. Blood return verified and flushed with 20 ml normal saline in each port. Sterile dressing applied with biopatch, statLock and occlusive dressing as per protocol. Curos caps applied to each port. Patient tolerated procedure well with minimal blood loss ( less than 5 ml.)   Patient education material provided. PICC procedure performed by  :  Santosh Singh RN. PICC nurse  Assisted by :   Ryder Kidd RN  PICC nurse  Reason for access : reliable access / MD order /   Juaquin barajas IV medication administration / Poor vascular access / Vesicant IV medication infusion  Complications related to insertion  : none  X-Ray : not applicable  Notified primary nurse    RN  that  PICC line can be used.    Total Trimmed Length :  41   cm   External Length : 0  cm   PICC line site arm circumference:  39    cm   PICC catheter occupies  25   % of vein  Type of PICC: Bard Solo Power PICC   Ref # :     [id number]     Lot # :  [id number] Expiration Date :    2019-05-31     Lon Chappell RN. BSN. SHARITA,CMSRN. Clinician IV .  PICC Nurse, Vascular Access Team.

## 2018-03-07 NOTE — PROGRESS NOTES
Podiatry History and Physical    Subjective:   Feels about the same as last night. Only eating part of meals. Feels ill and warm. Date of Service:  March 7, 2018    Patient is a 60 y.o.  female who is being seen for infection R foot. MRI not yet performed.     Patient Active Problem List    Diagnosis Date Noted    Sepsis ([address] 03/05/2018    Obesity hypoventilation syndrome ([address] 02/19/2018    Type 2 diabetes mellitus with diabetic neuropathy ([address] 01/02/2018    Venous stasis dermatitis of both lower extremities 11/09/2017    B12 deficiency 10/31/2017    (HFpEF) heart failure with preserved ejection fraction ([address] 10/31/2017    PE (physical exam), annual 10/11/2017    Gout 10/11/2017    History of hyperparathyroidism 10/11/2017    GERD (gastroesophageal reflux disease) 10/11/2017    Insomnia 07/24/2017    Sinus tachycardia 07/24/2017    Anemia 07/24/2017    Ulcer of right fifth toe due to diabetes mellitus ([address] 07/24/2017    Peripheral neuropathy 07/24/2017    Chronic diastolic CHF (congestive heart failure), NYHA class 3 ([address] 07/24/2017    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 2 diabetes mellitus ([address] 07/24/2017    Stage 2 chronic kidney disease due to benign hypertension 07/24/2017    Hypertension 07/24/2017    BMI 45.0-49.9, adult ([address] 07/24/2017    Fatty liver 07/24/2017    Stasis dermatitis 07/24/2017    Low back pain 07/24/2017    Aortic stenosis, mild 07/24/2017    Bilateral edema of lower extremity 07/24/2017    ASCVD (arteriosclerotic cardiovascular disease) 05/31/2014    Stroke ([address] 05/30/2014    Diabetic polyneuropathy ([address] 04/20/2013    Right leg weakness 04/18/2013    Diabetes mellitus type II, uncontrolled ([address] 04/18/2013    CKD (chronic kidney disease) stage 3, GFR 30-59 ml/min     Hyperlipemia     Diabetic gastroparesis ([address]     Diabetes ([address] 01/26/2011    Morbid obesity ([address] 01/26/2011     Past Medical History:   Diagnosis Date    Acute combined systolic and diastolic heart failure, NYHA class 2 ([address] 2017    Anemia 2017    Aortic stenosis, mild 2017    Bilateral edema of lower extremity 2017    BMI 45.0-49.9, adult ([address] 2017    Chronic diastolic CHF (congestive heart failure), NYHA class 3 ([address] 2017    CKD (chronic kidney disease) stage 2, GFR 60-89 ml/min     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 2 diabetes mellitus ([address] 2017    Diabetes ([address]     Fatty liver 2017    Gastroparesis     GERD (gastroesophageal reflux disease)     Gout     HTN (hypertension)     Hyperlipemia     Hyperparathyroidism ([address]     Hypertension 2017    Insomnia 2017    Low back pain 2017    N&V (nausea and vomiting) 2017    Nausea & vomiting     Obesity     Pelvic joint pain 2017    Peripheral neuropathy 2017    Sciatica     right    Severe right groin pain 2017    Sinus tachycardia 2017    Stage 2 chronic kidney disease due to benign hypertension 2017    Stasis dermatitis 2017    Stroke ([address]     right parietal, left caudate    Thyroid disease     para thyroid(high calcium)    Ulcer of right fifth toe due to diabetes mellitus ([address] 2017      Family History   Problem Relation Age of Onset    Cancer Mother      cervical    Diabetes Mother     Hypertension Mother     Stroke Mother 79    Diabetes Father     Hypertension Father     Kidney Disease Father     Diabetes Brother     Hypertension Brother     Stroke Brother 61      Social History   Substance Use Topics    Smoking status: Never Smoker    Smokeless tobacco: Never Used    Alcohol use Yes      Comment: rare     Past Surgical History:   Procedure Laterality Date    CHEST SURGERY PROCEDURE UNLISTED  10/24/13    PARATHYROID EXPLORATION     DELIVERY       HX  SECTION      HX CHOLECYSTECTOMY      HX OTHER SURGICAL      I&D right thigh abscess      Prior to Admission medications    Medication Sig Start Date End Date Taking? Authorizing Provider   acetaminophen (TYLENOL EXTRA STRENGTH) 500 mg tablet Take 1,000 mg by mouth every six (6) hours as needed for Pain. Yes Historical Provider   atenolol (TENORMIN) 50 mg tablet Take 50 mg by mouth daily. Yes Historical Provider   chlorhexidine (HIBICLENS) 4 % liquid Apply  to affected area every other day. Yes Historical Provider   colestipol (COLESTID) 1 gram tablet Take 1 g by mouth daily. Yes Historical Provider   glipiZIDE (GLUCOTROL) 10 mg tablet Take 10 mg by mouth two (2) times a day. Yes Historical Provider   insulin detemir U-100 (LEVEMIR FLEXTOUCH U-100 INSULN) 100 unit/mL (3 mL) inpn 50 Units by SubCUTAneous route two (2) times a day. Yes Historical Provider   ammonium lactate (LAC-HYDRIN) 12 % lotion Apply 1 Each to affected area every other day. rub in to affected area well 11/2/17  Yes Karla Ordonez MD   Bifidobacterium Infantis 4 mg cap Take 4 mg by mouth daily. Yes Historical Provider   nystatin (MYCOSTATIN) topical cream Apply  to affected area two (2) times a day. Use for groin rash 2/19/18  Yes Karla Ordonez MD   bumetanide [facility]) 2 mg tablet Take 3 mg by mouth daily. Yes Karla Ordonez MD   UBIDECARENONE (COQ-10 PO) Take 1 Tab by mouth daily.    Yes Historical Provider   allopurinol (ZYLOPRIM) 300 mg tablet TAKE 1 TABLET BY MOUTH EVERY DAY 2/10/18  Yes Karla Ordonez MD   dilTIAZem [facility]) 360 mg ER capsule TAKE 1 CAPSULE BY MOUTH EVERY DAY 2/3/18  Yes Karla Ordonez MD   atorvastatin (LIPITOR) 40 mg tablet TAKE 1 TABLET BY MOUTH EVERY DAY 1/28/18  Yes Karla Ordonez MD   hydrALAZINE (APRESOLINE) 50 mg tablet TAKE 1 TABLET BY MOUTH THREE TIMES DAILY 1/8/18  Yes Pierce Holcomb II, MD   LYRICA 75 mg capsule TAKE ONE CAPSULE BY MOUTH DAILY AT LUNCH TIME AND AGAIN AT BEDTIME. 12/26/17  Yes Karla Ordonez MD   insulin lispro (HUMALOG) 100 unit/mL kwikpen 10 Units by SubCUTAneous route three (3) times daily (with meals). 17  Yes Lianne Noguera MD   ferrous sulfate (SLOW FE) 142 mg (45 mg iron) ER tablet Take 142 mg by mouth Daily (before breakfast). Yes Historical Provider   aspirin-dipyridamole (AGGRENOX)  mg per SR capsule TAKE 1 CAPSULE BY MOUTH TWICE DAILY( EVERY TWELVE HOURS) 17  Yes Lianne Noguera MD   Cholecalciferol, Vitamin D3, 2,000 unit cap Take 1 Cap by mouth daily. Yes Historical Provider     Allergies   Allergen Reactions    Zestril [Lisinopril] Swelling        Review of Systems:  +SOB, +Nausea, +Pain R foot. Objective:     Patient Vitals for the past 8 hrs:   BP Temp Pulse Resp SpO2 Height Weight   18 0743 - - - - 99 % - -   18 0738 136/45 97.9 °F (36.6 °C) (!) 58 17 98 % - -   18 0334 - - - - - 5' (1.524 m) 134.8 kg (297 lb 3.2 oz)   18 0157 131/66 97.7 °F (36.5 °C) (!) 59 16 96 % - -   18 0149 - - - - 98 % - -     Temp (24hrs), Av.4 °F (36.9 °C), Min:97.7 °F (36.5 °C), Max:99 °F (37.2 °C)    Lower Extremity Exam:  B feet remain swollen with lipodermatosclerosis of the feet and legs. +Calor R foot. Bandage on wounds R foot not removed. DP and PT palpable even through the edema today. CFT less than 3 seconds. Sensation absent to light touch B toes. +TTP lateral R forefoot.     Data Review:   Recent Results (from the past 24 hour(s))   GLUCOSE, POC    Collection Time: 18 12:28 PM   Result Value Ref Range    Glucose (POC) 143 (H) 65 - 100 mg/dL    Performed by Dany Damon, POC    Collection Time: 18  5:08 PM   Result Value Ref Range    Glucose (POC) 136 (H) 65 - 100 mg/dL    Performed by Cecil Carmona (PCT)    GLUCOSE, POC    Collection Time: 18  8:46 PM   Result Value Ref Range    Glucose (POC) 149 (H) 65 - 100 mg/dL    Performed by Herlinda Giraldo    GLUCOSE, POC    Collection Time: 18  7:43 AM   Result Value Ref Range    Glucose (POC) 128 (H) 65 - 100 mg/dL    Performed by Cecil Carmona (PCT) Impression:   Cellulitis R foot  Abscess/Osteomyelitis R foot 5th MPJ region  Diabetic foot ulcer R 5th MPJ    Recommendation:   Awaiting MRI to be done. C&S in progress. Based upon the clinical appearance, Sx is likely, but the MRI is needed for planning. Based upon the expected timing of the MRI and radiologist report of the MRI, I expect Sx will be Saturday morning.

## 2018-03-07 NOTE — CONSULTS
Infectious Disease Consult Note    Reason for Consult: R foot diabetic wound infection, possible osteomyelitis   Date of Consultation: March 7, 2018  Date of Admission: 3/5/2018  Referring Physician: Lexi Covington       HPI:  Ms Mireya Manriquez is a 60year old lady with hx of DM, Hx of CDI in end of 2017-early 2018, chronic venous stasis, CKD, HELIO, morbid venous stasis admitted on 3/6 with altered mentation and respiratory issues. Her  says that she has had LE cellulitis and chart review indicates admission in 10/2017 to [facility] for the same. Her  says that he was doing her wound care at home until 3 weeks ago, when home care/wound care were arranged. Her  says that she has been weeping from the top part of her feet and he was not able to see the bottom part of her feet well as she was not able to examine it as she could not lie back with dyspnea at home. He recently noted it and it thought it looked like a blister that had ruptured and was red. They are not clear exact details as her feet were weeping and unclear if discharge was from the ulcers or not. He recalls that after last admission in 10/2017, she went to [facility] and then to Nursing home and developed C diff. She was on antibiotics but not long term at that time per him. She has not been ambulating since about 10/2017 per her . She reports that she does not have good sensation in her feet but is usually bed bound and so no known truama to LE. This admission, found to be febrile at 101.9 on 3/5/18. H and P indicates \"no acute changes on skin to suggest cellulitis\". Noted antibiotics started on 3/6 with Levaquin. Then note to have been started on Vancomycin, Cefepime and Flagyl. Blood cultures sent. Noted PICC inserted. Seen by Podiatry as well as Vascular surgery. This admission as well. Had \"Debridement of wound R foot attempted but terminated before completion due to extent of necrotic tissue\". Cultures of wound sent.  MRI Pending.      Past Medical History:  Past Medical History:   Diagnosis Date    Acute combined systolic and diastolic heart failure, NYHA class 2 ([address] 2017    Anemia 2017    Aortic stenosis, mild 2017    Bilateral edema of lower extremity 2017    BMI 45.0-49.9, adult ([address] 2017    Chronic diastolic CHF (congestive heart failure), NYHA class 3 ([address] 2017    CKD (chronic kidney disease) stage 2, GFR 60-89 ml/min     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 2 diabetes mellitus ([address] 2017    Diabetes ([address]     Fatty liver 2017    Gastroparesis     GERD (gastroesophageal reflux disease)     Gout     HTN (hypertension)     Hyperlipemia     Hyperparathyroidism ([address]     Hypertension 2017    Insomnia 2017    Low back pain 2017    N&V (nausea and vomiting) 2017    Nausea & vomiting     Obesity     Pelvic joint pain 2017    Peripheral neuropathy 2017    Sciatica     right    Severe right groin pain 2017    Sinus tachycardia 2017    Stage 2 chronic kidney disease due to benign hypertension 2017    Stasis dermatitis 2017    Stroke ([address]     right parietal, left caudate    Thyroid disease     para thyroid(high calcium)    Ulcer of right fifth toe due to diabetes mellitus ([address] 2017         Surgical History:  Past Surgical History:   Procedure Laterality Date    CHEST SURGERY PROCEDURE UNLISTED  10/24/13    PARATHYROID EXPLORATION     DELIVERY       HX  SECTION      HX CHOLECYSTECTOMY      HX OTHER SURGICAL      I&D right thigh abscess         Family History:   Family History   Problem Relation Age of Onset    Cancer Mother      cervical    Diabetes Mother     Hypertension Mother     Stroke Mother 79    Diabetes Father     Hypertension Father     Kidney Disease Father     Diabetes Brother     Hypertension Brother     Stroke Brother 61         Social History:     · Living Situation: at home wt    · Tobacco:denied   · Alcohol:denied   · Illicit Drugs:denied   · Sexual History:    · Travel History: none recently   · Exposures:   · Outdoor/Hiking: denied  · Animal/Pet: denied    · Construction: denied  · Hot Tub: denied   · Brackish/stagnant exposure: denied  · TB exposure: denied  · Sick Contacts: denied     Allergies: Allergies   Allergen Reactions    Zestril [Lisinopril] Swelling         Review of Systems:     Gen: Negative for chills, fevers, weight loss, weight gain   HEENT: Negative for headache, vision changes, ear ache or discharge, tingling,  nasal discharge, swelling, lumps in neck, sores on tongue   CV:  Negative for chest pain, dyspnea on exertion, leg edema   Lungs: + dyspnea, negative for cough    Abdomen: Negative for abdominal pain, nausea, vomiting, diarrhea, constipation   Genitourinary: Negative for genital pain or genital discharge     Neuro: Negative for headache, numbness, tingling, extremity weakness,  syncope, seizures    Skin: Negative for rash, sores/open wounds   Musculoskeletal:+ LE wounds   Endocrine: Negative for high or low blood sugars, heat or cold intolerance    Psych: Negative for manic behavior     Medications:  No current facility-administered medications on file prior to encounter. Current Outpatient Prescriptions on File Prior to Encounter   Medication Sig Dispense Refill    ammonium lactate (LAC-HYDRIN) 12 % lotion Apply 1 Each to affected area every other day. rub in to affected area well      Bifidobacterium Infantis 4 mg cap Take 4 mg by mouth daily.  nystatin (MYCOSTATIN) topical cream Apply  to affected area two (2) times a day. Use for groin rash 30 g 1    bumetanide (BUMEX) 2 mg tablet Take 3 mg by mouth daily.  UBIDECARENONE (COQ-10 PO) Take 1 Tab by mouth daily.       allopurinol (ZYLOPRIM) 300 mg tablet TAKE 1 TABLET BY MOUTH EVERY DAY 90 Tab 0    dilTIAZem (TIAZAC) 360 mg ER capsule TAKE 1 CAPSULE BY MOUTH EVERY DAY 30 Cap 0    atorvastatin (LIPITOR) 40 mg tablet TAKE 1 TABLET BY MOUTH EVERY DAY 90 Tab 0    hydrALAZINE (APRESOLINE) 50 mg tablet TAKE 1 TABLET BY MOUTH THREE TIMES DAILY 100 Tab 0    LYRICA 75 mg capsule TAKE ONE CAPSULE BY MOUTH DAILY AT LUNCH TIME AND AGAIN AT BEDTIME. 180 Cap 0    insulin lispro (HUMALOG) 100 unit/mL kwikpen 10 Units by SubCUTAneous route three (3) times daily (with meals). 1 Package     ferrous sulfate (SLOW FE) 142 mg (45 mg iron) ER tablet Take 142 mg by mouth Daily (before breakfast).  aspirin-dipyridamole (AGGRENOX)  mg per SR capsule TAKE 1 CAPSULE BY MOUTH TWICE DAILY( EVERY TWELVE HOURS) 60 Cap 5    Cholecalciferol, Vitamin D3, 2,000 unit cap Take 1 Cap by mouth daily.              Physical Exam:    Vitals: Patient Vitals for the past 24 hrs:   Temp Pulse Resp BP SpO2   03/07/18 1107 98.1 °F (36.7 °C) 72 19 131/54 93 %   03/07/18 0743 - - - - 99 %   03/07/18 0738 97.9 °F (36.6 °C) (!) 58 17 136/45 98 %   03/07/18 0157 97.7 °F (36.5 °C) (!) 59 16 131/66 96 %   03/07/18 0149 - - - - 98 %   03/06/18 2231 98.6 °F (37 °C) 60 20 (!) 105/39 93 %   03/06/18 2135 - 60 - 121/53 -   03/06/18 2015 98.8 °F (37.1 °C) (!) 58 20 114/48 96 %   03/06/18 1643 99 °F (37.2 °C) 60 19 127/59 92 %   03/06/18 1626 98.3 °F (36.8 °C) 60 18 127/59 98 %   ·   · GEN: NAD  · HEENT: no scleral icterus,  no thrush  · CV: S1, S2 heard   · Lungs: Clear to auscultation bilaterally anteriorly  · Abdomen: soft, obese, non tender   · Genitourinary: + mast  · Extremities: B/L chronic venous changes noted, + R foot ulcer with purulent discharge, no odor noted, no surrounding erythema noted   · Warm lower extremities   · Neuro: alert to self, situation, verbal minimally  · Skin: no rash  · Psych: flat affect   · Lines: PICC                 Labs:   Recent Results (from the past 24 hour(s))   GLUCOSE, POC    Collection Time: 03/06/18  5:08 PM   Result Value Ref Range    Glucose (POC) 136 (H) 65 - 100 mg/dL Performed by Matthew Encinas (PCT)    GLUCOSE, POC    Collection Time: 03/06/18  8:46 PM   Result Value Ref Range    Glucose (POC) 149 (H) 65 - 100 mg/dL    Performed by Yvette Jaffe    CULTURE, WOUND Emma Arleen STAIN    Collection Time: 03/06/18 10:33 PM   Result Value Ref Range    Special Requests: NO SPECIAL REQUESTS      GRAM STAIN 3+ WBCS SEEN      GRAM STAIN NO ORGANISMS SEEN      Culture result: PENDING    GLUCOSE, POC    Collection Time: 03/07/18  7:43 AM   Result Value Ref Range    Glucose (POC) 128 (H) 65 - 100 mg/dL    Performed by Matthew Encinas (PCT)        Microbiology Data:       Blood 3/5/18     3/7/2018  8:17 AM - [facility]       Component Results      Component Value Ref Range & Units Status     Special Requests: NO SPECIAL REQUESTS   Preliminary     Culture result: NO GROWTH 2 DAYS   Preliminary       Result History      Wound R foot culture  3/7/2018 11:03 AM - [facility]       Component Results      Component Value Ref Range & Units Status     Special Requests: NO SPECIAL REQUESTS   Preliminary     GRAM STAIN 3+ WBCS SEEN   Preliminary     GRAM STAIN NO ORGANISMS SEEN   Preliminary     Culture result: PENDING  Preliminary       Result History      Imaging:     FINDINGS:  Two views of the right foot demonstrate no fracture or other acute  osseous or articular abnormality. Prominent soft tissue swelling. No opaque  foreign body or gas in the soft tissue, small vessel calcifications suggest  diabetes. Calcaneal spurs. There is no radiological evidence to suggest  osteomyelitis. Mild DJD metatarsophalangeal joint.     IMPRESSION  IMPRESSION:  No acute bony abnormality. Procedures:   Bedside debridement attempted 3/6    Assessment / Plan:     Ms Matt Deal is a 60year old lady with hx of DM, Hx of CDI in end of 2017-early 2018, chronic venous stasis, CKD, HELIO, morbid venous stasis admitted on 3/6 with altered mentation and respiratory issues.   Her  says that she has had LE cellulitis and chart review indicates admission in 10/2017 to [facility] for the same. Her  says that he was doing her wound care at home until 3 weeks ago, when home care/wound care were arranged. Her  says that she has been weeping from the top part of her feet and he was not able to see the bottom part of her feet well as she was not able to examine it as she could not lie back with dyspnea at home. He recently noted it and it thought it looked like a blister that had ruptured and was red. They are not clear exact details as her feet were weeping and unclear if discharge was from the ulcers or not. He recalls that after last admission in 10/2017, she went to [facility] and then to Nursing home and developed C diff. She was on antibiotics but not long term at that time per him. She has not been ambulating since about 10/2017 per her . She reports that she does not have good sensation in her feet but is usually bed bound and so no known truama to LE. This admission, found to be febrile at 101.9 on 3/5/18. H and P indicates \"no acute changes on skin to suggest cellulitis\". Noted antibiotics started on 3/6 with Levaquin. Then note to have been started on Vancomycin, Cefepime and Flagyl. Blood cultures sent. Noted PICC inserted. Seen by Podiatry as well as Vascular surgery. This admission as well. Had \"Debridement of wound R foot attempted but terminated before completion due to extent of necrotic tissue\". Cultures of wound sent. MRI Pending. 1) R foot diabetic foot ulcer with concerns for osteomyelitis  Recommendations:  MRI pending   Will await findings and Podiatry evaluation for possible surgical debridement   Please obtain deep tissue and bone for gram stain, bacterial and fungal culture as well as Pathology.  Please send any bone margins for histopathology if resected   Duration of antibiotics and choice will depend on cultures and also pathology  Currently on Vancomycin, Cefepime and Flagyl   Deep bone cultures may be affected as she is already on antibiotics and presented with fever up to 101 on admission and therefore reasonable to cover with antibiotics even though this may affect culture results   Given hx of CDI, would like to limit antibiotics as much as a possible and use as narrow spectrum as possible   Wound care   Optimize DM control for better wound healing and infection control   Will follow blood cultures   Side effects of antibiotics including GI, renal and CDI explained patient and her  today     2) DM    3) HELIO    4) CKD: renally dose antibiotics     5) Hx of CDI in past   Is high risk for CDI again    6) DVT Px per primary team     7) Access: PICC     8)  Slightly prominent lymph nodes in the mediastinum are stable per CT imaging     Discussed with her  today      Thank for the opportunity to participate in the care of this patient. Please contact with questions or concerns.          Kristina Wilson DO  1:07 PM

## 2018-03-07 NOTE — PROGRESS NOTES
Reason for Admission:Pt was admitted on 3/5/18 d/t a Dx of Sepsis, AMS, Hypoxic Respiratory Failure, CKD3, DMT2, CHF, Obesity and wounds. Pt is alert and oriented. RRAT Score:  37    Reason for Admission as identified by patient:  Wounds, NON AMB, and SOB  [facility] encouraged her to go to the hospital.    Resources/supports as identified by patient/family:  Son lives a mile away. Dr. Priti Ga. [facility] was open to Pt for RN/PT/OT    Challenges facing patient:    Finances Pt just got SSI Disability approved. Pt is paying for BC/BS PPO insurance. Transportation  Spouse just purchased a WC van so that they WC can roll into the [place] and she does not have to transfer her again into the car. Support system Son. Dr. Priti Ga. Living arrangements  Pt lives in one story home with ramp to enter. Pt has a Manual WC, RW, cane, crutches, Transfer shower bench. Self-care/ADLs/Cognition Pt is non AMB. Pt was able to transfer from bed to [facility] but having more difficulty with this. CG provides sponge bath. CG Dresses Pt. CG assist with toileting. Connection to healthcare providers/adherence to healthcare plan  Pt is Active with Dr. Priti Ga and was open to [facility]. Pt has been to [place] and [address] had a bad experience at [id number] literacy   seems to have caregiver burnout with the level of care that he was providing at home and seems that the Pt is non compliant in letting him provide care or get care for her that she needs. Prescription concerns  Non identified at this time. Current Advanced Care Plan:  CM spoke to Pt about MD wanting us to look into options for SNF rehab at discharge. CM left a list of SNF. CM educated them about Medicare. [facility] Compare Website. Spouse is going to look into that and talk to Dr. Priti Ga for his recommendation. CM will try to follow up with them tomorrow.        Plan for utilizing home health:  Pt is open to [address] [place] [address] if plan changes. Plan while patient is hospitalized:  Pt/spouse indicated that she is supposed to get MRI today and then maybe schedule surgery on Saturday with Podiatrist.     Expected Date of Discharge:  Renetta Vincent is 4.9: Admited 3/5/18: estimated date of discharge: Friday, 3/9/18 based on GLOS    Transition of Care Plan:  Dr Padmini Kaplan is not a hospitalist so, I met with spouse and Pt today to develop this care plan for Pt. Plan for communication with patient post discharge (who, when, how): CM will continue to work with Pt/family to coordinate care. Likelihood of readmission:   High d/t complex needs of Pt but hopefully needs will be met if Pt is agreeable to SNF. Also, we will need to see if SNF can skill her for RN only since therapy did not work with her. CM srini continue to monitor discharge plan. Next steps: CM will follow up with Pt/spouse tomorrow to get [facility].     Jorge Quintana   Ext [phone]

## 2018-03-07 NOTE — CONSULTS
Vascular Surgery Consult Note  3/7/2018    Subjective:     Miguel A Dumont is a 60 y.o.  female with complex pmhx see below. She presented to the hospital with complaint of dyspnea, hypoxia per her [address], and chest pain. Per her spouse she was more confused then her usual. She was dx w a small left pleural effusion. She was noted on arrival to have RLE cellulitis from a non-healing DM wound of the right foot. She was septic on arrival.  She has poor hygiene with cutaneous candidiasis of the skin folds of the chest, abd,and groin. She has open sores to her BLE. Plan is for surgical debridement of her right foot. We were asked to evaluate for possible PAD. ABIs on the right posterior tibial ankle/brachial index is 1.16. Segmental pressures in the right dorsalis pedis and the left ankle are not measurable due to extensive arterial calcification. The right great toe/brachial index is 0.72 and the left great  toe/brachial index is 1. 11. This am she is lying in bed. She is s/p bedside I&D by podiatrist Dr. Roger Lo last evening. Per her spouse she had a moderate amount of bleeding during the procedure. She is afebrile and normal tensive. She has a normal WBC.       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 (HCC)    Diabetes (HCC)    Fatty liver    Diabetic Gastroparesis    GERD (gastroesophageal reflux disease)    Gout    Hyperlipemia    Hypertension    Insomnia    Chronic Low back pain    Diabetic Peripheral neuropathy    Sciatica-right     Stasis dermatitis    Stroke ([address] right parietal, left caudate    Thyroid disease para thyroid(high calcium)    Ulcer of right fifth toe due to diabetes mellitus ([address]  Functional quadraplegia       Past Surgical History:   Procedure Laterality Date    CHEST SURGERY PROCEDURE  10/24/13    PARATHYROID EXPLORATION     DELIVERY       HX  SECTION      HX CHOLECYSTECTOMY      I&D right thigh abscess           Family History   Problem Relation Age of Onset    Cancer Mother      cervical    Diabetes Mother     Hypertension Mother     Stroke Mother 79    Diabetes Father     Hypertension Father     Kidney Disease Father     Diabetes Brother     Hypertension Brother     Stroke Brother 61      Social History   Substance Use Topics    Smoking status: Never Smoker    Smokeless tobacco: Never Used    Alcohol use Yes      Comment: rare       Patient lives at home with her spouse and has been bed ridden x 5 months. She is cared for by her . Until recently she had refused [facility] services. Prior to Admission medications    Medication Sig Start Date End Date Taking? Authorizing Provider   acetaminophen (TYLENOL EXTRA STRENGTH) 500 mg tablet Take 1,000 mg by mouth every six (6) hours as needed for Pain. Yes Historical Provider   atenolol (TENORMIN) 50 mg tablet Take 50 mg by mouth daily. Yes Historical Provider   chlorhexidine (HIBICLENS) 4 % liquid Apply  to affected area every other day. Yes Historical Provider   colestipol (COLESTID) 1 gram tablet Take 1 g by mouth daily. Yes Historical Provider   glipiZIDE (GLUCOTROL) 10 mg tablet Take 10 mg by mouth two (2) times a day. Yes Historical Provider   insulin detemir U-100 (LEVEMIR FLEXTOUCH U-100 INSULN) 100 unit/mL (3 mL) inpn 50 Units by SubCUTAneous route two (2) times a day. Yes Historical Provider   ammonium lactate (LAC-HYDRIN) 12 % lotion Apply 1 Each to affected area every other day. rub in to affected area well 17  Yes Ruthann Ching MD   Bifidobacterium Infantis 4 mg cap Take 4 mg by mouth daily. Yes Historical Provider   nystatin (MYCOSTATIN) topical cream Apply  to affected area two (2) times a day.  Use for groin rash 2/19/18  Yes Mariela Garcia MD   bumetanide (BUMEX) 2 mg tablet Take 3 mg by mouth daily. Yes Mariela Garcia MD   UBIDECARENONE (COQ-10 PO) Take 1 Tab by mouth daily. Yes Historical Provider   allopurinol (ZYLOPRIM) 300 mg tablet TAKE 1 TABLET BY MOUTH EVERY DAY 2/10/18  Yes Mariela Garcia MD   dilTIAZem [facility]) 360 mg ER capsule TAKE 1 CAPSULE BY MOUTH EVERY DAY 2/3/18  Yes Mariela Garcia MD   atorvastatin (LIPITOR) 40 mg tablet TAKE 1 TABLET BY MOUTH EVERY DAY 1/28/18  Yes Mariela Garcia MD   hydrALAZINE (APRESOLINE) 50 mg tablet TAKE 1 TABLET BY MOUTH THREE TIMES DAILY 1/8/18  Yes Yanna Mike II, MD   LYRICA 75 mg capsule TAKE ONE CAPSULE BY MOUTH DAILY AT LUNCH TIME AND AGAIN AT BEDTIME. 12/26/17  Yes Mariela Garcia MD   insulin lispro (HUMALOG) 100 unit/mL kwikpen 10 Units by SubCUTAneous route three (3) times daily (with meals). 11/2/17  Yes Mariela Garcia MD   ferrous sulfate (SLOW FE) 142 mg (45 mg iron) ER tablet Take 142 mg by mouth Daily (before breakfast). Yes Historical Provider   aspirin-dipyridamole (AGGRENOX)  mg per SR capsule TAKE 1 CAPSULE BY MOUTH TWICE DAILY( EVERY TWELVE HOURS) 9/4/17  Yes Mariela Garcia MD   Cholecalciferol, Vitamin D3, 2,000 unit cap Take 1 Cap by mouth daily. Yes Historical Provider     Allergies   Allergen Reactions    Zestril [Lisinopril] Swelling      Review of Systems   Constitutional: Positive for activity change, fatigue and fever. Negative for chills and diaphoresis. HENT: Negative. Eyes: Negative. Respiratory: Positive for chest tightness and shortness of breath. Negative for cough and wheezing. Cardiovascular: Positive for chest pain and leg swelling. Negative for palpitations. Gastrointestinal: Negative. Endocrine: Negative. Genitourinary: Negative. Musculoskeletal: Positive for arthralgias and myalgias. Skin: Positive for color change and wound. Allergic/Immunologic: Negative. Neurological: Positive for weakness. Hematological: Negative. Psychiatric/Behavioral: Negative. Objective:       Patient Vitals for the past 24 hrs:   BP Temp Pulse Resp SpO2 Height Weight   03/07/18 0743 - - - - 99 % - -   03/07/18 0738 136/45 97.9 °F (36.6 °C) (!) 58 17 98 % - -   03/07/18 0334 - - - - - 5' (1.524 m) 134.8 kg (297 lb 3.2 oz)   03/07/18 0157 131/66 97.7 °F (36.5 °C) (!) 59 16 96 % - -   03/07/18 0149 - - - - 98 % - -   03/06/18 2231 (!) 105/39 98.6 °F (37 °C) 60 20 93 % - -   03/06/18 2135 121/53 - 60 - - - -   03/06/18 2015 114/48 98.8 °F (37.1 °C) (!) 58 20 96 % - -   03/06/18 1643 127/59 99 °F (37.2 °C) 60 19 92 % - -   03/06/18 1626 127/59 98.3 °F (36.8 °C) 60 18 98 % - -   03/06/18 1225 123/65 98.7 °F (37.1 °C) (!) 52 17 96 % - -   03/06/18 0914 - - - - 96 % - -   03/06/18 0855 136/62 98.5 °F (36.9 °C) 62 19 95 % - -     Physical Exam   Constitutional:   Disheveled unkept morbidly obese female in NAD    HENT:   Head: Normocephalic. Eyes: Pupils are equal, round, and reactive to light. Neck: Normal range of motion. Cardiovascular: Normal rate and regular rhythm. Murmur heard. Systolic murmur is present with a grade of 3/6   Pulses:       Femoral pulses are 2+ on the right side, and 2+ on the left side. Unable to palpate POP, PT, or DP pulses secondary to BLE edema. Feet are warm to the touch BL. Pulmonary/Chest: No accessory muscle usage. No tachypnea. No respiratory distress. She has decreased breath sounds in the right lower field and the left lower field. She has wheezes. Abdominal: Soft. Normal appearance and bowel sounds are normal.   Morbidly obese with large pannus    Genitourinary:   Genitourinary Comments: Periwick   Musculoskeletal:   Generalized weakness throughout with the exception of BL upper extremities. Neurological: She is alert. Skin:   Cutaneous candidiasis to skin folds of breast, abd, and groin. Multiple open wounds to her BLE.   Right foot noted to have purulent drainage. Stasis dermatitis of BLE. Psychiatric: Her affect is blunt. She is withdrawn. Flat affect. Patient's spouse does most of the speaking for the patient. Pertinent Test Results:   Recent Results (from the past 24 hour(s))   GLUCOSE, POC    Collection Time: 03/06/18  8:54 AM   Result Value Ref Range    Glucose (POC) 140 (H) 65 - 100 mg/dL    Performed by Teresa Garcia, POC    Collection Time: 03/06/18 12:28 PM   Result Value Ref Range    Glucose (POC) 143 (H) 65 - 100 mg/dL    Performed by Dany Damon, POC    Collection Time: 03/06/18  5:08 PM   Result Value Ref Range    Glucose (POC) 136 (H) 65 - 100 mg/dL    Performed by Sage Jones (PCT)    GLUCOSE, POC    Collection Time: 03/06/18  8:46 PM   Result Value Ref Range    Glucose (POC) 149 (H) 65 - 100 mg/dL    Performed by Fabrice Dennison    GLUCOSE, POC    Collection Time: 03/07/18  7:43 AM   Result Value Ref Range    Glucose (POC) 128 (H) 65 - 100 mg/dL    Performed by Sage Jones (PCT)          Results from [place] encounter on 03/05/18   XR FOOT RT AP/LAT   Narrative EXAM:  XR FOOT RT AP/LAT    INDICATION:   wound over lateral R foot, hx DM, febrile. COMPARISON:  None. FINDINGS:  Two views of the right foot demonstrate no fracture or other acute  osseous or articular abnormality. Prominent soft tissue swelling. No opaque  foreign body or gas in the soft tissue, small vessel calcifications suggest  diabetes. Calcaneal spurs. There is no radiological evidence to suggest  osteomyelitis. Mild DJD metatarsophalangeal joint. Impression IMPRESSION:  No acute bony abnormality. Assessmen/Plan:     Consult problems  Ulcer of right fifth toe due to diabetes mellitus    Rule out PAD     Toe index on the right is 0.72 coupled with moderate blood flow during her bedside procedure indicates that she is likely to heal a right foot wound.    Venkat to evaluate patient this afternoon patient will likely be cleared from a vascular standpoint to proceed with surgery. 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 VTE prophylaxis:  LMWH    Disposition:  Patient would benefit from SNF with possible transition to ECF if she has a payment source. I suspect patient will refuse this. Vascular surgery signing off. We appreciate the opportunity to participate in the care of Mrs. Diane Hughes. No further vascular evaluation, intervention, or follow up needed.             Signed By: Vidhi Simon NP     March 7, 2018

## 2018-03-07 NOTE — PROGRESS NOTES
Bedside shift change report given to Maylene Duane (oncoming nurse) by Dorene Waller (offgoing nurse). Report included the following information SBAR, Kardex, Intake/Output, MAR, Recent Results and Cardiac Rhythm . Paz Nelson Phone for oncoming shift:   [phone]    Shift Summary: Pt given PRN norco for pain per MAR. Wound care and podiatry consult today. LDAs               Peripheral IV 03/05/18 Left Antecubital (Active)   Site Assessment Clean, dry, & intact 3/6/2018  3:34 PM   Phlebitis Assessment 0 3/6/2018  3:34 PM   Infiltration Assessment 0 3/6/2018  3:34 PM   Dressing Status Clean, dry, & intact 3/6/2018  3:34 PM   Dressing Type Transparent;Tape 3/6/2018  3:34 PM   Hub Color/Line Status Pink;Flushed;Capped 3/6/2018  3:34 PM   Alcohol Cap Used Yes 3/5/2018  8:30 PM          External Female Catheter 03/05/18 (Active)   Site Assessment Clean, dry, & intact 3/6/2018  3:34 PM   Repositioned Yes 3/6/2018  3:34 PM   Perineal Care Yes 3/6/2018  3:34 PM   Wick Changed No 3/6/2018  3:34 PM   Suction Canister/Tubing Changed No 3/6/2018  3:34 PM   Urine Output (mL) 350 ml 3/6/2018  6:02 PM                  Intake & Output   Date 03/05/18 1900 - 03/06/18 0659 03/06/18 0700 - 03/07/18 0659   Shift 0537-3779 24 Hour Total 2581-4226 8015-5282 24 Hour Total   I  N  T  A  K  E   P.O.   360  360      P. O.   360  360    Shift Total  (mL/kg)   360  (2.8)  360  (2.8)   O  U  T  P  U  T   Urine  (mL/kg/hr) 400 400 350  (0.2)  350      Urine Voided 400 400         Urine Occurrence(s) 1 x 1 x         Urine Output (mL) (External Female Catheter 03/05/18)   350  350    Shift Total  (mL/kg) 400  (3.2) 400  (3.2) 350  (2.8)  350  (2.8)   NET -400 -400 10  10   Weight (kg) 126.7 126.7 126.7 126.7 126.7      Last Bowel Movement Last Bowel Movement Date: 03/04/18 (per )   Glucose Checks [] N/A  [] AC/HS  [] Q6  Concerns:   Nutrition Active Orders   Diet    DIET DIABETIC CONSISTENT CARB Regular       Consults []PT  []OT  []Speech  []Case Management Cardiac Monitoring []N/A []Yes Expires:

## 2018-03-07 NOTE — ROUTINE PROCESS

## 2018-03-07 NOTE — ROUTINE PROCESS
Bedside shift change report given to FANY (oncoming nurse) by Santiago Pate (offgoing nurse). Report included the following information SBAR, Kardex, Intake/Output, MAR, Accordion, Recent Results and Med Rec Status.

## 2018-03-07 NOTE — PROGRESS NOTES
Pharmacy Automatic Renal Dosing Protocol - Antimicrobials    Indication for Antimicrobials: Cellulitis    Current Regimen of Each Antimicrobial:  Vancomycin 1750 mg IV Q24H (Start 3/6/18 - Day 2)  Cefepime 2g IV Q12H (Start 3/6/18 - Day 2)  Metronidazole 500 mg IV Q8H (Start 3/7/18 - Day 1)      Significant Cultures:     Urine culture 3/5, - NG - Final  Paired blood culture 3/5 - NG - Prelim  Toe Wound 3/6 - NG - Prelim  Toe Wound Anaerobic  3/6 - Pending      Labs:  Recent Labs      18   1234  18   0531  18   1214   CREA  1.96*  1.25*  1.26*   BUN  33*  24*  24*   WBC  7.0  6.3  7.3     Temp (24hrs), Av.3 °F (36.8 °C), Min:97.7 °F (36.5 °C), Max:99 °F (37.2 °C)        Paralysis, amputations, malnutrition: no  Creatinine Clearance (mL/min) 21.1 ml/min    Impression/Plan:     Empirically adjust Vancomycin to Q36H and Cefepime to Q24H due to increase in serum creatinine    Random level with AM labs. Pharmacy will follow daily and adjust medications as appropriate for renal function and/or serum levels. Thank you,  Ron Garcia PHARMD      Recommended duration of therapy  [email or web address]. [email or web address]    Renal Dosing  [email or web address]/[email or web address]/Renal%[email or web address]. pdf

## 2018-03-08 NOTE — PROGRESS NOTES
Bedside and Verbal shift change report given to Meme (oncoming nurse) by Mary Burn JULIETTE (offgoing nurse). Report included the following information Kardex, MAR and Recent Results. Zone Phone for oncoming shift:   [phone]    Shift Summary: Resting quietly    LDAs         PICC Double Lumen 03/07/18 Right;Brachial (Active)   Central Line Being Utilized Yes 3/7/2018  3:13 PM   Criteria for Appropriate Use Long term IV/antibiotic administration 3/7/2018  3:13 PM   Site Assessment Clean, dry, & intact 3/7/2018  3:13 PM   Phlebitis Assessment 0 3/7/2018  3:13 PM   Infiltration Assessment 0 3/7/2018  3:13 PM   Arm Circumference (cm) 39 cm 3/7/2018 10:31 AM   Date of Last Dressing Change 03/07/18 3/7/2018  3:13 PM   Dressing Status Clean, dry, & intact 3/7/2018  3:13 PM   Action Taken Other (comment) 3/7/2018 10:31 AM   External Catheter Length (cm) 0 centimeters 3/7/2018 10:31 AM   Dressing Type Disk with Chlorhexadine gluconate (CHG); Transparent 3/7/2018  3:13 PM   Hub Color/Line Status Purple;Capped 3/7/2018  3:13 PM   Positive Blood Return (Site #1) Yes 3/7/2018  3:13 PM   Hub Color/Line Status Red;Capped 3/7/2018  3:13 PM   Positive Blood Return (Site #2) Yes 3/7/2018  3:13 PM   Alcohol Cap Used Yes 3/7/2018  3:13 PM                              Intake & Output   Date 03/06/18 1900 - 03/07/18 0659 03/07/18 0700 - 03/08/18 0659   Shift 9671-6697 24 Hour Total 1363-4788 5208-2025 24 Hour Total   I  N  T  A  K  E   P.O.  360 240 120 360      P. O.  360 240 120 360    I.V.  (mL/kg/hr)   700  (0.4)  700      Volume (vancomycin (VANCOCIN) 1750 mg in  ml infusion)   500  500      Volume (cefepime (MAXIPIME) 2 g in 0.9 %  mL IVPB)   100  100      Volume (metroNIDAZOLE (FLAGYL) IVPB premix 500 mg)   100  100    Shift Total  (mL/kg)  360  (2.7) 940  (7) 120  (0.9) 1060  (7.9)   O  U  T  P  U  T   Urine  (mL/kg/hr)  350         Urine Occurrence(s)   2 x  2 x      Urine Output (mL) ([REMOVED] External Female Catheter 03/05/18)  350       Shift Total  (mL/kg)  350  (2.6)      NET  10    Weight (kg) 134.8 134.8 134.8 134.8 134.8      Last Bowel Movement Last Bowel Movement Date: 03/04/18 (per )   Glucose Checks [] N/A  [x] AC/HS  [] Q6  Concerns:   Nutrition Active Orders   Diet    DIET DIABETIC CONSISTENT CARB Regular       Consults []PT  []OT  []Speech  []Case Management   Cardiac Monitoring []N/A [x]Yes Expires:

## 2018-03-08 NOTE — PROGRESS NOTES
Problem: Self Care Deficits Care Plan (Adult)  Goal: *Acute Goals and Plan of Care (Insert Text)  Occupational Therapy Goals  Initiated 3/8/2018  1. Patient will perform self-feeding with moderate assistance  within 7 day(s). 2.  Patient will perform grooming with moderate assistance  within 7 day(s). 3.  Patient will perform bathing UB with moderate assistance  within 7 day(s). 4. Patient will be able to sit on EOB with max assist of 2 for 3 minutes, in prep for ADLs, with in 7days  5. Patient will be able to perform BUE ex with min assist with in 7days  Occupational Therapy EVALUATION  Patient: Elena Shipley (78 y.o. female)  Date: 3/8/2018  Primary Diagnosis: Sepsis ([address]  Osteomylitis  Procedure(s) (LRB):  RIGHT 5TH TOE AMPUTATION, PARTIAL RIGHT 5TH METATARSAL (REQ Pulse Lavage and TPS Power Saw) Anesthesia-Sedation with Ankle Block (Right)     Precautions:   Fall, Skin (O2 use is new)    ASSESSMENT :  Based on the objective data described below, the patient presents with generalized weakness, decreased endurance, strength, functional mobility, ADLs and cognition. Pt was living at home with family and per her  was only able to assist with standing to [facility] to [facility] and was max assist for ADLs and was able to feed herself most days. Pt was not able to state her name until OT said pts name.  states that pt has to get her thought together some times before she speaks. Pt was not oriented to place, situation or date. She was able to remember that is was March after she was told. Pt has functional range in BUE and her right UE is decreased but functional and her  strength is weak. Pt was on 2 liters of NC and her  states that she was not using O2 at home and O2% was 97% at rest at eval.  Pt was scooted up in bed and placed in chair position and pillows placed behind her to assist with breathing.   Pt was able to tolerate sitting in chair position during eval.  Spoke at length with  and he states that pt is able to take weight on her legs with his asisst in order to stand and transfer to [facility] but her legs are getting weaker. He wants pt to work on BLE strength also. Explained to  that when he spoke with PT , the impression was that pt has not been walking for some time and that  did not want PT due to pt was at her baseline.  stated that he may have given the wrong impresson and does want PT and requested order for PT , nursing stated that she would contact dr and put orders in for PT. Pt was left sitting in bed and educated pt on using her arms for washing her face , arms, and hands and feeding herself in order to get stronger and be able to return home with her . Recommendations at this time are for pt to have rehab at [facility] and  was not sure about staying at [facility] and talked at length to him about being home and home care and that a facility would have more equipment that could be used to assist pt with  Standing and becoming stronger. OT to see pt 3 times a week for therapy in order to work on increasing her overall endurance and  stated that he knew pt and himself needed to do more when therapy was not with them. Patient will benefit from skilled intervention to address the above impairments.   Patients rehabilitation potential is considered to be Guarded  Factors which may influence rehabilitation potential include:   []             None noted  [x]             Mental ability/status  []             Medical condition  []             Home/family situation and support systems  []             Safety awareness  []             Pain tolerance/management  []             Other:      PLAN :  Recommendations and Planned Interventions:  [x]               Self Care Training                  []        Therapeutic Activities  [x]               Functional Mobility Training    []        Cognitive Retraining  []               Therapeutic Exercises [x]        Endurance Activities  []               Balance Training                   []        Neuromuscular Re-Education  []               Visual/Perceptual Training     [x]   Home Safety Training  [x]               Patient Education                 [x]        Family Training/Education  []               Other (comment):    Frequency/Duration: Patient will be followed by occupational therapy 3 times a week to address goals. Discharge Recommendations: Antonio Connelly  Further Equipment Recommendations for Discharge: tbd     SUBJECTIVE:   Patient stated Shawn.     OBJECTIVE DATA SUMMARY:   HISTORY:   Past Medical History:   Diagnosis Date    Acute combined systolic and diastolic heart failure, NYHA class 2 ([address] 7/24/2017    Anemia 7/24/2017    Aortic stenosis, mild 7/24/2017    Bilateral edema of lower extremity 7/24/2017    BMI 45.0-49.9, adult ([facility]) 7/24/2017    Chronic diastolic CHF (congestive heart failure), NYHA class 3 ([address] 7/24/2017    CKD (chronic kidney disease) stage 2, GFR 60-89 ml/min     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 2 diabetes mellitus ([address] 7/24/2017    Diabetes ([address]     Fatty liver 7/24/2017    Gastroparesis     GERD (gastroesophageal reflux disease)     Gout     HTN (hypertension)     Hyperlipemia     Hyperparathyroidism ([address]     Hypertension 7/24/2017    Insomnia 7/24/2017    Low back pain 7/24/2017    N&V (nausea and vomiting) 7/24/2017    Nausea & vomiting     Obesity     Pelvic joint pain 7/24/2017    Peripheral neuropathy 7/24/2017    Sciatica     right    Severe right groin pain 7/24/2017    Sinus tachycardia 7/24/2017    Stage 2 chronic kidney disease due to benign hypertension 7/24/2017    Stasis dermatitis 7/24/2017    Stroke ([address] 2001    right parietal, left caudate    Thyroid disease     para thyroid(high calcium)    Ulcer of right fifth toe due to diabetes mellitus ([address] 7/24/2017     Past Surgical History:   Procedure Laterality Date  CHEST SURGERY PROCEDURE UNLISTED  10/24/13    PARATHYROID EXPLORATION     DELIVERY       HX  SECTION      HX CHOLECYSTECTOMY      HX OTHER SURGICAL      I&D right thigh abscess       Prior Level of Function/Environment/Context: pt lives with  and he is the main caregiver for pt. Pt is total care per  and she is able to feed herself at times  Occupations in which the patient is/was successful, what are the barriers preventing that success: deconditioning    Expanded or extensive additional review of patient history:     Home Situation  Home Environment: Private residence  One/Two Story Residence: One story  Living Alone: No  Support Systems: Therapist, Other (comments) (nursing)  Patient Expects to be Discharged to[de-identified] Private residence  Current DME Used/Available at Home: Adaptive bathing aides  [x]  Right hand dominant   []  Left hand dominant    EXAMINATION OF PERFORMANCE DEFICITS:  Cognitive/Behavioral Status:  Neurologic State: Alert;Confused  Orientation Level: Oriented to person  Cognition: Follows commands;Memory loss; Impaired decision making  Perception: Appears intact  Perseveration: No perseveration noted  Safety/Judgement: Fall prevention    Skin: in fair health     Edema: none noted    Hearing: Auditory  Auditory Impairment: None    Vision/Perceptual:                    grossly intact                 Range of Motion:    AROM: Generally decreased, functional  PROM: Generally decreased, functional                      Strength:    Strength: Generally decreased, functional                Coordination:  Coordination: Generally decreased, functional  Fine Motor Skills-Upper: Left Intact; Right Intact    Gross Motor Skills-Upper: Left Impaired;Right Impaired    Tone & Sensation:    Tone: Normal  Sensation: Intact                      Balance:       Functional Mobility and Transfers for ADLs:  Bed Mobility:       Transfers:       ADL Assessment:  Feeding: Maximum assistance    Oral Facial Hygiene/Grooming: Total assistance    Bathing: Total assistance    Upper Body Dressing: Total assistance    Lower Body Dressing: Total assistance    Toileting: Total assistance           Cognitive Retraining  Safety/Judgement: Fall prevention         Functional Measure:  Barthel Index:    Bathin  Bladder: 0  Bowels: 0  Groomin  Dressin  Feedin  Mobility: 0  Stairs: 0  Toilet Use: 0  Transfer (Bed to Chair and Back): 0  Total: 0       Barthel and G-code impairment scale:  Percentage of impairment CH  0% CI  1-19% CJ  20-39% CK  40-59% CL  60-79% CM  80-99% CN  100%   Barthel Score 0-100 100 99-80 79-60 59-40 20-39 1-19   0   Barthel Score 0-20 20 17-19 13-16 9-12 5-8 1-4 0      The Barthel ADL Index: Guidelines  1. The index should be used as a record of what a patient does, not as a record of what a patient could do. 2. The main aim is to establish degree of independence from any help, physical or verbal, however minor and for whatever reason. 3. The need for supervision renders the patient not independent. 4. A patient's performance should be established using the best available evidence. Asking the patient, friends/relatives and nurses are the usual sources, but direct observation and common sense are also important. However direct testing is not needed. 5. Usually the patient's performance over the preceding 24-48 hours is important, but occasionally longer periods will be relevant. 6. Middle categories imply that the patient supplies over 50 per cent of the effort. 7. Use of aids to be independent is allowed. Jena Corado., Barthel, D.W. ([id number]). Functional evaluation: the Barthel Index. [address] [facility] (14)2. Sofia Macias nicholas MARGI Taylor, Tanvir Staples.[place]., [place], [facility] (). Measuring the change indisability after inpatient rehabilitation; comparison of the responsiveness of the Barthel Index and Functional [place] Measure.  Journal of Neurology, Neurosurgery, and Psychiatry, 66(4), 182-408. DARRICK Lopes.MONICA, MONY Grover, & Farzaneh Kaufman M.A. (2004.) Assessment of post-stroke quality of life in cost-effectiveness studies: The usefulness of the Barthel Index and the EuroQoL-5D. Quality of Life Research, 13, 124-14         G codes: In compliance with CMSs Claims Based Outcome Reporting, the following G-code set was chosen for this patient based on their primary functional limitation being treated: The outcome measure chosen to determine the severity of the functional limitation was the Barthel with a score of 0/100 which was correlated with the impairment scale. ? Self Care:     - CURRENT STATUS: CN - 100% impaired, limited or restricted    - GOAL STATUS: CM - 80%-99% impaired, limited or restricted    - D/C STATUS:  ---------------To be determined---------------     Occupational Therapy Evaluation Charge Determination   History Examination Decision-Making   HIGH Complexity : Extensive review of history including physical, cognitive and psychosocial history  MEDIUM Complexity : 3-5 performance deficits relating to physical, cognitive , or psychosocial skils that result in activity limitations and / or participation restrictions MEDIUM Complexity : Patient may present with comorbidities that affect occupational performnce. Miniml to moderate modification of tasks or assistance (eg, physical or verbal ) with assesment(s) is necessary to enable patient to complete evaluation       Based on the above components, the patient evaluation is determined to be of the following complexity level: MEDIUM  Pain:  Pain Scale 1: Numeric (0 - 10)  Pain Intensity 1: 0              Activity Tolerance:   vss  Please refer to the flowsheet for vital signs taken during this treatment.   After treatment:   [] Patient left in no apparent distress sitting up in chair  [x] Patient left in no apparent distress in bed  [x] Call bell left within reach  [x] Nursing notified  [x] Caregiver present  [] Bed alarm activated    COMMUNICATION/EDUCATION:   The patients plan of care was discussed with: Physical Therapist, Registered Nurse and husbnad. [x] Home safety education was provided and the patient/caregiver indicated understanding. [] Patient/family have participated as able in goal setting and plan of care. [] Patient/family agree to work toward stated goals and plan of care. [] Patient understands intent and goals of therapy, but is neutral about his/her participation. [x] Patient is unable to participate in goal setting and plan of care. This patients plan of care is appropriate for delegation to \[facility]\"".     Thank you for this referral.  SONYA Murphy  Time Calculation: 45 mins

## 2018-03-08 NOTE — PROGRESS NOTES
Podiatry History and Physical    Subjective:  R foot still painful. No current fever or chills. Date of Service:  March 8, 2018    Patient is a 60 y.o.  female who is being seen for osteomyelitis and wound R foot.     Patient Active Problem List    Diagnosis Date Noted    Sepsis ([address] 03/05/2018    Obesity hypoventilation syndrome ([address] 02/19/2018    Type 2 diabetes mellitus with diabetic neuropathy ([address] 01/02/2018    Venous stasis dermatitis of both lower extremities 11/09/2017    B12 deficiency 10/31/2017    (HFpEF) heart failure with preserved ejection fraction ([address] 10/31/2017    PE (physical exam), annual 10/11/2017    Gout 10/11/2017    History of hyperparathyroidism 10/11/2017    GERD (gastroesophageal reflux disease) 10/11/2017    Insomnia 07/24/2017    Sinus tachycardia 07/24/2017    Anemia 07/24/2017    Ulcer of right fifth toe due to diabetes mellitus ([address] 07/24/2017    Peripheral neuropathy 07/24/2017    Chronic diastolic CHF (congestive heart failure), NYHA class 3 ([address] 07/24/2017    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 2 diabetes mellitus ([address] 07/24/2017    Stage 2 chronic kidney disease due to benign hypertension 07/24/2017    Hypertension 07/24/2017    BMI 45.0-49.9, adult ([address] 07/24/2017    Fatty liver 07/24/2017    Stasis dermatitis 07/24/2017    Low back pain 07/24/2017    Aortic stenosis, mild 07/24/2017    Bilateral edema of lower extremity 07/24/2017    ASCVD (arteriosclerotic cardiovascular disease) 05/31/2014    Stroke ([address] 05/30/2014    Diabetic polyneuropathy ([address] 04/20/2013    Right leg weakness 04/18/2013    Diabetes mellitus type II, uncontrolled ([address] 04/18/2013    CKD (chronic kidney disease) stage 3, GFR 30-59 ml/min     Hyperlipemia     Diabetic gastroparesis ([address]     Diabetes ([address] 01/26/2011    Morbid obesity ([address] 01/26/2011     Past Medical History:   Diagnosis Date    Acute combined systolic and diastolic heart failure, NYHA class 2 [facility] 2017    Anemia 2017    Aortic stenosis, mild 2017    Bilateral edema of lower extremity 2017    BMI 45.0-49.9, adult ([address] 2017    Chronic diastolic CHF (congestive heart failure), NYHA class 3 ([address] 2017    CKD (chronic kidney disease) stage 2, GFR 60-89 ml/min     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 2 diabetes mellitus ([address] 2017    Diabetes ([address]     Fatty liver 2017    Gastroparesis     GERD (gastroesophageal reflux disease)     Gout     HTN (hypertension)     Hyperlipemia     Hyperparathyroidism ([address]     Hypertension 2017    Insomnia 2017    Low back pain 2017    N&V (nausea and vomiting) 2017    Nausea & vomiting     Obesity     Pelvic joint pain 2017    Peripheral neuropathy 2017    Sciatica     right    Severe right groin pain 2017    Sinus tachycardia 2017    Stage 2 chronic kidney disease due to benign hypertension 2017    Stasis dermatitis 2017    Stroke ([address]     right parietal, left caudate    Thyroid disease     para thyroid(high calcium)    Ulcer of right fifth toe due to diabetes mellitus ([address] 2017      Family History   Problem Relation Age of Onset    Cancer Mother      cervical    Diabetes Mother     Hypertension Mother     Stroke Mother 79    Diabetes Father     Hypertension Father     Kidney Disease Father     Diabetes Brother     Hypertension Brother     Stroke Brother 61      Social History   Substance Use Topics    Smoking status: Never Smoker    Smokeless tobacco: Never Used    Alcohol use Yes      Comment: rare     Past Surgical History:   Procedure Laterality Date    CHEST SURGERY PROCEDURE UNLISTED  10/24/13    PARATHYROID EXPLORATION     DELIVERY       HX  SECTION      HX CHOLECYSTECTOMY      HX OTHER SURGICAL      I&D right thigh abscess      Prior to Admission medications    Medication Sig Start Date End Date Taking?  Authorizing Provider   acetaminophen (TYLENOL EXTRA STRENGTH) 500 mg tablet Take 1,000 mg by mouth every six (6) hours as needed for Pain. Yes Historical Provider   atenolol (TENORMIN) 50 mg tablet Take 50 mg by mouth daily. Yes Historical Provider   chlorhexidine (HIBICLENS) 4 % liquid Apply  to affected area every other day. Yes Historical Provider   colestipol (COLESTID) 1 gram tablet Take 1 g by mouth daily. Yes Historical Provider   glipiZIDE (GLUCOTROL) 10 mg tablet Take 10 mg by mouth two (2) times a day. Yes Historical Provider   insulin detemir U-100 (LEVEMIR FLEXTOUCH U-100 INSULN) 100 unit/mL (3 mL) inpn 50 Units by SubCUTAneous route two (2) times a day. Yes Historical Provider   ammonium lactate (LAC-HYDRIN) 12 % lotion Apply 1 Each to affected area every other day. rub in to affected area well 11/2/17  Yes Vandana Barksdale MD   Bifidobacterium Infantis 4 mg cap Take 4 mg by mouth daily. Yes Historical Provider   nystatin (MYCOSTATIN) topical cream Apply  to affected area two (2) times a day. Use for groin rash 2/19/18  Yes Vandana Barksdale MD   bumetanide [facility]) 2 mg tablet Take 3 mg by mouth daily. Yes Vandana Barksdale MD   UBIDECARENONE (COQ-10 PO) Take 1 Tab by mouth daily. Yes Historical Provider   allopurinol (ZYLOPRIM) 300 mg tablet TAKE 1 TABLET BY MOUTH EVERY DAY 2/10/18  Yes Vandana Barksdale MD   dilTIAZem [facility]) 360 mg ER capsule TAKE 1 CAPSULE BY MOUTH EVERY DAY 2/3/18  Yes Vandana Barksdale MD   atorvastatin (LIPITOR) 40 mg tablet TAKE 1 TABLET BY MOUTH EVERY DAY 1/28/18  Yes Vandana Barksdale MD   hydrALAZINE (APRESOLINE) 50 mg tablet TAKE 1 TABLET BY MOUTH THREE TIMES DAILY 1/8/18  Yes Ahmet Gonzalez II, MD   LYRICA 75 mg capsule TAKE ONE CAPSULE BY MOUTH DAILY AT LUNCH TIME AND AGAIN AT BEDTIME. 12/26/17  Yes Vandana Barksadle MD   insulin lispro (HUMALOG) 100 unit/mL kwikpen 10 Units by SubCUTAneous route three (3) times daily (with meals).  11/2/17  Yes Vandana Barksdale, MD   ferrous sulfate (SLOW FE) 142 mg (45 mg iron) ER tablet Take 142 mg by mouth Daily (before breakfast). Yes Historical Provider   aspirin-dipyridamole (AGGRENOX)  mg per SR capsule TAKE 1 CAPSULE BY MOUTH TWICE DAILY( EVERY TWELVE HOURS) 17  Yes Timothy Hopkins MD   Cholecalciferol, Vitamin D3, 2,000 unit cap Take 1 Cap by mouth daily. Yes Historical Provider     Allergies   Allergen Reactions    Zestril [Lisinopril] Swelling        Review of Systems:  +SOB, no fever or nausea. Objective:     Patient Vitals for the past 8 hrs:   BP Temp Pulse Resp SpO2   18 1101 134/62 97.8 °F (36.6 °C) 78 17 96 %   18 0802 168/58 98.3 °F (36.8 °C) 76 15 95 %     Temp (24hrs), Av.2 °F (36.8 °C), Min:97.8 °F (36.6 °C), Max:99 °F (37.2 °C)    Lower Extremity Exam:  Wound R 5th MPJ still draining creamy pus, other superficial wounds near base of R 5th metatarsal and dorsal lateral R foot have become dry eschars. DP and PT palpable through severe pitting edema B feet and legs. Sensation absent to light touch B toes. MRI: Images and report viewed. Consistent with osteomyelitis R 5th metatarsal head and base of 5th toe as well as septic joint 5th MPJ. No drainable abscess.     Data Review:   Recent Results (from the past 24 hour(s))   GLUCOSE, POC    Collection Time: 18  4:13 PM   Result Value Ref Range    Glucose (POC) 113 (H) 65 - 100 mg/dL    Performed by [facility], POC    Collection Time: 18  8:40 PM   Result Value Ref Range    Glucose (POC) 132 (H) 65 - 100 mg/dL    Performed by Ashok BASIC    Collection Time: 18  3:58 AM   Result Value Ref Range    Sodium 147 (H) 136 - 145 mmol/L    Potassium 4.1 3.5 - 5.1 mmol/L    Chloride 114 (H) 97 - 108 mmol/L    CO2 23 21 - 32 mmol/L    Anion gap 10 5 - 15 mmol/L    Glucose 67 65 - 100 mg/dL    BUN 33 (H) 6 - 20 MG/DL    Creatinine 1.78 (H) 0.55 - 1.02 MG/DL    BUN/Creatinine ratio 19 12 - 20      GFR est AA 35 (L) >60 ml/min/1.73m2    GFR est non-AA 29 (L) >60 ml/min/1.73m2    Calcium 8.2 (L) 8.5 - 10.1 MG/DL   CBC W/O DIFF    Collection Time: 03/08/18  3:58 AM   Result Value Ref Range    WBC 6.6 3.6 - 11.0 K/uL    RBC 2.92 (L) 3.80 - 5.20 M/uL    HGB 8.4 (L) 11.5 - 16.0 g/dL    HCT 28.3 (L) 35.0 - 47.0 %    MCV 96.9 80.0 - 99.0 FL    MCH 28.8 26.0 - 34.0 PG    MCHC 29.7 (L) 30.0 - 36.5 g/dL    RDW 17.2 (H) 11.5 - 14.5 %    PLATELET 228 626 - 526 K/uL    MPV 11.5 8.9 - 12.9 FL    NRBC 0.3 (H) 0  WBC    ABSOLUTE NRBC 0.02 (H) 0.00 - 0.01 K/uL   VANCOMYCIN, RANDOM    Collection Time: 03/08/18  3:58 AM   Result Value Ref Range    Vancomycin, random 34.5 UG/ML   GLUCOSE, POC    Collection Time: 03/08/18  8:06 AM   Result Value Ref Range    Glucose (POC) 74 65 - 100 mg/dL    Performed by [address], POC    Collection Time: 03/08/18  8:07 AM   Result Value Ref Range    Glucose (POC) 74 65 - 100 mg/dL    Performed by [address], POC    Collection Time: 03/08/18  8:31 AM   Result Value Ref Range    Glucose (POC) 80 65 - 100 mg/dL    Performed by [address], POC    Collection Time: 03/08/18 11:04 AM   Result Value Ref Range    Glucose (POC) 66 65 - 100 mg/dL    Performed by [address], POC    Collection Time: 03/08/18 11:05 AM   Result Value Ref Range    Glucose (POC) 73 65 - 100 mg/dL    Performed by [address], POC    Collection Time: 03/08/18 11:23 AM   Result Value Ref Range    Glucose (POC) 69 65 - 100 mg/dL    Performed by Jagdeep Ann    GLUCOSE, POC    Collection Time: 03/08/18 11:47 AM   Result Value Ref Range    Glucose (POC) 132 (H) 65 - 100 mg/dL    Performed by Roberto Mckeon (PCT)          Impression:   Cellulitis R foot  Osteomyelitis R 5th metatarsal head and base of 5th toe  Diabetic ulcer R foot      Recommendation:   Discussed MRI results and options with pt.  I recommended amputation of the R 5th toe and partial R 5th metatarsal. Total, primary closure may not be possible or desirable given the wound and skin quality, but I do not expect the wound care to be complicated. Risks and recovery explained, including risk of failure to heal and additional procedures needed. Pt and  agree to the Sx plan. I will try to arrange OR time for Saturday morning (3/10/18).

## 2018-03-08 NOTE — PROGRESS NOTES
HYPOGLYCEMIC EPISODE DOCUMENTATION    Patient with hypoglycemic episode(s) at 1105(time) on 03/08/2018(date). BG value(s) pre-treatment 68    Was patient symptomatic? [] yes, [x] no  Patient was treated with the following rescue medications/treatments: [] D50                [] Glucose tablets                [] Glucagon                [x] 4oz juice                [x] 6oz reg soda                [] 8oz low fat milk  BG value post-treatment: 69  Pt unable to drink the juice and soda offered. Pt refused glucose tablets. 12.5 grams of D50 given. BG value post-treatment: 132  Once BG treated and value greater than 80mg/dl, pt was provided with the following:  [x] snack  [] meal  Name of MD notified:Prateek  The following orders were received: Decrease bedtime lantus to 25 units. I also let Dr. Fabrizio Conn know that Pt has poor PO intake, with little to no appetite.

## 2018-03-08 NOTE — DIABETES MGMT
DTC Progress Note    Recommendations/ Comments: If appropriate, please consider:  1) decreasing pt's Lantus to 46 units bid due to hypoglycemia fasting this am.  2) delay restarting prandial insulin until pt's po intakes are consistently > 50% of meals offered. Current hospital DM medication: Lantus 50 units bid, Lispro correctional insulin - normal sensitivity ac and hs. Chart reviewed on [facility] due to hypoglycemia event this am on with only basal insulin on board - 74 mg/dl 0806 hr.     Patient is a 60 y.o. female with known DM on Levemir 50 units bid, Glipizide 10 mg bid and Lispro 10 units tid ac. A1c:   Lab Results   Component Value Date/Time    Hemoglobin A1c 7.6 (H) 10/27/2017 10:19 AM    Hemoglobin A1c 11.2 (H) 10/01/2015 01:04 PM       Recent Glucose Results: Lab Results   Component Value Date/Time    GLU 67 03/08/2018 03:58 AM     (H) 03/07/2018 12:34 PM    GLUCPOC 80 03/08/2018 08:31 AM    GLUCPOC 74 03/08/2018 08:07 AM    GLUCPOC 74 03/08/2018 08:06 AM        Lab Results   Component Value Date/Time    Creatinine 1.78 (H) 03/08/2018 03:58 AM     Estimated Creatinine Clearance: 42.1 mL/min (based on Cr of 1.78). Active Orders   Diet    DIET DIABETIC CONSISTENT CARB Regular        PO intake: Patient Vitals for the past 72 hrs:   % Diet Eaten   03/07/18 1904 10 %   03/07/18 1107 20 %   03/07/18 0954 20 %   03/06/18 1225 10 %   03/06/18 0930 15 %       Will continue to follow as needed.     Thank you  Renetta Mora [address] [place] [address] [phone]

## 2018-03-08 NOTE — PROGRESS NOTES
Bedside shift change report given to [facility] (oncoming nurse) by Ulysses Benavides (offgoing nurse). Report included the following information SBAR, Kardex, Intake/Output, MAR, Recent Results and Cardiac Rhythm . [place] [facility] [employer] Phone for oncoming shift:   [phone]    Shift Summary: PICC inserted today. Wound care completed per order. Pt off floor for MRI at this time. LDAs         PICC Double Lumen 03/07/18 Right;Brachial (Active)   Central Line Being Utilized Yes 3/7/2018  3:13 PM   Criteria for Appropriate Use Long term IV/antibiotic administration 3/7/2018  3:13 PM   Site Assessment Clean, dry, & intact 3/7/2018  3:13 PM   Phlebitis Assessment 0 3/7/2018  3:13 PM   Infiltration Assessment 0 3/7/2018  3:13 PM   Arm Circumference (cm) 39 cm 3/7/2018 10:31 AM   Date of Last Dressing Change 03/07/18 3/7/2018  3:13 PM   Dressing Status Clean, dry, & intact 3/7/2018  3:13 PM   Action Taken Other (comment) 3/7/2018 10:31 AM   External Catheter Length (cm) 0 centimeters 3/7/2018 10:31 AM   Dressing Type Disk with Chlorhexadine gluconate (CHG); Transparent 3/7/2018  3:13 PM   Hub Color/Line Status Purple;Capped 3/7/2018  3:13 PM   Positive Blood Return (Site #1) Yes 3/7/2018  3:13 PM   Hub Color/Line Status Red;Capped 3/7/2018  3:13 PM   Positive Blood Return (Site #2) Yes 3/7/2018  3:13 PM   Alcohol Cap Used Yes 3/7/2018  3:13 PM                              Intake & Output   Date 03/06/18 1900 - 03/07/18 0659 03/07/18 0700 - 03/08/18 0659   Shift 7451-6043 24 Hour Total 4717-0120 4344-6982 24 Hour Total   I  N  T  A  K  E   P.O.  360 240 120 360      P. O.  360 240 120 360    I.V.  (mL/kg/hr)   700  (0.4)  700      Volume (vancomycin (VANCOCIN) 1750 mg in  ml infusion)   500  500      Volume (cefepime (MAXIPIME) 2 g in 0.9 %  mL IVPB)   100  100      Volume (metroNIDAZOLE (FLAGYL) IVPB premix 500 mg)   100  100    Shift Total  (mL/kg)  360  (2.7) 940  (7) 120  (0.9) 1060  (7.9)   O  U  T  P  U  T   Urine  (mL/kg/hr)  350 Urine Occurrence(s)   2 x  2 x      Urine Output (mL) ([REMOVED] External Female Catheter 03/05/18)  350       Shift Total  (mL/kg)  350  (2.6)      NET  10    Weight (kg) 134.8 134.8 134.8 134.8 134.8      Last Bowel Movement Last Bowel Movement Date: 03/04/18 (per )   Glucose Checks [] N/A  [] AC/HS  [] Q6  Concerns:   Nutrition Active Orders   Diet    DIET DIABETIC CONSISTENT CARB Regular       Consults []PT  []OT  []Speech  []Case Management   Cardiac Monitoring []N/A []Yes Expires:

## 2018-03-08 NOTE — PROGRESS NOTES
Pharmacy Automatic Renal Dosing Protocol - Antimicrobials    Indication for Antimicrobials: Cellulitis    Current Regimen of Each Antimicrobial:  Vancomycin 1750 mg IV Q24H (Start 3/6/18 - Day 3)  Cefepime 2g IV Q24H (Start 3/6/18 - Day 3)  Metronidazole 500 mg IV Q8H (Start 3/7/18 - Day 2)      Significant Cultures:     Urine culture 3/5, - NG - Final  Paired blood culture 3/5 - NG - Prelim  Toe Wound 3/6 - Few Possible Enterococcus - Prelim  Toe Wound Anaerobic  3/6 - Pending      Labs:  Recent Labs      18   0358  18   1234  18   0531   CREA  1.78*  1.96*  1.25*   BUN  33*  33*  24*   WBC  6.6  7.0  6.3     Temp (24hrs), Av.3 °F (36.8 °C), Min:97.8 °F (36.6 °C), Max:99 °F (37.2 °C)        Paralysis, amputations, malnutrition: no  Creatinine Clearance (mL/min) 23.2 ml/min    Impression/Plan:     Vancomycin Trough Analysis     Vancomycin Random 3/8/18 03:58 = 34.5 mcg/ml   Extrapolated Level = 14.14 mcg/ml    Will increase dose to 2000 mg IV Q36H for extrapolated trough of 16.16    Cefepime and Flagyl are dosed appropriately. Pharmacy will follow daily and adjust medications as appropriate for renal function and/or serum levels. Thank you,  Ana Diaz, PHARMD      Recommended duration of therapy  http://[facility]/[place]/[facility]/Pharmacy/Clinical%20Companion/Duration%20of%20ABX%20therapy. docx    Renal Dosing  http://[facility]/[place]/[facility]/Pharmacy/Clinical%20Companion/Renal%[id number]. pdf

## 2018-03-08 NOTE — PROGRESS NOTES
HYPOGLYCEMIC EPISODE DOCUMENTATION    Patient with hypoglycemic episode(s) at 0807(time) on 03/08/2018(date). BG value(s) pre-treatment 76    Was patient symptomatic?  [] yes, [x] no  Patient was treated with the following rescue medications/treatments: [] D50                [] Glucose tablets                [] Glucagon                [x] 4oz juice                [] 6oz reg soda                [] 8oz low fat milk  BG value post-treatment: 80  Once BG treated and value greater than 80mg/dl, pt was provided with the following:  [] snack  [x] meal  Name of MD notified:None  The following orders were received: None

## 2018-03-08 NOTE — PROGRESS NOTES
Problem: Falls - Risk of  Goal: *Absence of Falls  Document Missael Fall Risk and appropriate interventions in the flowsheet.    Outcome: Progressing Towards Goal  Fall Risk Interventions:       Mentation Interventions: Bed/chair exit alarm, Door open when patient unattended    Medication Interventions: Bed/chair exit alarm, Evaluate medications/consider consulting pharmacy    Elimination Interventions: Call light in reach, Patient to call for help with toileting needs, Toilet paper/wipes in reach

## 2018-03-08 NOTE — PROGRESS NOTES
[address]  [phone]      Medical Progress Note      NAME: Isis Lizarraga   :  1955  MRM:  [id number]    Date/Time: 3/8/2018  6:41 AM      Subjective:     Presented like Sepsis. Source likely diabetic foot wound with ? osteomyelitis. Morbidly obese and now has obesity hypoventilation syndrome. Hygiene has been terrible and has multiple areas of early pressure injuries and more significant right foot wound. Has refused PT/OT for months until recently and now bed and chair bound. Diabetes has been poorly controlled as well. MRI suggests osteomyelitis and further debridement scheduled for 3/10/18. Hgb lower and creatinine improved. Appreciate all consultants thoughts. MARYJO's appear adequate for healing. Will need compression Rx as well. Past Medical History reviewed and unchanged from Admission History and Physical and/or prior progress note/electronic medical record    Medications reviewed. ROS:      General: postive for weakness and fever  Ear Nose and Throat: negative for rhinorrhea, pharyngitis, otalgia, tinnitus, speech or swallowing difficulties  Respiratory:  positive for SOB and wheezing  Cardiology:  positive for PND, orthopnea and edema  Gastrointestinal: positive for nausea  Genitourinary: positive for incontinence  Dermatological: positive for chronic lichenification legs and feet and pressure sore areas  Neurological: positive for bilateral LE weakness R>L and speech difficulties    Objective:     Last 24hrs VS reviewed since prior progress note.  Most recent are:    Visit Vitals    /59    Pulse 77    Temp 97.8 °F (36.6 °C)    Resp 18    Ht 5' (1.524 m)    Wt 303 lb 12.8 oz (137.8 kg)    SpO2 93%    BMI 59.33 kg/m2     SpO2 Readings from Last 6 Encounters:   18 93%   18 (!) 79%   18 92%   18 94%   18 93%   18 94%    O2 Flow Rate (L/min): 3 l/min Intake/Output Summary (Last 24 hours) at 03/08/18 0641  Last data filed at 03/08/18 0616   Gross per 24 hour   Intake             1160 ml   Output              650 ml   Net              510 ml          Physical Exam:    General appearance: alert, cooperative, no distress, appears stated age  Eyes: negative  Neck: supple, symmetrical, trachea midline, no adenopathy, thyroid: not enlarged, symmetric, no tenderness/mass/nodules, no carotid bruit and no JVD  Lungs: scattered wheezes  Heart: regular rate and rhythm, S1, S2 normal, grade II/IV systolic murmur; No click, rub or gallop  Abdomen: soft, non-tender. Bowel sounds normal. No masses,  no organomegaly, morbidly obese  Extremities: chronic lichenification legs and feet; ulcer right fifth toe and lateral plantar surface  Neurologic: bilateral LE weakness;  ? Expressive aphasia    Data Review:    Lab:    BMP:   Lab Results   Component Value Date/Time     (H) 03/08/2018 03:58 AM    K 4.1 03/08/2018 03:58 AM     (H) 03/08/2018 03:58 AM    CO2 23 03/08/2018 03:58 AM    AGAP 10 03/08/2018 03:58 AM    GLU 67 03/08/2018 03:58 AM    BUN 33 (H) 03/08/2018 03:58 AM    CREA 1.78 (H) 03/08/2018 03:58 AM    GFRAA 35 (L) 03/08/2018 03:58 AM    GFRNA 29 (L) 03/08/2018 03:58 AM     CBC:   Lab Results   Component Value Date/Time    WBC 6.6 03/08/2018 03:58 AM    HGB 8.4 (L) 03/08/2018 03:58 AM    HCT 28.3 (L) 03/08/2018 03:58 AM     03/08/2018 03:58 AM     Recent Glucose Results:   Lab Results   Component Value Date/Time    GLU 67 03/08/2018 03:58 AM     (H) 03/07/2018 12:34 PM     All labs reviewed in past 24 hours    Other pertinent lab: none    Imaging:    Reviewed. MARYJO's; ECHO    Assessment / Plan:      Active Problems:    Chronic diastolic CHF (congestive heart failure), NYHA class 3 ([facility] (7/24/2017)      Obesity hypoventilation syndrome ([facility] (2/19/2018)      Sepsis ([facility] (3/5/2018)      Diabetes mellitus type II, uncontrolled ([facility] (4/18/2013)      Diabetic gastroparesis (HCC) ()      Anemia (7/24/2017)      Ulcer of right fifth toe due to diabetes mellitus ([facility] (7/24/2017)      Hypertension (7/24/2017)      Bilateral edema of lower extremity (7/24/2017)      (HFpEF) heart failure with preserved ejection fraction ([facility] (10/31/2017)      Venous stasis dermatitis of both lower extremities (11/9/2017)      Morbid obesity ([facility] (1/26/2011)      CKD (chronic kidney disease) stage 3, GFR 30-59 ml/min ()      Fatty liver (7/24/2017)      Aortic stenosis, mild (7/24/2017)      BMI 45.0-49.9, adult ([facility] (7/24/2017)      Type 2 diabetes mellitus with diabetic neuropathy ([facility] (1/2/2018)        1. Continue current abx regimen pending further ID recommendations  2. Wound care  3. PT/OT/ST  4. Diurese  5. Follow labs  6. MRI foot and additional surgery per Dr. Carina Liraling noted  7. SSI  8.  She and  still considering DNR             Care Plan discussed with: Patient and Family    Discussed:  Code Status and Care Plan    Prophylaxis:  Lovenox    Disposition:  SNF/LTC  ___________________________________________________    Attending Physician: Jenny Oropeza MD

## 2018-03-08 NOTE — PROGRESS NOTES
Infectious Disease Progress Note       Subjective:   Ms Nuha Mccullough seen today. Her  was concerned that at times, she cannot communicate well and cannot say what she is thinking well. Other than that, no other issues expressed today. ROS: 10 point ROS obtained and pertinent positives include above. All others negative.          Objective:    Vitals:   Patient Vitals for the past 24 hrs:   Temp Pulse Resp BP SpO2   03/08/18 1101 97.8 °F (36.6 °C) 78 17 134/62 96 %   03/08/18 0802 98.3 °F (36.8 °C) 76 15 168/58 95 %   03/08/18 0405 97.8 °F (36.6 °C) 77 18 123/59 93 %   03/07/18 2121 98.3 °F (36.8 °C) 81 - 145/75 96 %   03/07/18 1437 99 °F (37.2 °C) 78 20 146/61 94 %       Physical Exam:  ¨ GEN: NAD  ¨ HEENT: no scleral icterus,  no thrush  ¨ CV: S1, S2 heard   ¨ Lungs: Clear to auscultation bilaterally anteriorly  ¨ Abdomen: soft, obese, non tender   ¨ Genitourinary: + mast  ¨ Extremities: B/L chronic venous changes noted, + R foot ulcer with purulent discharge, no odor noted, no surrounding erythema noted   ¨ Warm lower extremities   ¨ Neuro: alert to self, situation, verbal minimally  ¨ Skin: no rash  ¨ Psych: flat affect   ¨ Lines: PICC   Medications:    Current Facility-Administered Medications:     [START ON 3/9/2018] vancomycin (VANCOCIN) 2000 mg in  ml infusion, 2,000 mg, IntraVENous, Q36H, Amaya Sparks MD    metroNIDAZOLE (FLAGYL) IVPB premix 500 mg, 500 mg, IntraVENous, Q8H, Amaya Sparks MD, Last Rate: 100 mL/hr at 03/08/18 0909, 500 mg at 03/08/18 0909    albuterol-ipratropium (DUO-NEB) 2.5 MG-0.5 MG/3 ML, 3 mL, Nebulization, Q8H PRN, Tony Barton MD    cefepime (MAXIPIME) 2 g in 0.9 %  mL IVPB, 2 g, IntraVENous, Q24H, Amyaa Sparks MD    enoxaparin (LOVENOX) injection 40 mg, 40 mg, SubCUTAneous, Q24H, Amaya Sparks MD, 40 mg at 03/08/18 1446    insulin lispro (HUMALOG) injection, , SubCUTAneous, AC&HS, Amaya Sparks MD, Stopped at 03/07/18 1630   glucose chewable tablet 16 g, 4 Tab, Oral, PRN, Lacie Flores MD    dextrose (D50W) injection syrg 12.5-25 g, 12.5-25 g, IntraVENous, PRN, Lacie Flores MD    glucagon [facility]) injection 1 mg, 1 mg, IntraMUSCular, PRN, Lacie Flores MD    nystatin (MYCOSTATIN) 100,000 unit/gram powder, , Topical, BID, Lacie Flores MD    zinc oxide-cod liver oil (DESITIN) 40 % paste, , Topical, PRN, Lacie Flores MD    ammonium lactate (LAC-HYDRIN) 12 % lotion, , Topical, BID, Lacie Flores MD    clotrimazole (LOTRIMIN) 1 % cream, , Topical, BID, Lacie Flores MD    allopurinol (ZYLOPRIM) tablet 100 mg, 100 mg, Oral, DAILY, Mirna Craig MD    aspirin-dipyridamole (AGGRENOX)  mg per capsule 1 Cap, 1 Cap, Oral, BID, Mirna Craig MD, 1 Cap at 03/08/18 2377    atenolol (TENORMIN) tablet 50 mg, 50 mg, Oral, DAILY, Mirna Craig MD, 50 mg at 03/08/18 0906    atorvastatin (LIPITOR) tablet 40 mg, 40 mg, Oral, DAILY, Mirna Craig MD, 40 mg at 03/06/18 0930    [facility] ac& pc-s.therm-b.anim (ELVA Q/RISAQUAD), 1 Cap, Oral, DAILY, Mirna Craig MD    bumetanide (BUMEX) tablet 3 mg, 3 mg, Oral, DAILY, Mirna Craig MD, 3 mg at 03/08/18 8655    colestipol (COLESTID) tablet 1 g, 1 g, Oral, DAILY, Mirna Craig MD    dilTIAZem CD (CARDIZEM CD) capsule 360 mg, 360 mg, Oral, DAILY, Mirna Craig MD, 360 mg at 03/06/18 0929    hydrALAZINE (APRESOLINE) tablet 50 mg, 50 mg, Oral, TID, Mirna Craig MD, 50 mg at 03/08/18 0905    pregabalin (LYRICA) capsule 75 mg, 75 mg, Oral, BID, Mirna Craig MD, 75 mg at 03/07/18 1705    nystatin (MYCOSTATIN) 100,000 unit/gram cream, , Topical, BID, Lacie Flores MD    sodium chloride (NS) flush 5-10 mL, 5-10 mL, IntraVENous, Q8H, Mirna Craig MD, 10 mL at 03/08/18 0501    sodium chloride (NS) flush 5-10 mL, 5-10 mL, IntraVENous, PRN, Mirna Craig MD, 10 mL at 03/08/18 0915    acetaminophen (TYLENOL) tablet 650 mg, 650 mg, Oral, Q4H PRN, Mirna Craig MD    HYDROcodone-acetaminophen (NORCO) 5-325 mg per tablet 1 Tab, 1 Tab, Oral, Q4H PRN, Mirna Craig MD, 1 Tab at 03/06/18 2140    ondansetron [facility]) injection 4 mg, 4 mg, IntraVENous, Q4H PRN, Mirna Craig MD, 4 mg at 03/06/18 6916    docusate sodium (COLACE) capsule 100 mg, 100 mg, Oral, BID PRN, Mirna Craig MD    insulin glargine (LANTUS) injection 50 Units, 50 Units, SubCUTAneous, QHS, 50 Units at 03/07/18 2120 **AND** [DISCONTINUED] insulin glargine (LANTUS) injection 50 Units, 50 Units, SubCUTAneous, QHS, Lacie Flores MD, 50 Units at 03/05/18 2235      Labs:  Recent Results (from the past 24 hour(s))   CBC W/O DIFF    Collection Time: 03/07/18 12:34 PM   Result Value Ref Range    WBC 7.0 3.6 - 11.0 K/uL    RBC 2.93 (L) 3.80 - 5.20 M/uL    HGB 8.6 (L) 11.5 - 16.0 g/dL    HCT 28.7 (L) 35.0 - 47.0 %    MCV 98.0 80.0 - 99.0 FL    MCH 29.4 26.0 - 34.0 PG    MCHC 30.0 30.0 - 36.5 g/dL    RDW 17.2 (H) 11.5 - 14.5 %    PLATELET 624 473 - 256 K/uL    MPV 11.5 8.9 - 12.9 FL    NRBC 0.0 0  WBC    ABSOLUTE NRBC 0.00 0.00 - 0.01 K/uL   SED RATE (ESR)    Collection Time: 03/07/18 12:34 PM   Result Value Ref Range    Sed rate, automated 77 (H) 0 - 30 mm/hr   METABOLIC PANEL, BASIC    Collection Time: 03/07/18 12:34 PM   Result Value Ref Range    Sodium 147 (H) 136 - 145 mmol/L    Potassium 4.4 3.5 - 5.1 mmol/L    Chloride 115 (H) 97 - 108 mmol/L    CO2 24 21 - 32 mmol/L    Anion gap 8 5 - 15 mmol/L    Glucose 143 (H) 65 - 100 mg/dL    BUN 33 (H) 6 - 20 MG/DL    Creatinine 1.96 (H) 0.55 - 1.02 MG/DL    BUN/Creatinine ratio 17 12 - 20      GFR est AA 31 (L) >60 ml/min/1.73m2    GFR est non-AA 26 (L) >60 ml/min/1.73m2    Calcium 8.0 (L) 8.5 - 10.1 MG/DL   GLUCOSE, POC    Collection Time: 03/07/18  4:13 PM   Result Value Ref Range    Glucose (POC) 113 (H) 65 - 100 mg/dL    Performed by Dany Damon, POC    Collection Time: 03/07/18  8:40 PM   Result Value Ref Range    Glucose (POC) 132 (H) 65 - 100 mg/dL    Performed by [facility]    METABOLIC PANEL, BASIC    Collection Time: 03/08/18  3:58 AM   Result Value Ref Range    Sodium 147 (H) 136 - 145 mmol/L    Potassium 4.1 3.5 - 5.1 mmol/L    Chloride 114 (H) 97 - 108 mmol/L    CO2 23 21 - 32 mmol/L    Anion gap 10 5 - 15 mmol/L    Glucose 67 65 - 100 mg/dL    BUN 33 (H) 6 - 20 MG/DL    Creatinine 1.78 (H) 0.55 - 1.02 MG/DL    BUN/Creatinine ratio 19 12 - 20      GFR est AA 35 (L) >60 ml/min/1.73m2    GFR est non-AA 29 (L) >60 ml/min/1.73m2    Calcium 8.2 (L) 8.5 - 10.1 MG/DL   CBC W/O DIFF    Collection Time: 03/08/18  3:58 AM   Result Value Ref Range    WBC 6.6 3.6 - 11.0 K/uL    RBC 2.92 (L) 3.80 - 5.20 M/uL    HGB 8.4 (L) 11.5 - 16.0 g/dL    HCT 28.3 (L) 35.0 - 47.0 %    MCV 96.9 80.0 - 99.0 FL    MCH 28.8 26.0 - 34.0 PG    MCHC 29.7 (L) 30.0 - 36.5 g/dL    RDW 17.2 (H) 11.5 - 14.5 %    PLATELET 256 572 - 404 K/uL    MPV 11.5 8.9 - 12.9 FL    NRBC 0.3 (H) 0  WBC    ABSOLUTE NRBC 0.02 (H) 0.00 - 0.01 K/uL   VANCOMYCIN, RANDOM    Collection Time: 03/08/18  3:58 AM   Result Value Ref Range    Vancomycin, random 34.5 UG/ML   GLUCOSE, POC    Collection Time: 03/08/18  8:06 AM   Result Value Ref Range    Glucose (POC) 74 65 - 100 mg/dL    Performed by [address], POC    Collection Time: 03/08/18  8:07 AM   Result Value Ref Range    Glucose (POC) 74 65 - 100 mg/dL    Performed by [address], POC    Collection Time: 03/08/18  8:31 AM   Result Value Ref Range    Glucose (POC) 80 65 - 100 mg/dL    Performed by [address], POC    Collection Time: 03/08/18 11:04 AM   Result Value Ref Range    Glucose (POC) 66 65 - 100 mg/dL    Performed by [address], POC    Collection Time: 03/08/18 11:05 AM   Result Value Ref Range    Glucose (POC) 73 65 - 100 mg/dL    Performed by [address], POC    Collection Time: 03/08/18 11:23 AM   Result Value Ref Range    Glucose (POC) 69 65 - 100 mg/dL    Performed by Amy Callahan            Micro:     Wound 3/6/18  Component Results      Component Value Ref Range & Units Status     Special Requests: NO SPECIAL REQUESTS   Preliminary     GRAM STAIN 3+ WBCS SEEN   Preliminary     GRAM STAIN NO ORGANISMS SEEN   Preliminary     Culture result:    Preliminary     FEW POSSIBLE ENTEROCOCCUS SPECIES (A)         Blood 3/5/18  Component Results      Component Value Ref Range & Units Status     Special Requests: NO SPECIAL REQUESTS   Preliminary     Culture result: NO GROWTH 3 DAYS   Preliminary       Result History          Imaging:      FINDINGS:  Two views of the right foot demonstrate no fracture or other acute  osseous or articular abnormality. Prominent soft tissue swelling. No opaque  foreign body or gas in the soft tissue, small vessel calcifications suggest  diabetes. Calcaneal spurs. There is no radiological evidence to suggest  osteomyelitis. Mild DJD metatarsophalangeal joint.      IMPRESSION  IMPRESSION:  No acute bony abnormality.          MRI:     FINDINGS: Bone marrow: Ill-defined bone marrow edema is in the fifth metatarsal  head and fifth proximal phalanx. No distinct fracture line. Subtle thinning of  the cortex of the fifth proximal phalanx more likely than the fifth metatarsal  head.     Remaining bone marrow signal is within normal limits.     Joint fluid:  Moderate fifth MTP joint effusion. No other joint effusion.     Tendons: Intact.     Muscles: Diffuse edema. Mild atrophy.     Neurovascular bundles: No evidence of neuroma.     Articular cartilage: Marrow fifth MTP joint is increased. Lisfranc joint is  within normal limits.     Soft tissue mass: None. Skin breakdown is lateral to the fifth MTP joint.     IMPRESSION  IMPRESSION:   1. Soft tissue ulceration is lateral to the fifth MTP joint. There is evidence  of underlying fifth MTP joint septic arthritis and osteomyelitis of the fifth  metatarsal head and fifth proximal phalanx.   2. Cellulitis and myositis. No drainable abscess within the limitation of no  intravenous contrast. However, the fifth MTP joint effusion is lobulated and may  be difficult to drain.     Procedures:   Bedside debridement attempted 3/6     Assessment / Plan:      Ms Natalio Izquierdo is a 60year old lady with hx of DM, Hx of CDI in end of 2017-early 2018, chronic venous stasis, CKD, HELIO, morbid venous stasis admitted on 3/6 with altered mentation and respiratory issues. Her  says that she has had LE cellulitis and chart review indicates admission in 10/2017 to [facility] for the same. Her  says that he was doing her wound care at home until 3 weeks ago, when home care/wound care were arranged. Her  says that she has been weeping from the top part of her feet and he was not able to see the bottom part of her feet well as she was not able to examine it as she could not lie back with dyspnea at home. He recently noted it and it thought it looked like a blister that had ruptured and was red. They are not clear exact details as her feet were weeping and unclear if discharge was from the ulcers or not. He recalls that after last admission in 10/2017, she went to [facility] and then to Nursing home and developed C diff. She was on antibiotics but not long term at that time per him. She has not been ambulating since about 10/2017 per her . She reports that she does not have good sensation in her feet but is usually bed bound and so no known truama to .      This admission, found to be febrile at 101.9 on 3/5/18. H and P indicates \"no acute changes on skin to suggest cellulitis\". Noted antibiotics started on 3/6 with Levaquin. Then note to have been started on Vancomycin, Cefepime and Flagyl. Blood cultures sent. PICC was already inserted.       Seen by Podiatry as well as Vascular surgery. Had \"Debridement of wound R foot attempted but terminated before completion due to extent of necrotic tissue\".   Cultures of wound sent. MRI Pending.            1) R foot diabetic foot ulcer with concerns for osteomyelitis      MRI with \" Soft tissue ulceration is lateral to the fifth MTP joint. There is evidence of underlying fifth MTP joint septic arthritis and osteomyelitis of the fifth metatarsal head and fifth proximal phalanx\". \"Cellulitis and myositis. No drainable abscess within the limitation of no  intravenous contrast. However, the fifth MTP joint effusion is lobulated and may be difficult to drain\"     Recommendations:  Continue Vancomycin IV, Cefepime IV and Flagyl ( can be PO if she can take PO and absorb well)   Discussed with Dr Truman Dhaliwal and requested bone biopsies for gram stain, bacterial, fungal cultures as well as pathology for bony margins   Final choice and duration of antibiotics to be determined based on surgical intervention, cultures and bone margins for pathology   If all infected bone resected out with negative bone margins on pathology for osteomyelitis, then would not need long term IV antibiotics   She is slow to respond to questions and doubt this is all new from talking to her  and do not suspect related to IV antibotics at this time. However discussed with her and her  that Cefepime can cause change in mentation and akinetic seizures and if she is worse, may need to adjust antibiotics. Discussed with them that infection by itself on top of all her other co morbidities would also play a role in her mentation status. Wound care   Optimize DM control for better wound healing and infection control   Side effects of antibiotics including GI, renal and CDI explained patient and her  today   Vancomycin random level at 34 (3/8). Dosing adjustments based on renal function per pharmacy.  Trough goal is 15-20      2) DM   3) HELIO     4) CKD: renally dose antibiotics      5) Hx of CDI in past   Is high risk for CDI again     6) DVT Px per primary team      7) Access: PICC      8)  Slightly prominent lymph nodes in the mediastinum are stable per CT imaging      Discussed with her , RN and DR Gibbs today. Thank you for the opportunity to participate in the care of this patient. Please contact with questions or concerns.       Warner Dunaway DO   11:42 AM

## 2018-03-08 NOTE — PROGRESS NOTES
HYPOGLYCEMIC EPISODE DOCUMENTATION    Patient with hypoglycemic episode(s) at 1634(time) on 03/08/2018(date). BG value(s) pre-treatment 67    Was patient symptomatic? [] yes, [x] no  Patient was treated with the following rescue medications/treatments: [x] D50                [] Glucose tablets                [] Glucagon                [x] 4oz juice                [] 6oz reg soda                [] 8oz low fat milk  BG value post-treatment: 74  Pt has poor PO intake, refusing glucose tablets, 12.5g of D50 administered per MAR.    BG value post-treatment: 141  Once BG treated and value greater than 80mg/dl, pt was provided with the following:  [] snack  [x] meal at bedside  Name of MD notified:None   The following orders were received: None

## 2018-03-09 PROBLEM — I69.320 CVA, OLD, APHASIA: Status: ACTIVE | Noted: 2018-01-01

## 2018-03-09 NOTE — PROGRESS NOTES
Bedside and Verbal shift change report given to Terry Banks (oncoming nurse) by Gila Estrada (offgoing nurse). Report included the following information SBAR, Kardex, Intake/Output, MAR, Accordion, Recent Results and Cardiac Rhythm NSR.

## 2018-03-09 NOTE — DIABETES MGMT
DTC Progress Note    Recommendations/ Comments: If appropriate, please consider:  1) adding dextrose IVF if hypoglycemia continues    Current hospital DM medication: Lispro correctional insulin - normal sensitivity ac and hs. Chart reviewed on Rosashe Kristy     Patient is a 60 y.o. female with known DM on Levemir 50 units bid, Glipizide 10 mg bid and Lispro 10 units tid ac. A1c:   Lab Results   Component Value Date/Time    Hemoglobin A1c 7.6 (H) 10/27/2017 10:19 AM    Hemoglobin A1c 11.2 (H) 10/01/2015 01:04 PM       Recent Glucose Results:   Lab Results   Component Value Date/Time    GLU 75 03/09/2018 01:55 AM    GLUCPOC 95 03/09/2018 08:24 AM    GLUCPOC 74 03/09/2018 07:58 AM    GLUCPOC 84 03/08/2018 10:30 PM        Lab Results   Component Value Date/Time    Creatinine 1.49 (H) 03/09/2018 01:55 AM     Estimated Creatinine Clearance: 49.8 mL/min (based on Cr of 1.49). Active Orders   Diet    DIET DIABETIC CONSISTENT CARB Mechanical Soft        PO intake:   Patient Vitals for the past 72 hrs:   % Diet Eaten   03/08/18 1330 5 %   03/07/18 1904 10 %   03/07/18 1107 20 %   03/07/18 0954 20 %   03/06/18 1225 10 %   03/06/18 0930 15 %       Will continue to follow as needed.     Thank you  Doretha Escoto, [address], [address]  Office:  [phone]

## 2018-03-09 NOTE — PROGRESS NOTES
Pharmacy Automatic Renal Dosing Protocol - Antimicrobials    Indication for Antimicrobials: Foot wound with possible osteomyelitis    Current Regimen of Each Antimicrobial:  Vancomycin 1750 mg IV Q36H (Start 3/6/18 - Day 4)  Cefepime 2g IV Q24H (Start 3/6/18 - Day 4)  Metronidazole 500 mg IV Q8H (Start 3/7/18 - Day 3)      Significant Cultures:     Urine culture 3/5, - NG - Final  Paired blood culture 3/5 - NG - Prelim  Toe Wound 3/6 - E. Faecalis - Prelim  Toe Wound Anaerobic  3/6 - Pending      Labs:  Recent Labs      18   0155  18   0358  18   1234   CREA  1.49*  1.78*  1.96*   BUN  30*  33*  33*   WBC  6.6  6.6  7.0     Temp (24hrs), Av.8 °F (36.6 °C), Min:97.3 °F (36.3 °C), Max:98.2 °F (36.8 °C)        Paralysis, amputations, malnutrition: no  Creatinine Clearance (mL/min) 27.8 ml/min    Impression/Plan:     Vancomycin Random 3/8/18 03:58 = 34.5 mcg/ml   Extrapolated Level = 14.14 mcg/ml  Will increase dose to 2000 mg IV Q36H for extrapolated trough of 16.16  May want to repeat trough over weekend if renal function continues to improve. Due to E. Ada Bellow now growing in wound will recommend that we switch from Cefepime  to Zosyn or Unasyn. Pharmacy will follow daily and adjust medications as appropriate for renal function and/or serum levels. Thank you,  Ric Naranjo, PHARMD      Recommended duration of therapy  [email or web address]/Pharmacy/[email or web address]. [email or web address]    Renal Dosing  [email or web address]/Pharmacy/Clinical%20Companion/Renal%[email or web address]. pdf

## 2018-03-09 NOTE — ANESTHESIA PREPROCEDURE EVALUATION
Anesthetic History     PONV          Review of Systems / Medical History  Patient summary reviewed, nursing notes reviewed and pertinent labs reviewed    Pulmonary                Comments: Obesity Hypoventilation Syndrome   Neuro/Psych       CVA  TIA    Comments: CVA with aphasia  Diabetic Polyneuropathy  Right-sided sciatica  Insomnia Cardiovascular    Hypertension  Valvular problems/murmurs: tricuspid insufficiency and aortic stenosis    CHF    Hyperlipidemia      Comments: TTE (3/6/18):   Moderate AS, mild to moderate TI, EF=60%    Venous stasis dermatitis of both lower extremities   GI/Hepatic/Renal     GERD    Renal disease  Liver disease    Comments: Fatty Liver  CRI, Stage III  Diabetic Gastroparesis Endo/Other    Diabetes: well controlled, type 2, using insulin  Hypothyroidism  Morbid obesity and anemia    Comments: Hyperparathyroidism Other Findings   Comments: Osteomyelitis of right fifth toe    Sepsis    Vitamin B12  Deficiency    Gout         Physical Exam    Airway  Mallampati: III  TM Distance: 4 - 6 cm    Mouth opening: Normal     Cardiovascular  Regular rate and rhythm,  S1 and S2 normal,  no murmur, click, rub, or gallop             Dental    Dentition: Poor dentition     Pulmonary  Breath sounds clear to auscultation               Abdominal  GI exam deferred       Other Findings            Anesthetic Plan    ASA: 3  Anesthesia type: general          Induction: Intravenous  Anesthetic plan and risks discussed with: Patient

## 2018-03-09 NOTE — CONSULTS
NEUROLOGY NOTE     DATE OF CONSULTATION: 3/9/2018    CONSULTED BY: Kong Tomas MD    Chief Complaint   Patient presents with    Shortness of Breath     arrived via EMS for c/o shortness of breath worsening from yesterday. Reason for Consult  I have been asked to see the patient in neurological consultation to render advice and opinion regarding stroke    HISTORY OF PRESENT ILLNESS  Gabbi Duran is a 60 y.o. female. Presented like Sepsis. Source likely diabetic foot wound with osteomyelitis. Neurology consulted because of progressive expressive aphasia. Pt is not able to provide any hx.  states that she has had a stroke in the past that affected the right side of the body. She did not have any significant residuals but has had intermittent speech problems. Whenever she is in the hospital, she does have expressive aphasia and then it gets better.     ROS  A ten system review of constitutional, cardiovascular, respiratory, musculoskeletal, endocrine, skin, SHEENT, genitourinary, psychiatric and neurologic systems was obtained and is unremarkable except as stated in HPI     PMH  Past Medical History:   Diagnosis Date    Acute combined systolic and diastolic heart failure, NYHA class 2 ([address] 7/24/2017    Anemia 7/24/2017    Aortic stenosis, mild 7/24/2017    Bilateral edema of lower extremity 7/24/2017    BMI 45.0-49.9, adult ([address] 7/24/2017    Chronic diastolic CHF (congestive heart failure), NYHA class 3 ([address] 7/24/2017    CKD (chronic kidney disease) stage 2, GFR 60-89 ml/min     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 2 diabetes mellitus ([address] 7/24/2017    Diabetes ([address]     Fatty liver 7/24/2017    Gastroparesis     GERD (gastroesophageal reflux disease)     Gout     HTN (hypertension)     Hyperlipemia     Hyperparathyroidism ([address]     Hypertension 7/24/2017    Insomnia 7/24/2017    Low back pain 7/24/2017    N&V (nausea and vomiting) 7/24/2017    Nausea & vomiting     Obesity     Pelvic joint pain 7/24/2017    Peripheral neuropathy 7/24/2017    Sciatica     right    Severe right groin pain 7/24/2017    Sinus tachycardia 7/24/2017    Stage 2 chronic kidney disease due to benign hypertension 7/24/2017    Stasis dermatitis 7/24/2017    Stroke ([facility] 2001    right parietal, left caudate    Thyroid disease     para thyroid(high calcium)    Ulcer of right fifth toe due to diabetes mellitus ([facility] 7/24/2017       FH  Family History   Problem Relation Age of Onset    Cancer Mother      cervical    Diabetes Mother     Hypertension Mother     Stroke Mother 79    Diabetes Father     Hypertension Father     Kidney Disease Father     Diabetes Brother     Hypertension Brother     Stroke Brother 61       [address]  Social History     Social History    Marital status:      Spouse name: N/A    Number of children: N/A    Years of education: N/A     Social History Main Topics    Smoking status: Never Smoker    Smokeless tobacco: Never Used    Alcohol use Yes      Comment: rare    Drug use: Yes     Special: Prescription, OTC    Sexual activity: Not on file     Other Topics Concern    Not on file     Social History Narrative       ALLERGIES  Allergies   Allergen Reactions    Zestril [Lisinopril] Swelling       PHYSICAL EXAM  EXAMINATION:   Patient Vitals for the past 24 hrs:   Temp Pulse Resp BP SpO2   03/09/18 1102 97.8 °F (36.6 °C) 74 20 152/62 93 %   03/09/18 0829 98.1 °F (36.7 °C) 73 18 179/65 94 %   03/09/18 0151 97.6 °F (36.4 °C) 77 16 150/56 93 %   03/08/18 2200 97.3 °F (36.3 °C) 74 18 159/71 96 %   03/08/18 1948 98.2 °F (36.8 °C) 75 18 152/79 97 %   03/08/18 1708 - 80 - 150/49 -        General:   General appearance: Pt is in no acute distress   Distal pulses are preserved  Fundoscopic exam: attempted    Neurological Examination:   Mental Status:  AAO x3. She is able to answer questions by yes and no. Follows commands    Cranial Nerves:  PERRL, Extraocular movements are full. Facial sensation intact. Facial movement intact. Hearing intact to conversation. Motor: Strength is 5/5 in UE and 3-4/5 in LE. Limited secondary to body habitus. Sensation: Unusual exam. When I touch her right, she says left and when touching her left, she says right but otherwise she is not able to speak anything else.     LAB DATA REVIEWED:    Recent Results (from the past 24 hour(s))   GLUCOSE, POC    Collection Time: 03/08/18  4:33 PM   Result Value Ref Range    Glucose (POC) 73 65 - 100 mg/dL    Performed by Macario Carreon    GLUCOSE, POC    Collection Time: 03/08/18  4:34 PM   Result Value Ref Range    Glucose (POC) 72 65 - 100 mg/dL    Performed by Macario Carreon    GLUCOSE, POC    Collection Time: 03/08/18  4:50 PM   Result Value Ref Range    Glucose (POC) 74 65 - 100 mg/dL    Performed by Macario Carreon    GLUCOSE, POC    Collection Time: 03/08/18  4:51 PM   Result Value Ref Range    Glucose (POC) 74 65 - 100 mg/dL    Performed by Macario Carreon    GLUCOSE, POC    Collection Time: 03/08/18  5:12 PM   Result Value Ref Range    Glucose (POC) 141 (H) 65 - 100 mg/dL    Performed by Saroj Rose (PCT)    GLUCOSE, POC    Collection Time: 03/08/18 10:03 PM   Result Value Ref Range    Glucose (POC) 78 65 - 100 mg/dL    Performed by Lizbeth Agarwal    GLUCOSE, POC    Collection Time: 03/08/18 10:30 PM   Result Value Ref Range    Glucose (POC) 84 65 - 100 mg/dL    Performed by Melania Delgado    CBC W/O DIFF    Collection Time: 03/09/18  1:55 AM   Result Value Ref Range    WBC 6.6 3.6 - 11.0 K/uL    RBC 2.94 (L) 3.80 - 5.20 M/uL    HGB 8.5 (L) 11.5 - 16.0 g/dL    HCT 28.0 (L) 35.0 - 47.0 %    MCV 95.2 80.0 - 99.0 FL    MCH 28.9 26.0 - 34.0 PG    MCHC 30.4 30.0 - 36.5 g/dL    RDW 17.0 (H) 11.5 - 14.5 %    PLATELET 257 280 - 526 K/uL    MPV 11.6 8.9 - 12.9 FL    NRBC 0.0 0  WBC    ABSOLUTE NRBC 0.00 0.00 - 7.22 K/uL   METABOLIC PANEL, BASIC    Collection Time: 03/09/18  1:55 AM   Result Value Ref Range Sodium 147 (H) 136 - 145 mmol/L    Potassium 3.7 3.5 - 5.1 mmol/L    Chloride 114 (H) 97 - 108 mmol/L    CO2 25 21 - 32 mmol/L    Anion gap 8 5 - 15 mmol/L    Glucose 75 65 - 100 mg/dL    BUN 30 (H) 6 - 20 MG/DL    Creatinine 1.49 (H) 0.55 - 1.02 MG/DL    BUN/Creatinine ratio 20 12 - 20      GFR est AA 43 (L) >60 ml/min/1.73m2    GFR est non-AA 35 (L) >60 ml/min/1.73m2    Calcium 8.1 (L) 8.5 - 10.1 MG/DL   GLUCOSE, POC    Collection Time: 03/09/18  7:58 AM   Result Value Ref Range    Glucose (POC) 74 65 - 100 mg/dL    Performed by Lennox Fuss (PCT)    GLUCOSE, POC    Collection Time: 03/09/18  8:24 AM   Result Value Ref Range    Glucose (POC) 95 65 - 100 mg/dL    Performed by Lennox Fuss (PCT)    GLUCOSE, POC    Collection Time: 03/09/18 11:15 AM   Result Value Ref Range    Glucose (POC) 115 (H) 65 - 100 mg/dL    Performed by Lennox Fuss (PCT)         Imaging review:  CT Head  Prominent atrophy, remote ischemia, no acute changes. HOME MEDS  Prior to Admission Medications   Prescriptions Last Dose Informant Patient Reported? Taking? Bifidobacterium Infantis 4 mg cap 3/4/2018 at Unknown time Significant Other Yes Yes   Sig: Take 4 mg by mouth daily. Cholecalciferol, Vitamin D3, 2,000 unit cap 3/4/2018 at Unknown time Significant Other Yes Yes   Sig: Take 1 Cap by mouth daily. LYRICA 75 mg capsule 3/4/2018 at Unknown time Significant Other No Yes   Sig: TAKE ONE CAPSULE BY MOUTH DAILY AT LUNCH TIME AND AGAIN AT BEDTIME. UBIDECARENONE (COQ-10 PO) 3/4/2018 at Unknown time Significant Other Yes Yes   Sig: Take 1 Tab by mouth daily. acetaminophen (TYLENOL EXTRA STRENGTH) 500 mg tablet 3/4/2018 at Unknown time Significant Other Yes Yes   Sig: Take 1,000 mg by mouth every six (6) hours as needed for Pain.    allopurinol (ZYLOPRIM) 300 mg tablet 3/4/2018 at Unknown time Significant Other No Yes   Sig: TAKE 1 TABLET BY MOUTH EVERY DAY   ammonium lactate (LAC-HYDRIN) 12 % lotion 3/4/2018 at Unknown time Significant Other Yes Yes   Sig: Apply 1 Each to affected area every other day. rub in to affected area well   aspirin-dipyridamole (AGGRENOX)  mg per SR capsule 3/4/2018 at Unknown time Significant Other No Yes   Sig: TAKE 1 CAPSULE BY MOUTH TWICE DAILY( EVERY TWELVE HOURS)   atenolol (TENORMIN) 50 mg tablet 3/4/2018 at Unknown time Significant Other Yes Yes   Sig: Take 50 mg by mouth daily. atorvastatin (LIPITOR) 40 mg tablet 3/4/2018 at Unknown time Significant Other No Yes   Sig: TAKE 1 TABLET BY MOUTH EVERY DAY   bumetanide (BUMEX) 2 mg tablet 3/4/2018 at Unknown time Significant Other Yes Yes   Sig: Take 3 mg by mouth daily. chlorhexidine (HIBICLENS) 4 % liquid 3/4/2018 at Unknown time Significant Other Yes Yes   Sig: Apply  to affected area every other day. colestipol (COLESTID) 1 gram tablet 3/4/2018 at Unknown time Significant Other Yes Yes   Sig: Take 1 g by mouth daily. dilTIAZem (TIAZAC) 360 mg ER capsule 3/4/2018 at Unknown time Significant Other No Yes   Sig: TAKE 1 CAPSULE BY MOUTH EVERY DAY   ferrous sulfate (SLOW FE) 142 mg (45 mg iron) ER tablet 3/4/2018 at Unknown time Significant Other Yes Yes   Sig: Take 142 mg by mouth Daily (before breakfast). glipiZIDE (GLUCOTROL) 10 mg tablet 3/4/2018 at Unknown time Significant Other Yes Yes   Sig: Take 10 mg by mouth two (2) times a day. hydrALAZINE (APRESOLINE) 50 mg tablet 3/4/2018 at Unknown time Significant Other No Yes   Sig: TAKE 1 TABLET BY MOUTH THREE TIMES DAILY   insulin detemir U-100 (LEVEMIR FLEXTOUCH U-100 INSULN) 100 unit/mL (3 mL) inpn 3/4/2018 at Unknown time Significant Other Yes Yes   Si Units by SubCUTAneous route two (2) times a day. insulin lispro (HUMALOG) 100 unit/mL kwikpen 3/4/2018 at Unknown time Significant Other Yes Yes   Sig: 10 Units by SubCUTAneous route three (3) times daily (with meals).    nystatin (MYCOSTATIN) topical cream 3/4/2018 at Unknown time Significant Other No Yes   Sig: Apply  to affected area two (2) times a day. Use for groin rash      Facility-Administered Medications: None       CURRENT MEDS  Current Facility-Administered Medications   Medication Dose Route Frequency    ampicillin-sulbactam (UNASYN) 3 g in 0.9% sodium chloride (MBP/ADV) 100 mL  3 g IntraVENous Q12H    vancomycin (VANCOCIN) 2000 mg in  ml infusion  2,000 mg IntraVENous Q36H    insulin lispro (HUMALOG) injection   SubCUTAneous AC&HS    nystatin (MYCOSTATIN) 100,000 unit/gram powder   Topical BID    ammonium lactate (LAC-HYDRIN) 12 % lotion   Topical BID    clotrimazole (LOTRIMIN) 1 % cream   Topical BID    allopurinol (ZYLOPRIM) tablet 100 mg  100 mg Oral DAILY    aspirin-dipyridamole (AGGRENOX)  mg per capsule 1 Cap  1 Cap Oral BID    atenolol (TENORMIN) tablet 50 mg  50 mg Oral DAILY    atorvastatin (LIPITOR) tablet 40 mg  40 mg Oral DAILY    lactobac ac& pc-s.therm-b.anim (ELVA Q/RISAQUAD)  1 Cap Oral DAILY    bumetanide (BUMEX) tablet 3 mg  3 mg Oral DAILY    colestipol (COLESTID) tablet 1 g  1 g Oral DAILY    dilTIAZem CD (CARDIZEM CD) capsule 360 mg  360 mg Oral DAILY    hydrALAZINE (APRESOLINE) tablet 50 mg  50 mg Oral TID    pregabalin (LYRICA) capsule 75 mg  75 mg Oral BID    nystatin (MYCOSTATIN) 100,000 unit/gram cream   Topical BID    sodium chloride (NS) flush 5-10 mL  5-10 mL IntraVENous Q8H       IMPRESSION:  Renuka Brooks is a 60 y.o. female. Neurology consulted for expressive aphasia. Her exam is very unusual. She is able to give yes and no answers correctly. When I give her options about year and month, she is able to say yes to the correct option. When I ask if she knows her , she says no. On sensory testing, she is able to speak the words \"left\" and \"right\" fluently but says the left side is right and the right side if the left. I do suspect the symptoms are stress related/psycogenic rather than a true stroke.      Also, the  states that similar symptoms have happened in the past and does not want to get the MRI as it will be hard for her to get in the machine. He wants to hold off on repeat CT head as well. The symptoms have gotten better on it own in the past.    No further w/u at this time. Call with questions. Thank you very much for this consultation.      Willy Wiggins MD  Neurologist

## 2018-03-09 NOTE — PROGRESS NOTES
Pharmacy Automatic Renal Dosing Protocol - Antimicrobials    Indication for Antimicrobials: Foot wound with possible osteomyelitis    Current Regimen of Each Antimicrobial:  Vancomycin 1750 mg IV Q36H (Start 3/6/18 - Day 4)  Cefepime 2g IV Q24H (Start 3/6/18 - Day 4)  Metronidazole 500 mg IV Q8H (Start 3/7/18 - Day 3)      Significant Cultures:     Urine culture 3/5, - NG - Final  Paired blood culture 3/5 - NG - Prelim  Toe Wound 3/6 - E. Faecalis - Prelim  Toe Wound Anaerobic  3/6 - Pending      Labs:  Recent Labs      18   0155  18   0358  18   1234   CREA  1.49*  1.78*  1.96*   BUN  30*  33*  33*   WBC  6.6  6.6  7.0     Temp (24hrs), Av.8 °F (36.6 °C), Min:97.3 °F (36.3 °C), Max:98.2 °F (36.8 °C)        Paralysis, amputations, malnutrition: no  Creatinine Clearance (mL/min) 27.8 ml/min    Impression/Plan:     Vancomycin Random 3/8/18 03:58 = 34.5 mcg/ml   Extrapolated Level = 14.14 mcg/ml  Will increase dose to 2000 mg IV Q36H for extrapolated trough of 16.16  May want to repeat trough over weekend if renal function continues to improve. Cefepime and Flagyl are dosed appropriately. Due to E. Obadiah Kay now growing in wound will recommend that we switch from Cefepime  to Zosyn or Unasyn. Pharmacy will follow daily and adjust medications as appropriate for renal function and/or serum levels. Thank you,  Alondra Trevino, PHARMD      Recommended duration of therapy  [email or web address]/[facility]/Pharmacy/Clinical%20Companion/[email or web address]. docx    Renal Dosing  [email or web address]/[facility]/Pharmacy/Clinical%20Companion/Renal%[email or web address]. pdf

## 2018-03-09 NOTE — PROGRESS NOTES
Spiritual Care Partner Volunteer visited patient in [address] on 3/8/18. Documented by:  ERIC Golden

## 2018-03-09 NOTE — PROGRESS NOTES
Bedside shift change report given to Yared BELTRAN/Luciano (oncoming nurse) by Alan (offgoing nurse). Report included the following information SBAR, Kardex, Procedure Summary, MAR and Recent Results. Zone Phone for oncoming shift:   [phone]    Shift Summary: Pt. Resting quietly with  at bedside    LDAs         PICC Double Lumen 03/07/18 Right;Brachial (Active)   Central Line Being Utilized Yes 3/9/2018  2:05 AM   Criteria for Appropriate Use Long term IV/antibiotic administration 3/9/2018  2:05 AM   Site Assessment Clean, dry, & intact 3/9/2018  2:05 AM   Phlebitis Assessment 0 3/9/2018  2:05 AM   Infiltration Assessment 0 3/9/2018  2:05 AM   Arm Circumference (cm) 39 cm 3/7/2018 10:31 AM   Date of Last Dressing Change 03/07/18 3/9/2018  2:05 AM   Dressing Status Clean, dry, & intact 3/9/2018  2:05 AM   Action Taken Blood drawn 3/9/2018  2:05 AM   External Catheter Length (cm) 0 centimeters 3/7/2018 10:31 AM   Dressing Type Disk with Chlorhexadine gluconate (CHG) 3/9/2018  2:05 AM   Hub Color/Line Status Flushed;Cap end changed; Infusing; Purple;Red;Patent 3/9/2018  2:05 AM   Positive Blood Return (Site #1) Yes 3/9/2018  2:05 AM   Hub Color/Line Status Flushed;Cap end changed; Infusing;Red;Purple 3/9/2018  2:05 AM   Positive Blood Return (Site #2) Yes 3/9/2018  2:05 AM   Alcohol Cap Used Yes 3/9/2018  2:05 AM                External Female Catheter 03/07/18 (Active)   Site Assessment Clean, dry, & intact 3/9/2018  5:14 AM   Repositioned No 3/9/2018  5:14 AM   Perineal Care No 3/9/2018  5:14 AM   Wick Changed No 3/9/2018  5:14 AM   Suction Canister/Tubing Changed No 3/9/2018  5:14 AM   Urine Output (mL) 700 ml 3/9/2018  5:14 AM                  Intake & Output   Date 03/08/18 0700 - 03/09/18 0659 03/09/18 0700 - 03/10/18 0659   Shift 0700-1859 1900-0659 24 Hour Total 0700-1859 1900-0659 24 Hour Total   I  N  T  A  K  E   P. O. 0  0         P. O. 0  0       I.V.  (mL/kg/hr) 300  (0.2) 800 1100         I.V. 300 300         Volume (metroNIDAZOLE (FLAGYL) IVPB premix 500 mg)  300 300         Volume (vancomycin (VANCOCIN) 2000 mg in  ml infusion)  500 500       Shift Total  (mL/kg) 300  (2.2) 800  (5.9) 1100  (8.1)      O  U  T  P  U  T   Urine  (mL/kg/hr) 1200  (0.7) 1150 2350         Urine Output (mL) (External Female Catheter 03/07/18) 1200 1150 2350       Shift Total  (mL/kg) 1200  (8.7) 1150  (8.5) 2350  (17.3)      NET -900 -350 -1250      Weight (kg) 137.8 135.7 135.7 135.7 135.7 135.7      Last Bowel Movement Last Bowel Movement Date:  ((Per , it was before admission))   Glucose Checks [] N/A  [x] AC/HS  [] Q6  Concerns:   Nutrition Active Orders   Diet    DIET DIABETIC CONSISTENT CARB Regular       Consults []PT  []OT  []Speech  []Case Management   Cardiac Monitoring []N/A [x]Yes Expires:

## 2018-03-09 NOTE — PROGRESS NOTES
HYPOGLYCEMIC EPISODE DOCUMENTATION    Patient with hypoglycemic episode(s) at 0758(time) on 03/09/2018(date). BG value(s) pre-treatment 76    Was patient symptomatic?  [] yes, [x] no  Patient was treated with the following rescue medications/treatments: [] D50                [] Glucose tablets                [] Glucagon                [x] 4oz juice                [] 6oz reg soda                [] 8oz low fat milk  BG value post-treatment: 95  Once BG treated and value greater than 80mg/dl, pt was provided with the following:  [] snack  [x] meal - breakfast tray at the bedside  Name of MD notified:None  The following orders were received: None

## 2018-03-09 NOTE — PROGRESS NOTES
Nutrition Assessment:    INTERVENTIONS/RECOMMENDATIONS:   Meals/Snacks: General/healthful diet: CCD /mechanical soft   Supplement: Glucerna (chocolate) BID  Encourage PO intake  ASSESSMENT:   Chart reviewed. Talked with pt and pt's  at bedside.  did most of the answering for wife. He states her appetite has not increased at all. He states she will take small nibbles of food. Pt states no n/v or early satiety. She does state its easier to drink then eat solid foods. Willing to try Glucerna for the added calories and protein. Pt to have procedure 3/10 to amputate toe and part of metatarsal. Pt to be NPO at midnight 3/10. It will be important for pt to get adequate nutrition and increased protein to promote wound healing after surgery. Diet Order: Consistent carb (Mechanical soft)  % Eaten:  Patient Vitals for the past 72 hrs:   % Diet Eaten   03/08/18 1330 5 %   03/07/18 1904 10 %   03/07/18 1107 20 %   03/07/18 0954 20 %     Pertinent Medications: [x] Reviewed []Other:  Pertinent Labs: [x]Reviewed  []Other: Na 147, Cl 114, Gluc 742-68-40-98  Food Allergies: [x]None []Other:     Last BM: prior to admission  []Active     []Hyperactive  []Hypoactive       [] Absent  BS  Skin:    [] Intact   [] Incision  [] Breakdown   [x]Edema   [x]Other: pressure injury     Anthropometrics: Height: 5' (152.4 cm) Weight: 135.7 kg (299 lb 1.6 oz)    IBW (%IBW):   ( ) UBW (%UBW):   (  %)    BMI: Body mass index is 58.41 kg/(m^2). This BMI is indicative of:  []Underweight   []Normal   []Overweight   [] Obesity   [x] Extreme Obesity (BMI>40)  Last Weight Metrics:  Weight Loss Metrics 3/9/2018 3/5/2018 2/18/2018 2/13/2018 2/2/2018 11/24/2017 11/9/2017   Today's Wt 299 lb 1.6 oz - 278 lb 278 lb 278 lb - -   BMI - 58.41 kg/m2 54.29 kg/m2 52.53 kg/m2 52.53 kg/m2 - -       Estimated Nutrition Needs (Based on): 1603 Kcals/day (BMR (1743) x AF 1.2 - 500) , 101 g (.8g/kg) Protein  Carbohydrate:  At Least 130 g/day  Fluids: 1600 mL/day    NUTRITION DIAGNOSES:   Problem:  Inadequate protein-energy intake      Etiology: related to altered mental status      Signs/Symptoms: as evidenced by documented intake    Previous Nutrition Dx:  [] Resolved  [] Unresolved           [x] Progressing    NUTRITION INTERVENTIONS:  Meals/Snacks: General/healthful diet              GOAL:   to consume 50% of meals and ONS in 3-5 days    NUTRITION MONITORING AND EVALUATION   Food/Nutrient Intake Outcomes:  Total energy intake  Physical Signs/Symptoms Outcomes: Weight/weight change, Electrolyte and renal profile, GI profile, Glucose profile    Previous Goal Met:   [] Met              [x] Progressing Towards Goal              [] Not Progressing Towards Goal   Previous Recommendations:   [x] Implemented          [] Not Implemented          [] Not Applicable    LEARNING NEEDS (Diet, Food/Nutrient-Drug Interaction):    [x] None Identified   [] Identified and Education Provided/Documented   [] Identified and Pt declined/was not appropriate     Cultural, Judaism, OR Ethnic Dietary Needs:    [x] None Identified   [] Identified and Addressed     [x] Interdisciplinary Care Plan Reviewed/Documented    [x] Discharge Planning: Continue on a consistent carb diet adequate in calories and protein   [] Participated in Interdisciplinary Rounds    NUTRITION RISK:    [] Patient At Nutritional Risk    [] High              [x] Moderate/Mild           []  Low     [] Patient Not At [address]  Pager [phone]  Weekend Pager [phone]

## 2018-03-09 NOTE — PROGRESS NOTES
Problem: Mobility Impaired (Adult and Pediatric)  Goal: *Acute Goals and Plan of Care (Insert Text)  Physical Therapy Goals  Initiated 3/9/2018  1. Patient will roll side to side in bed with moderate assistance  within 7 day(s). 2.   Patient will move from supine to sit and sit to supine  in bed with maximal assistance within 7 day(s). 3.  Patient will sit EOB for 10 minutes with good sitting balance within 7 day(s). 4. Patient will perform LE exercises in supine within 7 day(s). physical Therapy EVALUATION  Patient: Roxanna Rosen (37 y.o. female)  Date: 3/9/2018  Primary Diagnosis: Sepsis ([address]  Osteomylitis  Osteomylitis  Procedure(s) (LRB):  RIGHT 5TH TOE AMPUTATION, PARTIAL RIGHT 5TH METATARSAL (REQ Pulse Lavage and TPS Power Saw) Anesthesia-Sedation with Ankle Block (Right)     Precautions:   Fall, Skin (O2 use is new)    ASSESSMENT :  Based on the objective data described below, the patient presents with significant weakness, decreased activity tolerance, impaired balance and overall poor functional mobiltiy. Back in October pt was able to stand and pivot to the chair with CGA but has had a steady decline. She was received in supine on 3L, cleared by nursing to mobilize. Worked on rolling and then came to EOB, needed total A of 2. Able to sit on the EOB ~7 minutes with constant support. She was able to hold herself up for ~1 minutes while holding onto the arms of a chair in front of her. She was returned to supine and worked on pulling herself toward a long sit position (limited due to adipose tissue). She would greatly benefit from SNF and then possibly LTC,  wants to take her home. Not safe for pt's  to attempt to mobilize her on his own in this condition like he had been doing. Will trial therapy 3x/wk, in hopes to observe gains. Patient will benefit from skilled intervention to address the above impairments.   Patients rehabilitation potential is considered to be Fair  Factors which may influence rehabilitation potential include:   []         None noted  [x]         Mental ability/status  [x]         Medical condition  []         Home/family situation and support systems  []         Safety awareness  []         Pain tolerance/management  []         Other:      PLAN :  Recommendations and Planned Interventions:  [x]           Bed Mobility Training             []    Neuromuscular Re-Education  []           Transfer Training                   []    Orthotic/Prosthetic Training  []           Gait Training                         []    Modalities  [x]           Therapeutic Exercises           []    Edema Management/Control  [x]           Therapeutic Activities            [x]    Patient and Family Training/Education  []           Other (comment):    Frequency/Duration: Patient will be followed by physical therapy  3 times a week to address goals. Discharge Recommendations: Antonio Connelly  Further Equipment Recommendations for Discharge: TBD     SUBJECTIVE:   Patient stated ok .     OBJECTIVE DATA SUMMARY:   HISTORY:    Past Medical History:   Diagnosis Date    Acute combined systolic and diastolic heart failure, NYHA class 2 ([address] 7/24/2017    Anemia 7/24/2017    Aortic stenosis, mild 7/24/2017    Bilateral edema of lower extremity 7/24/2017    BMI 45.0-49.9, adult ([facility]) 7/24/2017    Chronic diastolic CHF (congestive heart failure), NYHA class 3 ([address] 7/24/2017    CKD (chronic kidney disease) stage 2, GFR 60-89 ml/min     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 2 diabetes mellitus ([address] 7/24/2017    Diabetes ([address]     Fatty liver 7/24/2017    Gastroparesis     GERD (gastroesophageal reflux disease)     Gout     HTN (hypertension)     Hyperlipemia     Hyperparathyroidism ([address]     Hypertension 7/24/2017    Insomnia 7/24/2017    Low back pain 7/24/2017    N&V (nausea and vomiting) 7/24/2017    Nausea & vomiting     Obesity     Pelvic joint pain 7/24/2017    Peripheral neuropathy 2017    Sciatica     right    Severe right groin pain 2017    Sinus tachycardia 2017    Stage 2 chronic kidney disease due to benign hypertension 2017    Stasis dermatitis 2017    St[facility] 2001    right parietal, left caudate    Thyroid disease     para thyroid(high calcium)    Ulcer of right fifth toe due to diabetes mellitus ([facility] 2017     Past Surgical History:   Procedure Laterality Date    CHEST SURGERY PROCEDURE UNLISTED  10/24/13    PARATHYROID EXPLORATION     DELIVERY       HX  SECTION      HX CHOLECYSTECTOMY      HX OTHER SURGICAL      I&D right thigh abscess     Prior Level of Function/Home Situation: pt was able to walk independently back in October and then slowly declined.  does most everything for her which has likely aided in her functional decline. Personal factors and/or comorbidities impacting plan of care:     Home Situation  Home Environment: Private residence  One/Two Story Residence: One story  Living Alone: No  Support Systems: Therapist, Other (comments) (nursing)  Patient Expects to be Discharged to[de-identified] Private residence  Current DME Used/Available at Home: Adaptive bathing aides    EXAMINATION/PRESENTATION/DECISION MAKING:   Critical Behavior:  Neurologic State: Alert, Confused  Orientation Level: Oriented to person, Oriented to time, Disoriented to place, Disoriented to situation  Cognition: Follows commands  Safety/Judgement: Fall prevention  Hearing:   Auditory  Auditory Impairment: None  Skin:  intact  Edema: LEs  Range Of Motion:  AROM: Generally decreased, functional           PROM: Generally decreased, functional           Strength:    Strength: Generally decreased, functional                    Tone & Sensation:   Tone: Normal              Sensation: Intact               Coordination:  Coordination: Generally decreased, functional  Vision:      Functional Mobility:  Bed Mobility:  Rolling: Total assistance  Supine to Sit: Total assistance  Sit to Supine: Total assistance  Scooting: Total assistance  Transfers:            unsafe to attempt                 Balance:   Sitting: Impaired  Sitting - Static: Poor (constant support)  Sitting - Dynamic: Poor (constant support)  Ambulation/Gait Training:      harpal                        Functional Measure:  Barthel Index:    Bathin  Bladder: 0  Bowels: 0  Groomin  Dressin  Feedin  Mobility: 0  Stairs: 0  Toilet Use: 0  Transfer (Bed to Chair and Back): 0  Total: 0       Barthel and G-code impairment scale:  Percentage of impairment CH  0% CI  1-19% CJ  20-39% CK  40-59% CL  60-79% CM  80-99% CN  100%   Barthel Score 0-100 100 99-80 79-60 59-40 20-39 1-19   0   Barthel Score 0-20 20 17-19 13-16 9-12 5-8 1-4 0      The Barthel ADL Index: Guidelines  1. The index should be used as a record of what a patient does, not as a record of what a patient could do. 2. The main aim is to establish degree of independence from any help, physical or verbal, however minor and for whatever reason. 3. The need for supervision renders the patient not independent. 4. A patient's performance should be established using the best available evidence. Asking the patient, friends/relatives and nurses are the usual sources, but direct observation and common sense are also important. However direct testing is not needed. 5. Usually the patient's performance over the preceding 24-48 hours is important, but occasionally longer periods will be relevant. 6. Middle categories imply that the patient supplies over 50 per cent of the effort. 7. Use of aids to be independent is allowed. Vi [place]., Barthel, D.W. (5739). Functional evaluation: the Barthel Index. [facility] (14)2. Estee Elkins nicholas MARGI Taylor, Yao Wilkinson., Luanne Avendano., [place], [address] [place] [address] ().  Measuring the change indisability after inpatient rehabilitation; comparison of the responsiveness of the Barthel Index and Functional [place] Measure. Journal of Neurology, Neurosurgery, and Psychiatry, 66(4), 772-393. SETH Martin, MONY Grover, & Hadley Morales M.A. (2004.) Assessment of post-stroke quality of life in cost-effectiveness studies: The usefulness of the Barthel Index and the EuroQoL-5D. Quality of Life Research, 13, 183-41         G codes: In compliance with CMSs Claims Based Outcome Reporting, the following G-code set was chosen for this patient based on their primary functional limitation being treated: The outcome measure chosen to determine the severity of the functional limitation was the barthel with a score of 0/100 which was correlated with the impairment scale. ? Mobility - Walking and Moving Around:     - CURRENT STATUS: CN - 100% impaired, limited or restricted    - GOAL STATUS: CM - 80%-99% impaired, limited or restricted    - D/C STATUS:  ---------------To be determined---------------      Physical Therapy Evaluation Charge Determination   History Examination Presentation Decision-Making   HIGH Complexity :3+ comorbidities / personal factors will impact the outcome/ POC  HIGH Complexity : 4+ Standardized tests and measures addressing body structure, function, activity limitation and / or participation in recreation  MEDIUM Complexity : Evolving with changing characteristics  Other outcome measures barthel  HIGH       Based on the above components, the patient evaluation is determined to be of the following complexity level: MEDIUM    Pain:  Pain Scale 1: Numeric (0 - 10)  Pain Intensity 1: 0              Activity Tolerance:   Fair, VSS  Please refer to the flowsheet for vital signs taken during this treatment.   After treatment:   []         Patient left in no apparent distress sitting up in chair  [x]         Patient left in no apparent distress in bed  [x]         Call bell left within reach  [x]         Nursing notified  []         Caregiver present  []         Bed alarm activated    COMMUNICATION/EDUCATION:   The patients plan of care was discussed with: Occupational Therapist and Registered Nurse. [x]         Fall prevention education was provided and the patient/caregiver indicated understanding. []         Patient/family have participated as able in goal setting and plan of care. []         Patient/family agree to work toward stated goals and plan of care. []         Patient understands intent and goals of therapy, but is neutral about his/her participation. [x]         Patient is unable to participate in goal setting and plan of care.     Thank you for this referral.  El Mackenzie, PT, DPT   Time Calculation: 40 mins

## 2018-03-09 NOTE — PROGRESS NOTES
Problem: Self Care Deficits Care Plan (Adult)  Goal: *Acute Goals and Plan of Care (Insert Text)  Occupational Therapy Goals  Initiated 3/8/2018  1. Patient will perform self-feeding with moderate assistance  within 7 day(s). 2.  Patient will perform grooming with moderate assistance  within 7 day(s). 3.  Patient will perform bathing UB with moderate assistance  within 7 day(s). 4. Patient will be able to sit on EOB with max assist of 2 for 3 minutes, in prep for ADLs, with in 7days  5. Patient will be able to perform BUE ex with min assist with in 7days   Occupational Therapy TREATMENT  Patient: Gabbi Duran (3 y.o. female)  Date: 3/9/2018  Diagnosis: Sepsis ([address]  Osteomylitis  Osteomylitis <principal problem not specified>  Procedure(s) (LRB):  RIGHT 5TH TOE AMPUTATION, PARTIAL RIGHT 5TH METATARSAL (REQ Pulse Lavage and TPS Power Saw) Anesthesia-Sedation with Ankle Block (Right)    Precautions: Fall, Skin (O2 use is new)  Chart, occupational therapy assessment, plan of care, and goals were reviewed. ASSESSMENT:  Pt was cleared to be seen and was supine in bed and on 3 liters of NC. Pts  was present in room and pt was a little more talkative today. She was total assist for rolling to right and left in bed and total to come to sitting on EOb  Pt was able to sit on EOb for 1 minute holding to the arm rest of the recliner that was in front of her. OT had pt work on pulling up with BUE and bringing her back off the bed and she needed max assist to lift her back off the bed. Pt was able to use left UE to hold her cup and drink from cup. Pt was left sitting up in bed with  in room and instructed pt to use her arms to help roll and to move her arms and legs while she is in bed.   Recommend that pt have further therapy at discharge at rehab at [facility]  Progression toward goals:  []       Improving appropriately and progressing toward goals  [x]       Improving slowly and progressing toward goals  []       Not making progress toward goals and plan of care will be adjusted     PLAN:  Patient continues to benefit from skilled intervention to address the above impairments. Continue treatment per established plan of care. Discharge Recommendations:  Antonio Connelly  Further Equipment Recommendations for Discharge:  tbd     SUBJECTIVE:   Patient stated My foot hurts.     OBJECTIVE DATA SUMMARY:   Cognitive/Behavioral Status:  Neurologic State: Alert;Confused  Orientation Level: Oriented to person;Oriented to time;Disoriented to place; Disoriented to situation  Cognition: Follows commands             Functional Mobility and Transfers for ADLs:  Bed Mobility:  Rolling: Total assistance  Supine to Sit: Total assistance  Sit to Supine: Total assistance  Scooting: Total assistance    Balance:  Sitting: Impaired  Sitting - Static: Poor (constant support)  Sitting - Dynamic: Poor (constant support)    ADL Intervention:     Pt is total assist for ADLs. Pain:  Pain Scale 1: Numeric (0 - 10)  Pain Intensity 1: 0              Activity Tolerance:   vss  Please refer to the flowsheet for vital signs taken during this treatment.   After treatment:   [] Patient left in no apparent distress sitting up in chair  [x] Patient left in no apparent distress in bed  [x] Call bell left within reach  [x] Nursing notified  [x] Caregiver present  [] Bed alarm activated    COMMUNICATION/COLLABORATION:   The patients plan of care was discussed with: Physical Therapist and Registered Nurse    Nena Mcgrath OT  Time Calculation: 40 mins

## 2018-03-09 NOTE — PROGRESS NOTES
[address] [place] [address]  [phone]      Medical Progress Note      NAME: Ean Wagner   :  1955  MRM:  [id number]    Date/Time: 3/9/2018  7:13 AM      Subjective:     Presented like Sepsis. Source likely diabetic foot wound with osteomyelitis. Morbidly obese and now has obesity hypoventilation syndrome. Hygiene has been terrible and has multiple areas of early pressure injuries and more significant right foot wound. Has refused PT/OT for months until recently and now bed and chair bound. Diabetes has been poorly controlled as well. MRI suggests osteomyelitis and further debridement scheduled for 3/10/18 with probable amputation. Hgb stable and creatinine improved. Appreciate all consultants thoughts. MARYJO's appear adequate for healing. Will need compression Rx as well. Expressive aphasia seems worse.  concerned could be related to abx. Will ask Neuro to see. She has declined MRI recently tho has had one in past.     Past Medical History reviewed and unchanged from Admission History and Physical and/or prior progress note/electronic medical record    Medications reviewed. ROS:      General: postive for weakness and fever  Ear Nose and Throat: negative for rhinorrhea, pharyngitis, otalgia, tinnitus, speech or swallowing difficulties  Respiratory:  positive for SOB and wheezing  Cardiology:  positive for PND, orthopnea and edema  Gastrointestinal: positive for nausea  Genitourinary: positive for incontinence  Dermatological: positive for chronic lichenification legs and feet and pressure sore areas  Neurological: positive for bilateral LE weakness R>L and speech difficulties    Objective:     Last 24hrs VS reviewed since prior progress note.  Most recent are:    Visit Vitals    /56 (BP 1 Location: Left arm, BP Patient Position: At rest)    Pulse 77    Temp 97.6 °F (36.4 °C)    Resp 16    Ht 5' (1.524 m)    Wt 299 lb 1.6 oz (135.7 kg)    SpO2 93%    BMI 58.41 kg/m2     SpO2 Readings from Last 6 Encounters:   03/09/18 93%   03/05/18 (!) 79%   03/02/18 92%   03/01/18 94%   02/27/18 93%   02/23/18 94%    O2 Flow Rate (L/min): 3 l/min       Intake/Output Summary (Last 24 hours) at 03/09/18 0713  Last data filed at 03/09/18 2729   Gross per 24 hour   Intake             1100 ml   Output             2350 ml   Net            -1250 ml          Physical Exam:    General appearance: alert, cooperative, no distress, appears stated age  Eyes: negative  Neck: supple, symmetrical, trachea midline, no adenopathy, thyroid: not enlarged, symmetric, no tenderness/mass/nodules, no carotid bruit and no JVD  Lungs: scattered wheezes  Heart: regular rate and rhythm, S1, S2 normal, grade II/IV systolic murmur; No click, rub or gallop  Abdomen: soft, non-tender. Bowel sounds normal. No masses,  no organomegaly, morbidly obese  Extremities: chronic lichenification legs and feet; ulcer right fifth toe and lateral plantar surface  Neurologic: bilateral LE weakness;  ? Expressive aphasia    Data Review:    Lab:    BMP:   Lab Results   Component Value Date/Time     (H) 03/09/2018 01:55 AM    K 3.7 03/09/2018 01:55 AM     (H) 03/09/2018 01:55 AM    CO2 25 03/09/2018 01:55 AM    AGAP 8 03/09/2018 01:55 AM    GLU 75 03/09/2018 01:55 AM    BUN 30 (H) 03/09/2018 01:55 AM    CREA 1.49 (H) 03/09/2018 01:55 AM    GFRAA 43 (L) 03/09/2018 01:55 AM    GFRNA 35 (L) 03/09/2018 01:55 AM     CBC:   Lab Results   Component Value Date/Time    WBC 6.6 03/09/2018 01:55 AM    HGB 8.5 (L) 03/09/2018 01:55 AM    HCT 28.0 (L) 03/09/2018 01:55 AM     03/09/2018 01:55 AM     Recent Glucose Results:   Lab Results   Component Value Date/Time    GLU 75 03/09/2018 01:55 AM     All labs reviewed in past 24 hours    Other pertinent lab: none    Imaging:    Reviewed. MARYJO's; ECHO    Assessment / Plan:      Active Problems:    Chronic diastolic CHF (congestive heart failure), NYHA class 3 ([address] (7/24/2017)      Obesity hypoventilation syndrome ([address] (2/19/2018)      Sepsis ([address] (3/5/2018)      Diabetes mellitus type II, uncontrolled ([address] (4/18/2013)      Diabetic gastroparesis (HCC) ()      Anemia (7/24/2017)      Ulcer of right fifth toe due to diabetes mellitus ([address] (7/24/2017)      Hypertension (7/24/2017)      Bilateral edema of lower extremity (7/24/2017)      (HFpEF) heart failure with preserved ejection fraction ([address] (10/31/2017)      Venous stasis dermatitis of both lower extremities (11/9/2017)      CVA, old, aphasia (3/9/2018)      Morbid obesity ([address] (1/26/2011)      CKD (chronic kidney disease) stage 3, GFR 30-59 ml/min ()      Fatty liver (7/24/2017)      Aortic stenosis, mild (7/24/2017)      BMI 45.0-49.9, adult ([address] (7/24/2017)      Type 2 diabetes mellitus with diabetic neuropathy ([address] (1/2/2018)        1. Continue current abx regimen pending further ID recommendations  2. Wound care  3. PT/OT/ST  4. Diurese  5. Follow labs  6. Additional surgery per Dr. Alexia Mosher noted  7. SSI;  Lantus being held due to lower BS's and anorexia  8. She and  now not considering DNR  9.  Neuro consult             Care Plan discussed with: Patient and Family    Discussed:  Code Status and Care Plan    Prophylaxis:  Lovenox    Disposition:  SNF/LTC  ___________________________________________________    Attending Physician: Mika Khan MD

## 2018-03-09 NOTE — PROGRESS NOTES
Infectious Disease Progress Note       Subjective:   Ms Diane Hughes seen today earlier. She had no specific complaints. D/W RN as well. ROS: 10 point ROS obtained and pertinent positives include above. All others negative.          Objective:    Vitals:   Patient Vitals for the past 24 hrs:   Temp Pulse Resp BP SpO2   03/09/18 1102 97.8 °F (36.6 °C) 74 20 152/62 93 %   03/09/18 0829 98.1 °F (36.7 °C) 73 18 179/65 94 %   03/09/18 0151 97.6 °F (36.4 °C) 77 16 150/56 93 %   03/08/18 2200 97.3 °F (36.3 °C) 74 18 159/71 96 %   03/08/18 1948 98.2 °F (36.8 °C) 75 18 152/79 97 %   03/08/18 1708 - 80 - 150/49 -       Physical Exam:  ¨ GEN: NAD  ¨ HEENT: no scleral icterus,  no thrush  ¨ CV: S1, S2 heard   ¨ Lungs: Clear to auscultation bilaterally anteriorly  ¨ Abdomen: soft, obese, non tender   ¨ Genitourinary: + mast  ¨ Extremities: B/L chronic venous changes noted, + R foot ulcer, Warm lower extremities   ¨ Neuro: alert to self, situation, verbal minimally  ¨ Skin: no rash  ¨ Psych: flat affect     Medications:    Current Facility-Administered Medications:     ampicillin-sulbactam (UNASYN) 3 g in 0.9% sodium chloride (MBP/ADV) 100 mL, 3 g, IntraVENous, Q12H, Prudence MD Sandip    vancomycin (VANCOCIN) 2000 mg in  ml infusion, 2,000 mg, IntraVENous, Q36H, Prudence MD Sandip, Last Rate: 250 mL/hr at 03/09/18 0158, 2,000 mg at 03/09/18 0158    albuterol-ipratropium (DUO-NEB) 2.5 MG-0.5 MG/3 ML, 3 mL, Nebulization, Q8H PRN, Drew Clark MD    insulin lispro (HUMALOG) injection, , SubCUTAneous, AC&HS, Prudence MD Sandip, Stopped at 03/07/18 1630    glucose chewable tablet 16 g, 4 Tab, Oral, PRN, Prudence MD Sandip    dextrose (D50W) injection syrg 12.5-25 g, 12.5-25 g, IntraVENous, PRN, Prudence MD Sandip, 12.5 g at 03/08/18 1700    glucagon (GLUCAGEN) injection 1 mg, 1 mg, IntraMUSCular, PRN, Prudence MD Sandip    nystatin (MYCOSTATIN) 100,000 unit/gram powder, , Topical, BID, Hulan Lundborg MD Prateek    zinc oxide-cod liver oil (DESITIN) 40 % paste, , Topical, PRN, Mik Peng MD    ammonium lactate (LAC-HYDRIN) 12 % lotion, , Topical, BID, Mik Peng MD    clotrimazole (LOTRIMIN) 1 % cream, , Topical, BID, Mik Peng MD    allopurinol (ZYLOPRIM) tablet 100 mg, 100 mg, Oral, DAILY, Radha Shore MD    aspirin-dipyridamole (AGGRENOX)  mg per capsule 1 Cap, 1 Cap, Oral, BID, Radha Shore MD, 1 Cap at 03/08/18 6516    atenolol (TENORMIN) tablet 50 mg, 50 mg, Oral, DAILY, Radha Shore MD, 50 mg at 03/08/18 0906    atorvastatin (LIPITOR) tablet 40 mg, 40 mg, Oral, DAILY, Radha Shore MD, 40 mg at 03/06/18 0930    lactobac ac& pc-s.therm-b.anim (ELVA Q/RISAQUAD), 1 Cap, Oral, DAILY, Radha Shore MD    bumetanide (BUMEX) tablet 3 mg, 3 mg, Oral, DAILY, Radha Shore MD, 3 mg at 03/09/18 5632    colestipol (COLESTID) tablet 1 g, 1 g, Oral, DAILY, Radha Shore MD    dilTIAZem CD (CARDIZEM CD) capsule 360 mg, 360 mg, Oral, DAILY, Radha Shore MD, 180 mg at 03/09/18 7690    hydrALAZINE (APRESOLINE) tablet 50 mg, 50 mg, Oral, TID, Radha Shore MD, 50 mg at 03/09/18 0926    pregabalin (LYRICA) capsule 75 mg, 75 mg, Oral, BID, Radha Shore MD, 75 mg at 03/07/18 1705    nystatin (MYCOSTATIN) 100,000 unit/gram cream, , Topical, BID, Mik Peng MD    sodium chloride (NS) flush 5-10 mL, 5-10 mL, IntraVENous, Q8H, Radha Shore MD, 10 mL at 03/08/18 2243    sodium chloride (NS) flush 5-10 mL, 5-10 mL, IntraVENous, PRN, Radha Shore MD, 10 mL at 03/09/18 0935    acetaminophen (TYLENOL) tablet 650 mg, 650 mg, Oral, Q4H PRN, Radha Shore MD    HYDROcodone-acetaminophen [facility]) 5-325 mg per tablet 1 Tab, 1 Tab, Oral, Q4H PRN, Radha Shore MD, 1 Tab at 03/06/18 2140    ondansetron [facility]) injection 4 mg, 4 mg, IntraVENous, Q4H PRN, Radha Shore MD, 4 mg at 03/06/18 2487    docusate sodium (COLACE) capsule 100 mg, 100 mg, Oral, BID PRN, Radha Shore MD      Labs:  Recent Results (from the past 24 hour(s))   GLUCOSE, POC    Collection Time: 03/08/18  4:33 PM   Result Value Ref Range    Glucose (POC) 73 65 - 100 mg/dL    Performed by Macario Carreon    GLUCOSE, POC    Collection Time: 03/08/18  4:34 PM   Result Value Ref Range    Glucose (POC) 72 65 - 100 mg/dL    Performed by Macario Carreon    GLUCOSE, POC    Collection Time: 03/08/18  4:50 PM   Result Value Ref Range    Glucose (POC) 74 65 - 100 mg/dL    Performed by Macario Carreon    GLUCOSE, POC    Collection Time: 03/08/18  4:51 PM   Result Value Ref Range    Glucose (POC) 74 65 - 100 mg/dL    Performed by Macario Carreon    GLUCOSE, POC    Collection Time: 03/08/18  5:12 PM   Result Value Ref Range    Glucose (POC) 141 (H) 65 - 100 mg/dL    Performed by Saroj Rose (PCT)    GLUCOSE, POC    Collection Time: 03/08/18 10:03 PM   Result Value Ref Range    Glucose (POC) 78 65 - 100 mg/dL    Performed by Lizbeth Agarwal    GLUCOSE, POC    Collection Time: 03/08/18 10:30 PM   Result Value Ref Range    Glucose (POC) 84 65 - 100 mg/dL    Performed by Melania Delgado    CBC W/O DIFF    Collection Time: 03/09/18  1:55 AM   Result Value Ref Range    WBC 6.6 3.6 - 11.0 K/uL    RBC 2.94 (L) 3.80 - 5.20 M/uL    HGB 8.5 (L) 11.5 - 16.0 g/dL    HCT 28.0 (L) 35.0 - 47.0 %    MCV 95.2 80.0 - 99.0 FL    MCH 28.9 26.0 - 34.0 PG    MCHC 30.4 30.0 - 36.5 g/dL    RDW 17.0 (H) 11.5 - 14.5 %    PLATELET 030 277 - 530 K/uL    MPV 11.6 8.9 - 12.9 FL    NRBC 0.0 0  WBC    ABSOLUTE NRBC 0.00 0.00 - 5.17 K/uL   METABOLIC PANEL, BASIC    Collection Time: 03/09/18  1:55 AM   Result Value Ref Range    Sodium 147 (H) 136 - 145 mmol/L    Potassium 3.7 3.5 - 5.1 mmol/L    Chloride 114 (H) 97 - 108 mmol/L    CO2 25 21 - 32 mmol/L    Anion gap 8 5 - 15 mmol/L    Glucose 75 65 - 100 mg/dL    BUN 30 (H) 6 - 20 MG/DL    Creatinine 1.49 (H) 0.55 - 1.02 MG/DL    BUN/Creatinine ratio 20 12 - 20      GFR est AA 43 (L) >60 ml/min/1.73m2    GFR est non-AA 35 (L) >60 ml/min/1.73m2    Calcium 8.1 (L) 8.5 - 10.1 MG/DL   GLUCOSE, POC    Collection Time: 03/09/18  7:58 AM   Result Value Ref Range    Glucose (POC) 74 65 - 100 mg/dL    Performed by Brittni Gillis (PCT)    GLUCOSE, POC    Collection Time: 03/09/18  8:24 AM   Result Value Ref Range    Glucose (POC) 95 65 - 100 mg/dL    Performed by Brittni Gillis (PCT)    GLUCOSE, POC    Collection Time: 03/09/18 11:15 AM   Result Value Ref Range    Glucose (POC) 115 (H) 65 - 100 mg/dL    Performed by Brittni Gillis (PCT)            Micro:     Wound 3/6/18  Component Value Ref Range & Units Status      Special Requests: NO SPECIAL REQUESTS   Final     GRAM STAIN 3+ WBCS SEEN   Final     GRAM STAIN NO ORGANISMS SEEN   Final     Culture result:    Final     FEW ENTEROCOCCUS FAECALIS GROUP D (A)       Culture & Susceptibility        Antibiotic   Organism Organism Organism       Enterococcus faecalis group D       AMPICILLIN ($)   <=2   ug/mL   S Final         DAPTOMYCIN ($$$$$)   2   ug/mL   S Final         LINEZOLID ($$$$$)   2   ug/mL   S Final         PENICILLIN G ($$)   2   ug/mL   S Final         VANCOMYCIN ($)   4   ug/mL   S Final                                                                       Component Results      Component Value Ref Range & Units Status     Special Requests: NO SPECIAL REQUESTS   Preliminary     Culture result:    Preliminary     CHECKING FOR POSSIBLE FEW ANAEROBIC GRAM NEGATIVE RODS BETA LACTAMASE POSITIVE (A)       Result History            Blood 3/5/18  Component Results      Component Value Ref Range & Units Status     Special Requests: NO SPECIAL REQUESTS   Preliminary     Culture result: NO GROWTH 3 DAYS   Preliminary       Result History          Imaging:      FINDINGS:  Two views of the right foot demonstrate no fracture or other acute  osseous or articular abnormality. Prominent soft tissue swelling.  No opaque  foreign body or gas in the soft tissue, small vessel calcifications suggest  diabetes. Calcaneal spurs. There is no radiological evidence to suggest  osteomyelitis. Mild DJD metatarsophalangeal joint.      IMPRESSION  IMPRESSION:  No acute bony abnormality.          MRI:     FINDINGS: Bone marrow: Ill-defined bone marrow edema is in the fifth metatarsal  head and fifth proximal phalanx. No distinct fracture line. Subtle thinning of  the cortex of the fifth proximal phalanx more likely than the fifth metatarsal  head.     Remaining bone marrow signal is within normal limits.     Joint fluid:  Moderate fifth MTP joint effusion. No other joint effusion.     Tendons: Intact.     Muscles: Diffuse edema. Mild atrophy.     Neurovascular bundles: No evidence of neuroma.     Articular cartilage: Marrow fifth MTP joint is increased. Lisfranc joint is  within normal limits.     Soft tissue mass: None. Skin breakdown is lateral to the fifth MTP joint.     IMPRESSION  IMPRESSION:   1. Soft tissue ulceration is lateral to the fifth MTP joint. There is evidence  of underlying fifth MTP joint septic arthritis and osteomyelitis of the fifth  metatarsal head and fifth proximal phalanx. 2. Cellulitis and myositis. No drainable abscess within the limitation of no  intravenous contrast. However, the fifth MTP joint effusion is lobulated and may  be difficult to drain.     Procedures:   Bedside debridement attempted 3/6     Assessment / Plan:      Ms Shante Silveira is a 60year old lady with hx of DM, Hx of CDI in end of 2017-early 2018, chronic venous stasis, CKD, HELIO, morbid venous stasis admitted on 3/6 with altered mentation and respiratory issues. Her  says that she has had LE cellulitis and chart review indicates admission in 10/2017 to [facility] for the same. Her  says that he was doing her wound care at home until 3 weeks ago, when home care/wound care were arranged.   Her  says that she has been weeping from the top part of her feet and he was not able to see the bottom part of her feet well as she was not able to examine it as she could not lie back with dyspnea at home. He recently noted it and it thought it looked like a blister that had ruptured and was red. They are not clear exact details as her feet were weeping and unclear if discharge was from the ulcers or not. He recalls that after last admission in 10/2017, she went to [facility] and then to Nursing home and developed C diff. She was on antibiotics but not long term at that time per him. She has not been ambulating since about 10/2017 per her . She reports that she does not have good sensation in her feet but is usually bed bound and so no known truama to LE.      This admission, found to be febrile at 101.9 on 3/5/18. H and P indicates \"no acute changes on skin to suggest cellulitis\". Noted antibiotics started on 3/6 with Levaquin. Then note to have been started on Vancomycin, Cefepime and Flagyl. Blood cultures sent. PICC was already inserted.       Seen by Podiatry as well as Vascular surgery. Had \"Debridement of wound R foot attempted but terminated before completion due to extent of necrotic tissue\". Cultures of wound sent. MRI Pending.            1) R foot diabetic foot ulcer with concerns for osteomyelitis      MRI with \" Soft tissue ulceration is lateral to the fifth MTP joint. There is evidence of underlying fifth MTP joint septic arthritis and osteomyelitis of the fifth metatarsal head and fifth proximal phalanx\". \"Cellulitis and myositis.  No drainable abscess within the limitation of no  intravenous contrast. However, the fifth MTP joint effusion is lobulated and may be difficult to drain\"     Recommendations:  Continue Vancomycin IV, Cefepime IV and Flagyl   Await surgical intervention tentatively planned for 3/10  Discussed wt Dr Dennis Earl earlier  and requested bone biopsies for gram stain, bacterial, fungal cultures as well as pathology for bony margins   Final choice and duration of antibiotics to be determined based on surgical intervention, cultures and bone margins for pathology   Wound care   Optimize DM control for better wound healing and infection control   Side effects of antibiotics including GI, renal and CDI explained patient and her  today   Vancomycin Trough goal is 15-20 , dosing to be adjusted by pharmacy   Monitor renal function closely and renal dosing of antibiotics per pharmacy      2) DM     3) HELIO     4) CKD: renally dose antibiotics      5) Hx of CDI in past   Is high risk for CDI again     6) DVT Px per primary team      7) Access: PICC      8)  Slightly prominent lymph nodes in the mediastinum are stable per CT imaging      Discussed with RN today    Dr Gilmer Ramos on call over weekend. ID will follow peripherally. I will return 3/12/18      Thank you for the opportunity to participate in the care of this patient. Please contact with questions or concerns.       Kristina Wilson DO   4:05 PM

## 2018-03-09 NOTE — PROGRESS NOTES
Podiatry History and Physical    Subjective:  Pt resting in bed, NAD.  present. Has not eaten much since admission. Date of Service:  March 9, 2018    Patient is a 60 y.o.  female who is being seen for osteomyelitis R 5th MPJ.     Patient Active Problem List    Diagnosis Date Noted    CVA, old, aphasia 03/09/2018    Sepsis ([address] 03/05/2018    Obesity hypoventilation syndrome ([address] 02/19/2018    Type 2 diabetes mellitus with diabetic neuropathy ([address] 01/02/2018    Venous stasis dermatitis of both lower extremities 11/09/2017    B12 deficiency 10/31/2017    (HFpEF) heart failure with preserved ejection fraction ([address] 10/31/2017    PE (physical exam), annual 10/11/2017    Gout 10/11/2017    History of hyperparathyroidism 10/11/2017    GERD (gastroesophageal reflux disease) 10/11/2017    Insomnia 07/24/2017    Sinus tachycardia 07/24/2017    Anemia 07/24/2017    Ulcer of right fifth toe due to diabetes mellitus ([address] 07/24/2017    Peripheral neuropathy 07/24/2017    Chronic diastolic CHF (congestive heart failure), NYHA class 3 ([address] 07/24/2017    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 2 diabetes mellitus ([address] 07/24/2017    Stage 2 chronic kidney disease due to benign hypertension 07/24/2017    Hypertension 07/24/2017    BMI 45.0-49.9, adult ([address] 07/24/2017    Fatty liver 07/24/2017    Stasis dermatitis 07/24/2017    Low back pain 07/24/2017    Aortic stenosis, mild 07/24/2017    Bilateral edema of lower extremity 07/24/2017    ASCVD (arteriosclerotic cardiovascular disease) 05/31/2014    Stroke ([address] 05/30/2014    Diabetic polyneuropathy ([address] 04/20/2013    Right leg weakness 04/18/2013    Diabetes mellitus type II, uncontrolled ([address] 04/18/2013    CKD (chronic kidney disease) stage 3, GFR 30-59 ml/min     Hyperlipemia     Diabetic gastroparesis ([address]     Diabetes ([address] 01/26/2011    Morbid obesity ([address] 01/26/2011     Past Medical History:   Diagnosis Date    Acute combined systolic and diastolic heart failure, NYHA class 2 ([address] 2017    Anemia 2017    Aortic stenosis, mild 2017    Bilateral edema of lower extremity 2017    BMI 45.0-49.9, adult ([address] 2017    Chronic diastolic CHF (congestive heart failure), NYHA class 3 ([address] 2017    CKD (chronic kidney disease) stage 2, GFR 60-89 ml/min     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 2 diabetes mellitus ([address] 2017    Diabetes ([address]     Fatty liver 2017    Gastroparesis     GERD (gastroesophageal reflux disease)     Gout     HTN (hypertension)     Hyperlipemia     Hyperparathyroidism ([address]     Hypertension 2017    Insomnia 2017    Low back pain 2017    N&V (nausea and vomiting) 2017    Nausea & vomiting     Obesity     Pelvic joint pain 2017    Peripheral neuropathy 2017    Sciatica     right    Severe right groin pain 2017    Sinus tachycardia 2017    Stage 2 chronic kidney disease due to benign hypertension 2017    Stasis dermatitis 2017    Stroke ([address]     right parietal, left caudate    Thyroid disease     para thyroid(high calcium)    Ulcer of right fifth toe due to diabetes mellitus ([address] 2017      Family History   Problem Relation Age of Onset    Cancer Mother      cervical    Diabetes Mother     Hypertension Mother     Stroke Mother 79    Diabetes Father     Hypertension Father     Kidney Disease Father     Diabetes Brother     Hypertension Brother     Stroke Brother 61      Social History   Substance Use Topics    Smoking status: Never Smoker    Smokeless tobacco: Never Used    Alcohol use Yes      Comment: rare     Past Surgical History:   Procedure Laterality Date    CHEST SURGERY PROCEDURE UNLISTED  10/24/13    PARATHYROID EXPLORATION     DELIVERY       HX  SECTION      HX CHOLECYSTECTOMY      HX OTHER SURGICAL      I&D right thigh abscess      Prior to Admission medications Medication Sig Start Date End Date Taking? Authorizing Provider   acetaminophen (TYLENOL EXTRA STRENGTH) 500 mg tablet Take 1,000 mg by mouth every six (6) hours as needed for Pain. Yes Historical Provider   atenolol (TENORMIN) 50 mg tablet Take 50 mg by mouth daily. Yes Historical Provider   chlorhexidine (HIBICLENS) 4 % liquid Apply  to affected area every other day. Yes Historical Provider   colestipol (COLESTID) 1 gram tablet Take 1 g by mouth daily. Yes Historical Provider   glipiZIDE (GLUCOTROL) 10 mg tablet Take 10 mg by mouth two (2) times a day. Yes Historical Provider   insulin detemir U-100 (LEVEMIR FLEXTOUCH U-100 INSULN) 100 unit/mL (3 mL) inpn 50 Units by SubCUTAneous route two (2) times a day. Yes Historical Provider   ammonium lactate (LAC-HYDRIN) 12 % lotion Apply 1 Each to affected area every other day. rub in to affected area well 11/2/17  Yes Jenny Oropeza MD   Bifidobacterium Infantis 4 mg cap Take 4 mg by mouth daily. Yes Historical Provider   nystatin (MYCOSTATIN) topical cream Apply  to affected area two (2) times a day. Use for groin rash 2/19/18  Yes Jenny Oropeza MD   [facility]) 2 mg tablet Take 3 mg by mouth daily. Yes Jenny Oropeza MD   UBIDECARENONE (COQ-10 PO) Take 1 Tab by mouth daily.    Yes Historical Provider   allopurinol (ZYLOPRIM) 300 mg tablet TAKE 1 TABLET BY MOUTH EVERY DAY 2/10/18  Yes Jenny Oropeza MD   dilTIAZem [facility]) 360 mg ER capsule TAKE 1 CAPSULE BY MOUTH EVERY DAY 2/3/18  Yes Jenny Oropeza MD   atorvastatin (LIPITOR) 40 mg tablet TAKE 1 TABLET BY MOUTH EVERY DAY 1/28/18  Yes Jenny Oropeza MD   hydrALAZINE (APRESOLINE) 50 mg tablet TAKE 1 TABLET BY MOUTH THREE TIMES DAILY 1/8/18  Yes Ena Ayoub II, MD   LYRICA 75 mg capsule TAKE ONE CAPSULE BY MOUTH DAILY AT LUNCH TIME AND AGAIN AT BEDTIME. 12/26/17  Yes Jenny Oropeza MD   insulin lispro (HUMALOG) 100 unit/mL kwikpen 10 Units by SubCUTAneous route three (3) times daily (with meals). 17  Yes Gabrielle Mackenzie MD   ferrous sulfate (SLOW FE) 142 mg (45 mg iron) ER tablet Take 142 mg by mouth Daily (before breakfast). Yes Historical Provider   aspirin-dipyridamole (AGGRENOX)  mg per SR capsule TAKE 1 CAPSULE BY MOUTH TWICE DAILY( EVERY TWELVE HOURS) 17  Yes Gabrielle Mackenzie MD   Cholecalciferol, Vitamin D3, 2,000 unit cap Take 1 Cap by mouth daily. Yes Historical Provider     Allergies   Allergen Reactions    Zestril [Lisinopril] Swelling        Review of Systems:  +SOB, denies fever, chills, nausea or vomiting. Poor appetite. Objective:     Patient Vitals for the past 8 hrs:   BP Temp Pulse Resp SpO2   18 1102 152/62 97.8 °F (36.6 °C) 74 20 93 %   18 0829 179/65 98.1 °F (36.7 °C) 73 18 94 %     Temp (24hrs), Av °F (36.7 °C), Min:97.3 °F (36.3 °C), Max:98.8 °F (37.1 °C)    Lower extremity exam:  Dressings removed. Necrotic base to wound R 5th MPJ with pus on bandage. Reduced edema and calor R foot. DP and PT palpable through pitting edema B feet and ankles. Sensation absent to fine touch B toes. +POP lateral R foot.     Data Review:   Recent Results (from the past 24 hour(s))   GLUCOSE, POC    Collection Time: 18  4:33 PM   Result Value Ref Range    Glucose (POC) 73 65 - 100 mg/dL    Performed by Kevan Red, POC    Collection Time: 18  4:34 PM   Result Value Ref Range    Glucose (POC) 72 65 - 100 mg/dL    Performed by Kevan Red, POC    Collection Time: 18  4:50 PM   Result Value Ref Range    Glucose (POC) 74 65 - 100 mg/dL    Performed by Jodi Reed    GLUCOSE, POC    Collection Time: 18  4:51 PM   Result Value Ref Range    Glucose (POC) 74 65 - 100 mg/dL    Performed by Jodi Reed    GLUCOSE, POC    Collection Time: 18  5:12 PM   Result Value Ref Range    Glucose (POC) 141 (H) 65 - 100 mg/dL    Performed by Carina Roberts (PCT)    GLUCOSE, POC    Collection Time: 03/08/18 10:03 PM   Result Value Ref Range    Glucose (POC) 78 65 - 100 mg/dL    Performed by Philip Thomas    GLUCOSE, POC    Collection Time: 03/08/18 10:30 PM   Result Value Ref Range    Glucose (POC) 84 65 - 100 mg/dL    Performed by Melania Delgado    CBC W/O DIFF    Collection Time: 03/09/18  1:55 AM   Result Value Ref Range    WBC 6.6 3.6 - 11.0 K/uL    RBC 2.94 (L) 3.80 - 5.20 M/uL    HGB 8.5 (L) 11.5 - 16.0 g/dL    HCT 28.0 (L) 35.0 - 47.0 %    MCV 95.2 80.0 - 99.0 FL    MCH 28.9 26.0 - 34.0 PG    MCHC 30.4 30.0 - 36.5 g/dL    RDW 17.0 (H) 11.5 - 14.5 %    PLATELET 568 701 - 785 K/uL    MPV 11.6 8.9 - 12.9 FL    NRBC 0.0 0  WBC    ABSOLUTE NRBC 0.00 0.00 - 9.96 K/uL   METABOLIC PANEL, BASIC    Collection Time: 03/09/18  1:55 AM   Result Value Ref Range    Sodium 147 (H) 136 - 145 mmol/L    Potassium 3.7 3.5 - 5.1 mmol/L    Chloride 114 (H) 97 - 108 mmol/L    CO2 25 21 - 32 mmol/L    Anion gap 8 5 - 15 mmol/L    Glucose 75 65 - 100 mg/dL    BUN 30 (H) 6 - 20 MG/DL    Creatinine 1.49 (H) 0.55 - 1.02 MG/DL    BUN/Creatinine ratio 20 12 - 20      GFR est AA 43 (L) >60 ml/min/1.73m2    GFR est non-AA 35 (L) >60 ml/min/1.73m2    Calcium 8.1 (L) 8.5 - 10.1 MG/DL   GLUCOSE, POC    Collection Time: 03/09/18  7:58 AM   Result Value Ref Range    Glucose (POC) 74 65 - 100 mg/dL    Performed by Reva Barefoot (PCT)    GLUCOSE, POC    Collection Time: 03/09/18  8:24 AM   Result Value Ref Range    Glucose (POC) 95 65 - 100 mg/dL    Performed by Holden Barefoot (PCT)    GLUCOSE, POC    Collection Time: 03/09/18 11:15 AM   Result Value Ref Range    Glucose (POC) 115 (H) 65 - 100 mg/dL    Performed by Reva Sadler (PCT)          Impression:   Cellulitis R foot  Diabetic ulcer R 5th MPJ  Osteomyelitis R 5th metatarsal head and base of R 5th toe      Recommendation:   Plan is for Sx 3/10/18; the OR has given us a start time of 12:45 PM. NPO after midnight.  H&H is already low, so hold anticoagulants from now until about 24 hours after Sx.

## 2018-03-09 NOTE — PROGRESS NOTES
HYPOGLYCEMIC EPISODE DOCUMENTATION    Patient with hypoglycemic episode(s) at 2200(time) on 3/8/2018(date). BG value(s) pre-treatment 66    Was patient symptomatic? [] yes, [x] no  Patient was treated with the following rescue medications/treatments: [] D50                [] Glucose tablets                [] Glucagon                [x] 4oz juice                [] 6oz reg soda                [] 8oz low fat milk  BG value post-treatment: 84  Once BG treated and value greater than 80mg/dl, pt was provided with the following:  [x] snack (less than half of a cracker and ginger ale)  [] meal  Name of MD notified: Dr. Marizol Pedersen  The following orders were received: Hold all insulin tonight, including the Lantus.

## 2018-03-09 NOTE — PROGRESS NOTES
CM asked Pt spouse if he had made a decision about SNF placement and he stated that he wanted to wait until after surgery to make SNF placement decision. CM will need to follow up with Pt again at a later time.      Khari Sanchez [place]   Ext [phone]

## 2018-03-10 NOTE — BRIEF OP NOTE
BRIEF OPERATIVE NOTE    Date of Procedure: 3/10/2018   Preoperative Diagnosis: OSTEOMYELITIS RIGHT FOOT  Postoperative Diagnosis: OSTEOMYELITIS RIGHT FOOT    Procedure(s):  1. AMPUTATION RIGHT FIFTH TOE  AND PARTIAL RIGHT FIFTH METATARSAL    2.  WOUND RE-EXPLORATION  Surgeon(s) and Role:     * Simran Erazo DPM - Primary         Assistant Staff: None      Surgical Staff:  Circ-1: Dg Henderson RN  Scrub Tech-1: Criselda Nowak; Chasidy Lopez  Float Staff: Chasidy Lopez  Event Time In   Incision Start 1534   Incision Close 1642     Anesthesia: MAC   Estimated Blood Loss: 150 ml  Specimens:   ID Type Source Tests Collected by Time Destination   1 : OSTEOMYELITIS RIGHT FIFTH TOE AND METATARSAL Preservative Foot, Right  Simran Erazo DPM 3/10/2018 1542 Pathology   2 : BONE MARGIN OF RESECTION Preservative Foot, Right  Simran Erazo DPM 3/10/2018 1559 Pathology   1 : OSTEOMYELITIS RIGHT FIFTH TOE AND METATARSAL Bone Foot, Right ANAEROBIC/AEROBIC/GRAM STAIN Simran Erazo DPM 3/10/2018 1543 Microbiology      Findings: Necrotic tissue surrounding the R 5th MPJ   Complications: Sesamoids of the R 5th MPJ identified after breaking down the sterile field, new field established and sesamoids removed  Implants: * No implants in log *

## 2018-03-10 NOTE — PROGRESS NOTES
Pharmacy Automatic Renal Dosing Protocol - Antimicrobials    Indication for Antimicrobials: Foot wound with possible osteomyelitis    Current Regimen of Each Antimicrobial:  Vancomycin 2gm IV q36h (Start 3/6/18 - Day 5)  Unasyn 3gm IV q12h; start 3/9; Day #2      Previous Abx:  Cefepime 2g IV Q24H (Start 3/6/18 - Day 5)  Metronidazole 500 mg IV Q8H (Start 3/7/18 - Day 4)    Significant Cultures:   3/5: Urine: No Growth (FINAL)   3/5: Blood: No Growth (FINAL)  3/6: Toe Wound: E.faecalis D sens Vanc = 4 ([facility] notified) (FINAL)  3/6: Toe Wound-Anaerobic: Few Anaerobic GNR, ESBL (pending)   3/7: MRI Foot:         1. Soft tissue ulceration is lateral to the fifth MTP joint. There is evidence  of underlying fifth MTP joint septic arthritis and osteomyelitis of the fifth  metatarsal head and fifth proximal phalanx. 2. Cellulitis and myositis. No drainable abscess within the limitation of no  intravenous contrast. However, the fifth MTP joint effusion is lobulated and may  be difficult to drain. Labs:  Recent Labs      03/10/18   0222  18   0155  18   0358   CREA  1.31*  1.49*  1.78*   BUN  29*  30*  33*   WBC  8.3  6.6  6.6     Temp (24hrs), Av.2 °F (36.8 °C), Min:98 °F (36.7 °C), Max:98.4 °F (36.9 °C)        Paralysis, amputations, malnutrition: no  Creatinine Clearance (mL/min) 32 ml/min    Impression/Plan:   - Continue Vancomycin 2gm IV q36h  - Check Vancomycin Trough before 2pm dose today  - Renal function improved: CrCl 32  - Change Unasyn to 3gm IV q8h  - Daily BMP     Pharmacy will follow daily and adjust medications as appropriate for renal function and/or serum levels. Thank you,  Alcon Seymour [facility]      Recommended duration of therapy  http://[facility]/[place]/[facility]/Pharmacy/Clinical%20Companion/Duration%20of%20ABX%20therapy. docx    Renal Dosing  http://[facility]/[place]/[facility]/Pharmacy/Clinical%20Companion/Renal%[email or web address]. pdf

## 2018-03-10 NOTE — PROGRESS NOTES
Bedside and Verbal shift change report given to [facility] (oncoming nurse) by Lorri Singh (offgoing nurse). Report included the following information SBAR, Kardex, Intake/Output, MAR, Accordion and Cardiac Rhythm NSR. Discussed pt's order to be NPO at midnight to prepare for surgery tomorrow.

## 2018-03-10 NOTE — PERIOP NOTES
1300   TRANSFER - IN REPORT:    Verbal report received from [facility]) on [facility]  being received from 3261(unit) for ordered procedure     Report consisted of patients Situation, Background, Assessment and   Recommendations(SBAR). Information from the following report(NSRSBAR, Kardex, ED Summary, OR Summary, Procedure Summary, Intake/Output, MAR, Accordion, Recent Results, Med Rec Status and Cardiac Rhythm NSR was reviewed with the receiving nurse. Opportunity for questions and clarification was provided. Assessment completed upon patients arrival to unit and care assumed.

## 2018-03-10 NOTE — PROGRESS NOTES
Pharmacy Automatic Renal Dosing Protocol - Antimicrobials    Indication for Antimicrobials: Foot wound with possible osteomyelitis    Current Regimen of Each Antimicrobial:  Vancomycin 2gm IV q36h (Start 3/6/18 - Day 5)  Unasyn 3gm IV q12h; start 3/9; Day #2    Previous Abx:  Cefepime 2g IV Q24H (Start 3/6/18 - Day 5)  Metronidazole 500 mg IV Q8H (Start 3/7/18 - Day 4)    Vancomycin Trough Range: 15-20  Level Drawn (Date): 3/10  Trough: Drawn/Adjusted   28.4 / 28.1    Cp(max) 49.8   actual T1/2 = 41hr   Expected T1/2 = 19hr  Expect differences in Volume of Distribution due to morbid obesity    Significant Cultures:   3/5: Urine: No Growth (FINAL)   3/5: Blood: No Growth (FINAL)  3/6: Toe Wound: E.faecalis D sens Vanc = 4 ([facility] notified) (FINAL)  3/6: Toe Wound-Anaerobic: Few Anaerobic GNR, ESBL (pending)   3/7: MRI Foot:         1. Soft tissue ulceration is lateral to the fifth MTP joint. There is evidence  of underlying fifth MTP joint septic arthritis and osteomyelitis of the fifth  metatarsal head and fifth proximal phalanx. 2. Cellulitis and myositis. No drainable abscess within the limitation of no  intravenous contrast. However, the fifth MTP joint effusion is lobulated and may  be difficult to drain. Labs:  Recent Labs      03/10/18   0222  18   0155  18   0358   CREA  1.31*  1.49*  1.78*   BUN  29*  30*  33*   WBC  8.3  6.6  6.6     Temp (24hrs), Av.2 °F (36.8 °C), Min:98 °F (36.7 °C), Max:98.4 °F (36.9 °C)        Paralysis, amputations, malnutrition: no  Creatinine Clearance (mL/min) 32 ml/min    Impression/Plan:   - Vancomycin Trough 28.1 is great than desired & unexpected in light of improving renal function  - Hold Vancomycin   - Check Random Vancomycin Level in am  - Renal function improved: CrCl 32  - Change Unasyn to 3gm IV q8h  - Daily BMP     Pharmacy will follow daily and adjust medications as appropriate for renal function and/or serum levels.     Thank you, Denver Balo, [facility]      Recommended duration of therapy  http://[facility]/[place]/[facility]/Pharmacy/Clinical%20Companion/Duration%20of%20ABX%20therapy. docx    Renal Dosing  http://[facility]/[place]/[facility]/Pharmacy/Clinical%20Companion/Renal%[id number]. pdf

## 2018-03-10 NOTE — PROGRESS NOTES
Spiritual Care Assessment/Progress Note  [facility]      NAME: Isis Lizarraga      MRN: [id number]  AGE: 60 y.o.  SEX: female  Mormon Affiliation: Restorationist   Language: English     3/10/2018     Total Time (in minutes): 10     Spiritual Assessment begun in [facility] 3 MED TELE through conversation with:         [x]Patient        [x] Family    [] Friend(s)        Reason for Consult: Initial/Spiritual assessment, patient floor     Spiritual beliefs: (Please include comment if needed)     [x] Involved in a anuradha tradition/spiritual practice:     [] Supported by a anuradha community:      [] Claims no spiritual orientation:      [] Seeking spiritual identity:           [] Adheres to an individual form of spirituality:      [] Not able to assess:                     Identified resources for coping:      [] Prayer                  [] Devotional reading               [] Music                  [] Guided Imagery     [x] Family/friends                 [] Pet visits     [] Other:        Interventions offered during this visit: (See comments for more details)    Patient Interventions: Affirmation of emotions/emotional suffering, Coping skills reviewed/reinforced, Pre procedural contact, Iconic (affirming the presence of God/Higher Power), Initial/Spiritual assessment, patient floor, Mormon beliefs/image of God discussed     Family/Friend(s): Coping skills reviewed/reinforced, Mormon beliefs/image of God discussed     Plan of Care:     [] Discuss Spiritual/Cultural needs    [] Support AMD and/or advance care planning process      [] Support grieving process   [] Coordinate Rites/Rituals    [] Coordination with community clergy   [x] No spiritual needs identified at this time   [] Detailed Plan of Care below (See Comments)  [] Make referral to Music Therapy  [] Make referral to Pet Therapy     [] Make referral to Addiction services  [] Make referral to [facility]  [] Make referral to Spiritual Care Partner  [] No future visits requested           Initial Spiritual Assessment in 3261. Pt accompanied by  and children. Reported to be going down for a procedure shortly. Led in processing, pt would respond with nods and shaking of head, but did not verbalize.  stated that pt understood questions but was not verbalizing.  added that although they identify as believers in God they are not Protestant goers. Provided word of encouragement and advised of availability pronouncing blessing over procedure. Will follow up as needed. No specific needs expressed. RAHUL Juarez. Div

## 2018-03-10 NOTE — PROGRESS NOTES
Bedside shift change report given to Lexy Marie RN (oncoming nurse) by Cathryn Gaines RN (offgoing nurse). Report included the following information SBAR, Kardex, Intake/Output, MAR, Recent Results and Cardiac Rhythm NSR.

## 2018-03-10 NOTE — PERIOP NOTES
Handoff Report from Operating Room to PACU    Report received from [address] [place], [place] [address]she Ricketts CRNA and Rosalia Nuno RN regarding Harley Murry. Surgeon(s):  Reggie Gibbs DPM  And Procedure(s) (LRB):  AMPUTATION RIGHT FIFTH TOE  AND PARTIAL RIGHT FIFTH METATARSAL   (Right)  WOUND EXPLORATION (Right)  confirmed   with allergies and dressings discussed. Anesthesia type, drugs, patient history, complications, estimated blood loss, vital signs, intake and output, and last pain medication, lines, reversal medications and temperature were reviewed.

## 2018-03-10 NOTE — PROGRESS NOTES
[address] [place] [address]  [phone]      Medical Progress Note      NAME: Adrianne Lau   :  1955  MRM:  [id number]    Date/Time: 3/10/2018  7:03 AM      Subjective:     Presented like Sepsis. Source likely diabetic foot wound with osteomyelitis. Morbidly obese and now has obesity hypoventilation syndrome. Hygiene has been terrible and has multiple areas of early pressure injuries and more significant right foot wound. Has refused PT/OT for months until recently and now bed and chair bound. Diabetes has been poorly controlled as well. MRI suggests osteomyelitis and further debridement scheduled for 3/10/18 with probable amputation. MARYJO's appear adequate for healing. Will need compression Rx as well. Neurology feels aphasia is stress related/psychogenic.  states she will not fit into MRI and will need open study if needed. ID has seen and agreed with current antibiotic regimen. FBS 91. K+ 3.5. Hgb 9.2. Patient unable to communicate due to \"apahasia\" but is alert. Past Medical History reviewed and unchanged from Admission History and Physical and/or prior progress note/electronic medical record    Medications reviewed. ROS:      General: postive for weakness and fever  Ear Nose and Throat: negative for rhinorrhea, pharyngitis, otalgia, tinnitus, speech or swallowing difficulties  Respiratory: Less SOB and wheezing  Cardiology:  positive for PND, orthopnea and edema  Gastrointestinal: positive for nausea  Genitourinary: positive for incontinence  Dermatological: positive for chronic lichenification legs and feet and pressure sore areas  Neurological: positive for bilateral LE weakness R>L and speech difficulties    Objective:     Last 24hrs VS reviewed since prior progress note.  Most recent are:    Visit Vitals    /58    Pulse 73    Temp 98.3 °F (36.8 °C)    Resp 18    Ht 5' (1.524 m)    Wt 295 lb 8 oz (134 kg)    SpO2 98%    BMI 57.71 kg/m2     SpO2 Readings from Last 6 Encounters:   03/09/18 98%   03/05/18 (!) 79%   03/02/18 92%   03/01/18 94%   02/27/18 93%   02/23/18 94%    O2 Flow Rate (L/min): 3 l/min       Intake/Output Summary (Last 24 hours) at 03/10/18 0703  Last data filed at 03/10/18 0227   Gross per 24 hour   Intake              740 ml   Output             2550 ml   Net            -1810 ml          Physical Exam:    General appearance: alert, cooperative, no distress, appears stated age  Eyes: negative  Neck: supple, symmetrical, trachea midline, no adenopathy, thyroid: not enlarged, symmetric, no tenderness/mass/nodules, no carotid bruit and no JVD  Lungs: Few rhonchi but wheezing diminished  Heart: regular rate and rhythm, S1, S2 normal, grade II/IV systolic murmur; No click, rub or gallop  Abdomen: soft, non-tender. Bowel sounds normal. No masses,  no organomegaly, morbidly obese  Extremities: chronic lichenification legs and feet; ulcer right fifth toe and lateral plantar surface  Neurologic: bilateral LE weakness;  \"aphasia\" persists    Data Review:    Lab:    BMP:   Lab Results   Component Value Date/Time     (H) 03/10/2018 02:22 AM    K 3.5 03/10/2018 02:22 AM     (H) 03/10/2018 02:22 AM    CO2 26 03/10/2018 02:22 AM    AGAP 8 03/10/2018 02:22 AM    GLU 91 03/10/2018 02:22 AM    BUN 29 (H) 03/10/2018 02:22 AM    CREA 1.31 (H) 03/10/2018 02:22 AM    GFRAA 50 (L) 03/10/2018 02:22 AM    GFRNA 41 (L) 03/10/2018 02:22 AM     CBC:   Lab Results   Component Value Date/Time    WBC 8.3 03/10/2018 02:22 AM    HGB 9.2 (L) 03/10/2018 02:22 AM    HCT 30.1 (L) 03/10/2018 02:22 AM     03/10/2018 02:22 AM     Recent Glucose Results:   Lab Results   Component Value Date/Time    GLU 91 03/10/2018 02:22 AM     All labs reviewed in past 24 hours    Other pertinent lab: none    Imaging:    Reviewed. MARYJO's; ECHO    Assessment / Plan: Active Problems:     Morbid obesity ([facility] [address] [address] (1/26/2011)      Diabetes mellitus type II, uncontrolled ([address] (4/18/2013)      CKD (chronic kidney disease) stage 3, GFR 30-59 ml/min ()      Diabetic gastroparesis (HCC) ()      Anemia (7/24/2017)      Ulcer of right fifth toe due to diabetes mellitus ([address] (7/24/2017)      Chronic diastolic CHF (congestive heart failure), NYHA class 3 ([address] (7/24/2017)      Hypertension (7/24/2017)      BMI 45.0-49.9, adult ([address] (7/24/2017)      Fatty liver (7/24/2017)      Aortic stenosis, mild (7/24/2017)      Bilateral edema of lower extremity (7/24/2017)      (HFpEF) heart failure with preserved ejection fraction ([address] (10/31/2017)      Venous stasis dermatitis of both lower extremities (11/9/2017)      Type 2 diabetes mellitus with diabetic neuropathy ([address] (1/2/2018)      Obesity hypoventilation syndrome ([address] (2/19/2018)      Sepsis ([address] (3/5/2018)      CVA, old, aphasia (3/9/2018)        1. Continue current abx regimen   2. Wound care  3. PT/OT/ST  4. Diurese  5. K+ IV oredered  6. Follow labs  7. Additional surgery per Dr. Kenji Pete noted for today  8. SSI;  Lantus being held due to lower BS's and anorexia  9.  She and  now not considering DNR           Care Plan discussed with: Patient and Family    Discussed:  Code Status and Care Plan    Prophylaxis:  Lovenox    Disposition:  SNF/LTC  ___________________________________________________    Attending Physician: Selvin Cross MD

## 2018-03-10 NOTE — PERIOP NOTES
TRANSFER - OUT REPORT:    Verbal report given to Loreto Apple RN (name) on Renuka Brooks  being transferred to [address] [place] [address] ([facility]) for routine post - op       Report consisted of patients Situation, Background, Assessment and   Recommendations(SBAR). Information from the following report(s) SBAR, Kardex, OR Summary, Procedure Summary, Intake/Output and MAR was reviewed with the receiving nurse. Lines:   PICC Double Lumen 03/07/18 Right;Brachial (Active)   Central Line Being Utilized Yes 3/10/2018  5:21 PM   Criteria for Appropriate Use Long term IV/antibiotic administration 3/10/2018  5:21 PM   Site Assessment Clean, dry, & intact 3/10/2018  5:21 PM   Phlebitis Assessment 0 3/10/2018  5:21 PM   Infiltration Assessment 0 3/10/2018  5:21 PM   Arm Circumference (cm) 39 cm 3/7/2018 10:31 AM   Date of Last Dressing Change 03/07/18 3/10/2018  5:21 PM   Dressing Status Clean, dry, & intact 3/10/2018  5:21 PM   Action Taken Blood drawn 3/10/2018  2:27 AM   External Catheter Length (cm) 0 centimeters 3/7/2018 10:31 AM   Dressing Type Disk with Chlorhexadine gluconate (CHG); Transparent 3/10/2018  5:21 PM   Hub Color/Line Status Red; Infusing 3/10/2018  5:21 PM   Positive Blood Return (Site #1) Yes 3/10/2018  5:21 PM   Hub Color/Line Status Purple;Capped 3/10/2018  5:21 PM   Positive Blood Return (Site #2) Yes 3/10/2018  5:21 PM   Alcohol Cap Used Yes 3/10/2018  8:20 AM        Opportunity for questions and clarification was provided.       Patient transported with:   Monitor  O2 @ 3 liters  Registered Nurse

## 2018-03-10 NOTE — PROGRESS NOTES
Lovenox 40 mg sc daily ordered for DVT ppx  Will adjust to lovenox 40 mg sc every 12 hr for BMI > Radhaza Petey [address] [place] [address]

## 2018-03-10 NOTE — PROGRESS NOTES
Anesthesiology    Chart reviewed. Patient seen and examined. Visit Vitals    /66    Pulse 72    Temp 36.8 °C (98.2 °F)    Resp 18    Ht 5' (1.524 m)    Wt 135.7 kg (299 lb 1.6 oz)    SpO2 95%    BMI 58.41 kg/m2       General appearance: alert, cooperative, no distress, appears stated age.     Heart: RRR, no murmur  Lungs: CTA BL  Neurologic: Grossly normal    Labs:     Recent Results (from the past 24 hour(s))   GLUCOSE, POC    Collection Time: 03/08/18 10:03 PM   Result Value Ref Range    Glucose (POC) 78 65 - 100 mg/dL    Performed by Georges Bonilla    GLUCOSE, POC    Collection Time: 03/08/18 10:30 PM   Result Value Ref Range    Glucose (POC) 84 65 - 100 mg/dL    Performed by Melania Delgado    CBC W/O DIFF    Collection Time: 03/09/18  1:55 AM   Result Value Ref Range    WBC 6.6 3.6 - 11.0 K/uL    RBC 2.94 (L) 3.80 - 5.20 M/uL    HGB 8.5 (L) 11.5 - 16.0 g/dL    HCT 28.0 (L) 35.0 - 47.0 %    MCV 95.2 80.0 - 99.0 FL    MCH 28.9 26.0 - 34.0 PG    MCHC 30.4 30.0 - 36.5 g/dL    RDW 17.0 (H) 11.5 - 14.5 %    PLATELET 686 547 - 749 K/uL    MPV 11.6 8.9 - 12.9 FL    NRBC 0.0 0  WBC    ABSOLUTE NRBC 0.00 0.00 - 0.21 K/uL   METABOLIC PANEL, BASIC    Collection Time: 03/09/18  1:55 AM   Result Value Ref Range    Sodium 147 (H) 136 - 145 mmol/L    Potassium 3.7 3.5 - 5.1 mmol/L    Chloride 114 (H) 97 - 108 mmol/L    CO2 25 21 - 32 mmol/L    Anion gap 8 5 - 15 mmol/L    Glucose 75 65 - 100 mg/dL    BUN 30 (H) 6 - 20 MG/DL    Creatinine 1.49 (H) 0.55 - 1.02 MG/DL    BUN/Creatinine ratio 20 12 - 20      GFR est AA 43 (L) >60 ml/min/1.73m2    GFR est non-AA 35 (L) >60 ml/min/1.73m2    Calcium 8.1 (L) 8.5 - 10.1 MG/DL   GLUCOSE, POC    Collection Time: 03/09/18  7:58 AM   Result Value Ref Range    Glucose (POC) 74 65 - 100 mg/dL    Performed by Elizabeth Tinajero (PCT)    GLUCOSE, POC    Collection Time: 03/09/18  8:24 AM   Result Value Ref Range    Glucose (POC) 95 65 - 100 mg/dL    Performed by Nitza Schuler Ari Chávez (PCT)    GLUCOSE, POC    Collection Time: 03/09/18 11:15 AM   Result Value Ref Range    Glucose (POC) 115 (H) 65 - 100 mg/dL    Performed by Ashley Acuna (PCT)    GLUCOSE, POC    Collection Time: 03/09/18  4:31 PM   Result Value Ref Range    Glucose (POC) 95 65 - 100 mg/dL    Performed by Mayford Halsted        EKG:     Results for orders placed or performed during the hospital encounter of 03/05/18   EKG, 12 LEAD, INITIAL   Result Value Ref Range    Ventricular Rate 79 BPM    Atrial Rate 79 BPM    P-R Interval 136 ms    QRS Duration 78 ms    Q-T Interval 374 ms    QTC Calculation (Bezet) 428 ms    Calculated P Axis 42 degrees    Calculated R Axis 10 degrees    Calculated T Axis 50 degrees    Diagnosis       Normal sinus rhythm  Normal ECG  When compared with ECG of 26-OCT-2017 20:18,  No significant change was found  Confirmed by Dyan Nuno P.VCailin ([id number]) on 3/6/2018 9:27:03 AM         XR Results (most recent):    Results from Hospital Encounter encounter on 03/05/18   XR FOOT RT AP/LAT   Narrative EXAM:  XR FOOT RT AP/LAT    INDICATION:   wound over lateral R foot, hx DM, febrile. COMPARISON:  None. FINDINGS:  Two views of the right foot demonstrate no fracture or other acute  osseous or articular abnormality. Prominent soft tissue swelling. No opaque  foreign body or gas in the soft tissue, small vessel calcifications suggest  diabetes. Calcaneal spurs. There is no radiological evidence to suggest  osteomyelitis. Mild DJD metatarsophalangeal joint. Impression IMPRESSION:  No acute bony abnormality. A/P:    1.  OK to proceed with scheduled surgical procedure, assuming no acute changes in clinical status or lab derangements and patient remains NPO. 2.  RN informed of pending preoperative anesthetic issues and will notify Internal Medicine and/or Orthopedic service(s).     Signed By: Lucina Ang MD  Date:           3/9/2018  Time:          7:26 PM

## 2018-03-10 NOTE — ANESTHESIA POSTPROCEDURE EVALUATION
Post-Anesthesia Evaluation and Assessment    Patient: Avery García MRN: [id number]  SSN: [id number]    YOB: 1955  Age: 60 y.o. Sex: female       Cardiovascular Function/Vital Signs  Visit Vitals    /57 (BP 1 Location: Left arm, BP Patient Position: At rest)    Pulse 84    Temp 37.1 °C (98.7 °F)    Resp 20    Ht 5' (1.524 m)    Wt 134 kg (295 lb 8 oz)    SpO2 95%    BMI 57.71 kg/m2       Patient is status post general, total IV anesthesia anesthesia for Procedure(s):  AMPUTATION RIGHT FIFTH TOE  AND PARTIAL RIGHT FIFTH METATARSAL    WOUND EXPLORATION. Nausea/Vomiting: None    Postoperative hydration reviewed and adequate. Pain:  Pain Scale 1: Numeric (0 - 10) (03/10/18 1745)  Pain Intensity 1: 0 (03/10/18 1745)   Managed    Neurological Status:   Neuro (WDL): Exceptions to WDL (03/10/18 1721)  Neuro  Neurologic State: Alert;Drowsy; Eyes open spontaneously;Confused (03/10/18 1721)  Orientation Level: Oriented to person;Oriented to place (03/10/18 1721)  Cognition: Follows commands (03/10/18 1721)  Speech: Nods appropriately;Delayed responses (03/10/18 1721)  Assessment L Pupil: Sluggish (03/06/18 1929)  Size L Pupil (mm): 2 (03/05/18 2030)  Assessment R Pupil: Sluggish (03/05/18 2030)  Size R Pupil (mm): 2 (03/05/18 2030)  LUE Motor Response: Spontaneous ;Weak;Numbness (03/10/18 1721)  LLE Motor Response: Spontaneous ;Weak;Numbness (03/10/18 1721)  RUE Motor Response: Spontaneous ;Weak;Numbness (03/10/18 1721)  RLE Motor Response: Spontaneous ;Weak;Numbness (03/10/18 1721)   At baseline    Mental Status and Level of Consciousness: Arousable    Pulmonary Status:   O2 Device: Nasal cannula (03/10/18 1745)   Adequate oxygenation and airway patent    Complications related to anesthesia: None    Post-anesthesia assessment completed.  No concerns    Signed By: Vi Adames MD     March 10, 2018

## 2018-03-11 NOTE — PROGRESS NOTES
Bedside and Verbal shift change report given to Cindy (oncoming nurse) by Dominga Noble (offgoing nurse). Report included the following information SBAR, Kardex, Procedure Summary, Intake/Output, MAR, Accordion, Recent Results and Cardiac Rhythm NSR. Discussed events of surgery today.

## 2018-03-11 NOTE — PROGRESS NOTES
Pharmacy Automatic Renal Dosing Protocol - Antimicrobials    Indication for Antimicrobials: Foot wound with possible osteomyelitis    Current Regimen of Each Antimicrobial:  Vancomycin 2gm IV q36h (Start 3/6/18 - Day 6): On hold: last dose 3/10  Unasyn 3gm IV q8hr; start 3/9; Day #3    Previous Abx:  Cefepime 2g IV Q24H (Start 3/6/18 - Day 5)  Metronidazole 500 mg IV Q8H (Start 3/7/18 - Day 4)    Vancomycin Trough Range: 15-20  Level Drawn (Date): 3/11  Trough: Drawn/Adjusted: Random  43.3  3/10: 28.4 / 28.1    Cp(max) 49.8   actual T1/2 = 41hr   Expected T1/2 = 19hr  Expect differences in Volume of Distribution due to morbid obesity    Significant Cultures:   3/5: Urine: No Growth (FINAL)   3/5: Blood: No Growth (FINAL)  3/6: Toe Wound: E.faecalis D sens Vanc = 4 (David Carrion notified) (FINAL)  3/6: Toe Wound-Anaerobic: Few Anaerobic GNR, ESBL (pending)   3/7: MRI Foot:         1. Soft tissue ulceration is lateral to the fifth MTP joint. There is evidence  of underlying fifth MTP joint septic arthritis and osteomyelitis of the fifth  metatarsal head and fifth proximal phalanx. 2. Cellulitis and myositis. No drainable abscess within the limitation of no  intravenous contrast. However, the fifth MTP joint effusion is lobulated and may  be difficult to drain. Labs:  Recent Labs      18   0032  03/10/18   0222  18   0155   CREA  1.16*  1.31*  1.49*   BUN  26*  29*  30*   WBC  8.7  8.3  6.6     Temp (24hrs), Av.3 °F (36.8 °C), Min:98.1 °F (36.7 °C), Max:98.7 °F (37.1 °C)        Paralysis, amputations, malnutrition: no  Creatinine Clearance (mL/min) 36 ml/min    Impression/Plan:   - Random Vancomycin Level 43.3; Renal function slightly improved from yesterday (1.16 from 1.31)   - Morbidly Obese  - Continue to hold Vancomycin  - Random Vancomycin level with am labs;  Calculate elimination rate  - Continue Unasyn 3gm IV q8h  - Daily BMP     Pharmacy will follow daily and adjust medications as appropriate for renal function and/or serum levels. Thank you,  Isaiah Simons, [facility]      Recommended duration of therapy  [email or web address]/[facility]/Pharmacy/Clinical%20Companion/Duration%20of%[email or web address]. docx    Renal Dosing  [email or web address]/[facility]/Pharmacy/Clinical%20Companion/Renal%[id number]. pdf

## 2018-03-11 NOTE — OP NOTES
[facility] OP NOTE    Cl Dos Santos  MR#: [id number]  : 1955  ACCOUNT #: [de-identified]   DATE OF SERVICE: 03/10/2018    SURGEON:  Ricardo Causey DPM    ANESTHESIA TYPE:  IV sedation with local.    PREOPERATIVE DIAGNOSIS:  Osteomyelitis, right fifth metatarsophalangeal joint. POSTOPERATIVE DIAGNOSIS:  Osteomyelitis, right fifth metatarsophalangeal joint. PROCEDURES:  1. Amputation, right fifth toe and partial right fifth metatarsal.  2.  Reexploration of wound. SPECIMENS:  1. Aerobic and anaerobic bone cultures, right fifth metatarsal head. 2.  Osteomyelitis, right fifth toe and metatarsal.  3.  Resected bone margin. ESTIMATED BLOOD LOSS:  150 mL. COMPLICATIONS:  After the sterile field had been broken down, the fluoroscan was used to document the removal of the fifth toe and fifth metatarsal distal aspect; sesamoid bones were seen at the fifth metatarsophalangeal joint, necessitating reestablishing the sterile field and exising the sesamoids. IMPLANTS:  None. PROCEDURE IN DETAIL:  The patient was brought into the operating room and placed supine upon the OR table. After the administration of IV sedation, I injected the right foot and ankle with a total of 30 mL of 0.5% plain Marcaine. The foot was prepped and draped in the usual sterile technique. The foot was elevated for 3 minutes before the inflation of a tourniquet around the right ankle to a pressure of 250 mmHg. A tennis racquet type incision was made beginning with a linear incision on the lateral aspect of the right fifth metatarsal shaft, continuing to the base of the right fifth toe and then encircling the base of the right fifth toe. Brisk bleeding was encountered during this time. Elevating the tourniquet pressure to 300 mmHg did not result in any reduction of bleeding.   Therefore, the tourniquet was released entirely and although bleeding was still somewhat brisk, it was less than with the tourniquet inflated. The soft tissue was reflected dorsally and plantarly off of the right fifth metatarsal.  A power microsagittal saw was used to resect at approximately the midshaft of the right fifth metatarsal and the distal aspect of the right fifth metatarsal and the entire right fifth toe were freed of soft tissue attachments. Deep tissue cultures along with fragments from the right fifth metatarsal head were placed into aerobic and anaerobic culture tubes at this time and these were sent for C&S. The wound on the plantar right fifth metatarsophalangeal joint was excised full skin thickness. The wound at the lateral base of the right fifth metatarsal was saucerized and all necrotic tissue excised from this wound. The remaining necrotic tissue within the cavity created by removal of the distal aspect of the fifth metatarsal was also debrided. The area was then flushed thoroughly with 3 L of sterile saline using power lavage. Hemostasis was then obtained with a combination of electrocauterization and minimal use of 3-0 Vicryl ties for bleeding vessels. A clearance (bone margin) fragment from the remaining distal edge of the right fifth metatarsal was obtained and this will be sent as specimen. The main portion of the wound was closed with 3-0 nylon. The aspect of the wound at the fifth toe base could not be closed directly due to the soft tissue defect and the poor quality of the tissue in general.  The wound was packed with Nu Gauze and then wrapped with dry sterile dressings and Adaptic. The sterile field was broken down. A mini C-arm was brought in to document the amputation and it was at this time that the 2 small sesamoid bones at the plantar aspect of the right fifth metatarsophalangeal joint were identified. This necessitated removing the bandages, prepping the foot again, and establishing a new sterile field, but the patient never left the operating table.   Once a new sterile field had been established, the 2 most distal sutures were removed, and utilizing curved Metzenbaum scissors the sesamoid bones were excised. At this time, the mini C-arm was sterilely draped and removal of the sesamoids was documented before breakdown of the sterile field. The foot was then cleaned again with sterile saline. The incision was once again closed as much as possible with 4-0 nylon. The wound was packed with Nu Gauze and it was wrapped in dry sterile dressings with Adaptic over the surgical incision. The patient was then transported to the recovery room with vital signs stable and vascular status intact. She will remain until deemed stable for transfer back up to her hospital bed.   I will continue to follow the patient while she remains in the hospital.      OLAF Ross / [place]  D: 03/10/2018 17:42     T: 03/10/2018 19:14  JOB #: [id number]

## 2018-03-11 NOTE — PROGRESS NOTES
[address] [place] [address]  [phone]      Medical Progress Note      NAME: Wade Kat   :  1955  MRM:  [id number]    Date/Time: 3/11/2018  6:18 AM      Subjective: Tolerated surgery yesterday. Answering questions better. Neurology feels aphasia is stress related/psychogenic. Afebrile and pain currently controlled    Past Medical History reviewed and unchanged from Admission History and Physical and/or prior progress note/electronic medical record    Medications reviewed. ROS:      General: postive for weakness  Ear Nose and Throat: negative for rhinorrhea, pharyngitis, otalgia, tinnitus, speech or swallowing difficulties  Respiratory: Less SOB and wheezing  Cardiology:  positive for PND, orthopnea and edema  Gastrointestinal: positive for nausea  Genitourinary: positive for incontinence  Dermatological: positive for chronic lichenification legs and feet and pressure sore areas  Neurological: positive for bilateral LE weakness R>L and speech difficulties    Objective:     Last 24hrs VS reviewed since prior progress note. Most recent are:    Visit Vitals    /70    Pulse 88    Temp 98.2 °F (36.8 °C)    Resp 18    Ht 5' (1.524 m)    Wt 295 lb 8 oz (134 kg)    SpO2 96%    BMI 57.71 kg/m2     SpO2 Readings from Last 6 Encounters:   18 96%   18 (!) 79%   18 92%   18 94%   18 93%   18 94%    O2 Flow Rate (L/min): 3 l/min       Intake/Output Summary (Last 24 hours) at 18 0618  Last data filed at 18 2350   Gross per 24 hour   Intake             1900 ml   Output              750 ml   Net             1150 ml          Physical Exam:    General appearance: alert, cooperative, no distress, appears stated age  Eyes: negative  Neck: supple, symmetrical, trachea midline, no adenopathy, thyroid: not enlarged, symmetric, no tenderness/mass/nodules, no carotid bruit and no JVD  Lungs:  Few rhonchi but wheezing diminished  Heart: regular rate and rhythm, S1, S2 normal, grade II/IV systolic murmur; No click, rub or gallop  Abdomen: soft, non-tender. Bowel sounds normal. No masses,  no organomegaly, morbidly obese  Extremities: chronic lichenification legs and feet;right foot heavily bandaged  Neurologic: bilateral LE weakness;  \"aphasia\" persists but is improved somewhat today    Data Review:    Lab:    BMP:   Lab Results   Component Value Date/Time     (H) 03/11/2018 12:32 AM    K 3.7 03/11/2018 12:32 AM     (H) 03/11/2018 12:32 AM    CO2 26 03/11/2018 12:32 AM    AGAP 8 03/11/2018 12:32 AM     (H) 03/11/2018 12:32 AM    BUN 26 (H) 03/11/2018 12:32 AM    CREA 1.16 (H) 03/11/2018 12:32 AM    GFRAA 57 (L) 03/11/2018 12:32 AM    GFRNA 47 (L) 03/11/2018 12:32 AM     CBC:   Lab Results   Component Value Date/Time    WBC 8.7 03/11/2018 12:32 AM    HGB 7.1 (L) 03/11/2018 12:32 AM    HCT 23.8 (L) 03/11/2018 12:32 AM     03/11/2018 12:32 AM     Recent Glucose Results:   Lab Results   Component Value Date/Time     (H) 03/11/2018 12:32 AM     All labs reviewed in past 24 hours    Other pertinent lab: none    Imaging:    Reviewed. MARYJO's; ECHO    Procedure: . Amputation, right fifth toe and partial right fifth metatarsal.  2.  Reexploration of wound    Assessment / Plan: Active Problems:     Morbid obesity ([address] (1/26/2011)      Diabetes mellitus type II, uncontrolled ([address] (4/18/2013)      CKD (chronic kidney disease) stage 3, GFR 30-59 ml/min ()      Diabetic gastroparesis (HCC) ()      Anemia (7/24/2017)      Ulcer of right fifth toe due to diabetes mellitus ([address] (7/24/2017)      Chronic diastolic CHF (congestive heart failure), NYHA class 3 ([address] (7/24/2017)      Hypertension (7/24/2017)      BMI 45.0-49.9, adult ([facility] (7/24/2017)      Fatty liver (7/24/2017)      Aortic stenosis, mild (7/24/2017)      Bilateral edema of lower extremity (7/24/2017)      (HFpEF) heart failure with preserved ejection fraction ([facility] (10/31/2017)      Venous stasis dermatitis of both lower extremities (11/9/2017)      Type 2 diabetes mellitus with diabetic neuropathy ([facility] (1/2/2018)      Obesity hypoventilation syndrome ([facility] (2/19/2018)      Sepsis ([facility] (3/5/2018)      CVA, old, aphasia (3/9/2018)        1. Continue current abx regimen   2. Await cultures  3. Wound care  4. PT/OT/ST  5. Diurese  6. K+ 3.7  7. Na 149 - start slow hydration with D5W  8. Hgb 7.1 - will repeat later today; may need to hold lovenox if worse  9. SSI;  Lantus being held due to lower BS's and anorexia  10.  She and  now not considering DNR           Care Plan discussed with: Patient and Family    Discussed:  Code Status and Care Plan    Prophylaxis:  Lovenox    Disposition:  SNF/LTC  ___________________________________________________    Attending Physician: Aracely Mendosa MD

## 2018-03-12 PROBLEM — M86.271 SUBACUTE OSTEOMYELITIS OF RIGHT FOOT (HCC): Status: ACTIVE | Noted: 2018-01-01

## 2018-03-12 NOTE — PROGRESS NOTES
Bedside and Verbal shift change report given to Jenny RN (oncoming nurse) by Preston Dubin RN (offgoing nurse). Report included the following information SBAR, Kardex, Intake/Output, MAR and Cardiac Rhythm Normal Sinus.

## 2018-03-12 NOTE — PROGRESS NOTES
Podiatry History and Physical    Subjective:  No new complaints. Date:  March 12, 2018    Patient is a 60 y.o.  female who is being seen for PO amputation R 5th toe and partial 5th metatarsal, POD #2.       Patient Active Problem List    Diagnosis Date Noted    Subacute osteomyelitis of right foot ([address] 03/12/2018    CVA, old, aphasia 03/09/2018    Sepsis ([address] 03/05/2018    Obesity hypoventilation syndrome ([address] 02/19/2018    Type 2 diabetes mellitus with diabetic neuropathy ([address] 01/02/2018    Venous stasis dermatitis of both lower extremities 11/09/2017    B12 deficiency 10/31/2017    (HFpEF) heart failure with preserved ejection fraction ([address] 10/31/2017    PE (physical exam), annual 10/11/2017    Gout 10/11/2017    History of hyperparathyroidism 10/11/2017    GERD (gastroesophageal reflux disease) 10/11/2017    Insomnia 07/24/2017    Sinus tachycardia 07/24/2017    Anemia 07/24/2017    Ulcer of right fifth toe due to diabetes mellitus ([address] 07/24/2017    Peripheral neuropathy 07/24/2017    Chronic diastolic CHF (congestive heart failure), NYHA class 3 ([address] 07/24/2017    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 2 diabetes mellitus ([address] 07/24/2017    Stage 2 chronic kidney disease due to benign hypertension 07/24/2017    Hypertension 07/24/2017    BMI 45.0-49.9, adult ([address] 07/24/2017    Fatty liver 07/24/2017    Stasis dermatitis 07/24/2017    Low back pain 07/24/2017    Aortic stenosis, mild 07/24/2017    Bilateral edema of lower extremity 07/24/2017    ASCVD (arteriosclerotic cardiovascular disease) 05/31/2014    Stroke ([address] 05/30/2014    Diabetic polyneuropathy ([address] 04/20/2013    Right leg weakness 04/18/2013    Diabetes mellitus type II, uncontrolled ([address] 04/18/2013    CKD (chronic kidney disease) stage 3, GFR 30-59 ml/min     Hyperlipemia     Diabetic gastroparesis ([address]     Diabetes ([address] 01/26/2011    Morbid obesity ([facility] 01/26/2011     Past Medical History: Diagnosis Date    Acute combined systolic and diastolic heart failure, NYHA class 2 ([address] 2017    Anemia 2017    Aortic stenosis, mild 2017    Bilateral edema of lower extremity 2017    BMI 45.0-49.9, adult ([address] 2017    Chronic diastolic CHF (congestive heart failure), NYHA class 3 ([address] 2017    CKD (chronic kidney disease) stage 2, GFR 60-89 ml/min     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 2 diabetes mellitus ([address] 2017    Diabetes ([address]     Fatty liver 2017    Gastroparesis     GERD (gastroesophageal reflux disease)     Gout     HTN (hypertension)     Hyperlipemia     Hyperparathyroidism ([address]     Hypertension 2017    Insomnia 2017    Low back pain 2017    N&V (nausea and vomiting) 2017    Nausea & vomiting     Obesity     Pelvic joint pain 2017    Peripheral neuropathy 2017    Sciatica     right    Severe right groin pain 2017    Sinus tachycardia 2017    Stage 2 chronic kidney disease due to benign hypertension 2017    Stasis dermatitis 2017    Stroke ([address]     right parietal, left caudate    Thyroid disease     para thyroid(high calcium)    Ulcer of right fifth toe due to diabetes mellitus ([address] 2017      Family History   Problem Relation Age of Onset    Cancer Mother      cervical    Diabetes Mother     Hypertension Mother     Stroke Mother 79    Diabetes Father     Hypertension Father     Kidney Disease Father     Diabetes Brother     Hypertension Brother     Stroke Brother [address]      Social History   Substance Use Topics    Smoking status: Never Smoker    Smokeless tobacco: Never Used    Alcohol use Yes      Comment: rare     Past Surgical History:   Procedure Laterality Date    CHEST SURGERY PROCEDURE UNLISTED  10/24/13    PARATHYROID EXPLORATION     DELIVERY       HX  SECTION      HX CHOLECYSTECTOMY      HX OTHER SURGICAL      I&D right thigh abscess Prior to Admission medications    Medication Sig Start Date End Date Taking? Authorizing Provider   acetaminophen (TYLENOL EXTRA STRENGTH) 500 mg tablet Take 1,000 mg by mouth every six (6) hours as needed for Pain. Yes Historical Provider   atenolol (TENORMIN) 50 mg tablet Take 50 mg by mouth daily. Yes Historical Provider   chlorhexidine (HIBICLENS) 4 % liquid Apply  to affected area every other day. Yes Historical Provider   colestipol (COLESTID) 1 gram tablet Take 1 g by mouth daily. Yes Historical Provider   glipiZIDE (GLUCOTROL) 10 mg tablet Take 10 mg by mouth two (2) times a day. Yes Historical Provider   insulin detemir U-100 (LEVEMIR FLEXTOUCH U-100 INSULN) 100 unit/mL (3 mL) inpn 50 Units by SubCUTAneous route two (2) times a day. Yes Historical Provider   ammonium lactate (LAC-HYDRIN) 12 % lotion Apply 1 Each to affected area every other day. rub in to affected area well 11/2/17  Yes Lacie Flores MD   Bifidobacterium Infantis 4 mg cap Take 4 mg by mouth daily. Yes Historical Provider   nystatin (MYCOSTATIN) topical cream Apply  to affected area two (2) times a day. Use for groin rash 2/19/18  Yes Lacie Flores MD   bumetanide [facility]) 2 mg tablet Take 3 mg by mouth daily. Yes Lacie Flores MD   UBIDECARENONE (COQ-10 PO) Take 1 Tab by mouth daily.    Yes Historical Provider   allopurinol (ZYLOPRIM) 300 mg tablet TAKE 1 TABLET BY MOUTH EVERY DAY 2/10/18  Yes Lacie Flores MD   dilTIAZem [facility]) 360 mg ER capsule TAKE 1 CAPSULE BY MOUTH EVERY DAY 2/3/18  Yes Lacie Flores MD   atorvastatin (LIPITOR) 40 mg tablet TAKE 1 TABLET BY MOUTH EVERY DAY 1/28/18  Yes Lacie Flores MD   hydrALAZINE (APRESOLINE) 50 mg tablet TAKE 1 TABLET BY MOUTH THREE TIMES DAILY 1/8/18  Yes Christiano Hanley II, MD   LYRICA 75 mg capsule TAKE ONE CAPSULE BY MOUTH DAILY AT LUNCH TIME AND AGAIN AT BEDTIME. 12/26/17  Yes Lacie Flores MD   insulin lispro (HUMALOG) 100 unit/mL kwikpen 10 Units by SubCUTAneous route three (3) times daily (with meals). 17  Yes William Alegre MD   ferrous sulfate (SLOW FE) 142 mg (45 mg iron) ER tablet Take 142 mg by mouth Daily (before breakfast). Yes Historical Provider   aspirin-dipyridamole (AGGRENOX)  mg per SR capsule TAKE 1 CAPSULE BY MOUTH TWICE DAILY( EVERY TWELVE HOURS) 17  Yes William Alegre MD   Cholecalciferol, Vitamin D3, 2,000 unit cap Take 1 Cap by mouth daily. Yes Historical Provider     Allergies   Allergen Reactions    Zestril [Lisinopril] Swelling        Review of Systems:  NAD. No fever or chills. Objective:     Patient Vitals for the past 8 hrs:   BP Temp Pulse Resp SpO2 Weight   18 1136 147/63 98.3 °F (36.8 °C) 78 18 98 % -   18 0751 175/70 98.5 °F (36.9 °C) 89 18 91 % -   18 0725 - - - - - 130 kg (286 lb 8 oz)     Temp (24hrs), Av.4 °F (36.9 °C), Min:98.3 °F (36.8 °C), Max:98.6 °F (37 °C)    Lower Extremity exam:  Dressings have moderate amount of blood, but no pus or foul odor. The skin lateral R foot is viable with sutures intact. Wound bed at open amputation site R 5th toe is clean and granular. Wound R 5th toe/MPJ tunnels proximally about 3-4 cm. Wound bed base of R 5th metatarsal is mixed fibro-granular. DP and PT palpable; CFT less than 3 seconds. R foot warm.       Data Review:   Recent Results (from the past 24 hour(s))   CBC W/O DIFF    Collection Time: 18  3:00 PM   Result Value Ref Range    WBC 10.7 3.6 - 11.0 K/uL    RBC 2.82 (L) 3.80 - 5.20 M/uL    HGB 8.1 (L) 11.5 - 16.0 g/dL    HCT 27.1 (L) 35.0 - 47.0 %    MCV 96.1 80.0 - 99.0 FL    MCH 28.7 26.0 - 34.0 PG    MCHC 29.9 (L) 30.0 - 36.5 g/dL    RDW 16.7 (H) 11.5 - 14.5 %    PLATELET 422 338 - 370 K/uL    MPV 11.4 8.9 - 12.9 FL    NRBC 0.0 0  WBC    ABSOLUTE NRBC 0.00 0.00 - 0.01 K/uL   GLUCOSE, POC    Collection Time: 18  4:51 PM   Result Value Ref Range    Glucose (POC) 146 (H) 65 - 100 mg/dL    Performed by Laury Sanchez Cassi (PCT)    GLUCOSE, POC    Collection Time: 03/11/18  9:39 PM   Result Value Ref Range    Glucose (POC) 141 (H) 65 - 100 mg/dL    Performed by Joann Pastor (PCT)    CBC W/O DIFF    Collection Time: 03/12/18  4:35 AM   Result Value Ref Range    WBC 10.1 3.6 - 11.0 K/uL    RBC 2.61 (L) 3.80 - 5.20 M/uL    HGB 7.4 (L) 11.5 - 16.0 g/dL    HCT 25.0 (L) 35.0 - 47.0 %    MCV 95.8 80.0 - 99.0 FL    MCH 28.4 26.0 - 34.0 PG    MCHC 29.6 (L) 30.0 - 36.5 g/dL    RDW 16.9 (H) 11.5 - 14.5 %    PLATELET 348 317 - 844 K/uL    MPV 11.5 8.9 - 12.9 FL    NRBC 0.2 (H) 0  WBC    ABSOLUTE NRBC 0.02 (H) 0.00 - 0.43 K/uL   METABOLIC PANEL, COMPREHENSIVE    Collection Time: 03/12/18  4:35 AM   Result Value Ref Range    Sodium 149 (H) 136 - 145 mmol/L    Potassium 3.4 (L) 3.5 - 5.1 mmol/L    Chloride 113 (H) 97 - 108 mmol/L    CO2 26 21 - 32 mmol/L    Anion gap 10 5 - 15 mmol/L    Glucose 126 (H) 65 - 100 mg/dL    BUN 24 (H) 6 - 20 MG/DL    Creatinine 1.08 (H) 0.55 - 1.02 MG/DL    BUN/Creatinine ratio 22 (H) 12 - 20      GFR est AA >60 >60 ml/min/1.73m2    GFR est non-AA 51 (L) >60 ml/min/1.73m2    Calcium 8.2 (L) 8.5 - 10.1 MG/DL    Bilirubin, total 0.3 0.2 - 1.0 MG/DL    ALT (SGPT) 12 12 - 78 U/L    AST (SGOT) 10 (L) 15 - 37 U/L    Alk.  phosphatase 74 45 - 117 U/L    Protein, total 5.3 (L) 6.4 - 8.2 g/dL    Albumin 2.2 (L) 3.5 - 5.0 g/dL    Globulin 3.1 2.0 - 4.0 g/dL    A-G Ratio 0.7 (L) 1.1 - 2.2     VANCOMYCIN, RANDOM    Collection Time: 03/12/18  4:35 AM   Result Value Ref Range    Vancomycin, random 28.0 UG/ML   GLUCOSE, POC    Collection Time: 03/12/18  7:50 AM   Result Value Ref Range    Glucose (POC) 156 (H) 65 - 100 mg/dL    Performed by Ela Higgins (PCT)    TYPE + CROSSMATCH    Collection Time: 03/12/18 10:18 AM   Result Value Ref Range    Crossmatch Expiration 03/15/2018     ABO/Rh(D) Adah Cousins POSITIVE     Antibody screen NEG     Unit number [id number]     Blood component type RC LR,1     Unit division 00 Status of unit ALLOCATED     Crossmatch result Compatible     Unit number [id number]     Blood component type RC LR,1     Unit division 00     Status of unit ALLOCATED     Crossmatch result Compatible    GLUCOSE, POC    Collection Time: 03/12/18 11:42 AM   Result Value Ref Range    Glucose (POC) 213 (H) 65 - 100 mg/dL    Performed by Sage Jones (PCT)          Impression:   POD #2 open amputation R 5th toe and partial R 5th metatarsal, clean wound margins at this time. Recommendation:   Orders for wound care with moist gauze packing have been entered. Still waiting on C&S results. PCP has ordered PRBCs due to low H&H. F/U tomorrow.

## 2018-03-12 NOTE — PROGRESS NOTES
Infectious Disease Progress Note       Subjective:   Ms Mark Eubanks seen today. Also D/W her . No new symptoms or complaints     ROS: 10 point ROS obtained and pertinent positives include above. All others negative.          Objective:    Vitals:   Patient Vitals for the past 24 hrs:   Temp Pulse Resp BP SpO2   03/12/18 1136 98.3 °F (36.8 °C) 78 18 147/63 98 %   03/12/18 0751 98.5 °F (36.9 °C) 89 18 175/70 91 %   03/12/18 0323 - - - - 93 %   03/12/18 0315 98.6 °F (37 °C) 89 18 156/54 93 %   03/11/18 2300 98.4 °F (36.9 °C) 91 19 138/79 92 %   03/11/18 1958 98.3 °F (36.8 °C) 90 18 150/68 94 %   03/11/18 1544 98.3 °F (36.8 °C) 91 18 154/81 93 %       Physical Exam:  ¨ GEN: NAD  ¨ HEENT: no scleral icterus,  no thrush  ¨ CV: S1, S2 heard   ¨ Lungs: Clear to auscultation bilaterally anteriorly  ¨ Abdomen: soft, obese, non tender   ¨ Genitourinary: + mast  ¨ Extremities: B/L chronic venous changes noted, + R foot  Dressing   ¨ Neuro: alert to self, situation, verbal minimally  ¨ Skin: no rash  ¨ Psych: flat affect     Medications:    Current Facility-Administered Medications:     potassium chloride (K-DUR, KLOR-CON) SR tablet 20 mEq, 20 mEq, Oral, BID, Santosh Messina MD, 20 mEq at 03/12/18 1053    0.9% sodium chloride infusion 250 mL, 250 mL, IntraVENous, PRN, Santosh Messina MD    [facility]) injection 1 mg, 1 mg, IntraVENous, ONCE, Santosh Messina MD    vancomycin (VANCOCIN) 1500 mg in  ml infusion, 1,500 mg, IntraVENous, Q36H, Kristina Wilson DO    dextrose 5% infusion, 50 mL/hr, IntraVENous, CONTINUOUS, Renate White MD, Last Rate: 50 mL/hr at 03/11/18 0701, 50 mL/hr at 03/11/18 0701    ampicillin-sulbactam (UNASYN) 3 g in 0.9% sodium chloride (MBP/ADV) 100 mL, 3 g, IntraVENous, Q8H, Kristina Wilson DO, Last Rate: 100 mL/hr at 03/12/18 1056, 3 g at 03/12/18 1056    VANCOMYCIN INFORMATION NOTE, , Other, DAILY, Kristina Wilson DO    enoxaparin (LOVENOX) injection 40 mg, 40 mg, SubCUTAneous, Q12H, TAMMIE NeelyM, 40 mg at 03/12/18 1053    morphine injection 2 mg, 2 mg, IntraVENous, Q3H PRN, Poonam Gibbs DPM    albuterol-ipratropium (DUO-NEB) 2.5 MG-0.5 MG/3 ML, 3 mL, Nebulization, Q8H PRN, Karen Alexander MD    insulin lispro (HUMALOG) injection, , SubCUTAneous, AC&HS, Karla Ordonez MD, 3 Units at 03/12/18 1307    glucose chewable tablet 16 g, 4 Tab, Oral, PRN, Karla Ordonez MD    dextrose (D50W) injection syrg 12.5-25 g, 12.5-25 g, IntraVENous, PRN, Karla Ordonez MD, 12.5 g at 03/08/18 1700    glucagon (GLUCAGEN) injection 1 mg, 1 mg, IntraMUSCular, PRN, Karla Ordonez MD    nystatin (MYCOSTATIN) 100,000 unit/gram powder, , Topical, BID, Karla Ordonez MD    zinc oxide-cod liver oil (DESITIN) 40 % paste, , Topical, PRN, Karla Ordonez MD    ammonium lactate (LAC-HYDRIN) 12 % lotion, , Topical, BID, Karla Ordonez MD    clotrimazole (LOTRIMIN) 1 % cream, , Topical, BID, Karla Ordonez MD    allopurinol (ZYLOPRIM) tablet 100 mg, 100 mg, Oral, DAILY, Karen Alexander MD, 100 mg at 03/12/18 0900    atenolol (TENORMIN) tablet 50 mg, 50 mg, Oral, DAILY, Karen Alexander MD, 50 mg at 03/12/18 1053    atorvastatin (LIPITOR) tablet 40 mg, 40 mg, Oral, DAILY, Karen Alexander MD, 40 mg at 03/12/18 1053    lactobac ac& pc-s.therm-b.anim (ELVA Q/RISAQUAD), 1 Cap, Oral, DAILY, Karen Alexander MD, 1 Cap at 03/12/18 1052    bumetanide (BUMEX) tablet 3 mg, 3 mg, Oral, DAILY, Karen Alexander MD, 3 mg at 03/12/18 1053    colestipol (COLESTID) tablet 1 g, 1 g, Oral, DAILY, Karen Alexander MD, 1 g at 03/12/18 1053    dilTIAZem CD (CARDIZEM CD) capsule 360 mg, 360 mg, Oral, DAILY, Karen Alexander MD, 360 mg at 03/12/18 1052    hydrALAZINE (APRESOLINE) tablet 50 mg, 50 mg, Oral, TID, Karen Alexander MD, 50 mg at 03/12/18 1052    pregabalin (LYRICA) capsule 75 mg, 75 mg, Oral, BID, Karen Alexander MD, 75 mg at 03/12/18 1051    nystatin (MYCOSTATIN) 100,000 unit/gram cream, , Topical, BID, Mariela Garcia MD    sodium chloride (NS) flush 5-10 mL, 5-10 mL, IntraVENous, Q8H, Usman Richey MD, 10 mL at 03/12/18 1308    sodium chloride (NS) flush 5-10 mL, 5-10 mL, IntraVENous, PRN, Usman Richey MD, 10 mL at 03/10/18 1847    acetaminophen (TYLENOL) tablet 650 mg, 650 mg, Oral, Q4H PRN, Usman Richey MD, 650 mg at 03/12/18 0107    HYDROcodone-acetaminophen (Geraldyne Chad) 5-325 mg per tablet 1 Tab, 1 Tab, Oral, Q4H PRN, Usman Richey MD, 1 Tab at 03/11/18 0442    ondansetron (ZOFRAN) injection 4 mg, 4 mg, IntraVENous, Q4H PRN, Usman Richey MD, 4 mg at 03/10/18 1844    docusate sodium (COLACE) capsule 100 mg, 100 mg, Oral, BID PRN, Usman Richey MD      Labs:  Recent Results (from the past 24 hour(s))   CBC W/O DIFF    Collection Time: 03/11/18  3:00 PM   Result Value Ref Range    WBC 10.7 3.6 - 11.0 K/uL    RBC 2.82 (L) 3.80 - 5.20 M/uL    HGB 8.1 (L) 11.5 - 16.0 g/dL    HCT 27.1 (L) 35.0 - 47.0 %    MCV 96.1 80.0 - 99.0 FL    MCH 28.7 26.0 - 34.0 PG    MCHC 29.9 (L) 30.0 - 36.5 g/dL    RDW 16.7 (H) 11.5 - 14.5 %    PLATELET 769 751 - 089 K/uL    MPV 11.4 8.9 - 12.9 FL    NRBC 0.0 0  WBC    ABSOLUTE NRBC 0.00 0.00 - 0.01 K/uL   GLUCOSE, POC    Collection Time: 03/11/18  4:51 PM   Result Value Ref Range    Glucose (POC) 146 (H) 65 - 100 mg/dL    Performed by Johney Felty (PCT)    GLUCOSE, POC    Collection Time: 03/11/18  9:39 PM   Result Value Ref Range    Glucose (POC) 141 (H) 65 - 100 mg/dL    Performed by Nora Severs (PCT)    CBC W/O DIFF    Collection Time: 03/12/18  4:35 AM   Result Value Ref Range    WBC 10.1 3.6 - 11.0 K/uL    RBC 2.61 (L) 3.80 - 5.20 M/uL    HGB 7.4 (L) 11.5 - 16.0 g/dL    HCT 25.0 (L) 35.0 - 47.0 %    MCV 95.8 80.0 - 99.0 FL    MCH 28.4 26.0 - 34.0 PG    MCHC 29.6 (L) 30.0 - 36.5 g/dL    RDW 16.9 (H) 11.5 - 14.5 %    PLATELET 212 115 - 377 K/uL    MPV 11.5 8.9 - 12.9 FL    NRBC 0.2 (H) 0  WBC    ABSOLUTE NRBC 0.02 (H) 0.00 - 0.01 K/uL METABOLIC PANEL, COMPREHENSIVE    Collection Time: 03/12/18  4:35 AM   Result Value Ref Range    Sodium 149 (H) 136 - 145 mmol/L    Potassium 3.4 (L) 3.5 - 5.1 mmol/L    Chloride 113 (H) 97 - 108 mmol/L    CO2 26 21 - 32 mmol/L    Anion gap 10 5 - 15 mmol/L    Glucose 126 (H) 65 - 100 mg/dL    BUN 24 (H) 6 - 20 MG/DL    Creatinine 1.08 (H) 0.55 - 1.02 MG/DL    BUN/Creatinine ratio 22 (H) 12 - 20      GFR est AA >60 >60 ml/min/1.73m2    GFR est non-AA 51 (L) >60 ml/min/1.73m2    Calcium 8.2 (L) 8.5 - 10.1 MG/DL    Bilirubin, total 0.3 0.2 - 1.0 MG/DL    ALT (SGPT) 12 12 - 78 U/L    AST (SGOT) 10 (L) 15 - 37 U/L    Alk.  phosphatase 74 45 - 117 U/L    Protein, total 5.3 (L) 6.4 - 8.2 g/dL    Albumin 2.2 (L) 3.5 - 5.0 g/dL    Globulin 3.1 2.0 - 4.0 g/dL    A-G Ratio 0.7 (L) 1.1 - 2.2     VANCOMYCIN, RANDOM    Collection Time: 03/12/18  4:35 AM   Result Value Ref Range    Vancomycin, random 28.0 UG/ML   GLUCOSE, POC    Collection Time: 03/12/18  7:50 AM   Result Value Ref Range    Glucose (POC) 156 (H) 65 - 100 mg/dL    Performed by Johanna Carrero (PCT)    TYPE + CROSSMATCH    Collection Time: 03/12/18 10:18 AM   Result Value Ref Range    Crossmatch Expiration 03/15/2018     ABO/Rh(D) O POSITIVE     Antibody screen NEG     Unit number [id number]     Blood component type  LR,1     Unit division 00     Status of unit ALLOCATED     Crossmatch result Compatible     Unit number [id number]     Blood component type  LR,1     Unit division 00     Status of unit ALLOCATED     Crossmatch result Compatible    GLUCOSE, POC    Collection Time: 03/12/18 11:42 AM   Result Value Ref Range    Glucose (POC) 213 (H) 65 - 100 mg/dL    Performed by Johanna Carrero (PCT)            Micro:     3/10/18 Foot         Component Value Ref Range & Units Status     Special Requests: NO SPECIAL REQUESTS   Preliminary     GRAM STAIN NO WBC'S SEEN   Preliminary     GRAM STAIN NO ORGANISMS SEEN   Preliminary     Culture result:    Preliminary     LIGHT POSSIBLE STAPHYLOCOCCUS SPECIES, COAGULASE NEGATIVE CHECKING FOR POSSIBLE MULTIPLE COLONY TYPES (A)       Result History          Wound 3/6/18  Component Value Ref Range & Units Status      Special Requests: NO SPECIAL REQUESTS   Final     GRAM STAIN 3+ WBCS SEEN   Final     GRAM STAIN NO ORGANISMS SEEN   Final     Culture result:    Final     FEW ENTEROCOCCUS FAECALIS GROUP D (A)       Culture & Susceptibility        Antibiotic   Organism Organism Organism       Enterococcus faecalis group D       AMPICILLIN ($)   <=2   ug/mL   S Final         DAPTOMYCIN ($$$$$)   2   ug/mL   S Final         LINEZOLID ($$$$$)   2   ug/mL   S Final         PENICILLIN G ($$)   2   ug/mL   S Final         VANCOMYCIN ($)   4   ug/mL   S Final                                                                       Component Results      Component Value Ref Range & Units Status     Special Requests: NO SPECIAL REQUESTS   Preliminary     Culture result:    Preliminary     CHECKING FOR POSSIBLE FEW ANAEROBIC GRAM NEGATIVE RODS BETA LACTAMASE POSITIVE (A)       Result History            Blood 3/5/18  Component Results      Component Value Ref Range & Units Status     Special Requests: NO SPECIAL REQUESTS   Preliminary     Culture result: NO GROWTH 3 DAYS   Preliminary       Result History          Imaging:      FINDINGS:  Two views of the right foot demonstrate no fracture or other acute  osseous or articular abnormality. Prominent soft tissue swelling. No opaque  foreign body or gas in the soft tissue, small vessel calcifications suggest  diabetes. Calcaneal spurs. There is no radiological evidence to suggest  osteomyelitis. Mild DJD metatarsophalangeal joint.      IMPRESSION  IMPRESSION:  No acute bony abnormality.          MRI:     FINDINGS: Bone marrow: Ill-defined bone marrow edema is in the fifth metatarsal  head and fifth proximal phalanx. No distinct fracture line.  Subtle thinning of  the cortex of the fifth proximal phalanx more likely than the fifth metatarsal  head.     Remaining bone marrow signal is within normal limits.     Joint fluid:  Moderate fifth MTP joint effusion. No other joint effusion.     Tendons: Intact.     Muscles: Diffuse edema. Mild atrophy.     Neurovascular bundles: No evidence of neuroma.     Articular cartilage: Marrow fifth MTP joint is increased. Lisfranc joint is  within normal limits.     Soft tissue mass: None. Skin breakdown is lateral to the fifth MTP joint.     IMPRESSION  IMPRESSION:   1. Soft tissue ulceration is lateral to the fifth MTP joint. There is evidence  of underlying fifth MTP joint septic arthritis and osteomyelitis of the fifth  metatarsal head and fifth proximal phalanx. 2. Cellulitis and myositis. No drainable abscess within the limitation of no  intravenous contrast. However, the fifth MTP joint effusion is lobulated and may  be difficult to drain.     Procedures:   3/7/18  PROCEDURE:  Ankle, brachial and digital arterial pressures, CW Doppler   waveforms and digital PPG waveforms were performed.     1. The right posterior tibial ankle/brachial index is 1.16  2. Segmental pressures in the right dorsalis pedis and the left ankle  are not measurable due to extensive arterial calcification. The  ankle/brachial indexes are therefore unreliable predictors of distal  arterial perfusion. 3. The right great toe/brachial index is 0.72 and the left great  toe/brachial index is 1. 11. Bedside debridement attempted 3/6    3/10/18 Procedure(s):  1. AMPUTATION RIGHT FIFTH TOE  AND PARTIAL RIGHT FIFTH METATARSAL    2. WOUND RE-EXPLORATION     Assessment / Plan:      Ms Efren Case is a 60year old lady with hx of DM, Hx of CDI in end of 2017-early 2018, chronic venous stasis, CKD, HELIO, morbid venous stasis admitted on 3/6 with altered mentation and respiratory issues.   Her  says that she has had LE cellulitis and chart review indicates [facility] [address].  Her  says that he was doing her wound care at home until 3 weeks ago, when home care/wound care were arranged. Her  says that she has been weeping from the top part of her feet and he was not able to see the bottom part of her feet well as she was not able to examine it as she could not lie back with dyspnea at home. He recently noted it and it thought it looked like a blister that had ruptured and was red. They are not clear exact details as her feet were weeping and unclear if discharge was from the ulcers or not. He recalls that after last admission in 10/2017, she went to [facility] and then to [facility] and developed C diff. She was on antibiotics but not long term at that time per him. She has not been ambulating since about 10/2017 per her . She reports that she does not have good sensation in her feet but is usually bed bound and so no known truama to LE.      This admission, found to be febrile at 101.9 on 3/5/18. H and P indicates \"no acute changes on skin to suggest cellulitis\". Noted antibiotics started on 3/6 with Levaquin. Then note to have been started on Vancomycin, Cefepime and Flagyl. Blood cultures sent. PICC was already inserted.       Seen by Podiatry as well as Vascular surgery. Had \"Debridement of wound R foot attempted but terminated before completion due to extent of necrotic tissue\". Cultures of wound sent. MRI Pending.            1) R foot diabetic foot ulcer with concerns for osteomyelitis      MRI with \" Soft tissue ulceration is lateral to the fifth MTP joint. There is evidence of underlying fifth MTP joint septic arthritis and osteomyelitis of the fifth metatarsal head and fifth proximal phalanx\". \"Cellulitis and myositis.  No drainable abscess within the limitation of no  intravenous contrast. However, the fifth MTP joint effusion is lobulated and may be difficult to drain\"     Recommendations:  Noted antibiotics changed by primary team to Unasyn from Cefepime and Flagyl. Enterococcus was in superficial wound and deep cultures pending from 3/10/18  Unasyn does not cover  Pseudomonas and cultures are pending at this time. Recommend Vancomycin and Zosyn IV until culture data known fully and pathology results   Final choice and duration of antibiotics to be determined based on surgical intervention, cultures and bone margins for pathology   Wound care   Optimize DM control for better wound healing and infection control   Side effects of antibiotics including GI, renal and CDI explained patient and her  today   Vancomycin Trough goal is 15-20 , dosing to be adjusted by pharmacy   Monitor renal function closely and renal dosing of antibiotics per pharmacy      2) DM     3) HELIO     4) CKD: renally dose antibiotics      5) Hx of CDI in past   Is high risk for CDI again     6) DVT Px per primary team      7) Access: PICC      8)  Slightly prominent lymph nodes in the mediastinum are stable per CT imaging     D/W Pharmacist today. Thank you for the opportunity to participate in the care of this patient. Please contact with questions or concerns.       Yann Munguia DO   2:13 PM

## 2018-03-12 NOTE — PROGRESS NOTES
Pharmacy Automatic Renal Dosing Protocol - Antimicrobials    Indication for Antimicrobials: Foot wound with possible osteomyelitis    Current Regimen of Each Antimicrobial:  Vancomycin 2gm IV q36h (Start 3/6/18 - Day 6): On hold: last dose 3/10  Unasyn 3gm IV q8hr; start 3/9; Day #3    Previous Abx:  Cefepime 2g IV Q24H (Start 3/6/18 - Day 5)  Metronidazole 500 mg IV Q8H (Start 3/7/18 - Day 4)    Vancomycin Trough Range: 15-20  Level Drawn (Date): 3/11  Trough: Drawn/Adjusted: Random  43.3  3/10: 28.4 / 28.1  3/12 4:35 am 28     Cp(max) 49.8   actual T1/2 = 41hr   Expected T1/2 = 19hr  Expect differences in Volume of Distribution due to morbid obesity    Significant Cultures:   3/5: Urine: No Growth (FINAL)   3/5: Blood: No Growth (FINAL)  3/6: Toe Wound: E.faecalis D sens Vanc = 4 ([facility] Board notified) (FINAL)  3/6: Toe Wound-Anaerobic: Few Anaerobic GNR, ESBL (pending)   3/7: MRI Foot:         1. Soft tissue ulceration is lateral to the fifth MTP joint. There is evidence  of underlying fifth MTP joint septic arthritis and osteomyelitis of the fifth  metatarsal head and fifth proximal phalanx. 2. Cellulitis and myositis. No drainable abscess within the limitation of no  intravenous contrast. However, the fifth MTP joint effusion is lobulated and may  be difficult to drain. Labs:  Recent Labs      18   0435  18   1500  18   0032  03/10/18   0222   CREA  1.08*   --   1.16*  1.31*   BUN  24*   --   26*  29*   WBC  10.1  10.7  8.7  8.3     Temp (24hrs), Av.4 °F (36.9 °C), Min:98.3 °F (36.8 °C), Max:98.6 °F (37 °C)        Paralysis, amputations, malnutrition: no  Creatinine Clearance (mL/min) 36 ml/min    Impression/Plan  - Patients clearance is approximately 50% of that predicted by serum creatinine  - All levels and dosage history analyzed through [facility] dosing program, 1500 mg Q36H has anticipated trough of 19 mcg/ml.  Restart vancomycin at 10 pm tonight when level will be less than 20 mcg/ml  - Serum creatinine 1.08 mg/dl and declining  - Morbidly Obese  - Continue Unasyn 3gm IV q8h  - Daily BMP     Pharmacy will follow daily and adjust medications as appropriate for renal function and/or serum levels. Thank you,  Aida Ross, [facility]      Recommended duration of therapy  [email or web address]/[email or web address]. [email or web address]    Renal Dosing  [email or web address]/Pharmacy/[email or web address]/Renal%[email or web address]. pdf

## 2018-03-12 NOTE — PROGRESS NOTES
[address] [place] [address]  [phone]      Medical Progress Note      NAME: Yana Burnette   :  1955  MRM:  [id number]    Date/Time: 3/12/2018  6:45 AM      Subjective:     Afebrile and pain currently controlled. Hgb down to 7.4 and K+ 3.4. Defer to ID re length of further abx administration. Therapists and I believe SNF is best next level of care, but it appears she and  prefer home health. If so will need hospital bed set up at home prior to D/C. Past Medical History reviewed and unchanged from Admission History and Physical and/or prior progress note/electronic medical record    Medications reviewed. ROS:      General: postive for weakness  Ear Nose and Throat: negative for rhinorrhea, pharyngitis, otalgia, tinnitus, speech or swallowing difficulties  Respiratory: Less SOB and wheezing  Cardiology:  positive for PND, orthopnea and edema  Gastrointestinal: positive for nausea  Genitourinary: positive for incontinence  Dermatological: positive for chronic lichenification legs and feet and pressure sore areas  Neurological: positive for bilateral LE weakness R>L and speech difficulties    Objective:     Last 24hrs VS reviewed since prior progress note.  Most recent are:    Visit Vitals    /54 (BP 1 Location: Left arm, BP Patient Position: At rest)    Pulse 89    Temp 98.6 °F (37 °C)    Resp 18    Ht 5' (1.524 m)    Wt 299 lb 6.4 oz (135.8 kg)    SpO2 93%    BMI 58.47 kg/m2     SpO2 Readings from Last 6 Encounters:   18 93%   18 (!) 79%   18 92%   18 94%   18 93%   18 94%    O2 Flow Rate (L/min): 3 l/min       Intake/Output Summary (Last 24 hours) at 18 0645  Last data filed at 18 0322   Gross per 24 hour   Intake           1317.5 ml   Output                0 ml   Net           1317.5 ml          Physical Exam:    General appearance: alert, cooperative, no distress, appears stated age  Eyes: negative  Neck: supple, symmetrical, trachea midline, no adenopathy, thyroid: not enlarged, symmetric, no tenderness/mass/nodules, no carotid bruit and no JVD  Lungs: Few rhonchi but wheezing diminished  Heart: regular rate and rhythm, S1, S2 normal, grade II/IV systolic murmur; No click, rub or gallop  Abdomen: soft, non-tender. Bowel sounds normal. No masses,  no organomegaly, morbidly obese  Extremities: chronic lichenification legs and feet;right foot heavily bandaged  Neurologic: bilateral LE weakness;  \"aphasia\" persists but is improved somewhat today    Data Review:    Lab:    BMP:   Lab Results   Component Value Date/Time     (H) 03/12/2018 04:35 AM    K 3.4 (L) 03/12/2018 04:35 AM     (H) 03/12/2018 04:35 AM    CO2 26 03/12/2018 04:35 AM    AGAP 10 03/12/2018 04:35 AM     (H) 03/12/2018 04:35 AM    BUN 24 (H) 03/12/2018 04:35 AM    CREA 1.08 (H) 03/12/2018 04:35 AM    GFRAA >60 03/12/2018 04:35 AM    GFRNA 51 (L) 03/12/2018 04:35 AM     CBC:   Lab Results   Component Value Date/Time    WBC 10.1 03/12/2018 04:35 AM    HGB 7.4 (L) 03/12/2018 04:35 AM    HCT 25.0 (L) 03/12/2018 04:35 AM     03/12/2018 04:35 AM     Recent Glucose Results:   Lab Results   Component Value Date/Time     (H) 03/12/2018 04:35 AM     All labs reviewed in past 24 hours    Other pertinent lab: none    Imaging:    Reviewed. none    Procedure: . Amputation, right fifth toe and partial right fifth metatarsal.  2.  Reexploration of wound    Assessment / Plan:      Active Problems:    Chronic diastolic CHF (congestive heart failure), NYHA class 3 ([facility]) (7/24/2017)      Obesity hypoventilation syndrome ([address] (2/19/2018)      Sepsis ([address] (3/5/2018)      Diabetes mellitus type II, uncontrolled ([address] (4/18/2013)      Diabetic gastroparesis (HCC) ()      Anemia (7/24/2017)      Ulcer of right fifth toe due to diabetes mellitus ([facility] (7/24/2017)      Hypertension (7/24/2017)      Bilateral edema of lower extremity (7/24/2017)      (HFpEF) heart failure with preserved ejection fraction ([facility] (10/31/2017)      Venous stasis dermatitis of both lower extremities (11/9/2017)      CVA, old, aphasia (3/9/2018)      Subacute osteomyelitis of right foot ([facility] (3/12/2018)      Morbid obesity ([facility] (1/26/2011)      CKD (chronic kidney disease) stage 3, GFR 30-59 ml/min ()      Fatty liver (7/24/2017)      Aortic stenosis, mild (7/24/2017)      BMI 45.0-49.9, adult ([facility] (7/24/2017)      Type 2 diabetes mellitus with diabetic neuropathy ([facility] (1/2/2018)        1. Continue current abx regimen   2. Await cultures  3. Wound care  4. PT/OT/ST  5. Diurese  6. Replete K+  7. Na still 149 - continue slow hydration with D5W  8. Hgb 7.4- will transfuse  9. SSI;  Lantus being held due to lower BS's and anorexia  10.  She and  now not considering DNR; trying to make decision re disposition           Care Plan discussed with: Patient and Family    Discussed:  Code Status and Care Plan    Prophylaxis:  Lovenox    Disposition:  SNF/LTC  ___________________________________________________    Attending Physician: Ruthann Ching MD

## 2018-03-12 NOTE — PROGRESS NOTES
Updates sent to [facility] to resume care at d/c. Consult sent to [facility] to see if they contract for hospital beds. Staff-please document oxygen needs for possible home oxygen. Thanks    MD- [facility] will need resumption of care orders from you. Also if a hospital bed is wanted, please write an order for semi electric bed and the rationale for the need.

## 2018-03-13 NOTE — PROGRESS NOTES
RA PO at rest:  83%    Orders for oxygen and hospital bed ecin'd to [facility]    [facility] will resume care at d/c  . Dr Erich Parmar are asking for specific wound care instructions. Thanks    1500  I placed portable oxygen tank in room. D/C plans discussed with pt//spouse. Spouse wants to know the dimensions of the hospital bed so he can confirm he has enough space([facility] will get back with the info); he will transport at d/c in his wheelchair Patricia Barraza); he would like to hear from the surgeon what kind of boot//cover he should place on the surgical foot; his preference would be for the bed to be delivered in the morning so he can be here for the ID MD in the afternoon(if insurance authorizes the bed).

## 2018-03-13 NOTE — WOUND CARE
Wound Care Nurse consult from staff nurse after reading Dr Merna Dubin note for patients discharge home. Inpatient wound care rents inpatient beds and Case Management arranges outpatient/home beds.  WC nurse placed consult to CM with this copy of Dr Merna Dubin note 3/13/18: \" need hospital bed set up at home and probably O2 prior to D/C. [facility] semislectric hospital bed for positioning not feasible in ordinary bed including HOB elevated > 30 degrees at all times to facilitate adequate breathing and oxygenation with obesity/hypovention and chronic CHF as well as elevation of foot of bed to reduce edema and facilitate wound healing from partial amputation of right foot for osteomyelitis\"  Svetlana Power RN, [employer]

## 2018-03-13 NOTE — PROGRESS NOTES
Pharmacy Automatic Renal Dosing Protocol - Antimicrobials    Indication for Antimicrobials: Foot wound with possible osteomyelitis    Current Regimen of Each Antimicrobial:  Vancomycin 1500mg IV q36h (Start 3/6/18 - Day 7): has been supratherapeutic in past, adjusted per level  Zosyn 3.375g Iv q 8hr (start 3/12/18, Day #2)    Previous Abx:  Cefepime 2g IV Q24H (Start 3/6/18 - Day 5)  Metronidazole 500 mg IV Q8H (Start 3/7/18 - Day 4)  Unasyn 3gm IV q8hr; start 3/9; Day #3  Vancomycin 2000mg IV q 36hr    Vancomycin Trough Range: 15-20  Level Drawn (Date): 3/11  Trough: Drawn/Adjusted: Random  43.3  3/10: 28.4 / 28.1  3/12 4:35 am 28     Cp(max) 49.8   actual T1/2 = 41hr   Expected T1/2 = 19hr  Expect differences in Volume of Distribution due to morbid obesity    Significant Cultures:   3/5: Urine: No Growth (FINAL)   3/5: Blood: No Growth (FINAL)  3/6: Toe Wound: E.faecalis D sens Vanc = 4 (Rodrigo Goodwin notified) (FINAL)  3/6: Toe Wound-Anaerobic: Few Anaerobic GNR, ESBL (pending)   3/7: MRI Foot:         1. Soft tissue ulceration is lateral to the fifth MTP joint. There is evidence  of underlying fifth MTP joint septic arthritis and osteomyelitis of the fifth  metatarsal head and fifth proximal phalanx. 2. Cellulitis and myositis. No drainable abscess within the limitation of no  intravenous contrast. However, the fifth MTP joint effusion is lobulated and may  be difficult to drain.       Labs:  Recent Labs      18   0358  18   0435  18   1500  18   0032   CREA  1.15*  1.08*   --   1.16*   BUN  23*  24*   --   26*   WBC  10.3  10.1  10.7  8.7     Temp (24hrs), Av.2 °F (36.8 °C), Min:97.4 °F (36.3 °C), Max:98.4 °F (36.9 °C)        Paralysis, amputations, malnutrition: no  Creatinine Clearance (mL/min) 36 ml/min    Impression/Plan  - Patients clearance is approximately 50% of that predicted by serum creatinine  - Serum creatinine is stabilizing versus admission Scr  - Morbidly Obese  - unasyn switched to zosyn 3.375g Iv q 8hr per ID  - Daily BMP and cbc w/o diff ordered for 3/14 with am labs  -continue current vancomycin regimen 1500mg q 36hr and consider vancomycin trough on 3/15 prior to 2200 if Scr is stable for next 2 days. Pharmacy will follow daily and adjust medications as appropriate for renal function and/or serum levels. Thank you,  EVARISTO Johnson      Recommended duration of therapy  [email or web address]/[email or web address]/Pharmacy/[email or web address]. docx    Renal Dosing  [email or web address]/[email or web address]/Pharmacy/[email or web address]/Renal%[email or web address]. pdf

## 2018-03-13 NOTE — PROGRESS NOTES
[address] [place] [address]  [phone]      Medical Progress Note      NAME: Sonja Freire   :  1955  MRM:  [id number]    Date/Time: 3/13/2018  7:11 AM      Subjective:     Afebrile and pain currently controlled. Hgb now >9 and K+ 3.7. Defer to ID re length of further abx administration. Waiting on final cultures and path. Therapists and I believe SNF is best next level of care, but it appears she and  prefer home health. If so will need hospital bed set up at home and probably O2 prior to D/C. Needs semislectric hospital bed for positioning not feasible in ordinary bed including HOB elevated > 30 degrees at all times to facilitate adequate breathing and oxygenation with obesity/hypovention and chronic CHF as well as elevation of foot of bed to reduce edema and facilitate wound healing from partial amputation of right foot for osteomyelitis. Past Medical History reviewed and unchanged from Admission History and Physical and/or prior progress note/electronic medical record    Medications reviewed. ROS:      General: postive for weakness  Ear Nose and Throat: negative for rhinorrhea, pharyngitis, otalgia, tinnitus, speech or swallowing difficulties  Respiratory: Less SOB and wheezing  Cardiology:  positive for PND, orthopnea and edema  Gastrointestinal: positive for nausea  Genitourinary: positive for incontinence  Dermatological: positive for chronic lichenification legs and feet and pressure sore areas  Neurological: positive for bilateral LE weakness R>L and speech difficulties    Objective:     Last 24hrs VS reviewed since prior progress note.  Most recent are:    Visit Vitals    /71    Pulse 85    Temp 98.2 °F (36.8 °C)    Resp 16    Ht 5' (1.524 m)    Wt 286 lb 1.6 oz (129.8 kg)    SpO2 97%    BMI 55.88 kg/m2     SpO2 Readings from Last 6 Encounters:   18 97%   18 (!) 79%   18 92%   18 94% 02/27/18 93%   02/23/18 94%    O2 Flow Rate (L/min): 3 l/min       Intake/Output Summary (Last 24 hours) at 03/13/18 1575  Last data filed at 03/12/18 1902   Gross per 24 hour   Intake            350.8 ml   Output                0 ml   Net            350.8 ml          Physical Exam:    General appearance: alert, cooperative, no distress, appears stated age  Eyes: negative  Neck: supple, symmetrical, trachea midline, no adenopathy, thyroid: not enlarged, symmetric, no tenderness/mass/nodules, no carotid bruit and no JVD  Lungs: Few rhonchi but wheezing diminished  Heart: regular rate and rhythm, S1, S2 normal, grade II/IV systolic murmur; No click, rub or gallop  Abdomen: soft, non-tender. Bowel sounds normal. No masses,  no organomegaly, morbidly obese  Extremities: chronic lichenification legs and feet;right foot heavily bandaged; moderate edema  Neurologic: bilateral LE weakness;  \"aphasia\" persists but is improved somewhat today    Data Review:    Lab:    BMP:   Lab Results   Component Value Date/Time     (H) 03/13/2018 03:58 AM    K 3.6 03/13/2018 03:58 AM     (H) 03/13/2018 03:58 AM    CO2 28 03/13/2018 03:58 AM    AGAP 5 03/13/2018 03:58 AM     (H) 03/13/2018 03:58 AM    BUN 23 (H) 03/13/2018 03:58 AM    CREA 1.15 (H) 03/13/2018 03:58 AM    GFRAA 58 (L) 03/13/2018 03:58 AM    GFRNA 48 (L) 03/13/2018 03:58 AM     CBC:   Lab Results   Component Value Date/Time    WBC 10.3 03/13/2018 03:58 AM    HGB 9.6 (L) 03/13/2018 03:58 AM    HCT 31.0 (L) 03/13/2018 03:58 AM     03/13/2018 03:58 AM     Recent Glucose Results:   Lab Results   Component Value Date/Time     (H) 03/13/2018 03:58 AM     All labs reviewed in past 24 hours    Other pertinent lab: none    Imaging:    Reviewed. none    Procedure: . Amputation, right fifth toe and partial right fifth metatarsal.  2.  Reexploration of wound    Assessment / Plan:      Active Problems:    Chronic diastolic CHF (congestive heart failure), NYHA class 3 ([address] (7/24/2017)      Obesity hypoventilation syndrome ([address] (2/19/2018)      Sepsis ([address] (3/5/2018)      Diabetes mellitus type II, uncontrolled ([address] (4/18/2013)      Diabetic gastroparesis (HCC) ()      Anemia (7/24/2017)      Ulcer of right fifth toe due to diabetes mellitus ([address] (7/24/2017)      Hypertension (7/24/2017)      Bilateral edema of lower extremity (7/24/2017)      (HFpEF) heart failure with preserved ejection fraction ([address] (10/31/2017)      Venous stasis dermatitis of both lower extremities (11/9/2017)      CVA, old, aphasia (3/9/2018)      Subacute osteomyelitis of right foot ([address] (3/12/2018)      Morbid obesity ([address] (1/26/2011)      CKD (chronic kidney disease) stage 3, GFR 30-59 ml/min ()      Fatty liver (7/24/2017)      Aortic stenosis, mild (7/24/2017)      BMI 45.0-49.9, adult ([address] (7/24/2017)      Type 2 diabetes mellitus with diabetic neuropathy ([address] (1/2/2018)        1. Continue current abx regimen   2. Await cultures  3. Wound care  4. PT/OT/ST  5. Diurese  6. Replete K+  7. Na still 147 - continue slow hydration with D5W  8. Order hospital bed  9. Check O2 sat on RA  10. SSI;  Lantus being held due to lower BS's and anorexia  11.  She and  now not considering DNR           Care Plan discussed with: Patient and Family    Discussed:  Code Status and Care Plan    Prophylaxis:  Lovenox    Disposition:  SNF/LTC  ___________________________________________________    Attending Physician: Amaya Sparks MD

## 2018-03-13 NOTE — PROGRESS NOTES
Bedside and Verbal shift change report given to Greta Bradley 68 and Bharathi Smith RN (oncoming nurse) by Kael Jackson RN   (offgoing nurse). Report included the following information SBAR, Kardex, MAR and Recent Results. Zone Phone for oncoming shift:   [phone]    Shift Summary: Patient rested quietly. Patient received two units of blood.      LDAs         PICC Double Lumen 03/07/18 Right;Brachial (Active)   Central Line Being Utilized Yes 3/13/2018  3:15 AM   Criteria for Appropriate Use Long term IV/antibiotic administration 3/13/2018  3:15 AM   Site Assessment Clean, dry, & intact 3/13/2018  3:15 AM   Phlebitis Assessment 0 3/13/2018  3:15 AM   Infiltration Assessment 0 3/13/2018  3:15 AM   Arm Circumference (cm) 39 cm 3/7/2018 10:31 AM   Date of Last Dressing Change 03/07/18 3/12/2018  9:11 AM   Dressing Status Clean, dry, & intact 3/13/2018  3:15 AM   Action Taken Blood drawn 3/12/2018  9:11 AM   External Catheter Length (cm) 0 centimeters 3/7/2018 10:31 AM   Dressing Type Disk with Chlorhexadine gluconate (CHG) 3/13/2018  3:15 AM   Hub Color/Line Status Red 3/13/2018  3:15 AM   Positive Blood Return (Site #1) Yes 3/13/2018  3:15 AM   Hub Color/Line Status Purple 3/13/2018  3:15 AM   Positive Blood Return (Site #2) Yes 3/12/2018  3:22 AM   Alcohol Cap Used Yes 3/12/2018  3:22 AM                External Female Catheter 03/07/18 (Active)   Site Assessment Clean, dry, & intact 3/12/2018  8:15 PM   Repositioned No 3/12/2018  8:15 PM   Perineal Care Yes 3/12/2018  8:15 PM   Wick Changed Yes 3/12/2018  8:15 PM   Suction Canister/Tubing Changed No 3/12/2018  8:15 PM   Urine Output (mL) 200 ml 3/10/2018  6:31 PM                  Intake & Output   Date 03/12/18 0700 - 03/13/18 0659 03/13/18 0700 - 03/14/18 0659   Shift 3583-4461 6193-2074 24 Hour Total 2121-0673 2396-3258 24 Hour Total   I  N  T  A  K  E   Blood  350.8 350.8         Volume (TRANSFUSE PACKED RBC'S)  350.8 350.8       Shift Total  (mL/kg)  350.8  (2.7) 350.8  (2.7)      O  U  T  P  U  T   Stool            Stool Occurrence(s)  1 x 1 x       Shift Total  (mL/kg)         NET  350.8 350.8      Weight (kg) 130 129.8 129.8 129.8 129.8 129.8      Last Bowel Movement Last Bowel Movement Date: 03/11/18   Glucose Checks [] N/A  [x] AC/HS  [] Q6  Concerns:   Nutrition Active Orders   Diet    DIET DIABETIC CONSISTENT CARB Regular       Consults [x]PT  [x]OT  []Speech  [x]Case Management   Cardiac Monitoring []N/A [x]Yes Expires: 48 hours

## 2018-03-13 NOTE — PROGRESS NOTES
Pulse Oximetry Assessment:    83% at rest on room air (If this value is <90%, you do not need to proceed and the other fields may be deleted)    If patient will require home oxygen, please obtain an order from the attending physician for a case management consult to set up home oxygen.

## 2018-03-13 NOTE — PROGRESS NOTES
Bedside shift change report given to Latasha  (oncoming nurse) by Logan Corcoran RN  (offgoing nurse). Report included the following information SBAR, Kardex, Intake/Output, MAR, Recent Results and Cardiac Rhythm . [place] Zone Phone for oncoming shift:   [phone]    Shift Summary: Wound care completed x2. PRN pain medication given per MAR. LDAs         PICC Double Lumen 03/07/18 Right;Brachial (Active)   Central Line Being Utilized Yes 3/13/2018  3:11 PM   Criteria for Appropriate Use Long term IV/antibiotic administration 3/13/2018  3:11 PM   Site Assessment Clean, dry, & intact 3/13/2018  3:11 PM   Phlebitis Assessment 0 3/13/2018  3:11 PM   Infiltration Assessment 0 3/13/2018  3:11 PM   Arm Circumference (cm) 39 cm 3/7/2018 10:31 AM   Date of Last Dressing Change 03/07/18 3/13/2018  3:11 PM   Dressing Status Clean, dry, & intact 3/13/2018  3:11 PM   Action Taken Other (comment) 3/13/2018  7:57 AM   External Catheter Length (cm) 0 centimeters 3/7/2018 10:31 AM   Dressing Type Disk with Chlorhexadine gluconate (CHG); Transparent 3/13/2018  3:11 PM   Hub Color/Line Status Red;Capped 3/13/2018  3:11 PM   Positive Blood Return (Site #1) Yes 3/13/2018  3:11 PM   Hub Color/Line Status Purple; Infusing 3/13/2018  3:11 PM   Positive Blood Return (Site #2) Yes 3/13/2018  3:11 PM   Alcohol Cap Used Yes 3/13/2018  3:11 PM                External Female Catheter 03/07/18 (Active)   Site Assessment Clean, dry, & intact 3/13/2018  3:11 PM   Repositioned Yes 3/13/2018  3:11 PM   Perineal Care Yes 3/13/2018  3:11 PM   Wick Changed No 3/13/2018  3:11 PM   Suction Canister/Tubing Changed No 3/13/2018  3:11 PM   Urine Output (mL) 700 ml 3/13/2018  6:40 PM                  Intake & Output   Date 03/12/18 1900 - 03/13/18 0659 03/13/18 0700 - 03/14/18 0659   Shift 8982-3619 24 Hour Total 2233-0119 2228-3936 24 Hour Total   I  N  T  A  K  E   I.V.  (mL/kg/hr)   100  (0.1) 600 700      Volume (dextrose 5% infusion)    600 600 Volume (piperacillin-tazobactam (ZOSYN) 3.375 g in  ml premix/cmpd)   100  100    Blood 350.8 350.8         Volume (TRANSFUSE PACKED RBC'S) 350.8 350.8       Shift Total  (mL/kg) 350.8  (2.7) 350.8  (2.7) 100  (0.8) 600  (4.6) 700  (5.4)   O  U  T  P  U  T   Urine  (mL/kg/hr)   700  (0.4)  700      Urine Output (mL) (External Female Catheter 03/07/18)   700  700    Stool           Stool Occurrence(s) 1 x 1 x       Shift Total  (mL/kg)   700  (5.4)  700  (5.4)   .8 350.8 -600 600 0   Weight (kg) 129.8 129.8 129.8 129.8 129.8      Last Bowel Movement Last Bowel Movement Date: 03/12/18 (per )   Glucose Checks [] N/A  [] AC/HS  [] Q6  Concerns:   Nutrition Active Orders   Diet    DIET DIABETIC CONSISTENT CARB Regular       Consults []PT  []OT  []Speech  []Case Management   Cardiac Monitoring []N/A []Yes Expires:

## 2018-03-13 NOTE — PROGRESS NOTES
Infectious Disease Progress Note       Subjective:   Ms Marina Curtis seen today. Also D/W her . No new symptoms or complaints     ROS: 10 point ROS obtained and pertinent positives include above. All others negative.          Objective:    Vitals:   Patient Vitals for the past 24 hrs:   Temp Pulse Resp BP SpO2   03/13/18 1129 98.3 °F (36.8 °C) 70 18 183/74 96 %   03/13/18 0745 98.3 °F (36.8 °C) 66 20 145/71 91 %   03/13/18 0143 98.2 °F (36.8 °C) 85 16 165/71 97 %   03/13/18 0043 98.3 °F (36.8 °C) 87 16 168/78 96 %   03/12/18 2343 98 °F (36.7 °C) 68 - 158/70 99 %   03/12/18 2328 98.2 °F (36.8 °C) 64 18 166/70 95 %   03/12/18 2313 98.4 °F (36.9 °C) 70 18 175/80 97 %   03/12/18 2254 98.2 °F (36.8 °C) 70 18 147/71 97 %   03/12/18 1902 98.3 °F (36.8 °C) 70 18 131/66 93 %   03/12/18 1815 - 67 20 160/80 96 %   03/12/18 1739 97.4 °F (36.3 °C) 68 20 140/72 98 %   03/12/18 1618 - 66 - 146/68 95 %   03/12/18 1553 98.3 °F (36.8 °C) 65 18 155/67 90 %   03/12/18 1516 98.2 °F (36.8 °C) 70 18 137/63 92 %   03/12/18 1509 98.1 °F (36.7 °C) 70 20 134/68 93 %   03/12/18 1454 97.9 °F (36.6 °C) 75 18 134/68 96 %       Physical Exam:  ¨ GEN: NAD  ¨ HEENT: no scleral icterus,  no thrush  ¨ CV: S1, S2 heard   ¨ Lungs: Clear to auscultation bilaterally anteriorly  ¨ Abdomen: soft, obese, non tender   ¨ Genitourinary: + mast  ¨ Extremities: B/L chronic venous changes noted, + R foot  Dressing   ¨ Neuro: alert to self, situation, verbal minimally  ¨ Skin: no rash  ¨ Psych: flat affect     Medications:    Current Facility-Administered Medications:     potassium chloride (K-DUR, KLOR-CON) SR tablet 20 mEq, 20 mEq, Oral, BID, Moreno Johnson MD, 20 mEq at 03/12/18 1737    0.9% sodium chloride infusion 250 mL, 250 mL, IntraVENous, PRN, Moreno Johnson MD    vancomycin (VANCOCIN) 1500 mg in  ml infusion, 1,500 mg, IntraVENous, Q36H, May Wilson DO, Last Rate: 250 mL/hr at 03/12/18 2250, 1,500 mg at 03/12/18 2250   piperacillin-tazobactam (ZOSYN) 3.375 g in  ml premix/cmpd, 3.375 g, IntraVENous, Q8H, Kristina Wilson DO, 3.375 g at 03/13/18 1115    dextrose 5% infusion, 50 mL/hr, IntraVENous, CONTINUOUS, Dwayne Luu MD, Last Rate: 50 mL/hr at 03/11/18 0701, 50 mL/hr at 03/11/18 0701    VANCOMYCIN INFORMATION NOTE, , Other, DAILY, Kristina Wilson DO    enoxaparin (LOVENOX) injection 40 mg, 40 mg, SubCUTAneous, Q12H, Belle [place] Belock, DPM, 40 mg at 03/13/18 0825    morphine injection 2 mg, 2 mg, IntraVENous, Q3H PRN, Jodi Gonzales Belhenri, DPM    albuterol-ipratropium (DUO-NEB) 2.5 MG-0.5 MG/3 ML, 3 mL, Nebulization, Q8H PRN, Guille Moncada MD    insulin lispro (HUMALOG) injection, , SubCUTAneous, AC&HS, Russel Encinas MD, 5 Units at 03/13/18 1158    glucose chewable tablet 16 g, 4 Tab, Oral, PRN, Russel Encinas MD    dextrose (D50W) injection syrg 12.5-25 g, 12.5-25 g, IntraVENous, PRN, Russel Encinas MD, 12.5 g at 03/08/18 1700    glucagon (GLUCAGEN) injection 1 mg, 1 mg, IntraMUSCular, PRN, Russel Encinas MD    nystatin (MYCOSTATIN) 100,000 unit/gram powder, , Topical, BID, Russel Encinas MD    zinc oxide-cod liver oil (DESITIN) 40 % paste, , Topical, PRN, Russel Encinas MD    ammonium lactate (LAC-HYDRIN) 12 % lotion, , Topical, BID, Russel Encinas MD    clotrimazole (LOTRIMIN) 1 % cream, , Topical, BID, Russel Encinas MD    allopurinol (ZYLOPRIM) tablet 100 mg, 100 mg, Oral, DAILY, Guille Moncada MD, 100 mg at 03/12/18 0900    atenolol (TENORMIN) tablet 50 mg, 50 mg, Oral, DAILY, Guille Moncada MD, 50 mg at 03/12/18 1053    atorvastatin (LIPITOR) tablet 40 mg, 40 mg, Oral, DAILY, Guille Moncada MD, 40 mg at 03/12/18 1053    [facility] ac& pc-s.therm-b.anim (ELVA Q/RISAQUAD), 1 Cap, Oral, DAILY, Guille Moncada MD, 1 Cap at 03/12/18 1052    bumetanide (BUMEX) tablet 3 mg, 3 mg, Oral, DAILY, Guille Moncada MD, 3 mg at 03/13/18 0827    colestipol (COLESTID) tablet 1 g, 1 g, Oral, Ti Mehta MD, 1 g at 03/12/18 1053    dilTIAZem CD (CARDIZEM CD) capsule 360 mg, 360 mg, Oral, DAILY, Wilfredo Amos MD, 360 mg at 03/12/18 1052    hydrALAZINE (APRESOLINE) tablet 50 mg, 50 mg, Oral, TID, Wilfredo Amos MD, 50 mg at 03/12/18 2251    pregabalin (LYRICA) capsule 75 mg, 75 mg, Oral, BID, Wilfredo Amos MD, 75 mg at 03/13/18 8335    nystatin (MYCOSTATIN) 100,000 unit/gram cream, , Topical, BID, Gerson Lacey MD    sodium chloride (NS) flush 5-10 mL, 5-10 mL, IntraVENous, Q8H, Wilfredo Amos MD, 10 mL at 03/13/18 1320    sodium chloride (NS) flush 5-10 mL, 5-10 mL, IntraVENous, PRN, Wilfredo Amos MD, 10 mL at 03/10/18 1847    acetaminophen (TYLENOL) tablet 650 mg, 650 mg, Oral, Q4H PRN, Wilfredo Amos MD, 650 mg at 03/12/18 0107    HYDROcodone-acetaminophen ([address]) 5-325 mg per tablet 1 Tab, 1 Tab, Oral, Q4H PRN, Wilfredo Amos MD, 1 Tab at 03/13/18 1320    ondansetron (ZOFRAN) injection 4 mg, 4 mg, IntraVENous, Q4H PRN, Wilfredo Amos MD, 4 mg at 03/10/18 1844    docusate sodium (COLACE) capsule 100 mg, 100 mg, Oral, BID PRN, Wilfredo Amos MD      Labs:  Recent Results (from the past 24 hour(s))   GLUCOSE, POC    Collection Time: 03/12/18  4:30 PM   Result Value Ref Range    Glucose (POC) 159 (H) 65 - 100 mg/dL    Performed by Juliet Harper (PCT)    GLUCOSE, POC    Collection Time: 03/12/18  9:53 PM   Result Value Ref Range    Glucose (POC) 169 (H) 65 - 100 mg/dL    Performed by Mindy BASIC    Collection Time: 03/13/18  3:58 AM   Result Value Ref Range    Sodium 147 (H) 136 - 145 mmol/L    Potassium 3.6 3.5 - 5.1 mmol/L    Chloride 114 (H) 97 - 108 mmol/L    CO2 28 21 - 32 mmol/L    Anion gap 5 5 - 15 mmol/L    Glucose 151 (H) 65 - 100 mg/dL    BUN 23 (H) 6 - 20 MG/DL    Creatinine 1.15 (H) 0.55 - 1.02 MG/DL    BUN/Creatinine ratio 20 12 - 20      GFR est AA 58 (L) >60 ml/min/1.73m2    GFR est non-AA 48 (L) >60 ml/min/1.73m2    Calcium 8.2 (L) 8.5 - 10.1 MG/DL   CBC W/O DIFF    Collection Time: 03/13/18  3:58 AM   Result Value Ref Range    WBC 10.3 3.6 - 11.0 K/uL    RBC 3.29 (L) 3.80 - 5.20 M/uL    HGB 9.6 (L) 11.5 - 16.0 g/dL    HCT 31.0 (L) 35.0 - 47.0 %    MCV 94.2 80.0 - 99.0 FL    MCH 29.2 26.0 - 34.0 PG    MCHC 31.0 30.0 - 36.5 g/dL    RDW 17.2 (H) 11.5 - 14.5 %    PLATELET 534 208 - 066 K/uL    MPV 11.3 8.9 - 12.9 FL    NRBC 0.2 (H) 0  WBC    ABSOLUTE NRBC 0.02 (H) 0.00 - 0.01 K/uL   GLUCOSE, POC    Collection Time: 03/13/18  7:48 AM   Result Value Ref Range    Glucose (POC) 168 (H) 65 - 100 mg/dL    Performed by Dany Damon, POC    Collection Time: 03/13/18 11:34 AM   Result Value Ref Range    Glucose (POC) 257 (H) 65 - 100 mg/dL    Performed by Festus Jackson            Micro:     3/10/18 Foot     Component Value Ref Range & Units Status      Special Requests: NO SPECIAL REQUESTS   Final     GRAM STAIN NO WBC'S SEEN   Final     GRAM STAIN NO ORGANISMS SEEN   Final     Culture result:    Final     LIGHT STAPHYLOCOCCUS EPIDERMIDIS (OXACILLIN RESISTANT) (A)     Culture result:    Final     LIGHT STAPHYLOCOCCUS EPIDERMIDIS (OXACILLIN RESISTANT) . .. 2ND STRAIN (A)       Culture & Susceptibility        Antibiotic   Organism Organism Organism       Staphylococcus epidermidis Staphylococcus epidermidis      AMPICILLIN ($)   4   ug/mL   R Final  8   ug/mL   R Final       AMPICILLIN/SULBACTAM ($)   <=8/4   ug/mL   R Final  <=8/4   ug/mL   R Final       CEFAZOLIN ($)   <=4   ug/mL   R Final  <=4   ug/mL   R Final       CIPROFLOXACIN ($)   >2   ug/mL   R Final  >2   ug/mL   R Final       CLINDAMYCIN ($)   <=0.5   ug/mL   S Final  >4   ug/mL   R Final       DAPTOMYCIN ($$$$$)   <=0.5   ug/mL   S Final  <=0.5   ug/mL   S Final       ERYTHROMYCIN ($$$$)   <=0.5   ug/mL   S Final  >4   ug/mL   R Final       GENTAMICIN ($)   >8   ug/mL   R Final  >8   ug/mL   R Final       LEVOFLOXACIN ($)   >4   ug/mL   R Final  >4   ug/mL   R Final       LINEZOLID ($$$$$)   2   ug/mL   S Final  <=1   ug/mL   S Final       OXACILLIN   >2   ug/mL   R Final  >2   ug/mL   R Final       PENICILLIN G ($$)   8   ug/mL   R Final  8   ug/mL   R Final       RIFAMPIN ($$$$)   <=1   ug/mL   S Final  <=1   ug/mL   S Final       TETRACYCLINE   <=4   ug/mL   S Final  <=4   ug/mL   S Final       VANCOMYCIN ($)   2   ug/mL   S Final  2   ug/mL   S Final                Wound 3/6/18  Component Value Ref Range & Units Status      Special Requests: NO SPECIAL REQUESTS   Final     GRAM STAIN 3+ WBCS SEEN   Final     GRAM STAIN NO ORGANISMS SEEN   Final     Culture result:    Final     FEW ENTEROCOCCUS FAECALIS GROUP D (A)       Culture & Susceptibility        Antibiotic   Organism Organism Organism       Enterococcus faecalis group D       AMPICILLIN ($)   <=2   ug/mL   S Final         DAPTOMYCIN ($$$$$)   2   ug/mL   S Final         LINEZOLID ($$$$$)   2   ug/mL   S Final         PENICILLIN G ($$)   2   ug/mL   S Final         VANCOMYCIN ($)   4   ug/mL   S Final                                                                       Component Results      Component Value Ref Range & Units Status     Special Requests: NO SPECIAL REQUESTS   Preliminary     Culture result:    Preliminary     CHECKING FOR POSSIBLE FEW ANAEROBIC GRAM NEGATIVE RODS BETA LACTAMASE POSITIVE (A)       Result History            Blood 3/5/18  Component Results      Component Value Ref Range & Units Status     Special Requests: NO SPECIAL REQUESTS   Preliminary     Culture result: NO GROWTH 3 DAYS   Preliminary       Result History          Imaging:      FINDINGS:  Two views of the right foot demonstrate no fracture or other acute  osseous or articular abnormality. Prominent soft tissue swelling. No opaque  foreign body or gas in the soft tissue, small vessel calcifications suggest  diabetes. Calcaneal spurs. There is no radiological evidence to suggest  osteomyelitis.  Mild DJD metatarsophalangeal joint.      IMPRESSION  IMPRESSION:  No acute bony abnormality.          MRI:     FINDINGS: Bone marrow: Ill-defined bone marrow edema is in the fifth metatarsal  head and fifth proximal phalanx. No distinct fracture line. Subtle thinning of  the cortex of the fifth proximal phalanx more likely than the fifth metatarsal  head.     Remaining bone marrow signal is within normal limits.     Joint fluid:  Moderate fifth MTP joint effusion. No other joint effusion.     Tendons: Intact.     Muscles: Diffuse edema. Mild atrophy.     Neurovascular bundles: No evidence of neuroma.     Articular cartilage: Marrow fifth MTP joint is increased. Lisfranc joint is  within normal limits.     Soft tissue mass: None. Skin breakdown is lateral to the fifth MTP joint.     IMPRESSION  IMPRESSION:   1. Soft tissue ulceration is lateral to the fifth MTP joint. There is evidence  of underlying fifth MTP joint septic arthritis and osteomyelitis of the fifth  metatarsal head and fifth proximal phalanx. 2. Cellulitis and myositis. No drainable abscess within the limitation of no  intravenous contrast. However, the fifth MTP joint effusion is lobulated and may  be difficult to drain.     Procedures:   3/7/18  PROCEDURE:  Ankle, brachial and digital arterial pressures, CW Doppler   waveforms and digital PPG waveforms were performed.     1. The right posterior tibial ankle/brachial index is 1.16  2. Segmental pressures in the right dorsalis pedis and the left ankle  are not measurable due to extensive arterial calcification. The  ankle/brachial indexes are therefore unreliable predictors of distal  arterial perfusion. 3. The right great toe/brachial index is 0.72 and the left great  toe/brachial index is 1. 11. Bedside debridement attempted 3/6    3/10/18 Procedure(s):  1. AMPUTATION RIGHT FIFTH TOE  AND PARTIAL RIGHT FIFTH METATARSAL    2.  WOUND RE-EXPLORATION     Assessment / Plan:      Ms Deniz Cook is a 60year old lady with hx of DM, Hx of CDI in end of 2017-early 2018, chronic venous stasis, CKD, HELIO, morbid venous stasis admitted on 3/6 with altered mentation and respiratory issues. Her  says that she has had LE cellulitis and chart review indicates admission in 10/2017 to [facility] for the same. Her  says that he was doing her wound care at home until 3 weeks ago, when home care/wound care were arranged. Her  says that she has been weeping from the top part of her feet and he was not able to see the bottom part of her feet well as she was not able to examine it as she could not lie back with dyspnea at home. He recently noted it and it thought it looked like a blister that had ruptured and was red. They are not clear exact details as her feet were weeping and unclear if discharge was from the ulcers or not. He recalls that after last admission in 10/2017, she went to [facility] and then to Nursing home and developed C diff. She was on antibiotics but not long term at that time per him. She has not been ambulating since about 10/2017 per her . She reports that she does not have good sensation in her feet but is usually bed bound and so no known truama to .      This admission, found to be febrile at 101.9 on 3/5/18. H and P indicates \"no acute changes on skin to suggest cellulitis\". Noted antibiotics started on 3/6 with Levaquin. Then note to have been started on Vancomycin, Cefepime and Flagyl. Blood cultures sent. PICC was already inserted.       Seen by Podiatry as well as Vascular surgery. Had \"Debridement of wound R foot attempted but terminated before completion due to extent of necrotic tissue\". Cultures of wound sent. MRI Pending.            1) R foot diabetic foot ulcer with concerns for osteomyelitis      MRI with \" Soft tissue ulceration is lateral to the fifth MTP joint. There is evidence of underlying fifth MTP joint septic arthritis and osteomyelitis of the fifth metatarsal head and fifth proximal phalanx\". \"Cellulitis and myositis. No drainable abscess within the limitation of no  intravenous contrast. However, the fifth MTP joint effusion is lobulated and may be difficult to drain\"     Recommendations:  Vancomycin, Zosyn IV for now  Await pathology results. If pathology margins are + for osteomyelitis, will recommend 6 weeks IV antibiotics based on cultures   If on pathology, bone margins are negative and all infection was resected, then will not recommend long term IV antibiotics. May still need a few days to a week if there is cellulitis or concerns of soft tissue infection on surgical site   Final choice and duration of antibiotics to be determined based on surgical intervention, cultures and bone margins for pathology   Wound care   Optimize DM control for better wound healing and infection control   Side effects of antibiotics including GI, renal and CDI explained patient and her  today   Vancomycin Trough goal is 15-20 , dosing to be adjusted by pharmacy   Monitor renal function closely and renal dosing of antibiotics per pharmacy      2) DM     3) HELIO     4) CKD: renally dose antibiotics      5) Hx of CDI in past   Is high risk for CDI again     6) DVT Px per primary team      7) Access: PICC      8)  Slightly prominent lymph nodes in the mediastinum are stable per CT imaging     D/W her  today. Thank you for the opportunity to participate in the care of this patient. Please contact with questions or concerns.       Kristina Wilson,    2:07 PM

## 2018-03-13 NOTE — PROGRESS NOTES
Podiatry    S: Denies fever, chills, nausea. O: Bandage changed. Skin margins are viable, sutures intact. Light bleeding onto bandage, but no pus or foul odor. Wound bed at 5th toe amputation site is granular, wound bed at 5th metatarsal base mixed fibro-granular. DP and PT palpable B; CFT less than 3 seconds at wound margins. Sensation absent to fine touch    C&S: Staph. Epi., oxacillin resistant  Pathology results still pending    A: POD #3, open amputation R 5th toe and partial R 5th metatarsal    P: Continue current dressing changes with saline moist packing into wound tunnel and hydrogel on exterior wound surfaces until the tunnel is filled with in-growth, then change to just hydrogel. ABX per Infectious Disease. F/U in my office either 3/16/18 or 3/20/18, [place] office. [id number].

## 2018-03-14 NOTE — PROGRESS NOTES
[address]  [phone]      Medical Progress Note      NAME: Wade Kat   :  1955  MRM:  [id number]    Date/Time: 3/14/2018  7:11 AM      Subjective:     Afebrile and pain currently controlled. Defer to ID re length of further abx administration and choice abx altho Zyvox covers the Staph and enterococcus from wound cultures. Waiting on final  Path. Appears to have qualified for O2 and waiting on final confirmation re hospital bed. Creatinine higher and edema less. BS's higher as eating better. Will adjust insulin. Past Medical History reviewed and unchanged from Admission History and Physical and/or prior progress note/electronic medical record    Medications reviewed. ROS:      General: postive for weakness  Ear Nose and Throat: negative for rhinorrhea, pharyngitis, otalgia, tinnitus, speech or swallowing difficulties  Respiratory: Less SOB and wheezing  Cardiology:  positive for PND, orthopnea and edema  Gastrointestinal: positive for nausea  Genitourinary: positive for incontinence  Dermatological: positive for chronic lichenification legs and feet and pressure sore areas  Neurological: positive for bilateral LE weakness R>L and speech difficulties    Objective:     Last 24hrs VS reviewed since prior progress note.  Most recent are:    Visit Vitals    /67    Pulse 66    Temp 98.5 °F (36.9 °C)    Resp 16    Ht 5' (1.524 m)    Wt 289 lb 1.6 oz (131.1 kg)    SpO2 98%    BMI 56.46 kg/m2     SpO2 Readings from Last 6 Encounters:   18 98%   18 (!) 79%   18 92%   18 94%   18 93%   18 94%    O2 Flow Rate (L/min): 3 l/min       Intake/Output Summary (Last 24 hours) at 18 0700  Last data filed at 18 1923   Gross per 24 hour   Intake             1660 ml   Output              700 ml   Net              960 ml          Physical Exam:    General appearance: alert, cooperative, no distress, appears stated age  Eyes: negative  Neck: supple, symmetrical, trachea midline, no adenopathy, thyroid: not enlarged, symmetric, no tenderness/mass/nodules, no carotid bruit and no JVD  Lungs: Few rhonchi but wheezing diminished  Heart: regular rate and rhythm, S1, S2 normal, grade II/IV systolic murmur; No click, rub or gallop  Abdomen: soft, non-tender. Bowel sounds normal. No masses,  no organomegaly, morbidly obese  Extremities: chronic lichenification legs and feet;right foot heavily bandaged; moderate edema  Neurologic: bilateral LE weakness;  \"aphasia\" persists but is improved somewhat today    Data Review:    Lab:    BMP:   Lab Results   Component Value Date/Time     (H) 03/14/2018 02:45 AM    K 3.9 03/14/2018 02:45 AM     (H) 03/14/2018 02:45 AM    CO2 28 03/14/2018 02:45 AM    AGAP 7 03/14/2018 02:45 AM     (H) 03/14/2018 02:45 AM    BUN 26 (H) 03/14/2018 02:45 AM    CREA 1.66 (H) 03/14/2018 02:45 AM    GFRAA 38 (L) 03/14/2018 02:45 AM    GFRNA 31 (L) 03/14/2018 02:45 AM     CBC:   Lab Results   Component Value Date/Time    WBC 11.1 (H) 03/14/2018 02:45 AM    HGB 9.5 (L) 03/14/2018 02:45 AM    HCT 31.2 (L) 03/14/2018 02:45 AM     03/14/2018 02:45 AM     Recent Glucose Results:   Lab Results   Component Value Date/Time     (H) 03/14/2018 02:45 AM     All labs reviewed in past 24 hours    Other pertinent lab: none    Imaging:    Reviewed. none    Procedure: . Amputation, right fifth toe and partial right fifth metatarsal.  2.  Reexploration of wound    Assessment / Plan:      Active Problems:    Chronic diastolic CHF (congestive heart failure), NYHA class 3 ([address] (7/24/2017)      Obesity hypoventilation syndrome ([address] (2/19/2018)      Sepsis ([address] (3/5/2018)      Diabetes mellitus type II, uncontrolled ([address] (4/18/2013)      Diabetic gastroparesis (HCC) ()      Anemia (7/24/2017)      Ulcer of right fifth toe due to diabetes mellitus ([facility] (7/24/2017)      Hypertension (7/24/2017)      Bilateral edema of lower extremity (7/24/2017)      (HFpEF) heart failure with preserved ejection fraction ([facility] (10/31/2017)      Venous stasis dermatitis of both lower extremities (11/9/2017)      CVA, old, aphasia (3/9/2018)      Subacute osteomyelitis of right foot ([facility] (3/12/2018)      Morbid obesity ([facility] (1/26/2011)      CKD (chronic kidney disease) stage 3, GFR 30-59 ml/min ()      Fatty liver (7/24/2017)      Aortic stenosis, mild (7/24/2017)      BMI 45.0-49.9, adult ([facility] (7/24/2017)      Type 2 diabetes mellitus with diabetic neuropathy ([facility] (1/2/2018)        1. Continue current abx regimen   2. Await cultures  3. Wound care  4. PT/OT/ST  5. Decrease Bumex with creatinine climbing  6. Replete K+  7. Na better - continue slow hydration with D5W  8. Ordered hospital bed  9. Has O2  10.  Adjust insulin           Care Plan discussed with: Patient and Family    Discussed:  Code Status and Care Plan    Prophylaxis:  Lovenox    Disposition:  SNF/LTC  ___________________________________________________    Attending Physician: Taylor Candelario MD

## 2018-03-14 NOTE — PROGRESS NOTES
Bedside and Verbal shift change report given to [facility] [address] [place] [address] (oncoming nurse) by Richie Yost (offgoing nurse). Report included the following information SBAR, Kardex, Intake/Output, MAR, Accordion, Recent Results and Cardiac Rhythm NSR.

## 2018-03-14 NOTE — PROGRESS NOTES
[facility]  . Dr Felisa Nesbtit are asking for specific wound care instructions. Thanks      I placed portable oxygen tank in room. D/C plans discussed with pt//spouse. The hospictal bed will be delivered this afternoon to the home. He will transport at d/c in his wheelchair Sky Burgess); he would like to hear from the surgeon what kind of boot//cover he should place on the surgical foot. Spouse needs supplies to be in place for wound care.       I sent wound care order per Dr Ashu Ace note to [address] [facility]

## 2018-03-14 NOTE — PROGRESS NOTES
Infectious Disease Progress Note       Subjective:   Ms Jose Martinez seen . She says she had diarrhea but RN says she has not had a BM today and had loose stools a couple days ago one time. Otherwise no complains. ROS: 10 point ROS obtained and pertinent positives include above. All others negative.          Objective:    Vitals:   Patient Vitals for the past 24 hrs:   Temp Pulse Resp BP SpO2   03/14/18 1133 97.8 °F (36.6 °C) 68 18 141/62 98 %   03/14/18 0822 98.4 °F (36.9 °C) 71 18 145/70 99 %   03/14/18 0232 98.5 °F (36.9 °C) 66 16 141/67 98 %   03/13/18 2302 97.6 °F (36.4 °C) 67 16 139/62 95 %   03/13/18 1947 97.7 °F (36.5 °C) 66 18 123/61 95 %   03/13/18 1656 98 °F (36.7 °C) 69 18 166/86 90 %       Physical Exam:  ¨ GEN: NAD  ¨ HEENT: no scleral icterus,  no thrush  ¨ CV: S1, S2 heard   ¨ Lungs: Clear to auscultation bilaterally anteriorly  ¨ Abdomen: soft, obese, non tender   ¨ Extremities: B/L chronic venous changes noted, + R foot  Incision site noted, no erythema around incision or discharge, + has packing as well   ¨ Neuro: alert to self, situation, verbal minimally  ¨ Skin: no rash  ¨ Psych: more verbal and interactive today     Medications:    Current Facility-Administered Medications:     bumetanide (BUMEX) tablet 1 mg, 1 mg, Oral, DAILY, Radha Torres MD, 1 mg at 03/14/18 0920    insulin glargine (LANTUS) injection 10 Units, 10 Units, SubCUTAneous, QHS, Radha Torres MD    potassium chloride (K-DUR, KLOR-CON) SR tablet 20 mEq, 20 mEq, Oral, DAILY, Radha Torres MD, 20 mEq at 03/14/18 0920    0.9% sodium chloride infusion 250 mL, 250 mL, IntraVENous, PRN, Radha Torres MD    vancomycin (VANCOCIN) 1500 mg in  ml infusion, 1,500 mg, IntraVENous, Q36H, Kristina iWlson DO, Last Rate: 250 mL/hr at 03/14/18 1020, 1,500 mg at 03/14/18 1020    piperacillin-tazobactam (ZOSYN) 3.375 g in  ml premix/cmpd, 3.375 g, IntraVENous, Q8H, Kristina Wilson DO, 3.375 g at 03/14/18 7720    dextrose 5% infusion, 50 mL/hr, IntraVENous, CONTINUOUS, Loyola Peabody, MD, Last Rate: 50 mL/hr at 03/11/18 0701, 50 mL/hr at 03/11/18 0701    VANCOMYCIN INFORMATION NOTE, , Other, DAILY, Kristina Wilson,     enoxaparin (LOVENOX) injection 40 mg, 40 mg, SubCUTAneous, Q12H, Fidel Gibbs DPM, 40 mg at 03/14/18 0900    morphine injection 2 mg, 2 mg, IntraVENous, Q3H PRN, Fidel Gibbs DPM    albuterol-ipratropium (DUO-NEB) 2.5 MG-0.5 MG/3 ML, 3 mL, Nebulization, Q8H PRN, Usman Richey MD    insulin lispro (HUMALOG) injection, , SubCUTAneous, AC&HS, Mariela Garcia MD, 3 Units at 03/14/18 1144    glucose chewable tablet 16 g, 4 Tab, Oral, PRN, Mariela Garcia MD    dextrose (D50W) injection syrg 12.5-25 g, 12.5-25 g, IntraVENous, PRN, Mariela Garcia MD, 12.5 g at 03/08/18 1700    glucagon (GLUCAGEN) injection 1 mg, 1 mg, IntraMUSCular, PRN, Mariela Garcia MD    nystatin (MYCOSTATIN) 100,000 unit/gram powder, , Topical, BID, Mariela Garcia MD    zinc oxide-cod liver oil (DESITIN) 40 % paste, , Topical, PRN, Mariela Garcia MD    ammonium lactate (LAC-HYDRIN) 12 % lotion, , Topical, BID, Mariela Garcia MD    clotrimazole (LOTRIMIN) 1 % cream, , Topical, BID, Mariela Garcia MD    allopurinol (ZYLOPRIM) tablet 100 mg, 100 mg, Oral, DAILY, Usman Richey MD, 100 mg at 03/14/18 0947    atenolol (TENORMIN) tablet 50 mg, 50 mg, Oral, DAILY, Usman Richey MD, 50 mg at 03/14/18 0919    atorvastatin (LIPITOR) tablet 40 mg, 40 mg, Oral, DAILY, Usman Richey MD, 40 mg at 03/14/18 0947    lactobac ac& pc-s.therm-b.anim (ELVA Q/RISAQUAD), 1 Cap, Oral, DAILY, Usman Richey MD, 1 Cap at 03/14/18 0900    colestipol (COLESTID) tablet 1 g, 1 g, Oral, DAILY, Usman Richey MD, Stopped at 03/14/18 0947    dilTIAZem CD (CARDIZEM CD) capsule 360 mg, 360 mg, Oral, DAILY, Usman Richey MD, 360 mg at 03/14/18 0920    hydrALAZINE (APRESOLINE) tablet 50 mg, 50 mg, Oral, TID, Usman Richey, MD, 50 mg at 03/14/18 0919    pregabalin (LYRICA) capsule 75 mg, 75 mg, Oral, BID, Kong Tomas MD, 75 mg at 03/14/18 2849    nystatin (MYCOSTATIN) 100,000 unit/gram cream, , Topical, BID, Radha Torres MD    sodium chloride (NS) flush 5-10 mL, 5-10 mL, IntraVENous, Q8H, Kong Tomas MD, 10 mL at 03/14/18 6382    sodium chloride (NS) flush 5-10 mL, 5-10 mL, IntraVENous, PRN, Kong Tomas MD, 10 mL at 03/14/18 1150    acetaminophen (TYLENOL) tablet 650 mg, 650 mg, Oral, Q4H PRN, Kong Tomas MD, 650 mg at 03/12/18 0107    HYDROcodone-acetaminophen (Yelitza Pipes) 5-325 mg per tablet 1 Tab, 1 Tab, Oral, Q4H PRN, Kong Tomas MD, 1 Tab at 03/14/18 1135    ondansetron (ZOFRAN) injection 4 mg, 4 mg, IntraVENous, Q4H PRN, Kong Tomas MD, 4 mg at 03/14/18 1145    docusate sodium (COLACE) capsule 100 mg, 100 mg, Oral, BID PRN, Kong Tomas MD      Labs:  Recent Results (from the past 24 hour(s))   GLUCOSE, POC    Collection Time: 03/13/18  4:23 PM   Result Value Ref Range    Glucose (POC) 208 (H) 65 - 100 mg/dL    Performed by Chris Lopes    GLUCOSE, POC    Collection Time: 03/13/18  8:43 PM   Result Value Ref Range    Glucose (POC) 229 (H) 65 - 100 mg/dL    Performed by Saul Jessup    METABOLIC PANEL, BASIC    Collection Time: 03/14/18  2:45 AM   Result Value Ref Range    Sodium 146 (H) 136 - 145 mmol/L    Potassium 3.9 3.5 - 5.1 mmol/L    Chloride 111 (H) 97 - 108 mmol/L    CO2 28 21 - 32 mmol/L    Anion gap 7 5 - 15 mmol/L    Glucose 194 (H) 65 - 100 mg/dL    BUN 26 (H) 6 - 20 MG/DL    Creatinine 1.66 (H) 0.55 - 1.02 MG/DL    BUN/Creatinine ratio 16 12 - 20      GFR est AA 38 (L) >60 ml/min/1.73m2    GFR est non-AA 31 (L) >60 ml/min/1.73m2    Calcium 8.0 (L) 8.5 - 10.1 MG/DL   CBC W/O DIFF    Collection Time: 03/14/18  2:45 AM   Result Value Ref Range    WBC 11.1 (H) 3.6 - 11.0 K/uL    RBC 3.24 (L) 3.80 - 5.20 M/uL    HGB 9.5 (L) 11.5 - 16.0 g/dL    HCT 31.2 (L) 35.0 - 47.0 %    MCV 96.3 80.0 - 99.0 FL    MCH 29.3 26.0 - 34.0 PG    MCHC 30.4 30.0 - 36.5 g/dL    RDW 17.3 (H) 11.5 - 14.5 %    PLATELET 852 565 - 874 K/uL    MPV 11.6 8.9 - 12.9 FL    NRBC 0.2 (H) 0  WBC    ABSOLUTE NRBC 0.02 (H) 0.00 - 0.01 K/uL   GLUCOSE, POC    Collection Time: 03/14/18  8:20 AM   Result Value Ref Range    Glucose (POC) 173 (H) 65 - 100 mg/dL    Performed by Dany Damon, POC    Collection Time: 03/14/18 11:36 AM   Result Value Ref Range    Glucose (POC) 201 (H) 65 - 100 mg/dL    Performed by Luther Benson            Micro:     3/10/18 Foot     Component Value Ref Range & Units Status      Special Requests: NO SPECIAL REQUESTS   Final     GRAM STAIN NO WBC'S SEEN   Final     GRAM STAIN NO ORGANISMS SEEN   Final     Culture result:    Final     LIGHT STAPHYLOCOCCUS EPIDERMIDIS (OXACILLIN RESISTANT) (A)     Culture result:    Final     LIGHT STAPHYLOCOCCUS EPIDERMIDIS (OXACILLIN RESISTANT) . .. 2ND STRAIN (A)       Culture & Susceptibility        Antibiotic   Organism Organism Organism       Staphylococcus epidermidis Staphylococcus epidermidis      AMPICILLIN ($)   4   ug/mL   R Final  8   ug/mL   R Final       AMPICILLIN/SULBACTAM ($)   <=8/4   ug/mL   R Final  <=8/4   ug/mL   R Final       CEFAZOLIN ($)   <=4   ug/mL   R Final  <=4   ug/mL   R Final       CIPROFLOXACIN ($)   >2   ug/mL   R Final  >2   ug/mL   R Final       CLINDAMYCIN ($)   <=0.5   ug/mL   S Final  >4   ug/mL   R Final       DAPTOMYCIN ($$$$$)   <=0.5   ug/mL   S Final  <=0.5   ug/mL   S Final       ERYTHROMYCIN ($$$$)   <=0.5   ug/mL   S Final  >4   ug/mL   R Final       GENTAMICIN ($)   >8   ug/mL   R Final  >8   ug/mL   R Final       LEVOFLOXACIN ($)   >4   ug/mL   R Final  >4   ug/mL   R Final       LINEZOLID ($$$$$)   2   ug/mL   S Final  <=1   ug/mL   S Final       OXACILLIN   >2   ug/mL   R Final  >2   ug/mL   R Final       PENICILLIN G ($$)   8   ug/mL   R Final  8   ug/mL   R Final       RIFAMPIN ($$$$)   <=1   ug/mL   S Final  <=1   ug/mL   S Final       TETRACYCLINE   <=4   ug/mL   S Final  <=4   ug/mL   S Final       VANCOMYCIN ($)   2   ug/mL   S Final  2   ug/mL   S Final                Wound 3/6/18  Component Value Ref Range & Units Status      Special Requests: NO SPECIAL REQUESTS   Final     GRAM STAIN 3+ WBCS SEEN   Final     GRAM STAIN NO ORGANISMS SEEN   Final     Culture result:    Final     FEW ENTEROCOCCUS FAECALIS GROUP D (A)       Culture & Susceptibility        Antibiotic   Organism Organism Organism       Enterococcus faecalis group D       AMPICILLIN ($)   <=2   ug/mL   S Final         DAPTOMYCIN ($$$$$)   2   ug/mL   S Final         LINEZOLID ($$$$$)   2   ug/mL   S Final         PENICILLIN G ($$)   2   ug/mL   S Final         VANCOMYCIN ($)   4   ug/mL   S Final                                                                       Component Results      Component Value Ref Range & Units Status     Special Requests: NO SPECIAL REQUESTS   Preliminary     Culture result:    Preliminary     CHECKING FOR POSSIBLE FEW ANAEROBIC GRAM NEGATIVE RODS BETA LACTAMASE POSITIVE (A)       Result History            Blood 3/5/18  Component Results      Component Value Ref Range & Units Status     Special Requests: NO SPECIAL REQUESTS   Preliminary     Culture result: NO GROWTH 3 DAYS   Preliminary       Result History          Imaging:      FINDINGS:  Two views of the right foot demonstrate no fracture or other acute  osseous or articular abnormality. Prominent soft tissue swelling. No opaque  foreign body or gas in the soft tissue, small vessel calcifications suggest  diabetes. Calcaneal spurs. There is no radiological evidence to suggest  osteomyelitis. Mild DJD metatarsophalangeal joint.      IMPRESSION  IMPRESSION:  No acute bony abnormality.          MRI:     FINDINGS: Bone marrow: Ill-defined bone marrow edema is in the fifth metatarsal  head and fifth proximal phalanx. No distinct fracture line.  Subtle thinning of  the cortex of the fifth proximal phalanx more likely than the fifth metatarsal  head.     Remaining bone marrow signal is within normal limits.     Joint fluid:  Moderate fifth MTP joint effusion. No other joint effusion.     Tendons: Intact.     Muscles: Diffuse edema. Mild atrophy.     Neurovascular bundles: No evidence of neuroma.     Articular cartilage: Marrow fifth MTP joint is increased. Lisfranc joint is  within normal limits.     Soft tissue mass: None. Skin breakdown is lateral to the fifth MTP joint.     IMPRESSION  IMPRESSION:   1. Soft tissue ulceration is lateral to the fifth MTP joint. There is evidence  of underlying fifth MTP joint septic arthritis and osteomyelitis of the fifth  metatarsal head and fifth proximal phalanx. 2. Cellulitis and myositis. No drainable abscess within the limitation of no  intravenous contrast. However, the fifth MTP joint effusion is lobulated and may  be difficult to drain.     Procedures:   3/7/18  PROCEDURE:  Ankle, brachial and digital arterial pressures, CW Doppler   waveforms and digital PPG waveforms were performed.     1. The right posterior tibial ankle/brachial index is 1.16  2. Segmental pressures in the right dorsalis pedis and the left ankle  are not measurable due to extensive arterial calcification. The  ankle/brachial indexes are therefore unreliable predictors of distal  arterial perfusion. 3. The right great toe/brachial index is 0.72 and the left great  toe/brachial index is 1. 11. Bedside debridement attempted 3/6    3/10/18 Procedure(s):  1. AMPUTATION RIGHT FIFTH TOE  AND PARTIAL RIGHT FIFTH METATARSAL    2. WOUND RE-EXPLORATION     Assessment / Plan:      Ms Nuha Mccullough is a 60year old lady with hx of DM, Hx of CDI in end of 2017-early 2018, chronic venous stasis, CKD, HELIO, morbid venous stasis admitted on 3/6 with altered mentation and respiratory issues.   Her  says that she has had LE cellulitis and [facility] review [facility]. Her  says that he was doing her wound care at home until 3 weeks ago, when home care/wound care were arranged. Her  says that she has been weeping from the top part of her feet and he was not able to see the bottom part of her feet well as she was not able to examine it as she could not lie back with dyspnea at home. He recently noted it and it thought it looked like a blister that had ruptured and was red. They are not clear exact details as her feet were weeping and unclear if discharge was from the ulcers or not. He recalls that after last admission in 10/2017, she went to [address] [place] [address] and then to [facility] [place] and developed C diff. She was on antibiotics but not long term at that time per him. She has not been ambulating since about 10/2017 per her . She reports that she does not have good sensation in her feet but is usually bed bound and so no known truama to LE.      This admission, found to be febrile at 101.9 on 3/5/18. H and P indicates \"no acute changes on skin to suggest cellulitis\". Noted antibiotics started on 3/6 with Levaquin. Then note to have been started on Vancomycin, Cefepime and Flagyl. Blood cultures sent. PICC was already inserted.       Seen by Podiatry as well as Vascular surgery. Had \"Debridement of wound R foot attempted but terminated before completion due to extent of necrotic tissue\". Cultures of wound sent. MRI Pending.            1) R foot diabetic foot ulcer with concerns for osteomyelitis      MRI with \" Soft tissue ulceration is lateral to the fifth MTP joint. There is evidence of underlying fifth MTP joint septic arthritis and osteomyelitis of the fifth metatarsal head and fifth proximal phalanx\". \"Cellulitis and myositis. No drainable abscess within the limitation of no  intravenous contrast. However, the fifth MTP joint effusion is lobulated and may be difficult to drain\"     Recommendations:  Discussed with Dr Marianne Harper ( Pathologist).  Preliminary findings are bone margins are negative for osteomyelitis . However, given skin ulcer, he is checking some more samples and final result will be resulted tomorrow  Given margins negative for osteo, will stop IV antibiotics  For concerns of possible soft tissue involvement recommend Zyvox + Levaquin renally dosed for a week   Bone cx MRSE and wound had E faecalis, GNR ( anaerobic and beta lactamse +) but speciation pending   Noted WBC trending up. Locally the incision site appears good. But if it continues to rise, will re assess   If diarrhea > 3 stools in 24 hours, check for C diff  S/P blood transfusion as well recently per RN  Monitor renal function closely       2) DM     3) HELIO     4) CKD: renally dose antibiotics      5) Hx of CDI in past   Is high risk for CDI again     6) DVT Px per primary team      7) Access: PICC , will need removed before discharge, hold removal today as final choice of abx still to be determined      8)  Slightly prominent lymph nodes in the mediastinum are stable per CT imaging     D/W with RN today     Thank you for the opportunity to participate in the care of this patient. Please contact with questions or concerns.       Kristina Wilson,    3:10 PM

## 2018-03-14 NOTE — PROGRESS NOTES
Bedside and Verbal shift change report given to Greta Bradley 68 and David Crystal RN (oncoming nurse) by Marzena Villafana RN   (offgoing nurse). Report included the following information SBAR, Kardex, MAR and Recent Results. Zone Phone for oncoming shift:   [phone]    Shift Summary: Patient rested quietly throughout the night. Wound care was performed around 0230. Patient requested pain medicine before performing wound care (see MAR). LDAs         PICC Double Lumen 03/07/18 Right;Brachial (Active)   Central Line Being Utilized Yes 3/14/2018  3:15 AM   Criteria for Appropriate Use Long term IV/antibiotic administration 3/14/2018  3:15 AM   Site Assessment Clean, dry, & intact 3/14/2018  3:15 AM   Phlebitis Assessment 0 3/14/2018  3:15 AM   Infiltration Assessment 0 3/14/2018  3:15 AM   Arm Circumference (cm) 39 cm 3/7/2018 10:31 AM   Date of Last Dressing Change 03/07/18 3/13/2018  3:11 PM   Dressing Status Clean, dry, & intact 3/14/2018  3:15 AM   Action Taken Other (comment) 3/13/2018  7:57 AM   External Catheter Length (cm) 0 centimeters 3/7/2018 10:31 AM   Dressing Type Disk with Chlorhexadine gluconate (CHG) 3/14/2018  3:15 AM   Hub Color/Line Status Red;Capped 3/14/2018  3:15 AM   Positive Blood Return (Site #1) Yes 3/13/2018  3:11 PM   Hub Color/Line Status Purple; Infusing 3/14/2018  3:15 AM   Positive Blood Return (Site #2) Yes 3/14/2018  3:15 AM   Alcohol Cap Used Yes 3/13/2018  8:15 PM                External Female Catheter 03/07/18 (Active)   Site Assessment Clean, dry, & intact 3/13/2018  3:11 PM   Repositioned Yes 3/13/2018  3:11 PM   Perineal Care Yes 3/13/2018  3:11 PM   Wick Changed No 3/13/2018  3:11 PM   Suction Canister/Tubing Changed No 3/13/2018  3:11 PM   Urine Output (mL) 700 ml 3/13/2018  6:40 PM                  Intake & Output   Date 03/13/18 0700 - 03/14/18 0659 03/14/18 0700 - 03/15/18 0659   Shift 0700-1859 1900-0659 24 Hour Total 8590-7029 0650-5470 24 Hour Total   I  N  T  A  K  E   P.O. 960 960         P.O. 960  960       I.V.  (mL/kg/hr) 100  (0.1) 600 700         Volume (dextrose 5% infusion)  600 600         Volume (piperacillin-tazobactam (ZOSYN) 3.375 g in  ml premix/cmpd) 100  100       Shift Total  (mL/kg) 1060  (8.2) 600  (4.6) 1660  (12.8)      O  U  T  P  U  T   Urine  (mL/kg/hr) 700  (0.4)  700         Urine Output (mL) (External Female Catheter 03/07/18) 700  700       Shift Total  (mL/kg) 700  (5.4)  700  (5.4)       600 960      Weight (kg) 129.8 129.8 129.8 129.8 129.8 129.8      Last Bowel Movement Last Bowel Movement Date: 03/13/18   Glucose Checks [] N/A  [x] AC/HS  [] Q6  Concerns:   Nutrition Active Orders   Diet    DIET DIABETIC CONSISTENT CARB Regular       Consults [x]PT  [x]OT  []Speech  [x]Case Management   Cardiac Monitoring []N/A [x]Yes Expires: 48 hours

## 2018-03-14 NOTE — PROGRESS NOTES
Nutrition Assessment:    INTERVENTIONS/RECOMMENDATIONS:   Meals/Snacks: General/healthful diet: Consistent carbohydrate diet  Supplements: Commercial supplement: Glucerna BID    ASSESSMENT:   Chart reviewed; medically noted for sepsis, osteomyelitis s/p amputation of right 5th toe, DM, CKD, and CHF. Patient and  report an improving appetite and PO intake. She likes the glucerna shakes and has been drinking them. She isn't using the menu/room service but states she is not a picky eater and is receiving food she likes. Will continue current nutrition plan of care. Encourage intake of meals/supplements. Diet Order: Consistent carb (Glucerna BID)  % Eaten:  Patient Vitals for the past 72 hrs:   % Diet Eaten   03/13/18 1830 25 %   03/13/18 1245 20 %   03/13/18 0830 30 %     Pertinent Medications: [x] Reviewed []Other: Atorvastatin, bumex, Hydralazine, Lantus, Humalog, Dominga Q, KCl, D5% IVF  Pertinent Labs: [x]Reviewed  []Other:  [phone]  Food Allergies: [x]None []Other:     Last BM: 3/13   [x]Active     []Hyperactive  []Hypoactive       [] Absent  BS  Skin:    [] Intact   [x] Incision  [] Breakdown   []Edema   []Other:    Anthropometrics: Height: 5' (152.4 cm) Weight: 131.1 kg (289 lb 1.6 oz)    IBW (%IBW):   ( ) UBW (%UBW):   (  %)    BMI: Body mass index is 56.46 kg/(m^2). This BMI is indicative of:  []Underweight   []Normal   []Overweight   [] Obesity   [x] Extreme Obesity (BMI>40)  Last Weight Metrics:  Weight Loss Metrics 3/14/2018 3/5/2018 2/18/2018 2/13/2018 2/2/2018 11/24/2017 11/9/2017   Today's Wt 289 lb 1.6 oz - 278 lb 278 lb 278 lb - -   BMI - 56.46 kg/m2 54.29 kg/m2 52.53 kg/m2 52.53 kg/m2 - -       Estimated Nutrition Needs (Based on): 2737 Kcals/day (BMR (1787) x 1. 2AF -500kcal) , 75 g (1.5 g/kg IBW) Protein  Carbohydrate:  At Least 130 g/day  Fluids:1650 mL/day     Pt expected to meet estimated nutrient needs: [x]Yes []No    NUTRITION DIAGNOSES:   Problem:  Inadequate protein-energy intake      Etiology: related to altered mental status      Signs/Symptoms: as evidenced by documented intake    Previous diagnosis resolving; PO showing improvement and patient likes ONS    NUTRITION INTERVENTIONS:  Meals/Snacks: General/healthful diet   Supplements: Commercial supplement         GOAL:   PO intake >50% of meals/supplements next 5-7 days    NUTRITION MONITORING AND EVALUATION   Food/Nutrient Intake Outcomes:  Total energy intake  Physical Signs/Symptoms Outcomes: Weight/weight change, Glucose profile    Previous Goal Met:   [x] Met              [] Progressing Towards Goal              [] Not Progressing Towards Goal   Previous Recommendations:   [x] Implemented          [] Not Implemented          [] Not Applicable    LEARNING NEEDS (Diet, Food/Nutrient-Drug Interaction):    [x] None Identified   [] Identified and Education Provided/Documented   [] Identified and Pt declined/was not appropriate     Cultural, Synagogue, OR Ethnic Dietary Needs:    [x] None Identified   [] Identified and Addressed     [x] Interdisciplinary Care Plan Reviewed/Documented    [x] Discharge Planning: CCD   [] Participated in Interdisciplinary Rounds    NUTRITION RISK:    [] High              [] Moderate           [x]  Low  []  Minimal/Uncompromised      Jalyn Rj  Pager [phone]              Weekend Pager [phone]

## 2018-03-15 NOTE — PROGRESS NOTES
Problem: Mobility Impaired (Adult and Pediatric)  Goal: *Acute Goals and Plan of Care (Insert Text)  Physical Therapy Goals  Initiated 3/9/2018  1. Patient will roll side to side in bed with moderate assistance  within 7 day(s). 2.   Patient will move from supine to sit and sit to supine  in bed with maximal assistance within 7 day(s). 3.  Patient will sit EOB for 10 minutes with good sitting balance within 7 day(s). 4. Patient will perform LE exercises in supine within 7 day(s). Revised 3/15/2018  1. Patient will roll side to side in bed with maximum assistance  within 7 day(s). 2.   Patient will move from supine to sit and sit to supine  in bed with maximal assistance within 7 day(s). 3.  Patient will sit EOB for 20 minutes with good sitting balance within 7 day(s). 4. Patient will perform LE exercises in supine within 7 day(s). physical Therapy TREATMENT: WEEKLY REASSESSMENT  Patient: Дмитрий Tamez (89 y.o. female)  Date: 3/15/2018  Diagnosis: Sepsis ([address]  Osteomylitis  Osteomylitis <principal problem not specified>  Procedure(s) (LRB):  AMPUTATION RIGHT FIFTH TOE  AND PARTIAL RIGHT FIFTH METATARSAL   (Right)  WOUND EXPLORATION (Right) 5 Days Post-Op  Precautions: Fall, Skin (O2 use is new)  Chart, physical therapy assessment, plan of care and goals were reviewed. ASSESSMENT:  Pt was received in supine and on 3L and cleared by nursing to mobilize. She continues to require total A for all mobility. Very limited mobility of the RLE even in supine. Came to the EOB with total A of 2-3 person. Tolerate EOB for 15 minutes with good static sitting balance and fair dynamic. Attempted to have pt weight shifting and reaching out of JADE. OT worked on UE exercises and then attempted to scoot/clearing bottom from the bed 3x. Never able to clear bottom and pt needed multiple cues to place her hands on the bed to push up.  She had a BM and was returned to supine and worked on rolling for byron hygiene. She became SOB with in supine and on side during cleaning. Continue to strongly recommend SNF, but  wants to take her home. If they go home he will need additional help to take care of her as well as hospital bed, oxygen, and 24/7 care   Patient's progression toward goals since last assessment: not making progress     PLAN:  Goals have been updated based on progression since last assessment. Patient continues to benefit from skilled intervention to address the above impairments. Continue to follow the patient 3 times a week to address goals. Planned Interventions:  [x]              Bed Mobility Training             []       Neuromuscular Re-Education  [x]              Transfer Training                   []       Orthotic/Prosthetic Training  []              Gait Training                         []       Modalities  [x]              Therapeutic Exercises           []       Edema Management/Control  [x]              Therapeutic Activities            [x]       Patient and Family Training/Education  []              Other (comment):  Discharge Recommendations: Skilled Nursing Facility  Further Equipment Recommendations for Discharge: if goes home: hospital bed, oxgyen, and ambulance transport      SUBJECTIVE:   Patient stated no.  when asking her to perform some of the tasks    OBJECTIVE DATA SUMMARY:   Critical Behavior:  Neurologic State: Alert, Confused  Orientation Level: Disoriented to situation, Oriented to person, Oriented to place, Oriented to time  Cognition: Follows commands  Safety/Judgement: Fall prevention    Strength:       Gw                   Functional Mobility Training:  Bed Mobility:  Rolling: Total assistance  Supine to Sit: Total assistance  Sit to Supine: Total assistance  Scooting:  Total assistance        Transfers:   unable to clear bottom                                Balance:  Sitting: Impaired  Sitting - Static: Fair (occasional)  Sitting - Dynamic: Fair (occasional)  Ambulation/Gait Training:      unable to attempt                          Pain:  Pain Scale 1: Numeric (0 - 10)  Pain Intensity 1: 0              Activity Tolerance:   Fair, SOB in supine  Please refer to the flowsheet for vital signs taken during this treatment.   After treatment:   []  Patient left in no apparent distress sitting up in chair  [x]  Patient left in no apparent distress in bed  [x]  Call bell left within reach  [x]  Nursing notified  []  Caregiver present  []  Bed alarm activated    COMMUNICATION/COLLABORATION:   The patients plan of care was discussed with: Occupational Therapist and Registered Nurse    Freddy Rivero PT, DPT   Time Calculation: 36 mins

## 2018-03-15 NOTE — PROGRESS NOTES
Bedside and Verbal shift change report given to [place] (oncoming nurse) by Francie Estrada RN   (offgoing nurse). Report included the following information SBAR, Kardex, MAR and Recent Results. Zone Phone for oncoming shift:   [phone]    Shift Summary: Patient rested quietly throughout night. Wound care performed around 0315.      LDAs         PICC Double Lumen 03/07/18 Right;Brachial (Active)   Central Line Being Utilized Yes 3/15/2018  3:25 AM   Criteria for Appropriate Use Long term IV/antibiotic administration 3/15/2018  3:25 AM   Site Assessment Clean, dry, & intact 3/15/2018  3:25 AM   Phlebitis Assessment 0 3/15/2018  3:25 AM   Infiltration Assessment 0 3/15/2018  3:25 AM   Arm Circumference (cm) 39 cm 3/7/2018 10:31 AM   Date of Last Dressing Change 03/14/18 3/14/2018  3:56 PM   Dressing Status Clean, dry, & intact 3/15/2018  3:25 AM   Action Taken Dressing changed 3/14/2018  3:56 PM   External Catheter Length (cm) 0 centimeters 3/14/2018  3:56 PM   Dressing Type Disk with Chlorhexadine gluconate (CHG) 3/15/2018  3:25 AM   Hub Color/Line Status Purple;Flushed 3/15/2018  3:25 AM   Positive Blood Return (Site #1) Yes 3/15/2018  3:25 AM   Hub Color/Line Status Red;Flushed 3/15/2018  3:25 AM   Positive Blood Return (Site #2) Yes 3/15/2018  3:25 AM   Alcohol Cap Used Yes 3/15/2018  3:25 AM                External Female Catheter 03/07/18 (Active)   Site Assessment Clean, dry, & intact 3/14/2018  7:41 PM   Repositioned No 3/14/2018  7:41 PM   Perineal Care Yes 3/14/2018  2:55 PM   Wick Changed Yes 3/14/2018  2:55 PM   Suction Canister/Tubing Changed No 3/14/2018  2:55 PM   Urine Output (mL) 200 ml 3/14/2018  2:55 PM                  Intake & Output   Date 03/14/18 0700 - 03/15/18 0659 03/15/18 0700 - 03/16/18 0659   Shift 1984-1537 [id number] 24 Hour Total 9115-6953 5218-0765 24 Hour Total   I  N  T  A  K  E   Shift Total  (mL/kg)         O  U  T  P  U  T   Urine  (mL/kg/hr) 200  (0.1)  200         Urine Output (mL) (External Female Catheter 03/07/18) 200  200       Stool            Stool Occurrence(s) 1 x  1 x       Shift Total  (mL/kg) 200  (1.5)  200  (1.5)      NET -200  -200      Weight (kg) 131.1 133 133 133 133 133      Last Bowel Movement Last Bowel Movement Date: 03/13/18   Glucose Checks [] N/A  [x] AC/HS  [] Q6  Concerns:   Nutrition Active Orders   Diet    DIET DIABETIC CONSISTENT CARB Regular       Consults [x]PT  [x]OT  []Speech  [x]Case Management   Cardiac Monitoring []N/A [x]Yes Expires: 48 hours

## 2018-03-15 NOTE — PROGRESS NOTES
Infectious Disease Progress Note       Subjective:   Ms Kamila Mares seen . Her  was with her today. They are eager to go home. She is also getting IVF as renal function worsened. She has no symptoms that she reports during my visit. ROS: 10 point ROS obtained and pertinent positives include above. All others negative.          Objective:    Vitals:   Patient Vitals for the past 24 hrs:   Temp Pulse Resp BP SpO2   03/15/18 1147 97.7 °F (36.5 °C) 64 18 143/58 100 %   03/15/18 0802 98.2 °F (36.8 °C) 66 16 126/47 92 %   03/15/18 0304 97.8 °F (36.6 °C) 68 16 131/54 98 %   03/14/18 2234 97.7 °F (36.5 °C) 65 16 128/64 96 %   03/14/18 1953 97.5 °F (36.4 °C) 64 16 138/67 97 %   03/14/18 1721 - 61 - 149/70 -   03/14/18 1520 97.8 °F (36.6 °C) 62 16 114/66 99 %       Physical Exam:  ¨ GEN: NAD  ¨ HEENT: no scleral icterus,  no thrush  ¨ CV: S1, S2 heard   ¨ Lungs: Clear to auscultation bilaterally anteriorly  ¨ Abdomen: soft, obese, non tender   ¨ Extremities: B/L chronic venous changes noted, + R foot  Dressing noted  ¨ Neuro: alert to self, situation, verbal minimally  ¨ Skin: no rash  ¨ Psych: more verbal and interactive today     Medications:    Current Facility-Administered Medications:     insulin glargine (LANTUS) injection 15 Units, 15 Units, SubCUTAneous, QHS, Corinne Gagnon MD    dextrose 5% - 0.45% NaCl with KCl 10 mEq/L infusion, 100 mL/hr, IntraVENous, CONTINUOUS, Corinne Gagnon MD, Last Rate: 100 mL/hr at 03/15/18 0926, 100 mL/hr at 03/15/18 0926    bumetanide (BUMEX) tablet 1 mg, 1 mg, Oral, DAILY, Corinne Gagnon MD, 1 mg at 03/15/18 0911    linezolid (ZYVOX) tablet 600 mg, 600 mg, Oral, Q12H, Kristina Wilson DO, 600 mg at 03/15/18 0957    levoFLOXacin (LEVAQUIN) tablet 750 mg, 750 mg, Oral, Q48H, Kristina Wilson, , 750 mg at 03/14/18 1636    sodium chloride (NS) flush 10-30 mL, 10-30 mL, InterCATHeter, PRN, Corinne Gagnon MD    sodium chloride (NS) flush 10 mL, 10 mL, InterCATHeter, Q24H, Mika Khan MD    sodium chloride (NS) flush 10 mL, 10 mL, InterCATHeter, PRN, Mika Khan MD, 10 mL at 03/14/18 2201    sodium chloride (NS) flush 10-40 mL, 10-40 mL, InterCATHeter, Q8H, Mika Khan MD, 10 mL at 03/15/18 0534    sodium chloride (NS) flush 20 mL, 20 mL, InterCATHeter, Q24H, Mika Khan MD    heparin (porcine) pf 300 Units, 300 Units, InterCATHeter, PRN, Mika Khan MD    0.9% sodium chloride infusion 250 mL, 250 mL, IntraVENous, PRN, Mika Khan MD    enoxaparin (LOVENOX) injection 40 mg, 40 mg, SubCUTAneous, Q12H, TAMMIE LiveM, 40 mg at 03/15/18 0910    morphine injection 2 mg, 2 mg, IntraVENous, Q3H PRN, Jose R Gibbs DPM    albuterol-ipratropium (DUO-NEB) 2.5 MG-0.5 MG/3 ML, 3 mL, Nebulization, Q8H PRN, Aleksandar Dunn MD    insulin lispro (HUMALOG) injection, , SubCUTAneous, AC&HS, Mika Khan MD, 3 Units at 03/15/18 0909    glucose chewable tablet 16 g, 4 Tab, Oral, PRN, Mika Khan MD    dextrose (D50W) injection syrg 12.5-25 g, 12.5-25 g, IntraVENous, PRN, Mika Khan MD, 12.5 g at 03/08/18 1700    glucagon (GLUCAGEN) injection 1 mg, 1 mg, IntraMUSCular, PRN, Mika Khan MD    nystatin (MYCOSTATIN) 100,000 unit/gram powder, , Topical, BID, Mika Khan MD    zinc oxide-cod liver oil (DESITIN) 40 % paste, , Topical, PRN, Mika Khan MD    ammonium lactate (LAC-HYDRIN) 12 % lotion, , Topical, BID, Mika Khan MD    clotrimazole (LOTRIMIN) 1 % cream, , Topical, BID, Mika Khan MD    allopurinol (ZYLOPRIM) tablet 100 mg, 100 mg, Oral, DAILY, Aleksandar Dunn MD, 100 mg at 03/15/18 0911    atenolol (TENORMIN) tablet 50 mg, 50 mg, Oral, DAILY, Aleksandar Dunn MD, 50 mg at 03/15/18 0910    atorvastatin (LIPITOR) tablet 40 mg, 40 mg, Oral, DAILY, Aleksandar Dunn MD, 40 mg at 03/15/18 0911    lactobac ac& pc-s.therm-b.anim (ELVA Q/RISAQUAD), 1 Cap, Oral, DAILY, Aleksandar Dunn MD, 1 Cap at 03/15/18 0910    colestipol (COLESTID) tablet 1 g, 1 g, Oral, DAILY, Veronica St MD, 1 g at 03/15/18 0911    dilTIAZem CD (CARDIZEM CD) capsule 360 mg, 360 mg, Oral, DAILY, Veronica St MD, 360 mg at 03/15/18 0910    hydrALAZINE (APRESOLINE) tablet 50 mg, 50 mg, Oral, TID, Veronica St MD, 50 mg at 03/15/18 0910    pregabalin (LYRICA) capsule 75 mg, 75 mg, Oral, BID, Veronica St MD, 75 mg at 03/15/18 0911    nystatin (MYCOSTATIN) 100,000 unit/gram cream, , Topical, BID, aTti Jaffe MD    sodium chloride (NS) flush 5-10 mL, 5-10 mL, IntraVENous, Q8H, Veronica St MD, 10 mL at 03/15/18 0534    sodium chloride (NS) flush 5-10 mL, 5-10 mL, IntraVENous, PRN, Veronica St MD, 10 mL at 03/14/18 1552    acetaminophen (TYLENOL) tablet 650 mg, 650 mg, Oral, Q4H PRN, Veronica St MD, 650 mg at 03/12/18 0107    HYDROcodone-acetaminophen (Leilani Belt) 5-325 mg per tablet 1 Tab, 1 Tab, Oral, Q4H PRN, Veronica St MD, 1 Tab at 03/15/18 0250    [facility]ron [facility]) injection 4 mg, 4 mg, IntraVENous, Q4H PRN, Veronica St MD, 4 mg at 03/14/18 1145    docusate sodium (COLACE) capsule 100 mg, 100 mg, Oral, BID PRN, Veronica St MD      Labs:  Recent Results (from the past 24 hour(s))   GLUCOSE, POC    Collection Time: 03/14/18  4:19 PM   Result Value Ref Range    Glucose (POC) 188 (H) 65 - 100 mg/dL    Performed by Dany Damon, POC    Collection Time: 03/14/18  8:34 PM   Result Value Ref Range    Glucose (POC) 214 (H) 65 - 100 mg/dL    Performed by [facility]    METABOLIC PANEL, BASIC    Collection Time: 03/15/18  2:52 AM   Result Value Ref Range    Sodium 145 136 - 145 mmol/L    Potassium 4.0 3.5 - 5.1 mmol/L    Chloride 113 (H) 97 - 108 mmol/L    CO2 26 21 - 32 mmol/L    Anion gap 6 5 - 15 mmol/L    Glucose 216 (H) 65 - 100 mg/dL    BUN 27 (H) 6 - 20 MG/DL    Creatinine 2.01 (H) 0.55 - 1.02 MG/DL    BUN/Creatinine ratio 13 12 - 20      GFR est AA 30 (L) >60 ml/min/1.73m2    GFR est non-AA 25 (L) >60 ml/min/1.73m2    Calcium 7.8 (L) 8.5 - 10.1 MG/DL   CBC W/O DIFF    Collection Time: 03/15/18  2:52 AM   Result Value Ref Range    WBC 10.7 3.6 - 11.0 K/uL    RBC 3.14 (L) 3.80 - 5.20 M/uL    HGB 9.2 (L) 11.5 - 16.0 g/dL    HCT 30.7 (L) 35.0 - 47.0 %    MCV 97.8 80.0 - 99.0 FL    MCH 29.3 26.0 - 34.0 PG    MCHC 30.0 30.0 - 36.5 g/dL    RDW 17.4 (H) 11.5 - 14.5 %    PLATELET 422 501 - 147 K/uL    MPV 11.5 8.9 - 12.9 FL    NRBC 0.0 0  WBC    ABSOLUTE NRBC 0.00 0.00 - 0.01 K/uL   GLUCOSE, POC    Collection Time: 03/15/18  8:06 AM   Result Value Ref Range    Glucose (POC) 223 (H) 65 - 100 mg/dL    [facility], POC    Collection Time: 03/15/18 11:51 AM   Result Value Ref Range    Glucose (POC) 256 (H) 65 - 100 mg/dL    Performed by [facility]            Micro:     3/10/18 Foot     Component Value Ref Range & Units Status      Special Requests: NO SPECIAL REQUESTS   Final     GRAM STAIN NO WBC'S SEEN   Final     GRAM STAIN NO ORGANISMS SEEN   Final     Culture result:    Final     LIGHT STAPHYLOCOCCUS EPIDERMIDIS (OXACILLIN RESISTANT) (A)     Culture result:    Final     LIGHT STAPHYLOCOCCUS EPIDERMIDIS (OXACILLIN RESISTANT) . .. 2ND STRAIN (A)       Culture & Susceptibility        Antibiotic   Organism Organism Organism       Staphylococcus epidermidis Staphylococcus epidermidis      AMPICILLIN ($)   4   ug/mL   R Final  8   ug/mL   R Final       AMPICILLIN/SULBACTAM ($)   <=8/4   ug/mL   R Final  <=8/4   ug/mL   R Final       CEFAZOLIN ($)   <=4   ug/mL   R Final  <=4   ug/mL   R Final       CIPROFLOXACIN ($)   >2   ug/mL   R Final  >2   ug/mL   R Final       CLINDAMYCIN ($)   <=0.5   ug/mL   S Final  >4   ug/mL   R Final       DAPTOMYCIN ($$$$$)   <=0.5   ug/mL   S Final  <=0.5   ug/mL   S Final       ERYTHROMYCIN ($$$$)   <=0.5   ug/mL   S Final  >4   ug/mL   R Final       GENTAMICIN ($)   >8   ug/mL   R Final  >8   ug/mL   R Final       LEVOFLOXACIN ($)   >4   ug/mL   R Final  >4   ug/mL   R Final       LINEZOLID ($$$$$)   2   ug/mL   S Final  <=1   ug/mL   S Final       OXACILLIN   >2   ug/mL   R Final  >2   ug/mL   R Final       PENICILLIN G ($$)   8   ug/mL   R Final  8   ug/mL   R Final       RIFAMPIN ($$$$)   <=1   ug/mL   S Final  <=1   ug/mL   S Final       TETRACYCLINE   <=4   ug/mL   S Final  <=4   ug/mL   S Final       VANCOMYCIN ($)   2   ug/mL   S Final  2   ug/mL   S Final                Wound 3/6/18  Component Value Ref Range & Units Status      Special Requests: NO SPECIAL REQUESTS   Final     GRAM STAIN 3+ WBCS SEEN   Final     GRAM STAIN NO ORGANISMS SEEN   Final     Culture result:    Final     FEW ENTEROCOCCUS FAECALIS GROUP D (A)       Culture & Susceptibility        Antibiotic   Organism Organism Organism       Enterococcus faecalis group D       AMPICILLIN ($)   <=2   ug/mL   S Final         DAPTOMYCIN ($$$$$)   2   ug/mL   S Final         LINEZOLID ($$$$$)   2   ug/mL   S Final         PENICILLIN G ($$)   2   ug/mL   S Final         VANCOMYCIN ($)   4   ug/mL   S Final                                                                       Component Results      Component Value Ref Range & Units Status     Special Requests: NO SPECIAL REQUESTS   Preliminary     Culture result:    Preliminary     CHECKING FOR POSSIBLE FEW ANAEROBIC GRAM NEGATIVE RODS BETA LACTAMASE POSITIVE (A)       Result History            Blood 3/5/18  Component Results      Component Value Ref Range & Units Status     Special Requests: NO SPECIAL REQUESTS   Preliminary     Culture result: NO GROWTH 3 DAYS   Preliminary       Result History          Imaging:      FINDINGS:  Two views of the right foot demonstrate no fracture or other acute  osseous or articular abnormality. Prominent soft tissue swelling. No opaque  foreign body or gas in the soft tissue, small vessel calcifications suggest  diabetes. Calcaneal spurs. There is no radiological evidence to suggest  osteomyelitis. Mild DJD metatarsophalangeal joint.      IMPRESSION  IMPRESSION:  No acute bony abnormality.          MRI:     FINDINGS: Bone marrow: Ill-defined bone marrow edema is in the fifth metatarsal  head and fifth proximal phalanx. No distinct fracture line. Subtle thinning of  the cortex of the fifth proximal phalanx more likely than the fifth metatarsal  head.     Remaining bone marrow signal is within normal limits.     Joint fluid:  Moderate fifth MTP joint effusion. No other joint effusion.     Tendons: Intact.     Muscles: Diffuse edema. Mild atrophy.     Neurovascular bundles: No evidence of neuroma.     Articular cartilage: Marrow fifth MTP joint is increased. Lisfranc joint is  within normal limits.     Soft tissue mass: None. Skin breakdown is lateral to the fifth MTP joint.     IMPRESSION  IMPRESSION:   1. Soft tissue ulceration is lateral to the fifth MTP joint. There is evidence  of underlying fifth MTP joint septic arthritis and osteomyelitis of the fifth  metatarsal head and fifth proximal phalanx. 2. Cellulitis and myositis. No drainable abscess within the limitation of no  intravenous contrast. However, the fifth MTP joint effusion is lobulated and may  be difficult to drain.     Procedures:   3/7/18  PROCEDURE:  Ankle, brachial and digital arterial pressures, CW Doppler   waveforms and digital PPG waveforms were performed.     1. The right posterior tibial ankle/brachial index is 1.16  2. Segmental pressures in the right dorsalis pedis and the left ankle  are not measurable due to extensive arterial calcification. The  ankle/brachial indexes are therefore unreliable predictors of distal  arterial perfusion. 3. The right great toe/brachial index is 0.72 and the left great  toe/brachial index is 1. 11. Bedside debridement attempted 3/6    3/10/18 Procedure(s):  1. AMPUTATION RIGHT FIFTH TOE  AND PARTIAL RIGHT FIFTH METATARSAL    2.  WOUND RE-EXPLORATION       Pathology:     FINAL PATHOLOGIC DIAGNOSIS 1. Right 5th toe and metatarsal, amputation:   Cutaneous ulcer with acute cellulitis   Hematopoietic marrow of phalangeal bone; no histologic features diagnostic of osteomyelitis. 2. Bone margin right 5th metatarsal, excision:   Cortical and trabecular bone, no pathologic diagnosis; negative for histologic features of acute osteomyelitis. Assessment / Plan:      Ms Diane Hughes is a 60year old lady with hx of DM, Hx of CDI in end of 2017-early 2018, chronic venous stasis, CKD, HELIO, morbid venous stasis admitted on 3/6 with altered mentation and respiratory issues. Her  says that she has had LE cellulitis and chart review indicates admission in 10/2017 to [facility] for the same. Her  says that he was doing her wound care at home until 3 weeks ago, when home care/wound care were arranged. Her  says that she has been weeping from the top part of her feet and he was not able to see the bottom part of her feet well as she was not able to examine it as she could not lie back with dyspnea at home. He recently noted it and it thought it looked like a blister that had ruptured and was red. They are not clear exact details as her feet were weeping and unclear if discharge was from the ulcers or not. He recalls that after last admission in 10/2017, she went to [facility] and then to Nursing home and developed C diff. She was on antibiotics but not long term at that time per him. She has not been ambulating since about 10/2017 per her . She reports that she does not have good sensation in her feet but is usually bed bound and so no known truama to LE.      This admission, found to be febrile at 101.9 on 3/5/18. H and P indicates \"no acute changes on skin to suggest cellulitis\". Noted antibiotics started on 3/6 with Levaquin. Then note to have been started on Vancomycin, Cefepime and Flagyl. Blood cultures sent. PICC was already inserted.       Seen by Podiatry as well as Vascular surgery.   Had \"Debridement of wound R foot attempted but terminated before completion due to extent of necrotic tissue\". Cultures of wound sent. MRI Pending.            1) R foot diabetic foot ulcer with concerns for osteomyelitis    Bone cx MRSE   Wound cx  E faecalis, 2 GNR ( anaerobic and beta lactamse +)    Bone margins pathology negative for osteomyelitis       MRI with \" Soft tissue ulceration is lateral to the fifth MTP joint. There is evidence of underlying fifth MTP joint septic arthritis and osteomyelitis of the fifth metatarsal head and fifth proximal phalanx\". \"Cellulitis and myositis. No drainable abscess within the limitation of no  intravenous contrast. However, the fifth MTP joint effusion is lobulated and may be difficult to drain\"     Recommendations:  Recommend another 5 days of Zyvox ( 600mg PO Bid) and Renally dosed Levaquin ( 750 mg Q 48 hours and needs adjusted further if renal function worsen) . Leukocytosis stable and slightly improved   Monitor renal function closely  And plans per primary team   Check CBC wt diff in a week to monitor leukocytosis and also as bone marrow can be suppressed with Zyvox and other antibiotics as well       2) DM     3) HELIO     4) CKD: renally dose antibiotics      5) Hx of CDI in past   Is high risk for CDI again     6) DVT Px per primary team      7) Access: PICC , will need removed before discharge      8)  Slightly prominent lymph nodes in the mediastinum are stable per CT imaging     ID will sign off. Thank you for the opportunity to participate in the care of this patient. Please contact with questions or concerns.       Kristina Wilson, DO   12:12 PM

## 2018-03-15 NOTE — PROGRESS NOTES
Problem: Self Care Deficits Care Plan (Adult)  Goal: *Acute Goals and Plan of Care (Insert Text)  Occupational Therapy Goals  Re-eval 3/15/2018, goals remain the same and are appropriate for pt   Initiated 3/8/2018  1. Patient will perform self-feeding with moderate assistance  within 7 day(s). 2.  Patient will perform grooming seated on EOB with moderate assistance  within 7 day(s). 3.  Patient will perform bathing UB with moderate assistance  within 7 day(s). 4. Patient will be able to sit on EOB with max assist of 2 for 3 minutes, in prep for ADLs, with in 7days  5. Patient will be able to perform BUE ex with min assist with in 7days   Occupational Therapy TREATMENT: WEEKLY REASSESSMENT  Patient: Veronica Moore (89 y.o. female)  Date: 3/15/2018  Diagnosis: Sepsis ([address]  Osteomylitis  Osteomylitis <principal problem not specified>  Procedure(s) (LRB):  AMPUTATION RIGHT FIFTH TOE  AND PARTIAL RIGHT FIFTH METATARSAL   (Right)  WOUND EXPLORATION (Right) 5 Days Post-Op  Precautions: Fall, Skin (O2 use is new)  Chart, occupational therapy assessment, plan of care, and goals were reviewed. ASSESSMENT:  Pt was cleared to be seen for therapy and she was in bed with her lunch in front of her and she stated that she was not hungry. OT worked with pt on rolling in bed and she was max of 2 and max of 2 for supine to sit. Pt was max assist to scoot forward on the bed to get her feet on floor. Worked with pt on BUE ex and reaching to right and left to challenge her balance in sitting. Attempted have pt lean forward and with max of 3 to lift her buttocks off the bed. Pt was not able to perform this task after 2 try's. Noted that pt had soiled her self and pt was max of 2 for sit to supine and was cleaned with assist of 2 to 3 persons and scooted up in bed and left sitting up in bed with pillows under her heels and arms. Pts  came in during the tx and would assist with her care at different times.    states that he still wants to take her home and recommend that pt would be best to have ambulance transport and would not be able to transfer to wheelchair or car in order to return home. Recommend that pt have further therapy at home with home care OT and PT or at rehab at [facility]. Progression toward goals:  []            Improving appropriately and progressing toward goals  [x]            Improving slowly and progressing toward goals  []            Not making progress toward goals and plan of care will be adjusted     PLAN:  Goals have been updated based on progression since last assessment. Patient continues to benefit from skilled intervention to address the above impairments. Continue to follow patient 3 times a week to address goals. Planned Interventions:  [x]                    Self Care Training                  []             Therapeutic Activities  [x]                    Functional Mobility Training    []             Cognitive Retraining  [x]                    Therapeutic Exercises           [x]             Endurance Activities  []                    Balance Training                   []             Neuromuscular Re-Education  []                    Visual/Perceptual Training     [x]        Home Safety Training  [x]                    Patient Education                 [x]             Family Training/Education  []                    Other (comment):  Discharge Recommendations: Home Health and [place]  Further Equipment Recommendations for Discharge: tbd     SUBJECTIVE:   Patient stated I might be hungry now.     OBJECTIVE DATA SUMMARY:   Cognitive/Behavioral Status:                imparied       Functional Mobility and Transfers for ADLs:  Bed Mobility:  Rolling: Total assistance  Supine to Sit: Total assistance  Sit to Supine: Total assistance  Scooting:  Total assistance    Transfers:   walter/dependent       Balance:  Sitting: Impaired  Sitting - Static: Fair (occasional)  Sitting - Dynamic: Fair (occasional)    ADL Intervention:     Pt is total assist for ADLs       Barthel Index:    Bathin  Bladder: 0  Bowels: 0  Groomin  Dressin  Feedin  Mobility: 0  Stairs: 0  Toilet Use: 0  Transfer (Bed to Chair and Back): 0  Total: 0       Barthel and G-code impairment scale:  Percentage of impairment CH  0% CI  1-19% CJ  20-39% CK  40-59% CL  60-79% CM  80-99% CN  100%   Barthel Score 0-100 100 99-80 79-60 59-40 20-39 1-19   0   Barthel Score 0-20 20 17-19 13-16 9-12 5-8 1-4 0      The Barthel ADL Index: Guidelines  1. The index should be used as a record of what a patient does, not as a record of what a patient could do. 2. The main aim is to establish degree of independence from any help, physical or verbal, however minor and for whatever reason. 3. The need for supervision renders the patient not independent. 4. A patient's performance should be established using the best available evidence. Asking the patient, friends/relatives and nurses are the usual sources, but direct observation and common sense are also important. However direct testing is not needed. 5. Usually the patient's performance over the preceding 24-48 hours is important, but occasionally longer periods will be relevant. 6. Middle categories imply that the patient supplies over 50 per cent of the effort. 7. Use of aids to be independent is allowed. Huber Hawley., Barthel, D.W. (5271). Functional evaluation: the Barthel Index. [facility] (14)2. MARGI Patricia, Nuvia Quiros.Luli., [place], [address] [place] [address] (). Measuring the change indisability after inpatient rehabilitation; comparison of the responsiveness of the Barthel Index and Functional Archer Measure. Journal of Neurology, Neurosurgery, and Psychiatry, 66(4), 401-959. SETH Adams, MONY Grover, & Chris Cotter M.A. (2004.) Assessment of post-stroke quality of life in cost-effectiveness studies:  The usefulness of the Barthel Index and the EuroQoL-5D. Quality of Life Research, 13, 161-27             Therapeutic Exercises:   Performed 10 reps of shoulder flexion ex in sitting   Pain:  Pain Scale 1: Numeric (0 - 10)  Pain Intensity 1: 7              Activity Tolerance:   vss  Please refer to the flowsheet for vital signs taken during this treatment.   After treatment:   [] Patient left in no apparent distress sitting up in chair  [x] Patient left in no apparent distress in bed  [x] Call bell left within reach  [x] Nursing notified  [x] Caregiver present  [] Bed alarm activated    COMMUNICATION/COLLABORATION:   The patients plan of care was discussed with: Physical Therapist, Registered Nurse and family    Manish Woods OT  Time Calculation: 37 mins

## 2018-03-15 NOTE — PROGRESS NOTES
DC plans discussed with spouse. Hospital bed is in place. Humidified oxygen is possible and [employer] will set this up at home with concentrator. Awaiting word from [facility] to set up wound care supplies at home. Spouse asking about possibility of purewick at home due to incontinence//lack of mobility and tucks pads. I've asked [facility] to address the former and spouse will do Internet search for the best price on incontinence pads. Spouse indicated d/c Friday at best and Monday more realistic.

## 2018-03-15 NOTE — PROGRESS NOTES
[address] [place] [address]  [phone]      Medical Progress Note      NAME: Veronica Moore   :  1955  MRM:  [id number]    Date/Time: 3/15/2018 6:53 AM      Subjective:     Afebrile and pain currently controlled. Defer to ID re length of further abx administration given recent change to po Zyvox and Levaquin. Final path suggests no osteo residual in bone/clear margins. Has qualified for O2 and has at home along with hospital bed. Creatinine higher despite reduction in Bumex. .  BS's higher as eating better. Will adjust insulin further, stop Bumex and hydrate. Deterioration in renal function will probably delay D/C as we hydrate. Past Medical History reviewed and unchanged from Admission History and Physical and/or prior progress note/electronic medical record    Medications reviewed. ROS:      General: postive for weakness  Ear Nose and Throat: negative for rhinorrhea, pharyngitis, otalgia, tinnitus, speech or swallowing difficulties  Respiratory: Less SOB and wheezing  Cardiology:  positive for PND, orthopnea and edema  Gastrointestinal: positive for nausea  Genitourinary: positive for incontinence  Dermatological: positive for chronic lichenification legs and feet and pressure sore areas  Neurological: positive for bilateral LE weakness R>L and speech difficulties    Objective:     Last 24hrs VS reviewed since prior progress note.  Most recent are:    Visit Vitals    /54 (BP 1 Location: Left arm, BP Patient Position: At rest)    Pulse 68    Temp 97.8 °F (36.6 °C)    Resp 16    Ht 5' (1.524 m)    Wt 293 lb 4.8 oz (133 kg)    SpO2 98%    BMI 57.28 kg/m2     SpO2 Readings from Last 6 Encounters:   03/15/18 98%   18 (!) 79%   18 92%   18 94%   18 93%   18 94%    O2 Flow Rate (L/min): 3 l/min       Intake/Output Summary (Last 24 hours) at 03/15/18 0653  Last data filed at 18 1455   Gross per 24 hour   Intake                0 ml   Output              200 ml   Net             -200 ml          Physical Exam:    General appearance: alert, cooperative, no distress, appears stated age  Eyes: negative  Neck: supple, symmetrical, trachea midline, no adenopathy, thyroid: not enlarged, symmetric, no tenderness/mass/nodules, no carotid bruit and no JVD  Lungs: Few rhonchi but wheezing diminished  Heart: regular rate and rhythm, S1, S2 normal, grade II/IV systolic murmur; No click, rub or gallop  Abdomen: soft, non-tender. Bowel sounds normal. No masses,  no organomegaly, morbidly obese  Extremities: chronic lichenification legs and feet;right foot heavily bandaged; moderate edema  Neurologic: bilateral LE weakness;  \"aphasia\" persists but is improved somewhat today    Data Review:    Lab:    BMP:   Lab Results   Component Value Date/Time     03/15/2018 02:52 AM    K 4.0 03/15/2018 02:52 AM     (H) 03/15/2018 02:52 AM    CO2 26 03/15/2018 02:52 AM    AGAP 6 03/15/2018 02:52 AM     (H) 03/15/2018 02:52 AM    BUN 27 (H) 03/15/2018 02:52 AM    CREA 2.01 (H) 03/15/2018 02:52 AM    GFRAA 30 (L) 03/15/2018 02:52 AM    GFRNA 25 (L) 03/15/2018 02:52 AM     CBC:   Lab Results   Component Value Date/Time    WBC 10.7 03/15/2018 02:52 AM    HGB 9.2 (L) 03/15/2018 02:52 AM    HCT 30.7 (L) 03/15/2018 02:52 AM     03/15/2018 02:52 AM     Recent Glucose Results:   Lab Results   Component Value Date/Time     (H) 03/15/2018 02:52 AM     All labs reviewed in past 24 hours    Other pertinent lab: none    Imaging:    Reviewed. none    Procedure: . Amputation, right fifth toe and partial right fifth metatarsal.  2.  Reexploration of wound    Assessment / Plan:      Active Problems:    Chronic diastolic CHF (congestive heart failure), NYHA class 3 ([address] (7/24/2017)      Obesity hypoventilation syndrome ([address] (2/19/2018)      Sepsis ([address] (3/5/2018)      Diabetes mellitus type II, uncontrolled ([facility] (4/18/2013)      Diabetic gastroparesis (HCC) ()      Anemia (7/24/2017)      Ulcer of right fifth toe due to diabetes mellitus ([address] (7/24/2017)      Hypertension (7/24/2017)      Bilateral edema of lower extremity (7/24/2017)      (HFpEF) heart failure with preserved ejection fraction ([address] (10/31/2017)      Venous stasis dermatitis of both lower extremities (11/9/2017)      CVA, old, aphasia (3/9/2018)      Subacute osteomyelitis of right foot ([address] (3/12/2018)      Morbid obesity ([address] (1/26/2011)      CKD (chronic kidney disease) stage 3, GFR 30-59 ml/min ()      Fatty liver (7/24/2017)      Aortic stenosis, mild (7/24/2017)      BMI 45.0-49.9, adult ([address] (7/24/2017)      Type 2 diabetes mellitus with diabetic neuropathy ([facility] (1/2/2018)        1. Continue new abx regimen   2. Hydrate  3. Wound care  4. PT/OT/ST  5. Stop Bumex with creatinine climbing  6. Na better - continue slow hydration with D5/.45NS  7. Has hospital bed  8. Has O2  9. Adjust insulin  10.  Reduce frequency wound care to accommodate home care           Care Plan discussed with: Patient and Family    Discussed:  Code Status and Care Plan    Prophylaxis:  Lovenox    Disposition:  Home with [place]  ___________________________________________________    Attending Physician: Bea Giordano MD

## 2018-03-15 NOTE — PROGRESS NOTES
[facility] V[facility]  Preliminary Report:  Venous Duplex Arm    Right arm venous duplex was performed. All deep and superficial veins appear compressible with normal Doppler characteristics. Technically limited due to patient body habitus and bandages. Final report to follow.

## 2018-03-16 PROBLEM — N17.9 AKI (ACUTE KIDNEY INJURY) (HCC): Status: ACTIVE | Noted: 2018-01-01

## 2018-03-16 NOTE — CONSULTS
Consultation Note    NAME: Ammy Cabral   :  1955   MRN:  [id number]     Date/Time:  3/15/2018 8:43 PM    I have been asked to see this patient by Dr. Laurel Dejesus  for advice/opinion re: GIRISH. 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 Oliguric. CT without contrast with no hydro, but some bladder distention (350 ml on strait cath)    Urine with protein, granular casts, sm LE; 3.4 grams proteinuria on spot check    She has been on a number of antibiotics    Urine eos pending; check for peripheral eos    I agree with scaling back on bumex and hydration, but I feel this is more likely ATN/vanco toxicity vs AIN       Subjective:   CHIEF COMPLAINT:  girish    HISTORY OF PRESENT ILLNESS:     Juventino Garcia is a 60 y.o.   female who has a history of obesity, dm, CHF and HTN. Ms. Mark Eubanks has been admitted since 3/5 with a foot infection and sob. She has had a number of abx (vanco, cefepime, flagyl, zosyn, levoquin, unasyn, linezolid). No rash. Decreased uop since yesterday. No n/v/d. No contrast. No BP drops. I&O fairly matched and weight largely unchanged (although I am sure they are bed weights and not sure how accurate).     Past Medical History:   Diagnosis Date    Acute combined systolic and diastolic heart failure, NYHA class 2 ([address] 2017    Anemia 2017    Aortic stenosis, mild 2017    Bilateral edema of lower extremity 2017    BMI 45.0-49.9, adult (HCC) 2017    Chronic diastolic CHF (congestive heart failure), NYHA class 3 ([address] 2017    CKD (chronic kidney disease) stage 2, GFR 60-89 ml/min     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 2 diabetes mellitus ([address] 2017    Diabetes ([address]     Fatty liver 2017    Gastroparesis     GERD (gastroesophageal reflux disease)  Gout     HTN (hypertension)     Hyperlipemia     [facility]par[facility]     Hypertension 2017    Insomnia 2017    Low back pain 2017    N&V (nausea and vomiting) 2017    Nausea & vomiting     Obesity     Pelvic joint pain 2017    Peripheral neuropathy 2017    Sciatica     right    Severe right groin pain 2017    Sinus tachycardia 2017    Stage 2 chronic kidney disease due to benign hypertension 2017    Stasis dermatitis 2017    Stroke ([facility] 2001    right parietal, left caudate    Thyroid disease     para thyroid(high calcium)    Ulcer of right fifth toe due to diabetes mellitus ([facility] 2017      Past Surgical History:   Procedure Laterality Date    CHEST SURGERY PROCEDURE UNLISTED  10/24/13    PARATHYROID EXPLORATION     DELIVERY       HX  SECTION      HX CHOLECYSTECTOMY      HX OTHER SURGICAL      I&D right thigh abscess     Social History   Substance Use Topics    Smoking status: Never Smoker    Smokeless tobacco: Never Used    Alcohol use Yes      Comment: rare      Family History   Problem Relation Age of Onset    Cancer Mother      cervical    Diabetes Mother     Hypertension Mother     Stroke Mother 79    Diabetes Father     Hypertension Father     Kidney Disease Father     Diabetes Brother     Hypertension Brother     Stroke Brother 60      Allergies   Allergen Reactions    Zestril [Lisinopril] Swelling      Prior to Admission medications    Medication Sig Start Date End Date Taking? Authorizing Provider   acetaminophen (TYLENOL EXTRA STRENGTH) 500 mg tablet Take 1,000 mg by mouth every six (6) hours as needed for Pain. Yes Historical Provider   atenolol (TENORMIN) 50 mg tablet Take 50 mg by mouth daily. Yes Historical Provider   chlorhexidine (HIBICLENS) 4 % liquid Apply  to affected area every other day.    Yes Historical Provider   colestipol (COLESTID) 1 gram tablet Take 1 g by mouth daily. Yes Historical Provider   glipiZIDE (GLUCOTROL) 10 mg tablet Take 10 mg by mouth two (2) times a day. Yes Historical Provider   insulin detemir U-100 (LEVEMIR FLEXTOUCH U-100 INSULN) 100 unit/mL (3 mL) inpn 50 Units by SubCUTAneous route two (2) times a day. Yes Historical Provider   ammonium lactate (LAC-HYDRIN) 12 % lotion Apply 1 Each to affected area every other day. rub in to affected area well 11/2/17  Yes Mariela Garcia MD   Bifidobacterium Infantis 4 mg cap Take 4 mg by mouth daily. Yes Historical Provider   nystatin (MYCOSTATIN) topical cream Apply  to affected area two (2) times a day. Use for groin rash 2/19/18  Yes Mariela Garcia MD   bumetanide [facility]) 2 mg tablet Take 3 mg by mouth daily. Yes Mariela Garcia MD   UBIDECARENONE (COQ-10 PO) Take 1 Tab by mouth daily. Yes Historical Provider   allopurinol (ZYLOPRIM) 300 mg tablet TAKE 1 TABLET BY MOUTH EVERY DAY 2/10/18  Yes Mariela Garcia MD   dilTIAZem [facility]) 360 mg ER capsule TAKE 1 CAPSULE BY MOUTH EVERY DAY 2/3/18  Yes Mariela Garcia MD   atorvastatin (LIPITOR) 40 mg tablet TAKE 1 TABLET BY MOUTH EVERY DAY 1/28/18  Yes Mariela Garcia MD   hydrALAZINE (APRESOLINE) 50 mg tablet TAKE 1 TABLET BY MOUTH THREE TIMES DAILY 1/8/18  Yes Yanna Mike II, MD   LYRICA 75 mg capsule TAKE ONE CAPSULE BY MOUTH DAILY AT LUNCH TIME AND AGAIN AT BEDTIME. 12/26/17  Yes Mariela Garcia MD   insulin lispro (HUMALOG) 100 unit/mL kwikpen 10 Units by SubCUTAneous route three (3) times daily (with meals). 11/2/17  Yes Mariela Garcia MD   ferrous sulfate (SLOW FE) 142 mg (45 mg iron) ER tablet Take 142 mg by mouth Daily (before breakfast). Yes Historical Provider   aspirin-dipyridamole (AGGRENOX)  mg per SR capsule TAKE 1 CAPSULE BY MOUTH TWICE DAILY( EVERY TWELVE HOURS) 9/4/17  Yes Mariela Garcia MD   Cholecalciferol, Vitamin D3, 2,000 unit cap Take 1 Cap by mouth daily.    Yes Historical Provider     REVIEW OF SYSTEMS: []  Unable to obtain reliable ROS due to  [] mental status  [] sedated   [] intubated   [x] Total of 12 systems reviewed as follows:  Constitutional: negative fever, negative chills, negative weight loss  Eyes:   negative visual changes  ENT:   negative sore throat, tongue or lip swelling  Respiratory:  negative cough, + dyspnea  Cards:  negative for chest pain, palpitations,+ lower extremity edema  GI:   negative for nausea, vomiting, diarrhea, and abdominal pain  :  negative for frequency, dysuria  Integument:  negative for rash and pruritus  Heme:  negative for easy bruising and gum/nose bleeding  Musculoskel: negative for myalgias,  back pain and muscle weakness  Neuro:  negative for headaches, dizziness, vertigo  Psych:  negative for feelings of anxiety, depression   Travel?: none    Objective:   VITALS:    Visit Vitals    /64 (BP 1 Location: Left arm, BP Patient Position: At rest)    Pulse 60    Temp 97.6 °F (36.4 °C)    Resp 22    Ht 5' (1.524 m)    Wt 133 kg (293 lb 4.8 oz)    SpO2 98%    BMI 57.28 kg/m2     PHYSICAL EXAM:  Gen:  []  WD []  WN  [] cachectic []  thin [x]  obese []  disheveled             []  ill apearing  []   Critical  []   Chronic    [x]  No acute distress    HEENT:   [x] NC/AT/PERRLA/EOMI    [] pink conjunctivae      [] pale conjunctivae                  PERRL  [] yes  [] no      [] moist mucosa    [] dry mucosa    hearing intact to voice [x] yes  [] No                 NECK:   supple [x] yes  [] no        masses [] yes  [x] No               thyroid  []  non tender  []  tender    RESP:   [x] CTA bilaterally anterior/no wheezing/rhonchi/rales/crackles    [] rhonchi bilaterally - no dullness  [] wheezing   [] rhonchi   [] crackles     use of accessory muscles [] yes [x] no    CARD:   [x]  regular rate and rhythm/No murmurs/rubs/gallops    murmur  [] yes ()  [] no      Rubs  [] yes  [] no       Gallops [] yes  [] no    Rate []  regular  []  irregular        carotid bruits [] Right  []  Left                 LE edema - trace to 1+ dep and right hand [x] yes  [] no           JVP  []  yes   []  no    ABD:    [x] soft/non distended/non tender/+bowel sounds/no HSM    []  Rigid    tenderness [] yes [] no   Liver enlargement  []  yes []  no                Spleen enlargement  []  yes []  no     distended []  yes [] no     bowel sound  [] hypoactive   [] hyperactive    LYMPH:    Neck []  yes [x]  no       Axillae []  yes []  no    SKIN:   Rashes []  yes   [x]  no    Ulcers []  yes   []  no               [] tight to palpitation    skin turgor []  good  [] poor  [] decreased               Cyanosis/clubbing []  yes []  no    NEUR:   [x] cranial nerves II-XII grossly intact       [] Cranial nerves deficit                 []  facial droop    []  slurred speech   [] aphasic     [] Strength normal     []  weakness  []  LUE  []   RUE/ []  LLE  []   RLE    follows commands  [x]  yes []  no           PSYCH:   insight [] poor [] good   Alert and Oriented to  [x] person  [x] place  [x]  time                    [] depressed [] anxious [] agitated  [] lethargic [] stuporous  [] sedated     LAB DATA REVIEWED:    Recent Labs      03/15/18   0252  03/14/18   0245   WBC  10.7  11.1*   HGB  9.2*  9.5*   HCT  30.7*  31.2*   PLT  277  301     Recent Labs      03/15/18   0252  03/14/18   0245  03/13/18   0358   NA  145  146*  147*   K  4.0  3.9  3.6   CL  113*  111*  114*   CO2  26  28  28   BUN  27*  26*  23*   CREA  2.01*  1.66*  1.15*   GLU  216*  194*  151*   CA  7.8*  8.0*  8.2*     No results for input(s): SGOT, GPT, ALT, AP, TBIL, TBILI, ALB, GLOB, GGT, AML, LPSE in the last 72 hours. No lab exists for component: AMYP, HLPSE  No results for input(s): INR, PTP, APTT in the last 72 hours. No lab exists for component: INREXT   No results for input(s): FE, TIBC, PSAT, FERR in the last 72 hours. No results for input(s): PH, PCO2, PO2 in the last 72 hours.   No results for input(s): CPK, CKMB in the last 72 hours.    No lab exists for component: TROPONINI  Lab Results   Component Value Date/Time    Glucose (POC) 214 (H) 03/15/2018 08:18 PM    Glucose (POC) 195 (H) 03/15/2018 05:24 PM    Glucose (POC) 256 (H) 03/15/2018 11:51 AM    Glucose (POC) 223 (H) 03/15/2018 08:06 AM    Glucose (POC) 214 (H) 03/14/2018 08:34 PM       Procedures: see electronic medical records for all procedures/Xrays and details which were not copied into this note but were reviewed prior to creation of Plan.    ________________________________________________________________________       ___________________________________________________  Consulting Physician:  Marco Antonio Sahu MD

## 2018-03-16 NOTE — PROGRESS NOTES
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 Oliguric. CT without contrast with no hydro, but some bladder distention (350 ml on strait cath); now with mast     Urine with protein, granular casts, sm LE; 3.4 grams proteinuria on spot check     She has been on a number of antibiotics     Urine eos neg; mild peripheral eosinophilia     I agree with scaling back on bumex and hydration, but I feel this is more likely ATN/vanco toxicity vs AIN       Subjective:     Chief Complaint:  Denies complaint. Not very talkative. We discussed the above. Now with a mast. Review of Systems:    Symptom Y/N Comments  Symptom Y/N Comments   Fever/Chills    Chest Pain n    Poor Appetite    Edema n    Cough    Abdominal Pain n    Sputum    Joint Pain     SOB/PIPER n   Pruritis/Rash     Nausea/vomit n   Tolerating PT/OT     Diarrhea    Tolerating Diet     Constipation    Other       Could not obtain due to:      Objective:     VITALS:   Last 24hrs VS reviewed since prior progress note. Most recent are:  Visit Vitals    /48 (BP 1 Location: Left arm, BP Patient Position: At rest)    Pulse (!) 54    Temp 98.4 °F (36.9 °C)    Resp 16    Ht 5' (1.524 m)    Wt 133.5 kg (294 lb 4.8 oz)    SpO2 95%    BMI 57.48 kg/m2       Intake/Output Summary (Last 24 hours) at 18 0729  Last data filed at 18 1072   Gross per 24 hour   Intake             2000 ml   Output              350 ml   Net             1650 ml      Telemetry Reviewed:     PHYSICAL EXAM:  General: WD, WN. Alert, cooperative, no acute distress  Resp:  CTA Bilaterally anterior.  No Wheezing/Rhonchi/Rales. No access. muscle use  CV:  Regular  rhythm,  No murmur (), No Rubs, No Gallops. trace edema  GI:  Soft, Non distended, Non tender.  +Bowel sounds, no HSM      Lab Data Reviewed: (see below)    Medications Reviewed: (see below)    PMH/SH reviewed - no change compared to H&P  ________________________________________________________________________  Care Plan discussed with:  Patient y    Family      RN y    Care Manager                    Consultant:          Comments   >50% of visit spent in counseling and coordination of care       ________________________________________________________________________  Jonathan Dockery MD     Procedures: see electronic medical records for all procedures/Xrays and details which  were not copied into this note but were reviewed prior to creation of Plan. LABS:  Recent Labs      03/16/18   0144  03/15/18   0252   WBC  9.2  10.7   HGB  9.0*  9.2*   HCT  30.1*  30.7*   PLT  253  277     Recent Labs      03/16/18   0150  03/15/18   0252  03/14/18   0245   NA  144  145  146*   K  4.0  4.0  3.9   CL  111*  113*  111*   CO2  25  26  28   BUN  31*  27*  26*   CREA  2.53*  2.01*  1.66*   GLU  214*  216*  194*   CA  7.8*  7.8*  8.0*     No results for input(s): SGOT, GPT, AP, TBIL, TP, ALB, GLOB, GGT, AML, LPSE in the last 72 hours. No lab exists for component: AMYP, HLPSE  No results for input(s): INR, PTP, APTT in the last 72 hours. No lab exists for component: INREXT   No results for input(s): FE, TIBC, PSAT, FERR in the last 72 hours. Lab Results   Component Value Date/Time    Folate 19.6 10/27/2017 10:19 AM      No results for input(s): PH, PCO2, PO2 in the last 72 hours. No results for input(s): CPK, CKMB in the last 72 hours.     No lab exists for component: TROPONINI  No components found for: [place]  Lab Results   Component Value Date/Time    Color YELLOW/STRAW 03/15/2018 06:55 PM    Appearance CLOUDY (A) 03/15/2018 06:55 PM    Specific gravity 1.019 03/15/2018 06:55 PM    Specific gravity 1.020 05/31/2014 04:10 AM    pH (UA) 5.0 03/15/2018 06:55 PM    Protein 300 (A) 03/15/2018 06:55 PM    Glucose 250 (A) 03/15/2018 06:55 PM    Ketone NEGATIVE  03/15/2018 06:55 PM    Bilirubin NEGATIVE  03/15/2018 06:55 PM    Urobilinogen 0.2 03/15/2018 06:55 PM    Nitrites NEGATIVE  03/15/2018 06:55 PM    Leukocyte Esterase SMALL (A) 03/15/2018 06:55 PM    Epithelial cells FEW 03/15/2018 06:55 PM    Bacteria 1+ (A) 03/15/2018 06:55 PM    WBC 5-10 03/15/2018 06:55 PM    RBC 0-5 03/15/2018 06:55 PM       MEDICATIONS:  Current Facility-Administered Medications   Medication Dose Route Frequency    insulin glargine (LANTUS) injection 20 Units  20 Units SubCUTAneous QHS    dextrose 5% - 0.45% NaCl with KCl 10 mEq/L infusion  100 mL/hr IntraVENous CONTINUOUS    sodium chloride (NS) flush 10-30 mL  10-30 mL InterCATHeter PRN    sodium chloride (NS) flush 10 mL  10 mL InterCATHeter Q24H    sodium chloride (NS) flush 10 mL  10 mL InterCATHeter PRN    sodium chloride (NS) flush 10-40 mL  10-40 mL InterCATHeter Q8H    sodium chloride (NS) flush 20 mL  20 mL InterCATHeter Q24H    heparin (porcine) pf 300 Units  300 Units InterCATHeter PRN    0.9% sodium chloride infusion 250 mL  250 mL IntraVENous PRN    enoxaparin (LOVENOX) injection 40 mg  40 mg SubCUTAneous Q12H    morphine injection 2 mg  2 mg IntraVENous Q3H PRN    albuterol-ipratropium (DUO-NEB) 2.5 MG-0.5 MG/3 ML  3 mL Nebulization Q8H PRN    insulin lispro (HUMALOG) injection   SubCUTAneous AC&HS    glucose chewable tablet 16 g  4 Tab Oral PRN    dextrose (D50W) injection syrg 12.5-25 g  12.5-25 g IntraVENous PRN    glucagon (GLUCAGEN) injection 1 mg  1 mg IntraMUSCular PRN    nystatin (MYCOSTATIN) 100,000 unit/gram powder   Topical BID    zinc oxide-cod liver oil (DESITIN) 40 % paste   Topical PRN    ammonium lactate (LAC-HYDRIN) 12 % lotion   Topical BID    clotrimazole (LOTRIMIN) 1 % cream   Topical BID    allopurinol (ZYLOPRIM) tablet 100 mg  100 mg Oral DAILY    atenolol (TENORMIN) tablet 50 mg  50 mg Oral DAILY    atorvastatin (LIPITOR) tablet 40 mg  40 mg Oral DAILY    lactobac ac& pc-s.therm-b.anim (ELVA Q/RISAQUAD)  1 Cap Oral DAILY    colestipol (COLESTID) tablet 1 g  1 g Oral DAILY    dilTIAZem CD (CARDIZEM CD) capsule 360 mg  360 mg Oral DAILY    hydrALAZINE (APRESOLINE) tablet 50 mg  50 mg Oral TID    pregabalin (LYRICA) capsule 75 mg  75 mg Oral BID    nystatin (MYCOSTATIN) 100,000 unit/gram cream   Topical BID    sodium chloride (NS) flush 5-10 mL  5-10 mL IntraVENous Q8H    sodium chloride (NS) flush 5-10 mL  5-10 mL IntraVENous PRN    acetaminophen (TYLENOL) tablet 650 mg  650 mg Oral Q4H PRN    HYDROcodone-acetaminophen (NORCO) 5-325 mg per tablet 1 Tab  1 Tab Oral Q4H PRN    ondansetron (ZOFRAN) injection 4 mg  4 mg IntraVENous Q4H PRN    docusate sodium (COLACE) capsule 100 mg  100 mg Oral BID PRN

## 2018-03-16 NOTE — PROGRESS NOTES
Bedside and Verbal shift change report given to Cindy (oncoming nurse) by Josie Powell (offgoing nurse). Report included the following information SBAR, Kardex, Intake/Output, MAR, Accordion, Recent Results and Cardiac Rhythm Sinus Nilo/Sinus Arrythmia.

## 2018-03-16 NOTE — PROGRESS NOTES
Podiatry History and Physical    Subjective:  No fever or chills. R foot not painful. Date of Service:  March 16, 2018    Patient is a 60 y.o.   female who is being seen for PO open amputation R 5th toe and partial R 5th metatarsal.    Patient Active Problem List    Diagnosis Date Noted    GIRISH (acute kidney injury) ([address] 03/16/2018    Subacute osteomyelitis of right foot ([address] 03/12/2018    CVA, old, aphasia 03/09/2018    Sepsis ([address] 03/05/2018    Obesity hypoventilation syndrome ([address] 02/19/2018    Type 2 diabetes mellitus with diabetic neuropathy ([address] 01/02/2018    Venous stasis dermatitis of both lower extremities 11/09/2017    B12 deficiency 10/31/2017    (HFpEF) heart failure with preserved ejection fraction ([address] 10/31/2017    PE (physical exam), annual 10/11/2017    Gout 10/11/2017    History of hyperparathyroidism 10/11/2017    GERD (gastroesophageal reflux disease) 10/11/2017    Insomnia 07/24/2017    Sinus tachycardia 07/24/2017    Anemia 07/24/2017    Ulcer of right fifth toe due to diabetes mellitus ([address] 07/24/2017    Peripheral neuropathy 07/24/2017    Chronic diastolic CHF (congestive heart failure), NYHA class 3 ([address] 07/24/2017    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 2 diabetes mellitus ([address] 07/24/2017    Stage 2 chronic kidney disease due to benign hypertension 07/24/2017    Hypertension 07/24/2017    BMI 45.0-49.9, adult ([address] 07/24/2017    Fatty liver 07/24/2017    Stasis dermatitis 07/24/2017    Low back pain 07/24/2017    Aortic stenosis, mild 07/24/2017    Bilateral edema of lower extremity 07/24/2017    ASCVD (arteriosclerotic cardiovascular disease) 05/31/2014    Stroke ([address] 05/30/2014    Diabetic polyneuropathy ([address] 04/20/2013    Right leg weakness 04/18/2013    Diabetes mellitus type II, uncontrolled ([address] 04/18/2013    CKD (chronic kidney disease) stage 3, GFR 30-59 ml/min     Hyperlipemia     Diabetic [facility]roparesis [facility]     Diabetes ([facility] 2011    Morbid obesity ([address] 2011     Past Medical History:   Diagnosis Date    Acute combined systolic and diastolic heart failure, NYHA class 2 ([address] 2017    Anemia 2017    Aortic stenosis, mild 2017    Bilateral edema of lower extremity 2017    BMI 45.0-49.9, adult ([address] 2017    Chronic diastolic CHF (congestive heart failure), NYHA class 3 ([address] 2017    CKD (chronic kidney disease) stage 2, GFR 60-89 ml/min     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 2 diabetes mellitus ([address] 2017    Diabetes ([address]     Fatty liver 2017    Gastroparesis     GERD (gastroesophageal reflux disease)     Gout     HTN (hypertension)     Hyperlipemia     Hyperparathyroidism ([address]     Hypertension 2017    Insomnia 2017    Low back pain 2017    N&V (nausea and vomiting) 2017    Nausea & vomiting     Obesity     Pelvic joint pain 2017    Peripheral neuropathy 2017    Sciatica     right    Severe right groin pain 2017    Sinus tachycardia 2017    Stage 2 chronic kidney disease due to benign hypertension 2017    Stasis dermatitis 2017    Stroke ([address] 2001    right parietal, left caudate    Thyroid disease     para thyroid(high calcium)    Ulcer of right fifth toe due to diabetes mellitus ([address] 2017      Family History   Problem Relation Age of Onset    Cancer Mother      cervical    Diabetes Mother     Hypertension Mother     Stroke Mother 79    Diabetes Father     Hypertension Father     Kidney Disease Father     Diabetes Brother     Hypertension Brother     Stroke Brother 61      Social History   Substance Use Topics    Smoking status: Never Smoker    Smokeless tobacco: Never Used    Alcohol use Yes      Comment: rare     Past Surgical History:   Procedure Laterality Date    CHEST SURGERY PROCEDURE UNLISTED  10/24/13    PARATHYROID EXPLORATION     DELIVERY       HX  SECTION      HX CHOLECYSTECTOMY      HX OTHER SURGICAL      I&D right thigh abscess      Prior to Admission medications    Medication Sig Start Date End Date Taking? Authorizing Provider   acetaminophen (TYLENOL EXTRA STRENGTH) 500 mg tablet Take 1,000 mg by mouth every six (6) hours as needed for Pain. Yes Historical Provider   atenolol (TENORMIN) 50 mg tablet Take 50 mg by mouth daily. Yes Historical Provider   chlorhexidine (HIBICLENS) 4 % liquid Apply  to affected area every other day. Yes Historical Provider   colestipol (COLESTID) 1 gram tablet Take 1 g by mouth daily. Yes Historical Provider   glipiZIDE (GLUCOTROL) 10 mg tablet Take 10 mg by mouth two (2) times a day. Yes Historical Provider   insulin detemir U-100 (LEVEMIR FLEXTOUCH U-100 INSULN) 100 unit/mL (3 mL) inpn 50 Units by SubCUTAneous route two (2) times a day. Yes Historical Provider   ammonium lactate (LAC-HYDRIN) 12 % lotion Apply 1 Each to affected area every other day. rub in to affected area well 11/2/17  Yes Mik Peng MD   Bifidobacterium Infantis 4 mg cap Take 4 mg by mouth daily. Yes Historical Provider   nystatin (MYCOSTATIN) topical cream Apply  to affected area two (2) times a day. Use for groin rash 2/19/18  Yes Mik Peng MD   [facility]) 2 mg tablet Take 3 mg by mouth daily. Yes Mik Peng MD   UBIDECARENONE (COQ-10 PO) Take 1 Tab by mouth daily.    Yes Historical Provider   allopurinol (ZYLOPRIM) 300 mg tablet TAKE 1 TABLET BY MOUTH EVERY DAY 2/10/18  Yes Mik Peng MD   dilTIAZem [facility]) 360 mg ER capsule TAKE 1 CAPSULE BY MOUTH EVERY DAY 2/3/18  Yes Mik Peng MD   atorvastatin (LIPITOR) 40 mg tablet TAKE 1 TABLET BY MOUTH EVERY DAY 1/28/18  Yes Mik Peng MD   hydrALAZINE (APRESOLINE) 50 mg tablet TAKE 1 TABLET BY MOUTH THREE TIMES DAILY 1/8/18  Yes Martín Lang II, MD   LYRICA 75 mg capsule TAKE ONE CAPSULE BY MOUTH DAILY AT LUNCH TIME AND AGAIN AT BEDTIME. 12/26/17  Yes Lacie Flores MD   insulin lispro (HUMALOG) 100 unit/mL kwikpen 10 Units by SubCUTAneous route three (3) times daily (with meals). 17  Yes Lacie Flores MD   ferrous sulfate (SLOW FE) 142 mg (45 mg iron) ER tablet Take 142 mg by mouth Daily (before breakfast). Yes Historical Provider   aspirin-dipyridamole (AGGRENOX)  mg per SR capsule TAKE 1 CAPSULE BY MOUTH TWICE DAILY( EVERY TWELVE HOURS) 17  Yes Lacie Flores MD   Cholecalciferol, Vitamin D3, 2,000 unit cap Take 1 Cap by mouth daily. Yes Historical Provider     Allergies   Allergen Reactions    Zestril [Lisinopril] Swelling        Review of Systems:  Chronic shortness of breath. No fever or nausea. Objective:     Patient Vitals for the past 8 hrs:   BP Temp Pulse Resp SpO2   18 1522 126/63 98.5 °F (36.9 °C) (!) 53 20 95 %   18 1121 133/56 98.6 °F (37 °C) (!) 59 19 94 %   18 0952 128/54 - (!) 58 - -     Temp (24hrs), Av.2 °F (36.8 °C), Min:97.6 °F (36.4 °C), Max:98.6 °F (37 °C)    Lower Extremity Exam:  Dressings removed. Incision edges co-apted with sutures except for the site at the 5th toe amputation. Wound beds at amputation site 5th toe and base of 5th metatarsal are clean. Light bleeding when the packing gauze was removed, but no pus.   DP and PT palpable R foot; CFT 4 seconds to Sx site lateral R foot    Data Review:   Recent Results (from the past 24 hour(s))   EOSINOPHILS, URINE    Collection Time: 03/15/18  6:55 PM   Result Value Ref Range    Eosinophils,urine NEGATIVE      URINALYSIS W/MICROSCOPIC    Collection Time: 03/15/18  6:55 PM   Result Value Ref Range    Color YELLOW/STRAW      Appearance CLOUDY (A) CLEAR      Specific gravity 1.019 1.003 - 1.030      pH (UA) 5.0 5.0 - 8.0      Protein 300 (A) NEG mg/dL    Glucose 250 (A) NEG mg/dL    Ketone NEGATIVE  NEG mg/dL    Bilirubin NEGATIVE  NEG      Blood NEGATIVE  NEG      Urobilinogen 0.2 0.2 - 1.0 EU/dL    Nitrites NEGATIVE  NEG Leukocyte Esterase SMALL (A) NEG      WBC 5-10 0 - 4 /hpf    RBC 0-5 0 - 5 /hpf    Epithelial cells FEW FEW /lpf    Bacteria 1+ (A) NEG /hpf    Mucus TRACE (A) NEG /lpf    Amorphous Crystals 1+ (A) NEG    Granular cast 0-2 (A) NEG /lpf   PROTEIN/CREATININE RATIO, URINE    Collection Time: 03/15/18  6:55 PM   Result Value Ref Range    Protein, urine random 290 (H) 0.0 - 11.9 mg/dL    Creatinine, urine 86.10 mg/dL    Protein/Creat. urine Ratio 3.4     GLUCOSE, POC    Collection Time: 03/15/18  8:18 PM   Result Value Ref Range    Glucose (POC) 214 (H) 65 - 100 mg/dL    Performed by [employer]    CBC WITH AUTOMATED DIFF    Collection Time: 03/16/18  1:44 AM   Result Value Ref Range    WBC 9.2 3.6 - 11.0 K/uL    RBC 3.07 (L) 3.80 - 5.20 M/uL    HGB 9.0 (L) 11.5 - 16.0 g/dL    HCT 30.1 (L) 35.0 - 47.0 %    MCV 98.0 80.0 - 99.0 FL    MCH 29.3 26.0 - 34.0 PG    MCHC 29.9 (L) 30.0 - 36.5 g/dL    RDW 17.3 (H) 11.5 - 14.5 %    PLATELET 802 116 - 302 K/uL    MPV 12.1 8.9 - 12.9 FL    NRBC 0.0 0  WBC    ABSOLUTE NRBC 0.00 0.00 - 0.01 K/uL    NEUTROPHILS 75 32 - 75 %    LYMPHOCYTES 9 (L) 12 - 49 %    MONOCYTES 9 5 - 13 %    EOSINOPHILS 4 0 - 7 %    BASOPHILS 1 0 - 1 %    IMMATURE GRANULOCYTES 2 (H) 0.0 - 0.5 %    ABS. NEUTROPHILS 6.9 1.8 - 8.0 K/UL    ABS. LYMPHOCYTES 0.8 0.8 - 3.5 K/UL    ABS. MONOCYTES 0.8 0.0 - 1.0 K/UL    ABS. EOSINOPHILS 0.4 0.0 - 0.4 K/UL    ABS. BASOPHILS 0.1 0.0 - 0.1 K/UL    ABS. IMM.  GRANS. 0.2 (H) 0.00 - 0.04 K/UL    DF AUTOMATED      RBC COMMENTS ANISOCYTOSIS  1+       METABOLIC PANEL, BASIC    Collection Time: 03/16/18  1:50 AM   Result Value Ref Range    Sodium 144 136 - 145 mmol/L    Potassium 4.0 3.5 - 5.1 mmol/L    Chloride 111 (H) 97 - 108 mmol/L    CO2 25 21 - 32 mmol/L    Anion gap 8 5 - 15 mmol/L    Glucose 214 (H) 65 - 100 mg/dL    BUN 31 (H) 6 - 20 MG/DL    Creatinine 2.53 (H) 0.55 - 1.02 MG/DL    BUN/Creatinine ratio 12 12 - 20      GFR est AA 23 (L) >60 ml/min/1.73m2 GFR est non-AA 19 (L) >60 ml/min/1.73m2    Calcium 7.8 (L) 8.5 - 10.1 MG/DL   GLUCOSE, POC    Collection Time: 03/16/18  7:32 AM   Result Value Ref Range    Glucose (POC) 205 (H) 65 - 100 mg/dL    Performed by Randeduardo Ibanez (PCT)    GLUCOSE, POC    Collection Time: 03/16/18 11:15 AM   Result Value Ref Range    Glucose (POC) 219 (H) 65 - 100 mg/dL    Performed by Exitroundmigdalia Ibanez (PCT)    GLUCOSE, POC    Collection Time: 03/16/18  4:14 PM   Result Value Ref Range    Glucose (POC) 256 (H) 65 - 100 mg/dL    Performed by Exitroundn Shamra (PCT)          Impression:   PO amputation R 5th toe and partial 5th metatarsal, infection resolving      Recommendation:   Continue current wound care. Pt remains in the hospital for GIRISH and other medical issues, so I will follow only periodically to check the wound.

## 2018-03-16 NOTE — PROGRESS NOTES
Bedside and Verbal shift change report given to Melany Claros (oncoming nurse) by Nate De Leon (offgoing nurse). Report included the following information SBAR, Kardex and Intake/Output.

## 2018-03-16 NOTE — PROGRESS NOTES
[address] [place] [address]  [phone]      Medical Progress Note      NAME: Ja Sprague   :  1955  MRM:  [id number]    Date/Time: 3/16/2018 7:14 AM      Subjective:     Afebrile and pain currently controlled. Renal function worse this am.  Urine output difficult to assess. Will D/C Bumex and all abx and insert Riojas and follow U/O and renal function. BS's still > 200 and Lantus increased. Appreciate Dr. Mayito Quispe help. Past Medical History reviewed and unchanged from Admission History and Physical and/or prior progress note/electronic medical record    Medications reviewed. ROS:      General: postive for weakness  Ear Nose and Throat: negative for rhinorrhea, pharyngitis, otalgia, tinnitus, speech or swallowing difficulties  Respiratory: Less SOB and wheezing  Cardiology:  positive for PND, orthopnea and edema  Gastrointestinal: positive for nausea  Genitourinary: positive for incontinence  Dermatological: positive for chronic lichenification legs and feet and pressure sore areas  Neurological: positive for bilateral LE weakness R>L and speech difficulties    Objective:     Last 24hrs VS reviewed since prior progress note.  Most recent are:    Visit Vitals    /48 (BP 1 Location: Left arm, BP Patient Position: At rest)    Pulse (!) 54    Temp 98.4 °F (36.9 °C)    Resp 16    Ht 5' (1.524 m)    Wt 294 lb 4.8 oz (133.5 kg)    SpO2 95%    BMI 57.48 kg/m2     SpO2 Readings from Last 6 Encounters:   18 95%   18 (!) 79%   18 92%   18 94%   18 93%   18 94%    O2 Flow Rate (L/min): 3 l/min       Intake/Output Summary (Last 24 hours) at 18 0717  Last data filed at 18 1049   Gross per 24 hour   Intake             2000 ml   Output              350 ml   Net             1650 ml          Physical Exam:    General appearance: alert, cooperative, no distress, appears stated age  Eyes: negative  Neck: supple, symmetrical, trachea midline, no adenopathy, thyroid: not enlarged, symmetric, no tenderness/mass/nodules, no carotid bruit and no JVD  Lungs: Few rhonchi but wheezing diminished  Heart: regular rate and rhythm, S1, S2 normal, grade II/IV systolic murmur; No click, rub or gallop  Abdomen: soft, non-tender. Bowel sounds normal. No masses,  no organomegaly, morbidly obese  Extremities: chronic lichenification legs and feet;right foot heavily bandaged; moderate edema  Neurologic: bilateral LE weakness;  \"aphasia\" persists but is improved somewhat today    Data Review:    Lab:    BMP:   Lab Results   Component Value Date/Time     03/16/2018 01:50 AM    K 4.0 03/16/2018 01:50 AM     (H) 03/16/2018 01:50 AM    CO2 25 03/16/2018 01:50 AM    AGAP 8 03/16/2018 01:50 AM     (H) 03/16/2018 01:50 AM    BUN 31 (H) 03/16/2018 01:50 AM    CREA 2.53 (H) 03/16/2018 01:50 AM    GFRAA 23 (L) 03/16/2018 01:50 AM    GFRNA 19 (L) 03/16/2018 01:50 AM     CBC:   Lab Results   Component Value Date/Time    WBC 9.2 03/16/2018 01:44 AM    HGB 9.0 (L) 03/16/2018 01:44 AM    HCT 30.1 (L) 03/16/2018 01:44 AM     03/16/2018 01:44 AM     Recent Glucose Results:   Lab Results   Component Value Date/Time     (H) 03/16/2018 01:50 AM     All labs reviewed in past 24 hours    Other pertinent lab: none    Imaging:    Reviewed. none    Procedure: . Amputation, right fifth toe and partial right fifth metatarsal.  2.  Reexploration of wound    Assessment / Plan:      Active Problems:    Chronic diastolic CHF (congestive heart failure), NYHA class 3 (HCC) (7/24/2017)      Obesity hypoventilation syndrome ([address] (2/19/2018)      Sepsis ([address] (3/5/2018)      GIRISH (acute kidney injury) ([address] (3/16/2018)      Diabetes mellitus type II, uncontrolled ([address] (4/18/2013)      Diabetic gastroparesis (HCC) ()      Anemia (7/24/2017)      Ulcer of right fifth toe due to diabetes mellitus ([facility] (7/24/2017) Hypertension (7/24/2017)      Bilateral edema of lower extremity (7/24/2017)      (HFpEF) heart failure with preserved ejection fraction ([facility] (10/31/2017)      Venous stasis dermatitis of both lower extremities (11/9/2017)      CVA, old, aphasia (3/9/2018)      Subacute osteomyelitis of right foot ([facility] (3/12/2018)      Morbid obesity ([facility] (1/26/2011)      CKD (chronic kidney disease) stage 3, GFR 30-59 ml/min ()      Fatty liver (7/24/2017)      Aortic stenosis, mild (7/24/2017)      BMI 45.0-49.9, adult ([facility] (7/24/2017)      Type 2 diabetes mellitus with diabetic neuropathy ([facility] (1/2/2018)        1. D/C abx and Bumex  2. Hydrate  3. Wound care  4. PT/OT/ST  5. Riojas short term to assess urine output  6. Follow labs  7. Has O2 and hospital bed when D/C feasible  8. Adjust insulin  9.  Reduce frequency wound care to accommodate home care           Care Plan discussed with: Patient and Family    Discussed:  Code Status and Care Plan    Prophylaxis:  Lovenox    Disposition:  Home with [facility]  ___________________________________________________    Attending Physician: Jono Peng MD

## 2018-03-16 NOTE — PROCEDURES
[facility]  *** FINAL REPORT ***    Name: Janet Angel  MRN: [id number]    Inpatient  : 1955  HIS Order #: [id number]  [id number] [facility] Visit #: [id number]  Date: 15 Mar 2018    TYPE OF TEST: Peripheral Venous Testing    REASON FOR TEST  Limb swelling    Right Arm:-  Deep venous thrombosis:           No  Superficial venous thrombosis:    No      INTERPRETATION/FINDINGS  PROCEDURE:  Venous duplex examination using B-mode, color flow and  spectral Doppler of the upper extremity veins. Right arm :  1. Deep vein(s) visualized include the internal jugular, subclavian,  axillary, brachial, radial and ulnar veins. 2. No evidence of deep venous thrombosis detected in the veins  visualized. 3. No evidence of deep vein thrombosis in the contralateral subclavian   vein. 4. Superficial vein(s) visualized include the cephalic (forearm) vein. 5. No evidence of superficial thrombosis detected. ADDITIONAL COMMENTS    Technically limited exam angella to patient body habitus and bandages. I have personally reviewed the data relevant to the interpretation of  this  study. TECHNOLOGIST: Victorino Blanco.  GIBRAN Cherry, RDMS  Signed: 2018 08:03 AM    PHYSICIAN: Kemar Emmanuel MD  Signed: 2018 02:01 PM

## 2018-03-16 NOTE — PROGRESS NOTES
Bedside shift change report given to Gil (oncoming nurse) by Shannan Cerda (offgoing nurse). Report included the following information SBAR, Kardex, Intake/Output, MAR and Recent Results. Zone Phone for oncoming shift:   [phone]    Shift Summary: Pt rested through night. No c/o of pain. Wound care completed per orders. Still unable to urinate. Bladder scan showed 0ml. LDAs         PICC Double Lumen 03/07/18 Right;Brachial (Active)   Central Line Being Utilized Yes 3/16/2018  3:22 AM   Criteria for Appropriate Use Long term IV/antibiotic administration 3/16/2018  3:22 AM   Site Assessment Clean, dry, & intact 3/16/2018  3:22 AM   Phlebitis Assessment 0 3/16/2018  3:22 AM   Infiltration Assessment 0 3/16/2018  3:22 AM   Arm Circumference (cm) 39 cm 3/7/2018 10:31 AM   Date of Last Dressing Change 03/14/18 3/15/2018  7:04 PM   Dressing Status Clean, dry, & intact 3/16/2018  3:22 AM   Action Taken Blood drawn 3/16/2018  3:22 AM   External Catheter Length (cm) 0 centimeters 3/15/2018  3:00 PM   Dressing Type Disk with Chlorhexadine gluconate (CHG); Transparent 3/16/2018  3:22 AM   Hub Color/Line Status Purple; Infusing 3/16/2018  3:22 AM   Positive Blood Return (Site #1) Yes 3/16/2018  3:22 AM   Hub Color/Line Status Red;Capped 3/16/2018  3:22 AM   Positive Blood Return (Site #2) Yes 3/16/2018  3:22 AM   Alcohol Cap Used Yes 3/16/2018  3:22 AM                External Female Catheter 03/07/18 (Active)   Site Assessment Clean, dry, & intact 3/14/2018  7:41 PM   Repositioned No 3/14/2018  7:41 PM   Perineal Care Yes 3/14/2018  2:55 PM   Wick Changed Yes 3/14/2018  2:55 PM   Suction Canister/Tubing Changed No 3/14/2018  2:55 PM   Urine Output (mL) 200 ml 3/14/2018  2:55 PM                  Intake & Output   Date 03/15/18 0700 - 03/16/18 0659 03/16/18 0700 - 03/17/18 0659   Shift 0700-1859 1900-0659 24 Hour Total 0700-1859 1900-0659 24 Hour Total   I  N  T  A  K  E   P. O. 560 240 800         P. O. 560 240 800 I.V.  (mL/kg/hr)  1200  (0.7) 1200  (0.4)         I.V.  1200 1200       Shift Total  (mL/kg) 560  (4.2) 1440  (10.8) 2000  (15)      O  U  T  P  U  T   Urine  (mL/kg/hr)  350  (0.2) 350  (0.1)         Urine  350 350       Shift Total  (mL/kg)  350  (2.6) 350  (2.6)       1090 1650      Weight (kg) 133 133.5 133.5 133.5 133.5 133.5      Last Bowel Movement Last Bowel Movement Date: 03/14/18   Glucose Checks [] N/A  [x] AC/HS  [] Q6  Concerns:   Nutrition Active Orders   Diet    DIET DIABETIC CONSISTENT CARB Regular       Consults [x]PT  [x]OT  []Speech  [x]Case Management   Cardiac Monitoring []N/A [x]Yes Expires:48 hrs

## 2018-03-17 NOTE — PROGRESS NOTES
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 Oliguric. CT without contrast with no hydro, but some bladder distention (350 ml on strait cath); now with mast     Urine with protein, granular casts, sm LE; 3.4 grams proteinuria on spot check     She has been on a number of antibiotics     Urine eos neg; mild peripheral eosinophilia     ATN/vanc vs AIN-    Long d/w  at bedside-he is quite unhappy and frustrated over his wife's poor renal function. And despite the fact that she has been essentially non ambulatory for the last 6 months feels that most of this could've been prevented--reviewed with him that despite pharmacy dosing pt's vancomycin that morbid obese people with multiple co morbities can get vanc toxic, have arf from infections/sepsis etc.  Agree with dr. Jasmin Mejia that she might need hd-I am holding out as long as I can on this and hoping that she will recover function without RRT. Subjective:     Chief Complaint:  Denies complaint. Not very talkative. We discussed the above. Now with a mast. Review of Systems:    Symptom Y/N Comments  Symptom Y/N Comments   Fever/Chills    Chest Pain n    Poor Appetite    Edema n    Cough    Abdominal Pain n    Sputum    Joint Pain     SOB/PIPER n   Pruritis/Rash     Nausea/vomit n   Tolerating PT/OT     Diarrhea    Tolerating Diet     Constipation    Other       Could not obtain due to:      Objective:     VITALS:   Last 24hrs VS reviewed since prior progress note.  Most recent are:  Visit Vitals    /67 (BP 1 Location: Left arm, BP Patient Position: At rest)    Pulse 65    Temp 97.4 °F (36.3 °C)    Resp 18    Ht 5' (1.524 m)    Wt 133.5 kg (294 lb 4.8 oz)    SpO2 93%    BMI 57.48 kg/m2       Intake/Output Summary (Last 24 hours) at 03/17/18 1235  Last data filed at 03/17/18 0311   Gross per 24 hour   Intake             5375 ml   Output                0 ml   Net             5375 ml      Telemetry Reviewed:     PHYSICAL EXAM:  General: WD, WN. Alert, cooperative, no acute distress, morbidly obese  Resp:  CTA Bilaterally anterior. No access. muscle use  CV:  Regular  rhythm,  (+) foot bandaged  GI:  Soft, Non distended, Non tender.  (+) edema in pannus      Lab Data Reviewed: (see below)    Medications Reviewed: (see below)    PMH/SH reviewed - no change compared to H&P  ________________________________________________________________________  Care Plan discussed with:  Patient y    Family  y    RN     Care Manager                    Consultant:          Comments   >50% of visit spent in counseling and coordination of care       ________________________________________________________________________  Benny Miramontes MD     Procedures: see electronic medical records for all procedures/Xrays and details which  were not copied into this note but were reviewed prior to creation of Plan. LABS:  Recent Labs      03/17/18   0325  03/16/18   0144   WBC  11.1*  9.2   HGB  9.2*  9.0*   HCT  30.3*  30.1*   PLT  258  253     Recent Labs      03/17/18   0325  03/16/18   0150  03/15/18   0252   NA  142  144  145   K  4.1  4.0  4.0   CL  111*  111*  113*   CO2  23  25  26   BUN  33*  31*  27*   CREA  2.93*  2.53*  2.01*   GLU  238*  214*  216*   CA  7.9*  7.8*  7.8*     No results for input(s): SGOT, GPT, AP, TBIL, TP, ALB, GLOB, GGT, AML, LPSE in the last 72 hours. No lab exists for component: AMYP, HLPSE  No results for input(s): INR, PTP, APTT in the last 72 hours.     No lab exists for component: INREXT, INREXT   No results for input(s): FE, TIBC, PSAT, FERR in the last 72 hours. Lab Results   Component Value Date/Time    Folate 19.6 10/27/2017 10:19 AM      No results for input(s): PH, PCO2, PO2 in the last 72 hours. No results for input(s): CPK, CKMB in the last 72 hours.     No lab exists for component: TROPONINI  No components found for: [place]  Lab Results   Component Value Date/Time    Color YELLOW/STRAW 03/15/2018 06:55 PM    Appearance CLOUDY (A) 03/15/2018 06:55 PM    Specific gravity 1.019 03/15/2018 06:55 PM    Specific gravity 1.020 05/31/2014 04:10 AM    pH (UA) 5.0 03/15/2018 06:55 PM    Protein 300 (A) 03/15/2018 06:55 PM    Glucose 250 (A) 03/15/2018 06:55 PM    Ketone NEGATIVE  03/15/2018 06:55 PM    Bilirubin NEGATIVE  03/15/2018 06:55 PM    Urobilinogen 0.2 03/15/2018 06:55 PM    Nitrites NEGATIVE  03/15/2018 06:55 PM    Leukocyte Esterase SMALL (A) 03/15/2018 06:55 PM    Epithelial cells FEW 03/15/2018 06:55 PM    Bacteria 1+ (A) 03/15/2018 06:55 PM    WBC 5-10 03/15/2018 06:55 PM    RBC 0-5 03/15/2018 06:55 PM       MEDICATIONS:  Current Facility-Administered Medications   Medication Dose Route Frequency    insulin glargine (LANTUS) injection 20 Units  20 Units SubCUTAneous QHS    enoxaparin (LOVENOX) injection 40 mg  40 mg SubCUTAneous Q24H    sodium chloride (NS) flush 10-30 mL  10-30 mL InterCATHeter PRN    sodium chloride (NS) flush 10 mL  10 mL InterCATHeter Q24H    sodium chloride (NS) flush 10 mL  10 mL InterCATHeter PRN    sodium chloride (NS) flush 10-40 mL  10-40 mL InterCATHeter Q8H    sodium chloride (NS) flush 20 mL  20 mL InterCATHeter Q24H    heparin (porcine) pf 300 Units  300 Units InterCATHeter PRN    0.9% sodium chloride infusion 250 mL  250 mL IntraVENous PRN    morphine injection 2 mg  2 mg IntraVENous Q3H PRN    albuterol-ipratropium (DUO-NEB) 2.5 MG-0.5 MG/3 ML  3 mL Nebulization Q8H PRN    insulin lispro (HUMALOG) injection   SubCUTAneous AC&HS    glucose chewable tablet 16 g  4 Tab Oral PRN    dextrose (D50W) injection syrg 12.5-25 g  12.5-25 g IntraVENous PRN    glucagon (GLUCAGEN) injection 1 mg  1 mg IntraMUSCular PRN    nystatin (MYCOSTATIN) 100,000 unit/gram powder   Topical BID    zinc oxide-cod liver oil (DESITIN) 40 % paste   Topical PRN    ammonium lactate (LAC-HYDRIN) 12 % lotion   Topical BID    clotrimazole (LOTRIMIN) 1 % cream   Topical BID    allopurinol (ZYLOPRIM) tablet 100 mg  100 mg Oral DAILY    atenolol (TENORMIN) tablet 50 mg  50 mg Oral DAILY    atorvastatin (LIPITOR) tablet 40 mg  40 mg Oral DAILY    lactobac ac& pc-s.therm-b.anim (ELVA Q/RISAQUAD)  1 Cap Oral DAILY    colestipol (COLESTID) tablet 1 g  1 g Oral DAILY    dilTIAZem CD (CARDIZEM CD) capsule 360 mg  360 mg Oral DAILY    hydrALAZINE (APRESOLINE) tablet 50 mg  50 mg Oral TID    pregabalin (LYRICA) capsule 75 mg  75 mg Oral BID    nystatin (MYCOSTATIN) 100,000 unit/gram cream   Topical BID    sodium chloride (NS) flush 5-10 mL  5-10 mL IntraVENous Q8H    sodium chloride (NS) flush 5-10 mL  5-10 mL IntraVENous PRN    acetaminophen (TYLENOL) tablet 650 mg  650 mg Oral Q4H PRN    HYDROcodone-acetaminophen (NORCO) 5-325 mg per tablet 1 Tab  1 Tab Oral Q4H PRN    ondansetron (ZOFRAN) injection 4 mg  4 mg IntraVENous Q4H PRN    docusate sodium (COLACE) capsule 100 mg  100 mg Oral BID PRN

## 2018-03-17 NOTE — PROGRESS NOTES
PROGRESS NOTE    NAME:  Дмитрий Tamez   :   1955   MRN:   [id number]     Date/Time:  3/17/2018 9:04 AM  Subjective:   History:  Resting comfortably. I > O by 8 liters last 72 hours. IV fluids stopped. CXR remains clear.        Medications reviewed:  Current Facility-Administered Medications   Medication Dose Route Frequency    insulin glargine (LANTUS) injection 20 Units  20 Units SubCUTAneous QHS    enoxaparin (LOVENOX) injection 40 mg  40 mg SubCUTAneous Q24H    sodium chloride (NS) flush 10-30 mL  10-30 mL InterCATHeter PRN    sodium chloride (NS) flush 10 mL  10 mL InterCATHeter Q24H    sodium chloride (NS) flush 10 mL  10 mL InterCATHeter PRN    sodium chloride (NS) flush 10-40 mL  10-40 mL InterCATHeter Q8H    sodium chloride (NS) flush 20 mL  20 mL InterCATHeter Q24H    heparin (porcine) pf 300 Units  300 Units InterCATHeter PRN    0.9% sodium chloride infusion 250 mL  250 mL IntraVENous PRN    morphine injection 2 mg  2 mg IntraVENous Q3H PRN    albuterol-ipratropium (DUO-NEB) 2.5 MG-0.5 MG/3 ML  3 mL Nebulization Q8H PRN    insulin lispro (HUMALOG) injection   SubCUTAneous AC&HS    glucose chewable tablet 16 g  4 Tab Oral PRN    dextrose (D50W) injection syrg 12.5-25 g  12.5-25 g IntraVENous PRN    glucagon (GLUCAGEN) injection 1 mg  1 mg IntraMUSCular PRN    nystatin (MYCOSTATIN) 100,000 unit/gram powder   Topical BID    zinc oxide-cod liver oil (DESITIN) 40 % paste   Topical PRN    ammonium lactate (LAC-HYDRIN) 12 % lotion   Topical BID    clotrimazole (LOTRIMIN) 1 % cream   Topical BID    allopurinol (ZYLOPRIM) tablet 100 mg  100 mg Oral DAILY    atenolol (TENORMIN) tablet 50 mg  50 mg Oral DAILY    atorvastatin (LIPITOR) tablet 40 mg  40 mg Oral DAILY    lactobac ac& pc-s.therm-b.anim (ELVA Q/RISAQUAD)  1 Cap Oral DAILY    colestipol (COLESTID) tablet 1 g  1 g Oral DAILY    dilTIAZem CD (CARDIZEM CD) capsule 360 mg  360 mg Oral DAILY    hydrALAZINE (APRESOLINE) tablet 50 mg  50 mg Oral TID    pregabalin (LYRICA) capsule 75 mg  75 mg Oral BID    nystatin (MYCOSTATIN) 100,000 unit/gram cream   Topical BID    sodium chloride (NS) flush 5-10 mL  5-10 mL IntraVENous Q8H    sodium chloride (NS) flush 5-10 mL  5-10 mL IntraVENous PRN    acetaminophen (TYLENOL) tablet 650 mg  650 mg Oral Q4H PRN    HYDROcodone-acetaminophen (NORCO) 5-325 mg per tablet 1 Tab  1 Tab Oral Q4H PRN    ondansetron (ZOFRAN) injection 4 mg  4 mg IntraVENous Q4H PRN    docusate sodium (COLACE) capsule 100 mg  100 mg Oral BID PRN        Objective:   Vitals:  Visit Vitals    /82    Pulse 64    Temp 98.4 °F (36.9 °C)    Resp 16    Ht 5' (1.524 m)    Wt 294 lb 4.8 oz (133.5 kg)    SpO2 94%    BMI 57.48 kg/m2    O2 Flow Rate (L/min): 3 l/min O2 Device: Nasal cannula Temp (24hrs), Av.3 °F (36.8 °C), Min:98 °F (36.7 °C), Max:98.6 °F (37 °C)      Last 24hr Input/Output:    Intake/Output Summary (Last 24 hours) at 18 0904  Last data filed at 18 5265   Gross per 24 hour   Intake             5375 ml   Output              175 ml   Net             5200 ml        PHYSICAL EXAM:  General:     Alert, cooperative, no distress, appears stated age. Head:    Normocephalic, without obvious abnormality, atraumatic. Eyes:    Conjunctivae/corneas clear. PERRLA  Nose:   Nares normal. No drainage or sinus tenderness. Throat:     Lips, mucosa, and tongue normal.  No Thrush  Neck:   Supple, symmetrical,  no adenopathy, thyroid: non tender     no carotid bruit and no JVD. Back:     Symmetric,  No CVA tenderness. Lungs:    Clear to auscultation bilaterally. No Wheezing or Rhonchi. No rales. Heart:    Regular rate and rhythm,  no murmur, rub or gallop. Abdomen:    Soft, non-tender. Not distended. Bowel sounds normal. No masses  Extremities:  Extremities normal, atraumatic, No cyanosis. 2-3+ edema.  No clubbing  Lymph nodes:  Cervical, supraclavicular normal.  Neurologic:  Normal strength, Alert and oriented X 3. Skin:                No rash      Lab Data Reviewed:    Recent Results (from the past 24 hour(s))   GLUCOSE, POC    Collection Time: 03/16/18 11:15 AM   Result Value Ref Range    Glucose (POC) 219 (H) 65 - 100 mg/dL    Performed by [employer] (P) [employer] (PCT)    GLUCOSE, POC    Collection Time: 03/16/18  4:14 PM   Result Value Ref Range    Glucose (POC) 256 (H) 65 - 100 mg/dL    Performed by [employer] (PCT)    GLUCOSE, POC    Collection Time: 03/16/18 10:21 PM   Result Value Ref Range    Glucose (POC) 280 (H) 65 - 100 mg/dL    Performed by Micheal Zaldivar    METABOLIC PANEL, BASIC    Collection Time: 03/17/18  3:25 AM   Result Value Ref Range    Sodium 142 136 - 145 mmol/L    Potassium 4.1 3.5 - 5.1 mmol/L    Chloride 111 (H) 97 - 108 mmol/L    CO2 23 21 - 32 mmol/L    Anion gap 8 5 - 15 mmol/L    Glucose 238 (H) 65 - 100 mg/dL    BUN 33 (H) 6 - 20 MG/DL    Creatinine 2.93 (H) 0.55 - 1.02 MG/DL    BUN/Creatinine ratio 11 (L) 12 - 20      GFR est AA 20 (L) >60 ml/min/1.73m2    GFR est non-AA 16 (L) >60 ml/min/1.73m2    Calcium 7.9 (L) 8.5 - 10.1 MG/DL   CBC W/O DIFF    Collection Time: 03/17/18  3:25 AM   Result Value Ref Range    WBC 11.1 (H) 3.6 - 11.0 K/uL    RBC 3.12 (L) 3.80 - 5.20 M/uL    HGB 9.2 (L) 11.5 - 16.0 g/dL    HCT 30.3 (L) 35.0 - 47.0 %    MCV 97.1 80.0 - 99.0 FL    MCH 29.5 26.0 - 34.0 PG    MCHC 30.4 30.0 - 36.5 g/dL    RDW 17.2 (H) 11.5 - 14.5 %    PLATELET 134 697 - 240 K/uL    MPV 12.3 8.9 - 12.9 FL    NRBC 0.0 0  WBC    ABSOLUTE NRBC 0.00 0.00 - 0.01 K/uL   GLUCOSE, POC    Collection Time: 03/17/18  7:56 AM   Result Value Ref Range    Glucose (POC) 241 (H) 65 - 100 mg/dL    Performed by Durga Painter          Assessment/Plan:     Active Problems:     Morbid obesity ([facility] (1/26/2011)      Diabetes mellitus type II, uncontrolled ([facility] (4/18/2013)      CKD (chronic kidney disease) stage 3, GFR 30-59 ml/min ()      Diabetic gastroparesis (HCC) ()      Anemia (7/24/2017) Ulcer of right fifth toe due to diabetes mellitus ([address] (7/24/2017)      Chronic diastolic CHF (congestive heart failure), NYHA class 3 ([address] (7/24/2017)      Hypertension (7/24/2017)      BMI 45.0-49.9, adult ([address] (7/24/2017)      Fatty liver (7/24/2017)      Aortic stenosis, mild (7/24/2017)      Bilateral edema of lower extremity (7/24/2017)      (HFpEF) heart failure with preserved ejection fraction ([address] (10/31/2017)      Venous stasis dermatitis of both lower extremities (11/9/2017)      Type 2 diabetes mellitus with diabetic neuropathy ([address] (1/2/2018)      Obesity hypoventilation syndrome ([facility] (2/19/2018)      Sepsis ([facility] (3/5/2018)      CVA, old, aphasia (3/9/2018)      Subacute osteomyelitis of right foot ([facility] (3/12/2018)      GIRISH (acute kidney injury) ([facility] (3/16/2018)       ___________________________________________________  PLAN:    1. Continue to monitor renal function   2.  May need dialysis if remains oliguric  3.  :  4.            ___________________________________________________    Attending Physician: Therese Cervantes MD

## 2018-03-18 NOTE — PROGRESS NOTES
0715: Report received from Cindy RN, RN SBAR, DEBI, ED SUMMARY, MAR, RECENT RESULTS were discussed.     Marianne Becerra RN    1600: report given to Imtiaz Silverman

## 2018-03-18 NOTE — PROGRESS NOTES
PROGRESS NOTE    NAME:  Fabio Cowden   :   1955   MRN:   [id number]     Date/Time:  3/18/2018 8:26 AM  Subjective:   History:  Remains oliguric. creatinine=3.1.       Medications reviewed:  Current Facility-Administered Medications   Medication Dose Route Frequency    insulin glargine (LANTUS) injection 20 Units  20 Units SubCUTAneous QHS    enoxaparin (LOVENOX) injection 40 mg  40 mg SubCUTAneous Q24H    sodium chloride (NS) flush 10-30 mL  10-30 mL InterCATHeter PRN    sodium chloride (NS) flush 10 mL  10 mL InterCATHeter Q24H    sodium chloride (NS) flush 10 mL  10 mL InterCATHeter PRN    sodium chloride (NS) flush 10-40 mL  10-40 mL InterCATHeter Q8H    sodium chloride (NS) flush 20 mL  20 mL InterCATHeter Q24H    heparin (porcine) pf 300 Units  300 Units InterCATHeter PRN    0.9% sodium chloride infusion 250 mL  250 mL IntraVENous PRN    morphine injection 2 mg  2 mg IntraVENous Q3H PRN    albuterol-ipratropium (DUO-NEB) 2.5 MG-0.5 MG/3 ML  3 mL Nebulization Q8H PRN    insulin lispro (HUMALOG) injection   SubCUTAneous AC&HS    glucose chewable tablet 16 g  4 Tab Oral PRN    dextrose (D50W) injection syrg 12.5-25 g  12.5-25 g IntraVENous PRN    glucagon (GLUCAGEN) injection 1 mg  1 mg IntraMUSCular PRN    nystatin (MYCOSTATIN) 100,000 unit/gram powder   Topical BID    zinc oxide-cod liver oil (DESITIN) 40 % paste   Topical PRN    ammonium lactate (LAC-HYDRIN) 12 % lotion   Topical BID    clotrimazole (LOTRIMIN) 1 % cream   Topical BID    allopurinol (ZYLOPRIM) tablet 100 mg  100 mg Oral DAILY    atenolol (TENORMIN) tablet 50 mg  50 mg Oral DAILY    atorvastatin (LIPITOR) tablet 40 mg  40 mg Oral DAILY    lactobac ac& pc-s.therm-b.anim (ELVA Q/RISAQUAD)  1 Cap Oral DAILY    colestipol (COLESTID) tablet 1 g  1 g Oral DAILY    dilTIAZem CD (CARDIZEM CD) capsule 360 mg  360 mg Oral DAILY    hydrALAZINE (APRESOLINE) tablet 50 mg  50 mg Oral TID    pregabalin (LYRICA) capsule 75 mg 75 mg Oral BID    nystatin (MYCOSTATIN) 100,000 unit/gram cream   Topical BID    sodium chloride (NS) flush 5-10 mL  5-10 mL IntraVENous Q8H    sodium chloride (NS) flush 5-10 mL  5-10 mL IntraVENous PRN    acetaminophen (TYLENOL) tablet 650 mg  650 mg Oral Q4H PRN    HYDROcodone-acetaminophen (NORCO) 5-325 mg per tablet 1 Tab  1 Tab Oral Q4H PRN    ondansetron (ZOFRAN) injection 4 mg  4 mg IntraVENous Q4H PRN    docusate sodium (COLACE) capsule 100 mg  100 mg Oral BID PRN        Objective:   Vitals:  Visit Vitals    /68 (BP 1 Location: Left arm, BP Patient Position: At rest)    Pulse 72    Temp 98.6 °F (37 °C)    Resp 18    Ht 5' (1.524 m)    Wt 304 lb 12.8 oz (138.3 kg)    SpO2 94%    BMI 59.53 kg/m2    O2 Flow Rate (L/min): 3 l/min O2 Device: Nasal cannula Temp (24hrs), Av.3 °F (36.8 °C), Min:97.4 °F (36.3 °C), Max:98.6 °F (37 °C)      Last 24hr Input/Output:  No intake or output data in the 24 hours ending 18 0826     PHYSICAL EXAM:  General:     Alert, cooperative, no distress, appears stated age. Head:    Normocephalic, without obvious abnormality, atraumatic. Eyes:    Conjunctivae/corneas clear. PERRLA  Nose:   Nares normal. No drainage or sinus tenderness. Throat:     Lips, mucosa, and tongue normal.  No Thrush  Neck:   Supple, symmetrical,  no adenopathy, thyroid: non tender     no carotid bruit and no JVD. Back:     Symmetric,  No CVA tenderness. Lungs:    Clear to auscultation bilaterally. No Wheezing or Rhonchi. No rales. Heart:    Regular rate and rhythm,  no murmur, rub or gallop. Abdomen:    Soft, non-tender. Not distended. Bowel sounds normal. No masses  Extremities:  Extremities normal, atraumatic, No cyanosis. 3+ edema. No clubbing  Lymph nodes:  Cervical, supraclavicular normal.  Neurologic:  Normal strength, Alert and oriented X 3.    Skin:                No rash      Lab Data Reviewed:    Recent Results (from the past 24 hour(s))   GLUCOSE, POC Collection Time: 03/17/18 11:29 AM   Result Value Ref Range    Glucose (POC) 223 (H) 65 - 100 mg/dL    Performed by [address], POC    Collection Time: 03/17/18  4:35 PM   Result Value Ref Range    Glucose (POC) 178 (H) 65 - 100 mg/dL    Performed by [address], POC    Collection Time: 03/17/18  9:10 PM   Result Value Ref Range    Glucose (POC) 183 (H) 65 - 100 mg/dL    Performed by Lea Orellana (PCT)    METABOLIC PANEL, BASIC    Collection Time: 03/18/18  4:31 AM   Result Value Ref Range    Sodium 143 136 - 145 mmol/L    Potassium 4.1 3.5 - 5.1 mmol/L    Chloride 112 (H) 97 - 108 mmol/L    CO2 22 21 - 32 mmol/L    Anion gap 9 5 - 15 mmol/L    Glucose 167 (H) 65 - 100 mg/dL    BUN 36 (H) 6 - 20 MG/DL    Creatinine 3.06 (H) 0.55 - 1.02 MG/DL    BUN/Creatinine ratio 12 12 - 20      GFR est AA 19 (L) >60 ml/min/1.73m2    GFR est non-AA 15 (L) >60 ml/min/1.73m2    Calcium 8.1 (L) 8.5 - 10.1 MG/DL   CBC WITH AUTOMATED DIFF    Collection Time: 03/18/18  4:31 AM   Result Value Ref Range    WBC 10.3 3.6 - 11.0 K/uL    RBC 2.97 (L) 3.80 - 5.20 M/uL    HGB 8.7 (L) 11.5 - 16.0 g/dL    HCT 28.8 (L) 35.0 - 47.0 %    MCV 97.0 80.0 - 99.0 FL    MCH 29.3 26.0 - 34.0 PG    MCHC 30.2 30.0 - 36.5 g/dL    RDW 17.2 (H) 11.5 - 14.5 %    PLATELET 122 493 - 634 K/uL    MPV 12.3 8.9 - 12.9 FL    NRBC 0.0 0  WBC    ABSOLUTE NRBC 0.00 0.00 - 0.01 K/uL    NEUTROPHILS 81 (H) 32 - 75 %    LYMPHOCYTES 7 (L) 12 - 49 %    MONOCYTES 8 5 - 13 %    EOSINOPHILS 3 0 - 7 %    BASOPHILS 0 0 - 1 %    IMMATURE GRANULOCYTES 1 (H) 0.0 - 0.5 %    ABS. NEUTROPHILS 8.4 (H) 1.8 - 8.0 K/UL    ABS. LYMPHOCYTES 0.7 (L) 0.8 - 3.5 K/UL    ABS. MONOCYTES 0.8 0.0 - 1.0 K/UL    ABS. EOSINOPHILS 0.3 0.0 - 0.4 K/UL    ABS. BASOPHILS 0.0 0.0 - 0.1 K/UL    ABS. IMM.  GRANS. 0.1 (H) 0.00 - 0.04 K/UL    DF AUTOMATED      RBC COMMENTS ANISOCYTOSIS  1+       GLUCOSE, POC    Collection Time: 03/18/18  7:52 AM   Result Value Ref Range Glucose (POC) 170 (H) 65 - 100 mg/dL    Performed by Lindsey Chavez          Assessment/Plan:     Active Problems: Morbid obesity ([address] (1/26/2011)      Diabetes mellitus type II, uncontrolled ([address] (4/18/2013)      CKD (chronic kidney disease) stage 3, GFR 30-59 ml/min ()      Diabetic gastroparesis (HCC) ()      Anemia (7/24/2017)      Ulcer of right fifth toe due to diabetes mellitus ([address] (7/24/2017)      Chronic diastolic CHF (congestive heart failure), NYHA class 3 ([address] (7/24/2017)      Hypertension (7/24/2017)      BMI 45.0-49.9, adult ([address] (7/24/2017)      Fatty liver (7/24/2017)      Aortic stenosis, mild (7/24/2017)      Bilateral edema of lower extremity (7/24/2017)      (HFpEF) heart failure with preserved ejection fraction ([address] (10/31/2017)      Venous stasis dermatitis of both lower extremities (11/9/2017)      Type 2 diabetes mellitus with diabetic neuropathy ([address] (1/2/2018)      Obesity hypoventilation syndrome ([address] (2/19/2018)      Sepsis ([address] (3/5/2018)      CVA, old, aphasia (3/9/2018)      Subacute osteomyelitis of right foot ([address] (3/12/2018)      GIRISH (acute kidney injury) ([address] (3/16/2018)       ___________________________________________________  PLAN:    1. No pressing indication for dialysis at this time   2.  Continue to monitor renal function  3.  :  4.            ___________________________________________________    Attending Physician: Josefina Flanagan MD

## 2018-03-18 NOTE — PROGRESS NOTES
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 Oliguric. CT without contrast with no hydro, but some bladder distention (350 ml on strait cath); now with mast--bumex x 1 today     Urine with protein, granular casts, sm LE; 3.4 grams proteinuria on spot check     She has been on a number of antibiotics     Urine eos neg; mild peripheral eosinophilia     ATN/vanc vs AIN-  No I/os documented from yesterday ! Subjective:     Chief Complaint:  Denies complaint. We discussed the above. Review of Systems:    Symptom Y/N Comments  Symptom Y/N Comments   Fever/Chills    Chest Pain n    Poor Appetite    Edema n    Cough    Abdominal Pain n    Sputum    Joint Pain     SOB/PIPER n   Pruritis/Rash     Nausea/vomit n   Tolerating PT/OT     Diarrhea    Tolerating Diet     Constipation    Other       Could not obtain due to:      Objective:     VITALS:   Last 24hrs VS reviewed since prior progress note. Most recent are:  Visit Vitals    /72 (BP 1 Location: Left arm, BP Patient Position: At rest)    Pulse 71    Temp 98.9 °F (37.2 °C)    Resp 19    Ht 5' (1.524 m)    Wt 138.3 kg (304 lb 12.8 oz)    SpO2 98%    BMI 59.53 kg/m2       Intake/Output Summary (Last 24 hours) at 18 1340  Last data filed at 18 0903   Gross per 24 hour   Intake              240 ml   Output                0 ml   Net              240 ml      Telemetry Reviewed:     PHYSICAL EXAM:  General: WD, WN. Alert, cooperative, no acute distress, morbidly obese  Resp:  CTA Bilaterally anterior. No access.  muscle use  CV:  Regular rhythm,  (+) foot bandaged  GI:  Soft, Non distended, Non tender.  (+) edema in pannus      Lab Data Reviewed: (see below)    Medications Reviewed: (see below)    PMH/SH reviewed - no change compared to H&P  ________________________________________________________________________  Care Plan discussed with:  Patient y    Family  y    RN     Care Manager                    Consultant:          Comments   >50% of visit spent in counseling and coordination of care       ________________________________________________________________________  Miguel Tobias MD     Procedures: see electronic medical records for all procedures/Xrays and details which  were not copied into this note but were reviewed prior to creation of Plan. LABS:  Recent Labs      03/18/18   0431  03/17/18   0325   WBC  10.3  11.1*   HGB  8.7*  9.2*   HCT  28.8*  30.3*   PLT  240  258     Recent Labs      03/18/18   0431  03/17/18   0325  03/16/18   0150   NA  143  142  144   K  4.1  4.1  4.0   CL  112*  111*  111*   CO2  22  23  25   BUN  36*  33*  31*   CREA  3.06*  2.93*  2.53*   GLU  167*  238*  214*   CA  8.1*  7.9*  7.8*     No results for input(s): SGOT, GPT, AP, TBIL, TP, ALB, GLOB, GGT, AML, LPSE in the last 72 hours. No lab exists for component: AMYP, HLPSE  No results for input(s): INR, PTP, APTT in the last 72 hours. No lab exists for component: INREXT, INREXT   No results for input(s): FE, TIBC, PSAT, FERR in the last 72 hours. Lab Results   Component Value Date/Time    Folate 19.6 10/27/2017 10:19 AM      No results for input(s): PH, PCO2, PO2 in the last 72 hours. No results for input(s): CPK, CKMB in the last 72 hours.     No lab exists for component: TROPONINI  No components found for: [place]  Lab Results   Component Value Date/Time    Color YELLOW/STRAW 03/15/2018 06:55 PM    Appearance CLOUDY (A) 03/15/2018 06:55 PM    Specific gravity 1.019 03/15/2018 06:55 PM    Specific gravity 1.020 05/31/2014 04:10 AM    pH (UA) 5.0 03/15/2018 06:55 PM    Protein 300 (A) 03/15/2018 06:55 PM    Glucose 250 (A) 03/15/2018 06:55 PM    Ketone NEGATIVE  03/15/2018 06:55 PM    Bilirubin NEGATIVE  03/15/2018 06:55 PM    Urobilinogen 0.2 03/15/2018 06:55 PM    Nitrites NEGATIVE  03/15/2018 06:55 PM    Leukocyte Esterase SMALL (A) 03/15/2018 06:55 PM    Epithelial cells FEW 03/15/2018 06:55 PM    Bacteria 1+ (A) 03/15/2018 06:55 PM    WBC 5-10 03/15/2018 06:55 PM    RBC 0-5 03/15/2018 06:55 PM       MEDICATIONS:  Current Facility-Administered Medications   Medication Dose Route Frequency    insulin glargine (LANTUS) injection 24 Units  24 Units SubCUTAneous QHS    bumetanide (BUMEX) injection 2 mg  2 mg IntraVENous ONCE    enoxaparin (LOVENOX) injection 40 mg  40 mg SubCUTAneous Q24H    sodium chloride (NS) flush 10-30 mL  10-30 mL InterCATHeter PRN    sodium chloride (NS) flush 10 mL  10 mL InterCATHeter Q24H    sodium chloride (NS) flush 10 mL  10 mL InterCATHeter PRN    sodium chloride (NS) flush 10-40 mL  10-40 mL InterCATHeter Q8H    sodium chloride (NS) flush 20 mL  20 mL InterCATHeter Q24H    heparin (porcine) pf 300 Units  300 Units InterCATHeter PRN    0.9% sodium chloride infusion 250 mL  250 mL IntraVENous PRN    morphine injection 2 mg  2 mg IntraVENous Q3H PRN    albuterol-ipratropium (DUO-NEB) 2.5 MG-0.5 MG/3 ML  3 mL Nebulization Q8H PRN    insulin lispro (HUMALOG) injection   SubCUTAneous AC&HS    glucose chewable tablet 16 g  4 Tab Oral PRN    dextrose (D50W) injection syrg 12.5-25 g  12.5-25 g IntraVENous PRN    glucagon (GLUCAGEN) injection 1 mg  1 mg IntraMUSCular PRN    nystatin (MYCOSTATIN) 100,000 unit/gram powder   Topical BID    zinc oxide-cod liver oil (DESITIN) 40 % paste   Topical PRN    ammonium lactate (LAC-HYDRIN) 12 % lotion   Topical BID    clotrimazole (LOTRIMIN) 1 % cream   Topical BID    allopurinol (ZYLOPRIM) tablet 100 mg  100 mg Oral DAILY    atenolol (TENORMIN) tablet 50 mg  50 mg Oral DAILY    atorvastatin (LIPITOR) tablet 40 mg  40 mg Oral DAILY    lactobac ac& pc-s.therm-b.anim (ELVA Q/RISAQUAD)  1 Cap Oral DAILY    colestipol (COLESTID) tablet 1 g  1 g Oral DAILY    dilTIAZem CD (CARDIZEM CD) capsule 360 mg  360 mg Oral DAILY    hydrALAZINE (APRESOLINE) tablet 50 mg  50 mg Oral TID    pregabalin (LYRICA) capsule 75 mg  75 mg Oral BID    nystatin (MYCOSTATIN) 100,000 unit/gram cream   Topical BID    sodium chloride (NS) flush 5-10 mL  5-10 mL IntraVENous Q8H    sodium chloride (NS) flush 5-10 mL  5-10 mL IntraVENous PRN    acetaminophen (TYLENOL) tablet 650 mg  650 mg Oral Q4H PRN    HYDROcodone-acetaminophen (NORCO) 5-325 mg per tablet 1 Tab  1 Tab Oral Q4H PRN    ondansetron (ZOFRAN) injection 4 mg  4 mg IntraVENous Q4H PRN    docusate sodium (COLACE) capsule 100 mg  100 mg Oral BID PRN

## 2018-03-19 NOTE — PROGRESS NOTES
[address] [place] [address]  [phone]      Medical Progress Note      NAME: Yana Burnette   :  1955  MRM:  [id number]    Date/Time: 3/19/2018 7:29 AM      Subjective:     Afebrile and pain currently controlled. Renal function may have plateau'd. Urine output 875 last 24 hours. Hgb stable BS's better     Past Medical History reviewed and unchanged from Admission History and Physical and/or prior progress note/electronic medical record    Medications reviewed. ROS:      General: postive for weakness  Ear Nose and Throat: negative for rhinorrhea, pharyngitis, otalgia, tinnitus, speech or swallowing difficulties  Respiratory: Less SOB and wheezing  Cardiology:  positive for PND, orthopnea and edema  Gastrointestinal: positive for nausea  Genitourinary: positive for incontinence  Dermatological: positive for chronic lichenification legs and feet and pressure sore areas  Neurological: positive for bilateral LE weakness R>L and speech difficulties    Objective:     Last 24hrs VS reviewed since prior progress note. Most recent are:    Visit Vitals    /51    Pulse 66    Temp 97.8 °F (36.6 °C)    Resp 18    Ht 5' (1.524 m)    Wt 304 lb 12.8 oz (138.3 kg)    SpO2 98%    BMI 59.53 kg/m2     SpO2 Readings from Last 6 Encounters:   18 98%   18 (!) 79%   18 92%   18 94%   18 93%   18 94%    O2 Flow Rate (L/min): 3 l/min       Intake/Output Summary (Last 24 hours) at 18 0729  Last data filed at 18 0636   Gross per 24 hour   Intake              420 ml   Output              825 ml   Net             -405 ml          Physical Exam:    General appearance: alert, cooperative, no distress, appears stated age  Eyes: negative  Neck: supple, symmetrical, trachea midline, no adenopathy, thyroid: not enlarged, symmetric, no tenderness/mass/nodules, no carotid bruit and no JVD  Lungs:  Few rhonchi but wheezing diminished  Heart: regular rate and rhythm, S1, S2 normal, grade II/IV systolic murmur; No click, rub or gallop  Abdomen: soft, non-tender. Bowel sounds normal. No masses,  no organomegaly, morbidly obese  Extremities: chronic lichenification legs and feet;right foot heavily bandaged; moderate edema  Neurologic: bilateral LE weakness;  \"aphasia\" persists but is improved somewhat today    Data Review:    Lab:    BMP:   Lab Results   Component Value Date/Time     03/19/2018 04:54 AM    K 4.3 03/19/2018 04:54 AM     (H) 03/19/2018 04:54 AM    CO2 25 03/19/2018 04:54 AM    AGAP 6 03/19/2018 04:54 AM     (H) 03/19/2018 04:54 AM    BUN 42 (H) 03/19/2018 04:54 AM    CREA 3.06 (H) 03/19/2018 04:54 AM    GFRAA 19 (L) 03/19/2018 04:54 AM    GFRNA 15 (L) 03/19/2018 04:54 AM     CBC:   Lab Results   Component Value Date/Time    WBC 9.7 03/19/2018 04:54 AM    HGB 8.7 (L) 03/19/2018 04:54 AM    HCT 28.8 (L) 03/19/2018 04:54 AM     03/19/2018 04:54 AM     Recent Glucose Results:   Lab Results   Component Value Date/Time     (H) 03/19/2018 04:54 AM     All labs reviewed in past 24 hours    Other pertinent lab: none    Imaging:    Reviewed. none    Procedure: . Amputation, right fifth toe and partial right fifth metatarsal.  2.  Reexploration of wound    Assessment / Plan:      Active Problems:    Chronic diastolic CHF (congestive heart failure), NYHA class 3 ([facility]) (7/24/2017)      Obesity hypoventilation syndrome ([address] (2/19/2018)      Sepsis ([address] (3/5/2018)      GIRISH (acute kidney injury) ([address] (3/16/2018)      Diabetes mellitus type II, uncontrolled ([address] (4/18/2013)      Diabetic gastroparesis (HCC) ()      Anemia (7/24/2017)      Ulcer of right fifth toe due to diabetes mellitus ([address] (7/24/2017)      Hypertension (7/24/2017)      Bilateral edema of lower extremity (7/24/2017)      (HFpEF) heart failure with preserved ejection fraction ([facility] (10/31/2017)      Venous stasis dermatitis of both lower extremities (11/9/2017)      CVA, old, aphasia (3/9/2018)      Subacute osteomyelitis of right foot ([facility] (3/12/2018)      Morbid obesity ([facility] (1/26/2011)      CKD (chronic kidney disease) stage 3, GFR 30-59 ml/min ()      Fatty liver (7/24/2017)      Aortic stenosis, mild (7/24/2017)      BMI 45.0-49.9, adult ([facility] (7/24/2017)      Type 2 diabetes mellitus with diabetic neuropathy ([facility] (1/2/2018)        1. Diuretic per renal  2. D/C [place] after today? 3. Wound care  4. PT/OT/ST  5. Follow labs  6. Has O2 and hospital bed when D/C feasible  7. Continue present diabetic management  8. Reduced frequency wound care to accommodate home care  9.  Aggrenox resumed as had been stopped by Dr. Suyapa Berumen for surgery           Care Plan discussed with: Patient and Family    Discussed:  Code Status and Care Plan    Prophylaxis:  Lovenox    Disposition:  Home with [facility]  ___________________________________________________    Attending Physician: Bethany Ribera MD

## 2018-03-19 NOTE — PROGRESS NOTES
Bedside and Verbal shift change report given to Marily Powell RN (oncoming nurse) by Keeley Aggarwal RN (offgoing nurse). Report included the following information SBAR, Kardex, Intake/Output, MAR and Recent Results.

## 2018-03-19 NOTE — PROGRESS NOTES
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 Oliguric. CT without contrast with no hydro, but some bladder distention (350 ml on strait cath); now with mast--bumex x 1 today     Urine with protein, granular casts, sm LE; 3.4 grams proteinuria on spot check     She has been on a number of antibiotics     Urine eos neg; mild peripheral eosinophilia     ATN/vanc vs AIN-  No I/os documented from yesterday ! Subjective:     Chief Complaint:  Denies complaint. We discussed the above. Review of Systems:    Symptom Y/N Comments  Symptom Y/N Comments   Fever/Chills    Chest Pain n    Poor Appetite    Edema n    Cough    Abdominal Pain n    Sputum    Joint Pain     SOB/PIPER n   Pruritis/Rash     Nausea/vomit n   Tolerating PT/OT     Diarrhea    Tolerating Diet     Constipation    Other       Could not obtain due to:      Objective:     VITALS:   Last 24hrs VS reviewed since prior progress note. Most recent are:  Visit Vitals    /66    Pulse 64    Temp 98.2 °F (36.8 °C)    Resp 20    Ht 5' (1.524 m)    Wt 138 kg (304 lb 4.8 oz)    SpO2 91%    BMI 59.43 kg/m2       Intake/Output Summary (Last 24 hours) at 18 1201  Last data filed at 18 0753   Gross per 24 hour   Intake              180 ml   Output             1025 ml   Net             -845 ml      Telemetry Reviewed:     PHYSICAL EXAM:  General: WD, WN. Alert, cooperative, no acute distress, morbidly obese  Resp:  CTA Bilaterally anterior. No access.  muscle use  CV:  Regular  rhythm,  (+) foot bandaged  GI:  Soft, Non distended, Non tender.  (+) edema in pannus      Lab Data Reviewed: (see below)    Medications Reviewed: (see below)    PMH/SH reviewed - no change compared to H&P  ________________________________________________________________________  Care Plan discussed with:  Patient y    Family  y    RN     Care Manager                    Consultant:          Comments   >50% of visit spent in counseling and coordination of care       ________________________________________________________________________  Shane Garcia MD     Procedures: see electronic medical records for all procedures/Xrays and details which  were not copied into this note but were reviewed prior to creation of Plan. LABS:  Recent Labs      03/19/18 0454 03/18/18   0431   WBC  9.7  10.3   HGB  8.7*  8.7*   HCT  28.8*  28.8*   PLT  233  240     Recent Labs      03/19/18 0454  03/18/18   0431  03/17/18   0325   NA  145  143  142   K  4.3  4.1  4.1   CL  114*  112*  111*   CO2  25  22  23   BUN  42*  36*  33*   CREA  3.06*  3.06*  2.93*   GLU  156*  167*  238*   CA  8.2*  8.1*  7.9*   MG  1.8   --    --    PHOS  4.7   --    --      Recent Labs      03/19/18 0454   SGOT  5*   AP  77   TP  5.7*   ALB  2.3*   GLOB  3.4     No results for input(s): INR, PTP, APTT in the last 72 hours. No lab exists for component: INREXT, INREXT   No results for input(s): FE, TIBC, PSAT, FERR in the last 72 hours. Lab Results   Component Value Date/Time    Folate 19.6 10/27/2017 10:19 AM      No results for input(s): PH, PCO2, PO2 in the last 72 hours. No results for input(s): CPK, CKMB in the last 72 hours.     No lab exists for component: TROPONINI  No components found for: [place]  Lab Results   Component Value Date/Time    Color YELLOW/STRAW 03/15/2018 06:55 PM    Appearance CLOUDY (A) 03/15/2018 06:55 PM    Specific gravity 1.019 03/15/2018 06:55 PM    Specific gravity 1.020 05/31/2014 04:10 AM    pH (UA) 5.0 03/15/2018 06:55 PM    Protein 300 (A) 03/15/2018 06:55 PM    Glucose 250 (A) 03/15/2018 06:55 PM    Ketone NEGATIVE  03/15/2018 06:55 PM    Bilirubin NEGATIVE  03/15/2018 06:55 PM    Urobilinogen 0.2 03/15/2018 06:55 PM    Nitrites NEGATIVE  03/15/2018 06:55 PM    Leukocyte Esterase SMALL (A) 03/15/2018 06:55 PM    Epithelial cells FEW 03/15/2018 06:55 PM    Bacteria 1+ (A) 03/15/2018 06:55 PM    WBC 5-10 03/15/2018 06:55 PM    RBC 0-5 03/15/2018 06:55 PM       MEDICATIONS:  Current Facility-Administered Medications   Medication Dose Route Frequency    aspirin-dipyridamole (AGGRENOX)  mg per capsule 1 Cap  1 Cap Oral BID    bumetanide (BUMEX) injection 2 mg  2 mg IntraVENous ONCE    insulin glargine (LANTUS) injection 24 Units  24 Units SubCUTAneous QHS    enoxaparin (LOVENOX) injection 40 mg  40 mg SubCUTAneous Q24H    sodium chloride (NS) flush 10-30 mL  10-30 mL InterCATHeter PRN    sodium chloride (NS) flush 10 mL  10 mL InterCATHeter Q24H    sodium chloride (NS) flush 10 mL  10 mL InterCATHeter PRN    sodium chloride (NS) flush 10-40 mL  10-40 mL InterCATHeter Q8H    sodium chloride (NS) flush 20 mL  20 mL InterCATHeter Q24H    heparin (porcine) pf 300 Units  300 Units InterCATHeter PRN    0.9% sodium chloride infusion 250 mL  250 mL IntraVENous PRN    morphine injection 2 mg  2 mg IntraVENous Q3H PRN    albuterol-ipratropium (DUO-NEB) 2.5 MG-0.5 MG/3 ML  3 mL Nebulization Q8H PRN    insulin lispro (HUMALOG) injection   SubCUTAneous AC&HS    glucose chewable tablet 16 g  4 Tab Oral PRN    dextrose (D50W) injection syrg 12.5-25 g  12.5-25 g IntraVENous PRN    glucagon (GLUCAGEN) injection 1 mg  1 mg IntraMUSCular PRN    nystatin (MYCOSTATIN) 100,000 unit/gram powder   Topical BID    zinc oxide-cod liver oil (DESITIN) 40 % paste   Topical PRN    ammonium lactate (LAC-HYDRIN) 12 % lotion   Topical BID    clotrimazole (LOTRIMIN) 1 % cream   Topical BID    allopurinol (ZYLOPRIM) tablet 100 mg  100 mg Oral DAILY    atenolol (TENORMIN) tablet 50 mg  50 mg Oral DAILY    atorvastatin (LIPITOR) tablet 40 mg  40 mg Oral DAILY    lactobac ac& pc-s.therm-b.anim (ELVA Q/RISAQUAD)  1 Cap Oral DAILY    colestipol (COLESTID) tablet 1 g  1 g Oral DAILY    dilTIAZem CD (CARDIZEM CD) capsule 360 mg  360 mg Oral DAILY    hydrALAZINE (APRESOLINE) tablet 50 mg  50 mg Oral TID    pregabalin (LYRICA) capsule 75 mg  75 mg Oral BID    nystatin (MYCOSTATIN) 100,000 unit/gram cream   Topical BID    sodium chloride (NS) flush 5-10 mL  5-10 mL IntraVENous Q8H    sodium chloride (NS) flush 5-10 mL  5-10 mL IntraVENous PRN    acetaminophen (TYLENOL) tablet 650 mg  650 mg Oral Q4H PRN    HYDROcodone-acetaminophen (NORCO) 5-325 mg per tablet 1 Tab  1 Tab Oral Q4H PRN    ondansetron (ZOFRAN) injection 4 mg  4 mg IntraVENous Q4H PRN    docusate sodium (COLACE) capsule 100 mg  100 mg Oral BID PRN

## 2018-03-19 NOTE — PROGRESS NOTES
Bedside and Verbal shift change report given to [address] [place] [address] Avenue (oncoming nurse) by Stan Rodriguez RN (offgoing nurse). Report included the following information SBAR, Kardex, Intake/Output, MAR and Cardiac Rhythm Normal Sinus.      2345 Q12 wound care done

## 2018-03-19 NOTE — PROGRESS NOTES
[facility] will follow at d/c.  Bed//oxygen in place. May need new oxygen criteria numbers prior to d/c. Spouse will transport when stable.

## 2018-03-20 NOTE — PROGRESS NOTES
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 Padmini Arriola yesterday-pt essentially bedbound. Dont anticipate that there will be much functional improvement.  is prepared at home with a hospital bed, and getting ready for 02. Plan dc in the next 1-2 days as creatinine continues to improve (hopefully)         Subjective:     Chief Complaint:  Denies complaint. We discussed the above. Review of Systems:    Symptom Y/N Comments  Symptom Y/N Comments   Fever/Chills    Chest Pain n    Poor Appetite    Edema n    Cough    Abdominal Pain n    Sputum    Joint Pain     SOB/PIPER n   Pruritis/Rash     Nausea/vomit n   Tolerating PT/OT     Diarrhea    Tolerating Diet     Constipation    Other       Could not obtain due to:      Objective:     VITALS:   Last 24hrs VS reviewed since prior progress note. Most recent are:  Visit Vitals    /74 (BP 1 Location: Left arm, BP Patient Position: At rest)    Pulse 64    Temp 98.2 °F (36.8 °C)    Resp 18    Ht 5' (1.524 m)    Wt 137.8 kg (303 lb 12.8 oz)    SpO2 98%    BMI 59.33 kg/m2       Intake/Output Summary (Last 24 hours) at 18 1150  Last data filed at 18 0431   Gross per 24 hour   Intake              240 ml   Output              600 ml   Net             -360 ml      Telemetry Reviewed:     PHYSICAL EXAM:  General: WD, WN. Alert, cooperative, no acute distress, morbidly obese  Resp:  CTA Bilaterally anterior. No access.  muscle use  CV:  Regular  rhythm,  (+) foot bandaged  GI:  Soft, Non distended, Non tender.  (+) edema in pannus      Lab Data Reviewed: (see below)    Medications Reviewed: (see below)    PMH/SH reviewed - no change compared to H&P  ________________________________________________________________________  Care Plan discussed with:  Patient y    Family  y    RN     Care Manager                    Consultant:          Comments   >50% of visit spent in counseling and coordination of care       ________________________________________________________________________  Denis Rivera MD     Procedures: see electronic medical records for all procedures/Xrays and details which  were not copied into this note but were reviewed prior to creation of Plan. LABS:  Recent Labs      03/20/18 0413 03/19/18 0454   WBC  9.7  9.7   HGB  8.7*  8.7*   HCT  28.7*  28.8*   PLT  214  233     Recent Labs      03/20/18 0413 03/19/18 0454  03/18/18   0431   NA  146*  145  143   K  4.4  4.3  4.1   CL  115*  114*  112*   CO2  25  25  22   BUN  45*  42*  36*   CREA  2.97*  3.06*  3.06*   GLU  233*  156*  167*   CA  8.4*  8.2*  8.1*   MG   --   1.8   --    PHOS   --   4.7   --      Recent Labs      03/19/18 0454   SGOT  5*   AP  77   TP  5.7*   ALB  2.3*   GLOB  3.4     No results for input(s): INR, PTP, APTT in the last 72 hours. No lab exists for component: INREXT, INREXT   No results for input(s): FE, TIBC, PSAT, FERR in the last 72 hours. Lab Results   Component Value Date/Time    Folate 19.6 10/27/2017 10:19 AM      No results for input(s): PH, PCO2, PO2 in the last 72 hours. No results for input(s): CPK, CKMB in the last 72 hours.     No lab exists for component: TROPONINI  No components found for: [place]  Lab Results   Component Value Date/Time    Color YELLOW/STRAW 03/15/2018 06:55 PM    Appearance CLOUDY (A) 03/15/2018 06:55 PM    Specific gravity 1.019 03/15/2018 06:55 PM    Specific gravity 1.020 05/31/2014 04:10 AM    pH (UA) 5.0 03/15/2018 06:55 PM    Protein 300 (A) 03/15/2018 06:55 PM    Glucose 250 (A) 03/15/2018 06:55 PM    Ketone NEGATIVE  03/15/2018 06:55 PM    Bilirubin NEGATIVE  03/15/2018 06:55 PM    Urobilinogen 0.2 03/15/2018 06:55 PM    Nitrites NEGATIVE  03/15/2018 06:55 PM    Leukocyte Esterase SMALL (A) 03/15/2018 06:55 PM    Epithelial cells FEW 03/15/2018 06:55 PM    Bacteria 1+ (A) 03/15/2018 06:55 PM    WBC 5-10 03/15/2018 06:55 PM    RBC 0-5 03/15/2018 06:55 PM       MEDICATIONS:  Current Facility-Administered Medications   Medication Dose Route Frequency    pantoprazole (PROTONIX) tablet 40 mg  40 mg Oral ACB&D    bumetanide (BUMEX) injection 2 mg  2 mg IntraVENous ONCE    aspirin-dipyridamole (AGGRENOX)  mg per capsule 1 Cap  1 Cap Oral BID    heparin (porcine) injection 5,000 Units  5,000 Units SubCUTAneous Q12H    insulin glargine (LANTUS) injection 24 Units  24 Units SubCUTAneous QHS    sodium chloride (NS) flush 10-30 mL  10-30 mL InterCATHeter PRN    sodium chloride (NS) flush 10 mL  10 mL InterCATHeter Q24H    sodium chloride (NS) flush 10 mL  10 mL InterCATHeter PRN    sodium chloride (NS) flush 10-40 mL  10-40 mL InterCATHeter Q8H    sodium chloride (NS) flush 20 mL  20 mL InterCATHeter Q24H    heparin (porcine) pf 300 Units  300 Units InterCATHeter PRN    0.9% sodium chloride infusion 250 mL  250 mL IntraVENous PRN    morphine injection 2 mg  2 mg IntraVENous Q3H PRN    albuterol-ipratropium (DUO-NEB) 2.5 MG-0.5 MG/3 ML  3 mL Nebulization Q8H PRN    insulin lispro (HUMALOG) injection   SubCUTAneous AC&HS    glucose chewable tablet 16 g  4 Tab Oral PRN    dextrose (D50W) injection syrg 12.5-25 g  12.5-25 g IntraVENous PRN    glucagon (GLUCAGEN) injection 1 mg  1 mg IntraMUSCular PRN    nystatin (MYCOSTATIN) 100,000 unit/gram powder   Topical BID    zinc oxide-cod liver oil (DESITIN) 40 % paste   Topical PRN    ammonium lactate (LAC-HYDRIN) 12 % lotion   Topical BID    clotrimazole (LOTRIMIN) 1 % cream   Topical BID    allopurinol (ZYLOPRIM) tablet 100 mg  100 mg Oral DAILY    atenolol (TENORMIN) tablet 50 mg  50 mg Oral DAILY    atorvastatin (LIPITOR) tablet 40 mg  40 mg Oral DAILY    lactobac ac& pc-s.therm-b.anim (ELVA Q/RISAQUAD)  1 Cap Oral DAILY    colestipol (COLESTID) tablet 1 g  1 g Oral DAILY    dilTIAZem CD (CARDIZEM CD) capsule 360 mg  360 mg Oral DAILY    hydrALAZINE (APRESOLINE) tablet 50 mg  50 mg Oral TID    pregabalin (LYRICA) capsule 75 mg  75 mg Oral BID    nystatin (MYCOSTATIN) 100,000 unit/gram cream   Topical BID    sodium chloride (NS) flush 5-10 mL  5-10 mL IntraVENous Q8H    sodium chloride (NS) flush 5-10 mL  5-10 mL IntraVENous PRN    acetaminophen (TYLENOL) tablet 650 mg  650 mg Oral Q4H PRN    HYDROcodone-acetaminophen (NORCO) 5-325 mg per tablet 1 Tab  1 Tab Oral Q4H PRN    ondansetron (ZOFRAN) injection 4 mg  4 mg IntraVENous Q4H PRN    docusate sodium (COLACE) capsule 100 mg  100 mg Oral BID PRN

## 2018-03-20 NOTE — PROGRESS NOTES
1200 - patient has mast removed at 0400 this morning. Patient has not voided yet. Has no complains or distress. Performed bladder scan. Result showed 0 mls retention. Performed Straight cath per policy and [facility]'s recommendation. Urine output after straight cath was 200cc, yellow. Clear urine.

## 2018-03-20 NOTE — PROGRESS NOTES
0400: Mast cath removed without difficulty. Baloon deflated. 100mL urine present in mast bag. Patient tolerated procedure well.

## 2018-03-20 NOTE — PROGRESS NOTES
Problem: Mobility Impaired (Adult and Pediatric)  Goal: *Acute Goals and Plan of Care (Insert Text)  Physical Therapy Goals  Initiated 3/9/2018  1. Patient will roll side to side in bed with moderate assistance  within 7 day(s). 2.   Patient will move from supine to sit and sit to supine  in bed with maximal assistance within 7 day(s). 3.  Patient will sit EOB for 10 minutes with good sitting balance within 7 day(s). 4. Patient will perform LE exercises in supine within 7 day(s). Revised 3/15/2018  1. Patient will roll side to side in bed with maximum assistance  within 7 day(s). 2.   Patient will move from supine to sit and sit to supine  in bed with maximal assistance within 7 day(s). 3.  Patient will sit EOB for 20 minutes with good sitting balance within 7 day(s). 4. Patient will perform LE exercises in supine within 7 day(s). physical Therapy TREATMENT  Patient: Bishop Girard (49 y.o. female)  Date: 3/20/2018  Diagnosis: Sepsis ([address]  Osteomylitis  Osteomylitis <principal problem not specified>  Procedure(s) (LRB):  AMPUTATION RIGHT FIFTH TOE  AND PARTIAL RIGHT FIFTH METATARSAL   (Right)  WOUND EXPLORATION (Right) 10 Days Post-Op  Precautions: Fall, Skin (O2 use is new)  Chart, physical therapy assessment, plan of care and goals were reviewed. ASSESSMENT:  Patient received in bed sleeping and in NAD. Greeted by her  who was present throughout the session. Patient agreeable to intervention with encouragement for participation an attempted transfer to EOB. Rolled and scooted to EOB with BLE off requiring total assistance x2 and attempted sidelying to sit. Unable to clear the patient's shoulders to sit edge of bed despite HOB elevated and assist x2. This patient is 5' tall and unable to safely scoot to place feet on floor despite seat deflate. Aborted this option and returned to supine to sit bed in chair position.  Patient participated well in exercises from chair position but impaired command following (especially for knee flexion) despite verbal, tactile, and visual cues. Per the patient's , she was able to stand to transfer to a bedside commode with his assistance prior to admission but has been unable to do more than sit EOB (last session) since surgery. Discussed her current functional status and concerns for safe return home d/t patient dependence for all aspects of mobility. Seated forward flexion limited today d/t her pannus preventing meaningful hip flexion or clearance of her back from the bed to sit unsupported from chair position. The patient and her  plan to discharge home with HHPT and a hospital bed. In addition to this, a drop arm bedside commode and transfer board would be helpful if she can progress to a sitting position. She may require a wojciech if sitting remains impaired. Would recommend discharge to a SNF setting as a safe alternative d/t high levels of care and concern for her 's sole ability to meet her needs. They continue to refuse SNF level rehab due to patient communication deficits and a poor prior experience with SNF. Progression toward goals:  []    Improving appropriately and progressing toward goals  [x]    Improving slowly and progressing toward goals  []    Not making progress toward goals and plan of care will be adjusted     PLAN:  Patient continues to benefit from skilled intervention to address the above impairments. Continue treatment per established plan of care. Discharge Recommendations:  Rehab  Further Equipment Recommendations for Discharge:  See above, Wojciech vs drop arm BSC and transfer board     SUBJECTIVE:   Patient stated I'm tired.     OBJECTIVE DATA SUMMARY:   Critical Behavior:  Neurologic State: Alert  Orientation Level: Disoriented to situation, Disoriented to time  Cognition: Follows commands  Safety/Judgement: Fall prevention  Functional Mobility Training:  Bed Mobility:  Rolling:  Total assistance;Assist x2  Supine to Sit: Total assistance;Assist x2  Sit to Supine: Total assistance;Assist x2  Scooting: Total assistance     Balance:  Sitting: Impaired  Sitting - Static: Fair (occasional)  Sitting - Dynamic: Poor (constant support)  Therapeutic Exercises:   Supine LAQs, marching, ankle pumps x10 each, mild manually resisted LAQs in sitting  Attempted hamstring sets or curls in sitting but patient continued with knee extension despite verbal, tactile, and visual cues with manual muscle facilitation    Encouraged LAQs or quad sets and glute sets in supine to condition muscles for \"standing\"   Pain:  Pain Scale 1: Numeric (0 - 10)  Pain Intensity 1: 4  Pain Location 1: Abdomen  Pain Orientation 1: Lower  Pain Description 1: Aching  Pain Intervention(s) 1: Medication (see MAR)  Activity Tolerance:     Please refer to the flowsheet for vital signs taken during this treatment.   After treatment:   []    Patient left in no apparent distress sitting up in chair  [x]    Patient left in no apparent distress in bed  [x]    Call bell left within reach  [x]    Nursing notified  []    Caregiver present  []    Bed alarm activated    COMMUNICATION/COLLABORATION:   The patients plan of care was discussed with: Registered Nurse    Dylan Hui, PT, DPT   Time Calculation: 42 mins

## 2018-03-20 NOTE — DIABETES MGMT
DTC Progress Note    Recommendations/ Comments: blood sugars trending up, patient may benefit from continuing to titrate Lantus. Would recommend Lantus 30 units daily    Current hospital DM medication: Lispro correctional insulin - normal sensitivity ac and hs., Lantus 24 units daily    Chart reviewed on Rakan Dominguez     Patient is a 60 y.o. female with known DM on Levemir 50 units bid, Glipizide 10 mg bid and Lispro 10 units tid ac. A1c:   Lab Results   Component Value Date/Time    Hemoglobin A1c 7.6 (H) 10/27/2017 10:19 AM    Hemoglobin A1c 11.2 (H) 10/01/2015 01:04 PM       Recent Glucose Results:   Lab Results   Component Value Date/Time     (H) 03/20/2018 04:13 AM    GLUCPOC 248 (H) 03/20/2018 11:49 AM    GLUCPOC 263 (H) 03/20/2018 08:13 AM    GLUCPOC 214 (H) 03/19/2018 09:14 PM        Lab Results   Component Value Date/Time    Creatinine 2.97 (H) 03/20/2018 04:13 AM     Estimated Creatinine Clearance: 25.2 mL/min (based on Cr of 2.97). Active Orders   Diet    DIET DIABETIC CONSISTENT CARB Regular        PO intake:   Patient Vitals for the past 72 hrs:   % Diet Eaten   03/18/18 1206 50 %   03/18/18 0903 75 %       Will continue to follow as needed.     Thank you  Milagros Reed, [address] [place] [address], [address]  Office:  [phone]

## 2018-03-20 NOTE — PROGRESS NOTES
[address] [place] [address]  [phone]      Medical Progress Note      NAME: Derick Pearce   :  1955  MRM:  [id number]    Date/Time: 3/20/2018 6:46 AM      Subjective:     Afebrile. C/O mid abd pain w/o N/V. Recent CT negative. Will add Protonix and check KUB. Renal function slightly improved. Urine output 800 last 24 hours. Hgb stable. BS's stable. Cath out. Will check urine C/S. Past Medical History reviewed and unchanged from Admission History and Physical and/or prior progress note/electronic medical record    Medications reviewed. ROS:      General: postive for weakness  Ear Nose and Throat: negative for rhinorrhea, pharyngitis, otalgia, tinnitus, speech or swallowing difficulties  Respiratory: Less SOB and wheezing  Cardiology:  positive for PND, orthopnea and edema  Gastrointestinal: positive for abd pain  Genitourinary: positive for incontinence  Dermatological: positive for chronic lichenification legs and feet and pressure sore areas  Neurological: positive for bilateral LE weakness R>L and speech difficulties    Objective:     Last 24hrs VS reviewed since prior progress note.  Most recent are:    Visit Vitals    /60 (BP 1 Location: Left arm, BP Patient Position: At rest)    Pulse 65    Temp 97.3 °F (36.3 °C)    Resp 20    Ht 5' (1.524 m)    Wt 303 lb 12.8 oz (137.8 kg)    SpO2 92%    BMI 59.33 kg/m2     SpO2 Readings from Last 6 Encounters:   18 92%   18 (!) 79%   18 92%   18 94%   18 93%   18 94%    O2 Flow Rate (L/min): 3 l/min       Intake/Output Summary (Last 24 hours) at 18 0646  Last data filed at 18 0431   Gross per 24 hour   Intake              240 ml   Output              800 ml   Net             -560 ml          Physical Exam:    General appearance: alert, cooperative, no distress, appears stated age  Eyes: negative  Neck: supple, symmetrical, trachea midline, no adenopathy, thyroid: not enlarged, symmetric, no tenderness/mass/nodules, no carotid bruit and no JVD  Lungs: Few rhonchi but wheezing diminished  Heart: regular rate and rhythm, S1, S2 normal, grade II/IV systolic murmur; No click, rub or gallop  Abdomen: soft, non-tender. Bowel sounds normal. No masses,  no organomegaly, morbidly obese  Extremities: chronic lichenification legs and feet;right foot heavily bandaged; moderate edema  Neurologic: bilateral LE weakness;  \"aphasia\" persists but is improved somewhat today    Data Review:    Lab:    BMP:   Lab Results   Component Value Date/Time     (H) 03/20/2018 04:13 AM    K 4.4 03/20/2018 04:13 AM     (H) 03/20/2018 04:13 AM    CO2 25 03/20/2018 04:13 AM    AGAP 6 03/20/2018 04:13 AM     (H) 03/20/2018 04:13 AM    BUN 45 (H) 03/20/2018 04:13 AM    CREA 2.97 (H) 03/20/2018 04:13 AM    GFRAA 19 (L) 03/20/2018 04:13 AM    GFRNA 16 (L) 03/20/2018 04:13 AM     CBC:   Lab Results   Component Value Date/Time    WBC 9.7 03/20/2018 04:13 AM    HGB 8.7 (L) 03/20/2018 04:13 AM    HCT 28.7 (L) 03/20/2018 04:13 AM     03/20/2018 04:13 AM     Recent Glucose Results:   Lab Results   Component Value Date/Time     (H) 03/20/2018 04:13 AM     All labs reviewed in past 24 hours    Other pertinent lab: none    Imaging:    Reviewed. none    Procedure: . Amputation, right fifth toe and partial right fifth metatarsal.  2.  Reexploration of wound    Assessment / Plan:      Active Problems:    Chronic diastolic CHF (congestive heart failure), NYHA class 3 (HCC) (7/24/2017)      Obesity hypoventilation syndrome ([address] (2/19/2018)      Sepsis ([address] (3/5/2018)      GIRISH (acute kidney injury) ([address] (3/16/2018)      Diabetes mellitus type II, uncontrolled ([address] (4/18/2013)      Diabetic gastroparesis (HCC) ()      Anemia (7/24/2017)      Ulcer of right fifth toe due to diabetes mellitus ([facility] (7/24/2017)      Hypertension (7/24/2017)      Bilateral edema of lower extremity (7/24/2017)      (HFpEF) heart failure with preserved ejection fraction ([facility] (10/31/2017)      Venous stasis dermatitis of both lower extremities (11/9/2017)      CVA, old, aphasia (3/9/2018)      Subacute osteomyelitis of right foot ([facility] (3/12/2018)      Morbid obesity ([facility] (1/26/2011)      CKD (chronic kidney disease) stage 3, GFR 30-59 ml/min ()      Fatty liver (7/24/2017)      Aortic stenosis, mild (7/24/2017)      BMI 45.0-49.9, adult ([facility] (7/24/2017)      Type 2 diabetes mellitus with diabetic neuropathy ([facility] (1/2/2018)        1. Diuretic per renal  2. D/C [place] and check urine C/S  3. Wound care  4. PT/OT/ST  5. Follow labs  6. Has O2 and hospital bed when D/C feasible  7. Continue present diabetic management  8. Urine C/S  9. KUB and Protonix  10. Reduced frequency wound care to accommodate home care  11.  Aggrenox resumed as had been stopped by Dr. Roger Lo for surgery           Care Plan discussed with: Patient and Family    Discussed:  Code Status and Care Plan    Prophylaxis:  Heparin and aggrenox    [facility]  ___________________________________________________    Attending Physician: Lacie Flores MD

## 2018-03-20 NOTE — PROGRESS NOTES
Bedside and Verbal shift change report given to Fabby (oncoming nurse) by Carlos Rust (offgoing nurse). Report included the following information SBAR, Kardex, Intake/Output, MAR, Recent Results and Med Rec Status.

## 2018-03-21 NOTE — PROGRESS NOTES
Bedside and Verbal shift change report given to Franklin Gilbert (oncoming nurse) by Eleanor Herrmann (offgoing nurse). Report included the following information SBAR, Kardex, Intake/Output, MAR and Recent Results.

## 2018-03-21 NOTE — PROGRESS NOTES
Bedside shift change report given to JULIETTE Sequeira (oncoming nurse) by Ajay/JULIETTE Garber (offgoing nurse). Report included the following information SBAR, Kardex, Procedure Summary, MAR, Recent Results and Cardiac Rhythm NSR. Zone Phone for oncoming shift:   [phone]    Shift Summary: Pt. Resting comfortably, Dr. Agnieszka Tan already in to see pt.      LDAs         PICC Double Lumen 03/07/18 Right;Brachial (Active)   Central Line Being Utilized Yes 3/20/2018  7:52 PM   Criteria for Appropriate Use Long term IV/antibiotic administration 3/20/2018  7:52 PM   Site Assessment Clean, dry, & intact 3/20/2018  7:52 PM   Phlebitis Assessment 0 3/20/2018  7:52 PM   Infiltration Assessment 0 3/20/2018  7:52 PM   Arm Circumference (cm) 39 cm 3/7/2018 10:31 AM   Date of Last Dressing Change 03/14/18 3/20/2018  7:52 PM   Dressing Status Clean, dry, & intact 3/20/2018  7:52 PM   Action Taken Blood drawn 3/20/2018  3:10 AM   External Catheter Length (cm) 0 centimeters 3/15/2018  3:00 PM   Dressing Type Disk with Chlorhexadine gluconate (CHG) 3/20/2018  4:00 PM   Hub Color/Line Status Purple;Flushed 3/20/2018  7:52 PM   Positive Blood Return (Site #1) Yes 3/20/2018  7:52 PM   Hub Color/Line Status Red;Flushed 3/20/2018  7:52 PM   Positive Blood Return (Site #2) Yes 3/20/2018  7:52 PM   Alcohol Cap Used Yes 3/20/2018  7:52 PM                          [REMOVED] Urinary Catheter 03/16/18 [place]-Criteria for Appropriate Use: Obstruction/retention   Intake & Output   Date 03/20/18 0700 - 03/21/18 0659 03/21/18 0700 - 03/22/18 0659   Shift [id number] 1900-0659 24 Hour Total 0700-1859 1900-0659 24 Hour Total   I  N  T  A  K  E   Shift Total  (mL/kg)         O  U  T  P  U  T   Urine  (mL/kg/hr)  300 300         Urine Voided  300 300       Other 200  200         Other Output 200  200       Stool            Stool Occurrence(s)  1 x 1 x       Shift Total  (mL/kg) 200  (1.5) 300  (2.2) 500  (3.7)      NET -200 -300 -500      Weight (kg) 137.8 136.6 136.6 136.6 136.6 136.6      Last Bowel Movement Last Bowel Movement Date: 03/20/18   Glucose Checks [] N/A  [x] AC/HS  [] Q6  Concerns:   Nutrition Active Orders   Diet    DIET DIABETIC CONSISTENT CARB Regular       Consults []PT  []OT  []Speech  []Case Management   Cardiac Monitoring []N/A [x]Yes Expires:

## 2018-03-21 NOTE — PROGRESS NOTES
[address] [facility]  [phone]      Medical Progress Note      NAME: Pravin Rivera   :  1955  MRM:  [id number]    Date/Time: 3/21/2018 6:58 AM      Subjective:     Afebrile. Still c/o intermittent mid abd pain w/o N/V.  thinks pain med may be contributing and will try to hold today and see. Recent CT negative. KUB negative. Renal function unchanged and this may be her new normal.  Will ask Dr. Demarco Vieyra her thoughts including ? ongoing po diuretic. Urine output appears less but [place] out and ? accuracy. Hgb stable. BS's > 200. If abd pain improved and kidney function stable would like to D/C at least by Friday if all agree. BP appears lower this am and will watch. May need to decrease hydralazine. Past Medical History reviewed and unchanged from Admission History and Physical and/or prior progress note/electronic medical record    Medications reviewed. ROS:      General: postive for weakness  Ear Nose and Throat: negative for rhinorrhea, pharyngitis, otalgia, tinnitus, speech or swallowing difficulties  Respiratory: Less SOB and wheezing  Cardiology:  positive for PND, orthopnea and edema  Gastrointestinal: positive for abd pain  Genitourinary: positive for incontinence  Dermatological: positive for chronic lichenification legs and feet and pressure sore areas  Neurological: positive for bilateral LE weakness R>L and speech difficulties    Objective:     Last 24hrs VS reviewed since prior progress note.  Most recent are:    Visit Vitals    BP 98/76    Pulse 66    Temp 98.2 °F (36.8 °C)    Resp 18    Ht 5' (1.524 m)    Wt 301 lb 1.6 oz (136.6 kg)    SpO2 96%    BMI 58.8 kg/m2     SpO2 Readings from Last 6 Encounters:   18 96%   18 (!) 79%   18 92%   18 94%   18 93%   18 94%    O2 Flow Rate (L/min): 3 l/min       Intake/Output Summary (Last 24 hours) at 18 7836  Last data filed at 03/20/18 2336   Gross per 24 hour   Intake                0 ml   Output              500 ml   Net             -500 ml          Physical Exam:    General appearance: alert, cooperative, no distress, appears stated age  Eyes: negative  Neck: supple, symmetrical, trachea midline, no adenopathy, thyroid: not enlarged, symmetric, no tenderness/mass/nodules, no carotid bruit and no JVD  Lungs: Few rhonchi but wheezing diminished  Heart: regular rate and rhythm, S1, S2 normal, grade II/IV systolic murmur; No click, rub or gallop  Abdomen: soft, non-tender. Bowel sounds normal. No masses,  no organomegaly, morbidly obese  Extremities: chronic lichenification legs and feet;right foot heavily bandaged; moderate edema  Neurologic: bilateral LE weakness;  \"aphasia\" persists but is improved somewhat today    Data Review:    Lab:    BMP:   Lab Results   Component Value Date/Time     (H) 03/21/2018 02:22 AM    K 4.5 03/21/2018 02:22 AM     (H) 03/21/2018 02:22 AM    CO2 24 03/21/2018 02:22 AM    AGAP 6 03/21/2018 02:22 AM     (H) 03/21/2018 02:22 AM    BUN 47 (H) 03/21/2018 02:22 AM    CREA 2.96 (H) 03/21/2018 02:22 AM    GFRAA 19 (L) 03/21/2018 02:22 AM    GFRNA 16 (L) 03/21/2018 02:22 AM     CBC:   No results found for: WBC, HGB, HGBEXT, HCT, HCTEXT, PLT, PLTEXT, HGBEXT, HCTEXT, PLTEXT  Recent Glucose Results:   Lab Results   Component Value Date/Time     (H) 03/21/2018 02:22 AM     All labs reviewed in past 24 hours    Other pertinent lab: none    Imaging:    Reviewed. none    Procedure: . Amputation, right fifth toe and partial right fifth metatarsal.  2.  Reexploration of wound    Assessment / Plan:      Active Problems:    Chronic diastolic CHF (congestive heart failure), NYHA class 3 ([address] (7/24/2017)      Obesity hypoventilation syndrome ([address] (2/19/2018)      Sepsis ([address] (3/5/2018)      GIRISH (acute kidney injury) ([address] (3/16/2018)      Diabetes mellitus type II, uncontrolled ([facility] (4/18/2013)      Diabetic gastroparesis (HCC) ()      Anemia (7/24/2017)      Ulcer of right fifth toe due to diabetes mellitus ([address] (7/24/2017)      Hypertension (7/24/2017)      Bilateral edema of lower extremity (7/24/2017)      (HFpEF) heart failure with preserved ejection fraction ([address] (10/31/2017)      Venous stasis dermatitis of both lower extremities (11/9/2017)      CVA, old, aphasia (3/9/2018)      Subacute osteomyelitis of right foot ([address] (3/12/2018)      Morbid obesity ([address] (1/26/2011)      CKD (chronic kidney disease) stage 3, GFR 30-59 ml/min ()      Fatty liver (7/24/2017)      Aortic stenosis, mild (7/24/2017)      BMI 45.0-49.9, adult ([address] (7/24/2017)      Type 2 diabetes mellitus with diabetic neuropathy ([address] (1/2/2018)        1. Diuretic per renal  2. Increase lantus  3. Wound care  4. PT/OT/ST  5. Follow labs  6. Has O2 and hospital bed when D/C feasible  7. Continue present diabetic management otherwise  8. Hold Norco if possible  9. Continue Protonix  10. Reduced frequency wound care to accommodate home care  11.  Aggrenox resumed as had been stopped by Dr. Dennis Earl for surgery           Care Plan discussed with: Patient and Family    Discussed:  Code Status and Care Plan    Prophylaxis:  Heparin and aggrenox    Disposition:  Home with [facility]  ___________________________________________________    Attending Physician: Nena Keating MD

## 2018-03-21 NOTE — PROGRESS NOTES
At 0730 patient morning output with external catheter was 300 ml. Patient has not voided so far. No signs of distress. Urine output upon straight cath is 325 mls, clear yellow urine. Dr. Nitin Rivera paged. [phone] - unable to get hold of . Paged Dr. Néstor Turcios.

## 2018-03-21 NOTE — PROGRESS NOTES
D/C held with pt, spouse, Reji Sanches, and MD.  Will tentatively arrange [facility] ambulance for 0930 tomorrow if stable for d/c. All in agreement at present time. Bed, oxygen, humidifier, and [facility] are all lined up.     1300  Reminder:  Spouse would like recommendation of a \"shoe\"  \"boot\" for right foot as protection at d/c. Please write specific wound care orders for [facility] and spouse. 1330  Spouse is in agreement with the above. He feels he can care for wife at home. His step son lives 1 mile away. He feels she would do better at home than a SNF.       1530  Nursing-please send home wound care supplies to last for 24 hours

## 2018-03-21 NOTE — PROGRESS NOTES
Nutrition Assessment:    INTERVENTIONS/RECOMMENDATIONS:   Meals/Snacks: General/healthful diet:  Continue CCD for BG management. Supplements: Commercial supplement:  Will continue the Glucerna Shakes, per pt request.    ASSESSMENT:   3/21:  Chart reviewed; med noted for sepsis on admission. Noted for DM foot infection. Plans for possible discharge tomorrow. Pt reports good appetite and would like to continue the Glucerna Shakes for now. Diet Order: Consistent carb  % Eaten:  Patient Vitals for the past 72 hrs:   % Diet Eaten   03/21/18 1329 50 %   03/21/18 1109 40 %     Pertinent Medications: [x] Reviewed []Other:  Pertinent Labs: [x]Reviewed  []Other: -248  Food Allergies: [x]None []Other:     Last BM: 3/21   [x]Active     []Hyperactive  []Hypoactive       [] Absent  BS  Skin:    [] Intact   [] Incision  [] Breakdown   []Edema   [x]Other:    Anthropometrics: Height: 5' (152.4 cm) Weight: 136.6 kg (301 lb 1.6 oz)    IBW (%IBW):   ( ) UBW (%UBW):   (  %)    BMI: Body mass index is 58.8 kg/(m^2). This BMI is indicative of:  []Underweight   []Normal   []Overweight   [] Obesity   [x] Extreme Obesity (BMI>40)  Last Weight Metrics:  Weight Loss Metrics 3/21/2018 3/5/2018 2/18/2018 2/13/2018 2/2/2018 11/24/2017 11/9/2017   Today's Wt 301 lb 1.6 oz - 278 lb 278 lb 278 lb - -   BMI - 58.8 kg/m2 54.29 kg/m2 52.53 kg/m2 52.53 kg/m2 - -       Estimated Nutrition Needs (Based on): 3116 Kcals/day (BMR (1977) x 1. 2AF -500kcal) , 75 g (1.5 g/kg IBW) Protein  Carbohydrate:  At Least 130 g/day  Fluids: 1600 mL/day    NUTRITION DIAGNOSES:   Problem:  Inadequate protein-energy intake      Etiology: related to altered mental status      Signs/Symptoms: as evidenced by documented intake    Previous Nutrition Dx:  [x] Resolved  [] Unresolved           [] Progressing    NUTRITION INTERVENTIONS:  Meals/Snacks: General/healthful diet   Supplements: Commercial supplement     Initial/Brief Nutrition Education: Purpose of nutrition education        GOAL:   PO intake >50% of meals/supplements next 5-7 days    NUTRITION MONITORING AND EVALUATION   Food/Nutrient Intake Outcomes:  Total energy intake  Physical Signs/Symptoms Outcomes: Weight/weight change, Glucose profile    Previous Goal Met:   [x] Met              [] Progressing Towards Goal              [] Not Progressing Towards Goal   Previous Recommendations:   [x] Implemented          [] Not Implemented          [] Not Applicable    LEARNING NEEDS (Diet, Food/Nutrient-Drug Interaction):    [x] None Identified   [] Identified and Education Provided/Documented   [] Identified and Pt declined/was not appropriate     Cultural, Orthodoxy, OR Ethnic Dietary Needs:    [x] None Identified   [] Identified and Addressed     [x] Interdisciplinary Care Plan Reviewed/Documented    [x] Discharge Planning:  Continue CCD for BG management; if PO falls below 50% baseline or weight loss occurs/poor wound healing, may continue the Glucerna Shake po 1-2 times daily   [] Participated in Interdisciplinary Rounds    NUTRITION RISK:    [x] Patient At Nutritional Risk    [] High              [x] Moderate/Mild           []  Low     [] Patient Not At [address] [place] [address]  Pager [phone]  Weekend Pager [phone]

## 2018-03-21 NOTE — PROGRESS NOTES
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 Mary Perla after dc  Can see in office for fu--given her non ambulatory status not sure how that would work  Will see again upon request.       Subjective:     Chief Complaint:  Wants to go home    Review of Systems:    Symptom Y/N Comments  Symptom Y/N Comments   Fever/Chills    Chest Pain n    Poor Appetite    Edema n    Cough    Abdominal Pain n    Sputum    Joint Pain     SOB/PIPER n   Pruritis/Rash     Nausea/vomit n   Tolerating PT/OT     Diarrhea    Tolerating Diet     Constipation    Other       Could not obtain due to:      Objective:     VITALS:   Last 24hrs VS reviewed since prior progress note. Most recent are:  Visit Vitals    /47    Pulse 63    Temp 97.7 °F (36.5 °C)    Resp 18    Ht 5' (1.524 m)    Wt 136.6 kg (301 lb 1.6 oz)    SpO2 96%    BMI 58.8 kg/m2       Intake/Output Summary (Last 24 hours) at 18 1443  Last data filed at 18 1329   Gross per 24 hour   Intake              200 ml   Output              300 ml   Net             -100 ml      Telemetry Reviewed:     PHYSICAL EXAM:  General: WD, WN. Alert, cooperative, no acute distress, morbidly obese  Resp:  CTA Bilaterally anterior. No access.  muscle use  CV:  Regular  rhythm,  (+) foot bandaged  GI:  Soft, Non distended, Non tender.  (+) edema in pannus      Lab Data Reviewed: (see below)    Medications Reviewed: (see below)    PMH/SH reviewed - no change compared to H&P  ________________________________________________________________________  Care Plan discussed with:  Patient y    Family  y    RN     Care Manager                    Consultant:   Dr. Corey Myers   >50% of visit spent in counseling and coordination of care       ________________________________________________________________________  Luly Lewis MD     Procedures: see electronic medical records for all procedures/Xrays and details which  were not copied into this note but were reviewed prior to creation of Plan. LABS:  Recent Labs      03/20/18 0413 03/19/18 0454   WBC  9.7  9.7   HGB  8.7*  8.7*   HCT  28.7*  28.8*   PLT  214  233     Recent Labs      03/21/18   0222  03/20/18 0413 03/19/18 0454   NA  146*  146*  145   K  4.5  4.4  4.3   CL  116*  115*  114*   CO2  24  25  25   BUN  47*  45*  42*   CREA  2.96*  2.97*  3.06*   GLU  183*  233*  156*   CA  8.3*  8.4*  8.2*   MG   --    --   1.8   PHOS   --    --   4.7     Recent Labs      03/19/18 0454   SGOT  5*   AP  77   TP  5.7*   ALB  2.3*   GLOB  3.4     No results for input(s): INR, PTP, APTT in the last 72 hours. No lab exists for component: INREXT, INREXT   No results for input(s): FE, TIBC, PSAT, FERR in the last 72 hours. Lab Results   Component Value Date/Time    Folate 19.6 10/27/2017 10:19 AM      No results for input(s): PH, PCO2, PO2 in the last 72 hours. No results for input(s): CPK, CKMB in the last 72 hours.     No lab exists for component: TROPONINI  No components found for: [place]  Lab Results   Component Value Date/Time    Color YELLOW/STRAW 03/15/2018 06:55 PM    Appearance CLOUDY (A) 03/15/2018 06:55 PM    Specific gravity 1.019 03/15/2018 06:55 PM    Specific gravity 1.020 05/31/2014 04:10 AM    pH (UA) 5.0 03/15/2018 06:55 PM    Protein 300 (A) 03/15/2018 06:55 PM    Glucose 250 (A) 03/15/2018 06:55 PM    Ketone NEGATIVE  03/15/2018 06:55 PM    Bilirubin NEGATIVE  03/15/2018 06:55 PM    Urobilinogen 0.2 03/15/2018 06:55 PM    Nitrites NEGATIVE  03/15/2018 06:55 PM    Leukocyte Esterase SMALL (A) 03/15/2018 06:55 PM    Epithelial cells FEW 03/15/2018 06:55 PM    Bacteria 1+ (A) 03/15/2018 06:55 PM    WBC 5-10 03/15/2018 06:55 PM    RBC 0-5 03/15/2018 06:55 PM       MEDICATIONS:  Current Facility-Administered Medications   Medication Dose Route Frequency    insulin glargine (LANTUS) injection 30 Units  30 Units SubCUTAneous QHS    [START ON 3/22/2018] bumetanide (BUMEX) tablet 1 mg  1 mg Oral BID    [START ON 3/22/2018] hydrALAZINE (APRESOLINE) tablet 25 mg  25 mg Oral TID    albumin human 25% (BUMINATE) solution 12.5 g  12.5 g IntraVENous ONCE    bumetanide (BUMEX) injection 2 mg  2 mg IntraVENous ONCE    pantoprazole (PROTONIX) tablet 40 mg  40 mg Oral ACB&D    aspirin-dipyridamole (AGGRENOX)  mg per capsule 1 Cap  1 Cap Oral BID    heparin (porcine) injection 5,000 Units  5,000 Units SubCUTAneous Q12H    sodium chloride (NS) flush 10-30 mL  10-30 mL InterCATHeter PRN    sodium chloride (NS) flush 10 mL  10 mL InterCATHeter Q24H    sodium chloride (NS) flush 10 mL  10 mL InterCATHeter PRN    sodium chloride (NS) flush 10-40 mL  10-40 mL InterCATHeter Q8H    sodium chloride (NS) flush 20 mL  20 mL InterCATHeter Q24H    heparin (porcine) pf 300 Units  300 Units InterCATHeter PRN    0.9% sodium chloride infusion 250 mL  250 mL IntraVENous PRN    morphine injection 2 mg  2 mg IntraVENous Q3H PRN    albuterol-ipratropium (DUO-NEB) 2.5 MG-0.5 MG/3 ML  3 mL Nebulization Q8H PRN    insulin lispro (HUMALOG) injection   SubCUTAneous AC&HS    glucose chewable tablet 16 g  4 Tab Oral PRN    dextrose (D50W) injection syrg 12.5-25 g  12.5-25 g IntraVENous PRN    glucagon (GLUCAGEN) injection 1 mg  1 mg IntraMUSCular PRN    nystatin (MYCOSTATIN) 100,000 unit/gram powder   Topical BID    zinc oxide-cod liver oil (DESITIN) 40 % paste   Topical PRN    ammonium lactate (LAC-HYDRIN) 12 % lotion   Topical BID    clotrimazole (LOTRIMIN) 1 % cream   Topical BID    allopurinol (ZYLOPRIM) tablet 100 mg  100 mg Oral DAILY    atenolol (TENORMIN) tablet 50 mg  50 mg Oral DAILY    atorvastatin (LIPITOR) tablet 40 mg  40 mg Oral DAILY    lactobac ac& pc-s.therm-b.anim (ELVA Q/RISAQUAD)  1 Cap Oral DAILY    colestipol (COLESTID) tablet 1 g  1 g Oral DAILY    dilTIAZem CD (CARDIZEM CD) capsule 360 mg  360 mg Oral DAILY    pregabalin (LYRICA) capsule 75 mg  75 mg Oral BID    nystatin (MYCOSTATIN) 100,000 unit/gram cream   Topical BID    sodium chloride (NS) flush 5-10 mL  5-10 mL IntraVENous Q8H    sodium chloride (NS) flush 5-10 mL  5-10 mL IntraVENous PRN    acetaminophen (TYLENOL) tablet 650 mg  650 mg Oral Q4H PRN    HYDROcodone-acetaminophen (NORCO) 5-325 mg per tablet 1 Tab  1 Tab Oral Q4H PRN    ondansetron (ZOFRAN) injection 4 mg  4 mg IntraVENous Q4H PRN    docusate sodium (COLACE) capsule 100 mg  100 mg Oral BID PRN

## 2018-03-22 NOTE — DISCHARGE INSTRUCTIONS
[facility] [phone]  [phone]      Patient Discharge Instructions    Osvaldo Ching / [id number] : 1955    Admitted 3/5/2018 Discharged: 3/22/2018     Active Problems:    Chronic diastolic CHF (congestive heart failure), NYHA class 3 ([address] (2017)      Obesity hypoventilation syndrome ([address] (2018)      Sepsis ([address] (3/5/2018)      GIRISH (acute kidney injury) ([address] (3/16/2018)      Diabetes mellitus type II, uncontrolled ([address] (2013)      Diabetic gastroparesis (HCC) ()      Anemia (2017)      Ulcer of right fifth toe due to diabetes mellitus ([address] (2017)      Hypertension (2017)      Bilateral edema of lower extremity (2017)      (HFpEF) heart failure with preserved ejection fraction ([address] (10/31/2017)      Venous stasis dermatitis of both lower extremities (2017)      CVA, old, aphasia (3/9/2018)      Subacute osteomyelitis of right foot ([address] (3/12/2018)      Morbid obesity ([address] (2011)      CKD (chronic kidney disease) stage 3, GFR 30-59 ml/min ()      Fatty liver (2017)      Aortic stenosis, mild (2017)      BMI 45.0-49.9, adult ([address] (2017)      Type 2 diabetes mellitus with diabetic neuropathy (HCC) (2018)          Allergies   Allergen Reactions    Zestril [Lisinopril] Swelling       · It is important that you take the medication exactly as they are prescribed. · Do not take other medications without consulting your doctor. What to do at Next Level of Care    Disposition:  Home    Recommended diet: Diabetic Diet    Recommended activity: Activity as tolerated,  PT/OT/ST    Wound Care:  Wound Care R foot: Loosly pack wound right foot with saline moistened Nu Gauze 1/2 inch strip, apply Hydrogel to outer wounds, cover with 4x4s and wrap with gauze rolls. Change every 12 hours.     Lab:  none    Other:  Check BS's before meals    Oxygen:  2-3 lpm nasal cannula          Information obtained by :  I understand that if any problems occur once I am at home I am to contact my physician. I understand and acknowledge receipt of the instructions indicated above.                                                                                                                                            Physician's or R.N.'s Signature                                                                  Date/Time                                                                                                                                              Patient or Representative Signature                                                          Date/Time

## 2018-03-22 NOTE — PROGRESS NOTES
[address] [place] [address]  [phone]      Medical Progress Note      NAME: Isis Lizarraga   :  1955  MRM:  [id number]    Date/Time: 3/22/2018 7:14 AM      Subjective:     Afebrile. No abd pain. Waiting on renal function and if stable D/C. Past Medical History reviewed and unchanged from Admission History and Physical and/or prior progress note/electronic medical record    Medications reviewed. ROS:      General: postive for weakness  Ear Nose and Throat: negative for rhinorrhea, pharyngitis, otalgia, tinnitus, speech or swallowing difficulties  Respiratory: Less SOB and wheezing  Cardiology:  positive for PND, orthopnea and edema  Gastrointestinal: positive for abd pain  Genitourinary: positive for incontinence  Dermatological: positive for chronic lichenification legs and feet and pressure sore areas  Neurological: positive for bilateral LE weakness R>L and speech difficulties    Objective:     Last 24hrs VS reviewed since prior progress note. Most recent are:    Visit Vitals    /73 (BP 1 Location: Left arm, BP Patient Position: At rest)    Pulse 70    Temp 98.1 °F (36.7 °C)    Resp 18    Ht 5' (1.524 m)    Wt 302 lb 1.6 oz (137 kg)    SpO2 95%    BMI 59 kg/m2     SpO2 Readings from Last 6 Encounters:   18 95%   18 (!) 79%   18 92%   18 94%   18 93%   18 94%    O2 Flow Rate (L/min): 1 l/min       Intake/Output Summary (Last 24 hours) at 18 9454  Last data filed at 18 0602   Gross per 24 hour   Intake              200 ml   Output              575 ml   Net             -375 ml          Physical Exam:    General appearance: alert, cooperative, no distress, appears stated age  Eyes: negative  Neck: supple, symmetrical, trachea midline, no adenopathy, thyroid: not enlarged, symmetric, no tenderness/mass/nodules, no carotid bruit and no JVD  Lungs:  Few rhonchi but wheezing diminished  Heart: regular rate and rhythm, S1, S2 normal, grade II/IV systolic murmur; No click, rub or gallop  Abdomen: soft, non-tender. Bowel sounds normal. No masses,  no organomegaly, morbidly obese  Extremities: chronic lichenification legs and feet;right foot heavily bandaged; moderate edema  Neurologic: bilateral LE weakness;  \"aphasia\" persists but is improved somewhat today    Data Review:    Lab:    BMP:   No results found for: NA, K, CL, CO2, AGAP, GLU, BUN, CREA, GFRAA, GFRNA  CBC:   Lab Results   Component Value Date/Time    WBC 8.3 03/22/2018 03:12 AM    HGB 8.2 (L) 03/22/2018 03:12 AM    HCT 27.2 (L) 03/22/2018 03:12 AM     03/22/2018 03:12 AM     Recent Glucose Results:   No results found for: GLU  All labs reviewed in past 24 hours    Other pertinent lab: none    Imaging:    Reviewed. none    Procedure: . Amputation, right fifth toe and partial right fifth metatarsal.  2.  Reexploration of wound    Assessment / Plan:      Active Problems:    Chronic diastolic CHF (congestive heart failure), NYHA class 3 ([facility]) (7/24/2017)      Obesity hypoventilation syndrome ([address] (2/19/2018)      Sepsis ([address] (3/5/2018)      GIRISH (acute kidney injury) ([address] (3/16/2018)      Diabetes mellitus type II, uncontrolled ([address] (4/18/2013)      Diabetic gastroparesis ([facility]) ()      Anemia (7/24/2017)      Ulcer of right fifth toe due to diabetes mellitus ([address] (7/24/2017)      Hypertension (7/24/2017)      Bilateral edema of lower extremity (7/24/2017)      (HFpEF) heart failure with preserved ejection fraction ([address] (10/31/2017)      Venous stasis dermatitis of both lower extremities (11/9/2017)      CVA, old, aphasia (3/9/2018)      Subacute osteomyelitis of right foot ([address] (3/12/2018)      Morbid obesity ([address] (1/26/2011)      CKD (chronic kidney disease) stage 3, GFR 30-59 ml/min ()      Fatty liver (7/24/2017)      Aortic stenosis, mild (7/24/2017)      BMI 45.0-49.9, adult ([facility] (7/24/2017)      Type 2 diabetes mellitus with diabetic neuropathy ([facility] (1/2/2018)        1. Diuretic per renal  2. Increase lantus  3. Wound care  4. PT/OT/ST  5. Has O2 and hospital bed when D/C feasible  6. Continue present diabetic management otherwise  7. Hold Norco   8. Continue Protonix  9. Reduced frequency wound care to accommodate home care  10.  Aggrenox resumed as had been stopped by Dr. Bree Nolen for surgery           Care Plan discussed with: Patient and Family    Discussed:  Code Status and Care Plan    Prophylaxis:  Heparin and aggrenox    Disposition:  Home with [place]  ___________________________________________________    Attending Physician: Quang Cervantes MD

## 2018-03-22 NOTE — PROGRESS NOTES
Bedside shift change report given to JULIETTE Sequeira (oncoming nurse) by Ajay/JULIETTE Garber (offgoing nurse). Report included the following information SBAR, Kardex, Intake/Output, Recent Results, Med Rec Status and Cardiac Rhythm NSR. Zone Phone for oncoming shift:   [phone]    Shift Summary: Pt. Resting comfortably    LDAs         PICC Double Lumen 03/07/18 Right;Brachial (Active)   Central Line Being Utilized Yes 3/22/2018  3:18 AM   Criteria for Appropriate Use Limited/no vessel suitable for conventional peripheral access 3/22/2018  3:18 AM   Site Assessment Clean, dry, & intact 3/22/2018  3:18 AM   Phlebitis Assessment 0 3/22/2018  3:18 AM   Infiltration Assessment 0 3/22/2018  3:18 AM   Arm Circumference (cm) 39 cm 3/7/2018 10:31 AM   Date of Last Dressing Change 03/21/18 3/22/2018  3:18 AM   Dressing Status Clean, dry, & intact 3/22/2018  3:18 AM   Action Taken Blood drawn 3/22/2018  3:18 AM   External Catheter Length (cm) 0 centimeters 3/21/2018 11:09 AM   Dressing Type Disk with Chlorhexadine gluconate (CHG) 3/21/2018 10:04 PM   Hub Color/Line Status Capped; Purple;Red 3/21/2018 10:04 PM   Positive Blood Return (Site #1) No 3/21/2018 10:04 PM   Hub Color/Line Status Purple; Other (comment) 3/21/2018 10:04 PM   Positive Blood Return (Site #2) Yes 3/22/2018  3:18 AM   Alcohol Cap Used Yes 3/22/2018  3:18 AM                          [REMOVED] Urinary Catheter 03/16/18 [place]-Criteria for Appropriate Use: Obstruction/retention   Intake & Output   Date 03/21/18 0700 - 03/22/18 0659 03/22/18 0700 - 03/23/18 0659   Shift 9083-1898 9183-1849 24 Hour Total 6623-0335 4490-9289 24 Hour Total   I  N  T  A  K  E   P.O. 200  200         P. O. 200  200       Shift Total  (mL/kg) 200  (1.5)  200  (1.5)      O  U  T  P  U  T   Urine  (mL/kg/hr) 325  (0.2)  325         Urine 325  325       Shift Total  (mL/kg) 325  (2.4)  325  (2.4)      NET -125  -125      Weight (kg) 136.6 136.6 136.6 136.6 136.6 136.6      Last Bowel Movement Last Bowel Movement Date: 03/21/18   Glucose Checks [] N/A  [x] AC/HS  [] Q6  Concerns:   Nutrition Active Orders   Diet    DIET DIABETIC CONSISTENT CARB Regular       Consults []PT  []OT  []Speech  []Case Management   Cardiac Monitoring []N/A [x]Yes Expires:

## 2018-03-22 NOTE — PROGRESS NOTES
Patient discharge home via ambulance. Copy of discharge instruction, prescriptions and all patient belongings given to patient prior to discharge. PICC line removed. Pressure dressing applied. No signs of bleeding. Patient tolerated well.

## 2018-03-22 NOTE — DISCHARGE SUMMARY
[facility] [address]    Efren Harvey  MR#: [id number]  : 1955  ACCOUNT #: [de-identified]   ADMIT DATE: 2018  DISCHARGE DATE: 2018    PATIENT PROFILE:  The patient is a 78-year-old  female with history of heart failure, chronic kidney disease, diabetes, obesity with hypoventilation, who presented to the emergency room on the morning of admission with altered mental status. She also was noted to be short of breath and hypoxic on presentation. She also had chronic lower extremity erythema and edema as well as a worsening right foot ulcer. She was felt to be septic from that wound and subsequently admitted for further evaluation and therapy. Her past medical history and other medical problems are significant and there are multiple of them and they are as detailed in the electronic medical record. PHYSICAL EXAMINATION:  GENERAL:  At time of admission, she appeared an obese, nonambulatory white female. VITAL SIGNS:  Blood pressure 196/71, respirations 25, pulse 82, temperature 101.9, O2 saturation 86% on room air. HEENT:  Generally unremarkable with face symmetric. NECK:  Supple without adenopathy. CHEST:  Decreased breath sounds without wheezes or rales. HEART:  Regular rate and rhythm without appreciable murmur. ABDOMEN:  Massively obese, nontender, nondistended. Bowel sounds normal.  Some erythema under the pannus of her abdomen. EXTREMITIES:  Appear reasonably well perfused. There is a diabetic foot ulcer over the lateral aspect of the right foot with surrounding erythema. Her legs further revealed brawny lymphedema and lichenified skin over both anterior tibial surfaces. NEUROLOGIC:  Revealed her to be aphasic with limited strength in her lower extremities. LABORATORY:  Blood sugars throughout the hospitalization ranged from 50 to greater than 300. LABORATORY DATA:  Initial hemoglobin 10.1, white blood count 7300. Hemoglobin did fall to 7.4 after which she received a transfusion and her hemoglobin thereafter ranged from 8.2 to 9.5. Urinalysis initially revealed some yeast in the urine. D-dimer 2.20. Initial CMP remarkable for BUN of 24, creatinine of 1.6. Her creatinine did rise to 3.06 later in the hospitalization and then settled at 3.0 thereafter. Urine creatinine 86.1. Urine protein 290 protein-creatinine ratio 3.4. Vancomycin trough levels ranged from 15.9 to 28.4 and random levels ranged from 15.3 to 43.3. Urine culture no growth. Blood cultures no growth in 5 days. Wound culture grew few enterococcus sensitive to vancomycin and ampicillin. Anaerobic cultures revealed a few anaerobic gram-negative rods of two different strains. Subsequent wound culture grew Staph epi oxacillin resistant, but sensitive to vancomycin. Subsequent anaerobic culture, no anaerobes isolated. ABG on 4 liters pO2 77, pCO2 36, pH 7.42, O2 saturation on room air in the middle of the hospitalization 83%. Surgical pathology for partial resection of her right foot revealed cutaneous ulcer with cellulitis and cortical and trabecular bone with no pathologic diagnosis, negative for histologic features of acute osteomyelitis. 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 No urinary obstruction was noted. Chest x-rays throughout the hospitalization revealed no acute abnormalities. X-ray of the right foot, no acute bony abnormality. MRI of the right foot revealed soft tissue ulceration with underlying evidence for fifth MTP joint septic arthritis and osteomyelitis of the fifth metatarsal head and fifth proximal phalanx. Cellulitis and myositis also noted, but no drainable abscess.   Ventilation perfusion lung scan normal.  Duplex of the right upper extremity revealed no evidence for DVT. Ankle-brachial index revealed a toe-brachial index on the right of 0.72 and on the left 1.11. HOSPITAL COURSE:  The patient was admitted and placed on broad spectrum antibiotics including vancomycin, Flagyl and cefepime initially. She was seen in consultation by Infectious Disease and the antibiotics were adjusted over the course of the hospitalization. She was seen in consultation by Dr. Tracy Orozco who initiated wound care and then ultimately took her to surgery for partial amputation of the fifth toe and fifth metatarsal head. After that, wound care was initiated. Her IV antibiotics were continued until it was determined that she had no residual osteomyelitis. Unfortunately with the significant number of antibiotics that she received including vancomycin, she did have the development of acute kidney injury. She was hydrated with slight improvement, but ultimately it just took time for her renal function to stabilize. Nephrology helped in managing this problem. Her creatinine stabilized around 3 and this was felt to be her new normal for now. She then developed issues with some mid abdominal pain, but ultimately this was felt related to her pain medications and when these were stopped that resolved. She did require the transfusion as noted. With resolution of her cellulitis and the amputation taking care of any osteomyelitis and wound care fully initiated and Home Health involved, it was felt that maximum hospital benefit had been reached. Arrangements were made for her discharge with followup in the office. FINAL DIAGNOSES:  1. Sepsis. 2.  Altered mental status secondary to sepsis. 3.  Chronic kidney disease, now stage IV. 4.  Diabetes. 5.  Chronic anemia. 6.  Osteomyelitis, right foot, status post amputation. 7.  Chronic diastolic congestive heart failure. 8.  Hypertension. 9.  BMI greater than 45.    10.  Heart failure with preserved ejection fraction.   11. Obesity hypoventilation syndrome. 12.  Old cerebrovascular accident with aphasia. PROCEDURES:  MRI of the foot, amputation of the fifth phalanx and metatarsal head,  ventilation perfusion lung, CT scan of the abdomen and pelvis, lower extremity ABIs. CONSULTATIONS:  Dr. Sharene Bamberger, Dr. Yared Thomas and Nusrat, and Dr. Sheron Funes:  Slightly improved. PROGNOSIS:  Extremely guarded given the multitude of medical problems and her current nonambulatory status. DISCHARGE PLANS:  1. Discharged to home. 2.  Diabetic diet. 3.  Activity as tolerated with Home Health PT, OT, speech therapy and wound care. 4.  Wound care to consist of loosely packing the wound in the right foot with saline-moistened Nu Gauze, applying hydrogel to the outer wounds and covering with 4 x 4's and wrapped with gauze rolls every 12 hours. 5.  Blood sugars before meals. 6.  Two to three liters per minute nasal cannula. 7.  Hospital bed obtained for home because of her inability to breathe at less than 30 degrees truncal elevation and the need for elevation of her foot. MEDICATIONS:  Include clotrimazole applied to her feet twice a day. Insulin Humalog sliding scale three times a day:  Blood sugars 140-199, 2 units; 200-249, 3 units; 250-299, 5 units; 300-349, 7 units; 350 or greater call MD.  Pantoprazole 40 mg daily, Desitin applied to pressure areas twice a day, allopurinol 300 mg one-half tablet daily, Lac-Hydrin applied to her lower extremities daily, atenolol 50 mg daily, bumetanide 2 mg daily, cholesterol 1 gram daily, hydralazine 50 mg one-half tablet three times a day, Levemir 20 units twice a day, Nystatin topical cream to the groin and pannus areas as well as Nystatin powder, Tylenol Extra Strength 500 mg 2 every 6 hours as needed for pain.   Aggrenox twice a day, atorvastatin 40 mg daily, probiotic daily, vitamin D3 2000 International Units daily, diltiazem 360 mg daily, Lyrica 75 mg twice a day, slow Fe 142 mg daily. She will be managed at home with home health care and be seen in the office in approximately a week's time. DISPOSITION:  Home with home health care.       MD FRANCESCO Olsen/RN  D: 03/22/2018 08:23     T: 03/22/2018 10:15  JOB #: [id number]

## 2018-03-22 NOTE — PROGRESS NOTES
Bedside and Verbal shift change report given to Viola Sawyer (oncoming nurse) by Sara Sheppard (offgoing nurse). Report included the following information SBAR, Kardex, Intake/Output, MAR and Recent Results.

## 2018-03-24 NOTE — ED NOTES
Pt positioned for comfort while in bed. Pt had a large, formed bowel movement. No complaints of pain or discomfort.

## 2018-03-24 NOTE — ED NOTES
Pt and family continues to refuse straight cath. Pt aware of need for urine. Will continue to monitor pt for changes.

## 2018-03-24 NOTE — ED NOTES
Pt positioned for comfort with pillows to prop extremities. Pt turned frequently.   Will continue to monitor for changes

## 2018-03-24 NOTE — ED NOTES
Pt continue to rest in bed at this time with eyes closed.  at bedside. Pure wick remains in place with UA pending. Pt and  refuses straight cath.

## 2018-03-24 NOTE — DISCHARGE INSTRUCTIONS

## 2018-03-24 NOTE — ED NOTES
Dry dressing applied to right foot wound. Bree care performed at this time and new sheets put on bed.   Pt was very incontinent upon arrival to the ED

## 2018-03-24 NOTE — ED NOTES
Dr. Luis Alberto Rodrigues went over dc instructions with pt and family at this time. No questions or concerns. Pt to dc home after antibiotics are completed.

## 2018-03-24 NOTE — ED NOTES
Pt discharged by MD Airam. Pt provided with discharge instructions Rx and instructions on follow up care. Pt out of ED by stretcher accompanied by family members.

## 2018-03-24 NOTE — ED NOTES
TRANSFER - IN REPORT:    Verbal report received from KeithRN(name) on [address]. Report consisted of patients Situation, Background, Assessment and   Recommendations(SBAR). Information from the following report(s) SBAR and ED Summary was reviewed with the receiving nurse. Opportunity for questions and clarification was provided.

## 2018-03-24 NOTE — ED PROVIDER NOTES
EMERGENCY DEPARTMENT HISTORY AND PHYSICAL EXAM      Date: 3/23/2018  Patient Name: Aruna Johnson    History of Presenting Illness     Chief Complaint   Patient presents with    Altered mental status       History Provided By: Patient and EMS    HPI: Aruna Johnson, 60 y.o. female s/p amputation of the R fifth toe and partial amputation of the R fifth metatarsal on 3/10/2018 with PMHx significant for DM / hyperlipemia / stroke / HTN / GERD / CKD / gout / anemia / CHF, presents via EMS to the ED with cc of AMS and generalized weakness as reported by EMS. EMS reports that they were initially called for AMS and generalized weakness and report that pt was confused on arrival and was difficult to obtain history from. PCP: Lucian Centeno MD    Further history was limited by pt's altered mental status. Current Facility-Administered Medications   Medication Dose Route Frequency Provider Last Rate Last Dose    cefTRIAXone (ROCEPHIN) 1 g in 0.9% sodium chloride (MBP/ADV) 50 mL MBP  1 g IntraVENous NOW Lorri Alegria  mL/hr at 03/24/18 0648 1 g at 03/24/18 7212     Current Outpatient Prescriptions   Medication Sig Dispense Refill    cephALEXin (KEFLEX) 500 mg capsule Take 1 Cap by mouth three (3) times daily for 7 days. 21 Cap 0    bumetanide (BUMEX) 2 mg tablet Take 1 Tab by mouth daily.  insulin detemir U-100 (LEVEMIR FLEXTOUCH U-100 INSULN) 100 unit/mL (3 mL) inpn 20 Units by SubCUTAneous route two (2) times a day.  ammonium lactate (LAC-HYDRIN) 12 % lotion Apply 1 Each to affected area daily. rub in to affected area well      clotrimazole (LOTRIMIN) 1 % topical cream Apply  to affected area two (2) times a day. 15 g 0    nystatin (MYCOSTATIN) powder Apply  to affected area two (2) times a day. 60 g 5    pantoprazole (PROTONIX) 40 mg tablet Take 1 Tab by mouth daily. 30 Tab 1    zinc oxide-cod liver oil (DESITIN) 40 % paste Apply  to affected area as needed for Skin Irritation.  allopurinol (ZYLOPRIM) 300 mg tablet Take 0.5 Tabs by mouth daily. 90 Tab 0    insulin lispro (HUMALOG) 100 unit/mL injection For BS 3x/day before meals    140-199=2 units            200-249=3 units  250-299=5 units  300-349=7 units  350 or greater = Call MD 1 Vial     hydrALAZINE (APRESOLINE) 50 mg tablet Take 0.5 Tabs by mouth three (3) times daily. 100 Tab 0    acetaminophen (TYLENOL EXTRA STRENGTH) 500 mg tablet Take 1,000 mg by mouth every six (6) hours as needed for Pain.  atenolol (TENORMIN) 50 mg tablet Take 50 mg by mouth daily.  colestipol (COLESTID) 1 gram tablet Take 1 g by mouth daily.  Bifidobacterium Infantis 4 mg cap Take 4 mg by mouth daily.  nystatin (MYCOSTATIN) topical cream Apply  to affected area two (2) times a day. Use for groin rash 30 g 1    dilTIAZem (TIAZAC) 360 mg ER capsule TAKE 1 CAPSULE BY MOUTH EVERY DAY 30 Cap 0    atorvastatin (LIPITOR) 40 mg tablet TAKE 1 TABLET BY MOUTH EVERY DAY 90 Tab 0    LYRICA 75 mg capsule TAKE ONE CAPSULE BY MOUTH DAILY AT LUNCH TIME AND AGAIN AT BEDTIME. 180 Cap 0    ferrous sulfate (SLOW FE) 142 mg (45 mg iron) ER tablet Take 142 mg by mouth Daily (before breakfast).  aspirin-dipyridamole (AGGRENOX)  mg per SR capsule TAKE 1 CAPSULE BY MOUTH TWICE DAILY( EVERY TWELVE HOURS) 60 Cap 5    Cholecalciferol, Vitamin D3, 2,000 unit cap Take 1 Cap by mouth daily.          Past History     Past Medical History:  Past Medical History:   Diagnosis Date    Acute combined systolic and diastolic heart failure, NYHA class 2 ([facility] 7/24/2017    Anemia 7/24/2017    Aortic stenosis, mild 7/24/2017    Bilateral edema of lower extremity 7/24/2017    BMI 45.0-49.9, adult ([facility]) 7/24/2017    Chronic diastolic CHF (congestive heart failure), NYHA class 3 ([facility] [address] 7/24/2017    CKD (chronic kidney disease) stage 2, GFR 60-89 ml/min     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 2 diabetes mellitus ([facility] [address] 7/24/2017    Diabetes ([address]     Fatty liver 2017    Gastroparesis     GERD (gastroesophageal reflux disease)     Gout     HTN (hypertension)     Hyperlipemia     Hyperparathyroidism ([facility] [address]     Hypertension 2017    Insomnia 2017    Low back pain 2017    N&V (nausea and vomiting) 2017    Nausea & vomiting     Obesity     Pelvic joint pain 2017    Peripheral neuropathy 2017    Sciatica     right    Severe right groin pain 2017    Sinus tachycardia 2017    Stage 2 chronic kidney disease due to benign hypertension 2017    Stasis dermatitis 2017    Stroke ([facility] [address] 2001    right parietal, left caudate    Thyroid disease     para thyroid(high calcium)    Ulcer of right fifth toe due to diabetes mellitus ([facility] [address] 2017       Past Surgical History:  Past Surgical History:   Procedure Laterality Date    CHEST SURGERY PROCEDURE UNLISTED  10/24/13    PARATHYROID EXPLORATION     DELIVERY       HX  SECTION      HX CHOLECYSTECTOMY      HX OTHER SURGICAL      I&D right thigh abscess       Family History:  Family History   Problem Relation Age of Onset    Cancer Mother      cervical    Diabetes Mother     Hypertension Mother     Stroke Mother 79    Diabetes Father     Hypertension Father     Kidney Disease Father     Diabetes Brother     Hypertension Brother     Stroke Brother 61       Social History:  Social History   Substance Use Topics    Smoking status: Never Smoker    Smokeless tobacco: Never Used    Alcohol use Yes      Comment: rare       Allergies: Allergies   Allergen Reactions    Zestril [Lisinopril] Swelling         Review of Systems   Review of Systems   Unable to perform ROS: Mental status change       Physical Exam   Physical Exam   Constitutional: She is oriented to person, place, and time. She appears well-developed and well-nourished. Morbidly obese   HENT:   Head: Normocephalic and atraumatic.    Mouth/Throat: Oropharynx is clear and moist.   Eyes: Conjunctivae and EOM are normal. Pupils are equal, round, and reactive to light. Right eye exhibits no discharge. Left eye exhibits no discharge. Neck: Normal range of motion. Neck supple. Cardiovascular: Normal rate, regular rhythm and normal heart sounds. No murmur heard. Pulmonary/Chest: No respiratory distress. She has no wheezes. She has no rales. Appears to be slightly tachypneic  Prolonged expiratory phase   Abdominal: Soft. Bowel sounds are normal. She exhibits no distension. There is no tenderness. Musculoskeletal: Normal range of motion. She exhibits no edema. Amputation of the R fifth toe   Neurological: She is alert and oriented to person, place, and time. No cranial nerve deficit. She exhibits normal muscle tone. Skin: Skin is warm. She is not diaphoretic.   5 cm laceration with sutures in place over the R foot, appears wet with ulcerations of the most distal and proximal aspects of the lesion  Lichenification of the proximal/dorsal aspects of the R foot up to the ankle  Mild erythema of the R lower leg without tenderness  Erythema of the LLE  Lichenification of the L foot  No induration of the panus, no increased warmth or redness   Nursing note and vitals reviewed.         Diagnostic Study Results     Labs -     Recent Results (from the past 12 hour(s))   LACTIC ACID    Collection Time: 03/24/18  1:01 AM   Result Value Ref Range    Lactic acid 0.7 0.4 - 2.0 MMOL/L   CBC WITH AUTOMATED DIFF    Collection Time: 03/24/18  1:01 AM   Result Value Ref Range    WBC 9.6 3.6 - 11.0 K/uL    RBC 3.15 (L) 3.80 - 5.20 M/uL    HGB 9.1 (L) 11.5 - 16.0 g/dL    HCT 31.0 (L) 35.0 - 47.0 %    MCV 98.4 80.0 - 99.0 FL    MCH 28.9 26.0 - 34.0 PG    MCHC 29.4 (L) 30.0 - 36.5 g/dL    RDW 17.2 (H) 11.5 - 14.5 %    PLATELET 661 229 - 442 K/uL    MPV 12.3 8.9 - 12.9 FL    NRBC 0.0 0  WBC    ABSOLUTE NRBC 0.00 0.00 - 0.01 K/uL    NEUTROPHILS 80 (H) 32 - 75 %    LYMPHOCYTES 8 (L) 12 - 49 %    MONOCYTES 7 5 - 13 %    EOSINOPHILS 4 0 - 7 %    BASOPHILS 1 0 - 1 %    IMMATURE GRANULOCYTES 1 (H) 0.0 - 0.5 %    ABS. NEUTROPHILS 7.7 1.8 - 8.0 K/UL    ABS. LYMPHOCYTES 0.7 (L) 0.8 - 3.5 K/UL    ABS. MONOCYTES 0.7 0.0 - 1.0 K/UL    ABS. EOSINOPHILS 0.4 0.0 - 0.4 K/UL    ABS. BASOPHILS 0.1 0.0 - 0.1 K/UL    ABS. IMM. GRANS. 0.1 (H) 0.00 - 0.04 K/UL    DF AUTOMATED     METABOLIC PANEL, COMPREHENSIVE    Collection Time: 03/24/18  1:01 AM   Result Value Ref Range    Sodium 148 (H) 136 - 145 mmol/L    Potassium 4.5 3.5 - 5.1 mmol/L    Chloride 119 (H) 97 - 108 mmol/L    CO2 23 21 - 32 mmol/L    Anion gap 6 5 - 15 mmol/L    Glucose 176 (H) 65 - 100 mg/dL    BUN 54 (H) 6 - 20 MG/DL    Creatinine 2.75 (H) 0.55 - 1.02 MG/DL    BUN/Creatinine ratio 20 12 - 20      GFR est AA 21 (L) >60 ml/min/1.73m2    GFR est non-AA 17 (L) >60 ml/min/1.73m2    Calcium 8.6 8.5 - 10.1 MG/DL    Bilirubin, total 0.3 0.2 - 1.0 MG/DL    ALT (SGPT) 15 12 - 78 U/L    AST (SGOT) 13 (L) 15 - 37 U/L    Alk.  phosphatase 91 45 - 117 U/L    Protein, total 6.3 (L) 6.4 - 8.2 g/dL    Albumin 2.6 (L) 3.5 - 5.0 g/dL    Globulin 3.7 2.0 - 4.0 g/dL    A-G Ratio 0.7 (L) 1.1 - 2.2     SED RATE (ESR)    Collection Time: 03/24/18  1:01 AM   Result Value Ref Range    Sed rate, automated 61 (H) 0 - 30 mm/hr   URINALYSIS W/ RFLX MICROSCOPIC    Collection Time: 03/24/18  6:11 AM   Result Value Ref Range    Color YELLOW/STRAW      Appearance CLOUDY (A) CLEAR      Specific gravity 1.012 1.003 - 1.030      pH (UA) 5.5 5.0 - 8.0      Protein 100 (A) NEG mg/dL    Glucose NEGATIVE  NEG mg/dL    Ketone NEGATIVE  NEG mg/dL    Bilirubin NEGATIVE  NEG      Blood TRACE (A) NEG      Urobilinogen 0.2 0.2 - 1.0 EU/dL    Nitrites NEGATIVE  NEG      Leukocyte Esterase LARGE (A) NEG      WBC >100 (H) 0 - 4 /hpf    RBC 0-5 0 - 5 /hpf    Epithelial cells FEW FEW /lpf    Bacteria 1+ (A) NEG /hpf    Yeast PRESENT (A) NEG      Yeast w/hyphae PRESENT (A) NEG         Radiologic Studies -   XR FOOT RT MIN 3 V   Final Result      XR CHEST PORT   Final Result        CT Results  (Last 48 hours)    None        CXR Results  (Last 48 hours)               03/24/18 0113  XR CHEST PORT Final result    Impression:  IMPRESSION:       Low lung volumes with central and basilar atelectasis. Narrative:  EXAM:  XR CHEST PORT       INDICATION:  Altered mental status. COMPARISON: 3/17/2018       TECHNIQUE: Portable AP chest view at 0105 hours       FINDINGS: Cardiac monitoring wires overlie the thorax. The cardiomediastinal   contours are stable. The pulmonary vasculature is within normal limits. There are low lung volumes with central and basilar atelectasis. There is no   pleural effusion or pneumothorax. The bones and upper abdomen are stable. Study Result      EXAM:  XR FOOT RT MIN 3 V     INDICATION:   Right foot wound.     COMPARISON:  3/5/2018     FINDINGS:  Three views of the right foot demonstrate surgical changes following  amputation through the mid fifth metatarsal. Adjacent soft tissue gas is noted. There is no acute fracture or bony erosion. Vascular calcification is present.     IMPRESSION  IMPRESSION: No acute bony abnormality. Surgical changes following amputation  through the mid fifth metatarsal. Adjacent soft tissue gas may be postsurgical,  but superimposed infection is not excluded. Medical Decision Making   I am the first provider for this patient. I reviewed the vital signs, available nursing notes, past medical history, past surgical history, family history and social history. Vital Signs-Reviewed the patient's vital signs.   Patient Vitals for the past 12 hrs:   Temp Pulse Resp BP SpO2   03/24/18 0530 - 70 21 143/70 -   03/24/18 0500 - 63 13 154/79 97 %   03/24/18 0430 - 64 16 158/80 94 %   03/24/18 0400 - 63 - (!) 144/99 92 %   03/24/18 0330 - 64 20 153/79 94 %   03/24/18 0304 97.9 °F (36.6 °C) - - - -   03/24/18 0300 - 65 19 121/60 93 %   03/24/18 0230 - 65 15 100/79 96 %   03/24/18 0200 - 64 16 144/63 96 %   03/24/18 0150 - 63 17 162/70 99 %   03/24/18 0140 - 63 17 161/69 98 %   03/24/18 0133 98.4 °F (36.9 °C) 72 20 167/60 94 %   03/24/18 0120 - 63 19 161/63 92 %   03/24/18 0110 - 63 19 151/61 93 %   03/24/18 0050 - 64 20 166/65 94 %   03/24/18 0006 - - - - 95 %   03/24/18 0006 - - - - 95 %   03/24/18 0006 98.5 °F (36.9 °C) - - - -   03/24/18 0002 - 66 20 (!) 118/98 95 %       Pulse Oximetry Analysis - 95% on RA    Records Reviewed: Nursing Notes and Old Medical Records    Provider Notes (Medical Decision Making):   Chronically ill-appearing with AMS, possibly secondary to infection at wound site. ED Course:   Initial assessment performed. The patients presenting problems have been discussed, and they are in agreement with the care plan formulated and outlined with them. I have encouraged them to ask questions as they arise throughout their visit. Progress Note:  2:11 AM  Pt spouse has arrived to the ED, reports that he had a difficult time waking the pt yesterday evening to repack her wound. Called EMS after he was unable to arouse pt. He reports that the pt had been fine since she got home from the hospital yesterday. He reports that the pt has her dressing changed every 12 hours with ½ inch gauze soaked in saline, and covered with wound gel. Written by Sury Toro ED Scribe, as dictated by Ganga French MD.    02:00 Patient sitting on pure wick and yet to urinate despite IVF infusion. Catheterization requested but spouse refused. Continue IVF and await urine production. Progress Note:  6:15 AM  Pt and spouse have agreed to straight catheterization. Per nursing, 1 L of retained urine was subsequently drained. Written by Sury Toro ED Scribe, as dictated by Ganga French MD.    Progress Note:  6:33 AM  Updated  on all available results. Discussed risk factors for C diff given pt's multiple rounds of medications recently. Return precautions were discussed. Pt's  expresses his understanding and agrees with the current plan. Written by Ever Spencer, ED Scribe, as dictated by Jackson Connor MD.    Critical Care Time:   0    Disposition:  DISCHARGE NOTE  6:40 AM  The patient has been re-evaluated and is ready for discharge. Reviewed available results with patient. Counseled pt on diagnosis and care plan. Pt has expressed understanding, and all questions have been answered. Pt agrees with plan and agrees to F/U as recommended, or return to the ED if their sxs worsen. Discharge instructions have been provided and explained to the pt, along with reasons to return to the ED. Written by Kan Dawson, FOX Scribe, as dictated by Jackson Connor MD.    PLAN:  1. Current Discharge Medication List      START taking these medications    Details   cephALEXin (KEFLEX) 500 mg capsule Take 1 Cap by mouth three (3) times daily for 7 days. Qty: 21 Cap, Refills: 0           2. Follow-up Information     Follow up With Details Comments Contact Info    [facility] [place] EM[facility]  If symptoms worsen [address] [facility] [address] [facility] [address] Jessica Uriarte MD Call monday to discuss ER visit [address] [place], [place] [id number] [phone]          Return to ED if worse     Diagnosis     Clinical Impression:   1. Acute cystitis without hematuria        Attestations: This note is prepared by Kan Dawson, acting as Scribe for Jackson Connor MD.    The scribe's documentation has been prepared under my direction and personally reviewed by me in its entirety. I confirm that the note above accurately reflects all work, treatment, procedures, and medical decision making performed by me.   Jackson Connor MD

## 2018-03-24 NOTE — ED NOTES
Patient and family at sleep at this time. [facility] contacted for an updated ETA, 5 min eta per Jame Cross.

## 2018-03-25 NOTE — PROGRESS NOTES
Called and spoke with patient's  (POA). Discussed UA findings. Fluconazole called to patient's preferred pharmacy. Questions answered.   Jessica Edmonds PA-C

## 2018-03-26 NOTE — TELEPHONE ENCOUNTER
Interactive contact made with patient today, and the following topics were addressed:    Does Patient have a family member or caretaker to assist? YES Her  Roldan Szymanski    Reason for hospitalization She also was noted to be short of breath and hypoxic on presentation. She also had chronic lower extremity erythema and edema as well as a worsening right foot ulcer. She was felt to be septic from that wound and subsequently admitted for further evaluation and therapy. Symptoms discussed YES    Need for referral to any agencies or community services? YES Patient will be followed with [facility]    Education provided to the beneficiary, family, guardian, and/or caretaker to support  self-management, independent living, and activities of daily living? YES     Is patient able to manage his or her own meds? NO Patient's  will be managing her medications. Reviewed discharge medications with the patient and/or caretaker, and she expresses understanding. Was patient prescribed antibiotics? NO      no safety equipment. Patient needs help with: with all ADL needs.     Reminded patient of upcoming appointment on 4/6/2018 at  3:30pm

## 2018-03-29 PROBLEM — R00.1 BRADYCARDIA: Status: ACTIVE | Noted: 2018-01-01

## 2018-03-29 PROBLEM — R09.02 HYPOXIA: Status: ACTIVE | Noted: 2018-01-01

## 2018-03-29 NOTE — PROGRESS NOTES
Reviewed record in preparation for visit and have obtained necessary documentation. Identified pt with two pt identifiers(name and ). Chief Complaint   Patient presents with    Shortness of Breath     at times        Coordination of Care Questionnaire:  :     1) Have you been to an emergency room, urgent care clinic since your last visit? Yes, went to [facility] ER on 3/23/18 for UTI/yeast infection. Hospitalized since your last visit? Yes, was hospitalized at [facility] from 3/5/18-3/22/18 for sepsis. 2) Have you seen or consulted any other health care providers outside of [address] [place] [address] since your last visit?  No

## 2018-03-29 NOTE — PROGRESS NOTES
Subjective:  Ms. Mckenzie Lund was admitted to [address] [place] [address] from 03/5-3/22. She presented with sepsis, source undetermined, possibly related to her chronically infected legs. She was discharged home, though she is bedridden. Home health called and said she had a  slow pulse and hypoxia. Her  says that every time she moves around her oxygen level drops. She's on 3 liters of nasal oxygen daily. She went to the ER on the 23rd following her discharge home on the 22nd. Chest xray then showed hypoventilation, but no evidence of congestive heart failure. She developed renal failure while in the hospital, but did not require dialysis. Her creatinine on the 23rd was 2.75. Current Medications:  1. Humalog sliding scale t.i.d.  2. Protonix 40 mg daily. 3. Allopurinol 150 mg daily. 4. Atenolol 50 mg daily. 5. Bumex 2 mg daily. 6. Colestipol 1 packet daily. 7. Hydralazine 25 mg t.i.d.  8. Levemir 20 units b.i.d.  9. Aggrenox b.i.d. 10. Atorvastatin 40 mg daily. 11. Probiotic, one daily. 12. Vitamin D 2,000 units daily. 13. Diltiazem 360 mg daily. 14. Lyrica 75 mg b.i.d.  15. Slow Fe 142 mg daily. Physical Examination:  GENERAL:  T: 98.2. BP: 126/64. P: 50 and regular. O2 sat on 3 liters: 97%. She is awake, alert and sitting in the chair, in no distress. CHEST:  Lungs reveal bibasilar rales with diminished breath sounds bilaterally. CARDIAC:  Heart regular rhythm without murmurs or gallops. EXTREMITIES:  Chronic lymphedema with no evidence of cellulitis. Studies: We attempted to do a rhythm strip because she cannot get out of the chair. It was technically very poor quality. but appears to be sinus alysha with some ectopy. One view chest xray shows very poor inspiratory effort, but no evidence of pneumonia or congestive heart failure. Impression:  1. Hypoxia due to hypoventilation. 2. Bradycardia due to medications. Plan:  1. Decrease Atenolol to 25 mg daily.   2. Decrease Diltiazem to 240 mg daily. 3. Continue other medications and insulin as outlined. 4. Follow up with Dr. Karlo Pinto in one week. 5. Home health [facility] blood this morning and I'll try to get the results and let them know.

## 2018-04-06 NOTE — PROGRESS NOTES
Subjective:   Nikolas Burnett is a 60 y.o. female      Chief Complaint   Patient presents with   [facility] Follow Up     amputation of little toe right foot        History of present illness:  Ms. Cardoso He is brought back to the office by her  in an ill fitting wheelchair. She is seen today for follow up regarding her recent hospitalization related to the osteomyelitis of her right foot and the partial amputation that she underwent, as well as for evaluation for manual mobility equipment. With regard to her medical follow up, she also has issues with inability to lie flat because of her obesity hypoventilation syndrome and as a result she is now chronically on oxygen because her O2 saturations are generally below 90, and she has a hospital bed as she is unable to lie flat whatsoever. The wound apparently is healing and looks clean according to wound care nurse. She needs to see Dr. Juli Rodriguez, who did the surgery, and get his opinion about changing wound care if necessary and also get sutures removed. Mr. Cardoso He has Dr. Tony tapia, but has just failed to call him and I impressed upon him the need for a speedy office visit. With regard to her breathing issues, I wonder if an increase in Bumex would be warranted, but will have to see what her renal function looks like first.  I'm sure her chronic anemia contributes to some of her breathing difficulties as well, but her last hemoglobin has been in the 9 range and transfusion is not warranted at this point. Will check those labs today and see her again routinely from a medical standpoint in three weeks time. With regard to the manual mobility equipment, her mobility is extremely limited. She is unable to dress herself. She can feed herself when food is set up. She needs help in grooming and toileting and bathing in customary areas of the home. She really has difficulty accessing the bathroom, kitchen or bedroom to complete these tasks.  She's had chronic issues with her inability to ambulate from months to years dating back to a stroke, after which she rehabilitated very poorly. This particularly caused right leg weakness, but ultimately she has become wheelchair bound with inability to ambulate or barely able to stand, and has weakness in both lower extremities. She is unable to ambulate more than 1-2 steps and mostly just with transfers. She cannot use a cane or walker since she's unable to support her weight by herself. She definitely needs the assistance of 1-2 people to even significantly bear weight at times. We could not obtain her height and weight today because of her inability to stand from a seated position. Her limitations are both neurologic, as well as pulmonary, as noted above. She has no ambulatory ability and she is extremely limited by shortness of breath, to the point that she cannot breathe very well just with lying down, much less with movement. Strength in her upper extremities is adequate as she can manipulate some things with her hands and arms, but it is totally inadequate in her lower extremities with less than 2/5 strength in her lower extremity muscle groups. At this point we're going to try to get her a new manual wheelchair that better fits her as it is really the only way she can get around at all and out to the doctor, etc.  She does have the mental and to some degree the physical capability to operate the wheelchair to some degree, but mostly her  will be with her and pushing the wheelchair. I do not see her improving significantly in the future to the point she would be ambulatory to a sufficient degree where she would not require a wheelchair.         Patient Active Problem List    Diagnosis Date Noted    GIRISH (acute kidney injury) ([facility] 03/16/2018     Priority: 1 - One    Sepsis ([facility] 03/05/2018     Priority: 1 - One    Obesity hypoventilation syndrome ([facility] 02/19/2018     Priority: 1 - One    Chronic diastolic CHF (congestive heart failure), NYHA class 3 ([facility] 07/24/2017     Priority: 1 - One    Stroke ([facility] 05/30/2014     Priority: 1 - One    Right leg weakness 04/18/2013     Priority: 1 - One    Subacute osteomyelitis of right foot ([address] 03/12/2018     Priority: 2 - Two    CVA, old, aphasia 03/09/2018     Priority: 2 - Two    Venous stasis dermatitis of both lower extremities 11/09/2017     Priority: 2 - Two    (HFpEF) heart failure with preserved ejection fraction ([address] 10/31/2017     Priority: 2 - Two    Anemia 07/24/2017     Priority: 2 - Two    Ulcer of right fifth toe due to diabetes mellitus ([facility] 07/24/2017     Priority: 2 - Two    Peripheral neuropathy 07/24/2017     Priority: 2 - Two    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 2 diabetes mellitus ([address] 07/24/2017     Priority: 2 - Two    Stage 2 chronic kidney disease due to benign hypertension 07/24/2017     Priority: 2 - Two    Hypertension 07/24/2017     Priority: 2 - Two    Bilateral edema of lower extremity 07/24/2017     Priority: 2 - Two    ASCVD (arteriosclerotic cardiovascular disease) 05/31/2014     Priority: 2 - Two    Diabetic polyneuropathy ([facility] 04/20/2013     Priority: 2 - Two    Diabetes mellitus type II, uncontrolled ([facility] 04/18/2013     Priority: 2 - Two    Diabetic gastroparesis ([facility]      Priority: 2 - Two    Diabetes ([facility] 01/26/2011     Priority: 2 - Two    B12 deficiency 10/31/2017     Priority: 3 - Three    GERD (gastroesophageal reflux disease) 10/11/2017     Priority: 3 - Three    Fatty liver 07/24/2017     Priority: 3 - Three    Stasis dermatitis 07/24/2017     Priority: 3 - Three    Aortic stenosis, mild 07/24/2017     Priority: 3 - Three    CKD (chronic kidney disease) stage 3, GFR 30-59 ml/min      Priority: 3 - Three    Hyperlipemia      Priority: 3 - Three    Morbid obesity ([facility] 01/26/2011     Priority: 3 - Three    Gout 10/11/2017     Priority: 4 - Four    History of hyperparathyroidism 10/11/2017 Priority: 4 - Four    Sinus tachycardia 2017     Priority: 4 - Four    Low back pain 2017     Priority: 4 - Four    Insomnia 2017     Priority: 5 - Five    BMI 45.0-49.9, adult ([email or web address] 2017     Priority: 5 - Five    Bradycardia 2018    Hypoxia 2018    Type 2 diabetes mellitus with diabetic neuropathy ([email or web address] 2018    PE (physical exam), annual 10/11/2017      Past Surgical History:   Procedure Laterality Date    CHEST SURGERY PROCEDURE UNLISTED  10/24/13    PARATHYROID EXPLORATION     DELIVERY       HX AMPUTATION TOE  2018    HX  SECTION      HX CHOLECYSTECTOMY      HX OTHER SURGICAL      I&D right thigh abscess      Allergies   Allergen Reactions    Zestril [Lisinopril] Swelling      Family History   Problem Relation Age of Onset    Cancer Mother      cervical    Diabetes Mother     Hypertension Mother     Stroke Mother 79    Diabetes Father     Hypertension Father     Kidney Disease Father     Diabetes Brother     Hypertension Brother     Stroke Brother 61      Social History     Social History    Marital status:      Spouse name: N/A    Number of children: N/A    Years of education: N/A     Occupational History    Not on file. Social History Main Topics    Smoking status: Never Smoker    Smokeless tobacco: Never Used    Alcohol use Yes      Comment: rare    Drug use: Yes     Special: Prescription, OTC    Sexual activity: Not on file     Other Topics Concern    Not on file     Social History Narrative     Outpatient Prescriptions Marked as Taking for the 18 encounter (Office Visit) with Jayme Reid MD   Medication Sig Dispense Refill    aspirin-dipyridamole (AGGRENOX)  mg per SR capsule TAKE 1 CAPSULE BY MOUTH TWICE DAILY( EVERY TWELVE HOURS) 60 Cap 0    dilTIAZem CD (CARDIZEM CD) 240 mg ER capsule Take 1 Cap by mouth daily.  90 Cap 3    bumetanide (BUMEX) 2 mg tablet Take 1 Tab by mouth daily.      insulin detemir U-100 (LEVEMIR FLEXTOUCH U-100 INSULN) 100 unit/mL (3 mL) inpn 20 Units by SubCUTAneous route two (2) times a day.  ammonium lactate (LAC-HYDRIN) 12 % lotion Apply 1 Each to affected area daily. rub in to affected area well      clotrimazole (LOTRIMIN) 1 % topical cream Apply  to affected area two (2) times a day. 15 g 0    nystatin (MYCOSTATIN) powder Apply  to affected area two (2) times a day. 60 g 5    pantoprazole (PROTONIX) 40 mg tablet Take 1 Tab by mouth daily. 30 Tab 1    zinc oxide-cod liver oil (DESITIN) 40 % paste Apply  to affected area as needed for Skin Irritation.  allopurinol (ZYLOPRIM) 300 mg tablet Take 0.5 Tabs by mouth daily. 90 Tab 0    insulin lispro (HUMALOG) 100 unit/mL injection For BS 3x/day before meals    140-199=2 units            200-249=3 units  250-299=5 units  300-349=7 units  350 or greater = Call MD 1 Vial     hydrALAZINE (APRESOLINE) 50 mg tablet Take 0.5 Tabs by mouth three (3) times daily. 100 Tab 0    acetaminophen (TYLENOL EXTRA STRENGTH) 500 mg tablet Take 1,000 mg by mouth every six (6) hours as needed for Pain.  atenolol (TENORMIN) 50 mg tablet Take 50 mg by mouth daily.  colestipol (COLESTID) 1 gram tablet Take 1 g by mouth daily.  Bifidobacterium Infantis 4 mg cap Take 4 mg by mouth daily.  nystatin (MYCOSTATIN) topical cream Apply  to affected area two (2) times a day. Use for groin rash 30 g 1    atorvastatin (LIPITOR) 40 mg tablet TAKE 1 TABLET BY MOUTH EVERY DAY 90 Tab 0    LYRICA 75 mg capsule TAKE ONE CAPSULE BY MOUTH DAILY AT LUNCH TIME AND AGAIN AT BEDTIME. 180 Cap 0    ferrous sulfate (SLOW FE) 142 mg (45 mg iron) ER tablet Take 142 mg by mouth Daily (before breakfast).  Cholecalciferol, Vitamin D3, 2,000 unit cap Take 1 Cap by mouth daily. Review of Systems              Constitutional:  She denies fever, weight loss, sweats or fatigue.   HEENT:  No blurred or double vision, headache or dizziness. No difficulty with swallowing, taste, speech or smell. Respiratory:  SOB with supine position  Cardiac:  Denies chest pain, palpitations, unexplained indigestion, syncope, edema, PND or orthopnea. GI:  No changes in bowel movements, no abdominal pain, no bloating, anorexia, nausea, vomiting or heartburn. :  No frequency or dysuria. Denies incontinence. Extremities:  Bilateral brawny edema and lichenification. Partial amputation right foot dressed and reduced wound size  Skin:  No recent rashes or mole changes. Neurological: unable to stand w/o aid of another person. WC bound      Objective:     Vitals:    04/06/18 1528   BP: 132/77   Pulse: 63   Resp: 18   Temp: 98.5 °F (36.9 °C)   TempSrc: Oral   SpO2: 94%   Height: 5' (1.524 m)   PainSc:   4   PainLoc: Toe       There is no height or weight on file to calculate BMI. Physical Examination:              General Appearance: Morbidly obese; chronically ill appearing HEENT:     Ears:  The TMs and ear canals were clear. Eyes:  The pupillary responses were normal.  Extraocular muscle function intact. Lids and conjunctiva not injected. Neck:  Supple without thyromegaly or adenopathy. No JVD noted. Lungs:  Clear to auscultation and percussion. Cardiac:  Regular rate and rhythm without murmur. GI: morbidly obese. No mass  Extremities:  No clubbing, cyanosis or edema. Skin:  Chronic LE brawny edema with severe stasis changes and lichenification. Partial amputation right foot with residual open wound. Neurological:  4-/5 upper extremity strength; 2-/5 lower extremity strengthl. Assessment/Plan:   Impressions:      ICD-10-CM ICD-9-CM    1. Subacute osteomyelitis of right foot (HCC) M86.271 730.07 AMB POC COMPLETE CBC,AUTOMATED ENTER      HOME HEALTH NURSE WOUND CARE   2.  Cerebrovascular accident (CVA), unspecified mechanism ([facility] I63.9 434.91 AMB POC COMPREHENSIVE METABOLIC PANEL   3. Obesity hypoventilation syndrome ([facility] E66.2 278.03 AMB POC COMPREHENSIVE METABOLIC PANEL   4. Type 2 diabetes mellitus with hyperglycemia, with long-term current use of insulin (HCC) E11.65 250.00 AMB POC COMPREHENSIVE METABOLIC PANEL    O88.4 907.60 AMB POC HEMOGLOBIN A1C     V58.67    5. Morbid obesity ([facility]) E66.01 278.01 AMB POC COMPREHENSIVE METABOLIC PANEL   6. Anemia, unspecified type D64.9 285.9 AMB POC COMPLETE CBC,AUTOMATED ENTER   7. Essential hypertension I10 401.9 AMB POC COMPREHENSIVE METABOLIC PANEL        Plan:  1. Continue present meds  2. Discussed lifestyle modifications including Na restriction, low carb/fat diet, weight reduction and exercise (at least a walking program). 3. See Dr. Robb Bentley  4. Continue wound care, PT/OT/ST        Follow-up Disposition:  Return in about 3 weeks (around 4/27/2018). Orders Placed This Encounter    HOME HEALTH NURSE: WOUND CARE     Wound Care: R foot: Loosly pack wound right foot with saline moistened Nu Gauze 1/2 inch strip, apply Hydrogel to outer wounds, cover with 4x4s and wrap with gauze rolls. Change every 8 hours.  CONTINUOUS [[id number]] (Order [id number])  Nursing    AMB POC COMPLETE CBC,AUTOMATED ENTER    AMB POC COMPREHENSIVE METABOLIC PANEL    AMB POC HEMOGLOBIN A1C       Teryl Baumgarten, MD

## 2018-04-06 NOTE — PROGRESS NOTES
Id  Chief Complaint   Patient presents with   [facility] Follow Up     amputation of little toe right foot     Visit Vitals    /77 (BP 1 Location: Left arm, BP Patient Position: Sitting)    Pulse 63    Temp 98.5 °F (36.9 °C) (Oral)    Resp 18    Ht 5' (1.524 m)    SpO2 94%     1. Have you been to the ER, urgent care clinic since your last visit? Hospitalized since your last visit?  no    2. Have you seen or consulted any other health care providers outside of the [address] [place] [address] since your last visit? Include any pap smears or colon screening.  no

## 2018-04-27 NOTE — PROGRESS NOTES
Alcides Wilkinson is a 60 y.o. female  Chief Complaint   Patient presents with    Medication Evaluation     follow up 3 weeks  \"breathing issue\"     Visit Vitals    /79 (BP 1 Location: Left arm, BP Patient Position: Sitting)    Pulse 66    Temp 98.3 °F (36.8 °C) (Oral)    Resp 20    Ht 5' (1.524 m)    Wt 300 lb (136.1 kg)    SpO2 91%    BMI 58.59 kg/m2     1. Have you been to the ER, urgent care clinic since your last visit? Hospitalized since your last visit?  no    2. Have you seen or consulted any other health care providers outside of the [facility] since your last visit? Include any pap smears or colon screening.  no

## 2018-04-27 NOTE — PROGRESS NOTES
Subjective:   Karina Mccabe is a 60 y.o. female      Chief Complaint   Patient presents with    Medication Evaluation     follow up 3 weeks  \"breathing issue\"        History of present illness:  Ms. Chavo Bales returns in follow up. Since last visit she did see Dr. Krystal Gomez, who removed the sutures from the wound on her right foot. Apparently the wound continues to heal well. She's been discharged from home health care and wound care and her  is doing the wound care now. He feels as though it's coming along. The appearance is that of appropriate healing today. Over the last few days she's noted problems with increased shortness of breath. Her oxygen saturations have been low at times, but do improve when her  increases her oxygen to 5 liters. Her chest xray today still reveals hypoventilation, but most of the abnormalities seem to be on the left side, where she could have some atelectasis, infiltrate or even some fluid. With the extent of the edema she has I would favor fluid and I've increased her Bumex. There is also the significant consideration of recurrent pulmonary emboli given her sedentary state, lower extremity brawny edema and her intermittent problems with shortness of breath. While hospitalized in March she did undergo a ventilation perfusion lung scan, however, that was normal.  We are going to increase her fluid medicine for the short term and see what her labs look like today. Depending upon those labs, including how anemic she is and what her renal function is, we'll decide about venous doppler repeat ventilation perfusion lung scan or even a non contrast CT. Follow up will be determined subsequent to that. She will continue her other medications as is, but increase Bumex for now.         Patient Active Problem List    Diagnosis Date Noted    GIRISH (acute kidney injury) ([facility] 03/16/2018     Priority: 1 - One    Sepsis ([facility] 03/05/2018     Priority: 1 - One    Obesity hypoventilation syndrome ([facility] 02/19/2018     Priority: 1 - One    Chronic diastolic CHF (congestive heart failure), NYHA class 3 ([address] 07/24/2017     Priority: 1 - One    Stroke ([address] 05/30/2014     Priority: 1 - One    Right leg weakness 04/18/2013     Priority: 1 - One    Subacute osteomyelitis of right foot ([address] 03/12/2018     Priority: 2 - Two    CVA, old, aphasia 03/09/2018     Priority: 2 - Two    Venous stasis dermatitis of both lower extremities 11/09/2017     Priority: 2 - Two    (HFpEF) heart failure with preserved ejection fraction ([address] 10/31/2017     Priority: 2 - Two    Anemia 07/24/2017     Priority: 2 - Two    Ulcer of right fifth toe due to diabetes mellitus ([address] 07/24/2017     Priority: 2 - Two    Peripheral neuropathy 07/24/2017     Priority: 2 - Two    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 2 diabetes mellitus ([address] 07/24/2017     Priority: 2 - Two    Stage 2 chronic kidney disease due to benign hypertension 07/24/2017     Priority: 2 - Two    Hypertension 07/24/2017     Priority: 2 - Two    Bilateral edema of lower extremity 07/24/2017     Priority: 2 - Two    ASCVD (arteriosclerotic cardiovascular disease) 05/31/2014     Priority: 2 - Two    Diabetic polyneuropathy ([address] 04/20/2013     Priority: 2 - Two    Diabetes mellitus type II, uncontrolled ([address] 04/18/2013     Priority: 2 - Two    Diabetic gastroparesis ([address]      Priority: 2 - Two    Diabetes ([facility] 01/26/2011     Priority: 2 - Two    B12 deficiency 10/31/2017     Priority: 3 - Three    GERD (gastroesophageal reflux disease) 10/11/2017     Priority: 3 - Three    Fatty liver 07/24/2017     Priority: 3 - Three    Stasis dermatitis 07/24/2017     Priority: 3 - Three    Aortic stenosis, mild 07/24/2017     Priority: 3 - Three    CKD (chronic kidney disease) stage 3, GFR 30-59 ml/min      Priority: 3 - Three    Hyperlipemia      Priority: 3 - Three    Morbid obesity ([address] 01/26/2011     Priority: 3 - Three    Gout 10/11/2017 Priority: 4 - Four    History of hyperparathyroidism 10/11/2017     Priority: 4 - Four    Sinus tachycardia 2017     Priority: 4 - Four    Low back pain 2017     Priority: 4 - Four    Insomnia 2017     Priority: 5 - Five    BMI 45.0-49.9, adult ([facility] 2017     Priority: 5 - Five    Bradycardia 2018    Hypoxia 2018    Type 2 diabetes mellitus with diabetic neuropathy ([facility] 2018    PE (physical exam), annual 10/11/2017      Past Surgical History:   Procedure Laterality Date    CHEST SURGERY PROCEDURE UNLISTED  10/24/13    PARATHYROID EXPLORATION     DELIVERY       HX AMPUTATION TOE  2018    HX  SECTION      HX CHOLECYSTECTOMY      HX OTHER SURGICAL      I&D right thigh abscess      Allergies   Allergen Reactions    Zestril [Lisinopril] Swelling      Family History   Problem Relation Age of Onset    Cancer Mother      cervical    Diabetes Mother     Hypertension Mother     Stroke Mother 79    Diabetes Father     Hypertension Father     Kidney Disease Father     Diabetes Brother     Hypertension Brother     Stroke Brother 61      Social History     Social History    Marital status:      Spouse name: N/A    Number of children: N/A    Years of education: N/A     Occupational History    Not on file.      Social History Main Topics    Smoking status: Never Smoker    Smokeless tobacco: Never Used    Alcohol use Yes      Comment: rare    Drug use: Yes     Special: Prescription, OTC    Sexual activity: Not on file     Other Topics Concern    Not on file     Social History Narrative     Outpatient Prescriptions Marked as Taking for the 18 encounter (Office Visit) with Humble Olivia MD   Medication Sig Dispense Refill    FREESTYLE LITE STRIPS strip TEST BLOOD SUGAR THREE TIMES DAILY AS NEEDED 100 Strip 0    aspirin-dipyridamole (AGGRENOX)  mg per SR capsule TAKE 1 CAPSULE BY MOUTH TWICE DAILY( EVERY TWELVE HOURS) 60 Cap 0    dilTIAZem CD (CARDIZEM CD) 240 mg ER capsule Take 1 Cap by mouth daily. 90 Cap 3    bumetanide (BUMEX) 2 mg tablet Take 2 mg by mouth two (2) times a day.  insulin detemir U-100 (LEVEMIR FLEXTOUCH U-100 INSULN) 100 unit/mL (3 mL) inpn 20 Units by SubCUTAneous route two (2) times a day.  ammonium lactate (LAC-HYDRIN) 12 % lotion Apply 1 Each to affected area daily. rub in to affected area well      clotrimazole (LOTRIMIN) 1 % topical cream Apply  to affected area two (2) times a day. 15 g 0    nystatin (MYCOSTATIN) powder Apply  to affected area two (2) times a day. 60 g 5    pantoprazole (PROTONIX) 40 mg tablet Take 1 Tab by mouth daily. 30 Tab 1    zinc oxide-cod liver oil (DESITIN) 40 % paste Apply  to affected area as needed for Skin Irritation.  allopurinol (ZYLOPRIM) 300 mg tablet Take 0.5 Tabs by mouth daily. 90 Tab 0    insulin lispro (HUMALOG) 100 unit/mL injection For BS 3x/day before meals    140-199=2 units            200-249=3 units  250-299=5 units  300-349=7 units  350 or greater = Call MD 1 Vial     hydrALAZINE (APRESOLINE) 50 mg tablet Take 0.5 Tabs by mouth three (3) times daily. 100 Tab 0    acetaminophen (TYLENOL EXTRA STRENGTH) 500 mg tablet Take 1,000 mg by mouth every six (6) hours as needed for Pain.  atenolol (TENORMIN) 50 mg tablet Take 50 mg by mouth daily.  colestipol (COLESTID) 1 gram tablet Take 1 g by mouth daily.  Bifidobacterium Infantis 4 mg cap Take 4 mg by mouth daily.  nystatin (MYCOSTATIN) topical cream Apply  to affected area two (2) times a day. Use for groin rash 30 g 1    atorvastatin (LIPITOR) 40 mg tablet TAKE 1 TABLET BY MOUTH EVERY DAY 90 Tab 0    LYRICA 75 mg capsule TAKE ONE CAPSULE BY MOUTH DAILY AT LUNCH TIME AND AGAIN AT BEDTIME. 180 Cap 0    ferrous sulfate (SLOW FE) 142 mg (45 mg iron) ER tablet Take 142 mg by mouth Daily (before breakfast).       Cholecalciferol, Vitamin D3, 2,000 unit cap Take 1 Cap by mouth daily. Review of Systems              Constitutional:  She denies fever, weight loss, sweats or fatigue. HEENT:  No blurred or double vision, headache or dizziness. No difficulty with swallowing, taste, speech or smell. Respiratory: SOB episodically worse  Cardiac:  Denies chest pain, palpitations, unexplained indigestion, syncope, edema, PND or orthopnea. GI:  No changes in bowel movements, no abdominal pain, no bloating, anorexia, nausea, vomiting or heartburn. :  No frequency or dysuria. Denies incontinence. Extremities:  Edema and lichenification changes lower extremities  Skin:  No recent rashes or mole changes. Neurological:  Bilateral LE weakness. Objective:     Vitals:    04/27/18 1539   BP: 139/79   Pulse: 66   Resp: 20   Temp: 98.3 °F (36.8 °C)   TempSrc: Oral   SpO2: 91%   Weight: 300 lb (136.1 kg)   Height: 5' (1.524 m)   PainSc:   4   PainLoc: Leg       Body mass index is 58.59 kg/(m^2). Physical Examination:              General Appearance:  Well-developed, well-nourished, no acute  distress. HEENT:     Ears:  The TMs and ear canals were clear. Eyes:  The pupillary responses were normal.  Extraocular muscle function intact. Lids and conjunctiva not injected. Neck:  Supple without thyromegaly or adenopathy. No JVD noted. Lungs:  Clear to auscultation and percussion. Cardiac:  Regular rate and rhythm without murmur. GI: nontender w/o mass. Normal BS's. Extremities:  brawny edema both legs with severe stasis changes and lichenification. Skin:  No rash or unusual mole changes noted. Wound laterally on right foot healing  Neurological:  Bilateral lower extremity weakness          Assessment/Plan:   Impressions:      ICD-10-CM ICD-9-CM    1. Obesity hypoventilation syndrome (HCC) E66.2 278.03 XR CHEST SNGL V   2. Chronic diastolic CHF (congestive heart failure), NYHA class 3 (HCC) I50.32 428.32 XR CHEST SNGL V   3.  Subacute osteomyelitis of right foot ([facility] M86.271 730.07 AMB POC COMPLETE CBC,AUTOMATED ENTER   4. (HFpEF) heart failure with preserved ejection fraction (HCC) I50.30 428.9 XR CHEST SNGL V   5. Uncontrolled type 2 diabetes mellitus with other specified complication, with long-term current use of insulin (HCC) E11.69 250.82 AMB POC BASIC METABOLIC PANEL    V44.91 I23.19     Z79.4     6. CKD (chronic kidney disease) stage 3, GFR 30-59 ml/min N18.3 585.3 AMB POC BASIC METABOLIC PANEL   7. Anemia, unspecified type D64.9 285.9 AMB POC COMPLETE CBC,AUTOMATED ENTER        Plan:  1. Continue present meds  2. Discussed lifestyle modifications including Na restriction, low carb/fat diet, weight reduction      Follow-up Disposition:  Return for To be determined.       Orders Placed This Encounter    XR CHEST SNGL V     Standing Status:   Future     Number of Occurrences:   1     Standing Expiration Date:   5/27/2019     Order Specific Question:   Reason for Exam     Answer:   SOB    AMB Carley Ballesteros MD

## 2018-05-01 NOTE — PROGRESS NOTES
Hgb is low and could be contributing to SOB. Increase Slow Fe bid. FU 3 weeks to recheck. If sxs worsen before that call and we will get venous doppler of LE's and lung scan. Kidney function at least is better.

## 2018-05-16 NOTE — PROGRESS NOTES
Subjective:   Nguyễn Morales is a 60 y.o. female      Chief Complaint   Patient presents with    Medication Evaluation     follow up        History of present illness:  Ms. Saroj Mckeon returns accompanied by her . She comes in in a wheelchair as per usual.  She has not really progressed to any degree with physical therapy and is nowhere close to walking. She exists primarily in her hospital bed at home and in the wheelchair at this point. She did undergo amputation of the right foot toe as previously noted and this now appears to be healing well. She continues to see Dr. Bindu Mcdaniel who did the original amputation. He is probably getting ready to let her go as the wounds are nearly fully healed now. On her last visit, she was more short of breath than usual and we increased her bumetanide. She seems better today. She was also noted to be extremely anemic last visit and we are checking that again today. We did increase her iron to a degree to see if this would help. Overall, she is feeling better and seems less short of breath. Her spirits are better as well. Her  is now mostly caring for the wound and try to push her to any physical therapy that she is willing to do albeit very little. She has had no chest pain. She remains on chronic O2 and does have shortness of breath with even minimal exertion at this point. She remains obese with the obesity hypoventilation syndrome overall. Her  will continue the wound care at home. We will check her labs today including follow-up regarding her anemia and let them know the results and any adjustments in medications. She will be checked again in a month's time.             Patient Active Problem List    Diagnosis Date Noted    GIRISH (acute kidney injury) ([facility] 03/16/2018     Priority: 1 - One    Sepsis ([facility] 03/05/2018     Priority: 1 - One    Obesity hypoventilation syndrome ([facility] 02/19/2018     Priority: 1 - One    Chronic diastolic CHF (congestive heart failure), NYHA class 3 ([facility] 07/24/2017     Priority: 1 - One    Stroke ([facility] 05/30/2014     Priority: 1 - One    Right leg weakness 04/18/2013     Priority: 1 - One    Subacute osteomyelitis of right foot ([address] 03/12/2018     Priority: 2 - Two    CVA, old, aphasia 03/09/2018     Priority: 2 - Two    Venous stasis dermatitis of both lower extremities 11/09/2017     Priority: 2 - Two    (HFpEF) heart failure with preserved ejection fraction ([address] 10/31/2017     Priority: 2 - Two    Anemia 07/24/2017     Priority: 2 - Two    Ulcer of right fifth toe due to diabetes mellitus ([facility] 07/24/2017     Priority: 2 - Two    Peripheral neuropathy 07/24/2017     Priority: 2 - Two    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 2 diabetes mellitus ([address] 07/24/2017     Priority: 2 - Two    Stage 2 chronic kidney disease due to benign hypertension 07/24/2017     Priority: 2 - Two    Hypertension 07/24/2017     Priority: 2 - Two    Bilateral edema of lower extremity 07/24/2017     Priority: 2 - Two    ASCVD (arteriosclerotic cardiovascular disease) 05/31/2014     Priority: 2 - Two    Diabetic polyneuropathy ([address] 04/20/2013     Priority: 2 - Two    Diabetes mellitus type II, uncontrolled ([facility] 04/18/2013     Priority: 2 - Two    Diabetic gastroparesis ([facility]      Priority: 2 - Two    Diabetes ([facility] 01/26/2011     Priority: 2 - Two    B12 deficiency 10/31/2017     Priority: 3 - Three    GERD (gastroesophageal reflux disease) 10/11/2017     Priority: 3 - Three    Fatty liver 07/24/2017     Priority: 3 - Three    Stasis dermatitis 07/24/2017     Priority: 3 - Three    Aortic stenosis, mild 07/24/2017     Priority: 3 - Three    CKD (chronic kidney disease) stage 3, GFR 30-59 ml/min      Priority: 3 - Three    Hyperlipemia      Priority: 3 - Three    Morbid obesity ([address] 01/26/2011     Priority: 3 - Three    Gout 10/11/2017     Priority: 4 - Four    History of hyperparathyroidism 10/11/2017     Priority: 4 - Four  Sinus tachycardia 2017     Priority: 4 - Four    Low back pain 2017     Priority: 4 - Four    Insomnia 2017     Priority: 5 - Five    BMI 45.0-49.9, adult ([facility] 2017     Priority: 5 - Five    Bradycardia 2018    Hypoxia 2018    Type 2 diabetes mellitus with diabetic neuropathy ([facility] 2018    PE (physical exam), annual 10/11/2017      Past Surgical History:   Procedure Laterality Date    CHEST SURGERY PROCEDURE UNLISTED  10/24/13    PARATHYROID EXPLORATION     DELIVERY       HX AMPUTATION TOE  2018    HX  SECTION      HX CHOLECYSTECTOMY      HX OTHER SURGICAL      I&D right thigh abscess      Allergies   Allergen Reactions    Zestril [Lisinopril] Swelling      Family History   Problem Relation Age of Onset    Cancer Mother      cervical    Diabetes Mother     Hypertension Mother     Stroke Mother 79    Diabetes Father     Hypertension Father     Kidney Disease Father     Diabetes Brother     Hypertension Brother     Stroke Brother 61      Social History     Social History    Marital status:      Spouse name: N/A    Number of children: N/A    Years of education: N/A     Occupational History    Not on file.      Social History Main Topics    Smoking status: Never Smoker    Smokeless tobacco: Never Used    Alcohol use Yes      Comment: rare    Drug use: Yes     Special: Prescription, OTC    Sexual activity: Not on file     Other Topics Concern    Not on file     Social History Narrative     Outpatient Prescriptions Marked as Taking for the 18 encounter (Office Visit) with Henrry Jewell MD   Medication Sig Dispense Refill    aspirin-dipyridamole (AGGRENOX)  mg per SR capsule TAKE 1 CAPSULE BY MOUTH TWICE DAILY( EVERY TWELVE HOURS) 60 Cap 0    FREESTYLE LITE STRIPS strip TEST BLOOD SUGAR THREE TIMES DAILY AS NEEDED 100 Strip 0    dilTIAZem CD (CARDIZEM CD) 240 mg ER capsule Take 1 Cap by mouth daily. 90 Cap 3    bumetanide (BUMEX) 2 mg tablet Take 2 mg by mouth two (2) times a day.  insulin detemir U-100 (LEVEMIR FLEXTOUCH U-100 INSULN) 100 unit/mL (3 mL) inpn 20 Units by SubCUTAneous route two (2) times a day.  ammonium lactate (LAC-HYDRIN) 12 % lotion Apply 1 Each to affected area daily. rub in to affected area well      clotrimazole (LOTRIMIN) 1 % topical cream Apply  to affected area two (2) times a day. 15 g 0    nystatin (MYCOSTATIN) powder Apply  to affected area two (2) times a day. 60 g 5    pantoprazole (PROTONIX) 40 mg tablet Take 1 Tab by mouth daily. 30 Tab 1    zinc oxide-cod liver oil (DESITIN) 40 % paste Apply  to affected area as needed for Skin Irritation.  allopurinol (ZYLOPRIM) 300 mg tablet Take 0.5 Tabs by mouth daily. 90 Tab 0    insulin lispro (HUMALOG) 100 unit/mL injection For BS 3x/day before meals    140-199=2 units            200-249=3 units  250-299=5 units  300-349=7 units  350 or greater = Call MD 1 Vial     hydrALAZINE (APRESOLINE) 50 mg tablet Take 0.5 Tabs by mouth three (3) times daily. 100 Tab 0    acetaminophen (TYLENOL EXTRA STRENGTH) 500 mg tablet Take 1,000 mg by mouth every six (6) hours as needed for Pain.  atenolol (TENORMIN) 50 mg tablet Take 50 mg by mouth daily.  colestipol (COLESTID) 1 gram tablet Take 1 g by mouth daily.  Bifidobacterium Infantis 4 mg cap Take 4 mg by mouth daily.  nystatin (MYCOSTATIN) topical cream Apply  to affected area two (2) times a day. Use for groin rash 30 g 1    atorvastatin (LIPITOR) 40 mg tablet TAKE 1 TABLET BY MOUTH EVERY DAY 90 Tab 0    LYRICA 75 mg capsule TAKE ONE CAPSULE BY MOUTH DAILY AT LUNCH TIME AND AGAIN AT BEDTIME. 180 Cap 0    ferrous sulfate (SLOW FE) 142 mg (45 mg iron) ER tablet Take 142 mg by mouth Daily (before breakfast).  Cholecalciferol, Vitamin D3, 2,000 unit cap Take 1 Cap by mouth daily.           Review of Systems              Constitutional:  She denies fever, weight loss, sweats or fatigue. HEENT:  No blurred or double vision, headache or dizziness. No difficulty with swallowing, taste, speech or smell. Respiratory:  SOB with minimal exertion. 3-4 pillow orthopnea  Cardiac:  Denies chest pain, palpitations, unexplained indigestion, syncope, edema, PND or orthopnea. GI:  No changes in bowel movements, no abdominal pain, no bloating, anorexia, nausea, vomiting or heartburn. :  No frequency or dysuria. Denies incontinence. Extremities:  No joint pain, stiffness or swelling. Skin:  No recent rashes or mole changes. Neurological:  R>L LE weakness. Objective:     Vitals:    05/16/18 1531   BP: 144/79   Pulse: 70   Resp: 18   Temp: 98.4 °F (36.9 °C)   TempSrc: Oral   SpO2: 93%   Weight: 300 lb (136.1 kg)   Height: 5' (1.524 m)   PainSc:   0 - No pain       Body mass index is 58.59 kg/(m^2). Physical Examination:              General Appearance:  Well-developed, well-nourished, no acute  distress. HEENT:     Ears:  The TMs and ear canals were clear. Eyes:  The pupillary responses were normal.  Extraocular muscle function intact. Lids and conjunctiva not injected. Neck:  Supple without thyromegaly or adenopathy. No JVD noted. Lungs:  Clear to auscultation and percussion. Cardiac:  Regular rate and rhythm without murmur. GI: nontender w/o mass. Normal BS's. Extremities:  Less edema and less lichenification of LE's.  %th toe amputated. Site heling well at this point. Skin:  No rash or unusual mole changes noted. Neurological:  Grossly normal.            Assessment/Plan:   Impressions:      ICD-10-CM ICD-9-CM    1. Delayed surgical wound healing of toe amputation stump ([facility]) T87.89 997.69 AMB POC COMPLETE CBC,AUTOMATED ENTER     998.83    2. Obesity hypoventilation syndrome ([facility]) E66.2 278.03 AMB POC COMPLETE CBC,AUTOMATED ENTER   3.  Type 2 diabetes mellitus with hyperglycemia, with long-term current use of insulin ([facility]) E11.65 250.00 AMB POC BASIC METABOLIC PANEL    X90.5 041.19      V58.67    4. Essential hypertension I10 401.9 AMB POC BASIC METABOLIC PANEL        Plan:  1. Continue present meds  2. Discussed lifestyle modifications including Na restriction, low carb/fat diet, weight reduction. 3. Continue wound care and FU with podiatry  4. Follow-up Disposition:  Return in about 1 month (around 6/16/2018).       Orders Placed This Encounter    FABIÁN Nesbitt MD

## 2018-05-16 NOTE — PROGRESS NOTES
Shirley Hallman is a 60 y.o. female  Chief Complaint   Patient presents with    Medication Evaluation     follow up     Visit Vitals    /79 (BP 1 Location: Left arm, BP Patient Position: Sitting)    Pulse 70    Temp 98.4 °F (36.9 °C) (Oral)    Resp 18    Ht 5' (1.524 m)    Wt 300 lb (136.1 kg)    SpO2 93%    BMI 58.59 kg/m2     1. Have you been to the ER, urgent care clinic since your last visit? Hospitalized since your last visit?  no    2. Have you seen or consulted any other health care providers outside of the [address] [place] [address] since your last visit? Include any pap smears or colon screening.    no

## 2018-06-15 NOTE — PROGRESS NOTES
Jossie Wood is a 58 y.o. female  Chief Complaint   Patient presents with    Diabetes     follow up    Hand Swelling     bilaterally pain     Visit Vitals    /77 (BP 1 Location: Left arm, BP Patient Position: Sitting)    Pulse 65    Temp 98.4 °F (36.9 °C) (Oral)    Resp 18    Ht 5' (1.524 m)    Wt 300 lb (136.1 kg)    SpO2 97%    BMI 58.59 kg/m2     1. Have you been to the ER, urgent care clinic since your last visit? Hospitalized since your last visit?  no    2. Have you seen or consulted any other health care providers outside of the [address] [place] [address] since your last visit? Include any pap smears or colon screening.    no

## 2018-06-15 NOTE — PROGRESS NOTES
Subjective:   Anuel Muir is a 58 y.o. female      Chief Complaint   Patient presents with    Diabetes     follow up    Hand Swelling     bilaterally pain        History of present illness:  Ms. Marialuisa Mallory returns in follow up. She's actually done well since last visit. She looks as though she's lost some weight and it's not because she's had a poor appetite and not eating well according to her . She's voluntarily modifying her diet from what I understand. The wounds on her right foot seem to be healing according to her . They are dressed. They are due to see Dr. Jay Morrow in the not too distant future and I did not look at them today as the  describes a good healing process and no drainage. She's had less issues with shortness of breath. She's still not trying to ambulate and therefore leading a bed-chair existence. Her last labs were improved, including her HGB and renal function. She's missed a few doses of Bumex, but it doesn't seem to have caused her undue difficulties as she has less edema than usual.  She still has the chronic stasis changes of her lower extremities. She's not experienced any new GI or  symptoms and no cardiorespiratory symptoms such as chest pain. She still finds it very difficult to lie flat in bed and uses her hospital bed with the head of the bed elevated at least at 45 degrees. Will continue the wound care at this point. I don't see anything to change in terms of her medications. I am once again encouraging her to try to begin some physical therapy, but I think this is a lost cause. She seems satisfied with bed and wheelchair existence. Will check some labs today. She'll be seen in one month's time.         Patient Active Problem List    Diagnosis Date Noted    G[facility]ISH [facility]acute kidney injury) ([facility] 03/16/2018     Priority: 1 - One    Sepsis ([facility] 03/05/2018     Priority: 1 - One    Obesity hypoventilation syndrome ([facility] 02/19/2018     Priority: 1 - One    Chronic diastolic CHF (congestive heart failure), NYHA class 3 ([facility] 07/24/2017     Priority: 1 - One    Stroke ([address] 05/30/2014     Priority: 1 - One    Right leg weakness 04/18/2013     Priority: 1 - One    Subacute osteomyelitis of right foot ([address] 03/12/2018     Priority: 2 - Two    CVA, old, aphasia 03/09/2018     Priority: 2 - Two    Venous stasis dermatitis of both lower extremities 11/09/2017     Priority: 2 - Two    (HFpEF) heart failure with preserved ejection fraction ([address] 10/31/2017     Priority: 2 - Two    Anemia 07/24/2017     Priority: 2 - Two    Ulcer of right fifth toe due to diabetes mellitus ([facility] 07/24/2017     Priority: 2 - Two    Peripheral neuropathy 07/24/2017     Priority: 2 - Two    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 2 diabetes mellitus ([facility] 07/24/2017     Priority: 2 - Two    Stage 2 chronic kidney disease due to benign hypertension 07/24/2017     Priority: 2 - Two    Hypertension 07/24/2017     Priority: 2 - Two    Bilateral edema of lower extremity 07/24/2017     Priority: 2 - Two    ASCVD (arteriosclerotic cardiovascular disease) 05/31/2014     Priority: 2 - Two    Diabetic polyneuropathy ([address] 04/20/2013     Priority: 2 - Two    Diabetes mellitus type II, uncontrolled ([facility] 04/18/2013     Priority: 2 - Two    Diabetic gastroparesis ([facility]      Priority: 2 - Two    Diabetes ([facility] 01/26/2011     Priority: 2 - Two    B12 deficiency 10/31/2017     Priority: 3 - Three    GERD (gastroesophageal reflux disease) 10/11/2017     Priority: 3 - Three    Fatty liver 07/24/2017     Priority: 3 - Three    Stasis dermatitis 07/24/2017     Priority: 3 - Three    Aortic stenosis, mild 07/24/2017     Priority: 3 - Three    CKD (chronic kidney disease) stage 3, GFR 30-59 ml/min      Priority: 3 - Three    Hyperlipemia      Priority: 3 - Three    Morbid obesity ([facility] 01/26/2011     Priority: 3 - Three    Gout 10/11/2017     Priority: 4 - Four    History of hyperparathyroidism 10/11/2017     Priority: 4 - Four    Sinus tachycardia 2017     Priority: 4 - Four    Low back pain 2017     Priority: 4 - Four    Insomnia 2017     Priority: 5 - Five    BMI 45.0-49.9, adult ([facility] 2017     Priority: 5 - Five    Bradycardia 2018    Hypoxia 2018    Type 2 diabetes mellitus with diabetic neuropathy ([facility] 2018    PE (physical exam), annual 10/11/2017      Past Surgical History:   Procedure Laterality Date    CHEST SURGERY PROCEDURE UNLISTED  10/24/13    PARATHYROID EXPLORATION     DELIVERY       HX AMPUTATION TOE  2018    HX  SECTION      HX CHOLECYSTECTOMY      HX OTHER SURGICAL      I&D right thigh abscess      Allergies   Allergen Reactions    Zestril [Lisinopril] Swelling      Family History   Problem Relation Age of Onset    Cancer Mother      cervical    Diabetes Mother     Hypertension Mother     Stroke Mother 79    Diabetes Father     Hypertension Father     Kidney Disease Father     Diabetes Brother     Hypertension Brother     Stroke Brother 61      Social History     Social History    Marital status:      Spouse name: N/A    Number of children: N/A    Years of education: N/A     Occupational History    Not on file. Social History Main Topics    Smoking status: Never Smoker    Smokeless tobacco: Never Used    Alcohol use Yes      Comment: rare    Drug use: Yes     Special: Prescription, OTC    Sexual activity: Not on file     Other Topics Concern    Not on file     Social History Narrative     Outpatient Prescriptions Marked as Taking for the 6/15/18 encounter (Office Visit) with Nona Grijalva MD   Medication Sig Dispense Refill    atorvastatin (LIPITOR) 40 mg tablet TAKE 1 TABLET BY MOUTH EVERY DAY 90 Tab 0    atenolol (TENORMIN) 50 mg tablet TAKE 1 TABLET BY MOUTH EVERY NIGHT AT BEDTIME.  90 Tab 0    hydrALAZINE (APRESOLINE) 50 mg tablet TAKE 1 TABLET BY MOUTH THREE TIMES DAILY 100 Tab 0    aspirin-dipyridamole (AGGRENOX)  mg per SR capsule TAKE 1 CAPSULE BY MOUTH TWICE DAILY( EVERY TWELVE HOURS) 60 Cap 0    pantoprazole (PROTONIX) 40 mg tablet TAKE 1 TABLET BY MOUTH EVERY DAY 30 Tab 0    FREESTYLE LITE STRIPS strip TEST BLOOD SUGAR THREE TIMES DAILY AS NEEDED 100 Strip 0    dilTIAZem CD (CARDIZEM CD) 240 mg ER capsule Take 1 Cap by mouth daily. 90 Cap 3    bumetanide (BUMEX) 2 mg tablet Take 2 mg by mouth two (2) times a day.  insulin detemir U-100 (LEVEMIR FLEXTOUCH U-100 INSULN) 100 unit/mL (3 mL) inpn 20 Units by SubCUTAneous route two (2) times a day.  ammonium lactate (LAC-HYDRIN) 12 % lotion Apply 1 Each to affected area daily. rub in to affected area well      clotrimazole (LOTRIMIN) 1 % topical cream Apply  to affected area two (2) times a day. 15 g 0    nystatin (MYCOSTATIN) powder Apply  to affected area two (2) times a day. 60 g 5    allopurinol (ZYLOPRIM) 300 mg tablet Take 0.5 Tabs by mouth daily. 90 Tab 0    insulin lispro (HUMALOG) 100 unit/mL injection For BS 3x/day before meals    140-199=2 units            200-249=3 units  250-299=5 units  300-349=7 units  350 or greater = Call MD 1 Vial     hydrALAZINE (APRESOLINE) 50 mg tablet Take 0.5 Tabs by mouth three (3) times daily. 100 Tab 0    acetaminophen (TYLENOL EXTRA STRENGTH) 500 mg tablet Take 1,000 mg by mouth every six (6) hours as needed for Pain.  atenolol (TENORMIN) 50 mg tablet Take 50 mg by mouth daily.  colestipol (COLESTID) 1 gram tablet Take 1 g by mouth daily.  Bifidobacterium Infantis 4 mg cap Take 4 mg by mouth daily.  nystatin (MYCOSTATIN) topical cream Apply  to affected area two (2) times a day. Use for groin rash 30 g 1    LYRICA 75 mg capsule TAKE ONE CAPSULE BY MOUTH DAILY AT LUNCH TIME AND AGAIN AT BEDTIME. 180 Cap 0    ferrous sulfate (SLOW FE) 142 mg (45 mg iron) ER tablet Take 142 mg by mouth Daily (before breakfast).       Cholecalciferol, Vitamin D3, 2,000 unit cap Take 1 Cap by mouth daily. Review of Systems              Constitutional:  She denies fever, weight loss, sweats or fatigue. HEENT:  No blurred or double vision, headache or dizziness. No difficulty with swallowing, taste, speech or smell. Respiratory:  No cough, wheezing or shortness of breath. No sputum production. Cardiac:  Denies chest pain, palpitations, unexplained indigestion, syncope, edema, PND or orthopnea. GI:  No changes in bowel movements, no abdominal pain, no bloating, anorexia, nausea, vomiting or heartburn. :  No frequency or dysuria. Denies incontinence. Extremities:  No joint pain, stiffness or swelling. Skin:  No recent rashes or mole changes. Neurological:  No numbness, tingling, burning paresthesias or loss of motor strength. No syncope, dizziness, frequent headaches or memory loss. Objective:     Vitals:    06/15/18 1527   BP: 125/77   Pulse: 65   Resp: 18   Temp: 98.4 °F (36.9 °C)   TempSrc: Oral   SpO2: 97%   Weight: 300 lb (136.1 kg)   Height: 5' (1.524 m)   PainSc:   4   PainLoc: Foot       Body mass index is 58.59 kg/(m^2). Physical Examination:              General Appearance:  Chronically ill appearing. HEENT:     Ears:  The TMs and ear canals were clear. Eyes:  The pupillary responses were normal.  Extraocular muscle function intact. Lids and conjunctiva not injected. Neck:  Supple without thyromegaly or adenopathy. No JVD noted. Lungs:  Clear to auscultation and percussion. Cardiac:  Regular rate and rhythm without murmur. GI: nontender w/o mass. Normal BS's. Extremities: bilateral edema and stasis changes. Wounds right foot dressed. .. Followed by Dr. Angel Carranza. Neurological:  parapareticl. Assessment/Plan:   Impressions:      ICD-10-CM ICD-9-CM    1.  Chronic diastolic CHF (congestive heart failure), NYHA class 3 ([facility]) I50.32 428.32 AMB POC COMPLETE CBC,AUTOMATED ENTER      AMB POC BASIC METABOLIC PANEL   2. Cerebrovascular accident (CVA), unspecified mechanism ([facility] I63.9 434.91 AMB POC COMPLETE CBC,AUTOMATED ENTER      AMB POC BASIC METABOLIC PANEL   3. Diabetic gastroparesis (HCC) E11.43 250.60 AMB POC COMPLETE CBC,AUTOMATED ENTER    K31.84 536.3 AMB POC BASIC METABOLIC PANEL   4. Uncontrolled type 2 diabetes mellitus with other specified complication, with long-term current use of insulin (HCC) E11.69 250.82 AMB POC COMPLETE CBC,AUTOMATED ENTER    E11.65 V58.67 AMB POC BASIC METABOLIC PANEL    U05.8     5. (HFpEF) heart failure with preserved ejection fraction (HCC) I50.30 428.9 AMB POC COMPLETE CBC,AUTOMATED ENTER      AMB POC BASIC METABOLIC PANEL        Plan:  1. Continue present meds  2. Discussed lifestyle modifications including Na restriction, low carb/fat diet, weight reduction and exercise. Follow-up Disposition:  Return in about 1 month (around 7/15/2018).       Orders Placed This Encounter    FABIÁN Ballesteros MD

## 2018-07-02 NOTE — PROGRESS NOTES
Subjective:     Ms. Darlyn Nails is brought to the office by her  because of the redevelopment of an ulcer on the lateral aspect of the right foot proximal to where she had her toe amputated. This was an area that had small ulcer in it previously. She was seen by Dr. Roseanna Wilkerson on 06/26/2018, and he pronounced this area healed. The  subsequently noted a dark and blistered area. On the way here to the office today, it drained a moderate amount of blood. Physical Examination:   On examination, there is a residual flattop blister with blood that was unroofed with the base being a shallow-based ulcer but clean appearing. There is some question whether circulation will allow for healing of this, and we may need to get Vascular Surgery involved. PLAN:  For now, we will cleanse it with saline and apply Carrasyn Gel and a protective dressing and keep pressure off of it. We will see what it looks like in 2 weeks' time.         Patient Active Problem List    Diagnosis Date Noted    GIRISH (acute kidney injury) ([address] 03/16/2018     Priority: 1 - One    Sepsis ([address] 03/05/2018     Priority: 1 - One    Obesity hypoventilation syndrome ([address] 02/19/2018     Priority: 1 - One    Chronic diastolic CHF (congestive heart failure), NYHA class 3 ([address] 07/24/2017     Priority: 1 - One    Stroke ([address] 05/30/2014     Priority: 1 - One    Right leg weakness 04/18/2013     Priority: 1 - One    Subacute osteomyelitis of right foot ([address] 03/12/2018     Priority: 2 - Two    CVA, old, aphasia 03/09/2018     Priority: 2 - Two    Venous stasis dermatitis of both lower extremities 11/09/2017     Priority: 2 - Two    (HFpEF) heart failure with preserved ejection fraction ([address] 10/31/2017     Priority: 2 - Two    Anemia 07/24/2017     Priority: 2 - Two    Ulcer of right fifth toe due to diabetes mellitus ([address] 07/24/2017     Priority: 2 - Two    Peripheral neuropathy 07/24/2017     Priority: 2 - Two    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 2 diabetes mellitus ([facility] 2017     Priority: 2 - Two    Stage 2 chronic kidney disease due to benign hypertension 2017     Priority: 2 - Two    Hypertension 2017     Priority: 2 - Two    Bilateral edema of lower extremity 2017     Priority: 2 - Two    ASCVD (arteriosclerotic cardiovascular disease) 2014     Priority: 2 - Two    Diabetic polyneuropathy ([facility] 2013     Priority: 2 - Two    Diabetes mellitus type II, uncontrolled ([address] 2013     Priority: 2 - Two    Diabetic gastroparesis ([facility]      Priority: 2 - Two    Diabetes ([facility] 2011     Priority: 2 - Two    B12 deficiency 10/31/2017     Priority: 3 - Three    GERD (gastroesophageal reflux disease) 10/11/2017     Priority: 3 - Three    Fatty liver 2017     Priority: 3 - Three    Stasis dermatitis 2017     Priority: 3 - Three    Aortic stenosis, mild 2017     Priority: 3 - Three    CKD (chronic kidney disease) stage 3, GFR 30-59 ml/min      Priority: 3 - Three    Hyperlipemia      Priority: 3 - Three    Morbid obesity ([facility] 2011     Priority: 3 - Three    Gout 10/11/2017     Priority: 4 - Four    History of hyperparathyroidism 10/11/2017     Priority: 4 - Four    Sinus tachycardia 2017     Priority: 4 - Four    Low back pain 2017     Priority: 4 - Four    Insomnia 2017     Priority: 5 - Five    BMI 45.0-49.9, adult ([facility] 2017     Priority: 5 - Five    Bradycardia 2018    Hypoxia 2018    Type 2 diabetes mellitus with diabetic neuropathy ([facility] 2018    PE (physical exam), annual 10/11/2017     Past Surgical History:   Procedure Laterality Date    CHEST SURGERY PROCEDURE UNLISTED  10/24/13    PARATHYROID EXPLORATION     DELIVERY       HX AMPUTATION TOE  2018    HX  SECTION      HX CHOLECYSTECTOMY      HX OTHER SURGICAL      I&D right thigh abscess      Social History   Substance Use Topics    Smoking status: Never Smoker    Smokeless tobacco: Never Used    Alcohol use Yes      Comment: rare     Allergies   Allergen Reactions    Zestril [Lisinopril] Swelling     Outpatient Prescriptions Marked as Taking for the 7/2/18 encounter (Office Visit) with Destiny Fish MD   Medication Sig Dispense Refill    pantoprazole (PROTONIX) 40 mg tablet TAKE 1 TABLET BY MOUTH EVERY DAY 30 Tab 0    atorvastatin (LIPITOR) 40 mg tablet TAKE 1 TABLET BY MOUTH EVERY DAY 90 Tab 0    atenolol (TENORMIN) 50 mg tablet TAKE 1 TABLET BY MOUTH EVERY NIGHT AT BEDTIME. 90 Tab 0    hydrALAZINE (APRESOLINE) 50 mg tablet TAKE 1 TABLET BY MOUTH THREE TIMES DAILY 100 Tab 0    aspirin-dipyridamole (AGGRENOX)  mg per SR capsule TAKE 1 CAPSULE BY MOUTH TWICE DAILY( EVERY TWELVE HOURS) 60 Cap 0    FREESTYLE LITE STRIPS strip TEST BLOOD SUGAR THREE TIMES DAILY AS NEEDED 100 Strip 0    dilTIAZem CD (CARDIZEM CD) 240 mg ER capsule Take 1 Cap by mouth daily. 90 Cap 3    bumetanide (BUMEX) 2 mg tablet Take 2 mg by mouth two (2) times a day.  insulin detemir U-100 (LEVEMIR FLEXTOUCH U-100 INSULN) 100 unit/mL (3 mL) inpn 20 Units by SubCUTAneous route two (2) times a day.  ammonium lactate (LAC-HYDRIN) 12 % lotion Apply 1 Each to affected area daily. rub in to affected area well      clotrimazole (LOTRIMIN) 1 % topical cream Apply  to affected area two (2) times a day. 15 g 0    nystatin (MYCOSTATIN) powder Apply  to affected area two (2) times a day. 60 g 5    zinc oxide-cod liver oil (DESITIN) 40 % paste Apply  to affected area as needed for Skin Irritation.  allopurinol (ZYLOPRIM) 300 mg tablet Take 0.5 Tabs by mouth daily. 90 Tab 0    insulin lispro (HUMALOG) 100 unit/mL injection For BS 3x/day before meals    140-199=2 units            200-249=3 units  250-299=5 units  300-349=7 units  350 or greater = Call MD 1 Vial     hydrALAZINE (APRESOLINE) 50 mg tablet Take 0.5 Tabs by mouth three (3) times daily.  100 Tab 0    acetaminophen (TYLENOL EXTRA STRENGTH) 500 mg tablet Take 1,000 mg by mouth every six (6) hours as needed for Pain.  atenolol (TENORMIN) 50 mg tablet Take 50 mg by mouth daily.  colestipol (COLESTID) 1 gram tablet Take 1 g by mouth daily.  Bifidobacterium Infantis 4 mg cap Take 4 mg by mouth daily.  nystatin (MYCOSTATIN) topical cream Apply  to affected area two (2) times a day. Use for groin rash 30 g 1    LYRICA 75 mg capsule TAKE ONE CAPSULE BY MOUTH DAILY AT LUNCH TIME AND AGAIN AT BEDTIME. 180 Cap 0    ferrous sulfate (SLOW FE) 142 mg (45 mg iron) ER tablet Take 142 mg by mouth Daily (before breakfast).  Cholecalciferol, Vitamin D3, 2,000 unit cap Take 1 Cap by mouth daily. Review of Systems:  GEN: no weight loss, weight gain, fatigue or night sweats  CV: no PND, orthopnea, or palpitations  Resp: no dyspnea on exertion, no cough  Abd: no nausea, vomiting or diarrhea  Endocrine: no hair loss, excessive thirst or polyuria  Neurological ROS: no TIA or stroke symptoms      Objective:     Visit Vitals    /76 (BP 1 Location: Left arm, BP Patient Position: Sitting)    Pulse 65    Temp 98.1 °F (36.7 °C) (Oral)    SpO2 96%     General:   alert, cooperative and no distress   Eyes: conjunctivae/sclerae clear. PERRL   Mouth:  No oral lesions, no pharyngeal erythema, no exudates   Skin: Shallow ulcer right lateral foot 1.5 cm. Revealed after unroofing bloody vesicular lesion. Base clean   Heart: S1 and S2 normal,no murmurs noted    Lungs: Clear to auscultation bilaterally, no increased work of breathing   Abdomen: Soft, nontender. Normal bowel sounds   Extremities: 1+ edema; no cyanosis; Legs paretic                                Assessment/Plan:       ICD-10-CM ICD-9-CM    1. Ulcer of right foot, unspecified ulcer stage ([facility] L97.519 707.15    2. Obesity hypoventilation syndrome (HCC) E66.2 278.03    3.  Chronic diastolic CHF (congestive heart failure), NYHA class 3 ([facility]) I50.32 428.32    4. Cerebrovascular accident (CVA), unspecified mechanism ([facility] I63.9 434.91    5. Type 2 diabetes mellitus with diabetic neuropathy, with long-term current use of insulin ([facility]) E11.40 250.60     Z79.4 357.2      V58.67      Clean daily with Saline and apply Carrasyn wound c=gel and DSD    Float foot for pressure relief    No orders of the defined types were placed in this encounter. Follow-up Disposition:  Return in about 2 weeks (around 7/16/2018).

## 2018-07-02 NOTE — PROGRESS NOTES
Reviewed record in preparation for visit and have obtained necessary documentation. Identified pt with two pt identifiers(name and ). No chief complaint on file. Coordination of Care Questionnaire:  :     1) Have you been to an emergency room, urgent care clinic since your last visit? No     Hospitalized since your last visit? No     When:  Where:  Diagnosis:          2) Have you seen or consulted any other health care providers outside of [address] [place] [address] since your last visit?  Yes Dr Elby Sandifer

## 2018-07-17 NOTE — PROGRESS NOTES
Subjective:  Ms. Tonia Andre returns for a look at the previously ulcerated area over the lateral aspect of the right foot proximal to where she had her toe amputated. Last visit this had a small ulcer with a clean base. Today it has a solid eschar on it without surrounding undermining of the edges and without surrounding fluctuance or significant erythema. I think it is healing well. In retrospect it still appears to have been a pressure ulcer. Nonetheless I think we can let her go for six weeks time with continued current treatment and pressure relief. At her follow up will resume treatment of her usual problems, although she will continue her present medications in the meantime. Physical Examination:  On examination, the area of the previous blister has an eschar on it. It is approximately 1.5 cm and oval shaped at this point. There is no surrounding erythema or significant fluctuance and the edges are not undermined. I think this is healing. Previously there was a question as to whether circulation would allow for healing, but it appears to be doing so, so will defer vascular surgery evaluation for now. Plan: For now we will continue to cleanse it with saline and apply Carrasyn gel every couple days time, as well as protective dressing.  was also instructed in terms of pressure relief. Will see what it looks like when she comes back for her usual follow up in six weeks time.

## 2018-07-17 NOTE — PROGRESS NOTES
Gricel Acosta is a 58 y.o. female  Chief Complaint   Patient presents with    Follow-up     Visit Vitals    /76 (BP 1 Location: Left arm, BP Patient Position: Sitting)    Pulse 72    Temp 98.9 °F (37.2 °C) (Oral)    Resp 16    Ht 5' (1.524 m)    SpO2 97%     1. Have you been to the ER, urgent care clinic since your last visit? Hospitalized since your last visit?  no    2. Have you seen or consulted any other health care providers outside of the [address] [place] [address] since your last visit? Include any pap smears or colon screening.    no

## 2018-08-13 PROBLEM — N30.00 ACUTE CYSTITIS WITHOUT HEMATURIA: Status: ACTIVE | Noted: 2018-01-01

## 2018-08-13 PROBLEM — R10.84 GENERALIZED ABDOMINAL PAIN: Status: ACTIVE | Noted: 2018-01-01

## 2018-08-13 NOTE — PROGRESS NOTES
Subjective:  Mr. Padma Michelle called me earlier today and said Ms. Padma Michelle has not eaten any solid food for two weeks. He said she seemed to be taking in plenty of liquids. Her bowel movements, she's having frequent loose bowel movements. She's had dry heaves, but no bill vomiting. She has not had fever or chills. Current Medications:  1. Atorvastatin 40 mg daily. 2. Atenolol 50 mg daily. 3. Hydralazine 75 mg t.i.d.  4. Aggrenox 25/200 b.i.d.  5. Protonix 40 mg daily. 6. Cardizem  mg daily. 7. Bumex 2 mg b.i.d.  8. Levemir 20 units b.i.d.  9. Allopurinol 150 mg daily. 10. Humalog t.i.d. A.c. based on sliding scale. 11. Colestid 1 gram daily. 12. Probiotic daily. 13. Lyrica 75 mg b.i.d.  14. Ferrous sulfate 142 mg daily. 15. Vitamin D 2,000 units daily. I asked Mr. Padma Michelle to bring Ms. Padma Michelle to the office and when he did she said everything is better now and she's eating better. Physical Examination:  GENERAL:  T: 98.2. BP: 166/89. P: 90 and regular. O2 sat on room air: 99%. She is awake, alert and in no distress. HEENT:  Unremarkable. Sclerae anicteric. CHEST:  Lungs clear. CARDIAC:  Heart regular rhythm without murmurs or gallops. ABDOMEN:  Soft, non tender without hepatosplenomegaly. Bowel sounds are present. EXTREMITIES:  1+ edema. No evidence of cellulitis. Impression:  1. Anorexia, etiology undetermined. 2. Stage 3 CKD. 3. Type 2 diabetes. 4. Hypertension. Plan:  1. Check CBC, CMP, UA. I'll review that and make further recommendations and arrange for follow up if necessary.

## 2018-08-13 NOTE — PROGRESS NOTES
Reviewed record in preparation for visit and have obtained necessary documentation. Identified pt with two pt identifiers(name and ).     Chief Complaint   Patient presents with    Decreased Appetite      states patient hasn't eaten or taken any medicines by mouth for one week

## 2018-08-16 NOTE — PROGRESS NOTES
PHI verified and patient's  notified of patients KP=[id number];kidney function improved     REC:gastric emptying scan-already scheduled

## 2018-08-20 NOTE — PROGRESS NOTES
PHI verified and patient's  notified of patients test Scan shows issue with stomach emptying.   Can discuss at FU

## 2018-08-20 NOTE — TELEPHONE ENCOUNTER
Patient's  called stating that he was concerned about Ms Armas\" bowels being all liquid and that she doesn't know when she is going.  Patient's  was advised that per Dr Julia Yanes can add Benefiber 1 tbsp to bulk stools, and Metoclopramide 5 mg tid ac

## 2018-08-22 NOTE — PROGRESS NOTES
Subjective:   Xochilt Ross is a 58 y.o. female      No chief complaint on file. History of present illness:      Ms. Geovanny Arellano returns in follow up. After her last visit with me she was actually seen by Dr. Suresh Lee because of increasing difficulties with eating. She was having difficulty keeping any solid food down whatsoever. She was better at keeping some liquids down. He checked labs and found that she wasn't dehydrated. He sent her for gastric emptying scan, which was significantly positive and consistent with her diabetes so that she has severe gastroparesis. Since that time we started her on Reglan 5 mg before meals and at bedtime and she's actually noted improvement in her ability to eat. We talked at length about six small feedings a day and making sure that she stays hydrated and using Glucerna as a substitute for any meal that she's unable to get. Will continue the Reglan and hopefully this will work. If things don't, she may require hospitalization and even potentially a jejunostomy tube at some point. She's lost nearly 20 pounds and as a result she's breathing better and doesn't even come in using the oxygen today. That is good from a pulmonary standpoint, but I don't think it's necessarily good from a nutritional standpoint or a medical issue standpoint. Will see how she does over the next two weeks' time and then decide what our next steps are at that point.     Patient Active Problem List    Diagnosis Date Noted    GIRISH (acute kidney injury) ([email or web address] 03/16/2018     Priority: 1 - One    Sepsis ([address] 03/05/2018     Priority: 1 - One    Obesity hypoventilation syndrome ([facility] 02/19/2018     Priority: 1 - One    Chronic diastolic CHF (congestive heart failure), NYHA class 3 ([address] 07/24/2017     Priority: 1 - One    Stroke ([address] 05/30/2014     Priority: 1 - One    Right leg weakness 04/18/2013     Priority: 1 - One    Subacute osteomyelitis of right foot ([address] 03/12/2018     Priority: 2 - Two    CVA, old, aphasia 03/09/2018     Priority: 2 - Two    Venous stasis dermatitis of both lower extremities 11/09/2017     Priority: 2 - Two    (HFpEF) heart failure with preserved ejection fraction ([address] 10/31/2017     Priority: 2 - Two    Anemia 07/24/2017     Priority: 2 - Two    Ulcer of right fifth toe due to diabetes mellitus ([address] 07/24/2017     Priority: 2 - Two    Peripheral neuropathy 07/24/2017     Priority: 2 - Two    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 2 diabetes mellitus ([address] 07/24/2017     Priority: 2 - Two    Stage 2 chronic kidney disease due to benign hypertension 07/24/2017     Priority: 2 - Two    Hypertension 07/24/2017     Priority: 2 - Two    Bilateral edema of lower extremity 07/24/2017     Priority: 2 - Two    ASCVD (arteriosclerotic cardiovascular disease) 05/31/2014     Priority: 2 - Two    Diabetic polyneuropathy ([address] 04/20/2013     Priority: 2 - Two    Diabetes mellitus type II, uncontrolled ([address] 04/18/2013     Priority: 2 - Two    Diabetic gastroparesis ([address]      Priority: 2 - Two    Diabetes ([address] 01/26/2011     Priority: 2 - Two    B12 deficiency 10/31/2017     Priority: 3 - Three    GERD (gastroesophageal reflux disease) 10/11/2017     Priority: 3 - Three    Fatty liver 07/24/2017     Priority: 3 - Three    Stasis dermatitis 07/24/2017     Priority: 3 - Three    Aortic stenosis, mild 07/24/2017     Priority: 3 - Three    CKD (chronic kidney disease) stage 3, GFR 30-59 ml/min      Priority: 3 - Three    Hyperlipemia      Priority: 3 - Three    Morbid obesity ([address] 01/26/2011     Priority: 3 - Three    Gout 10/11/2017     Priority: 4 - Four    History of hyperparathyroidism 10/11/2017     Priority: 4 - Four    Sinus tachycardia 07/24/2017     Priority: 4 - Four    Low back pain 07/24/2017     Priority: 4 - Four    Insomnia 07/24/2017     Priority: 5 - Five    BMI 45.0-49.9, adult ([address] 07/24/2017     Priority: 5 - Five    Generalized abdominal pain 2018    Acute cystitis without hematuria 2018    Bradycardia 2018    Hypoxia 2018    Type 2 diabetes mellitus with diabetic neuropathy ([facility] 2018    PE (physical exam), annual 10/11/2017           Past Surgical History:   Procedure Laterality Date    CHEST SURGERY PROCEDURE UNLISTED  10/24/13    PARATHYROID EXPLORATION     DELIVERY       HX AMPUTATION TOE  2018    HX  SECTION      HX CHOLECYSTECTOMY      HX OTHER SURGICAL      I&D right thigh abscess      Allergies   Allergen Reactions    Zestril [Lisinopril] Swelling      Family History   Problem Relation Age of Onset    Cancer Mother      cervical    Diabetes Mother     Hypertension Mother     Stroke Mother 79    Diabetes Father     Hypertension Father     Kidney Disease Father     Diabetes Brother     Hypertension Brother     Stroke Brother 61      Social History     Social History    Marital status:      Spouse name: N/A    Number of children: N/A    Years of education: N/A     Occupational History    Not on file. Social History Main Topics    Smoking status: Never Smoker    Smokeless tobacco: Never Used    Alcohol use Yes      Comment: rare    Drug use: Yes     Special: Prescription, OTC    Sexual activity: Not on file     Other Topics Concern    Not on file     Social History Narrative     Outpatient Prescriptions Marked as Taking for the 18 encounter (Office Visit) with Benito Keyes MD   Medication Sig Dispense Refill    metoclopramide HCl (REGLAN) 5 mg tablet Take 1 Tab by mouth four (4) times daily for 10 days. 100 Tab 0    levoFLOXacin (LEVAQUIN) 250 mg tablet Take 1 Tab by mouth daily for 7 days.  7 Tab 0    LEVEMIR FLEXTOUCH U-100 INSULN 100 unit/mL (3 mL) inpn INJECT 54 UNITS UNDER THE SKIN TWICE DAILY 45 mL 0    LYRICA 75 mg capsule TAKE 1 CAPSULE BY MOUTH TWICE DAILY AT LUNCH TIME AND AGAIN AT BEDTIME 180 Cap 0    aspirin-dipyridamole (AGGRENOX)  mg per SR capsule TAKE 1 CAPSULE BY MOUTH TWICE DAILY( EVERY TWELVE HOURS) 60 Cap 0    pantoprazole (PROTONIX) 40 mg tablet TAKE 1 TABLET BY MOUTH EVERY DAY 30 Tab 0    atorvastatin (LIPITOR) 40 mg tablet TAKE 1 TABLET BY MOUTH EVERY DAY 90 Tab 0    atenolol (TENORMIN) 50 mg tablet TAKE 1 TABLET BY MOUTH EVERY NIGHT AT BEDTIME. 90 Tab 0    hydrALAZINE (APRESOLINE) 50 mg tablet TAKE 1 TABLET BY MOUTH THREE TIMES DAILY 100 Tab 0    FREESTYLE LITE STRIPS strip TEST BLOOD SUGAR THREE TIMES DAILY AS NEEDED 100 Strip 0    dilTIAZem CD (CARDIZEM CD) 240 mg ER capsule Take 1 Cap by mouth daily. 90 Cap 3    bumetanide (BUMEX) 2 mg tablet Take 2 mg by mouth two (2) times a day.  ammonium lactate (LAC-HYDRIN) 12 % lotion Apply 1 Each to affected area daily. rub in to affected area well      clotrimazole (LOTRIMIN) 1 % topical cream Apply  to affected area two (2) times a day. 15 g 0    nystatin (MYCOSTATIN) powder Apply  to affected area two (2) times a day. 60 g 5    zinc oxide-cod liver oil (DESITIN) 40 % paste Apply  to affected area as needed for Skin Irritation.  allopurinol (ZYLOPRIM) 300 mg tablet Take 0.5 Tabs by mouth daily. 90 Tab 0    insulin lispro (HUMALOG) 100 unit/mL injection For BS 3x/day before meals    140-199=2 units            200-249=3 units  250-299=5 units  300-349=7 units  350 or greater = Call MD 1 Vial     hydrALAZINE (APRESOLINE) 50 mg tablet Take 0.5 Tabs by mouth three (3) times daily. 100 Tab 0    acetaminophen (TYLENOL EXTRA STRENGTH) 500 mg tablet Take 1,000 mg by mouth every six (6) hours as needed for Pain.  atenolol (TENORMIN) 50 mg tablet Take 50 mg by mouth daily.  colestipol (COLESTID) 1 gram tablet Take 1 g by mouth daily.  Bifidobacterium Infantis 4 mg cap Take 4 mg by mouth daily.  nystatin (MYCOSTATIN) topical cream Apply  to affected area two (2) times a day.  Use for groin rash 30 g 1    ferrous sulfate (SLOW FE) 142 mg (45 mg iron) ER tablet Take 142 mg by mouth Daily (before breakfast).  Cholecalciferol, Vitamin D3, 2,000 unit cap Take 1 Cap by mouth daily. Review of Systems              Constitutional:  She denies fever, weight loss, sweats or fatigue. HEENT:  No blurred or double vision, headache or dizziness. No difficulty with swallowing, taste, speech or smell. Respiratory:  No cough, wheezing or shortness of breath. No sputum production. Cardiac:  Denies chest pain, palpitations, unexplained indigestion, syncope, edema, PND or orthopnea. GI:  Nausea with difficulty eating. Loose stools recently which are improving. :  No frequency or dysuria. Denies incontinence. Extremities:  No joint pain, stiffness or swelling. Skin:  No recent rashes or mole changes. Neurological: speech difficulties and LE weakness      Objective:     Vitals:    08/22/18 1524   BP: 156/84   Pulse: 98   Temp: 98.4 °F (36.9 °C)   TempSrc: Oral   SpO2: 96%   Height: 5' (1.524 m)   PainSc:   0 - No pain       There is no height or weight on file to calculate BMI. Physical Examination:              General Appearance:  Well-developed, well-nourished, no acute  distress. HEENT:     Ears:  The TMs and ear canals were clear. Eyes:  The pupillary responses were normal.  Extraocular muscle function intact. Lids and conjunctiva not injected. Neck:  Supple without thyromegaly or adenopathy. No JVD noted. Lungs:  Clear to auscultation and percussion. Cardiac:  Regular rate and rhythm without murmur. GI: nontender w/o mass. Normal BS's. Extremities: 1-2+ edema. Skin:  No rash or unusual mole changes noted. Neurological:  Dysphasia and bilateral LE weakness. Assessment/Plan:   Impressions:      ICD-10-CM ICD-9-CM    1. Diabetic gastroparesis ([facility] S18.43 878.41 METABOLIC PANEL, BASIC    L01.12 536.3    2.  Cerebrovascular accident (CVA), unspecified mechanism ([facility] J74.7 582.53 METABOLIC PANEL, BASIC   3. Obesity hypoventilation syndrome (HCC) E66.2 278.03    4. Chronic diastolic CHF (congestive heart failure), NYHA class 3 (HCC) I50.32 428.32    5. (HFpEF) heart failure with preserved ejection fraction (HCC) I50.30 428.9    6. Morbid obesity ([facility] E66.01 278.01         Plan:  1. Continue present meds  2. Discussed lifestyle modifications including Na restriction, low carb/fat diet,frequent feedings and Glucerna supplements and exercise (at least a walking program). Follow-up Disposition:  Return in about 2 weeks (around 9/5/2018). Orders Placed This Encounter    METABOLIC PANEL, BASIC    metoclopramide HCl (REGLAN) 5 mg tablet     Sig: Take 1 Tab by mouth four (4) times daily for 10 days.      Dispense:  100 Tab     Refill:  0       Nancy Buitrago MD

## 2018-08-22 NOTE — PROGRESS NOTES
Reviewed record in preparation for visit and have obtained necessary documentation. Identified pt with two pt identifiers(name and ). No chief complaint on file. Coordination of Care Questionnaire:  :     1) Have you been to an emergency room, urgent care clinic since your last visit? No     Hospitalized since your last visit? No               2) Have you seen or consulted any other health care providers outside of [address] [place] [address] since your last visit?  No

## 2018-09-06 NOTE — MR AVS SNAPSHOT
[address] [facility] [address] [address] [phone] Patient: Sung Porter MRN: [id number] BYS:[birth year] Visit Information Date & Time Provider Department Dept. Phone Encounter #  
 9/6/2018  3:40 PM Mary Kay Santos, [address] [phone] [id number] Upcoming Health Maintenance Date Due Hepatitis C Screening 1955 MICROALBUMIN Q1 6/3/1964 EYE EXAM RETINAL OR DILATED Q1 6/3/1964 DTaP/Tdap/Td series (1 - Tdap) 6/3/1975 PAP AKA CERVICAL CYTOLOGY 6/3/1975 FOBT Q 1 YEAR AGE 50-75 6/3/2004 ZOSTER VACCINE AGE 60> 4/3/2014 BREAST CANCER SCRN MAMMOGRAM 6/11/2015 LIPID PANEL Q1 7/24/2018 Influenza Age 5 to Adult 8/1/2018 HEMOGLOBIN A1C Q6M 10/6/2018 FOOT EXAM Q1 3/6/2019 Allergies as of 9/6/2018  Review Complete On: 9/6/2018 By: Orlando Simons Severity Noted Reaction Type Reactions Zestril [Lisinopril]  01/26/2011    Swelling Current Immunizations  Reviewed on 10/3/2015 Name Date Influenza High Dose Vaccine PF 10/19/2017 Influenza Vaccine (Quad) PF 10/7/2015  9:51 AM  
 Pneumococcal Vaccine (Unspecified Type) 1/1/2011 Not reviewed this visit You Were Diagnosed With   
  
 Codes Comments Diabetic gastroparesis ([facility]    -  Primary ICD-10-CM: E11.43, K31.84 ICD-9-CM: 250.60, 536.3 Type 2 diabetes mellitus with diabetic neuropathy, with long-term current use of insulin (HCC)     ICD-10-CM: E11.40, Z79.4 ICD-9-CM: 250.60, 357.2, V58.67 (HFpEF) heart failure with preserved ejection fraction (HCC)     ICD-10-CM: I50.30 ICD-9-CM: 428.9 Essential hypertension     ICD-10-CM: I10 
ICD-9-CM: 401.9 Vitals BP Pulse Temp Height(growth percentile) SpO2 OB Status 158/84 (BP 1 Location: Left arm, BP Patient Position: Sitting) 100 98.7 °F (37.1 °C) (Oral) 5' (1.524 m) 93% Postmenopausal  
 Smoking Status Never Smoker Preferred Pharmacy Pharmacy Name Phone [facility] [phone] - [address], [address] AT [place] [address] [phone] Your Updated Medication List  
  
   
This list is accurate as of 9/6/18  4:22 PM.  Always use your most recent med list.  
  
  
  
  
 allopurinol 300 mg tablet Commonly known as:  Gaylan Elders Take 0.5 Tabs by mouth daily. ammonium lactate 12 % lotion Commonly known as:  LAC-HYDRIN Apply 1 Each to affected area daily. rub in to affected area well  
  
 aspirin-dipyridamole  mg per SR capsule Commonly known as:  AGGRENOX  
TAKE 1 CAPSULE BY MOUTH TWICE DAILY( EVERY TWELVE HOURS) * atenolol 50 mg tablet Commonly known as:  TENORMIN Take 50 mg by mouth daily. * atenolol 50 mg tablet Commonly known as:  TENORMIN  
TAKE 1 TABLET BY MOUTH EVERY NIGHT AT BEDTIME. atorvastatin 40 mg tablet Commonly known as:  LIPITOR  
TAKE 1 TABLET BY MOUTH EVERY DAY Bifidobacterium Infantis 4 mg Cap Take 4 mg by mouth daily. bumetanide 2 mg tablet Commonly known as:  Arletta Yusef Take 2 mg by mouth two (2) times a day. Cholecalciferol (Vitamin D3) 2,000 unit Cap capsule Commonly known as:  VITAMIN D3 Take 1 Cap by mouth daily. clotrimazole 1 % topical cream  
Commonly known as:  Francie Brain Apply  to affected area two (2) times a day. colestipol 1 gram tablet Commonly known as:  COLESTID Take 1 g by mouth daily. dilTIAZem  mg ER capsule Commonly known as:  CARDIZEM CD Take 1 Cap by mouth daily. FREESTYLE LITE STRIPS strip Generic drug:  glucose blood VI test strips TEST BLOOD SUGAR THREE TIMES DAILY AS NEEDED * hydrALAZINE 50 mg tablet Commonly known as:  APRESOLINE Take 0.5 Tabs by mouth three (3) times daily. * hydrALAZINE 50 mg tablet Commonly known as:  APRESOLINE  
 TAKE 1 TABLET BY MOUTH THREE TIMES DAILY  
  
 insulin lispro 100 unit/mL injection Commonly known as:  HUMALOG For BS 3x/day before meals  140-199=2 units           200-249=3 units 250-299=5 units 300-349=7 units 350 or greater = Call MD CHOWDHURY FLEXTOUCH U-100 INSULN 100 unit/mL (3 mL) Inpn Generic drug:  insulin detemir U-100 INJECT 54 UNITS UNDER THE SKIN TWICE DAILY  
  
 LYRICA 75 mg capsule Generic drug:  pregabalin TAKE 1 CAPSULE BY MOUTH TWICE DAILY AT LUNCH TIME AND AGAIN AT BEDTIME  
  
 * nystatin topical cream  
Commonly known as:  MYCOSTATIN Apply  to affected area two (2) times a day. Use for groin rash * nystatin powder Commonly known as:  MYCOSTATIN Apply  to affected area two (2) times a day. pantoprazole 40 mg tablet Commonly known as:  PROTONIX  
TAKE 1 TABLET BY MOUTH EVERY DAY  
  
 potassium chloride 20 mEq tablet Commonly known as:  K-DUR, KLOR-CON Take 1 Tab by mouth two (2) times a day. SLOW  mg (45 mg iron) ER tablet Generic drug:  ferrous sulfate Take 142 mg by mouth Daily (before breakfast). TYLENOL EXTRA STRENGTH 500 mg tablet Generic drug:  acetaminophen Take 1,000 mg by mouth every six (6) hours as needed for Pain. zinc oxide-cod liver oil 40 % paste Commonly known as:  Tevin Cabrera Apply  to affected area as needed for Skin Irritation. * Notice: This list has 6 medication(s) that are the same as other medications prescribed for you. Read the directions carefully, and ask your doctor or other care provider to review them with you. We Performed the Following METABOLIC PANEL, COMPREHENSIVE [19819 CPT(R)] Introducing [facility] & HEALTH SERVICES! [facility] introduces [employer] patient portal. Now you can access parts of your medical record, email your doctor's office, and request medication refills online. 1. In your internet browser, go to [email or web address] 2. Click on the First Time User? Click Here link in the Sign In box. You will see the New Member Sign Up page. 3. Enter your [employer] Access Code exactly as it appears below. You will not need to use this code after youve completed the sign-up process. If you do not sign up before the expiration date, you must request a new code. · [employer] Access Code: [email or web address] Expires: 11/14/2018 12:55 PM 
 
4. Enter the last four digits of your Social Security Number (xxxx) and Date of Birth (mm/dd/yyyy) as indicated and click Submit. You will be taken to the next sign-up page. 5. Create a [employer] ID. This will be your [employer] login ID and cannot be changed, so think of one that is secure and easy to remember. 6. Create a [employer] password. You can change your password at any time. 7. Enter your Password Reset Question and Answer. This can be used at a later time if you forget your password. 8. Enter your e-mail address. You will receive e-mail notification when new information is available in [address] 9. Click Sign Up. You can now view and download portions of your medical record. 10. Click the Download Summary menu link to download a portable copy of your medical information. If you have questions, please visit the Frequently Asked Questions section of the [employer] website. Remember, [employer] is NOT to be used for urgent needs. For medical emergencies, dial 911. Now available from your iPhone and Android! Please provide this summary of care documentation to your next provider. Your primary care clinician is listed as Cain Manzano. If you have any questions after today's visit, please call [phone].

## 2018-09-06 NOTE — PROGRESS NOTES
Reviewed record in preparation for visit and have obtained necessary documentation. Identified pt with two pt identifiers(name and ). Chief Complaint   Patient presents with    Follow-up     2weeks        Coordination of Care Questionnaire:  :     1) Have you been to an emergency room, urgent care clinic since your last visit? No   Hospitalized since your last visit? No               2) Have you seen or consulted any other health care providers outside of [address] [place] [address] since your last visit?  No

## 2018-09-08 NOTE — PROGRESS NOTES
Kidney function OK and not dehydrated. Proteins in blood low so becoming malnourished.   Only way to improve this is high protein foods and supplements

## 2018-09-10 NOTE — PROGRESS NOTES
Called patient and spoke to her . verified . Advised of lab results and the patient stated he understood.

## 2018-09-14 NOTE — TELEPHONE ENCOUNTER
called to say patient is very nauseous. Can anything be done? Advised patient Dr. Collette Heimlich is out of the office today. He didn't want to talk to Dr. Jesu Anderson. He wants Dr. Collette Heimlich to call him on Monday.

## 2018-09-17 NOTE — TELEPHONE ENCOUNTER
Requested Prescriptions     Pending Prescriptions Disp Refills    ondansetron (ZOFRAN ODT) 4 mg disintegrating tablet 15 Tab 0     Sig: Take 1 Tab by mouth every eight (8) hours as needed for Nausea.

## 2018-11-05 NOTE — ED NOTES
Assumed care of patient. Pt resting in position of comfort. Call bell within reach. Per patient's  the patient is bed bound, incontinent of urine and stool. Reports he has been using a pur wick for the past week but notes that the patient does not tell him that she has been incontinent. Pt's entire sacrum, panus, pelvic region is red, excoriated and painful to touch.  has been treating with Desitin. Dr Roberta Nunez at bedside.   denies cp, sob, fevers, chills, n/v/d

## 2018-11-05 NOTE — DISCHARGE INSTRUCTIONS
Dermatitis: Care Instructions  Your Care Instructions  Dermatitis is the general name used for any rash or inflammation of the skin. Different kinds of dermatitis cause different kinds of rashes. Common causes of a rash include new medicines, plants (such as poison oak or poison ivy), heat, and stress. Certain illnesses can also cause a rash. An allergic reaction to something that touches your skin, such as latex, nickel, or poison ivy, is called contact dermatitis. Contact dermatitis may also be caused by something that irritates the skin, such as bleach, a chemical, or soap. These types of rashes cannot be spread from person to person. How long your rash will last depends on what caused it. Rashes may last a few days or months. Follow-up care is a key part of your treatment and safety. Be sure to make and go to all appointments, and call your doctor if you are having problems. It's also a good idea to know your test results and keep a list of the medicines you take. How can you care for yourself at home? · Do not scratch the rash. Cut your nails short, and file them smooth. Or wear gloves if this helps keep you from scratching. · Wash the area with water only. Pat dry. · Put cold, wet cloths on the rash to reduce itching. · Keep cool, and stay out of the sun. · Leave the rash open to the air as much as possible. · If the rash itches, use hydrocortisone cream. Follow the directions on the label. Calamine lotion may help for plant rashes. · Take an over-the-counter antihistamine, such as diphenhydramine (Benadryl) or loratadine (Claritin), to help calm the itching. Read and follow all instructions on the label. · If your doctor prescribed a cream, use it as directed. If your doctor prescribed medicine, take it exactly as directed. When should you call for help?   Call your doctor now or seek immediate medical care if:    · You have symptoms of infection, such as:  ? Increased pain, swelling, warmth, or redness. ? Red streaks leading from the area. ? Pus draining from the area. ? A fever.     · You have joint pain along with the rash.    Watch closely for changes in your health, and be sure to contact your doctor if:    · Your rash is changing or getting worse.     · You are not getting better as expected. Where can you learn more? Go to http://karen-josé antonio.info/. Enter [phone] [phone] in the search box to learn more about \"Dermatitis: Care Instructions. \"  Current as of: April 18, 2018  Content Version: 11.8  © 1025-9273 [employer]. Care instructions adapted under license by [employer] (which disclaims liability or warranty for this information). If you have questions about a medical condition or this instruction, always ask your healthcare professional. [address] any warranty or liability for your use of this information. Jock Itch: Care Instructions  Your Care Instructions  Jock itch is a fungal infection of the groin. The fungus that causes jock itch lives on your skin. It often affects male athletes, but anyone can get jock itch. You may get an itchy rash on your inner thighs and rear end (buttocks). It spreads and starts to itch when you sweat or are in steamy showers or locker rooms. Jock itch should end soon if you keep your skin dry after you clean it. You can treat jock itch at home with antifungal creams and powders that you can buy without a prescription. Follow-up care is a key part of your treatment and safety. Be sure to make and go to all appointments, and call your doctor if you are having problems. It's also a good idea to know your test results and keep a list of the medicines you take. How can you care for yourself at home? · Wash the rash with soap and water. · Wet a soft cloth with cool water alone or mixed with baking soda or essential oils such as lavender or ita.  Put the cool compress on the skin to relieve itching. · If you have large areas of blisters or sores, use compresses such as those made with Burow's solution (available without a prescription) to soothe and dry out the blisters. · Spread antifungal cream or powder over, and beyond the edge of, the rash. Follow the directions on the package. · If your doctor prescribed medicine, take it exactly as directed. Call your doctor if you have any problems with your medicine. · Try not to scratch the rash. · Shower or bathe daily and after you exercise. · Keep your skin dry as much as possible to allow it to heal.  · Until your jock itch is cured, wear loose-fitting cotton clothing. Avoid tight underwear, pants, and panty hose. · Wash your supporters and shorts after every wearing. · Do not share clothing, sports equipment, towels, or sheets to avoid spreading the fungi to other people. To prevent jock itch  · Put on socks before you put on underwear if you have athlete's foot. This action helps prevent the fungus on your feet from spreading to your groin. · Wash your workout clothes, underwear, socks, and towels after each use. · Keep your groin, inner thighs, and buttocks clean and dry, especially after you exercise and shower. · Use a powder on your groin, inner thighs, and buttocks to help keep these areas dry. · Do not borrow or lend clothing, sports equipment, towels, or sheets. · Wear slippers or sandals in locker rooms, showers, and public bathing areas. When should you call for help? Call your doctor now or seek immediate medical care if:    · You have signs of infection, such as:  ? Increased pain, swelling, warmth, or redness. ? Red streaks leading from the rash. ? Pus draining from the rash. ? A fever.    Watch closely for changes in your health, and be sure to contact your doctor if:    · You do not get better as expected. Where can you learn more? Go to http://karen-josé antonio.info/.   Enter G303 in the search box to learn more about \"Jock Itch: Care Instructions. \"  Current as of: April 18, 2018  Content Version: 11.8  © 7297-4815 [employer]. Care instructions adapted under license by [employer] (which disclaims liability or warranty for this information). If you have questions about a medical condition or this instruction, always ask your healthcare professional. [address] any warranty or liability for your use of this information.

## 2018-11-05 NOTE — ED NOTES
Discharge instructions reviewed with pt by Dr. Trang Luque. Pt able to return/verbalize discharge instructions. Copy of discharge instructions given. RX given to patient. Patient condition stable, respiratory status within normal limits, neuro status intact. Discharged via wheelchair out of er, accompanied by

## 2018-11-05 NOTE — ED PROVIDER NOTES
EMERGENCY DEPARTMENT HISTORY AND PHYSICAL EXAM 
 
 
Date: 11/5/2018 Patient Name: Cally Seals History of Presenting Illness Chief Complaint Patient presents with  
 Skin Problem  
  pts family reports pt is bed bound, has sore to buttock area History Provided By: Patient's  HPI: Cally Seals, 58 y.o. female with PMHx significant for DM, HLD, stroke, HTN, gastroparesis, GERD, hyperparathyroidism, CKD, CHF , presents via wheelchair to the ED for further evaluation of an erythematous rash to the buttock, B/L upper thighs and between the legs worsening over the last week. Pt's  reports no associated sx. He expresses the pt is now bed bound after a stroke and 2 week admission for abscess between the legs noting after a lengthy rehabilitation stay she returned home and was able to express when she needed to use the restroom.  discloses in the last week the pt is now not able to communicate prior to using the restroom only expresses after using the restroom and has been sitting in urine and stool. Pt's  states he has noticed redness of the buttock, thighs, and in-between her legs endorsing he has been using Nystatin powder and Desitin cream on the areas but the erythema continued to worsen leading him to bring the pt to the ED.  endorses using a purewik at home for the last week as well.  denies any current abx use. She denies any fevers, chills, decreased appetite, chest pain, SOB, abdominal pain, nausea, vomiting, or diarrhea. There are no other complaints, changes, or physical findings at this time. PCP: Maryjane Santos MD 
 
Current Outpatient Medications Medication Sig Dispense Refill  atorvastatin (LIPITOR) 40 mg tablet TAKE 1 TABLET BY MOUTH EVERY DAY 90 Tab 0  
 metoclopramide HCl (REGLAN) 5 mg tablet TAKE 1 TABLET BY MOUTH FOUR TIMES DAILY FOR 10 DAYS 100 Tab 0  
  ondansetron (ZOFRAN ODT) 4 mg disintegrating tablet Take 1 Tab by mouth every eight (8) hours as needed for Nausea. 15 Tab 0  
 metoclopramide HCl (REGLAN) 5 mg tablet Take 5 mg by mouth four (4) times daily.  potassium chloride (K-DUR, KLOR-CON) 20 mEq tablet Take 1 Tab by mouth two (2) times a day. 60 Tab 3  
 LEVEMIR FLEXTOUCH U-100 INSULN 100 unit/mL (3 mL) inpn INJECT 54 UNITS UNDER THE SKIN TWICE DAILY 45 mL 0  
 LYRICA 75 mg capsule TAKE 1 CAPSULE BY MOUTH TWICE DAILY AT LUNCH TIME AND AGAIN AT BEDTIME 180 Cap 0  
 aspirin-dipyridamole (AGGRENOX)  mg per SR capsule TAKE 1 CAPSULE BY MOUTH TWICE DAILY( EVERY TWELVE HOURS) 60 Cap 0  
 pantoprazole (PROTONIX) 40 mg tablet TAKE 1 TABLET BY MOUTH EVERY DAY 30 Tab 0  
 atenolol (TENORMIN) 50 mg tablet TAKE 1 TABLET BY MOUTH EVERY NIGHT AT BEDTIME. 90 Tab 0  
 hydrALAZINE (APRESOLINE) 50 mg tablet TAKE 1 TABLET BY MOUTH THREE TIMES DAILY 100 Tab 0  
 FREESTYLE LITE STRIPS strip TEST BLOOD SUGAR THREE TIMES DAILY AS NEEDED 100 Strip 0  
 dilTIAZem CD (CARDIZEM CD) 240 mg ER capsule Take 1 Cap by mouth daily. 90 Cap 3  
 bumetanide (BUMEX) 2 mg tablet Take 2 mg by mouth two (2) times a day.  ammonium lactate (LAC-HYDRIN) 12 % lotion Apply 1 Each to affected area daily. rub in to affected area well  clotrimazole (LOTRIMIN) 1 % topical cream Apply  to affected area two (2) times a day. 15 g 0  
 nystatin (MYCOSTATIN) powder Apply  to affected area two (2) times a day. 60 g 5  
 zinc oxide-cod liver oil (DESITIN) 40 % paste Apply  to affected area as needed for Skin Irritation.  allopurinol (ZYLOPRIM) 300 mg tablet Take 0.5 Tabs by mouth daily. 90 Tab 0  
 insulin lispro (HUMALOG) 100 unit/mL injection For BS 3x/day before meals 140-199=2 units 200-249=3 units 250-299=5 units 300-349=7 units 350 or greater = Call MD 1 Vial   
 acetaminophen (TYLENOL EXTRA STRENGTH) 500 mg tablet Take 1,000 mg by mouth every six (6) hours as needed for Pain.  atenolol (TENORMIN) 50 mg tablet Take 50 mg by mouth daily.  colestipol (COLESTID) 1 gram tablet Take 1 g by mouth daily.  Bifidobacterium Infantis 4 mg cap Take 4 mg by mouth daily.  nystatin (MYCOSTATIN) topical cream Apply  to affected area two (2) times a day. Use for groin rash 30 g 1  
 ferrous sulfate (SLOW FE) 142 mg (45 mg iron) ER tablet Take 142 mg by mouth Daily (before breakfast).  Cholecalciferol, Vitamin D3, 2,000 unit cap Take 1 Cap by mouth daily. Past History Past Medical History: 
Past Medical History:  
Diagnosis Date  Acute combined systolic and diastolic heart failure, NYHA class 2 ([address] 7/24/2017  Anemia 7/24/2017  Aortic stenosis, mild 7/24/2017  Bilateral edema of lower extremity 7/24/2017  BMI 45.0-49.9, adult ([address] 7/24/2017  Chronic diastolic CHF (congestive heart failure), NYHA class 3 ([address] 7/24/2017  CKD (chronic kidney disease) stage 2, GFR 60-89 ml/min  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 2 diabetes mellitus ([address] 7/24/2017  Diabetes ([address]  Fatty liver 7/24/2017  Gastroparesis  GERD (gastroesophageal reflux disease)  Gout   
 HTN (hypertension)  Hyperlipemia  Hyperparathyroidism ([address]  Hypertension 7/24/2017  Insomnia 7/24/2017  Low back pain 7/24/2017  N&V (nausea and vomiting) 7/24/2017  Nausea & vomiting  Obesity  Pelvic joint pain 7/24/2017  Peripheral neuropathy 7/24/2017  Sciatica   
 right  Severe right groin pain 7/24/2017  Sinus tachycardia 7/24/2017  Stage 2 chronic kidney disease due to benign hypertension 7/24/2017  Stasis dermatitis 7/24/2017  Stroke [facility]) 2001  
 right parietal, left caudate  Thyroid disease   
 para thyroid(high calcium)  Ulcer of right fifth toe due to diabetes mellitus ([address] 7/24/2017 Past Surgical History: 
Past Surgical History:  
Procedure Laterality Date  CHEST SURGERY PROCEDURE UNLISTED  10/24/13 PARATHYROID EXPLORATION   
 DELIVERY     
 HX AMPUTATION TOE  2018  HX  SECTION    
 HX CHOLECYSTECTOMY  HX OTHER SURGICAL    
 I&D right thigh abscess Family History: 
Family History Problem Relation Age of Onset  Cancer Mother   
     cervical  
 Diabetes Mother  Hypertension Mother  Stroke Mother 79  
 Diabetes Father  Hypertension Father  Kidney Disease Father  Diabetes Brother  Hypertension Brother  Stroke Brother 61 Social History: 
Social History Tobacco Use  Smoking status: Never Smoker  Smokeless tobacco: Never Used Substance Use Topics  Alcohol use: Yes Comment: rare  Drug use: Yes Types: Prescription, OTC Allergies: Allergies Allergen Reactions  Zestril [Lisinopril] Swelling Review of Systems Review of Systems Constitutional: Negative. Negative for activity change, appetite change, chills, fatigue, fever and unexpected weight change. HENT: Negative. Negative for congestion, hearing loss, rhinorrhea, sneezing and voice change. Eyes: Negative. Negative for pain and visual disturbance. Respiratory: Negative. Negative for apnea, cough, choking, chest tightness and shortness of breath. Cardiovascular: Negative. Negative for chest pain and palpitations. Gastrointestinal: Negative. Negative for abdominal distention, abdominal pain, blood in stool, diarrhea, nausea and vomiting. Genitourinary: Negative. Negative for difficulty urinating, flank pain, frequency and urgency. No discharge Musculoskeletal: Negative. Negative for arthralgias, back pain, myalgias and neck stiffness. Skin: Positive for rash (buttock, thighs, and inner legs). Negative for color change. Neurological: Negative. Negative for dizziness, seizures, syncope, speech difficulty, weakness, numbness and headaches. Hematological: Negative for adenopathy. Psychiatric/Behavioral: Negative. Negative for agitation, behavioral problems, dysphoric mood and suicidal ideas. The patient is not nervous/anxious. Physical Exam  
Physical Exam  
Constitutional: She is oriented to person, place, and time. She appears well-developed and well-nourished. No distress. HENT:  
Head: Normocephalic and atraumatic. Mouth/Throat: Oropharynx is clear and moist. No oropharyngeal exudate. Eyes: Conjunctivae and EOM are normal. Pupils are equal, round, and reactive to light. Right eye exhibits no discharge. Left eye exhibits no discharge. Neck: Normal range of motion. Neck supple. Cardiovascular: Normal rate, regular rhythm and intact distal pulses. Exam reveals no gallop and no friction rub. Murmur heard. Systolic (ejection ) murmur is present with a grade of 2/6. Pulmonary/Chest: Effort normal and breath sounds normal. No respiratory distress. She has no wheezes. She has no rales. She exhibits no tenderness. Abdominal: Soft. Bowel sounds are normal. She exhibits no distension and no mass. There is no tenderness. There is no rebound and no guarding. Musculoskeletal: Normal range of motion. She exhibits no edema. Lymphadenopathy:  
  She has no cervical adenopathy. Neurological: She is alert and oriented to person, place, and time. No cranial nerve deficit. Coordination normal.  
Skin: Skin is warm and dry. Mild to moderate diffuse excoriations to the buttock, no appreciable induration or abscess. tinea cruris to the B/L lower extremities Mild woody changes to the B/L lower extremities Psychiatric: She has a normal mood and affect. Nursing note and vitals reviewed. Diagnostic Study Results Medical Decision Making I am the first provider for this patient. I reviewed the vital signs, available nursing notes, past medical history, past surgical history, family history and social history. Vital Signs-Reviewed the patient's vital signs. Patient Vitals for the past 12 hrs: 
 Temp Pulse Resp BP SpO2  
11/05/18 0907 98.2 °F (36.8 °C) 96 16 140/67 100 % Pulse Oximetry Analysis - 100% on room air Cardiac Monitor:  
Rate: 96 bpm 
Rhythm: Normal Sinus Rhythm Records Reviewed: Nursing Notes and Old Medical Records Provider Notes (Medical Decision Making): Questionable cellulitis vs tinea superinfection. Pt has no systemic sx of infection. Will treat localized and have re-evaluation in 48 hours. ED Course:  
Initial assessment performed. The patients presenting problems have been discussed, and they are in agreement with the care plan formulated and outlined with them. I have encouraged them to ask questions as they arise throughout their visit. Progress Notes: 
 
9:37 AM  
The pt has been re-evaluated. Pt and  were updated on plan for discharge with abx coverage and topical treatment for the pt's condition with instructions for 48 hour follow up. Critical Care Time: 0 minutes Disposition: 
9:38 AM 
Angelica Armas's  results have been reviewed with her. She has been counseled regarding her diagnosis. She verbally conveys understanding and agreement of the signs, symptoms, diagnosis, treatment and prognosis and additionally agrees to follow up as recommended with Dr. Emili Maria MD in 24 - 48 hours. She also agrees with the care-plan and conveys that all of her questions have been answered. I have also put together some discharge instructions for her that include: 1) educational information regarding their diagnosis, 2) how to care for their diagnosis at home, as well a 3) list of reasons why they would want to return to the ED prior to their follow-up appointment, should their condition change. PLAN: 
1. Current Discharge Medication List  
  
START taking these medications Details cephALEXin (KEFLEX) 500 mg capsule Take 1 Cap by mouth four (4) times daily for 7 days. Qty: 28 Cap, Refills: 0 CONTINUE these medications which have CHANGED Details  
nystatin (MYCOSTATIN) topical cream Apply  to affected area two (2) times a day. Qty: 30 g, Refills: 0 STOP taking these medications  
  
 nystatin (MYCOSTATIN) powder Comments:  
Reason for Stoppin.  
Follow-up Information Follow up With Specialties Details Why Contact Info Kala Guzmán MD Internal Medicine In 2 days For wound re-check [address] [facility] 
[phone] Kala Guzmán MD Internal Medicine In 2 days For wound re-check [address] [facility] 
[phone] Return to ED if worse Diagnosis Clinical Impression: 1. Tinea cruris 2. Irritant contact dermatitis, unspecified trigger Attestations: 
 
Attestation: This note is prepared by Kiley Mike, acting as Scribe for [employer] Hetal Tai, [address] Hetal Tai MD: The scribe's documentation has been prepared under my direction and personally reviewed by me in its entirety. I confirm that the note above accurately reflects all work, treatment, procedures, and medical decision making performed by me.

## 2018-11-05 NOTE — PROGRESS NOTES
Follow up in 1 year with a KUB.   Follow up sooner if needed.   Pharmacy  Enoxaparin (Lovenox®) Monitoring/Dosing      Indication: DVT prophylaxis     Current Dose: Enoxaparin 40 subcutaneously every 12 hours (dose adjusted for BMI > 40)    Creatinine Clearance (mL/min): 29      Labs:  Recent Labs      03/16/18   0150  03/16/18   0144  03/15/18   0252  03/14/18   0245   CREA  2.53*   --   2.01*  1.66*   HGB   --   9.0*  9.2*  9.5*   PLT   --   253  277  301     Wt Readings from Last 1 Encounters:   03/16/18 133.5 kg (294 lb 4.8 oz)     Ht Readings from Last 1 Encounters:   03/07/18 152.4 cm (60\")       Impression/Plan: lovenox reduced to 40 mg daily for Crcl < 30 ml/min     Thanks,  Ela Torres, PHARMD

## 2018-11-08 NOTE — TELEPHONE ENCOUNTER
Patient's  is requesting a refill on the Nystatin topical cream.    Requested Prescriptions     Pending Prescriptions Disp Refills    nystatin (MYCOSTATIN) topical cream 60 g 2     Sig: Apply  to affected area two (2) times a day.

## 2018-11-14 NOTE — TELEPHONE ENCOUNTER
RX refill request from the patient/pharmacy. Patient last seen 9/6/2018 with labs, and next appt. Not scheduled for follow up appointment.     Requested Prescriptions     Pending Prescriptions Disp Refills    aspirin-dipyridamole (AGGRENOX)  mg per SR capsule [Pharmacy Med Name: ASPIRIN-DIPYRIDAMOLE ER 25/200MG C] 60 Cap 3     Sig: TAKE 1 CAPSULE BY MOUTH TWICE DAILY( EVERY TWELVE HOURS)

## 2018-11-26 NOTE — PROGRESS NOTES
[facility] Palliative Medicine Office  Nursing Note  [phone]  Fax [phone]     Name:  Rey Russell  YOB: 1955     Incoming call from Navneet in Dr. Chelsie Torres office (PCP) inquiring if pt meets criteria for Palliative Home. Nurse explained criteria for Palliative Home visits: A life-limiting illness with life expectancy of less than 2 years and in addition to the limited life expectancy, the presence of 1 or more COPE needs (Care Decisions, Overwhelming Symptoms, Psychosocial Distress, and End-Stage Disease). Navneet says that according to pt's , pt has not been eating recently and pt has been refusing her meds. Navneet will check with Dr. Marizol Pedersen to see if pt has life expectancy of 2 years or less and call back. In the meantime, this nurse forwarded pt's info to [place] [phone] to check if pt's insurance will cover Palliative Home visits.     Flako Veloz, CASSANDRAN, RN  Clinical Referral Navigator

## 2018-11-26 NOTE — PROGRESS NOTES
[facility] Palliative Medicine Office  Nursing Note  [phone]  Fax [phone]     Name:  Ammy Cabral  YOB: 1955     Received incoming call from Minnie Cruz RN with [address] [place] [address] inquiring about provider home visit services for pt and if pt would qualify for Palliative Home. Abbey Alvarez is a 57 yo female with history of DM2 with diabetic polyneuropathy, gastroparesis, diastolic congestive heart failure with preserved ejection fraction (NYHA Class III), CKD stage III, morbid obesity, obesity hypoventilation syndrome, CVA. Pt is confined to a wheelchair since CVA. Ms. Merced Mcdonald states pt's  is her only caregiver, he does not have any help. Pt is dependent in all ADL's. Pt was seen in [id number] [place] [address] ED on 11/9/18 with an erythematous rash to the buttock, B/L upper thighs and between the legs. According to her , pt is not always able to express when she needs to go to the bathroom and she had been sitting in urine and stool. Ms. Merced Mcdonald says  did research on his own and discovered purewik which he started using about a week prior to pt's ED visit. This nurse explained Palliative Home criteria to Ms. Merced Mcdonald:  A life-limiting illness with life expectancy of less than 2 years and in addition to the limited life expectancy, the presence of 1 or more COPE needs (Care Decisions, Overwhelming Symptoms, Psychosocial Distress, and End-Stage Disease). It isn't clear that pt has a diagnosis that gives her a life expectancy of 2 years or less to live. This nurse suggested [address] if pt is homebound and has life expectancy of greater than 2 years (if her insurance will cover [facility]). Ms. Merced Mcdonald says pt and  will likely want to continue with current PCP, Dr. Cmaila Yu with [address] in [place].       Nurse will review chart further and determine if pt meets criteria for Palliative Yojana Hobbs, CASSANDRAN, RN  Clinical Referral Navigator

## 2018-11-27 NOTE — PROGRESS NOTES
[facility] Palliative Medicine Office  Nursing Note  [phone]  Fax [phone]     Name:  Ean Wagner  YOB: 1955     Nurse called Dr. Cande Siddiqui office and spoke with Ricardo Staples to inform her that [facility] in our office has been trying to speak with someone at [place] [address] today to get approval for Palliative Home visits. Ms. Salas Blevins has been transferred to numerous people at [place] [address] but none of their reps have been able to give a yes or no answer about whether or not they will approve Palliative Home visits. Ms. Salas Blevins will try again later today. Nurse called referring home health nurse Kenzie Glass RN and informed her of above also.     DEJAH Patel, RN  Clinical Referral Navigator

## 2018-12-01 PROBLEM — N17.9 ACUTE RENAL FAILURE (ARF) (HCC): Status: ACTIVE | Noted: 2018-01-01

## 2018-12-01 PROBLEM — E11.9 DM (DIABETES MELLITUS) (HCC): Status: ACTIVE | Noted: 2018-01-01

## 2018-12-01 PROBLEM — N18.9 CKD (CHRONIC KIDNEY DISEASE): Status: ACTIVE | Noted: 2018-01-01

## 2018-12-01 NOTE — PROGRESS NOTES
TRANSFER - IN REPORT: 
 
Verbal report received from Heidy Isaac RN(name) on Jessica Ribeiro  being received from ER(unit) for routine progression of care Report consisted of patients Situation, Background, Assessment and  
Recommendations(SBAR). Information from the following report(s) SBAR, Kardex, ED Summary, Intake/Output, MAR and Recent Results was reviewed with the receiving nurse. Opportunity for questions and clarification was provided. Assessment completed upon patients arrival to unit and care assumed.

## 2018-12-01 NOTE — PROGRESS NOTES
Pharmacy  Heparin Monitoring Indication: DVT prophylaxis Current Dose: Heparin 5000 units subcutaneously every 8 hours Creatinine Clearance (mL/min): 21 ml/min Current Weight: 7500 Kg Labs: 
Recent Labs 12/01/18 
1225 12/01/18 
1109 CREA 1.87*  --   
HGB  --  13.8 PLT  --  334 Wt Readings from Last 1 Encounters:  
12/01/18 136 kg (299 lb 13.2 oz) Ht Readings from Last 1 Encounters:  
12/01/18 152.4 cm (60\") Impression/Plan:  
· Change dose to heparin 7500 units subcutaneously every 8 hr per protocol for obese patients with BMI >40 Thanks, Ayanna Cruz, PHARMD

## 2018-12-01 NOTE — H&P
Hospitalist Admission NoteNAME: Rey Russell :  1955 MRN:  [id number] Date/Time:  2018 4:00 PM 
 
Patient PCP: Cesia Sprague MD 
______________________________________________________________________ Given the patient's current clinical presentation, I have a high level of concern for decompensation if discharged from the emergency department. Complex decision making was performed, which includes reviewing the patient's available past medical records, laboratory results, and x-ray films. My assessment of this patient's clinical condition and my plan of care is as follows. Assessment / Plan: Active Problems: 
    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 Morbid obesity ([address] (2011) Chronic, monitor Right leg weakness (2013) Hx of stroke patient bedridden Hyperlipemia () Continue statin Diabetic gastroparesis ([address] () Worsening symptoms, refusing meds, discussed option, GI consult, palliative care consult Diabetic polyneuropathy ([address] (2013) Continue lyrica Stroke [facility]) (2014) Patient bedridden since abscess and cellulitis in groin area Hypertension (2017) Will resume meds, BP stable Aortic stenosis, mild (2017) Mild systolic murmur, monitior DM (diabetes mellitus) ([address] (2018) staretd on SSI, will resume lantus if become hyperglycemic, patient not eating CKD (chronic kidney disease) (2018) As above control BP, hydrate nephrology consult  if worse Diarrhea, check Cdiff Code Status: full code, discussed with , will consult palliative care Surrogate Decision Maker: DVT Prophylaxis: Heparin GI Prophylaxis: not indicated Baseline: bedridden, ADL dependent, incontinent Subjective: CHIEF COMPLAINT: refusing food, and meds. HISTORY OF PRESENT ILLNESS:    
Mark Kay is a 58 y.o.  female who presents with decreased intake and progressive weakness from,  her baseline, she has been in and out of hospital numerous times. she is refusing to eat or taking her pills, she has HX of diabetic gastroparesis, and for last few month been on Reglan which improved her symptoms, but for last two weeks  not taking it regularly, it has been helping when she was taking it. She even unable to tolerate  Liquids without it ,  tried to crushed pills and add it to juice, which improved her symptoms some, but still unable to take enough  Nutrition,  becoming weaker and weaker. Patient has Hx of stroke with minimal residual symptom in the beginning.  reports that  Patient  developed severe abscess and cellulites in groin area, which required prolong hospitalization, after that she was not able to ambulate and become bedridden. NOW   taking care of her, she is most of the time unable to inform him about need to use bedpan. She has loose BM and incontinent. Patient refused to come even  insisted, finally patient son convinced her to come to ER, patient understand conversation, but  rarely talks. We were asked to admit for work up and evaluation of the above problems. Past Medical History:  
Diagnosis Date  Acute combined systolic and diastolic heart failure, NYHA class 2 ([address] 7/24/2017  Anemia 7/24/2017  Aortic stenosis, mild 7/24/2017  Bilateral edema of lower extremity 7/24/2017  BMI 45.0-49.9, adult ([address] 7/24/2017  Chronic diastolic CHF (congestive heart failure), NYHA class 3 ([address] 7/24/2017  CKD (chronic kidney disease) stage 2, GFR 60-89 ml/min  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 2 diabetes mellitus ([facility] 2017  Diabetes ([facility]  Fatty liver 2017  Gastroparesis  GERD (gastroesophageal reflux disease)  Gout   
 HTN (hypertension)  Hyperlipemia  Hyperparathyroidism ([facility]  Hypertension 2017  Insomnia 2017  Low back pain 2017  N&V (nausea and vomiting) 2017  Nausea & vomiting  Obesity  Pelvic joint pain 2017  Peripheral neuropathy 2017  Sciatica   
 right  Severe right groin pain 2017  Sinus tachycardia 2017  Stage 2 chronic kidney disease due to benign hypertension 2017  Stasis dermatitis 2017  Stroke [facility]) 2001  
 right parietal, left caudate  Thyroid disease   
 para thyroid(high calcium)  Ulcer of right fifth toe due to diabetes mellitus ([facility] 2017 Past Surgical History:  
Procedure Laterality Date  CHEST SURGERY PROCEDURE UNLISTED  10/24/13 PARATHYROID EXPLORATION   
 DELIVERY     
 HX AMPUTATION TOE  2018  HX  SECTION    
 HX CHOLECYSTECTOMY  HX OTHER SURGICAL    
 I&D right thigh abscess Social History Tobacco Use  Smoking status: Never Smoker  Smokeless tobacco: Never Used Substance Use Topics  Alcohol use: Yes Comment: rare Family History Problem Relation Age of Onset  Cancer Mother   
     cervical  
 Diabetes Mother  Hypertension Mother  Stroke Mother 79  
 Diabetes Father  Hypertension Father  Kidney Disease Father  Diabetes Brother  Hypertension Brother  Stroke Brother 61 Allergies Allergen Reactions  Zestril [Lisinopril] Swelling Prior to Admission medications Medication Sig Start Date End Date Taking?  Authorizing Provider  
aspirin-dipyridamole (AGGRENOX)  mg per SR capsule TAKE 1 CAPSULE BY MOUTH TWICE DAILY( EVERY TWELVE HOURS) 18   William Chandra MD  
 nystatin (MYCOSTATIN) topical cream Apply  to affected area two (2) times a day. 11/8/18   Rodolfo Dubon MD  
metoclopramide HCl (REGLAN) 5 mg tablet TAKE 1 TABLET BY MOUTH FOUR TIMES DAILY FOR 10 DAYS 11/7/18   Rodolfo Dubon MD  
atorvastatin (LIPITOR) 40 mg tablet TAKE 1 TABLET BY MOUTH EVERY DAY 11/4/18   Rodolfo Dubon MD  
ondansetron (ZOFRAN ODT) 4 mg disintegrating tablet Take 1 Tab by mouth every eight (8) hours as needed for Nausea. 9/17/18   Rodolfo Dubon MD  
metoclopramide HCl (REGLAN) 5 mg tablet Take 5 mg by mouth four (4) times daily. Provider, Historical  
potassium chloride (K-DUR, KLOR-CON) 20 mEq tablet Take 1 Tab by mouth two (2) times a day. 8/27/18   Rodolfo Dubon MD  
LEVEMIR FLEXTOUCH U-100 INSULN 100 unit/mL (3 mL) inpn INJECT 54 UNITS UNDER THE SKIN TWICE DAILY 8/10/18   Ron Chandra MD  
LYRICA 75 mg capsule TAKE 1 CAPSULE BY MOUTH TWICE DAILY AT LUNCH TIME AND AGAIN AT BEDTIME 8/1/18   Rodolfo Dubon MD  
pantoprazole (PROTONIX) 40 mg tablet TAKE 1 TABLET BY MOUTH EVERY DAY 6/27/18   Rodolfo Dubon MD  
atenolol (TENORMIN) 50 mg tablet TAKE 1 TABLET BY MOUTH EVERY NIGHT AT BEDTIME. 6/10/18   Ron Chandra MD  
hydrALAZINE (APRESOLINE) 50 mg tablet TAKE 1 TABLET BY MOUTH THREE TIMES DAILY 6/3/18   Rodolfo Dubon MD  
FREESTYLE LITE STRIPS strip TEST BLOOD SUGAR THREE TIMES DAILY AS NEEDED 4/12/18   Ron Chandra MD  
dilTIAZem CD (CARDIZEM CD) 240 mg ER capsule Take 1 Cap by mouth daily. 3/29/18   Marissa Coats MD  
bumetanide (BUMEX) 2 mg tablet Take 2 mg by mouth two (2) times a day. 3/22/18   Rodolfo Dubon MD  
ammonium lactate (LAC-HYDRIN) 12 % lotion Apply 1 Each to affected area daily. rub in to affected area well 3/22/18   Ron Chandra MD  
clotrimazole (LOTRIMIN) 1 % topical cream Apply  to affected area two (2) times a day.  3/22/18   Rodolfo Dubon MD  
 zinc oxide-cod liver oil (DESITIN) 40 % paste Apply  to affected area as needed for Skin Irritation. 3/22/18   Yolanda Wilson MD  
allopurinol (ZYLOPRIM) 300 mg tablet Take 0.5 Tabs by mouth daily. 3/22/18   Yolanda Wilson MD  
insulin lispro (HUMALOG) 100 unit/mL injection For BS 3x/day before meals 140-199=2 units 200-249=3 units 250-299=5 units 300-349=7 units 350 or greater = Call MD 3/22/18   Yolanda Wilson MD  
acetaminophen (TYLENOL EXTRA STRENGTH) 500 mg tablet Take 1,000 mg by mouth every six (6) hours as needed for Pain. Provider, Historical  
atenolol (TENORMIN) 50 mg tablet Take 50 mg by mouth daily. Provider, Historical  
colestipol (COLESTID) 1 gram tablet Take 1 g by mouth daily. Provider, Historical  
Bifidobacterium Infantis 4 mg cap Take 4 mg by mouth daily. Provider, Historical  
ferrous sulfate (SLOW FE) 142 mg (45 mg iron) ER tablet Take 142 mg by mouth Daily (before breakfast). Provider, Historical  
Cholecalciferol, Vitamin D3, 2,000 unit cap Take 1 Cap by mouth daily. Provider, Historical  
 
 
REVIEW OF SYSTEMS:    
I am not able to complete the review of systems because: The patient is intubated and sedated The patient has altered mental status due to his acute medical problems The patient has baseline aphasia from prior stroke(s) The patient has baseline dementia and is not reliable historian The patient is in acute medical distress and unable to provide information Total of 12 systems reviewed as follows:   
   POSITIVE= underlined text  Negative = text not underlined General:  fever, chills, sweats, generalized weakness, weight loss/gain,  
   loss of appetite Eyes:    blurred vision, eye pain, loss of vision, double vision ENT:    rhinorrhea, pharyngitis Respiratory:   cough, sputum production, SOB, PIPER, wheezing, pleuritic pain  
Cardiology:   chest pain, palpitations, orthopnea, PND, edema, syncope Gastrointestinal:  abdominal pain , N/V, diarrhea, dysphagia, constipation, bleeding Genitourinary:  frequency, urgency, dysuria, hematuria, incontinence Muskuloskeletal :  arthralgia, myalgia, back pain Hematology:  easy bruising, nose or gum bleeding, lymphadenopathy Dermatological: rash, ulceration, pruritis, color change / jaundice Endocrine:   hot flashes or polydipsia Neurological:  headache, dizziness, confusion, focal weakness, paresthesia, Speech difficulties, memory loss, gait difficulty Psychological: Feelings of anxiety, depression, agitation Objective: VITALS:   
Visit Vitals BP (!) 110/99 (BP 1 Location: Right arm, BP Patient Position: At rest) Pulse 85 Temp 98.1 °F (36.7 °C) Resp 14 Wt 136 kg (299 lb 13.2 oz) SpO2 99% BMI 58.56 kg/m² PHYSICAL EXAM: 
 
General:    cooperative, no distress. HEENT: Positive for conjunctivitis. No oral ulcers, mucosa moist, throat clear, dentition fair Neck:  Supple, symmetrical,  thyroid: non tender Lungs:   Clear to auscultation bilaterally. No Wheezing or Rhonchi. No rales. Chest wall:  No tenderness  No Accessory muscle use. Heart:   Regular  rhythm,  Systolic murmur  murmur   No edema Abdomen:   Soft, non-tender. Not distended. Bowel sounds normal 
Extremities: No cyanosis. No clubbing,   
  Skin turgor normal, Capillary refill normal, Radial dial pulse 2+ Skin:     Not pale. Not Jaundiced  No rashes Psych:  Not examined Neurologic: EOMs intact. No facial asymmetry. Left upper arm weaker 4/5, minimal movement in LE,Sensation grossly intact. 
 
_______________________________________________________________________ Care Plan discussed with: 
  Comments Patient Family RN Care Manager Consultant:     
_______________________________________________________________________ Expected  Disposition:  
Home with Family HH/PT/OT/RN   
SNF/LTC   
[facility]   
 ________________________________________________________________________ TOTAL TIME: 50 Minutes Critical Care Provided     Minutes non procedure based Comments Reviewed previous records  
>50% of visit spent in counseling and coordination of care  Discussion with patient and/or family and questions answered 
  
 
________________________________________________________________________ Signed: David Novak MD 
 
Procedures: see electronic medical records for all procedures/Xrays and details which were not copied into this note but were reviewed prior to creation of Plan. LAB DATA REVIEWED:   
Recent Results (from the past 24 hour(s)) CBC WITH AUTOMATED DIFF Collection Time: 12/01/18 11:09 AM  
Result Value Ref Range WBC 13.9 (H) 3.6 - 11.0 K/uL  
 RBC 4.72 3.80 - 5.20 M/uL  
 HGB 13.8 11.5 - 16.0 g/dL HCT 42.8 35.0 - 47.0 % MCV 90.7 80.0 - 99.0 FL  
 MCH 29.2 26.0 - 34.0 PG  
 MCHC 32.2 30.0 - 36.5 g/dL  
 RDW 14.5 11.5 - 14.5 % PLATELET 874 213 - 452 K/uL MPV 11.5 8.9 - 12.9 FL  
 NRBC 0.0 0  WBC ABSOLUTE NRBC 0.00 0.00 - 0.01 K/uL NEUTROPHILS 81 (H) 32 - 75 % LYMPHOCYTES 6 (L) 12 - 49 % MONOCYTES 10 5 - 13 % EOSINOPHILS 2 0 - 7 % BASOPHILS 1 0 - 1 % IMMATURE GRANULOCYTES 1 (H) 0.0 - 0.5 % ABS. NEUTROPHILS 11.2 (H) 1.8 - 8.0 K/UL  
 ABS. LYMPHOCYTES 0.8 0.8 - 3.5 K/UL  
 ABS. MONOCYTES 1.5 (H) 0.0 - 1.0 K/UL  
 ABS. EOSINOPHILS 0.2 0.0 - 0.4 K/UL  
 ABS. BASOPHILS 0.1 0.0 - 0.1 K/UL  
 ABS. IMM. GRANS. 0.1 (H) 0.00 - 0.04 K/UL  
 DF AUTOMATED AMMONIA Collection Time: 12/01/18 11:09 AM  
Result Value Ref Range Ammonia 20 <32 UMOL/L  
EKG, 12 LEAD, INITIAL Collection Time: 12/01/18 11:17 AM  
Result Value Ref Range Ventricular Rate 90 BPM  
 Atrial Rate 90 BPM  
 P-R Interval 126 ms  
 QRS Duration 82 ms Q-T Interval 400 ms QTC Calculation (Bezet) 489 ms Calculated P Axis 69 degrees Calculated R Axis 16 degrees Calculated T Axis 156 degrees Diagnosis Normal sinus rhythm Nonspecific ST and T wave abnormality Prolonged QT When compared with ECG of 05-MAR-2018 11:47, 
T wave inversion now evident in Lateral leads QT has lengthened LACTIC ACID Collection Time: 12/01/18 11:55 AM  
Result Value Ref Range Lactic acid 1.1 0.4 - 2.0 MMOL/L  
TROPONIN I Collection Time: 12/01/18 12:25 PM  
Result Value Ref Range Troponin-I, Qt. 0.10 (H) <0.05 ng/mL MAGNESIUM Collection Time: 12/01/18 12:25 PM  
Result Value Ref Range Magnesium 1.8 1.6 - 2.4 mg/dL PHOSPHORUS Collection Time: 12/01/18 12:25 PM  
Result Value Ref Range Phosphorus 3.5 2.6 - 4.7 MG/DL  
CK W/ CKMB & INDEX Collection Time: 12/01/18 12:25 PM  
Result Value Ref Range CK 40 26 - 192 U/L  
 CK - MB 2.1 <3.6 NG/ML  
 CK-MB Index 5.3 (H) 0 - 2.5 METABOLIC PANEL, COMPREHENSIVE Collection Time: 12/01/18 12:25 PM  
Result Value Ref Range Sodium 142 136 - 145 mmol/L Potassium 3.5 3.5 - 5.1 mmol/L Chloride 111 (H) 97 - 108 mmol/L  
 CO2 15 (LL) 21 - 32 mmol/L Anion gap 16 (H) 5 - 15 mmol/L Glucose 161 (H) 65 - 100 mg/dL BUN 50 (H) 6 - 20 MG/DL Creatinine 1.87 (H) 0.55 - 1.02 MG/DL  
 BUN/Creatinine ratio 27 (H) 12 - 20 GFR est AA 33 (L) >60 ml/min/1.73m2 GFR est non-AA 27 (L) >60 ml/min/1.73m2 Calcium 8.5 8.5 - 10.1 MG/DL Bilirubin, total 0.4 0.2 - 1.0 MG/DL  
 ALT (SGPT) 16 12 - 78 U/L  
 AST (SGOT) 18 15 - 37 U/L Alk. phosphatase 115 45 - 117 U/L Protein, total 6.5 6.4 - 8.2 g/dL Albumin 2.2 (L) 3.5 - 5.0 g/dL Globulin 4.3 (H) 2.0 - 4.0 g/dL A-G Ratio 0.5 (L) 1.1 - 2.2 TSH 3RD GENERATION Collection Time: 12/01/18 12:30 PM  
Result Value Ref Range TSH 1.91 0.36 - 3.74 uIU/mL

## 2018-12-01 NOTE — ED PROVIDER NOTES
EMERGENCY DEPARTMENT HISTORY AND PHYSICAL EXAM 
 
 
Date: 12/1/2018 Patient Name: Rin Leigh History of Presenting Illness Chief Complaint Patient presents with  
 Other Failure to thrive per , pt has been refusing to take her medication for weeks now, pt is refusing to eat or drinking. Pt uses wheelchair, pts speech is minimal.   
 
 
History Provided By: Patient, Patient's  and Patient's Son HPI: Rin Leigh, 58 y.o. female with PMHx significant for HTN, gastroparesis, GERD, hyperparathyroidism, CKD, anemia, and DM, presents by wheelchair to the ED with for evaluation of decreased appetite x \"several weeks\". Pt's  endorses associated increased constant drooling, diffuse abdominal pain, SOB, and diarrhea. He denies any alleviating factors. Pt notes that her pain is exacerbated after eating/drinking. Pt's  explains that the pt has been refusing to eat or take her medications for the past several weeks despite prior evaluation by her PCP. He states that the pt developed the inability to tolerate solid foods > 2 weeks ago for which she had a gastric emptying scan that showed that the pt's stomach was not properly emptying. He reports that the pt was placed on Reglan which helped to briefly alleviate her sxs, but her reports that the pt is having recurrent difficulty with tolerating PO, and she is refusing to eat solid foods. He notes that the pt has been able to tolerate unsweetened tea and Glucerna for the past several weeks, but she has not had solid PO in several days. He reports that the pt has been having normal urinary habits, and she recently finished a course of antibiotics for a sacral rash. He states that the pt has a Hx of CKD secondary to her DM, and the pt recently agreed to speak with a mental health counselor to help confront her possible depression.  He reports that the pt is at her baseline mental status, and she has been having normal sleep patterns. Of note, pt states that she is agreeable with admission to the hospital at this time. Pt's  denies Hx of liver disease. He specifically denies malodorous bowels or chest pain. There are no other complaints, changes, or physical findings at this time. PSHx: , hysterectomy Social Hx: - tobacco, +(rare) EtOH, - illicit drug use PCP: Alonzo Edwards MD 
 
Current Outpatient Medications Medication Sig Dispense Refill  aspirin-dipyridamole (AGGRENOX)  mg per SR capsule TAKE 1 CAPSULE BY MOUTH TWICE DAILY( EVERY TWELVE HOURS) 60 Cap 3  
 nystatin (MYCOSTATIN) topical cream Apply  to affected area two (2) times a day. 60 g 2  
 metoclopramide HCl (REGLAN) 5 mg tablet TAKE 1 TABLET BY MOUTH FOUR TIMES DAILY FOR 10 DAYS 100 Tab 0  
 atorvastatin (LIPITOR) 40 mg tablet TAKE 1 TABLET BY MOUTH EVERY DAY 90 Tab 0  
 ondansetron (ZOFRAN ODT) 4 mg disintegrating tablet Take 1 Tab by mouth every eight (8) hours as needed for Nausea. 15 Tab 0  
 metoclopramide HCl (REGLAN) 5 mg tablet Take 5 mg by mouth four (4) times daily.  potassium chloride (K-DUR, KLOR-CON) 20 mEq tablet Take 1 Tab by mouth two (2) times a day. 60 Tab 3  
 LEVEMIR FLEXTOUCH U-100 INSULN 100 unit/mL (3 mL) inpn INJECT 54 UNITS UNDER THE SKIN TWICE DAILY 45 mL 0  
 LYRICA 75 mg capsule TAKE 1 CAPSULE BY MOUTH TWICE DAILY AT LUNCH TIME AND AGAIN AT BEDTIME 180 Cap 0  
 pantoprazole (PROTONIX) 40 mg tablet TAKE 1 TABLET BY MOUTH EVERY DAY 30 Tab 0  
 atenolol (TENORMIN) 50 mg tablet TAKE 1 TABLET BY MOUTH EVERY NIGHT AT BEDTIME. 90 Tab 0  
 hydrALAZINE (APRESOLINE) 50 mg tablet TAKE 1 TABLET BY MOUTH THREE TIMES DAILY 100 Tab 0  
 FREESTYLE LITE STRIPS strip TEST BLOOD SUGAR THREE TIMES DAILY AS NEEDED 100 Strip 0  
 dilTIAZem CD (CARDIZEM CD) 240 mg ER capsule Take 1 Cap by mouth daily. 90 Cap 3  
 bumetanide (BUMEX) 2 mg tablet Take 2 mg by mouth two (2) times a day.  ammonium lactate (LAC-HYDRIN) 12 % lotion Apply 1 Each to affected area daily. rub in to affected area well  clotrimazole (LOTRIMIN) 1 % topical cream Apply  to affected area two (2) times a day. 15 g 0  
 zinc oxide-cod liver oil (DESITIN) 40 % paste Apply  to affected area as needed for Skin Irritation.  allopurinol (ZYLOPRIM) 300 mg tablet Take 0.5 Tabs by mouth daily. 90 Tab 0  
 insulin lispro (HUMALOG) 100 unit/mL injection For BS 3x/day before meals 140-199=2 units 200-249=3 units 250-299=5 units 300-349=7 units 350 or greater = Call MD 1 Vial   
 acetaminophen (TYLENOL EXTRA STRENGTH) 500 mg tablet Take 1,000 mg by mouth every six (6) hours as needed for Pain.  atenolol (TENORMIN) 50 mg tablet Take 50 mg by mouth daily.  colestipol (COLESTID) 1 gram tablet Take 1 g by mouth daily.  Bifidobacterium Infantis 4 mg cap Take 4 mg by mouth daily.  ferrous sulfate (SLOW FE) 142 mg (45 mg iron) ER tablet Take 142 mg by mouth Daily (before breakfast).  Cholecalciferol, Vitamin D3, 2,000 unit cap Take 1 Cap by mouth daily. Past History Past Medical History: 
Past Medical History:  
Diagnosis Date  Acute combined systolic and diastolic heart failure, NYHA class 2 ([address] 7/24/2017  Anemia 7/24/2017  Aortic stenosis, mild 7/24/2017  Bilateral edema of lower extremity 7/24/2017  BMI 45.0-49.9, adult ([address] 7/24/2017  Chronic diastolic CHF (congestive heart failure), NYHA class 3 ([address] 7/24/2017  CKD (chronic kidney disease) stage 2, GFR 60-89 ml/min  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 2 diabetes mellitus ([address] 7/24/2017  Diabetes ([address]  Fatty liver 7/24/2017  Gastroparesis  GERD (gastroesophageal reflux disease)  Gout   
 HTN (hypertension)  Hyperlipemia  Hyperparathyroidism ([address]  Hypertension 7/24/2017  Insomnia 7/24/2017  Low back pain 7/24/2017  N&V (nausea and vomiting) 7/24/2017  Nausea & vomiting  Obesity  Pelvic joint pain 2017  Peripheral neuropathy 2017  Sciatica   
 right  Severe right groin pain 2017  Sinus tachycardia 2017  Stage 2 chronic kidney disease due to benign hypertension 2017  Stasis dermatitis 2017  Stroke [facility]) 2001  
 right parietal, left caudate  Thyroid disease   
 para thyroid(high calcium)  Ulcer of right fifth toe due to diabetes mellitus ([address] 2017 Past Surgical History: 
Past Surgical History:  
Procedure Laterality Date  CHEST SURGERY PROCEDURE UNLISTED  10/24/13 PARATHYROID EXPLORATION   
 DELIVERY     
 HX AMPUTATION TOE  2018  HX  SECTION    
 HX CHOLECYSTECTOMY  HX OTHER SURGICAL    
 I&D right thigh abscess Family History: 
Family History Problem Relation Age of Onset  Cancer Mother   
     cervical  
 Diabetes Mother  Hypertension Mother  Stroke Mother 79  
 Diabetes Father  Hypertension Father  Kidney Disease Father  Diabetes Brother  Hypertension Brother  Stroke Brother 61 Social History: 
Social History Tobacco Use  Smoking status: Never Smoker  Smokeless tobacco: Never Used Substance Use Topics  Alcohol use: Yes Comment: rare  Drug use: Yes Types: Prescription, OTC Allergies: Allergies Allergen Reactions  Zestril [Lisinopril] Swelling Review of Systems Review of Systems Constitutional: Positive for appetite change. Negative for chills, fatigue and fever. HENT: Negative. Negative for congestion, rhinorrhea, sinus pressure and sore throat. Eyes: Negative. Respiratory: Positive for shortness of breath. Negative for cough, choking, chest tightness and wheezing. Cardiovascular: Negative. Negative for chest pain, palpitations and leg swelling. Gastrointestinal: Positive for abdominal pain and diarrhea.  Negative for constipation, nausea and vomiting.  
     + increased drooling Endocrine: Negative. Genitourinary: Negative. Negative for difficulty urinating, dysuria, flank pain and urgency. Musculoskeletal: Negative. Skin: Negative. Neurological: Negative. Negative for dizziness, speech difficulty, weakness, light-headedness, numbness and headaches. Psychiatric/Behavioral: Negative. All other systems reviewed and are negative. Physical Exam  
Physical Exam  
Constitutional: She is oriented to person, place, and time. She appears well-developed and well-nourished. No distress. HENT:  
Head: Normocephalic and atraumatic. Mouth/Throat: No oropharyngeal exudate. Dry mucous membranes, lips cracked Eyes: Conjunctivae and EOM are normal. Pupils are equal, round, and reactive to light. Neck: Normal range of motion. Neck supple. No JVD present. No tracheal deviation present. Cardiovascular: Normal rate, regular rhythm and intact distal pulses. Murmur heard. Pulmonary/Chest: Effort normal and breath sounds normal. No stridor. No respiratory distress. She has no wheezes. She has no rales. She exhibits no tenderness. Abdominal: Soft. She exhibits no distension. There is no tenderness. There is no rebound and no guarding. Obese, no ttp Musculoskeletal: Normal range of motion. She exhibits no edema or tenderness. Neurological: She is alert and oriented to person, place, and time. No cranial nerve deficit. Left sided weakness, there is also general muscle atrophy all extremities Skin: Skin is warm and dry. She is not diaphoretic. Skin very dry and scaly with poor skin turgor Psychiatric:  
Flat affect, depressed mood, pt denies suicidal thoughts Nursing note and vitals reviewed. Diagnostic Study Results Labs - Recent Results (from the past 12 hour(s)) CBC WITH AUTOMATED DIFF Collection Time: 12/01/18 11:09 AM  
Result Value Ref Range WBC 13.9 (H) 3.6 - 11.0 K/uL RBC 4.72 3.80 - 5.20 M/uL  
 HGB 13.8 11.5 - 16.0 g/dL HCT 42.8 35.0 - 47.0 % MCV 90.7 80.0 - 99.0 FL  
 MCH 29.2 26.0 - 34.0 PG  
 MCHC 32.2 30.0 - 36.5 g/dL  
 RDW 14.5 11.5 - 14.5 % PLATELET 189 867 - 489 K/uL MPV 11.5 8.9 - 12.9 FL  
 NRBC 0.0 0  WBC ABSOLUTE NRBC 0.00 0.00 - 0.01 K/uL NEUTROPHILS 81 (H) 32 - 75 % LYMPHOCYTES 6 (L) 12 - 49 % MONOCYTES 10 5 - 13 % EOSINOPHILS 2 0 - 7 % BASOPHILS 1 0 - 1 % IMMATURE GRANULOCYTES 1 (H) 0.0 - 0.5 % ABS. NEUTROPHILS 11.2 (H) 1.8 - 8.0 K/UL  
 ABS. LYMPHOCYTES 0.8 0.8 - 3.5 K/UL  
 ABS. MONOCYTES 1.5 (H) 0.0 - 1.0 K/UL  
 ABS. EOSINOPHILS 0.2 0.0 - 0.4 K/UL  
 ABS. BASOPHILS 0.1 0.0 - 0.1 K/UL  
 ABS. IMM. GRANS. 0.1 (H) 0.00 - 0.04 K/UL  
 DF AUTOMATED AMMONIA Collection Time: 12/01/18 11:09 AM  
Result Value Ref Range Ammonia 20 <32 UMOL/L  
EKG, 12 LEAD, INITIAL Collection Time: 12/01/18 11:17 AM  
Result Value Ref Range Ventricular Rate 90 BPM  
 Atrial Rate 90 BPM  
 P-R Interval 126 ms  
 QRS Duration 82 ms Q-T Interval 400 ms QTC Calculation (Bezet) 489 ms Calculated P Axis 69 degrees Calculated R Axis 16 degrees Calculated T Axis 156 degrees Diagnosis Normal sinus rhythm Nonspecific ST and T wave abnormality Prolonged QT When compared with ECG of 05-MAR-2018 11:47, 
T wave inversion now evident in Lateral leads QT has lengthened LACTIC ACID Collection Time: 12/01/18 11:55 AM  
Result Value Ref Range Lactic acid 1.1 0.4 - 2.0 MMOL/L  
TROPONIN I Collection Time: 12/01/18 12:25 PM  
Result Value Ref Range Troponin-I, Qt. 0.10 (H) <0.05 ng/mL MAGNESIUM Collection Time: 12/01/18 12:25 PM  
Result Value Ref Range Magnesium 1.8 1.6 - 2.4 mg/dL PHOSPHORUS Collection Time: 12/01/18 12:25 PM  
Result Value Ref Range Phosphorus 3.5 2.6 - 4.7 MG/DL  
CK W/ CKMB & INDEX Collection Time: 12/01/18 12:25 PM  
Result Value Ref Range  CK 40 26 - 192 U/L  
 CK - MB 2.1 <3.6 NG/ML  
 CK-MB Index 5.3 (H) 0 - 2.5 TSH 3RD GENERATION Collection Time: 12/01/18 12:30 PM  
Result Value Ref Range TSH 1.91 0.36 - 3.74 uIU/mL Radiologic Studies - CXR Results  (Last 48 hours) 12/01/18 1116  XR CHEST PORT Final result Impression:  IMPRESSION: Prominent size heart stable. No acute infiltrate. Narrative:  Clinical indication: Chest pain. Portable AP supine view of the chest is obtained, comparison April 27. Inspiration remains shallow with borderline cardiomegaly. No acute infiltrate. Medical Decision Making I am the first provider for this patient. I reviewed the vital signs, available nursing notes, past medical history, past surgical history, family history and social history. Vital Signs-Reviewed the patient's vital signs. Patient Vitals for the past 12 hrs: 
 Temp Pulse Resp BP SpO2  
12/01/18 0938 97.3 °F (36.3 °C) 97 12 (!) 150/102 100 % Pulse Oximetry Analysis - 100% on RA Cardiac Monitor:  
Rate: 96 bpm 
Rhythm: Normal Sinus Rhythm EKG interpretation: (Preliminary) Nannette Peterson NSR, rate 90, normal axis/pr/qrs, prolonged QTc, T wave inversion laterally, Thanh Pisano DO Written by Elizabeth El DO. Records Reviewed: Nursing Notes, Old Medical Records, Previous electrocardiograms, Previous Radiology Studies and Previous Laboratory Studies Provider Notes (Medical Decision Making): DDx: dehydration, renal failure, esophageal strictures ED Course:  
Initial assessment performed. The patients presenting problems have been discussed, and they are in agreement with the care plan formulated and outlined with them. I have encouraged them to ask questions as they arise throughout their visit. CONSULT NOTE: 
1:45 PM 
Elizabeth El DO spoke with Emilie Cade MD 
Specialty: PCP Discussed patient's hx, disposition, and available diagnostic and imaging results. Reviewed care plans. Consultant agrees with plans as outlined. Dr. Annie Leon recommends to admit the pt to the Hospitalist. 
Written by Elijah Novoa ED Scribe, as dictated by Breanne Arechiga DO. 
 
CONSULT NOTE:  
2:30PM 
Breanne Arechiga DO spoke with Lamont Santa MD  
Specialty: Hospitalist 
Discussed pt's hx, disposition, and available diagnostic and imaging results. Reviewed care plans. Consultant will evaluate pt for admission. Written by FOX Goldsmithibe, as dictated by Breanne Arechiga DO. Will continue with IV fluid administration, there were multiple lab draws due to hemolyzed specimens, this resulted delay in diagnosis and pt disposition. ? Depression and FTT versus other cause in pts current state. Ua pending as pt had not urinated at the time of admission. Critical Care Time: CRITICAL CARE NOTE : 
 
IMPENDING DETERIORATION -Renal 
ASSOCIATED RISK FACTORS - Metabolic changes and Dehydration MANAGEMENT- Bedside Assessment and Supervision of Care INTERPRETATION -  Xrays, ECG, Blood Pressure, Cardiac Output Measures  and Labs INTERVENTIONS - hemodynamic mngmt- IV fluids CASE REVIEW - Hospitalist, Nursing, Family and PCP 
TREATMENT RESPONSE -Stable PERFORMED BY - Self NOTES   : 
I have spent 40 minutes of critical care time involved in lab review, consultations with specialist, family decision- making, bedside attention and documentation. During this entire length of time I was immediately available to the patient . Brendan Cartwright DO Disposition: PLAN: 
1. Admit to Hospitalist 
 
ADMIT NOTE: 
2:30 PM 
Patient is being admitted to the hospital by Dr. Gabby Griffiths. The results of their tests and reasons for their admission have been discussed with them and/or available family. They convey agreement and understanding for the need to be admitted and for their admission diagnosis. Consultation has been made with the inpatient physician specialist for hospitalization. Diagnosis Clinical Impression: No diagnosis found. Attestations: This note is prepared by Zan Miguel, acting as Scribe for Dante Desir, [address] DO: The scribe's documentation has been prepared under my direction and personally reviewed by me in its entirety. I confirm that the note above accurately reflects all work, treatment, procedures, and medical decision making performed by me.

## 2018-12-01 NOTE — ED NOTES
Patient incontinent of moderate amount of loose stool. Dried stool noted to posterior of BLE. Perineum care provided and assisted patient to position of comfort. Pillow and blanket given Call bell within reach and spouse at bedside Primary Nurse Javid Robbins RN and Cherise B, RN performed a dual skin assessment on this patient Impairment noted- see wound doc flow sheet Juan score is 13

## 2018-12-02 PROBLEM — N18.9 CKD (CHRONIC KIDNEY DISEASE): Status: RESOLVED | Noted: 2018-01-01 | Resolved: 2018-01-01

## 2018-12-02 PROBLEM — N18.30 STAGE 3 CHRONIC KIDNEY DISEASE (HCC): Status: RESOLVED | Noted: 2018-01-01 | Resolved: 2018-01-01

## 2018-12-02 PROBLEM — F33.2 SEVERE EPISODE OF RECURRENT MAJOR DEPRESSIVE DISORDER, WITHOUT PSYCHOTIC FEATURES (HCC): Status: ACTIVE | Noted: 2018-01-01

## 2018-12-02 PROBLEM — R62.7 FTT (FAILURE TO THRIVE) IN ADULT: Status: ACTIVE | Noted: 2018-01-01

## 2018-12-02 NOTE — PROGRESS NOTES
Bedside and Verbal shift change report given to [facility], RN (oncoming nurse) by Sunshine Coronado RN (offgoing nurse). Report included the following information SBAR, Kardex, MAR and Recent Results.

## 2018-12-02 NOTE — PROGRESS NOTES
While performing perineal care for mast catheter insertion, noted new large amount of pus drainage from hair follicle on left labia, left labia is red and edematous as assessed on admission; paged Dr. Ciara Cary to inform.

## 2018-12-02 NOTE — CONSULTS
GI Consultation Note Aron Nevarez) NAME: Alphonso Phoenix : 1955 MRN: [id number] PCP: Kassie Forrester MD 
Date/Time:  2018 10:31 AM 
Subjective:  
REASON FOR CONSULT:    N/v/AP and gastroparesis Quan Llamas is a 58 y.o. female who I was asked to see for above. All history is obtained from . Per  pt was diagnosed with gastroparesis in  in w/u for n/v, not eating and was started on Reglan. Pt was doing well until about 2 weeks ago when she stopped taking her medications.  tried crushing it up over last week but did not help symptoms. Pt really does not provide any additional history or talk. Past Medical History:  
Diagnosis Date  Acute combined systolic and diastolic heart failure, NYHA class 2 ([address] 2017  Anemia 2017  Aortic stenosis, mild 2017  Bilateral edema of lower extremity 2017  BMI 45.0-49.9, adult ([address] 2017  Chronic diastolic CHF (congestive heart failure), NYHA class 3 ([address] 2017  CKD (chronic kidney disease) stage 2, GFR 60-89 ml/min  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 2 diabetes mellitus ([address] 2017  Diabetes ([address]  Fatty liver 2017  Gastroparesis  GERD (gastroesophageal reflux disease)  Gout   
 HTN (hypertension)  Hyperlipemia  Hyperparathyroidism ([address]  Hypertension 2017  Insomnia 2017  Low back pain 2017  N&V (nausea and vomiting) 2017  Nausea & vomiting  Obesity  Pelvic joint pain 2017  Peripheral neuropathy 2017  Sciatica   
 right  Severe right groin pain 2017  Sinus tachycardia 2017  Stage 2 chronic kidney disease due to benign hypertension 2017  Stasis dermatitis 2017  Stroke [facility]) 2001  
 right parietal, left caudate  Thyroid disease   
 para thyroid(high calcium)  Ulcer of right fifth toe due to diabetes mellitus ([address] 2017 Past Surgical History: Procedure Laterality Date  CHEST SURGERY PROCEDURE UNLISTED  10/24/13 PARATHYROID EXPLORATION   
 DELIVERY     
 HX AMPUTATION TOE  2018  HX  SECTION    
 HX CHOLECYSTECTOMY  HX OTHER SURGICAL    
 I&D right thigh abscess Social History Tobacco Use  Smoking status: Never Smoker  Smokeless tobacco: Never Used Substance Use Topics  Alcohol use: Yes Comment: rare Family History Problem Relation Age of Onset  Cancer Mother   
     cervical  
 Diabetes Mother  Hypertension Mother  Stroke Mother 79  
 Diabetes Father  Hypertension Father  Kidney Disease Father  Diabetes Brother  Hypertension Brother  Stroke Brother 61 Allergies Allergen Reactions  Zestril [Lisinopril] Swelling Home Medications: 
Prior to Admission Medications Prescriptions Last Dose Informant Patient Reported? Taking? Bifidobacterium Infantis 4 mg cap 2018 at Unknown time Significant Other Yes Yes Sig: Take 4 mg by mouth daily. Cholecalciferol, Vitamin D3, 2,000 unit cap 2018 at Unknown time Significant Other Yes Yes Sig: Take 1 Cap by mouth daily. FREESTYLE LITE STRIPS strip 2018 at Unknown time  No Yes Sig: TEST BLOOD SUGAR THREE TIMES DAILY AS NEEDED LEVEMIR FLEXTOUCH U-100 INSULN 100 unit/mL (3 mL) inpn 2018 at Unknown time  No Yes Sig: INJECT 54 UNITS UNDER THE SKIN TWICE DAILY Patient taking differently: INJECT 20 UNITS UNDER THE SKIN TWICE DAILY  
LYRICA 75 mg capsule 2018 at Unknown time  No Yes Sig: TAKE 1 CAPSULE BY MOUTH TWICE DAILY AT LUNCH TIME AND AGAIN AT BEDTIME  
acetaminophen (TYLENOL EXTRA STRENGTH) 500 mg tablet 2018 at Unknown time Significant Other Yes Yes Sig: Take 1,000 mg by mouth every six (6) hours as needed for Pain. allopurinol (ZYLOPRIM) 300 mg tablet 2018 at Unknown time  Yes Yes Sig: Take 0.5 Tabs by mouth daily. ammonium lactate (LAC-HYDRIN) 12 % lotion 11/1/2018 at Unknown time  Yes Yes Sig: Apply 1 Each to affected area daily. rub in to affected area well  
aspirin-dipyridamole (AGGRENOX)  mg per SR capsule 11/1/2018 at Unknown time  No Yes Sig: TAKE 1 CAPSULE BY MOUTH TWICE DAILY( EVERY TWELVE HOURS)  
atenolol (TENORMIN) 50 mg tablet Not Taking at Unknown time Significant Other Yes No  
Sig: Take 50 mg by mouth daily. atenolol (TENORMIN) 50 mg tablet 11/1/2018 at Unknown time  No Yes Sig: TAKE 1 TABLET BY MOUTH EVERY NIGHT AT BEDTIME. atorvastatin (LIPITOR) 40 mg tablet 11/1/2018 at Unknown time  No Yes Sig: TAKE 1 TABLET BY MOUTH EVERY DAY  
bumetanide (BUMEX) 2 mg tablet 11/1/2018 at Unknown time  Yes Yes Sig: Take 2 mg by mouth daily. clotrimazole (LOTRIMIN) 1 % topical cream 11/1/2018 at Unknown time  Yes Yes Sig: Apply  to affected area two (2) times a day. colestipol (COLESTID) 1 gram tablet 11/1/2018 at Unknown time Significant Other Yes Yes Sig: Take 1 g by mouth daily. dilTIAZem CD (CARDIZEM CD) 240 mg ER capsule 11/1/2018 at Unknown time  No Yes Sig: Take 1 Cap by mouth daily. ferrous sulfate (SLOW FE) 142 mg (45 mg iron) ER tablet 11/1/2018 at Unknown time Significant Other Yes Yes Sig: Take 142 mg by mouth Daily (before breakfast). hydrALAZINE (APRESOLINE) 50 mg tablet 11/1/2018 at Unknown time  No Yes Sig: TAKE 1 TABLET BY MOUTH THREE TIMES DAILY  
insulin lispro (HUMALOG) 100 unit/mL injection 11/1/2018 at Unknown time  Yes Yes Sig: For BS 3x/day before meals 140-199=2 units 200-249=3 units 250-299=5 units 300-349=7 units 350 or greater = Call MD  
metoclopramide HCl (REGLAN) 5 mg tablet 11/1/2018 at Unknown time  Yes Yes Sig: Take 5 mg by mouth four (4) times daily. metoclopramide HCl (REGLAN) 5 mg tablet 11/1/2018 at Unknown time  No Yes Sig: TAKE 1 TABLET BY MOUTH FOUR TIMES DAILY FOR 10 DAYS nystatin (MYCOSTATIN) topical cream 11/1/2018 at Unknown time  No Yes Sig: Apply  to affected area two (2) times a day. ondansetron (ZOFRAN ODT) 4 mg disintegrating tablet 11/1/2018 at Unknown time  No Yes Sig: Take 1 Tab by mouth every eight (8) hours as needed for Nausea. pantoprazole (PROTONIX) 40 mg tablet 11/1/2018 at Unknown time  No Yes Sig: TAKE 1 TABLET BY MOUTH EVERY DAY  
potassium chloride (K-DUR, KLOR-CON) 20 mEq tablet Not Taking at Unknown time  No No  
Sig: Take 1 Tab by mouth two (2) times a day. zinc oxide-cod liver oil (DESITIN) 40 % paste 11/1/2018 at Unknown time  Yes Yes Sig: Apply  to affected area as needed for Skin Irritation. Facility-Administered Medications: None Hospital medications: 
Current Facility-Administered Medications Medication Dose Route Frequency  metoclopramide HCl (REGLAN) injection 5 mg  5 mg IntraVENous TIDAC  pantoprazole (PROTONIX) 40 mg in sodium chloride 0.9% 10 mL injection  40 mg IntraVENous Q12H  
 allopurinol (ZYLOPRIM) tablet 150 mg  150 mg Oral DAILY  ammonium lactate 5 % lotn 1 Each  1 Each Topical DAILY  aspirin-dipyridamole (AGGRENOX)  mg per capsule 1 Cap  1 Cap Oral Q12H  
 atenolol (TENORMIN) tablet 50 mg  50 mg Oral DAILY  atorvastatin (LIPITOR) tablet 40 mg  40 mg Oral QHS  cholecalciferol (VITAMIN D3) tablet 2,000 Units  2,000 Units Oral DAILY  clotrimazole (LOTRIMIN) 1 % cream   Topical BID  ferrous sulfate tablet 325 mg  325 mg Oral ACB  pregabalin (LYRICA) capsule 75 mg  75 mg Oral BID  nystatin (MYCOSTATIN) 100,000 unit/gram cream   Topical BID  zinc oxide-cod liver oil (DESITIN) 40 % paste   Topical PRN  
 sodium chloride (NS) flush 5-10 mL  5-10 mL IntraVENous Q8H  
 sodium chloride (NS) flush 5-10 mL  5-10 mL IntraVENous PRN  
 heparin (porcine) injection 7,500 Units  7,500 Units SubCUTAneous Q8H  
 glucose chewable tablet 16 g  4 Tab Oral PRN  
  dextrose (D50W) injection syrg 12.5-25 g  25-50 mL IntraVENous PRN  
 glucagon (GLUCAGEN) injection 1 mg  1 mg IntraMUSCular PRN  
 insulin lispro (HUMALOG) injection   SubCUTAneous AC&HS  trimethoprim-polymyxin b (POLYTRIM) 10,000 unit- 1 mg/mL ophthalmic solution 1 Drop  1 Drop Both Eyes BID  colestipol (COLESTID) tablet 1 g  1 g Oral DAILY  dilTIAZem CD (CARDIZEM CD) capsule 240 mg  240 mg Oral DAILY  ondansetron (ZOFRAN) injection 4 mg  4 mg IntraVENous Q6H PRN  
 acetaminophen (TYLENOL) tablet 650 mg  650 mg Oral Q6H PRN  
 
REVIEW OF SYSTEMS:   
 []     Unable to obtain  ROS due to  []    mental status change  []    sedated   []    intubated 
 []    Total of 11 systems reviewed as follows: 
Const:  negative fever, negative chills, negative weight loss Eyes:   negative diplopia or visual changes, negative eye pain ENT:   negative coryza, negative sore throat Resp:   negative cough, hemoptysis, dyspnea Cards:  negative for chest pain, palpitations, lower extremity edema :  negative for frequency, dysuria and hematuria Skin:   negative for rash and pruritus Heme:  negative for easy bruising and gum/nose bleeding MS:  negative for myalgias, arthralgias, back pain and muscle weakness Neurolo:  negative for headaches, dizziness, vertigo, memory problems Psych:  negative for feelings of anxiety, depression Pertinent Positives include : 
 
Objective: VITALS:   
Visit Vitals /68 (BP 1 Location: Left arm, BP Patient Position: At rest) Pulse 92 Temp 99.2 °F (37.3 °C) Resp 18 Ht 5' (1.524 m) Wt 136 kg (299 lb 13.2 oz) SpO2 95% Breastfeeding? No  
BMI 58.56 kg/m² Temp (24hrs), Av.3 °F (36.8 °C), Min:97.7 °F (36.5 °C), Max:99.2 °F (37.3 °C) PHYSICAL EXAM:  
General:    Alert, cooperative, no distress, appears stated age. Head:   Normocephalic, without obvious abnormality, atraumatic. Eyes:   Conjunctivae clear, anicteric sclerae.   Pupils are equal 
 Nose:  Nares normal. No drainage or sinus tenderness. Throat:    Lips, mucosa, and tongue normal.  No Thrush Neck:  Supple, symmetrical,  no adenopathy, thyroid: non tender Back:    Symmetric,  No CVA tenderness. Lungs:   CTA bilaterally. No wheezing/rhonchi/rales. Chest wall:  No tenderness or deformity. No Accessory muscle use. Heart:   Regular rate and rhythm,  no murmur, rub or gallop. Abdomen:   Soft, +mild diffuse TTP Not distended. Bowel sounds normal. No masses Extremities: Atraumatic, No cyanosis. No edema. No clubbing Skin:     Texture, turgor normal. No rashes/lesions/jaundice Lymph: Cervical, supraclavicular normal. 
 
LAB DATA REVIEWED:   
Recent Results (from the past 48 hour(s)) CBC WITH AUTOMATED DIFF Collection Time: 12/01/18 11:09 AM  
Result Value Ref Range WBC 13.9 (H) 3.6 - 11.0 K/uL  
 RBC 4.72 3.80 - 5.20 M/uL  
 HGB 13.8 11.5 - 16.0 g/dL HCT 42.8 35.0 - 47.0 % MCV 90.7 80.0 - 99.0 FL  
 MCH 29.2 26.0 - 34.0 PG  
 MCHC 32.2 30.0 - 36.5 g/dL  
 RDW 14.5 11.5 - 14.5 % PLATELET 766 847 - 563 K/uL MPV 11.5 8.9 - 12.9 FL  
 NRBC 0.0 0  WBC ABSOLUTE NRBC 0.00 0.00 - 0.01 K/uL NEUTROPHILS 81 (H) 32 - 75 % LYMPHOCYTES 6 (L) 12 - 49 % MONOCYTES 10 5 - 13 % EOSINOPHILS 2 0 - 7 % BASOPHILS 1 0 - 1 % IMMATURE GRANULOCYTES 1 (H) 0.0 - 0.5 % ABS. NEUTROPHILS 11.2 (H) 1.8 - 8.0 K/UL  
 ABS. LYMPHOCYTES 0.8 0.8 - 3.5 K/UL  
 ABS. MONOCYTES 1.5 (H) 0.0 - 1.0 K/UL  
 ABS. EOSINOPHILS 0.2 0.0 - 0.4 K/UL  
 ABS. BASOPHILS 0.1 0.0 - 0.1 K/UL  
 ABS. IMM. GRANS. 0.1 (H) 0.00 - 0.04 K/UL  
 DF AUTOMATED AMMONIA Collection Time: 12/01/18 11:09 AM  
Result Value Ref Range Ammonia 20 <32 UMOL/L  
EKG, 12 LEAD, INITIAL Collection Time: 12/01/18 11:17 AM  
Result Value Ref Range Ventricular Rate 90 BPM  
 Atrial Rate 90 BPM  
 P-R Interval 126 ms  
 QRS Duration 82 ms Q-T Interval 400 ms QTC Calculation (Bezet) 489 ms Calculated P Axis 69 degrees Calculated R Axis 16 degrees Calculated T Axis 156 degrees Diagnosis Normal sinus rhythm Nonspecific ST and T wave abnormality Prolonged QT When compared with ECG of 05-MAR-2018 11:47, 
T wave inversion now evident in Lateral leads QT has lengthened Confirmed by Michelle Seymour ([id number]) on 12/2/2018 9:49:57 AM 
  
LACTIC ACID Collection Time: 12/01/18 11:55 AM  
Result Value Ref Range Lactic acid 1.1 0.4 - 2.0 MMOL/L  
TROPONIN I Collection Time: 12/01/18 12:25 PM  
Result Value Ref Range Troponin-I, Qt. 0.10 (H) <0.05 ng/mL MAGNESIUM Collection Time: 12/01/18 12:25 PM  
Result Value Ref Range Magnesium 1.8 1.6 - 2.4 mg/dL PHOSPHORUS Collection Time: 12/01/18 12:25 PM  
Result Value Ref Range Phosphorus 3.5 2.6 - 4.7 MG/DL  
CK W/ CKMB & INDEX Collection Time: 12/01/18 12:25 PM  
Result Value Ref Range CK 40 26 - 192 U/L  
 CK - MB 2.1 <3.6 NG/ML  
 CK-MB Index 5.3 (H) 0 - 2.5 METABOLIC PANEL, COMPREHENSIVE Collection Time: 12/01/18 12:25 PM  
Result Value Ref Range Sodium 142 136 - 145 mmol/L Potassium 3.5 3.5 - 5.1 mmol/L Chloride 111 (H) 97 - 108 mmol/L  
 CO2 15 (LL) 21 - 32 mmol/L Anion gap 16 (H) 5 - 15 mmol/L Glucose 161 (H) 65 - 100 mg/dL BUN 50 (H) 6 - 20 MG/DL Creatinine 1.87 (H) 0.55 - 1.02 MG/DL  
 BUN/Creatinine ratio 27 (H) 12 - 20 GFR est AA 33 (L) >60 ml/min/1.73m2 GFR est non-AA 27 (L) >60 ml/min/1.73m2 Calcium 8.5 8.5 - 10.1 MG/DL Bilirubin, total 0.4 0.2 - 1.0 MG/DL  
 ALT (SGPT) 16 12 - 78 U/L  
 AST (SGOT) 18 15 - 37 U/L Alk. phosphatase 115 45 - 117 U/L Protein, total 6.5 6.4 - 8.2 g/dL Albumin 2.2 (L) 3.5 - 5.0 g/dL Globulin 4.3 (H) 2.0 - 4.0 g/dL A-G Ratio 0.5 (L) 1.1 - 2.2 TSH 3RD GENERATION Collection Time: 12/01/18 12:30 PM  
Result Value Ref Range TSH 1.91 0.36 - 3.74 uIU/mL URINALYSIS W/ REFLEX CULTURE  
 Collection Time: 12/01/18  4:14 PM  
Result Value Ref Range Color YELLOW/STRAW Appearance TURBID (A) CLEAR Specific gravity 1.020 1.003 - 1.030    
 pH (UA) 6.0 5.0 - 8.0 Protein >300 (A) NEG mg/dL Glucose 250 (A) NEG mg/dL Ketone 15 (A) NEG mg/dL Blood MODERATE (A) NEG Urobilinogen 0.2 0.2 - 1.0 EU/dL Nitrites NEGATIVE  NEG Leukocyte Esterase LARGE (A) NEG    
 WBC >100 (H) 0 - 4 /hpf  
 RBC 5-10 0 - 5 /hpf Epithelial cells FEW FEW /lpf Bacteria 2+ (A) NEG /hpf  
 UA:UC IF INDICATED URINE CULTURE ORDERED (A) CNI Mucus TRACE (A) NEG /lpf Yeast w/hyphae PRESENT (A) NEG    
BILIRUBIN, CONFIRM Collection Time: 12/01/18  4:14 PM  
Result Value Ref Range Bilirubin UA, confirm NEGATIVE  NEG    
GLUCOSE, POC Collection Time: 12/01/18  7:07 PM  
Result Value Ref Range Glucose (POC) 158 (H) 65 - 100 mg/dL Performed by Norma Yee (PCT) GLUCOSE, POC Collection Time: 12/01/18  9:04 PM  
Result Value Ref Range Glucose (POC) 161 (H) 65 - 100 mg/dL Performed by [employer] HEMOGLOBIN A1C WITH EAG Collection Time: 12/02/18  3:35 AM  
Result Value Ref Range Hemoglobin A1c 10.4 (H) 4.2 - 6.3 % Est. average glucose 252 mg/dL METABOLIC PANEL, BASIC Collection Time: 12/02/18  3:35 AM  
Result Value Ref Range Sodium 144 136 - 145 mmol/L Potassium 3.3 (L) 3.5 - 5.1 mmol/L Chloride 114 (H) 97 - 108 mmol/L  
 CO2 16 (L) 21 - 32 mmol/L Anion gap 14 5 - 15 mmol/L Glucose 135 (H) 65 - 100 mg/dL BUN 50 (H) 6 - 20 MG/DL Creatinine 1.78 (H) 0.55 - 1.02 MG/DL  
 BUN/Creatinine ratio 28 (H) 12 - 20 GFR est AA 35 (L) >60 ml/min/1.73m2 GFR est non-AA 29 (L) >60 ml/min/1.73m2 Calcium 8.3 (L) 8.5 - 10.1 MG/DL  
GLUCOSE, POC Collection Time: 12/02/18  8:51 AM  
Result Value Ref Range Glucose (POC) 118 (H) 65 - 100 mg/dL Performed by Norma Yee (PCT) IMAGING RESULTS: 
 []      I have personally reviewed the actual   []    CXR  []    CT  []     US Assessment/Plan: Active Problems: Morbid obesity ([facility] (1/26/2011) Right leg weakness (4/18/2013) Overview: R/O CVA, HNP, mononeuritis Hyperlipemia () Diabetic gastroparesis ([facility] () Diabetic polyneuropathy ([facility] (4/20/2013) Stroke [facility]) (5/30/2014) Overview: Recurrent, bilateral 
 
  Hypertension (7/24/2017) Aortic stenosis, mild (7/24/2017) DM (diabetes mellitus) ([facility] (12/1/2018) CKD (chronic kidney disease) (12/1/2018) Acute renal failure (ARF) ([facility] (12/1/2018) 1. Diabetic gastroparesis based on GES from 8/18 2. AP/n/v, likely 2/2 #1 3. GIRISH 
___________________________________________________ RECOMMENDATIONS:   
- will convert Reglan to IV 
- IV PPI q 12 hour 
- CT Abd/Pelvis w/o IV contrast (given GIRISH) to evaluate for other intra-abdominal pathology 
- optimal glycemic control 
- eventual EGD as she has not had one before an as outpatient 
 
___________________________________________________ Care Plan discussed with: 
  [x]    Patient   [x]    Family   []    Nursing   []    Attending 
 ___________________________________________________ GI: Arnaud Herndon MD

## 2018-12-02 NOTE — PROGRESS NOTES
Received return call from Dr. Amy Michaels and informed him of left labia pus drainage; telephone orders received for Bactroban ointment BID left labia and GYN consult for tomorrow.

## 2018-12-02 NOTE — PROGRESS NOTES
Spiritual Care Assessment/Progress Note [address] 
 
 
NAME: Avni Parker      MRN: [id number] AGE: 58 y.o. SEX: female Anglican Affiliation: Yarsani  
Language: English  
 
12/2/2018     Total Time (in minutes): 5 Spiritual Assessment begun in [facility] 3 MED TELE through conversation with: 
  
    [x]Patient        [x] Family    [] Friend(s) Reason for Consult: Initial/Spiritual assessment, patient floor Spiritual beliefs: (Please include comment if needed) [x] Identifies with a anuradha tradition:  Yarsani   
   [] Supported by a anuradha community:        
   [] Claims no spiritual orientation:       
   [] Seeking spiritual identity:            
   [] Adheres to an individual form of spirituality:       
   [] Not able to assess:                   
 
    
Identified resources for coping:  
   [] Prayer                           
   [] Music                  [] Guided Imagery 
   [] Family/friends                 [] Pet visits [] Devotional reading                         [x] Unknown 
   [] Other:                                         
 
 
Interventions offered during this visit: (See comments for more details) Patient Interventions: Initial/Spiritual assessment, patient floor, Coping skills reviewed/reinforced Family/Friend(s): Coping skills reviewed/reinforced, Initial Assessment, Affirmation of emotions/emotional suffering Plan of Care: 
 
 [x] Support spiritual and/or cultural needs  
 [] Support AMD and/or advance care planning process    
 [] Support grieving process 
 [] Coordinate Rites and/or Rituals  
 [] Coordination with community clergy [] No spiritual needs identified at this time 
 [] Detailed Plan of Care below (See Comments)  [] Make referral to Music Therapy 
[] Make referral to Pet Therapy    
[] Make referral to Addiction services 
[] Make referral to [facility] 
[] Make referral to Spiritual Care Partner [] No future visits requested       
[] Follow up visits as needed Comments: patient sitting up in bed,  is also at bedside. Some eye contact, days she is not feeling well. Nurse entered room to give patient her medications during this visit.  requested  come back at a later time if able. Patient and  were provided information on how to contact a  should they desire a visit and  acknowledged understanding. He expressed gratitude fpr this visit. Visited by Rev. Bola West, [address] [place]  paging service: [phone])

## 2018-12-02 NOTE — PROGRESS NOTES
[address] 
[phone] Medical Progress Note NAME: Luli Ross :  1955 MRM:  [id number] Date/Time: 2018  1:21 PM 
 
Subjective:  
 
Events leading to admission noted. Patient well known to me with complex and strange history. Has been declining for > 1 year after a stroke and right thigh abscess left her with bilateral LE weakness (R>L) and she has been essentially bedridden since then with little inclination to help herself or participate in therapies. Most recently has stopped taking her meds and refused to come to the office or ED (until this presentation). Issues currently also exacerbated by gastroparesis. Diabetic management has been horrible. Hygiene is terrible due to obesity and immobility. Verbalization is minimal.  Her mentation is difficult to characterize but she clearly appears depressed. Presents with decline in renal function most likely due to decreased po intake as she probably has not been taking her diuretic. Incontinent stool and urine. H/O c diff colitis. She really is the definition of FTT. Have opened discussions re palliative care and hospice but  reluctant and she doesn't verbalize her thoughts. For now will pursue palliative care further but in meantime try to correct some of metabolic abnormalities and start RX depression. Even if she improves I am not certain it will persist if she is eventually discharged to home. Very frustating and difficult management problem. Past Medical History reviewed and unchanged from Admission History and Physical and/or prior progress note/electronic medical record Medications reviewed. ROS:   
 
General: postive for weakness Eyes: positive for left eye drainage and irritation Ear Nose and Throat: positive for ? odynophagia Respiratory:  positive for PIPER 
 Cardiology:  negative for chest pain, palpitations, + for orthopnea, PND, edema, 
Gastrointestinal: positive for N/V, difficulties swallowing and gastroparesis Genitourinary: positive for incontinence Muskuloskeletal : negative for arthralgia, myalgia Neurological: positive for paraparesis, dysphasia and diminished mentation Objective:  
 
Last 24hrs VS reviewed since prior progress note. Most recent are: 
 
Visit Vitals /80 (BP 1 Location: Left arm, BP Patient Position: At rest) Pulse 79 Temp 98 °F (36.7 °C) Resp 16 Ht 5' (1.524 m) Wt 299 lb 13.2 oz (136 kg) SpO2 94% Breastfeeding? No  
BMI 58.56 kg/m² SpO2 Readings from Last 6 Encounters:  
12/02/18 94% 11/29/18 95% 11/27/18 93% 11/20/18 96% 11/16/18 97% 11/15/18 91% Intake/Output Summary (Last 24 hours) at 12/2/2018 1342 Last data filed at 12/2/2018 0003 Gross per 24 hour Intake 345 ml Output  Net 345 ml Physical Exam: 
 
General appearance: alert, uncooperative, no distress, slowed mentation, appears older than stated age Eyes: conjunctivitis left eye Neck: supple, symmetrical, trachea midline, no adenopathy and thyroid: not enlarged, symmetric, no tenderness/mass/nodules Lungs: clear to auscultation bilaterally Heart: regular rate and rhythm, S1, S2 normal, grade 2/6 systolic murmur, no click, rub or gallop Abdomen: morbidly obese. No appreciable mass or tenderness Extremities: 3+ brawny edema with scaling lichenified skin Neurologic: paraparesis, dysphasia, essentially nonverbal 
 
Data Review: 
 
Lab: 
 
BMP:  
Lab Results Component Value Date/Time   12/02/2018 03:35 AM  
 K 3.3 (L) 12/02/2018 03:35 AM  
  (H) 12/02/2018 03:35 AM  
 CO2 16 (L) 12/02/2018 03:35 AM  
 AGAP 14 12/02/2018 03:35 AM  
  (H) 12/02/2018 03:35 AM  
 BUN 50 (H) 12/02/2018 03:35 AM  
 CREA 1.78 (H) 12/02/2018 03:35 AM  
 GFRAA 35 (L) 12/02/2018 03:35 AM  
 GFRNA 29 (L) 12/02/2018 03:35 AM  
 
CBC: No results found for: WBC, HGB, HGBEXT, HCT, HCTEXT, PLT, PLTEXT, HGBEXT, HCTEXT, PLTEXT Recent Glucose Results:  
Lab Results Component Value Date/Time  (H) 12/02/2018 03:35 AM  
 
All labs reviewed in past 24 hours Other pertinent lab: A1C > 10. TSH normal 
 
Imaging: 
 
Reviewed. CXR Assessment / Plan:  
 
Principal Problem: 
  FTT (failure to thrive) in adult (12/2/2018) Active Problems: 
  Right leg weakness (4/18/2013) Overview: R/O CVA, HNP, mononeuritis Diabetic gastroparesis ([address] () Stroke [facility]) (5/30/2014) Overview: Recurrent, bilateral 
 
  Acute renal failure (ARF) ([address] (12/1/2018) Diabetic polyneuropathy ([address] (4/20/2013) ASCVD (arteriosclerotic cardiovascular disease) (5/31/2014) Hypertension (7/24/2017) (HFpEF) heart failure with preserved ejection fraction ([address] (10/31/2017) DM (diabetes mellitus) ([address] (12/1/2018) Morbid obesity ([address] (1/26/2011) Hyperlipemia () Aortic stenosis, mild (7/24/2017) GERD (gastroesophageal reflux disease) (10/11/2017) Severe episode of recurrent major depressive disorder, without psychotic features ([address] (12/2/2018) 1. Replete KCl 2. Hydrate gently 3. GI consult ? EGD eventually or MBS 4. Check ABG 5. CT head 6. ECHO 7. Antidepressant 8. Wound care 9. PT/OT/ST 
10. Palliative care 11. Follow labs 12. [place] 13. Likely will need PICC line if does not opt for Hospice 14. Continue current meds including IV Reglan and PPI 15. Stool CD 16. Follow labs Care Plan discussed with: Patient, Family, Nursing Staff and Consultant/Specialist 
 
Discussed:  Care Plan Prophylaxis:  Hep SQ and H2B/PPI Disposition:  ??? 
___________________________________________________ Attending Physician: Nathalia Kan MD

## 2018-12-03 PROBLEM — N76.4 VULVAR ABSCESS: Status: ACTIVE | Noted: 2018-01-01

## 2018-12-03 NOTE — PROGRESS NOTES
[address] [place] [address] 
[phone] Medical Progress Note NAME: Bishop Girard :  1955 MRM:  [id number] Date/Time: 12/3/2018  7:54 AM 
 
Subjective:  
 
Events leading to admission noted. Patient well known to me with complex and strange history. Has been declining for > 1 year after a stroke and right thigh abscess left her with bilateral LE weakness (R>L) and she has been essentially bedridden since then with little inclination to help herself or participate in therapies. Most recently has stopped taking her meds and refused to come to the office or ED (until this presentation). Issues currently also exacerbated by gastroparesis. Diabetic management has been horrible. Hygiene is terrible due to obesity and immobility. Verbalization is minimal.  Her mentation is difficult to characterize but she clearly appears depressed. Presents with decline in renal function most likely due to decreased po intake as she probably has not been taking her diuretic. Incontinent stool and urine. H/O c diff colitis. She really is the definition of FTT. Have opened discussions re palliative care and hospice but  reluctant and she doesn't verbalize her thoughts. For now will pursue palliative care further but in meantime try to correct some of metabolic abnormalities and start RX depression. Even if she improves I am not certain it will persist if she is eventually discharged to home. Very frustating and difficult management problem. This am surprisingly more alert and verbal and hungry. Nurses found left labial abscess when Riojas being placed yesterday to help with perineal care. Have started Vancmycin in addition to Levaquin (started yesterday for possible UTI). CT head w/o new findings. .. Right encephalomalacia. CT abd pelvis negative except thickening abdominal wall tissues. Complains of odynophagia now and EGD probably warranted at some point. Will ask Dr. Rebecca Parikh to see re vulvar drainage/abscess. Needs ST and wound care. Past Medical History reviewed and unchanged from Admission History and Physical and/or prior progress note/electronic medical record Medications reviewed. ROS:   
 
General: postive for weakness  Drainage left side vulva Eyes: positive for left eye drainage and irritation Ear Nose and Throat: positive for ? odynophagia Respiratory:  positive for PIPER Cardiology:  negative for chest pain, palpitations, + for orthopnea, PND, edema, 
Gastrointestinal: positive for N/V, difficulties swallowing and gastroparesis Genitourinary: positive for incontinence Muskuloskeletal : negative for arthralgia, myalgia Neurological: positive for paraparesis, dysphasia and diminished mentation Objective:  
 
Last 24hrs VS reviewed since prior progress note. Most recent are: 
 
Visit Vitals /69 Pulse 73 Temp 97.9 °F (36.6 °C) Resp 16 Ht 5' (1.524 m) Wt 299 lb 13.2 oz (136 kg) SpO2 97% Breastfeeding? No  
BMI 58.56 kg/m² SpO2 Readings from Last 6 Encounters:  
12/03/18 97% 11/29/18 95% 11/27/18 93% 11/20/18 96% 11/16/18 97% 11/15/18 91% Intake/Output Summary (Last 24 hours) at 12/3/2018 2508 Last data filed at 12/3/2018 6152 Gross per 24 hour Intake  Output 500 ml Net -500 ml Physical Exam: 
 
General appearance: alert, uncooperative, no distress, slowed mentation, appears older than stated age Eyes: conjunctivitis left eye Neck: supple, symmetrical, trachea midline, no adenopathy and thyroid: not enlarged, symmetric, no tenderness/mass/nodules Lungs: clear to auscultation bilaterally Heart: regular rate and rhythm, S1, S2 normal, grade 2/6 systolic murmur, no click, rub or gallop Abdomen: morbidly obese. No appreciable mass or tenderness;  Drainage from left vulvar area and thickened abd wall Skin:  Significant sacral, buttock and perineal breakdown Extremities: 3+ brawny edema with scaling lichenified skin Neurologic: paraparesis, dysphasia, essentially nonverbal 
 
Data Review: 
 
Lab: 
 
BMP:  
Lab Results Component Value Date/Time  12/03/2018 05:02 AM  
 K 4.3 12/03/2018 05:02 AM  
  (H) 12/03/2018 05:02 AM  
 CO2 16 (L) 12/03/2018 05:02 AM  
 AGAP 10 12/03/2018 05:02 AM  
  (H) 12/03/2018 05:02 AM  
 BUN 39 (H) 12/03/2018 05:02 AM  
 CREA 1.72 (H) 12/03/2018 05:02 AM  
 GFRAA 36 (L) 12/03/2018 05:02 AM  
 GFRNA 30 (L) 12/03/2018 05:02 AM  
 
CBC:  
Lab Results Component Value Date/Time WBC 8.4 12/03/2018 05:02 AM  
 HGB 10.9 (L) 12/03/2018 05:02 AM  
 HCT 34.9 (L) 12/03/2018 05:02 AM  
  12/03/2018 05:02 AM  
 
Recent Glucose Results:  
Lab Results Component Value Date/Time  (H) 12/03/2018 05:02 AM  
 
All labs reviewed in past 24 hours Other pertinent lab: A1C > 10. TSH normal 
 
Imaging: 
 
Reviewed. CXR Assessment / Plan:  
 
Principal Problem: 
  FTT (failure to thrive) in adult (12/2/2018) Active Problems: 
  Right leg weakness (4/18/2013) Overview: R/O CVA, HNP, mononeuritis Diabetic gastroparesis ([address] () Stroke [facility]) (5/30/2014) Overview: Recurrent, bilateral 
 
  Acute renal failure (ARF) ([address] (12/1/2018) Diabetic polyneuropathy ([address] (4/20/2013) ASCVD (arteriosclerotic cardiovascular disease) (5/31/2014) Hypertension (7/24/2017) (HFpEF) heart failure with preserved ejection fraction ([address] (10/31/2017) DM (diabetes mellitus) ([address] (12/1/2018) Vulvar abscess (12/3/2018) Morbid obesity ([facility] (1/26/2011) CKD (chronic kidney disease) stage 3, GFR 30-59 ml/min ([facility]) () Hyperlipemia () Aortic stenosis, mild (7/24/2017) GERD (gastroesophageal reflux disease) (10/11/2017)   Severe episode of recurrent major depressive disorder, without psychotic features ([facility] (12/2/2018) 1. Replete KCl 2. Hydrate gently 3. GI consult ? EGD eventually or MBS 4. Culture drainage 5. CT head as above 6. ECHO 7. Antidepressant 8. Wound care 9. PT/OT/ST 
10. Palliative care 11. Follow labs 12. [place] 13. Likely will need PICC line if does not opt for Hospice 14. Continue current meds including IV Reglan and PPI 15. Stool CD 16. Follow labs 17. Surgery consult 18. Add Vancomycin. .. Wouldn't be surprised to find MRSA Care Plan discussed with: Patient, Family, Nursing Staff and Consultant/Specialist 
 
Discussed:  Care Plan Prophylaxis:  Hep SQ and H2B/PPI Disposition:  ??? 
___________________________________________________ Attending Physician: Ilana Gates MD

## 2018-12-03 NOTE — PROGRESS NOTES
Pt admitted with ARF//DM//uincontinence//not eating. Pt lives at home where she is 24/7 care by spouse, has a wheelchair//framp, PCP Dr Dylon Kate, full code, [address],  Morbidly obese, and is open to [facility]. Will follow and await Palliative Care recommendations. Chart sent to [facility] 
 
1400  I discussed d/c plans with spouse who states he has all the DME//[facility] he needs. He cares for her at home and works in the home as well. He anticipates speaking with Pall. Care today. Care Management Interventions PCP Verified by CM: Yes Transition of Care Consult (CM Consult): Discharge Planning MyChart Signup: No 
Discharge Durable Medical Equipment: No 
Physical Therapy Consult: No 
Occupational Therapy Consult: No 
Speech Therapy Consult: No 
Current Support Network: Lives with Spouse Confirm Follow Up Transport: Other (see comment) Plan discussed with Pt/Family/Caregiver: Yes Freedom of Choice Offered: Yes Discharge Location Discharge Placement: Home

## 2018-12-03 NOTE — CONSULTS
Palliative Medicine Consult Luis Alfredo: [phone] ([phone]) Patient Name: Shelly Weber YOB: 1955 Date of Initial Consult: 12/3/18 Reason for Consult: care decisions Requesting Provider: Dr. Dalia Jackson Primary Care Physician: Bryson Mosher MD 
 
 SUMMARY:  
Shelly Weber is a 58 y.o. with a past history of poorly controlled diabetes with diabetic polyneuropathy, gastroparesis, CKD 2, prior CVA (2014), multiple chronic infarcts on MRI and volume loss, OHS, chronic diastolic CHF, gout, depression, who was admitted on 12/1/2018 from home with a diagnosis of 2 weeks of uncontrolled nausea and vomiting due to diabetic gastroparesis not controlled by diet modification and reglan tablets. . Current medical issues leading to Palliative Medicine involvement include: debilitating diabetic gastroparesis with ongoing nausea, vomiting, poor PO intake, not responding to home plan of diet modification and reglan. Now with profound depression related to ongoing medical illness/nausea. Patient also noted to have labial abscess. Patient is , lives with  Sheela Garcai. Has two adult children. PALLIATIVE DIAGNOSES:  
1. Nausea, vomiting due to gastroparesis. Failure of outpatient management. 2. Labial abscess, surgery following 3. Depression, anhedonia, patient attributes to nausea/chronic debility 4. Hypoalbuminemia 5. Poorly controlled DM, HbA1c 10.4 6. CKD stage 2-3, returning to baseline w hydration 7. Obesity, OHS 8. Debility from prior CVA, significant encephalomalacia PLAN:  
1. Palliative Medicine introduced to patient. 1. She feels she was doing ok 2 weeks ago, but nausea came on and wouldn't go away. Vomiting 2-3 times per day despite no PO intake. She attributes her depressed mood to the persistent nausea. She is worried about what to do next?  
2. Discussed plan to follow along while she's here, continue to discuss medical options, plan of care. She indicates her  will usually be here in the afternoons (he works in the morning)  Will plan to address her overall care goals, will need to first see what we can do to improve her horrible nausea. Will plan to provide education about code status and her likely poor outcome in setting of code blue due to underlying end organ damage from diabetes. 2. Nausea, vomiting due to gastroparesis 1. Consider using liquid Reglan once ready to switch to oral (rather than pill form). 2. Modified diet -- liquified/ soluble fiber only -- dietary consult. 3. Consider adding Erythromycin 4. Consider minimizing other pills such as lipitor (benefit vs. Pill burden), consider alternate day supplements (benefit vs. Pill burden). Prioritize antidepressant and antinausea meds. 5. Consider Draining PEG/feeding J tube if no relief from above and patient is interested in this approach. 6. Optimize blood sugar control 3. Initial consult note routed to primary continuity provider 4. Communicated plan of care with: Palliative IDT 
 
 
 GOALS OF CARE / TREATMENT PREFERENCES:  
 
GOALS OF CARE: 
Patient/Health Care Proxy Stated Goals: Other (comment)(improvement in nausea main goal at this time) TREATMENT PREFERENCES:  
Code Status: Full Code Advance Care Planning: 
[x] [facility] Interdisciplinary Team has updated the ACP Navigator with [address] and Patient Capacity Primary Decision Maker (Health Care Agent): Clarence Waller Relationship to patient:spouse Phone number:cell [phone][] Named in a scanned document  
[x] Legal Next of Kin 
[] Guardian Secondary Decision Maker (First Alternate Health Care Agent):  
Relationship to patient: 
Phone number: 
[] Named in a scanned document  
[] Legal Next of Kin 
[] Guardian Medical Interventions: Other (comment)(not yet discussed) Other Instructions: Other: As far as possible, the palliative care team has discussed with patient / health care proxy about goals of care / treatment preferences for patient. HISTORY:  
 
History obtained from: chart CHIEF COMPLAINT: nausea and vomiting HPI/SUBJECTIVE: The patient is:  
[x] Verbal and participatory [] Non-participatory due to:  
 
58year old female with debility from multiple illnesses as above, has had ongoing issues with nausea and vomiting from diabetic gastroparesis. She says she was managing OK until a couple of weeks ago. Prior to that she was eating small meals, taking meds (reglan). Nausea seems to start suddenly and wouldn't go away. She stopped eating anything, couldn't take meds. She had some sips of water. She was vomiting 2-3 times per day. She can't enjoy anything, she lies in bed all day. Gets up sometimes with her 's help. Prior to the nausea, she would enjoy watching a movie. No generalized pain, but stomach hurts sometimes when she's vomiting. Clinical Pain Assessment (nonverbal scale for severity on nonverbal patients):  
Clinical Pain Assessment Severity: 0 Duration: for how long has pt been experiencing pain (e.g., 2 days, 1 month, years) Frequency: how often pain is an issue (e.g., several times per day, once every few days, constant) FUNCTIONAL ASSESSMENT:  
 
Palliative Performance Scale (PPS): PPS: 30 
 
 
 PSYCHOSOCIAL/SPIRITUAL SCREENING:  
 
Palliative IDT has assessed this patient for cultural preferences / practices and a referral made as appropriate to needs (Cultural Services, Patient Advocacy, Ethics, etc.) Any spiritual / Jehovah's witness concerns: 
[] Yes /  [x] No 
 
Caregiver Burnout: 
[] Yes /  [x] No /  [] No Caregiver Present Anticipatory grief assessment:  
[x] Normal  / [] Maladaptive ESAS Anxiety: Anxiety: 0 
 
ESAS Depression: Depression: 5  REVIEW OF SYSTEMS:  
 
 Positive and pertinent negative findings in ROS are noted above in HPI. The following systems were [x] reviewed / [] unable to be reviewed as noted in HPI Other findings are noted below. Systems: constitutional, ears/nose/mouth/throat, respiratory, gastrointestinal, genitourinary, musculoskeletal, integumentary, neurologic, psychiatric, endocrine. Positive findings noted below. Modified ESAS Completed by: provider Fatigue: 5 Drowsiness: 0 Depression: 5 Pain: 0 Anxiety: 0 Nausea: 5 Anorexia: 10 Dyspnea: 0 Constipation: No  
  Stool Occurrence(s): 1 PHYSICAL EXAM:  
 
From RN flowsheet: 
Wt Readings from Last 3 Encounters:  
12/01/18 136 kg (299 lb 13.2 oz) 11/05/18 136.1 kg (300 lb)  
06/15/18 136.1 kg (300 lb) Blood pressure 143/67, pulse 72, temperature 97.6 °F (36.4 °C), resp. rate 17, height 5' (1.524 m), weight 136 kg (299 lb 13.2 oz), SpO2 98 %, not currently breastfeeding. Pain Scale 1: Numeric (0 - 10) Pain Intensity 1: 0 Pain Location 1: Abdomen Pain Description 1: Aching Last bowel movement, if known:  
 
Constitutional: pt is awake, engages in conversation Eyes: pupils equal, anicteric No respiratory distress Left hand contractures Affect is flat but patient is pleasant and cooperative, normal eye contact She demonstrates some insight into her medical issues HISTORY:  
 
Principal Problem: 
  FTT (failure to thrive) in adult (12/2/2018) Active Problems: Morbid obesity ([address] (1/26/2011) Right leg weakness (4/18/2013) Overview: R/O CVA, HNP, mononeuritis CKD (chronic kidney disease) stage 3, GFR 30-59 ml/min ([facility]) () Hyperlipemia () Diabetic gastroparesis ([address] () Diabetic polyneuropathy ([address] (4/20/2013) Stroke [facility]) (5/30/2014) Overview: Recurrent, bilateral 
 
  ASCVD (arteriosclerotic cardiovascular disease) (5/31/2014) Hypertension (7/24/2017) Aortic stenosis, mild (7/24/2017) GERD (gastroesophageal reflux disease) (10/11/2017) (HFpEF) heart failure with preserved ejection fraction ([address] (10/31/2017) DM (diabetes mellitus) ([address] (2018) Acute renal failure (ARF) ([address] (2018) Severe episode of recurrent major depressive disorder, without psychotic features ([address] (2018) Vulvar abscess (12/3/2018) Past Medical History:  
Diagnosis Date  Acute combined systolic and diastolic heart failure, NYHA class 2 ([address] 2017  Anemia 2017  Aortic stenosis, mild 2017  Bilateral edema of lower extremity 2017  BMI 45.0-49.9, adult ([address] 2017  Chronic diastolic CHF (congestive heart failure), NYHA class 3 ([address] 2017  CKD (chronic kidney disease) 2018  CKD (chronic kidney disease) stage 2, GFR 60-89 ml/min  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 2 diabetes mellitus ([address] 2017  Diabetes ([address]  Fatty liver 2017  Gastroparesis  GERD (gastroesophageal reflux disease)  Gout   
 HTN (hypertension)  Hyperlipemia  Hyperparathyroidism ([address]  Hypertension 2017  Insomnia 2017  Low back pain 2017  N&V (nausea and vomiting) 2017  Nausea & vomiting  Obesity  Pelvic joint pain 2017  Peripheral neuropathy 2017  Sciatica   
 right  Severe right groin pain 2017  Sinus tachycardia 2017  Stage 2 chronic kidney disease due to benign hypertension 2017  Stasis dermatitis 2017  Stroke [facility]) 2001  
 right parietal, left caudate  Thyroid disease   
 para thyroid(high calcium)  Ulcer of right fifth toe due to diabetes mellitus ([address] 2017 Past Surgical History:  
Procedure Laterality Date  CHEST SURGERY PROCEDURE UNLISTED  10/24/13 PARATHYROID EXPLORATION   
 DELIVERY     
 HX AMPUTATION TOE  2018  HX  SECTION    
 HX CHOLECYSTECTOMY  HX OTHER SURGICAL I&D right thigh abscess Family History Problem Relation Age of Onset  Cancer Mother   
     cervical  
 Diabetes Mother  Hypertension Mother  Stroke Mother 79  
 Diabetes Father  Hypertension Father  Kidney Disease Father  Diabetes Brother  Hypertension Brother  Stroke Brother 61 History reviewed, no pertinent family history. Social History Tobacco Use  Smoking status: Never Smoker  Smokeless tobacco: Never Used Substance Use Topics  Alcohol use: Yes Comment: rare Allergies Allergen Reactions  Zestril [Lisinopril] Swelling Current Facility-Administered Medications Medication Dose Route Frequency  vancomycin (VANCOCIN) 2000 mg in  ml infusion  2,000 mg IntraVENous NOW  
 [START ON 12/4/2018] vancomycin (VANCOCIN) 1,000 mg in 0.9% sodium chloride 250 mL ([id number])  1,000 mg IntraVENous Q24H  
 metoclopramide HCl (REGLAN) injection 5 mg  5 mg IntraVENous TIDAC  pantoprazole (PROTONIX) 40 mg in sodium chloride 0.9% 10 mL injection  40 mg IntraVENous Q12H  
 escitalopram oxalate (LEXAPRO) tablet 10 mg  10 mg Oral DAILY  0.9% sodium chloride with KCl 20 mEq/L infusion   IntraVENous CONTINUOUS  
 levoFLOXacin (LEVAQUIN) tablet 750 mg  750 mg Oral Q48H  
 mupirocin (BACTROBAN) 2 % ointment   Topical BID  allopurinol (ZYLOPRIM) tablet 150 mg  150 mg Oral DAILY  ammonium lactate 5 % lotn 1 Each  1 Each Topical DAILY  aspirin-dipyridamole (AGGRENOX)  mg per capsule 1 Cap  1 Cap Oral Q12H  
 atenolol (TENORMIN) tablet 50 mg  50 mg Oral DAILY  atorvastatin (LIPITOR) tablet 40 mg  40 mg Oral QHS  cholecalciferol (VITAMIN D3) tablet 2,000 Units  2,000 Units Oral DAILY  clotrimazole (LOTRIMIN) 1 % cream   Topical BID  ferrous sulfate tablet 325 mg  325 mg Oral ACB  pregabalin (LYRICA) capsule 75 mg  75 mg Oral BID  nystatin (MYCOSTATIN) 100,000 unit/gram cream   Topical BID  
  zinc oxide-cod liver oil (DESITIN) 40 % paste   Topical PRN  
 sodium chloride (NS) flush 5-10 mL  5-10 mL IntraVENous Q8H  
 sodium chloride (NS) flush 5-10 mL  5-10 mL IntraVENous PRN  
 heparin (porcine) injection 7,500 Units  7,500 Units SubCUTAneous Q8H  
 glucose chewable tablet 16 g  4 Tab Oral PRN  
 dextrose (D50W) injection syrg 12.5-25 g  25-50 mL IntraVENous PRN  
 glucagon (GLUCAGEN) injection 1 mg  1 mg IntraMUSCular PRN  
 insulin lispro (HUMALOG) injection   SubCUTAneous AC&HS  trimethoprim-polymyxin b (POLYTRIM) 10,000 unit- 1 mg/mL ophthalmic solution 1 Drop  1 Drop Both Eyes BID  colestipol (COLESTID) tablet 1 g  1 g Oral DAILY  dilTIAZem CD (CARDIZEM CD) capsule 240 mg  240 mg Oral DAILY  ondansetron (ZOFRAN) injection 4 mg  4 mg IntraVENous Q6H PRN  
 acetaminophen (TYLENOL) tablet 650 mg  650 mg Oral Q6H PRN  
 
 
 
 LAB AND IMAGING FINDINGS:  
 
Lab Results Component Value Date/Time WBC 8.4 12/03/2018 05:02 AM  
 HGB 10.9 (L) 12/03/2018 05:02 AM  
 PLATELET 563 97/44/0551 05:02 AM  
 
Lab Results Component Value Date/Time Sodium 141 12/03/2018 05:02 AM  
 Potassium 4.3 12/03/2018 05:02 AM  
 Chloride 115 (H) 12/03/2018 05:02 AM  
 CO2 16 (L) 12/03/2018 05:02 AM  
 BUN 39 (H) 12/03/2018 05:02 AM  
 Creatinine 1.72 (H) 12/03/2018 05:02 AM  
 Calcium 7.9 (L) 12/03/2018 05:02 AM  
 Magnesium 1.8 12/01/2018 12:25 PM  
 Phosphorus 3.5 12/01/2018 12:25 PM  
  
Lab Results Component Value Date/Time AST (SGOT) 18 12/01/2018 12:25 PM  
 Alk. phosphatase 115 12/01/2018 12:25 PM  
 Protein, total 6.5 12/01/2018 12:25 PM  
 Albumin 2.2 (L) 12/01/2018 12:25 PM  
 Globulin 4.3 (H) 12/01/2018 12:25 PM  
 
Lab Results Component Value Date/Time INR 0.9 10/26/2017 07:58 PM  
 Prothrombin time 9.4 10/26/2017 07:58 PM  
 aPTT 24.0 10/26/2017 07:58 PM  
  
Lab Results Component Value Date/Time  Iron 45 10/27/2017 10:19 AM  
 TIBC 244 (L) 10/27/2017 10:19 AM  
 Iron % saturation 18 (L) 10/27/2017 10:19 AM  
 Ferritin 48 10/27/2017 10:19 AM  
  
Lab Results Component Value Date/Time pH 7.34 (L) 12/03/2018 04:55 AM  
 PCO2 34 (L) 12/03/2018 04:55 AM  
 PO2 91 12/03/2018 04:55 AM  
 
No components found for: [place] Lab Results Component Value Date/Time CK 40 12/01/2018 12:25 PM  
 CK - MB 2.1 12/01/2018 12:25 PM  
  
 
 
   
 
Total time:  
Counseling / coordination time, spent as noted above:  
> 50% counseling / coordination?:  
 
Prolonged service was provided for  []30 min   []75 min in face to face time in the presence of the patient, spent as noted above. Time Start:  
Time End:  
Note: this can only be billed with 39426 (initial) or 18689 (follow up). If multiple start / stop times, list each separately.

## 2018-12-03 NOTE — PROGRESS NOTES
11/27/18 update:   PSR was unable to reach anyone with pt's [employer] PPO to ask about prior auth for Palliative Home. PSR called back, each time the [facility] reps would transfer her call to several departments and then call would be cut off.

## 2018-12-03 NOTE — PROGRESS NOTES
Initial Nutrition Assessment: 
 
INTERVENTIONS/RECOMMENDATIONS:  
· Meals/Snacks: General/healthful diet: Advance to Consistent carbohydrate diet as tolerated · Supplements: Consider adding Glucerna once on appropriate diet ASSESSMENT:  
Patient medically noted for acute renal failure, DM, FTT, depression, and vulvar abscess. PMH for CHF, CVA, CKD, and gastroparesis. Patient currently NPO for possible I&D in OR. Reported refusal of PO meals and medications PTA. No weight changes noted on chart review. Morbid obesity. Uncontrolled DM. Noted to receive glucerna on prior admission and seemed to like it; would implement ONS once diet advanced. Encourage PO intake of meals. Diet Order: NPO 
% Eaten:  No data found. Pertinent Medications: [x]Reviewed []Other: Atorvastatin, vitamin D, Lexapro, Ferrous sulfate, Humalog, Reglan, Protonix Pertinent Labs: [x]Reviewed []Other: -[phone], A1c 10.4 Food Allergies: [x]None []Other Last BM: 12/2  []Active     []Hyperactive  [x]Hypoactive       [] Absent BS Skin:    [x] Intact   [] Incision  [] Breakdown: [] Edema []Other: Anthropometrics:  
Height: 5' (152.4 cm) Weight: 136 kg (299 lb 13.2 oz) IBW (%IBW):   ( ) UBW (%UBW):   (  %) Last Weight Metrics: 
Weight Loss Metrics 12/1/2018 11/5/2018 8/13/2018 6/15/2018 5/16/2018 4/27/2018 3/23/2018 Today's Wt 299 lb 13.2 oz 300 lb - 300 lb 300 lb 300 lb 300 lb BMI 58.56 kg/m2 58.59 kg/m2 - 58.59 kg/m2 58.59 kg/m2 58.59 kg/m2 58.59 kg/m2 BMI: Body mass index is 58.56 kg/m². This BMI is indicative of: 
 []Underweight    []Normal    []Overweight    [] Obesity   [x] Extreme Obesity (BMI>40) Estimated Nutrition Needs (Based on):  
8373 Kcals/day(BMR (1832) x 1. 2AF -500kcal) , 109 g(0.8 g/kg bw) Protein Carbohydrate: At Least 130 g/day  Fluids: 1700 mL/day (1ml/kcal) Pt expected to meet estimated nutrient needs: []Yes [x]No (NPO currently, poor PO PTA) NUTRITION DIAGNOSES:  
 Problem:  Predicted suboptimal energy intake Etiology: related to current medical condition Signs/Symptoms: as evidenced by reported refusal of food/medications PTA NUTRITION INTERVENTIONS: 
Meals/Snacks: General/healthful diet GOAL:  
Diet advanced and PO >50% of meals next 1-3 days LEARNING NEEDS (Diet, Food/Nutrient-Drug Interaction):  
 [x] None Identified 
 [] Identified and Education Provided/Documented 
 [] Identified and Pt declined/was not appropriate Cultural, Jehovah's witness, OR Ethnic Dietary Needs:  
 [x] None Identified 
 [] Identified and Addressed 
 
 [x] Interdisciplinary Care Plan Reviewed/Documented  
 [x] Discharge Planning: CCD MONITORING /EVALUATION:  
Food/Nutrient Intake Outcomes: Total energy intake Physical Signs/Symptoms Outcomes: Weight/weight change NUTRITION RISK:  
 [x] High              [] Moderate           []  Low  []  Minimal/Uncompromised PT SEEN FOR:  
 []  MD Consult: []Calorie Count []Diabetic Diet Education []Diet Education []Electrolyte Management []General Nutrition Management and Supplements []Management of Tube Feeding []TPN Recommendations [x]  RN Referral:  [x]MST score >=2 
   []Enteral/Parenteral Nutrition PTA []Pregnant: Gestational DM or Multigestation 
   []Pressure Ulcer/Wound Care needs 
     
[]  Low BMI 
[]  [facility] Pager [phone] Weekend Pager [phone]

## 2018-12-03 NOTE — PROGRESS NOTES
Pharmacy Automatic Renal Dosing Protocol - Antimicrobials Indication for Antimicrobials: SSTI Current Regimen of Each Antimicrobial: 
Vancomycin 2000 mg x 1, followed by 1000 mg IV Q24H (Start Date 18; Day # 1) Previous Antimicrobial Therapy: 
 
Goal Level: VANCOMYCIN TROUGH GOAL RANGE Goal trough 10-15 Date Dose & Interval Measured (mcg/mL) Extrapolated (mcg/mL) Significant Cultures:  
18 Urine - Pending Radiology / Imaging results: (X-ray, CT scan or MRI):  
 
Paralysis, amputations, malnutrition: None noted Labs: 
Recent Labs 18 
0502 18 
0335 18 
1225 18 
1109 CREA 1.72* 1.78* 1.87*  --   
BUN 39* 50* 50*  --   
WBC 8.4  --   --  13.9* Temp (24hrs), Av.9 °F (36.6 °C), Min:97.2 °F (36.2 °C), Max:99.2 °F (37.3 °C) Creatinine Clearance (mL/min) or Dialysis: 24 ml/min Impression/Plan:  
· Continue Vancomycin as above for estimated trough of 14.09 mcg/ml · Antimicrobial stop date 7 days Pharmacy will follow daily and adjust medications as appropriate for renal function and/or serum levels. Thank you, Georgi Mccall PHARMD 
 
Recommended duration of therapy 
[email or web address]/Pharmacy/[email or web address]. docx Renal Dosing 
[email or web address]/[email or web address]/[email or web address]. pdf

## 2018-12-03 NOTE — PROGRESS NOTES
Physical Therapy Orders received to evaluate and chart reviewed. Conferred with RN who reports patient is NPO pending possible I and D this afternoon, and  in room states patient has not eaten in 2 weeks and is very weak and drowsy. Patient not appropriate to see today, will defer eval and continue to follow. Elaine Bains

## 2018-12-03 NOTE — PROGRESS NOTES
GI Progress Note Alok Baxter) NAME:Shereen Wagner BSQ:[id number] RUM:[id number] PCP: Maninder Jewell MD 
Date/Time:  12/3/2018 8:05 AM  
Assessment: 1. Long-standing diabetic gastroparesis based 2. AP/n/v, likely 2/2 #1 3. GIRISH 4. Depression (appears improved today) 5. CT Abd/Pelvis with PO contrast unremarkable Plan:  
· Continue IV Reglan and IV PPI · Continue CLD, can possibly advance later today as she tolerates · Optimal glycemic control · Agree with Palliative care evaluation · No plans for EGD at this time Subjective:  
Pt looks significantly improved today. +Talking. Tolerating CLD. No n/v. Complaint Y/N Description Abdominal Pain Hematemesis Hematochezia Melena Constipation Diarrhea Dyspepsia Dysphagia Jaundiced Nausea/vomiting Review of Systems: 
Symptom Y/N Comments  Symptom Y/N Comments Fever/Chills    Chest Pain Cough    Headaches Sputum    Joint Pain SOB/PIPER    Pruritis/Rash Tolerating Diet    Other Could NOT obtain due to:   
 
Objective: VITALS:  
Last 24hrs VS reviewed since prior progress note. Most recent are: 
Visit Vitals /69 Pulse 73 Temp 97.9 °F (36.6 °C) Resp 16 Ht 5' (1.524 m) Wt 136 kg (299 lb 13.2 oz) SpO2 97% Breastfeeding? No  
BMI 58.56 kg/m² Intake/Output Summary (Last 24 hours) at 12/3/2018 0805 Last data filed at 12/3/2018 9117 Gross per 24 hour Intake  Output 500 ml Net -500 ml PHYSICAL EXAM: 
General: No acute distress   
HEENT: NC, Atraumatic. PERRLA, EOMI. Anicteric sclerae. Lungs:  CTA Bilaterally. No Wheezing/Rhonchi/Rales. Heart:  Regular  rhythm,  No murmur (), No Rubs, No Gallops Abdomen: Soft, Non distended, Non tender.  +Bowel sounds, no HSM Extremities: No c/c/e Lab and Radiology Data Reviewed: (see below) Medications Reviewed: (see below) PMH/SH reviewed - no change compared to H&P 
 ________________________________________________________________________ Care Plan discussed with: 
Patient x Family RN Consultant:    
 
Destiny Gillette MD  
 
Procedures: see electronic medical records for all procedures/Xrays and details which were not copied into this note but were reviewed prior to creation of Plan. LABS: 
Recent Labs 12/03/18 
0502 12/01/18 
1109 WBC 8.4 13.9* HGB 10.9* 13.8 HCT 34.9* 42.8  334 Recent Labs 12/03/18 
0502 12/02/18 
0335 12/01/18 
1225  144 142  
K 4.3 3.3* 3.5 * 114* 111* CO2 16* 16* 15* BUN 39* 50* 50* CREA 1.72* 1.78* 1.87* * 135* 161* CA 7.9* 8.3* 8.5 MG  --   --  1.8 PHOS  --   --  3.5 Recent Labs 12/01/18 
1225 SGOT 18  
  
TP 6.5 ALB 2.2*  
GLOB 4.3* No results for input(s): INR, PTP, APTT in the last 72 hours. No lab exists for component: INREXT No results for input(s): FE, TIBC, PSAT, FERR in the last 72 hours. Lab Results Component Value Date/Time Folate 19.6 10/27/2017 10:19 AM  
 
Recent Labs 12/03/18 
0455 PH 7.34* PCO2 34* PO2 91 Recent Labs 12/01/18 
1225 CPK 40 CKMB 2.1 Lab Results Component Value Date/Time  Color YELLOW/STRAW 12/01/2018 04:14 PM  
 Appearance TURBID (A) 12/01/2018 04:14 PM  
 Specific gravity 1.020 12/01/2018 04:14 PM  
 Specific gravity 1.020 05/31/2014 04:10 AM  
 pH (UA) 6.0 12/01/2018 04:14 PM  
 Protein >300 (A) 12/01/2018 04:14 PM  
 Glucose 250 (A) 12/01/2018 04:14 PM  
 Ketone 15 (A) 12/01/2018 04:14 PM  
 Bilirubin NEGATIVE  03/24/2018 06:11 AM  
 Urobilinogen 0.2 12/01/2018 04:14 PM  
 Nitrites NEGATIVE  12/01/2018 04:14 PM  
 Leukocyte Esterase LARGE (A) 12/01/2018 04:14 PM  
 Epithelial cells FEW 12/01/2018 04:14 PM  
 Bacteria 2+ (A) 12/01/2018 04:14 PM  
 WBC >100 (H) 12/01/2018 04:14 PM  
 RBC 5-10 12/01/2018 04:14 PM  
 
 
MEDICATIONS: 
 Current Facility-Administered Medications Medication Dose Route Frequency  metoclopramide HCl (REGLAN) injection 5 mg  5 mg IntraVENous TIDAC  pantoprazole (PROTONIX) 40 mg in sodium chloride 0.9% 10 mL injection  40 mg IntraVENous Q12H  
 escitalopram oxalate (LEXAPRO) tablet 10 mg  10 mg Oral DAILY  0.9% sodium chloride with KCl 20 mEq/L infusion   IntraVENous CONTINUOUS  
 levoFLOXacin (LEVAQUIN) tablet 750 mg  750 mg Oral Q48H  
 mupirocin (BACTROBAN) 2 % ointment   Topical BID  allopurinol (ZYLOPRIM) tablet 150 mg  150 mg Oral DAILY  ammonium lactate 5 % lotn 1 Each  1 Each Topical DAILY  aspirin-dipyridamole (AGGRENOX)  mg per capsule 1 Cap  1 Cap Oral Q12H  
 atenolol (TENORMIN) tablet 50 mg  50 mg Oral DAILY  atorvastatin (LIPITOR) tablet 40 mg  40 mg Oral QHS  cholecalciferol (VITAMIN D3) tablet 2,000 Units  2,000 Units Oral DAILY  clotrimazole (LOTRIMIN) 1 % cream   Topical BID  ferrous sulfate tablet 325 mg  325 mg Oral ACB  pregabalin (LYRICA) capsule 75 mg  75 mg Oral BID  nystatin (MYCOSTATIN) 100,000 unit/gram cream   Topical BID  zinc oxide-cod liver oil (DESITIN) 40 % paste   Topical PRN  
 sodium chloride (NS) flush 5-10 mL  5-10 mL IntraVENous Q8H  
 sodium chloride (NS) flush 5-10 mL  5-10 mL IntraVENous PRN  
 heparin (porcine) injection 7,500 Units  7,500 Units SubCUTAneous Q8H  
 glucose chewable tablet 16 g  4 Tab Oral PRN  
 dextrose (D50W) injection syrg 12.5-25 g  25-50 mL IntraVENous PRN  
 glucagon (GLUCAGEN) injection 1 mg  1 mg IntraMUSCular PRN  
 insulin lispro (HUMALOG) injection   SubCUTAneous AC&HS  trimethoprim-polymyxin b (POLYTRIM) 10,000 unit- 1 mg/mL ophthalmic solution 1 Drop  1 Drop Both Eyes BID  colestipol (COLESTID) tablet 1 g  1 g Oral DAILY  dilTIAZem CD (CARDIZEM CD) capsule 240 mg  240 mg Oral DAILY  ondansetron (ZOFRAN) injection 4 mg  4 mg IntraVENous Q6H PRN  
  acetaminophen (TYLENOL) tablet 650 mg  650 mg Oral Q6H PRN

## 2018-12-03 NOTE — PROGRESS NOTES
Made NPO for possible OR I&D -- not scheduled yet. Will depend on exam.  I'll be by around lunch time to assess patient.

## 2018-12-03 NOTE — CDMP QUERY
Dr. Celia Hallman MD : 
Please clarify if this patient is (was) being treated/managed for:  
 
=> Pressure ulcer of right heel and sacrum POA in setting of FTT 
=> Other explanation of clinical findings 
=> Clinically Undetermined (no explanation for clinical findings) The medical record reflects the following clinical findings, treatment, and risk factors. Risk Factors:  presents weakness, FTT, Clinical Indicators:  pressure ulcer right heel and sacrum noted by nursing, not eating for days, bedridden. Treatment: wound care nursing, [email or web address], electrolyte replacement, Reglan, Please clarify and document your clinical opinion in the progress notes and discharge summary including the definitive and/or presumptive diagnosis, (suspected or probable), related to the above clinical findings. Please include clinical findings supporting your diagnosis. Thank Naila Toledo  [address]; BIM;1792

## 2018-12-03 NOTE — OP NOTES
Admitted with FTT. Erythema and swelling noted on left labia. Purulent d/c on palpation. Incision/Drainage Procedure Note Encounter Date: 12/1/2018 Location of lesion:  above Procedure Details: The risks,benefits and alternatives were explained and consent was obtained for the procedure. Time out was performed. The area was prepped with betadine and draped in the usual manner. Local anesthesia was infiltrated into the skin overlying the abscess. An incision was made. Pus was evacuated. A culture was obtained. Non-viable tissue was debrided. The wound was irrigated with saline. The wound was packed with sterile gauze. A sterile dressing was then applied. Estimated Blood Loss:  minimal  
 
Disposition:  Procedure well tolerated. Plan:  Daily dressing changes. Already on abx.    
 
Signed By: Everton Araujo MD

## 2018-12-03 NOTE — PROGRESS NOTES
Speech path We will not be able to evaluate the pt until it is determined if she is going to the OR today. She is currently NPO.   
June Dueñas, SLP

## 2018-12-03 NOTE — DIABETES MGMT
DTC Consult Note Recommendations/ Comments: Please consider the following: 
Blood sugars controlled at this time- note elevated creatinine and gastroparesis and palliative involved Current hospital DM medication: Lispro Correctional insulin with high sensitivity DTC will continue to follow patient as needed. ____________________________ Consult received for:   
               [x]           Hospital Blood Glucose Management Chart reviewed and initial evaluation complete on [address]. Patient is a 58 y.o. female with known diabetes- on Levemir 20 units BID at home per PTA list.  
 
 
A1c:  
Lab Results Component Value Date/Time Hemoglobin A1c 10.4 (H) 12/02/2018 03:35 AM  
 
 
Recent Glucose Results:  
Lab Results Component Value Date/Time  (H) 12/03/2018 05:02 AM  
 GLUCPOC 209 (H) 12/03/2018 11:07 AM  
 GLUCPOC 168 (H) 12/03/2018 07:44 AM  
 GLUCPOC 232 (H) 12/02/2018 08:44 PM  
  
 
Lab Results Component Value Date/Time Creatinine 1.72 (H) 12/03/2018 05:02 AM  
 
 
Active Orders Diet DIET NPO  
  
 
PO intake: No data found. Thank you. Marko Worley RD, CDE [facility] Office:  [phone] Time spent: 15 minutes

## 2018-12-03 NOTE — PROGRESS NOTES
Bedside and Verbal shift change report given to Marck Rowan (oncoming nurse) by Marlene Alicea (offgoing nurse). Report included the following information SBAR, Kardex, Intake/Output, MAR and Recent Results.

## 2018-12-03 NOTE — PROGRESS NOTES
Bedside and Verbal shift change report given to [id number] [facility] (oncoming nurse) by Caitlin Garcia RN (offgoing nurse). Report included the following information SBAR, Kardex, Intake/Output, MAR and Recent Results.

## 2018-12-03 NOTE — PROGRESS NOTES
Occupational Therapy OT consult received, chart reviewed. Attempted to see patient x2 for OT evaluation. First attempt, nursing requested therapy to defer because she was in the room working with the patient. On the 2nd attempt, patient's  indicates that she is really weak and hasn't eaten in about 2 weeks. Noted the patient is currently not very responsive and that she may go for an I & D this afternoon. Will defer for now and follow up tomorrow.

## 2018-12-04 NOTE — CONSULTS
Palliative Medicine Consult Luis Alfredo: [phone] ([phone]) Patient Name: Sonja Freire YOB: 1955 Date of Initial Consult: 12/3/18 Reason for Consult: care decisions Requesting Provider: Dr. Evelin Santizo Primary Care Physician: Nam Brian MD 
 
 SUMMARY:  
Sonja Freire is a 58 y.o. with a past history of poorly controlled diabetes with diabetic polyneuropathy, gastroparesis, CKD 2, prior CVA (2014), multiple chronic infarcts on MRI and volume loss, OHS, chronic diastolic CHF, gout, depression, who was admitted on 12/1/2018 from home with a diagnosis of 2 weeks of uncontrolled nausea and vomiting due to diabetic gastroparesis not controlled by diet modification and reglan tablets. . Current medical issues leading to Palliative Medicine involvement include: debilitating diabetic gastroparesis with ongoing nausea, vomiting, poor PO intake, not responding to home plan of diet modification and reglan. Now with profound depression related to ongoing medical illness/nausea. Patient also noted to have labial abscess. Patient is , lives with  Olivia Kebede. They have been together 37 years. Patient has son Manish Toribio, two grandchildren PALLIATIVE DIAGNOSES:  
1. Nausea, vomiting due to gastroparesis. Failure of outpatient management. 2. Labial abscess, surgery following 3. Depression, anhedonia, patient attributes to nausea/chronic debility 4. Hypoalbuminemia 5. Poorly controlled DM, HbA1c 10.4 6. CKD stage 2-3, returning to baseline w hydration 7. Obesity, OHS 8. Debility from prior CVA, significant encephalomalacia 9. Acute grief, change in health status PLAN:  
1. Nausea, vomiting due to gastroparesis,  Some improvement with scheduled liquid reglan. She ate solid food for breakfast and lunch today. 1. Continue scheduled liquid Reglan 2. Modified diet -- liquified/ soluble fiber only -- dietary consult. 3. Consider adding Erythromycin if Reglan fails, but this patient has a hx of cdiff, so this is not a tempting option. 4. Consider minimizing other pills such as lipitor (benefit vs. Pill burden), consider alternate day supplements (benefit vs. Pill burden). Prioritize antidepressant and antinausea meds. 5. Consider Draining PEG/feeding J tube if no relief from above and patient is interested in this approach. 6. Optimize blood sugar control 7.  
2.  Care Goals discussed at length today with patient, spouse Earnest Patel, myself and also [facility] Governor [place]. Our team will continue to follow. In summary: 
 1 heard of some past medical events from spouse. Patient is tired from a busy day, but able to stay awake and engaged for our 45 minute + conversation. She is able to process and give meaningful answers. At times she says some jumbled words, but given time, she corrects herself and says things that make sense. 
 2. .  Asked patient about why at home she had refused meds, trips to Dr. Kiana Marquez office and then hospital.  Patient admits she had \"given up\"  but she has now changed her mind. Her son convinced her to keep trying to live, she gives a big smile with this answer. She is now quite willing to try what is needed to keep living. She does feel depressed, but thinks it's mostly related to her poor physical condition/ nausea/ bed bound status. Seeing her son, grandchildren is what is motivating her now. 3. Education provided about diabetes, gastroparesis. Chronic, won't go away, management options. Not a lot of medication choices, importance of not skipping reglan doses (starts viscous cycle), erythromycin not great option due to hx of cdiff. Discussed option of feeding tube if the reglan doesn't continue to work.  (they see lot of improvement now since admission to hospital). Explained draining PEG/feeding jtube.  We talked about pro/cons and fact that it MIGHT be option in future, but would be up to surgeon who places to evaluate her as candidate. Patient unable to say if she would consider, says she would need to think about it.  would want her to make decision. He is quite supportive of her, wants to honor her wishes for how aggressive she would want to be with her care. 4. Discussed wounds and possible recommendation for rehab stay. We heard of challenges they faced in the past with rehab stay/ NH stay. Spouse would be supportive of temporary rehab again IF patient willing and IF Dr. Emilee Rocha  recommend it. They agreed to discuss this with each other. We talked about benefits of consistent wound care, giving him a rest, monitoring how she does with reglan. Alexander Dee 5. Discussed home support. Spouse quite devoted, able to do his work from home. Asked that he call his insurance to ask is she qualifies for in home mental health counseling, and also if they would approve her for palliative home care visits. 6. Education provided about code status. In the ER, patient had said she wanted full code when her  explained to her (he says he didn't explain it as well as I did) . We had in depth conversation about code status, what that means. Explained poor outcomes in code blue in setting of advanced illness. Asked them to continue talking about this, consider if she would want a natural dying when that time comes vs. Attempt at resuscitation which I was very clear with her would not work or be of any benefit to her. DNR order would not change all we are doing now for her to help her live longer and better. I gave spouse blank DDNR form, explained she will be FULL code as default unless she chooses to protect herself with the form. 7.  We made plan to check back in daily during her stay. They should call if questions. They should think about all these things. , we talked about a lot and perhaps very overwhelming. Next steps. 1. See how she does on consistent doses of liquid Reglan. Monitor PO intake. If she fails this, I think next step is eval for draining PEG/feeding jtube- if patient decides she is willing. 2. Continue excellent wound care. (decide on rehab vs. Home-  will need wound care education) 3. Consider a psychiatry consult for specific recommendation for antidepressant with least GI side effects, (stabilize PO intake before adding new meds)  Affect is already much better with improvement of nausea. 4. Consider rehab vs. Home. 5. Spouse to call insurance to try to get approval for Palliative Home visits (if not going to rehab, this would be helpful option) 6. After giving patient some time to think about it, follow back up on code status information provided. GOALS OF CARE / TREATMENT PREFERENCES:  
 
GOALS OF CARE: 
Patient/Health Care Proxy Stated Goals: Other (comment)(improvement in nausea main goal at this time) TREATMENT PREFERENCES:  
Code Status: Full Code Advance Care Planning: 
[x] The [facility] Interdisciplinary Team has updated the ACP Navigator with [address] and Patient Capacity Primary Decision Maker (Health Care Agent): Ayanna Cruz Relationship to patient:spouse Phone number:cell [phone][] Named in a scanned document  
[x] Legal Next of Kin 
[] Guardian Secondary Decision Maker (First Alternate Health Care Agent):  
Relationship to patient: 
Phone number: 
[] Named in a scanned document  
[] Legal Next of Kin 
[] Guardian Medical Interventions: Other (comment)(not yet discussed) Other Instructions: Other: As far as possible, the palliative care team has discussed with patient / health care proxy about goals of care / treatment preferences for patient. HISTORY:  
 
History obtained from: chart CHIEF COMPLAINT: nausea and vomiting HPI/SUBJECTIVE:   
 The patient is:  
[x] Verbal and participatory [] Non-participatory due to:  
 
58year old female with debility from multiple illnesses as above, has had ongoing issues with nausea and vomiting from diabetic gastroparesis. She says she was managing OK until a couple of weeks ago. Prior to that she was eating small meals, taking meds (reglan). Nausea seems to start suddenly and wouldn't go away. She stopped eating anything, couldn't take meds. She had some sips of water. She was vomiting 2-3 times per day. She can't enjoy anything, she lies in bed all day. Gets up sometimes with her 's help. Prior to the nausea, she would enjoy watching a movie. No generalized pain, but stomach hurts sometimes when she's vomiting. Clinical Pain Assessment (nonverbal scale for severity on nonverbal patients):  
Clinical Pain Assessment Severity: 0 Duration: for how long has pt been experiencing pain (e.g., 2 days, 1 month, years) Frequency: how often pain is an issue (e.g., several times per day, once every few days, constant) FUNCTIONAL ASSESSMENT:  
 
Palliative Performance Scale (PPS): PPS: 30 
 
 
 PSYCHOSOCIAL/SPIRITUAL SCREENING:  
 
Palliative IDT has assessed this patient for cultural preferences / practices and a referral made as appropriate to needs (Cultural Services, Patient Advocacy, Ethics, etc.) Any spiritual / Congregational concerns: 
[] Yes /  [x] No 
 
Caregiver Burnout: 
[] Yes /  [x] No /  [] No Caregiver Present Anticipatory grief assessment:  
[x] Normal  / [] Maladaptive ESAS Anxiety: Anxiety: 0 
 
ESAS Depression: Depression: 5 REVIEW OF SYSTEMS:  
 
Positive and pertinent negative findings in ROS are noted above in HPI. The following systems were [x] reviewed / [] unable to be reviewed as noted in HPI Other findings are noted below.  
Systems: constitutional, ears/nose/mouth/throat, respiratory, gastrointestinal, genitourinary, musculoskeletal, integumentary, neurologic, psychiatric, endocrine. Positive findings noted below. Modified ESAS Completed by: provider Fatigue: 5 Drowsiness: 0 Depression: 5 Pain: 0 Anxiety: 0 Nausea: 5 Anorexia: 10 Dyspnea: 0 Constipation: No  
  Stool Occurrence(s): 1 PHYSICAL EXAM:  
 
From RN flowsheet: 
Wt Readings from Last 3 Encounters:  
12/04/18 94.6 kg (208 lb 8.9 oz) 11/05/18 136.1 kg (300 lb)  
06/15/18 136.1 kg (300 lb) Blood pressure 141/67, pulse 67, temperature 97.8 °F (36.6 °C), resp. rate 18, height 5' (1.524 m), weight 94.6 kg (208 lb 8.9 oz), SpO2 98 %, not currently breastfeeding. Pain Scale 1: Numeric (0 - 10) Pain Intensity 1: 0 Pain Location 1: Other (comment)(L labia) Pain Orientation 1: Left Pain Description 1: Aching Pain Intervention(s) 1: Medication (see MAR) Last bowel movement, if known:  
 
Constitutional: pt is awake, engages in conversation Eyes: pupils equal, anicteric No respiratory distress Left hand contractures Affect is flat but patient is pleasant and cooperative, normal eye contact She demonstrates some insight into her medical issues HISTORY:  
 
Principal Problem: 
  FTT (failure to thrive) in adult (12/2/2018) Active Problems: Morbid obesity ([address] (1/26/2011) Right leg weakness (4/18/2013) Overview: R/O CVA, HNP, mononeuritis CKD (chronic kidney disease) stage 3, GFR 30-59 ml/min ([facility]) () Hyperlipemia () Diabetic gastroparesis ([address] () Diabetic polyneuropathy ([address] (4/20/2013) Stroke [facility]) (5/30/2014) Overview: Recurrent, bilateral 
 
  ASCVD (arteriosclerotic cardiovascular disease) (5/31/2014) Hypertension (7/24/2017) Aortic stenosis, mild (7/24/2017) GERD (gastroesophageal reflux disease) (10/11/2017) (HFpEF) heart failure with preserved ejection fraction ([address] (10/31/2017) DM (diabetes mellitus) ([facility] (12/1/2018) Acute renal failure (ARF) ([address] (2018) Severe episode of recurrent major depressive disorder, without psychotic features ([address] (2018) Vulvar abscess (12/3/2018) Past Medical History:  
Diagnosis Date  Acute combined systolic and diastolic heart failure, NYHA class 2 ([address] 2017  Anemia 2017  Aortic stenosis, mild 2017  Bilateral edema of lower extremity 2017  BMI 45.0-49.9, adult ([address] 2017  Chronic diastolic CHF (congestive heart failure), NYHA class 3 ([address] 2017  CKD (chronic kidney disease) 2018  CKD (chronic kidney disease) stage 2, GFR 60-89 ml/min  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 2 diabetes mellitus ([address] 2017  Diabetes ([address]  Fatty liver 2017  Gastroparesis  GERD (gastroesophageal reflux disease)  Gout   
 HTN (hypertension)  Hyperlipemia  Hyperparathyroidism ([address]  Hypertension 2017  Insomnia 2017  Low back pain 2017  N&V (nausea and vomiting) 2017  Nausea & vomiting  Obesity  Pelvic joint pain 2017  Peripheral neuropathy 2017  Sciatica   
 right  Severe right groin pain 2017  Sinus tachycardia 2017  Stage 2 chronic kidney disease due to benign hypertension 2017  Stasis dermatitis 2017  Stroke [facility]) 2001  
 right parietal, left caudate  Thyroid disease   
 para thyroid(high calcium)  Ulcer of right fifth toe due to diabetes mellitus ([address] 2017 Past Surgical History:  
Procedure Laterality Date  CHEST SURGERY PROCEDURE UNLISTED  10/24/13 PARATHYROID EXPLORATION   
 DELIVERY     
 HX AMPUTATION TOE  2018  HX  SECTION    
 HX CHOLECYSTECTOMY  HX OTHER SURGICAL    
 I&D right thigh abscess Family History Problem Relation Age of Onset  Cancer Mother   
     cervical  
 Diabetes Mother  Hypertension Mother  Stroke Mother 79  
  Diabetes Father  Hypertension Father  Kidney Disease Father  Diabetes Brother  Hypertension Brother  Stroke Brother 61 History reviewed, no pertinent family history. Social History Tobacco Use  Smoking status: Never Smoker  Smokeless tobacco: Never Used Substance Use Topics  Alcohol use: Yes Comment: rare Allergies Allergen Reactions  Zestril [Lisinopril] Swelling Current Facility-Administered Medications Medication Dose Route Frequency  metoclopramide (REGLAN) 5 mg/5 mL oral syrup 10 mg  10 mg Oral TIDAC  miconazole (SECURA) 2 % extra thick cream   Topical BID  vancomycin (VANCOCIN) 1,000 mg in 0.9% sodium chloride 250 mL ([id number])  1,000 mg IntraVENous Q24H  
 acetaminophen (TYLENOL) solution 650 mg  650 mg Oral Q6H PRN Or  
 acetaminophen (TYLENOL) tablet 650 mg  650 mg Oral Q6H PRN  
 traMADol (ULTRAM) tablet  mg   mg Oral Q6H PRN  pantoprazole (PROTONIX) 40 mg in sodium chloride 0.9% 10 mL injection  40 mg IntraVENous Q12H  
 escitalopram oxalate (LEXAPRO) tablet 10 mg  10 mg Oral DAILY  0.9% sodium chloride with KCl 20 mEq/L infusion   IntraVENous CONTINUOUS  
 levoFLOXacin (LEVAQUIN) tablet 750 mg  750 mg Oral Q48H  
 mupirocin (BACTROBAN) 2 % ointment   Topical BID  ammonium lactate 5 % lotn 1 Each  1 Each Topical DAILY  aspirin-dipyridamole (AGGRENOX)  mg per capsule 1 Cap  1 Cap Oral Q12H  
 atenolol (TENORMIN) tablet 50 mg  50 mg Oral DAILY  zinc oxide-cod liver oil (DESITIN) 40 % paste   Topical PRN  
 sodium chloride (NS) flush 5-10 mL  5-10 mL IntraVENous Q8H  
 sodium chloride (NS) flush 5-10 mL  5-10 mL IntraVENous PRN  
 heparin (porcine) injection 7,500 Units  7,500 Units SubCUTAneous Q8H  
 glucose chewable tablet 16 g  4 Tab Oral PRN  
 dextrose (D50W) injection syrg 12.5-25 g  25-50 mL IntraVENous PRN  
 glucagon (GLUCAGEN) injection 1 mg  1 mg IntraMUSCular PRN  
  insulin lispro (HUMALOG) injection   SubCUTAneous AC&HS  trimethoprim-polymyxin b (POLYTRIM) 10,000 unit- 1 mg/mL ophthalmic solution 1 Drop  1 Drop Both Eyes BID  dilTIAZem CD (CARDIZEM CD) capsule 240 mg  240 mg Oral DAILY  ondansetron (ZOFRAN) injection 4 mg  4 mg IntraVENous Q6H PRN  
 
 
 
 LAB AND IMAGING FINDINGS:  
 
Lab Results Component Value Date/Time WBC 9.6 12/04/2018 04:03 AM  
 HGB 10.6 (L) 12/04/2018 04:03 AM  
 PLATELET 492 30/68/1675 04:03 AM  
 
Lab Results Component Value Date/Time Sodium 144 12/04/2018 04:03 AM  
 Potassium 4.0 12/04/2018 04:03 AM  
 Chloride 118 (H) 12/04/2018 04:03 AM  
 CO2 17 (L) 12/04/2018 04:03 AM  
 BUN 41 (H) 12/04/2018 04:03 AM  
 Creatinine 1.91 (H) 12/04/2018 04:03 AM  
 Calcium 7.6 (L) 12/04/2018 04:03 AM  
 Magnesium 1.8 12/01/2018 12:25 PM  
 Phosphorus 3.5 12/01/2018 12:25 PM  
  
Lab Results Component Value Date/Time AST (SGOT) 18 12/01/2018 12:25 PM  
 Alk. phosphatase 115 12/01/2018 12:25 PM  
 Protein, total 6.5 12/01/2018 12:25 PM  
 Albumin 2.2 (L) 12/01/2018 12:25 PM  
 Globulin 4.3 (H) 12/01/2018 12:25 PM  
 
Lab Results Component Value Date/Time INR 0.9 10/26/2017 07:58 PM  
 Prothrombin time 9.4 10/26/2017 07:58 PM  
 aPTT 24.0 10/26/2017 07:58 PM  
  
Lab Results Component Value Date/Time Iron 45 10/27/2017 10:19 AM  
 TIBC 244 (L) 10/27/2017 10:19 AM  
 Iron % saturation 18 (L) 10/27/2017 10:19 AM  
 Ferritin 48 10/27/2017 10:19 AM  
  
Lab Results Component Value Date/Time pH 7.34 (L) 12/03/2018 04:55 AM  
 PCO2 34 (L) 12/03/2018 04:55 AM  
 PO2 91 12/03/2018 04:55 AM  
 
No components found for: [place] Lab Results Component Value Date/Time CK 40 12/01/2018 12:25 PM  
 CK - MB 2.1 12/01/2018 12:25 PM  
  
 
 
   
 
Total time: 65min Counseling / coordination time, spent as noted above: 45 
> 50% counseling / coordination?: yes Prolonged service was provided for  []30 min   []75 min in face to face time in the presence of the patient, spent as noted above. Time Start:  
Time End:  
Note: this can only be billed with 70761 (initial) or 00152 (follow up). If multiple start / stop times, list each separately.

## 2018-12-04 NOTE — PROGRESS NOTES
Speech Pathology bedside swallow evaluation/discharge Patient: Avni Parker (57 y.o. female) Date: 12/4/2018 Primary Diagnosis: Acute renal failure (ARF) (HCC) [N17.9] CKD (chronic kidney disease) [N18.9] DM (diabetes mellitus) ([address] [E11.9] Precautions:     
 
ASSESSMENT : 
Based on the objective data described below, the patient presents with mild oral and pharyngeal dysphagia. Orally she masticates the solids mildly slowly. Good oral clearance. Pharyngeal phase is characterized by mild swallow delay but good hyolaryngeal excursion. No coughing or choking on any texture. She had significant difficulty expressing herself which her  reported happens when she gets sick. He was curious why this occurs. Skilled therapy provided by a speech-language pathologist is not indicated at this time. PLAN : 
Recommendations: 
Continue with current diet. What is etiology of communication deficits? Discharge Recommendations: To Be Determined SUBJECTIVE:  
Patient is not aware of date and her spouse reports she doesn't keep up with the date. OBJECTIVE:  
 
Past Medical History:  
Diagnosis Date  Acute combined systolic and diastolic heart failure, NYHA class 2 ([address] 7/24/2017  Anemia 7/24/2017  Aortic stenosis, mild 7/24/2017  Bilateral edema of lower extremity 7/24/2017  BMI 45.0-49.9, adult ([address] 7/24/2017  Chronic diastolic CHF (congestive heart failure), NYHA class 3 ([address] 7/24/2017  CKD (chronic kidney disease) 12/1/2018  CKD (chronic kidney disease) stage 2, GFR 60-89 ml/min  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 2 diabetes mellitus ([address] 7/24/2017  Diabetes ([address]  Fatty liver 7/24/2017  Gastroparesis  GERD (gastroesophageal reflux disease)  Gout   
 HTN (hypertension)  Hyperlipemia  Hyperparathyroidism ([address]  Hypertension 7/24/2017  Insomnia 7/24/2017  Low back pain 7/24/2017  N&V (nausea and vomiting) 7/24/2017  Nausea & vomiting  Obesity  Pelvic joint pain 2017  Peripheral neuropathy 2017  Sciatica   
 right  Severe right groin pain 2017  Sinus tachycardia 2017  Stage 2 chronic kidney disease due to benign hypertension 2017  Stasis dermatitis 2017  Stroke [facility]) 2001  
 right parietal, left caudate  Thyroid disease   
 para thyroid(high calcium)  Ulcer of right fifth toe due to diabetes mellitus ([address] 2017 Past Surgical History:  
Procedure Laterality Date  CHEST SURGERY PROCEDURE UNLISTED  10/24/13 PARATHYROID EXPLORATION   
 DELIVERY     
 HX AMPUTATION TOE  2018  HX  SECTION    
 HX CHOLECYSTECTOMY  HX OTHER SURGICAL    
 I&D right thigh abscess Prior Level of Function/Home Situation:  
Home Situation Home Environment: Private residence # Steps to Enter: 3 One/Two Story Residence: One story Living Alone: No 
Support Systems: Family member(s) Patient Expects to be Discharged to[de-identified] Private residence Current DME Used/Available at Home: Other (comment), Wheelchair(ramp) Diet prior to admission:  
Current Diet:  Reg/thins Cognitive and Communication Status: 
Neurologic State: Alert Orientation Level: Oriented to person, Disoriented to place, Disoriented to time Cognition: Follows commands Perception: Appears intact Perseveration: No perseveration noted Oral Assessment: 
Oral Assessment Labial: No impairment Lingual: No impairment Velum: Other (comment) Mandible: No impairment P.O. Trials: 
  
Vocal quality prior to P.O.: No impairment Consistency Presented: Thin liquid; Solid;Puree How Presented: Self-fed/presented;Straw;SLP-fed/presented;Spoon Bolus Acceptance: No impairment Bolus Formation/Control: Impaired Type of Impairment: Delayed Propulsion: Delayed (# of seconds) Oral Residue: None Initiation of Swallow: Delayed (# of seconds) Laryngeal Elevation: Functional 
 Aspiration Signs/Symptoms: None Pharyngeal Phase Characteristics: No impairment, issues, or problems Oral Phase Severity: Mild Pharyngeal Phase Severity : Mild NOMS:  
The NOMS functional outcome measure was used to quantify this patient's level of swallowing impairment. Based on the NOMS, the patient was determined to be at level 6 for swallow function G Codes: In compliance with CMSs Claims Based Outcome Reporting, the following G-code set was chosen for this patient based the use of the NOMS functional outcome to quantify this patient's level of swallowing impairment. Using the NOMS, the patient was determined to be at level 6 for swallow function which correlates with the CI= 1-19% level of severity. Based on the objective assessment provided within this note, the current, goal, and discharge g-codes are as follows: 
 
Swallow  Swallowing: 
 Swallow Current Status CI= 1-19%  Swallow Goal Status CI= 1-19%  Swallow D/C Status CI= 1-19% NOMS Swallowing Levels: 
Level 1 (CN): NPO Level 2 (CM): NPO but takes consistency in therapy Level 3 (CL): Takes less than 50% of nutrition p.o. and continues with nonoral feedings; and/or safe with mod cues; and/or max diet restriction Level 4 (CK): Safe swallow but needs mod cues; and/or mod diet restriction; and/or still requires some nonoral feeding/supplements Level 5 (CJ): Safe swallow with min diet restriction; and/or needs min cues Level 6 (CI): Independent with p.o.; rare cues; usually self cues; may need to avoid some foods or needs extra time Level 7 (CH): Independent for all p.o. DARWIN. (2003). National Outcomes Measurement System (NOMS): Adult Speech-Language Pathology User's Guide. Pain: 
Pain Scale 1: Numeric (0 - 10) Pain Intensity 1: 0 After treatment:  
[] Patient left in no apparent distress sitting up in chair 
[x] Patient left in no apparent distress in bed 
[x] Call bell left within reach [x] Nursing notified 
[] Caregiver present 
[] Bed alarm activated COMMUNICATION/EDUCATION:  
The patients plan of care including findings, recommendations, and recommended diet changes were discussed with: Registered Nurse. 
 
[] Posted safety precautions in patient's room. [x] Patient/family have participated as able and agree with findings and recommendations. [] Patient is unable to participate in plan of care at this time. Thank you for this referral. 
LEATHA Jeffers Time Calculation: 15 mins

## 2018-12-04 NOTE — DIABETES MGMT
DTC Consult Progress Note Recommendations/ Comments: chart reviewed for previous hospital BG management consult and hyperglycemia. Please consider the followin. Starting Lantus 8 units Current hospital DM medication: Lispro Correctional insulin with high sensitivity DTC will continue to follow patient as needed. ____________________________ Consult received for:   
               [x]           Hospital Blood Glucose Management Chart reviewed and initial evaluation complete on [address]. Patient is a 58 y.o. female with known diabetes- on Levemir 20 units BID at home per PTA list.  
 
 
A1c:  
Lab Results Component Value Date/Time Hemoglobin A1c 10.4 (H) 2018 03:35 AM  
 
 
Recent Glucose Results:  
Lab Results Component Value Date/Time  (H) 2018 04:03 AM  
 GLUCPOC 210 (H) 2018 12:03 PM  
 GLUCPOC 208 (H) 2018 07:54 AM  
 GLUCPOC 222 (H) 2018 09:11 PM  
  
 
Lab Results Component Value Date/Time Creatinine 1.91 (H) 2018 04:03 AM  
 
 
Active Orders Diet DIET REGULAR  
  
 
PO intake: No data found. Thank you. Delon Guzman RD [facility] Office:  [phone] Time spent: 7 minutes

## 2018-12-04 NOTE — PROGRESS NOTES
Subjective:   
Patient seen and examined by me today. No pain. Objective:   
Blood pressure 138/55, pulse 67, temperature 97.2 °F (36.2 °C), resp. rate 18, height 5' (1.524 m), weight 94.6 kg (208 lb 8.9 oz), SpO2 96 %, not currently breastfeeding. Exam - no spreading erythema. Assessment:  * No surgery found * * No surgery found * Active Hospital Problems Diagnosis Date Noted  Vulvar abscess 12/03/2018  FTT (failure to thrive) in adult 12/02/2018  Severe episode of recurrent major depressive disorder, without psychotic features ([address] 12/02/2018  DM (diabetes mellitus) ([address] 12/01/2018  Acute renal failure (ARF) ([address] 12/01/2018  (HFpEF) heart failure with preserved ejection fraction ([address] 10/31/2017  GERD (gastroesophageal reflux disease) 10/11/2017  Hypertension 07/24/2017  Aortic stenosis, mild 07/24/2017  ASCVD (arteriosclerotic cardiovascular disease) 05/31/2014  Stroke ([address] 05/30/2014 Recurrent, bilateral 
  
 Diabetic polyneuropathy ([address] 04/20/2013  Right leg weakness 04/18/2013 R/O CVA, HNP, mononeuritis  Hyperlipemia  Diabetic gastroparesis ([address]  CKD (chronic kidney disease) stage 3, GFR 30-59 ml/min ([facility])  Morbid obesity ([address] 01/26/2011  
 
 
- GNR on gram stains C&S pending. Plan: 
- wound care as written. - keep as clean as possible. - abx already written.    
 
Paresh Morales MD

## 2018-12-04 NOTE — PROGRESS NOTES
physical Therapy EVALUATION/DISCHARGE Patient: Sonja Freire (89 y.o. female) Date: 12/4/2018 Primary Diagnosis: Acute renal failure (ARF) (HCC) [N17.9] CKD (chronic kidney disease) [N18.9] DM (diabetes mellitus) ([address] [E11.9] Precautions:     
ASSESSMENT : 
Based on the objective data described below, the patient is bedbound at baseline and has not been able to even sit on edge of bed for quite some time. Patient received in bed with  in room to help provide history.  is primary caregiver and he has adequate equipment at home, I did suggest [employer] Racquel lift to facilitate patient being up in wheelchair but they declined. Patient's  LEs are markedly weak with decreased ROM and are non functional.  Patient declined attempting to come to sit on edge of bed. Performed ROM for LEs in tolerable range. Discussed with patient and  possible home health to instruct in HEP and use of tilt in space w/c that patient owns but rarely uses and they are agreeable. No further PT is indicated as patient is not willing to work towards any functional goal. 
 
Skilled physical therapy is not indicated at this time. PLAN : 
Discharge Recommendations: Home Health Further Equipment Recommendations for Discharge: SUBJECTIVE:  
Patient stated no.  OBJECTIVE DATA SUMMARY:  
HISTORY:   
Past Medical History:  
Diagnosis Date  Acute combined systolic and diastolic heart failure, NYHA class 2 ([address] 7/24/2017  Anemia 7/24/2017  Aortic stenosis, mild 7/24/2017  Bilateral edema of lower extremity 7/24/2017  BMI 45.0-49.9, adult ([address] 7/24/2017  Chronic diastolic CHF (congestive heart failure), NYHA class 3 ([address] 7/24/2017  CKD (chronic kidney disease) 12/1/2018  CKD (chronic kidney disease) stage 2, GFR 60-89 ml/min  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 2 diabetes mellitus ([address] 7/24/2017  Diabetes ([address]  Fatty liver 7/24/2017  Gastroparesis  GERD (gastroesophageal reflux disease)  Gout   
 HTN (hypertension)  Hyperlipemia  [facility]roidism [facility]  Hypertension 2017  Insomnia 2017  Low back pain 2017  N&V (nausea and vomiting) 2017  Nausea & vomiting  Obesity  Pelvic joint pain 2017  Peripheral neuropathy 2017  Sciatica   
 right  Severe right groin pain 2017  Sinus tachycardia 2017  Stage 2 chronic kidney disease due to benign hypertension 2017  Stasis dermatitis 2017  Stroke [facility]) 2001  
 right parietal, left caudate  Thyroid disease   
 para thyroid(high calcium)  Ulcer of right fifth toe due to diabetes mellitus ([facility] 2017 Past Surgical History:  
Procedure Laterality Date  CHEST SURGERY PROCEDURE UNLISTED  10/24/13 PARATHYROID EXPLORATION   
 DELIVERY     
 HX AMPUTATION TOE  2018  HX  SECTION    
 HX CHOLECYSTECTOMY  HX OTHER SURGICAL    
 I&D right thigh abscess Prior Level of Function/Home Situation: bed bound Personal factors and/or comorbidities impacting plan of care: depression Home Situation Home Environment: Private residence # Steps to Enter: 3 One/Two Story Residence: One story Living Alone: No 
Support Systems: Family member(s) Patient Expects to be Discharged to[de-identified] Private residence Current DME Used/Available at Home: Other (comment), Wheelchair(ramp) EXAMINATION/PRESENTATION/DECISION MAKING:  
Critical Behavior: 
Neurologic State: Alert Orientation Level: Oriented to person, Disoriented to place, Disoriented to time Cognition: Follows commands Hearing: Auditory Auditory Impairment: None Range Of Motion: 
AROM: Grossly decreased, non-functional 
  
  
  
PROM: Grossly decreased, non-functional 
  
  
  
Strength:   
Strength: Grossly decreased, non-functional 
  
  
  
  
  
  
Tone & Sensation:  
Tone: Abnormal 
  
  
  
  
Sensation: Impaired Coordination: 
  
Vision:  
  
Functional Mobility: 
Bed Mobility: 
  
  
  
  
Transfers: 
  
  
     
  
     
  
  
Balance:  
 Ambulation/Gait Training: 
  
  
  
  
  
  
  
  
  
  
  
  
  
  
   
  
   
  
 Stairs: Therapeutic Exercises:  
Gentle ROM for ankle pumps, heel slides, hip ad add, hip erir, glute sets, quad sets Functional Measure: 
Barthel Index: 
 
Bathin Bladder: 0 Bowels: 0 Groomin Dressin Feedin Mobility: 0 Stairs: 0 Toilet Use: 0 Transfer (Bed to Chair and Back): 0 Total: 5 Barthel and G-code impairment scale: 
Percentage of impairment CH 
0% CI 
1-19% CJ 
20-39% CK 
40-59% CL 
60-79% CM 
80-99% CN 
100% Barthel Score 0-100 100 99-80 79-60 59-40 20-39 1-19 
 0 Barthel Score 0-20 20 17-19 13-16 9-12 5-8 1-4 0 The Barthel ADL Index: Guidelines 1. The index should be used as a record of what a patient does, not as a record of what a patient could do. 2. The main aim is to establish degree of independence from any help, physical or verbal, however minor and for whatever reason. 3. The need for supervision renders the patient not independent. 4. A patient's performance should be established using the best available evidence. Asking the patient, friends/relatives and nurses are the usual sources, but direct observation and common sense are also important. However direct testing is not needed. 5. Usually the patient's performance over the preceding 24-48 hours is important, but occasionally longer periods will be relevant. 6. Middle categories imply that the patient supplies over 50 per cent of the effort. 7. Use of aids to be independent is allowed. Jordan Avalos., Barthel, D.W. ([id number]). Functional evaluation: the Barthel Index. [facility] (14)2. MARGI Lees, Richi Garcia, Belen Hu, eNlson, [address]  Measuring the change indisability after inpatient rehabilitation; comparison of the responsiveness of the Barthel Index and Functional [place] Measure. Journal of Neurology, Neurosurgery, and Psychiatry, 66(4), 243-254. DARRICK Coronado.A, MONY Grover, & Rajiv Marie M.A. (2004.) Assessment of post-stroke quality of life in cost-effectiveness studies: The usefulness of the Barthel Index and the EuroQoL-5D. [facility], 13, 240-68 G codes: In compliance with CMSs Claims Based Outcome Reporting, the following G-code set was chosen for this patient based on their primary functional limitation being treated: The outcome measure chosen to determine the severity of the functional limitation was the Barthel with a score of 5/100 which was correlated with the impairment scale. ? Mobility - Walking and Moving Around:  
  - CURRENT STATUS: CM - 80%-99% impaired, limited or restricted  - GOAL STATUS: CM - 80%-99% impaired, limited or restricted  - D/C STATUS:  CM - 80%-99% impaired, limited or restricted Physical Therapy Evaluation Charge Determination History Examination Presentation Decision-Making MEDIUM  Complexity : 1-2 comorbidities / personal factors will impact the outcome/ POC  LOW Complexity : 1-2 Standardized tests and measures addressing body structure, function, activity limitation and / or participation in recreation  MEDIUM Complexity : Evolving with changing characteristics  MEDIUM Complexity : FOTO score of 26-74 Based on the above components, the patient evaluation is determined to be of the following complexity level: LOW Pain: 
Pain Scale 1: Numeric (0 - 10) Pain Intensity 1: 0 Activity Tolerance:  
Poor Please refer to the flowsheet for vital signs taken during this treatment. After treatment:  
[]   Patient left in no apparent distress sitting up in chair 
[x]   Patient left in no apparent distress in bed 
[x]   Call bell left within reach [x]   Nursing notified [x]   Caregiver present 
[]   Bed alarm activated COMMUNICATION/EDUCATION:  
Communication/Collaboration: 
[x]   Fall prevention education was provided and the patient/caregiver indicated understanding. [x]   Patient/family have participated as able and agree with findings and recommendations. []   Patient is unable to participate in plan of care at this time. Findings and recommendations were discussed with: Occupational Therapist and Registered Nurse Thank you for this referral. 
Kristen Gay Time Calculation: 28 mins

## 2018-12-04 NOTE — PROGRESS NOTES
Occupational Therapy EVALUATION/discharge Patient: Bishop Girard (73 y.o. female) Date: 12/4/2018 Primary Diagnosis: Acute renal failure (ARF) (HCC) [N17.9] CKD (chronic kidney disease) [N18.9] DM (diabetes mellitus) ([address] [E11.9] Precautions:     
 
ASSESSMENT:  
Based on the objective data described below, the patient presents at her baseline Max to Total A level for all basic self-care tasks. She is mostly bedridden, and her  takes care of her. He has a lot of equipment available to him and has built other equipment/adaptations to make caring for her easier. Patient indicates most days she does not get dressed and that her  evens helps feed her. Patient has very limited use of B UE (left worse than right).  reports it has been a long time since he has even attempted to sit her on the edge of the bed. Discussed possibility of using a walter lift at home since he has a nice tilt in space w/c and access to a modified van. He is not interested. Discussed the importance of exercises in bed, even if she is not able to move through full ROM, that some movement is better than none. They indicate understanding. Since she is at her baseline, further skilled acute occupational therapy is not indicated at this time. Discharge Recommendations: None Further Equipment Recommendations for Discharge: None SUBJECTIVE:  
Patient stated Severa Care has to help me.  (to eat) OBJECTIVE DATA SUMMARY:  
HISTORY:  
Past Medical History:  
Diagnosis Date  Acute combined systolic and diastolic heart failure, NYHA class 2 ([address] 7/24/2017  Anemia 7/24/2017  Aortic stenosis, mild 7/24/2017  Bilateral edema of lower extremity 7/24/2017  BMI 45.0-49.9, adult ([address] 7/24/2017  Chronic diastolic CHF (congestive heart failure), NYHA class 3 ([address] 7/24/2017  CKD (chronic kidney disease) 12/1/2018  CKD (chronic kidney disease) stage 2, GFR 60-89 ml/min  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 2 diabetes mellitus [facility] 2017  Diabetes [facility]  Fatty liver 2017  Gastroparesis  GERD (gastroesophageal reflux disease)  Gout   
 HTN (hypertension)  Hyperlipemia  Hyperparathyroidism [facility]  Hypertension 2017  Insomnia 2017  Low back pain 2017  N&V (nausea and vomiting) 2017  Nausea & vomiting  Obesity  Pelvic joint pain 2017  Peripheral neuropathy 2017  Sciatica   
 right  Severe right groin pain 2017  Sinus tachycardia 2017  Stage 2 chronic kidney disease due to benign hypertension 2017  Stasis dermatitis 2017  Stroke [facility]) 2001  
 right parietal, left caudate  Thyroid disease   
 para thyroid(high calcium)  Ulcer of right fifth toe due to diabetes mellitus ([facility] 2017 Past Surgical History:  
Procedure Laterality Date  CHEST SURGERY PROCEDURE UNLISTED  10/24/13 PARATHYROID EXPLORATION   
 DELIVERY     
 HX AMPUTATION TOE  2018  HX  SECTION    
 HX CHOLECYSTECTOMY  HX OTHER SURGICAL    
 I&D right thigh abscess Prior Level of Function/Environment/Context: see above Expanded or extensive additional review of patient history:  
 
Home Situation Home Environment: Private residence # Steps to Enter: 3 One/Two Story Residence: One story Living Alone: No 
Support Systems: Family member(s) Patient Expects to be Discharged to[de-identified] Private residence Current DME Used/Available at Home: Other (comment), Wheelchair(ramp) Hand dominance: Right EXAMINATION OF PERFORMANCE DEFICITS: 
Cognitive/Behavioral Status: 
Neurologic State: Alert Orientation Level: Oriented to person;Disoriented to place; Disoriented to time Cognition: Follows commands Perception: Appears intact Perseveration: No perseveration noted Hearing: Auditory Auditory Impairment: None Vision/Perceptual: Not formally tested, but she reports her vision is \"not good\". She was able to see and read the clock and was able to see the dry erase board, but not read it. Range of Motion: 
AROM: Grossly decreased, non-functional 
PROM: Grossly decreased, non-functional 
  
  
  
  
  
  
Strength: 
Strength: Grossly decreased, non-functional 
  
  
  
  
 
Coordination: 
  
Fine Motor Skills-Upper: Left Impaired;Right Impaired Gross Motor Skills-Upper: Left Impaired;Right Impaired Tone & Sensation: 
Tone: Abnormal 
Sensation: Impaired Balance: 
  
Functional Mobility and Transfers for ADLs:Bed Mobility: 
  
 
Transfers: ADL Assessment: 
Feeding: Maximum assistance Oral Facial Hygiene/Grooming: Maximum assistance Bathing: Total assistance Upper Body Dressing: Total assistance Lower Body Dressing: Total assistance Toileting: Total assistance Functional Measure: 
Barthel Index: 
 
Bathin Bladder: 0 Bowels: 0 Groomin Dressin Feedin Mobility: 0 Stairs: 0 Toilet Use: 0 Transfer (Bed to Chair and Back): 0 Total: 5 Barthel and G-code impairment scale: 
Percentage of impairment CH 
0% CI 
1-19% CJ 
20-39% CK 
40-59% CL 
60-79% CM 
80-99% CN 
100% Barthel Score 0-100 100 99-80 79-60 59-40 20-39 1-19 
 0 Barthel Score 0-20 20 17-19 13-16 9-12 5-8 1-4 0 The Barthel ADL Index: Guidelines 1. The index should be used as a record of what a patient does, not as a record of what a patient could do. 2. The main aim is to establish degree of independence from any help, physical or verbal, however minor and for whatever reason. 3. The need for supervision renders the patient not independent. 4. A patient's performance should be established using the best available evidence. Asking the patient, friends/relatives and nurses are the usual sources, but direct observation and common sense are also important. However direct testing is not needed. 5. Usually the patient's performance over the preceding 24-48 hours is important, but occasionally longer periods will be relevant. 6. Middle categories imply that the patient supplies over 50 per cent of the effort. 7. Use of aids to be independent is allowed. Leesa Khan., Barthel, D.W. (1953). Functional evaluation: the Barthel Index. [address] [facility] (14)2. Aleksander Gallagher nicholas MARGI Taylor, Chip Guzmán., Vanessa Lee., Joanna, [address] [place] [address] (1999). Measuring the change indisability after inpatient rehabilitation; comparison of the responsiveness of the Barthel Index and Functional [place] Measure. Journal of Neurology, Neurosurgery, and Psychiatry, 66(4), 570-385. Tangela Worthington, SETH, MONY Grover, & Mela Pollock M.A. (2004.) Assessment of post-stroke quality of life in cost-effectiveness studies: The usefulness of the Barthel Index and the EuroQoL-5D. [facility], 13, 143-21 G codes: In compliance with CMSs Claims Based Outcome Reporting, the following G-code set was chosen for this patient based on their primary functional limitation being treated: The outcome measure chosen to determine the severity of the functional limitation was the Barthel Index with a score of 5/100 which was correlated with the impairment scale. ? Self Care:  
  - CURRENT STATUS: CM - 80%-99% impaired, limited or restricted  - GOAL STATUS: CM - 80%-99% impaired, limited or restricted  - D/C STATUS:  CM - 80%-99% impaired, limited or restricted Occupational Therapy Evaluation Charge Determination History Examination Decision-Making HIGH Complexity : Extensive review of history including physical, cognitive and psychosocial history  HIGH Complexity : 5 or more performance deficits relating to physical, cognitive , or psychosocial skils that result in activity limitations and / or participation restrictions HIGH Complexity : Patient presents with comorbidities that affect occupational performance. Signifigant modification of tasks or assistance (eg, physical or verbal) with assessment (s) is necessary to enable patient to complete evaluation Based on the above components, the patient evaluation is determined to be of the following complexity level: HIGH Pain: 
Pain Scale 1: Numeric (0 - 10) Pain Intensity 1: 0 Activity Tolerance:  
Poor After treatment:  
[]  Patient left in no apparent distress sitting up in chair 
[x]  Patient left in no apparent distress in bed 
[x]  Call bell left within reach [x]  Nursing notified 
[x]  Caregiver present 
[]  Bed alarm activated COMMUNICATION/EDUCATION:  
Communication/Collaboration: 
[x]      Home safety education was provided and the patient/caregiver indicated understanding. [x]      Patient/family have participated as able and agree with findings and recommendations. []      Patient is unable to participate in plan of care at this time. Findings and recommendations were discussed with: Physical Therapist and Registered Nurse Daniella Stone OTR/L Time Calculation: 31 mins

## 2018-12-04 NOTE — WOUND CARE
Wound care nurse consult from staff nurse stating \" R heel DTI;scaling b/l lower extremities;excoration groin,pannus,R buttuck unstagable ulcer,sacral/upper thigh discoloration\". Patient is a 57 y/o CF who was admitted for FTT. She is well known to wound care from past visits and wounds. Patient is cared for very lovingly by her . She has been bed bound since her stroke. Past Medical History:  
Diagnosis Date  Acute combined systolic and diastolic heart failure, NYHA class 2 ([address] 2017  Anemia 2017  Aortic stenosis, mild 2017  Bilateral edema of lower extremity 2017  BMI 45.0-49.9, adult ([address] 2017  Chronic diastolic CHF (congestive heart failure), NYHA class 3 ([address] 2017  CKD (chronic kidney disease) 2018  CKD (chronic kidney disease) stage 2, GFR 60-89 ml/min  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 2 diabetes mellitus ([address] 2017  Diabetes ([address]  Fatty liver 2017  Gastroparesis  GERD (gastroesophageal reflux disease)  Gout   
 HTN (hypertension)  Hyperlipemia  Hyperparathyroidism ([address]  Hypertension 2017  Insomnia 2017  Low back pain 2017  N&V (nausea and vomiting) 2017  Nausea & vomiting  Obesity  Pelvic joint pain 2017  Peripheral neuropathy 2017  Sciatica   
 right  Severe right groin pain 2017  Sinus tachycardia 2017  Stage 2 chronic kidney disease due to benign hypertension 2017  Stasis dermatitis 2017  Stroke [facility])   
 right parietal, left caudate  Thyroid disease   
 para thyroid(high calcium)  Ulcer of right fifth toe due to diabetes mellitus ([address] 2017 Past Surgical History:  
Procedure Laterality Date  CHEST SURGERY PROCEDURE UNLISTED  10/24/13 PARATHYROID EXPLORATION   
 DELIVERY     
 HX AMPUTATION TOE  2018  HX  SECTION    
 HX CHOLECYSTECTOMY  HX OTHER SURGICAL    
 I&D right thigh abscess Per her , patient stopped eating soiled foods, stool became watery and frequent leading to a UTI and incontinence MASD with yeast rash to buttocks/sacrum and posterior thighs and groins. BLE has thick plaques of skin which Lac-hydrin lotion will address. Right heel Unstageable PI POA that had a calloused eschar that I was able to peel away and expose intact epithelial skin. Prevalon boots on both feet! Left labia abscess I&D'd by surgeon yesterday -  nurse to clarify orders for nurses. WOUND POA CONDITIONS Wound Buttocks Left;Right (Active) DRESSING TYPE Open to air 12/4/2018 10:15 AM  
Non-Pressure Injury Partial thickness (epider/derm) 12/4/2018 10:15 AM  
Condition of Base [place]ier 12/4/2018 10:15 AM  
Tissue Type Pink 12/4/2018 10:15 AM  
Drainage Amount  None 12/4/2018 10:15 AM  
Wound Odor None 12/4/2018 10:15 AM  
Periwound Skin Condition Rash;Erythema, blanchable 12/4/2018 10:15 AM  
Cleansing and Cleansing Agents  Soap and water 12/4/2018 10:15 AM  
Dressing Type Applied Zinc based paste 12/4/2018 10:15 AM  
Procedure Tolerated Well 12/4/2018 10:15 AM  
Number of days: 3 Wound Heel Right (Active) DRESSING TYPE Open to air 12/4/2018 10:15 AM  
Pressure Injury Unstageable 12/4/2018 10:15 AM  
Wound Length (cm) 1 cm 12/4/2018 10:15 AM  
Wound Width (cm) 2 cm 12/4/2018 10:15 AM  
Wound Depth (cm) 0.1 12/4/2018 10:15 AM  
Wound Surface area (cm^2) 2 cm^2 12/4/2018 10:15 AM  
Condition of Base Epithelializing 12/4/2018 10:15 AM  
Condition of Edges Open 12/4/2018 10:15 AM  
Drainage Amount  None 12/4/2018 10:15 AM  
Wound Odor None 12/4/2018 10:15 AM  
Periwound Skin Condition Intact;Calloused 12/4/2018 10:15 AM  
Cleansing and Cleansing Agents  Soap and water 12/4/2018 10:15 AM  
Dressing Type Applied Open to air 12/4/2018 10:15 AM  
Procedure Tolerated Well 12/4/2018 10:15 AM  
Number of days: 3 Wound Groin Left;Right (Active) Non-Pressure Injury Partial thickness (epider/derm) 12/4/2018 10:15 AM  
Periwound Skin Condition Rash 12/4/2018 10:15 AM  
Cleansing and Cleansing Agents  Soap and water 12/4/2018 10:15 AM  
Dressing Type Applied Zinc based paste 12/4/2018 10:15 AM  
Number of days: 3 Wound Vagina Left (Active) DRESSING STATUS Removed 12/4/2018 10:15 AM  
DRESSING TYPE 4 x 4 12/4/2018 10:15 AM  
Non-Pressure Injury Partial thickness (epider/derm) 12/4/2018 10:15 AM  
Wound Length (cm) 1 cm 12/4/2018 10:15 AM  
Wound Width (cm) 1 cm 12/4/2018 10:15 AM  
Wound Depth (cm) 0.2 12/4/2018 10:15 AM  
Wound Surface area (cm^2) 1 cm^2 12/4/2018 10:15 AM  
Condition of Base Granulation 12/4/2018 10:15 AM  
Condition of Edges Open 12/4/2018 10:15 AM  
Tissue Type Red 12/4/2018 10:15 AM  
Tissue Type Percent Red 100 12/4/2018 10:15 AM  
Drainage Amount  Scant 12/4/2018 10:15 AM  
Drainage Color Serosanguinous 12/4/2018 10:15 AM  
Wound Odor None 12/4/2018 10:15 AM  
Periwound Skin Condition Erythema, blanchable;Rash 12/4/2018 10:15 AM  
Cleansing and Cleansing Agents  Dermal wound cleanser 12/4/2018 10:15 AM  
Dressing Type Applied Byron-pad 12/4/2018 10:15 AM  
Procedure Tolerated Well 12/4/2018 10:15 AM  
Number of days: 0 Recommend: 
 
Buttocks, posterior thighs and groins and pannus MASD with rash: cleanse gently with soap and water, pat dry and apply Secura antifungal zinc cream. 
 
Left labia abscess: BID cleanse with dermal wound cleanser spray and wipe clean. Apply Mupirocin/Bactroban ointment to wound and cover with a clean byron-pad. Change byron-pad as needed when soiled. Staff ordered and placed patient on a Bariatric bed with air and [facility] Right heel epitheliazed unstageable PI leave open to air and floated. Lac-hydrin lotion is to loosen and remove thick plaques of dead skin on BLE. Bernie Swift RN, [employer] 
 
 
 
 Nimisha Sandoval RN

## 2018-12-04 NOTE — PROGRESS NOTES
Pharmacy Automatic Renal Dosing Protocol - Antimicrobials Indication for Antimicrobials: SSTI (Labial Abscess) + UTI Current Regimen of Each Antimicrobial: 
Vancomycin 2000 mg x 1, followed by 1000 mg IV Q24H (Start Date 18; Day # 2) Levofloxacin 750 mg PO Q48H (Start 18; Day #3) Previous Antimicrobial Therapy: 
 
Goal Level: VANCOMYCIN TROUGH GOAL RANGE Goal trough 10-15 Date Dose & Interval Measured (mcg/mL) Extrapolated (mcg/mL) Significant Cultures:  
18 Urine - Pending Radiology / Imaging results: (X-ray, CT scan or MRI):  
 
Paralysis, amputations, malnutrition: None noted Labs: 
Recent Labs 18 
0403 18 
0502 18 
0335 CREA 1.91* 1.72* 1.78* BUN 41* 39* 50* WBC 9.6 8.4  -- Temp (24hrs), Av.9 °F (36.6 °C), Min:97.7 °F (36.5 °C), Max:98.3 °F (36.8 °C) Creatinine Clearance (mL/min) or Dialysis: 21 ml/min Impression/Plan:  
· Continue Vancomycin as above for estimated trough of 14.09 mcg/ml · Trough before tomorrow AM dose · Continue current levofloxacin dose. · Antimicrobial stop date 7 days Pharmacy will follow daily and adjust medications as appropriate for renal function and/or serum levels. Thank you, Alondra Trevino, PHARMD 
 
Recommended duration of therapy 
http://[facility]/Pharmacy/Clinical%20Companion/Duration%20of%20ABX%20therapy. docx Renal Dosing 
http://[facility]/[place]/[facility]/Pharmacy/Clinical%20Companion/Renal%20Dosing%[id number]. pdf

## 2018-12-04 NOTE — PROGRESS NOTES
Bedside and Verbal shift change report given to Bobby (oncoming nurse) by Nuria Schilling (offgoing nurse). Report included the following information SBAR, Kardex, Intake/Output, MAR and Recent Results.

## 2018-12-04 NOTE — PROGRESS NOTES
[facility] [phone] ([phone]) [facility]: [phone] ([phone]) Per Dr Morse's note: Jo Larson is a 58 y.o. with a past history of poorly controlled diabetes with diabetic polyneuropathy, gastroparesis, CKD 2, prior CVA (2014), multiple chronic infarcts on MRI and volume loss, OHS, chronic diastolic CHF, gout, depression, who was admitted on 12/1/2018 from home with a diagnosis of 2 weeks of uncontrolled nausea and vomiting due to diabetic gastroparesis not controlled by diet modification and reglan tablets. \" 
 
Palliative team was asked to see patient for assistance with exploring goals of care and for symptom management, education to family about patient's medical condition. Met jointly with patient and her  Sima Bee along with palliative team of Dr Flaca Jernigan. Patient was asleep but did wake up and shared that she was a bit sleepy but that she would \"try to stay awake\" and she did stay awake and was a part of our meeting. Patient has a history of CVA and her speech is a bit garbled at times, but she was able to follow what we said and she was able to communicate what she felt when asked. Brady Kern did do most of the talking, but did give patient time to talk and he felt that \"this is the best she has talked in quite some time\". In terms of psychosocial issues and needs: 
1. Grief and Depression: Patient has had numerous losses, functionally, physically and mentally since her CVAs, her current medical issues are limiting her quality of life, she has many reasons to be depressed and to [place] up\". Today patient appeared more animated, she did speak briefly about having \"given up\" when home, when she did not want to take her medications, didn't want to eat, was not motivated to go to a doctor or hospital. Patient indicated that her nausea and depressive feeling prevented her from following recommendations for herself. 2. Supportive, loving family: Patient has a son who she is close to and an 24 yo grand daughter that \"thinks the world of her\" (per ).  Chetan Medina has been working from home and he is her primary CG. He has not hired help, he seems to be very present wherever patient is, hospital or NH. He has few other supports or outlets for himself. He was tearful at various times today in talking about patient's suffering and some difficult decisions they may face in the future (Dr Nancy Amaro discussed possible feeding and venting tube for gastroparesis as perhaps a possibility) and also when discussing DDNR. 3.Need for emotional support/counseling: Patient and  may benefit from emotional support here at the hospital and when home.  indicated that he is open to counseling for patient, if patient is able to participate. She would need in home counseling as she has much difficulty getting out of bed and would need assistance. Patient may benefit from a psychiatric evaluation while in the hospital, to see if an anti-depressant would be helpful. 4. Re-addressing Code status: the burdens of CPR on patient's weak body was discussed today, a blank DDNR was given to patient and patient and  were asked to continue talking about this. At the present time, patient is full code. 5. Goals of Care: At present, patient is interested in full medical interventions and notes she is going to [place] to get better\". Much education provided today by Dr Nancy Amaro regarding her medical conditions and how they are not \"fixable\" to both  and patient. Patient noted there was a time when she \"gave up\", but her son \"convinced me to keep trying\". (Patient's speech is garbled at times, but today it seemed a bit better per ). Patient is not ready for hospice at this time, neither is .   
 
6. Bereavement Risk: noted to be moderate for , who is  to patient for 37 years, is devoting his life to caring for patient.    
 
LCSW will continue to see patient for emotional support while she is here at the hospital.

## 2018-12-04 NOTE — PROGRESS NOTES
GI Progress Note Khai Anderson) NAME:Shereen Barrios BDU:[birth year] HEB:[id number] PCP: Danielle Gannon MD 
Date/Time:  12/4/2018 1:12 PM  
Assessment: 1. Long-standing diabetic gastroparesis based on GES 2. AP/n/v, likely 2/2 #1 3. GIRISH 4. Depression (appears improved today) 5. CT Abd/Pelvis with PO contrast unremarkable Plan:  
· Continue IV Reglan and IV PPI; can likely transition to PO tomorrow · Speech pathology recommendations noted and appreciated · Pt tolerating regular diet · Optimal glycemic control · Palliative care evaluation noted and appreciated. Pt tolerating regular diet. No plan for PEG/J 
· No plans for EGD at this time Subjective:  
Pt continues to look better from admission. Tolerating regular diet. Complaint Y/N Description Abdominal Pain Hematemesis Hematochezia Melena Constipation Diarrhea Dyspepsia Dysphagia Jaundiced Nausea/vomiting Review of Systems: 
Symptom Y/N Comments  Symptom Y/N Comments Fever/Chills    Chest Pain Cough    Headaches Sputum    Joint Pain SOB/PIPER    Pruritis/Rash Tolerating Diet    Other Could NOT obtain due to:   
 
Objective: VITALS:  
Last 24hrs VS reviewed since prior progress note. Most recent are: 
Visit Vitals /67 Pulse 67 Temp 97.8 °F (36.6 °C) Resp 18 Ht 5' (1.524 m) Wt 94.6 kg (208 lb 8.9 oz) SpO2 98% Breastfeeding? No  
BMI 40.73 kg/m² Intake/Output Summary (Last 24 hours) at 12/4/2018 1312 Last data filed at 12/4/2018 2185 Gross per 24 hour Intake 120 ml Output 200 ml Net -80 ml PHYSICAL EXAM: 
General: WD, WN. Alert, cooperative, no acute distress   
HEENT: NC, Atraumatic. PERRLA, EOMI. Anicteric sclerae. Lungs:  CTA Bilaterally. No Wheezing/Rhonchi/Rales. Heart:  Regular  rhythm,  No murmur (), No Rubs, No Gallops Abdomen: Soft, Non distended, Non tender.  +Bowel sounds, no HSM Extremities: No c/c/e 
 
 Lab and Radiology Data Reviewed: (see below) Medications Reviewed: (see below) PMH/SH reviewed - no change compared to H&P 
________________________________________________________________________ Care Plan discussed with: 
Patient x Family RN x Consultant:    
 
Chip Shook MD  
 
Procedures: see electronic medical records for all procedures/Xrays and details which were not copied into this note but were reviewed prior to creation of Plan. LABS: 
Recent Labs 12/04/18 
0403 12/03/18 
0502 WBC 9.6 8.4 HGB 10.6* 10.9* HCT 34.0* 34.9*  
 288 Recent Labs 12/04/18 
0403 12/03/18 
0502 12/02/18 
0335  141 144  
K 4.0 4.3 3.3*  
* 115* 114* CO2 17* 16* 16*  
BUN 41* 39* 50* CREA 1.91* 1.72* 1.78* * 122* 135* CA 7.6* 7.9* 8.3* No results for input(s): SGOT, GPT, AP, TBIL, TP, ALB, GLOB, GGT, AML, LPSE in the last 72 hours. No lab exists for component: AMYP, HLPSE No results for input(s): INR, PTP, APTT in the last 72 hours. No lab exists for component: INREXT No results for input(s): FE, TIBC, PSAT, FERR in the last 72 hours. Lab Results Component Value Date/Time Folate 19.6 10/27/2017 10:19 AM  
 
Recent Labs 12/03/18 
0455 PH 7.34* PCO2 34* PO2 91 No results for input(s): CPK, CKMB in the last 72 hours. No lab exists for component: TROPONINI Lab Results Component Value Date/Time  Color YELLOW/STRAW 12/01/2018 04:14 PM  
 Appearance TURBID (A) 12/01/2018 04:14 PM  
 Specific gravity 1.020 12/01/2018 04:14 PM  
 Specific gravity 1.020 05/31/2014 04:10 AM  
 pH (UA) 6.0 12/01/2018 04:14 PM  
 Protein >300 (A) 12/01/2018 04:14 PM  
 Glucose 250 (A) 12/01/2018 04:14 PM  
 Ketone 15 (A) 12/01/2018 04:14 PM  
 Bilirubin NEGATIVE  03/24/2018 06:11 AM  
 Urobilinogen 0.2 12/01/2018 04:14 PM  
 Nitrites NEGATIVE  12/01/2018 04:14 PM  
 Leukocyte Esterase LARGE (A) 12/01/2018 04:14 PM  
 Epithelial cells FEW 12/01/2018 04:14 PM  
 Bacteria 2+ (A) 12/01/2018 04:14 PM  
 WBC >100 (H) 12/01/2018 04:14 PM  
 RBC 5-10 12/01/2018 04:14 PM  
 
 
MEDICATIONS: 
Current Facility-Administered Medications Medication Dose Route Frequency  metoclopramide (REGLAN) 5 mg/5 mL oral syrup 10 mg  10 mg Oral TIDAC  miconazole (SECURA) 2 % extra thick cream   Topical BID  vancomycin (VANCOCIN) 1,000 mg in 0.9% sodium chloride 250 mL ([id number])  1,000 mg IntraVENous Q24H  
 acetaminophen (TYLENOL) solution 650 mg  650 mg Oral Q6H PRN Or  
 acetaminophen (TYLENOL) tablet 650 mg  650 mg Oral Q6H PRN  
 traMADol (ULTRAM) tablet  mg   mg Oral Q6H PRN  pantoprazole (PROTONIX) 40 mg in sodium chloride 0.9% 10 mL injection  40 mg IntraVENous Q12H  
 escitalopram oxalate (LEXAPRO) tablet 10 mg  10 mg Oral DAILY  0.9% sodium chloride with KCl 20 mEq/L infusion   IntraVENous CONTINUOUS  
 levoFLOXacin (LEVAQUIN) tablet 750 mg  750 mg Oral Q48H  
 mupirocin (BACTROBAN) 2 % ointment   Topical BID  ammonium lactate 5 % lotn 1 Each  1 Each Topical DAILY  aspirin-dipyridamole (AGGRENOX)  mg per capsule 1 Cap  1 Cap Oral Q12H  
 atenolol (TENORMIN) tablet 50 mg  50 mg Oral DAILY  zinc oxide-cod liver oil (DESITIN) 40 % paste   Topical PRN  
 sodium chloride (NS) flush 5-10 mL  5-10 mL IntraVENous Q8H  
 sodium chloride (NS) flush 5-10 mL  5-10 mL IntraVENous PRN  
 heparin (porcine) injection 7,500 Units  7,500 Units SubCUTAneous Q8H  
 glucose chewable tablet 16 g  4 Tab Oral PRN  
 dextrose (D50W) injection syrg 12.5-25 g  25-50 mL IntraVENous PRN  
 glucagon (GLUCAGEN) injection 1 mg  1 mg IntraMUSCular PRN  
 insulin lispro (HUMALOG) injection   SubCUTAneous AC&HS  trimethoprim-polymyxin b (POLYTRIM) 10,000 unit- 1 mg/mL ophthalmic solution 1 Drop  1 Drop Both Eyes BID  dilTIAZem CD (CARDIZEM CD) capsule 240 mg  240 mg Oral DAILY  ondansetron (ZOFRAN) injection 4 mg  4 mg IntraVENous Q6H PRN

## 2018-12-05 NOTE — PROGRESS NOTES
GI Progress Note Jeffrey Maya) NAME:Shereen Bell CWU:[birth year] YAZMIN:[id number] ATTG: [unfilled]  PCP: Teetee Yost MD 
Date/Time:  12/5/2018 3:29 PM  
Assessment:  
1. Long-standing diabetic gastroparesis based on GES 2. AP/n/v, likely 2/2 #1 3. GIRISH 4. Depression 5. CT Abd/Pelvis with PO contrast unremarkable Plan:  
· Continue Reglan and PPI (has been transitioned to PO) · Speech pathology recommendations noted and appreciated · Pt tolerating regular diet · Optimal glycemic control · Palliative care evaluation noted and appreciated. Pt tolerating regular diet. No plan for PEG/J 
· No plans for EGD at this time GI will follow peripherally. Please feel free to call w/ any questions Subjective:  
Pt remains improved from admission. Has some nausea after moving around for wound care Complaint Y/N Description Abdominal Pain Hematemesis Hematochezia Melena Constipation Diarrhea Dyspepsia Dysphagia Jaundiced Nausea/vomiting Review of Systems: 
Symptom Y/N Comments  Symptom Y/N Comments Fever/Chills    Chest Pain Cough    Headaches Sputum    Joint Pain SOB/PIPER    Pruritis/Rash Tolerating Diet    Other Could NOT obtain due to:   
 
Objective: VITALS:  
Last 24hrs VS reviewed since prior progress note. Most recent are: 
Visit Vitals /76 Pulse 80 Temp 97.6 °F (36.4 °C) Resp 18 Ht 5' (1.524 m) Wt 94.8 kg (208 lb 15.9 oz) SpO2 96% Breastfeeding? No  
BMI 40.82 kg/m² Intake/Output Summary (Last 24 hours) at 12/5/2018 1529 Last data filed at 12/5/2018 8454 Gross per 24 hour Intake 1390 ml Output 175 ml Net 1215 ml PHYSICAL EXAM: 
General: WD, WN. Alert, cooperative, no acute distress   
HEENT: NC, Atraumatic. PERRLA, EOMI. Anicteric sclerae. Lungs:  CTA Bilaterally. No Wheezing/Rhonchi/Rales. Heart:  Regular  rhythm,  No murmur (), No Rubs, No Gallops Abdomen: +Morbidly obese, soft, Non distended, Non tender.  +Bowel sounds, no HSM Extremities: No c/c/e Lab and Radiology Data Reviewed: (see below) Medications Reviewed: (see below) PMH/SH reviewed - no change compared to H&P 
________________________________________________________________________ Care Plan discussed with: 
Patient x Family  x  
RN x Consultant:    
 
Patricio Brewer MD  
 
Procedures: see electronic medical records for all procedures/Xrays and details which were not copied into this note but were reviewed prior to creation of Plan. LABS: 
Recent Labs 12/05/18 
0257 12/04/18 
0403 WBC 9.3 9.6 HGB 10.8* 10.6* HCT 34.0* 34.0*  
 270 Recent Labs 12/05/18 
0257 12/04/18 
0403 12/03/18 
0502  144 141  
K 4.6 4.0 4.3 * 118* 115* CO2 16* 17* 16*  
BUN 44* 41* 39* CREA 2.27* 1.91* 1.72* * 239* 122* CA 7.2* 7.6* 7.9* No results for input(s): SGOT, GPT, AP, TBIL, TP, ALB, GLOB, GGT, AML, LPSE in the last 72 hours. No lab exists for component: AMYP, HLPSE No results for input(s): INR, PTP, APTT in the last 72 hours. No lab exists for component: INREXT No results for input(s): FE, TIBC, PSAT, FERR in the last 72 hours. Lab Results Component Value Date/Time Folate 19.6 10/27/2017 10:19 AM  
 
Recent Labs 12/03/18 
0455 PH 7.34* PCO2 34* PO2 91 No results for input(s): CPK, CKMB in the last 72 hours. No lab exists for component: TROPONINI Lab Results Component Value Date/Time  Color YELLOW/STRAW 12/01/2018 04:14 PM  
 Appearance TURBID (A) 12/01/2018 04:14 PM  
 Specific gravity 1.020 12/01/2018 04:14 PM  
 Specific gravity 1.020 05/31/2014 04:10 AM  
 pH (UA) 6.0 12/01/2018 04:14 PM  
 Protein >300 (A) 12/01/2018 04:14 PM  
 Glucose 250 (A) 12/01/2018 04:14 PM  
 Ketone 15 (A) 12/01/2018 04:14 PM  
 Bilirubin NEGATIVE  03/24/2018 06:11 AM  
 Urobilinogen 0.2 12/01/2018 04:14 PM  
 Nitrites NEGATIVE  12/01/2018 04:14 PM  
 Leukocyte Esterase LARGE (A) 12/01/2018 04:14 PM  
 Epithelial cells FEW 12/01/2018 04:14 PM  
 Bacteria 2+ (A) 12/01/2018 04:14 PM  
 WBC >100 (H) 12/01/2018 04:14 PM  
 RBC 5-10 12/01/2018 04:14 PM  
 
 
MEDICATIONS: 
Current Facility-Administered Medications Medication Dose Route Frequency  sodium bicarbonate (8.4%) 75 mEq in 0.45% sodium chloride 1,000 mL infusion   IntraVENous CONTINUOUS  
 [START ON 12/6/2018] vancomycin (VANCOCIN) 1250 mg in  ml infusion  1,250 mg IntraVENous Q36H  
 metoclopramide (REGLAN) 5 mg/5 mL oral syrup 10 mg  10 mg Oral TIDAC  miconazole (SECURA) 2 % extra thick cream   Topical BID  acetaminophen (TYLENOL) solution 650 mg  650 mg Oral Q6H PRN Or  
 acetaminophen (TYLENOL) tablet 650 mg  650 mg Oral Q6H PRN  
 traMADol (ULTRAM) tablet  mg   mg Oral Q6H PRN  pantoprazole (PROTONIX) 40 mg in sodium chloride 0.9% 10 mL injection  40 mg IntraVENous Q12H  
 escitalopram oxalate (LEXAPRO) tablet 10 mg  10 mg Oral DAILY  levoFLOXacin (LEVAQUIN) tablet 750 mg  750 mg Oral Q48H  
 mupirocin (BACTROBAN) 2 % ointment   Topical BID  ammonium lactate 5 % lotn 1 Each  1 Each Topical DAILY  aspirin-dipyridamole (AGGRENOX)  mg per capsule 1 Cap  1 Cap Oral Q12H  
 atenolol (TENORMIN) tablet 50 mg  50 mg Oral DAILY  zinc oxide-cod liver oil (DESITIN) 40 % paste   Topical PRN  
 sodium chloride (NS) flush 5-10 mL  5-10 mL IntraVENous Q8H  
 sodium chloride (NS) flush 5-10 mL  5-10 mL IntraVENous PRN  
 heparin (porcine) injection 7,500 Units  7,500 Units SubCUTAneous Q8H  
 glucose chewable tablet 16 g  4 Tab Oral PRN  
 dextrose (D50W) injection syrg 12.5-25 g  25-50 mL IntraVENous PRN  
 glucagon (GLUCAGEN) injection 1 mg  1 mg IntraMUSCular PRN  
 insulin lispro (HUMALOG) injection   SubCUTAneous AC&HS  trimethoprim-polymyxin b (POLYTRIM) 10,000 unit- 1 mg/mL ophthalmic solution 1 Drop  1 Drop Both Eyes BID  dilTIAZem CD (CARDIZEM CD) capsule 240 mg  240 mg Oral DAILY  ondansetron (ZOFRAN) injection 4 mg  4 mg IntraVENous Q6H PRN

## 2018-12-05 NOTE — PROGRESS NOTES
Vancomycin level = 18.1. Change dose to 1250 mg IV Q36 for predicted level of 13.2 mcg/ml for cellulitis.

## 2018-12-05 NOTE — PROGRESS NOTES
Bedside shift change report given to Lesta Ganser (oncoming nurse) by Noa Espino (offgoing nurse). Report included the following information SBAR, Kardex, Intake/Output, MAR and Recent Results.

## 2018-12-05 NOTE — PROGRESS NOTES
Therapy has not SNF recommendations at present time. [facility] will likely deny SNF admission. Will discuss with MD//spouse.

## 2018-12-05 NOTE — DIABETES MGMT
DTC Consult Progress Note Recommendations/ Comments: chart reviewed for previous hospital BG management consult and hyperglycemia. Please consider the followin. Starting Lantus 8 units Current hospital DM medication: Lispro Correctional insulin with high sensitivity DTC will continue to follow patient as needed. ____________________________ Consult received for:   
               [x]           Hospital Blood Glucose Management Chart reviewed and initial evaluation complete on [address]. Patient is a 58 y.o. female with known diabetes- on Levemir 20 units BID at home per PTA list.  
 
 
A1c:  
Lab Results Component Value Date/Time Hemoglobin A1c 10.4 (H) 2018 03:35 AM  
 
 
Recent Glucose Results:  
Lab Results Component Value Date/Time  (H) 2018 02:57 AM  
 GLUCPOC 258 (H) 2018 11:41 AM  
 GLUCPOC 284 (H) 2018 08:31 AM  
 GLUCPOC 257 (H) 2018 08:45 PM  
  
 
Lab Results Component Value Date/Time Creatinine 2.27 (H) 2018 02:57 AM  
 
 
Active Orders Diet DIET REGULAR  
  
 
PO intake:  
Patient Vitals for the past 72 hrs: 
 % Diet Eaten 18 0817 100 % Thank you. María Landry RD, CDE [facility] Office:  [phone] Time spent: 5 minutes

## 2018-12-05 NOTE — PROGRESS NOTES
Pharmacy Automatic Renal Dosing Protocol - Antimicrobials Indication for Antimicrobials: SSTI (Labial Abscess) + UTI Current Regimen of Each Antimicrobial: 
Vancomycin 1250 mg Q36H (Start Date 12/3/18; Day # 3) Levofloxacin 750 mg PO Q48H (Start 18; Day #4) Previous Antimicrobial Therapy: 
 
Goal Level: VANCOMYCIN TROUGH GOAL RANGE Goal trough 10-15 Date Dose & Interval Measured (mcg/mL) Extrapolated (mcg/mL) Significant Cultures:  
18 Urine - E. Faecalis, Klebsiella 12/3/18 Wound - Yeast 
 
Radiology / Imaging results: (X-ray, CT scan or MRI):  
 
Paralysis, amputations, malnutrition: None noted Labs: 
Recent Labs 18 
0257 18 
0403 18 
0502 CREA 2.27* 1.91* 1.72* BUN 44* 41* 39* WBC 9.3 9.6 8.4 Temp (24hrs), Av.5 °F (36.4 °C), Min:97.2 °F (36.2 °C), Max:97.7 °F (36.5 °C) Creatinine Clearance (mL/min) or Dialysis: 18 ml/min Impression/Plan: · Vancomycin level = 18.1. Change dose to 1250 mg IV Q36 for predicted level of 13.2 mcg/ml for cellulitis. · Creatinine rising quickly, will get another random level in AM  
· Levofloxacin to 500 mg IV Q48H · Antimicrobial stop date 7 days Pharmacy will follow daily and adjust medications as appropriate for renal function and/or serum levels. Thank you, Adam Matthews, ORIANAD 
 
Recommended duration of therapy 
http://[facility]/Pharmacy/Clinical%20Companion/Duration%20of%[email or web address]. docx Renal Dosing 
http://[facility]/[place]/[facility]/Pharmacy/Clinical%20Companion/Renal%[email or web address]. pdf

## 2018-12-05 NOTE — CONSULTS
[address] [place] [address] Royer Teixeira MR#: [id number] : 1955 ACCOUNT #: [de-identified] DATE OF SERVICE: 2018 Requested By Dr. Timothy Demarco for management of acute kidney injury. The patient seen and examined. History obtained minimally from the patient and mostly from chart review. HISTORY OF PRESENT ILLNESS:  The patient is a 59-year-old  female with past medical history significant for diabetes, diastolic CHF, hypertension, hyperlipidemia, diabetic gastroparesis, obesity, history of a recent stroke, right-sided sciatica who presented to ED with a multitude of symptoms. She has been having decreased oral intake and progressive weakness compared to her baseline. She has been in and out of hospital numerous times. She was refusing to eat or take her pills. She has been having failure to thrive. Her decline started more than a year ago after a stroke and a right thigh abscess which left her nearly paraplegic with bilateral lower extremity weakness, right greater than left. She has been essentially bedridden since then with little inclination to help herself or participate in therapies. Her hygiene is not good due to obesity and immobility. She is mostly nonverbal.  She appears depressed. She has declining kidney function over the last 3 days. Her creatinine has increased from 1.72 to 1.91 yesterday and up to 2.27 today. She is also acidotic with a CO2 of 16. Review of vitals shows transient hypotension in the last 24 hours with systolic BP in 26S. Prior to that, her blood pressure was uncontrolled with systolic BP in the 042S. She has not received any IV contrast during this admission. She has been on vancomycin and her vancomycin level from today is 18. No skin rash. She has underlying CKD. Her baseline creatinine is around 1.3. It was 1.33 back in 2018 when she saw doctor recommended from our group.   Her creatinine is 1.31 back in 09/2018 based on chart review. She has a history of nephrotic range proteinuria with proteinuria of 3.4 grams back in 03/2018. Her diuretics are held and she is started on IV fluids because of rising creatinine. REVIEW OF SYSTEMS:  As noted in HPI. In addition, she has incontinence of stool and urine. She has Riojas catheter in place. There is questionable odynophagia. Some drainage and irritation from the left eye. Positive orthopnea, but no PND. No chest pain. Negative for any arthralgia or myalgia. Diminished mentation as noted above. Past Medical History:  
Diagnosis Date  Acute combined systolic and diastolic heart failure, NYHA class 2 ([address] 7/24/2017  Anemia 7/24/2017  Aortic stenosis, mild 7/24/2017  Bilateral edema of lower extremity 7/24/2017  BMI 45.0-49.9, adult ([address] 7/24/2017  Chronic diastolic CHF (congestive heart failure), NYHA class 3 ([address] 7/24/2017  CKD (chronic kidney disease) 12/1/2018  CKD (chronic kidney disease) stage 2, GFR 60-89 ml/min  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 2 diabetes mellitus ([address] 7/24/2017  Diabetes ([address]  Fatty liver 7/24/2017  Gastroparesis  GERD (gastroesophageal reflux disease)  Gout   
 HTN (hypertension)  Hyperlipemia  Hyperparathyroidism ([address]  Hypertension 7/24/2017  Insomnia 7/24/2017  Low back pain 7/24/2017  N&V (nausea and vomiting) 7/24/2017  Nausea & vomiting  Obesity  Pelvic joint pain 7/24/2017  Peripheral neuropathy 7/24/2017  Sciatica   
 right  Severe right groin pain 7/24/2017  Sinus tachycardia 7/24/2017  Stage 2 chronic kidney disease due to benign hypertension 7/24/2017  Stasis dermatitis 7/24/2017  Stroke [facility]) 2001  
 right parietal, left caudate  Thyroid disease   
 para thyroid(high calcium)  Ulcer of right fifth toe due to diabetes mellitus ([address] 7/24/2017 Past Surgical History: Procedure Laterality Date  CHEST SURGERY PROCEDURE UNLISTED  10/24/13 PARATHYROID EXPLORATION   
 DELIVERY     
 HX AMPUTATION TOE  2018  HX  SECTION    
 HX CHOLECYSTECTOMY  HX OTHER SURGICAL    
 I&D right thigh abscess Allergies Allergen Reactions  Zestril [Lisinopril] Swelling Medications Prior to Admission Medication Sig  
 aspirin-dipyridamole (AGGRENOX)  mg per SR capsule TAKE 1 CAPSULE BY MOUTH TWICE DAILY( EVERY TWELVE HOURS)  nystatin (MYCOSTATIN) topical cream Apply  to affected area two (2) times a day.  metoclopramide HCl (REGLAN) 5 mg tablet TAKE 1 TABLET BY MOUTH FOUR TIMES DAILY FOR 10 DAYS  atorvastatin (LIPITOR) 40 mg tablet TAKE 1 TABLET BY MOUTH EVERY DAY  ondansetron (ZOFRAN ODT) 4 mg disintegrating tablet Take 1 Tab by mouth every eight (8) hours as needed for Nausea.  LEVEMIR FLEXTOUCH U-100 INSULN 100 unit/mL (3 mL) inpn INJECT 54 UNITS UNDER THE SKIN TWICE DAILY (Patient taking differently: INJECT 20 UNITS UNDER THE SKIN TWICE DAILY)  LYRICA 75 mg capsule TAKE 1 CAPSULE BY MOUTH TWICE DAILY AT LUNCH TIME AND AGAIN AT BEDTIME  
 pantoprazole (PROTONIX) 40 mg tablet TAKE 1 TABLET BY MOUTH EVERY DAY  atenolol (TENORMIN) 50 mg tablet TAKE 1 TABLET BY MOUTH EVERY NIGHT AT BEDTIME.  hydrALAZINE (APRESOLINE) 50 mg tablet TAKE 1 TABLET BY MOUTH THREE TIMES DAILY  FREESTYLE LITE STRIPS strip TEST BLOOD SUGAR THREE TIMES DAILY AS NEEDED  
 dilTIAZem CD (CARDIZEM CD) 240 mg ER capsule Take 1 Cap by mouth daily.  bumetanide (BUMEX) 2 mg tablet Take 2 mg by mouth daily.  ammonium lactate (LAC-HYDRIN) 12 % lotion Apply 1 Each to affected area daily. rub in to affected area well  clotrimazole (LOTRIMIN) 1 % topical cream Apply  to affected area two (2) times a day.  zinc oxide-cod liver oil (DESITIN) 40 % paste Apply  to affected area as needed for Skin Irritation.  allopurinol (ZYLOPRIM) 300 mg tablet Take 0.5 Tabs by mouth daily.  insulin lispro (HUMALOG) 100 unit/mL injection For BS 3x/day before meals 140-199=2 units 200-249=3 units 250-299=5 units 300-349=7 units 350 or greater = Call MD  
 acetaminophen (TYLENOL EXTRA STRENGTH) 500 mg tablet Take 1,000 mg by mouth every six (6) hours as needed for Pain.  colestipol (COLESTID) 1 gram tablet Take 1 g by mouth daily.  Bifidobacterium Infantis 4 mg cap Take 4 mg by mouth daily.  ferrous sulfate (SLOW FE) 142 mg (45 mg iron) ER tablet Take 142 mg by mouth Daily (before breakfast).  Cholecalciferol, Vitamin D3, 2,000 unit cap Take 1 Cap by mouth daily.  potassium chloride (K-DUR, KLOR-CON) 20 mEq tablet Take 1 Tab by mouth two (2) times a day. Social History Socioeconomic History  Marital status:  Spouse name: Not on file  Number of children: Not on file  Years of education: Not on file  Highest education level: Not on file Social Needs  Financial resource strain: Not on file  Food insecurity - worry: Not on file  Food insecurity - inability: Not on file  Transportation needs - medical: Not on file  Transportation needs - non-medical: Not on file Occupational History  Not on file Tobacco Use  Smoking status: Never Smoker  Smokeless tobacco: Never Used Substance and Sexual Activity  Alcohol use: Yes Comment: rare  Drug use: Yes Types: Prescription, OTC  Sexual activity: Not on file Other Topics Concern  Not on file Social History Narrative  Not on file Family History Problem Relation Age of Onset  Cancer Mother   
     cervical  
 Diabetes Mother  Hypertension Mother  Stroke Mother 79  
 Diabetes Father  Hypertension Father  Kidney Disease Father  Diabetes Brother  Hypertension Brother  Stroke Brother 61 PHYSICAL EXAMINATION: 
 GENERAL:  Elderly female, depressed appearing, in no acute distress. She is well built, well-nourished. VITAL SIGNS:  Recent vitals, she is afebrile, pulse 70, /60, saturating 94% on room air. HEENT:  Atraumatic, normocephalic. Extraocular muscles are intact, mild injection of her left eye. No scleral icterus. NECK:  Thyroid is not enlarged. No cervical adenopathy. CHEST:  Lungs are clear to auscultation. Equal breath sounds, no distress. HEART:  Regular rate and rhythm. Normal S1, S2. No murmur, no rub. ABDOMEN:  Soft, obese, nontender, normal bowel sounds. EXTREMITIES:  2-3+ brawny edema with lichenified skin. NEUROLOGIC:  Alert, awake. She is essentially nonverbal.  She has paraparesis from previous stroke. LABORATORY DATA:  Creatinine 2.27, CO2 of 16, anion gap is 9, glucose was high at 269, hemoglobin of 10.8. A1c has been greater than 10. TSH is normal.  Echocardiogram from this admission shows normal EF and no regional wall motion abnormalities. CT of abdomen and pelvis showed no acute abnormalities as well as no hydronephrosis. Urine culture had Enterococcus faecalis as well as Klebsiella. Previous protein to creatinine ratio 3.4 grams. UA showed proteinuria of greater than 300, glucosuria and moderate blood. WBCs were greater than 100. ASSESSMENT AND PLAN: 
1. Acute kidney injury on chronic kidney disease III. 2.  Hypertension with transient hypotension in the last 24 hours. 3.  Diabetes type 2, uncontrolled with nephrotic range proteinuria. Suspect has nephrotic syndrome due to diabetic chronic kidney disease. 4.  Metabolic acidosis. 5.  Edema/anasarca. 6.  Urinary tract infection. 7.  Recent stroke, failure to thrive and a paraparesis. Difficult scenario. A complex case. She is overall volume expanded and does not need much IV fluids,  but given the rising creatinine, transient gentle hydration would be reasonable.   Because her creatinine is rising, she is at risk for hyperkalemia and will need to avoid potassium containing IV fluids. She would also benefit from addition of bicarbonate to the IV fluid which would help treat her acidosis. RECOMMENDATIONS: 
1. Discontinue IV normal saline with KCl. 
2.  Start half normal saline with 1.5 amp of bicarbonate at 75 mL per hour. 3.  Would give IV fluids for 24-48 hours and subsequently will need to resume diuretics as she has significant edema. 4.  Needs optimization of diabetes control. 5.  Temporarily hold diuretics, while we diurese, as you are. 
6.  Avoid nephrotoxins. No ACE inhibitor or ARB. 7.  Check urine protein to creatinine ratio. 8.  Dose vancomycin based on levels. Target vancomycin levels of 15-20. Thanks for renal consultation. We will follow the patient with you. Peter Villegas MD 
  
 
BP / HN 
D: 12/05/2018 11:03    
T: 12/05/2018 11:26 
JOB #: [id number]

## 2018-12-05 NOTE — PROGRESS NOTES
[address] 
[phone] Medical Progress Note NAME: Elena Shipley :  1955 MRM:  [id number] Date/Time: 2018  7:47 AM 
 
Subjective:  
 
Events leading to admission noted. Patient well known to me with complex and strange history. Has been declining for > 1 year after a stroke and right thigh abscess left her with bilateral LE weakness (R>L) and she has been essentially bedridden since then with little inclination to help herself or participate in therapies. Most recently has stopped taking her meds and refused to come to the office or ED (until this presentation). Issues currently also exacerbated by gastroparesis. Diabetic management has been horrible. Hygiene is terrible due to obesity and immobility. Verbalization is minimal.  Her mentation is difficult to characterize but she clearly appears depressed. Presents with decline in renal function most likely due to decreased po intake as she probably has not been taking her diuretic. Incontinent stool and urine. H/O c diff colitis. She really is the definition of FTT. Have opened discussions re palliative care and hospice but  reluctant and she doesn't verbalize her thoughts. For now will pursue palliative care further but in meantime try to correct some of metabolic abnormalities and start RX depression. Even if she improves I am not certain it will persist if she is eventually discharged to home. Very frustating and difficult management problem. More alert and conversant this am.  Appreciate extensive palliative care input. Family needs decide re DDNR and if they would accept feeding tube if necessary. At this point eating some w/o N/V. Continues wound care and abx. Creatinine up despite hydration. Don't see nephrotoxic meds except Vanc. Will ask renal to see.  Will definitely need SNF and encouraged  to visit facilities so he doesn't have to make last minute decision. Will see if we can get Psych consult as suggested by palliative care. Past Medical History reviewed and unchanged from Admission History and Physical and/or prior progress note/electronic medical record Medications reviewed. ROS:   
 
General: postive for weakness  Drainage left side vulva Eyes: positive for left eye drainage and irritation Ear Nose and Throat: positive for ? odynophagia Respiratory:  positive for PIPER Cardiology:  negative for chest pain, palpitations, + for orthopnea, PND, edema, 
Gastrointestinal: positive for N/V, difficulties swallowing and gastroparesis Genitourinary: positive for incontinence Muskuloskeletal : negative for arthralgia, myalgia Neurological: positive for paraparesis, dysphasia and diminished mentation Objective:  
 
Last 24hrs VS reviewed since prior progress note. Most recent are: 
 
Visit Vitals /72 (BP 1 Location: Left arm, BP Patient Position: At rest) Pulse 60 Temp 97.4 °F (36.3 °C) Resp 18 Ht 5' (1.524 m) Wt 208 lb 15.9 oz (94.8 kg) SpO2 94% Breastfeeding? No  
BMI 40.82 kg/m² SpO2 Readings from Last 6 Encounters:  
12/05/18 94% 11/29/18 95% 11/27/18 93% 11/20/18 96% 11/16/18 97% 11/15/18 91% Intake/Output Summary (Last 24 hours) at 12/5/2018 0702 Last data filed at 12/5/2018 9589 Gross per 24 hour Intake 1605 ml Output 575 ml Net 1030 ml Physical Exam: 
 
General appearance: alert, uncooperative, no distress, slowed mentation, appears older than stated age Eyes: conjunctivitis left eye Neck: supple, symmetrical, trachea midline, no adenopathy and thyroid: not enlarged, symmetric, no tenderness/mass/nodules Lungs: clear to auscultation bilaterally Heart: regular rate and rhythm, S1, S2 normal, grade 2/6 systolic murmur, no click, rub or gallop Abdomen: morbidly obese. No appreciable mass or tenderness;  Drainage from left vulvar area and thickened abd wall Skin:  Significant sacral, buttock and perineal breakdown Extremities: 3+ brawny edema with scaling lichenified skin Neurologic: paraparesis, dysphasia, essentially nonverbal 
 
Data Review: 
 
Lab: 
 
BMP:  
Lab Results Component Value Date/Time  12/05/2018 02:57 AM  
 K 4.6 12/05/2018 02:57 AM  
  (H) 12/05/2018 02:57 AM  
 CO2 16 (L) 12/05/2018 02:57 AM  
 AGAP 9 12/05/2018 02:57 AM  
  (H) 12/05/2018 02:57 AM  
 BUN 44 (H) 12/05/2018 02:57 AM  
 CREA 2.27 (H) 12/05/2018 02:57 AM  
 GFRAA 26 (L) 12/05/2018 02:57 AM  
 GFRNA 22 (L) 12/05/2018 02:57 AM  
 
CBC:  
Lab Results Component Value Date/Time WBC 9.3 12/05/2018 02:57 AM  
 HGB 10.8 (L) 12/05/2018 02:57 AM  
 HCT 34.0 (L) 12/05/2018 02:57 AM  
  12/05/2018 02:57 AM  
 
Recent Glucose Results:  
Lab Results Component Value Date/Time  (H) 12/05/2018 02:57 AM  
 
All labs reviewed in past 24 hours Other pertinent lab: A1C > 10. TSH normal 
 
Imaging: 
 
Reviewed. CXR Assessment / Plan:  
 
Principal Problem: 
  FTT (failure to thrive) in adult (12/2/2018) Active Problems: 
  Right leg weakness (4/18/2013) Overview: R/O CVA, HNP, mononeuritis Diabetic gastroparesis ([address] () Stroke [facility]) (5/30/2014) Overview: Recurrent, bilateral 
 
  Acute renal failure (ARF) ([address] (12/1/2018) Diabetic polyneuropathy ([address] (4/20/2013) ASCVD (arteriosclerotic cardiovascular disease) (5/31/2014) Hypertension (7/24/2017) (HFpEF) heart failure with preserved ejection fraction ([address] (10/31/2017) DM (diabetes mellitus) ([address] (12/1/2018) Vulvar abscess (12/3/2018) Morbid obesity ([facility] (1/26/2011) CKD (chronic kidney disease) stage 3, GFR 30-59 ml/min ([facility]) () Hyperlipemia () Aortic stenosis, mild (7/24/2017) GERD (gastroesophageal reflux disease) (10/11/2017) Severe episode of recurrent major depressive disorder, without psychotic features ([facility] (12/2/2018) 1. Hydrate gently 2. GI consult ? EGD eventually or MBS 3. Culture drainage pending 4. CT head as above 5. ECHO essentially normal 
6. Antidepressant 7. Wound care 8. PT/OT/ST 9. Palliative care appreciated 10. Follow labs 11. Riojas 12. Likely will need PICC line if does not opt for Hospice 13. Adjust meds 14. Psych consult 15. Renal consult Care Plan discussed with: Patient, Family, Nursing Staff and Consultant/Specialist 
 
Discussed:  Care Plan Prophylaxis:  Hep SQ and H2B/PPI Disposition:  ??? 
___________________________________________________ Attending Physician: Radha Torres MD

## 2018-12-06 NOTE — PROGRESS NOTES
[address] 
[phone] Medical Progress Note NAME: Cassie Alexander :  1955 MRM:  [id number] Date/Time: 2018  7:41 AM 
 
Subjective: More alert and conversant this am.  Appreciate extensive palliative care input. Family needs decide re DDNR and if they would accept feeding tube if necessary. At this point eating some w/o N/V. Continues wound care and abx. Appreciate renal and psych input except Vanc. Will ask renal to see. Will definitely need SNF and encouraged  to visit facilities so he doesn't have to make last minute decision. With culture result will change abx to monotherapy with Zosyn. Have changed antidepressant to Remeron. Past Medical History reviewed and unchanged from Admission History and Physical and/or prior progress note/electronic medical record Medications reviewed. ROS:   
 
General: postive for weakness  Drainage left side vulva Eyes: positive for left eye drainage and irritation Ear Nose and Throat: positive for ? odynophagia Respiratory:  positive for PIPER Cardiology:  negative for chest pain, palpitations, + for orthopnea, PND, edema, 
Gastrointestinal: positive for N/V, difficulties swallowing and gastroparesis Genitourinary: positive for incontinence Muskuloskeletal : negative for arthralgia, myalgia Neurological: positive for paraparesis, dysphasia and diminished mentation Objective:  
 
Last 24hrs VS reviewed since prior progress note. Most recent are: 
 
Visit Vitals /69 Pulse 68 Temp 97.8 °F (36.6 °C) Resp 16 Ht 5' (1.524 m) Wt 218 lb 7.6 oz (99.1 kg) SpO2 96% Breastfeeding? No  
BMI 42.67 kg/m² SpO2 Readings from Last 6 Encounters:  
18 96% 18 95% 18 93% 18 96% 18 97% 11/15/18 91% Intake/Output Summary (Last 24 hours) at 2018 1615 Last data filed at 2018 0340 Gross per 24 hour Intake 1630 ml Output 425 ml Net 1205 ml Physical Exam: 
 
General appearance: alert, uncooperative, no distress, slowed mentation, appears older than stated age Eyes: conjunctivitis left eye Neck: supple, symmetrical, trachea midline, no adenopathy and thyroid: not enlarged, symmetric, no tenderness/mass/nodules Lungs: clear to auscultation bilaterally Heart: regular rate and rhythm, S1, S2 normal, grade 2/6 systolic murmur, no click, rub or gallop Abdomen: morbidly obese. No appreciable mass or tenderness;  Drainage from left vulvar area and thickened abd wall Skin:  Significant sacral, buttock and perineal breakdown Extremities: 3+ brawny edema with scaling lichenified skin Neurologic: paraparesis, dysphasia, essentially nonverbal 
 
Data Review: 
 
Lab: 
 
BMP:  
Lab Results Component Value Date/Time  12/06/2018 02:15 AM  
 K 4.4 12/06/2018 02:15 AM  
  (H) 12/06/2018 02:15 AM  
 CO2 17 (L) 12/06/2018 02:15 AM  
 AGAP 9 12/06/2018 02:15 AM  
  (H) 12/06/2018 02:15 AM  
 BUN 42 (H) 12/06/2018 02:15 AM  
 CREA 2.10 (H) 12/06/2018 02:15 AM  
 GFRAA 29 (L) 12/06/2018 02:15 AM  
 GFRNA 24 (L) 12/06/2018 02:15 AM  
 
CBC:  
Lab Results Component Value Date/Time WBC 9.4 12/06/2018 02:15 AM  
 HGB 10.7 (L) 12/06/2018 02:15 AM  
 HCT 33.9 (L) 12/06/2018 02:15 AM  
  12/06/2018 02:15 AM  
 
Recent Glucose Results:  
Lab Results Component Value Date/Time  (H) 12/06/2018 02:15 AM  
 
All labs reviewed in past 24 hours Other pertinent lab: A1C > 10. TSH normal 
 
Imaging: 
 
Reviewed. CXR Assessment / Plan:  
 
Principal Problem: 
  FTT (failure to thrive) in adult (12/2/2018) Active Problems: 
  Right leg weakness (4/18/2013) Overview: R/O CVA, HNP, mononeuritis Diabetic gastroparesis ([facility] [address] () Stroke [facility]) (5/30/2014) Overview: Recurrent, bilateral 
 
  Acute renal failure (ARF) ([facility] [address] (12/1/2018) Diabetic polyneuropathy ([facility] (4/20/2013) ASCVD (arteriosclerotic cardiovascular disease) (5/31/2014) Hypertension (7/24/2017) (HFpEF) heart failure with preserved ejection fraction ([facility] (10/31/2017) DM (diabetes mellitus) ([facility] (12/1/2018) Vulvar abscess (12/3/2018) Morbid obesity ([facility] (1/26/2011) CKD (chronic kidney disease) stage 3, GFR 30-59 ml/min ([facility]) () Hyperlipemia () Aortic stenosis, mild (7/24/2017) GERD (gastroesophageal reflux disease) (10/11/2017) Severe episode of recurrent major depressive disorder, without psychotic features ([facility] (12/2/2018) 1. Hydrate gently 2. GI consult ? EGD eventually or MBS 3. Change to Zosyn 4. CT head as above 5. ECHO essentially normal 
6. Antidepressant changed 7. Wound care 8. PT/OT/ST 9. Follow labs 10. [place] 11. Likely will need PICC line if does not opt for Hospice Care Plan discussed with: Patient, Family, Nursing Staff and Consultant/Specialist 
 
Discussed:  Care Plan Prophylaxis:  Hep SQ and H2B/PPI Disposition:  ??? 
___________________________________________________ Attending Physician: Russel Encinas MD

## 2018-12-06 NOTE — PROGRESS NOTES
Name: Harley Murry MRN: [id number] : 1955 Assessment: 1. Acute kidney injury on chronic kidney disease III. 2.  Hypertension with transient hypotension 2 days ago 3. Diabetes type 2, uncontrolled with nephrotic range proteinuria. Suspect has nephrotic syndrome due to diabetic chronic kidney disease (urine Prot/Cr ratio- 7.9 grams) 4. Metabolic acidosis. 5.  Edema/anasarca 6. Urinary tract infection. 7.  Recent stroke, failure to thrive and a paraparesis. 
  
Difficult scenario. A complex case. She is overall volume expanded and does not need much IV fluids,  but given the rising creatinine, trying gentle hydration with HCO3 drip. Cr marginally better and acidosis also improved Plan/Recommendations: 
Ct HCO3 drip- reduce rate to 60 ml/hr Will need to resume diuretics as she has significant edema. Needs optimization of diabetes control. Avoid nephrotoxins. No ACE inhibitor or ARB. Check urine protein to creatinine ratio. Dose vancomycin based on levels. Target vancomycin levels of 15-20. 
  
Subjective: 
Resting. Little more verbal. Taking some po  at bedside ROS:  
No nausea, no vomiting No chest pain, no shortness of breath Exam: 
Visit Vitals /70 (BP 1 Location: Left arm, BP Patient Position: At rest) Pulse 92 Temp 97.9 °F (36.6 °C) Resp 18 Ht 5' (1.524 m) Wt 99.1 kg (218 lb 7.6 oz) SpO2 92% Breastfeeding? No  
BMI 42.67 kg/m² WB/WN, older appearing, in NAD Mild L conjuctival injection Dec BS 
RRR 
++edema [place]+ Alert awake. Flat affect Current Facility-Administered Medications Medication Dose Route Frequency Last Dose  mirtazapine (REMERON) tablet 7.5 mg  7.5 mg Oral QHS  piperacillin-tazobactam (ZOSYN) 3.375 g in 0.9% sodium chloride (MBP/ADV) 100 mL  3.375 g IntraVENous Q8H    
 sodium bicarbonate (8.4%) 75 mEq in 0.45% sodium chloride 1,000 mL infusion   IntraVENous CONTINUOUS    
 metoclopramide (REGLAN) 5 mg/5 mL oral syrup 10 mg  10 mg Oral TIDAC 10 mg at 12/06/18 0859  
 miconazole (SECURA) 2 % extra thick cream   Topical BID  acetaminophen (TYLENOL) solution 650 mg  650 mg Oral Q6H  mg at 12/05/18 2119 Or  acetaminophen (TYLENOL) tablet 650 mg  650 mg Oral Q6H PRN    
 traMADol (ULTRAM) tablet  mg   mg Oral Q6H PRN 50 mg at 12/05/18 1427  pantoprazole (PROTONIX) 40 mg in sodium chloride 0.9% 10 mL injection  40 mg IntraVENous Q12H 40 mg at 12/06/18 7953  mupirocin (BACTROBAN) 2 % ointment   Topical BID  ammonium lactate 5 % lotn 1 Each  1 Each Topical DAILY 1 Each at 12/06/18 5385  aspirin-dipyridamole (AGGRENOX)  mg per capsule 1 Cap  1 Cap Oral Q12H 1 Cap at 12/06/18 1381  atenolol (TENORMIN) tablet 50 mg  50 mg Oral DAILY 50 mg at 12/06/18 4838  zinc oxide-cod liver oil (DESITIN) 40 % paste   Topical PRN    
 sodium chloride (NS) flush 5-10 mL  5-10 mL IntraVENous Q8H 10 mL at 12/06/18 0539  
 sodium chloride (NS) flush 5-10 mL  5-10 mL IntraVENous PRN 10 mL at 12/05/18 1528  heparin (porcine) injection 7,500 Units  7,500 Units SubCUTAneous Q8H 7,500 Units at 12/06/18 0158  glucose chewable tablet 16 g  4 Tab Oral PRN    
 dextrose (D50W) injection syrg 12.5-25 g  25-50 mL IntraVENous PRN    
 glucagon (GLUCAGEN) injection 1 mg  1 mg IntraMUSCular PRN    
 insulin lispro (HUMALOG) injection   SubCUTAneous AC&HS Stopped at 12/06/18 0730  trimethoprim-polymyxin b (POLYTRIM) 10,000 unit- 1 mg/mL ophthalmic solution 1 Drop  1 Drop Both Eyes BID 1 Drop at 12/06/18 0927  
 dilTIAZem CD (CARDIZEM CD) capsule 240 mg  240 mg Oral DAILY 240 mg at 12/06/18 8869  ondansetron (ZOFRAN) injection 4 mg  4 mg IntraVENous Q6H PRN 4 mg at 12/05/18 1527 Labs/Data: 
 
 Lab Results Component Value Date/Time WBC 9.4 12/06/2018 02:15 AM  
 Hemoglobin (POC) 8.5 07/24/2017 10:30 AM  
 HGB (POC) 11.4 (A) 08/13/2018 11:31 AM  
 HGB 10.7 (L) 12/06/2018 02:15 AM  
 HCT (POC) 35.7 (A) 08/13/2018 11:31 AM  
 HCT 33.9 (L) 12/06/2018 02:15 AM  
 PLATELET 321 68/69/8863 02:15 AM  
 MCV 92.1 12/06/2018 02:15 AM  
 
 
Lab Results Component Value Date/Time Sodium 145 12/06/2018 02:15 AM  
 Potassium 4.4 12/06/2018 02:15 AM  
 Chloride 119 (H) 12/06/2018 02:15 AM  
 CO2 17 (L) 12/06/2018 02:15 AM  
 Anion gap 9 12/06/2018 02:15 AM  
 Glucose 206 (H) 12/06/2018 02:15 AM  
 BUN 42 (H) 12/06/2018 02:15 AM  
 Creatinine 2.10 (H) 12/06/2018 02:15 AM  
 BUN/Creatinine ratio 20 12/06/2018 02:15 AM  
 GFR est AA 29 (L) 12/06/2018 02:15 AM  
 GFR est non-AA 24 (L) 12/06/2018 02:15 AM  
 Calcium 7.6 (L) 12/06/2018 02:15 AM  
 
 
Wt Readings from Last 3 Encounters:  
12/06/18 99.1 kg (218 lb 7.6 oz) 11/05/18 136.1 kg (300 lb)  
06/15/18 136.1 kg (300 lb) Intake/Output Summary (Last 24 hours) at 12/6/2018 1025 Last data filed at 12/6/2018 4543 Gross per 24 hour Intake 1140 ml Output 425 ml Net 715 ml Patient seen and examined. Chart reviewed. Labs, data and other pertinent notes reviewed in last 24 hrs. PMH/SH/FH reviewed and unchanged compared to H&P Discussed with pt/ Charol Hatchet, MD

## 2018-12-06 NOTE — CONSULTS
PSYCHIATRIC CONSULTATION 
 
                  
IDENTIFICATION:   
Patient Name  Luli Ross Date of Birth 1955 [facility] [id number] Medical Record Number  [id number] Age  58 y.o. PCP Gisel Cardona MD  
Admit date:  12/1/2018 Room Number  5162/30  @ [facility] Date of Service  12/5/2018 HISTORY  
 
   
REASON FOR HOSPITALIZATION/CONSULTATION: 
A psychiatric consultation was requested by Gisel Cardona MD to evaluate or provide advice/opinion related to evaluating for depression CC: \"i am okay\" HISTORY OF PRESENT ILLNESS:   
The patient, Luli Ross, is a 58 y.o. WHITE [place]  female with a past psychiatric history significant for depression  , who was admitted to the medical floor for the treatment of FTT (failure to thrive) in adult. Pt seen today for evaluation. She is alert but having speech difficulty with stuttering. She initially denied depressive sx but her  at her bedside who provided with collateral hx. Pt has several admission due to medical conditions- FTT, poor appetite and worsening gastroparesis. Pt had home health and she voiced depressed mood to her nurse. She also stopped taking her medications but since her gastric sx are improved , she has started eating and taking her medications. Pt denies feeling SI/Hopelessness. She reports low energy and lethargy. Denies feeling of guilt . Denies ken or psychosis. She does have speech impairment when her medical condition worsens ALLERGIES: 
Allergies Allergen Reactions  Zestril [Lisinopril] Swelling MEDICATIONS PRIOR TO ADMISSION: 
Medications Prior to Admission Medication Sig  
 aspirin-dipyridamole (AGGRENOX)  mg per SR capsule TAKE 1 CAPSULE BY MOUTH TWICE DAILY( EVERY TWELVE HOURS)  nystatin (MYCOSTATIN) topical cream Apply  to affected area two (2) times a day.   
 metoclopramide HCl (REGLAN) 5 mg tablet TAKE 1 TABLET BY MOUTH FOUR TIMES DAILY FOR 10 DAYS  atorvastatin (LIPITOR) 40 mg tablet TAKE 1 TABLET BY MOUTH EVERY DAY  ondansetron (ZOFRAN ODT) 4 mg disintegrating tablet Take 1 Tab by mouth every eight (8) hours as needed for Nausea.  LEVEMIR FLEXTOUCH U-100 INSULN 100 unit/mL (3 mL) inpn INJECT 54 UNITS UNDER THE SKIN TWICE DAILY (Patient taking differently: INJECT 20 UNITS UNDER THE SKIN TWICE DAILY)  LYRICA 75 mg capsule TAKE 1 CAPSULE BY MOUTH TWICE DAILY AT LUNCH TIME AND AGAIN AT BEDTIME  
 pantoprazole (PROTONIX) 40 mg tablet TAKE 1 TABLET BY MOUTH EVERY DAY  atenolol (TENORMIN) 50 mg tablet TAKE 1 TABLET BY MOUTH EVERY NIGHT AT BEDTIME.  hydrALAZINE (APRESOLINE) 50 mg tablet TAKE 1 TABLET BY MOUTH THREE TIMES DAILY  FREESTYLE LITE STRIPS strip TEST BLOOD SUGAR THREE TIMES DAILY AS NEEDED  
 dilTIAZem CD (CARDIZEM CD) 240 mg ER capsule Take 1 Cap by mouth daily.  bumetanide (BUMEX) 2 mg tablet Take 2 mg by mouth daily.  ammonium lactate (LAC-HYDRIN) 12 % lotion Apply 1 Each to affected area daily. rub in to affected area well  clotrimazole (LOTRIMIN) 1 % topical cream Apply  to affected area two (2) times a day.  zinc oxide-cod liver oil (DESITIN) 40 % paste Apply  to affected area as needed for Skin Irritation.  allopurinol (ZYLOPRIM) 300 mg tablet Take 0.5 Tabs by mouth daily.  insulin lispro (HUMALOG) 100 unit/mL injection For BS 3x/day before meals 140-199=2 units 200-249=3 units 250-299=5 units 300-349=7 units 350 or greater = Call MD  
 acetaminophen (TYLENOL EXTRA STRENGTH) 500 mg tablet Take 1,000 mg by mouth every six (6) hours as needed for Pain.  colestipol (COLESTID) 1 gram tablet Take 1 g by mouth daily.  Bifidobacterium Infantis 4 mg cap Take 4 mg by mouth daily.  ferrous sulfate (SLOW FE) 142 mg (45 mg iron) ER tablet Take 142 mg by mouth Daily (before breakfast).  Cholecalciferol, Vitamin D3, 2,000 unit cap Take 1 Cap by mouth daily.  potassium chloride (K-DUR, KLOR-CON) 20 mEq tablet Take 1 Tab by mouth two (2) times a day. PAST MEDICAL HISTORY: 
Past Medical History:  
Diagnosis Date  Acute combined systolic and diastolic heart failure, NYHA class 2 ([address] 2017  Anemia 2017  Aortic stenosis, mild 2017  Bilateral edema of lower extremity 2017  BMI 45.0-49.9, adult ([address] 2017  Chronic diastolic CHF (congestive heart failure), NYHA class 3 ([address] 2017  CKD (chronic kidney disease) 2018  CKD (chronic kidney disease) stage 2, GFR 60-89 ml/min  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 2 diabetes mellitus ([address] 2017  Diabetes ([address]  Fatty liver 2017  Gastroparesis  GERD (gastroesophageal reflux disease)  Gout   
 HTN (hypertension)  Hyperlipemia  Hyperparathyroidism ([address]  Hypertension 2017  Insomnia 2017  Low back pain 2017  N&V (nausea and vomiting) 2017  Nausea & vomiting  Obesity  Pelvic joint pain 2017  Peripheral neuropathy 2017  Sciatica   
 right  Severe right groin pain 2017  Sinus tachycardia 2017  Stage 2 chronic kidney disease due to benign hypertension 2017  Stasis dermatitis 2017  Stroke [facility]) 2001  
 right parietal, left caudate  Thyroid disease   
 para thyroid(high calcium)  Ulcer of right fifth toe due to diabetes mellitus ([address] 2017 Past Surgical History:  
Procedure Laterality Date  CHEST SURGERY PROCEDURE UNLISTED  10/24/13 PARATHYROID EXPLORATION   
 DELIVERY     
 HX AMPUTATION TOE  2018  HX  SECTION    
 HX CHOLECYSTECTOMY  HX OTHER SURGICAL    
 I&D right thigh abscess SOCIAL HISTORY:  
Social History Socioeconomic History  Marital status:  Spouse name: Not on file  Number of children: Not on file  Years of education: Not on file  Highest education level: Not on file Social Needs  Financial resource strain: Not on file  Food insecurity - worry: Not on file  Food insecurity - inability: Not on file  Transportation needs - medical: Not on file  Transportation needs - non-medical: Not on file Occupational History  Not on file Tobacco Use  Smoking status: Never Smoker  Smokeless tobacco: Never Used Substance and Sexual Activity  Alcohol use: Yes Comment: rare  Drug use: Yes Types: Prescription, OTC  Sexual activity: Not on file Other Topics Concern  Not on file Social History Narrative  Not on file FAMILY HISTORY: History reviewed. No pertinent family history. Family History Problem Relation Age of Onset  Cancer Mother   
     cervical  
 Diabetes Mother  Hypertension Mother  Stroke Mother 79  
 Diabetes Father  Hypertension Father  Kidney Disease Father  Diabetes Brother  Hypertension Brother  Stroke Brother 61 REVIEW of SYSTEMS:  
Psychological ROS: positive for - anxiety and depression Pertinent items are noted in the History of Present Illness. All other Systems reviewed and are considered negative. [facility] MENTAL STATUS EXAM (MSE): MSE FINDINGS ARE WITHIN NORMAL LIMITS (WNL) UNLESS OTHERWISE STATED BELOW. Orientation lethargic, Alert and Oriented x 2 Vital Signs (BP,Pulse, Temp) See below (reviewed 12/5/2018); vital signs are WNL if not listed below. Gait and Station Stable, no ataxia Abnormal Muscular Movements/Tone/Behavior No EPS, no Tardive Dyskinesia, no abnormal muscular movements; wnl tone Relations passive General Appearance:  age appropriate Language No aphasia or dysarthria Speech:  monotone and soft Thought Processes logical, poor abstract reasoning and computation Thought Associations blocked Thought Content free of delusions and free of hallucinations Suicidal Ideations none Homicidal Ideations none Mood:  depressed Affect:  constricted and depressed Memory recent  impaired Memory remote:  adequate Concentration/Attention:  impaired Fund of Knowledge average Insight:  fair Reliability poor Judgment:  poor VITALS:    
Patient Vitals for the past 24 hrs: 
 Temp Pulse Resp BP SpO2  
12/05/18 1521 97.6 °F (36.4 °C) 80 18 140/76 96 % 12/05/18 1030 97.4 °F (36.3 °C) 70 20 134/60 94 % 12/05/18 0856 97.6 °F (36.4 °C) 63 22 127/71 93 % 12/05/18 0323 97.4 °F (36.3 °C) 60 18 121/72 94 % 12/04/18 2241 97.4 °F (36.3 °C) (!) 57 18 104/49 93 % DATA LABORATORY DATA: 
Labs Reviewed CULTURE, URINE - Abnormal; Notable for the following components:  
    Result Value Culture result:   (*)   
 Value: YEAST (PREDOMINATING) Yeast identification can be performed upon request from a physician. Please contact the Microbiology Laboratory at [phone]. Culture result: KLEBSIELLA OXYTOCA 2000 COLONIES/mL (*) Culture result: ENTEROCOCCUS FAECALIS GROUP D 2000 COLONIES/mL (*) All other components within normal limits CULTURE, WOUND W GRAM STAIN - Abnormal; Notable for the following components:  
 Culture result: MODERATE ANAEROBIC GRAM NEGATIVE RODS (*) All other components within normal limits CULTURE, WOUND W GRAM STAIN - Abnormal; Notable for the following components:  
 Culture result: LIGHT YEAST, (APPARENT CANDIDA ALBICANS) (*) All other components within normal limits CBC WITH AUTOMATED DIFF - Abnormal; Notable for the following components: WBC 13.9 (*) NEUTROPHILS 81 (*) LYMPHOCYTES 6 (*) IMMATURE GRANULOCYTES 1 (*)   
 ABS. NEUTROPHILS 11.2 (*)   
 ABS. MONOCYTES 1.5 (*)   
 ABS. IMM. GRANS. 0.1 (*) All other components within normal limits URINALYSIS W/ REFLEX CULTURE - Abnormal; Notable for the following components:  
 Appearance TURBID (*) Protein >300 (*) Glucose 250 (*) Ketone 15 (*) Blood MODERATE (*) Leukocyte Esterase LARGE (*) WBC >100 (*) Bacteria 2+ (*)   
 UA:UC IF INDICATED URINE CULTURE ORDERED (*) Mucus TRACE (*) Yeast w/hyphae PRESENT (*) All other components within normal limits TROPONIN I - Abnormal; Notable for the following components:  
 Troponin-I, Qt. 0.10 (*) All other components within normal limits CK W/ CKMB & INDEX - Abnormal; Notable for the following components:  
 CK-MB Index 5.3 (*) All other components within normal limits METABOLIC PANEL, COMPREHENSIVE - Abnormal; Notable for the following components:  
 Chloride 111 (*)   
 CO2 15 (*) Anion gap 16 (*) Glucose 161 (*) BUN 50 (*) Creatinine 1.87 (*)   
 BUN/Creatinine ratio 27 (*)   
 GFR est AA 33 (*)   
 GFR est non-AA 27 (*) Albumin 2.2 (*) Globulin 4.3 (*) A-G Ratio 0.5 (*) All other components within normal limits HEMOGLOBIN A1C WITH EAG - Abnormal; Notable for the following components:  
 Hemoglobin A1c 10.4 (*) All other components within normal limits METABOLIC PANEL, BASIC - Abnormal; Notable for the following components:  
 Potassium 3.3 (*) Chloride 114 (*)   
 CO2 16 (*) Glucose 135 (*) BUN 50 (*) Creatinine 1.78 (*)   
 BUN/Creatinine ratio 28 (*)   
 GFR est AA 35 (*)   
 GFR est non-AA 29 (*) Calcium 8.3 (*) All other components within normal limits METABOLIC PANEL, BASIC - Abnormal; Notable for the following components:  
 Chloride 115 (*)   
 CO2 16 (*) Glucose 122 (*) BUN 39 (*) Creatinine 1.72 (*)   
 BUN/Creatinine ratio 23 (*)   
 GFR est AA 36 (*)   
 GFR est non-AA 30 (*) Calcium 7.9 (*) All other components within normal limits BLOOD GAS, ARTERIAL - Abnormal; Notable for the following components:  
 pH 7.34 (*) PCO2 34 (*) BICARBONATE 18 (*) All other components within normal limits CBC W/O DIFF - Abnormal; Notable for the following components: RBC 3.74 (*) HGB 10.9 (*) HCT 34.9 (*)   
 RDW 14.9 (*) All other components within normal limits METABOLIC PANEL, BASIC - Abnormal; Notable for the following components:  
 Chloride 118 (*)   
 CO2 17 (*) Glucose 239 (*) BUN 41 (*) Creatinine 1.91 (*)   
 BUN/Creatinine ratio 21 (*)   
 GFR est AA 32 (*)   
 GFR est non-AA 26 (*) Calcium 7.6 (*) All other components within normal limits CBC W/O DIFF - Abnormal; Notable for the following components: RBC 3.65 (*) HGB 10.6 (*) HCT 34.0 (*)   
 RDW 14.9 (*) All other components within normal limits METABOLIC PANEL, BASIC - Abnormal; Notable for the following components:  
 Chloride 120 (*)   
 CO2 16 (*) Glucose 269 (*) BUN 44 (*) Creatinine 2.27 (*)   
 GFR est AA 26 (*)   
 GFR est non-AA 22 (*) Calcium 7.2 (*) All other components within normal limits CBC W/O DIFF - Abnormal; Notable for the following components: RBC 3.67 (*) HGB 10.8 (*) HCT 34.0 (*)   
 RDW 15.4 (*) All other components within normal limits VANCOMYCIN, TROUGH - Abnormal; Notable for the following components:  
 Vancomycin,trough 18.1 (*) All other components within normal limits PROTEIN/CREATININE RATIO, URINE - Abnormal; Notable for the following components:  
 Protein, urine random 475 (*) All other components within normal limits GLUCOSE, POC - Abnormal; Notable for the following components:  
 Glucose (POC) 158 (*) All other components within normal limits GLUCOSE, POC - Abnormal; Notable for the following components:  
 Glucose (POC) 161 (*) All other components within normal limits GLUCOSE, POC - Abnormal; Notable for the following components:  
 Glucose (POC) 118 (*) All other components within normal limits GLUCOSE, POC - Abnormal; Notable for the following components:  
 Glucose (POC) 135 (*) All other components within normal limits GLUCOSE, POC - Abnormal; Notable for the following components:  
 Glucose (POC) 133 (*) All other components within normal limits GLUCOSE, POC - Abnormal; Notable for the following components:  
 Glucose (POC) 232 (*) All other components within normal limits GLUCOSE, POC - Abnormal; Notable for the following components:  
 Glucose (POC) 168 (*) All other components within normal limits GLUCOSE, POC - Abnormal; Notable for the following components:  
 Glucose (POC) 209 (*) All other components within normal limits GLUCOSE, POC - Abnormal; Notable for the following components:  
 Glucose (POC) 171 (*) All other components within normal limits GLUCOSE, POC - Abnormal; Notable for the following components:  
 Glucose (POC) 222 (*) All other components within normal limits GLUCOSE, POC - Abnormal; Notable for the following components:  
 Glucose (POC) 208 (*) All other components within normal limits GLUCOSE, POC - Abnormal; Notable for the following components:  
 Glucose (POC) 210 (*) All other components within normal limits GLUCOSE, POC - Abnormal; Notable for the following components:  
 Glucose (POC) 231 (*) All other components within normal limits GLUCOSE, POC - Abnormal; Notable for the following components:  
 Glucose (POC) 257 (*) All other components within normal limits GLUCOSE, POC - Abnormal; Notable for the following components:  
 Glucose (POC) 284 (*) All other components within normal limits GLUCOSE, POC - Abnormal; Notable for the following components:  
 Glucose (POC) 258 (*) All other components within normal limits GLUCOSE, POC - Abnormal; Notable for the following components:  
 Glucose (POC) 263 (*) All other components within normal limits LACTIC ACID AMMONIA  
TSH 3RD GENERATION  
MAGNESIUM  
PHOSPHORUS  
BILIRUBIN, CONFIRM Admission on 12/01/2018 No results displayed because visit has over 200 results. RADIOLOGY REPORTS: 
Results from Hospital Encounter encounter on 12/01/18 XR CHEST PORT Narrative Clinical indication: Chest pain. Portable AP supine view of the chest is obtained, comparison April 27. Inspiration remains shallow with borderline cardiomegaly. No acute infiltrate. Impression IMPRESSION: Prominent size heart stable. No acute infiltrate. Ct Head Wo Cont Result Date: 12/2/2018 EXAM:  CT HEAD WO CONT INDICATION:   AMS COMPARISON: March 6, 2018. CONTRAST:  None. TECHNIQUE: Unenhanced CT of the head was performed using 5 mm images. Brain and bone windows were generated. CT dose reduction was achieved through use of a standardized protocol tailored for this examination and automatic exposure control for dose modulation. FINDINGS: Ventricular size is stable prominent for age. Right parietal encephalomalacia also unchanged. There is no extra-axial fluid collection, hemorrhage shift or masses . impression: No acute changes. Ct Abd Pelv Wo Cont Result Date: 12/2/2018 EXAM:  CT ABD PELV WO CONT INDICATION: n/v/AP COMPARISON: March 15, 2018 CONTRAST:  None IV. TECHNIQUE: Thin axial images were obtained through the abdomen and pelvis. Coronal and sagittal reconstructions were generated. Oral contrast was administered. CT dose reduction was achieved through use of a standardized protocol tailored for this examination and automatic exposure control for dose modulation. The absence of intravenous contrast material reduces the sensitivity for evaluation of the solid parenchymal organs of the abdomen. FINDINGS: LUNG BASES: Minimal interstitial prominence at the lung bases posterior medially. Hiatal hernia. Vascular calcification in the heart. INCIDENTALLY IMAGED HEART AND MEDIASTINUM: Unremarkable. LIVER: No mass or biliary dilatation.  GALLBLADDER: Prior cholecystectomy. SPLEEN: No mass. PANCREAS: No mass or ductal dilatation. ADRENALS: Unremarkable. KIDNEYS/URETERS: Fat-containing mass right kidney centrally is stable in size. Renal atrophy. No Calculus, or hydronephrosis. STOMACH: Unremarkable. SMALL BOWEL: No dilatation or wall thickening. COLON: No dilatation or wall thickening. APPENDIX: Unremarkable. PERITONEUM: No ascites or pneumoperitoneum. RETROPERITONEUM: No lymphadenopathy or aortic aneurysm. REPRODUCTIVE ORGANS: Unremarkable uterus URINARY BLADDER: No mass or calculus. BONES: No destructive bone lesion. ADDITIONAL COMMENTS SOFT tissue swelling anterior abdominal wall on the left unknown clinical significance associated skin thickening IMPRESSION: No acute intra-abdominal abnormalities or. Anterior abdominal wall soft tissue swelling and skin thickening left side. Xr Chest [facility] Result Date: 12/1/2018 Clinical indication: Chest pain. Portable AP supine view of the chest is obtained, comparison April 27. Inspiration remains shallow with borderline cardiomegaly. No acute infiltrate. IMPRESSION: Prominent size heart stable. No acute infiltrate. MEDICATIONS ALL MEDICATIONS Current Facility-Administered Medications Medication Dose Route Frequency  sodium bicarbonate (8.4%) 75 mEq in 0.45% sodium chloride 1,000 mL infusion   IntraVENous CONTINUOUS  
 [START ON 12/6/2018] vancomycin (VANCOCIN) 1250 mg in  ml infusion  1,250 mg IntraVENous Q36H  
 [START ON 12/6/2018] levoFLOXacin (LEVAQUIN) tablet 500 mg  500 mg Oral Q48H  
 [START ON 12/6/2018] Vancomycin Random  1 Each Other ONCE  
 metoclopramide (REGLAN) 5 mg/5 mL oral syrup 10 mg  10 mg Oral TIDAC  miconazole (SECURA) 2 % extra thick cream   Topical BID  acetaminophen (TYLENOL) solution 650 mg  650 mg Oral Q6H PRN  Or  
 acetaminophen (TYLENOL) tablet 650 mg  650 mg Oral Q6H PRN  
 traMADol (ULTRAM) tablet  mg   mg Oral Q6H PRN  
  pantoprazole (PROTONIX) 40 mg in sodium chloride 0.9% 10 mL injection  40 mg IntraVENous Q12H  
 escitalopram oxalate (LEXAPRO) tablet 10 mg  10 mg Oral DAILY  mupirocin (BACTROBAN) 2 % ointment   Topical BID  ammonium lactate 5 % lotn 1 Each  1 Each Topical DAILY  aspirin-dipyridamole (AGGRENOX)  mg per capsule 1 Cap  1 Cap Oral Q12H  
 atenolol (TENORMIN) tablet 50 mg  50 mg Oral DAILY  zinc oxide-cod liver oil (DESITIN) 40 % paste   Topical PRN  
 sodium chloride (NS) flush 5-10 mL  5-10 mL IntraVENous Q8H  
 sodium chloride (NS) flush 5-10 mL  5-10 mL IntraVENous PRN  
 heparin (porcine) injection 7,500 Units  7,500 Units SubCUTAneous Q8H  
 glucose chewable tablet 16 g  4 Tab Oral PRN  
 dextrose (D50W) injection syrg 12.5-25 g  25-50 mL IntraVENous PRN  
 glucagon (GLUCAGEN) injection 1 mg  1 mg IntraMUSCular PRN  
 insulin lispro (HUMALOG) injection   SubCUTAneous AC&HS  trimethoprim-polymyxin b (POLYTRIM) 10,000 unit- 1 mg/mL ophthalmic solution 1 Drop  1 Drop Both Eyes BID  dilTIAZem CD (CARDIZEM CD) capsule 240 mg  240 mg Oral DAILY  ondansetron (ZOFRAN) injection 4 mg  4 mg IntraVENous Q6H PRN  
  
SCHEDULED MEDICATIONS Current Facility-Administered Medications Medication Dose Route Frequency  sodium bicarbonate (8.4%) 75 mEq in 0.45% sodium chloride 1,000 mL infusion   IntraVENous CONTINUOUS  
 [START ON 12/6/2018] vancomycin (VANCOCIN) 1250 mg in  ml infusion  1,250 mg IntraVENous Q36H  
 [START ON 12/6/2018] levoFLOXacin (LEVAQUIN) tablet 500 mg  500 mg Oral Q48H  
 [START ON 12/6/2018] Vancomycin Random  1 Each Other ONCE  
 metoclopramide (REGLAN) 5 mg/5 mL oral syrup 10 mg  10 mg Oral TIDAC  miconazole (SECURA) 2 % extra thick cream   Topical BID  pantoprazole (PROTONIX) 40 mg in sodium chloride 0.9% 10 mL injection  40 mg IntraVENous Q12H  
 escitalopram oxalate (LEXAPRO) tablet 10 mg  10 mg Oral DAILY  mupirocin (BACTROBAN) 2 % ointment   Topical BID  ammonium lactate 5 % lotn 1 Each  1 Each Topical DAILY  aspirin-dipyridamole (AGGRENOX)  mg per capsule 1 Cap  1 Cap Oral Q12H  
 atenolol (TENORMIN) tablet 50 mg  50 mg Oral DAILY  sodium chloride (NS) flush 5-10 mL  5-10 mL IntraVENous Q8H  
 heparin (porcine) injection 7,500 Units  7,500 Units SubCUTAneous Q8H  
 insulin lispro (HUMALOG) injection   SubCUTAneous AC&HS  trimethoprim-polymyxin b (POLYTRIM) 10,000 unit- 1 mg/mL ophthalmic solution 1 Drop  1 Drop Both Eyes BID  dilTIAZem CD (CARDIZEM CD) capsule 240 mg  240 mg Oral DAILY ASSESSMENT & PLAN The patient Alphonso Phoenix is a 58 y.o.  female who presents at this time for treatment of the following diagnoses: 
Patient Active Hospital Problem List: 
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 Plan: recommend to dc Lexapro and starting Remeron 7.5 mg HS for depression. Counseling as OP. Not appropriate for inpatient psych Disposition: re-consult psych if worsening sx Thank you very much for the opportunity to participate in the care of your patient. I will continue to follow up with patient as deemed appropriate. I have reviewed admission (and previous/old) labs and medical tests in the EHR and or transferring hospital documents. I will continue to order blood tests/labs and diagnostic tests as deemed appropriate and review results as they become available (see orders for details). I have reviewed old psychiatric and medical records available in the EHR. I Will order additional psychiatric records from other institutions to further elucidate the nature of patient's psychopathology and review once available.  
 
I will gather additional collateral information from friends, family and o/p treatment team to further elucidate the nature of patient's psychopathology and baselline level of psychiatric functioning. SIGNED:   
Rex Davies MD 
12/5/2018

## 2018-12-06 NOTE — PROGRESS NOTES
Nutrition Assessment: 
 
INTERVENTIONS/RECOMMENDATIONS:  
Meals/Snacks: General/healthful diet: CCD; 5-6 small meals per day Supplements: Glucerna daily as a snack ASSESSMENT:  
Patient medically noted for FTT, acute renal failure, MDD, and vulvar abscess. Diet advanced to regular since last assessment. Patient with a good appetite and eating well now; PO % of meals per flowsheets. Previously refusing meals d/t nausea PTA. Elevated BG noted. Recommend CCD/small meals to optimize BG control and help prevent nausea/gastroparesis. Skin breakdown noted; will add glucerna daily as snack. Patient was sleeping at time of attempted visit; didn't wake to knock on door and  not at bedside. Encourage intake of meals/supplement. Diet Order: Regular 
% Eaten:   
Patient Vitals for the past 72 hrs: 
 % Diet Eaten 12/05/18 1245 80 % 12/05/18 0830 80 % 12/04/18 0817 100 % Pertinent Medications: [x] Reviewed []Other: Humalog, Reglan, Remeron, Protonix Pertinent Labs: [x]Reviewed  []Other: [phone] Food Allergies: [x]None []Other:   Last BM: 12/1  [x]Active     []Hyperactive  []Hypoactive       [] Absent  BS Skin:    [] Intact   [] Incision  [x] Breakdown (unstageable right heel)  []Edema   []Other: Anthropometrics: Height: 5' (152.4 cm) Weight: 99.1 kg (218 lb 7.6 oz) IBW (%IBW):   ( ) UBW (%UBW):   (  %) BMI: Body mass index is 42.67 kg/m². This BMI is indicative of: 
[]Underweight   []Normal   []Overweight   [] Obesity   [x] Extreme Obesity (BMI>40) Last Weight Metrics: 
Weight Loss Metrics 12/6/2018 11/5/2018 8/13/2018 6/15/2018 5/16/2018 4/27/2018 3/23/2018 Today's Wt 218 lb 7.6 oz 300 lb - 300 lb 300 lb 300 lb 300 lb BMI 42.67 kg/m2 58.59 kg/m2 - 58.59 kg/m2 58.59 kg/m2 58.59 kg/m2 58.59 kg/m2 Estimated Nutrition Needs (Based on): 7816 Kcals/day(BMR (1462) x 1. 2AF -250kcal) , 99 g(1.0 g/kg bw) Protein Carbohydrate: At Least 130 g/day  Fluids: 1500 mL/day Pt expected to meet estimated nutrient needs: [x]Yes []No 
 
NUTRITION DIAGNOSES:  
Problem:  Increased nutrient needs Etiology: related to wound healing Signs/Symptoms: as evidenced by unstageable right heel NUTRITION INTERVENTIONS: 
Meals/Snacks: General/healthful diet GOAL:  
PO intake >70% of meals/supplement next 3-5 days NUTRITION MONITORING AND EVALUATION Food/Nutrient Intake Outcomes: Total energy intake Physical Signs/Symptoms Outcomes: Weight/weight change, Glucose profile, Electrolyte and renal profile, GI profile Previous Goal Met: 
 [x] Met              [] Progressing Towards Goal              [] Not Progressing Towards Goal  
Previous Recommendations: 
 [x] Implemented          [] Not Implemented          [] Not Applicable LEARNING NEEDS (Diet, Food/Nutrient-Drug Interaction):  
 [x] None Identified 
 [] Identified and Education Provided/Documented 
 [] Identified and Pt declined/was not appropriate Cultural, Judaism, OR Ethnic Dietary Needs:  
 [x] None Identified 
 [] Identified and Addressed 
 
 [x] Interdisciplinary Care Plan Reviewed/Documented  
 [x] Discharge Planning: CCD - 5-6 small meals/day  
 [] Participated in Interdisciplinary Rounds NUTRITION RISK:  
 [] High              [x] Moderate           []  Low  []  Minimal/Uncompromised Michel Gonzalez Pager [phone] Weekend Pager [phone]

## 2018-12-06 NOTE — PROGRESS NOTES
Some exudate on left labial wound. Beginning to granulate. Labia no longer erythematous. Change to cotton 4x4 to aid in debriding of exudate. Will sign off. Please call as needed. Thanks.

## 2018-12-06 NOTE — PROGRESS NOTES
Music Therapy Assessment [facility] Form [id number]  [id number]   
1955  58 y.o.  female Patient Telephone Number: [phone] (home) Orthodoxy Affiliation: [employer]  
Language: [place] Extended Emergency Contact Information Primary Emergency Contact: PawelJanes Baron [address] Home Phone: [phone] Mobile Phone: [phone] Relation: Spouse Secondary Emergency Contact: Cas Jacobson [address] Mobile Phone: [phone] Relation: Child Patient Active Problem List  
 Diagnosis Date Noted  Vulvar abscess 12/03/2018  FTT (failure to thrive) in adult 12/02/2018  Severe episode of recurrent major depressive disorder, without psychotic features ([address] 12/02/2018  DM (diabetes mellitus) ([address] 12/01/2018  Acute renal failure (ARF) ([address] 12/01/2018  Generalized abdominal pain 08/13/2018  Acute cystitis without hematuria 08/13/2018  Bradycardia 03/29/2018  Hypoxia 03/29/2018  GIRISH (acute kidney injury) ([address] 03/16/2018  Subacute osteomyelitis of right foot ([address] 03/12/2018  CVA, old, aphasia 03/09/2018  Sepsis ([address] 03/05/2018  Obesity hypoventilation syndrome ([address] 02/19/2018  Type 2 diabetes mellitus with diabetic neuropathy ([address] 01/02/2018  Venous stasis dermatitis of both lower extremities 11/09/2017  
 B12 deficiency 10/31/2017  (HFpEF) heart failure with preserved ejection fraction ([address] 10/31/2017  PE (physical exam), annual 10/11/2017  Gout 10/11/2017  History of hyperparathyroidism 10/11/2017  GERD (gastroesophageal reflux disease) 10/11/2017  Insomnia 07/24/2017  Sinus tachycardia 07/24/2017  Anemia 07/24/2017  Ulcer of right fifth toe due to diabetes mellitus ([address] 07/24/2017  Peripheral neuropathy 07/24/2017  Chronic diastolic CHF (congestive heart failure), NYHA class 3 ([address] 07/24/2017  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 2 diabetes mellitus ([facility] 2017  Stage 2 chronic kidney disease due to benign hypertension 2017  Hypertension 2017  BMI 45.0-49.9, adult ([facility] 2017  Fatty liver 2017  Stasis dermatitis 2017  Low back pain 2017  Aortic stenosis, mild 2017  Bilateral edema of lower extremity 2017  ASCVD (arteriosclerotic cardiovascular disease) 2014  Stroke ([facility] 2014  Diabetic polyneuropathy ([facility] 2013  Right leg weakness 2013  Diabetes mellitus type II, uncontrolled ([facility] 2013  CKD (chronic kidney disease) stage 3, GFR 30-59 ml/min ([facility])  Hyperlipemia  Diabetic gastroparesis ([facility]  Diabetes ([facility] 2011  Morbid obesity ([facility] 2011 Date: 2018 Mental Status:   [x] Alert [  ] Allen Caitlyn [  ]  Confused  [  ] Minimally responsive Communication Status: [x] Impaired Speech [  ] Nonverbal  
 
Physical Status:   [x] Oxygen in use  [  ] Hard of Hearing [  ] Vision Impaired [  ] Ambulatory  [  ] Ambulatory with assistance [  ] Non-ambulatory Music Preferences, Background: Annette Akosua from the s-, Keerthi songs. Clinical Problem addressed: Improve mood, support healthy pt/family coping, alleviate pain. Goal(s) met in session: 
Physical/Pain management (Scale of 1-10): Pre-session ratin Post-session rating: Pt said the music took her mind off her pain and discomfort. [  ] Increased relaxation   [  ] Regulated breathing patterns [  ] Decreased muscle tension   [  ] Minimized physical distress Emotional/Psychological: 
[  ] Increased self-expression   [  ] Decreased aggressive behavior [  ] Decreased sadness   [  ] Discussed healthy coping skills [x] Improved mood    [  ] Decreased withdrawn behavior Social: 
[  ] Decreased feelings of isolation/loneliness [  ] Positive social interaction [x] Provided support and/or comfort for family/friends Spiritual: 
[  ] Spiritual support    [  ] Expressed peace [  ] Expressed anuradha    [  ] Discussed beliefs Techniques Utilized (Check all that apply):  
[  ] Procedural support MT [x] Music for relaxation [x] Patient preferred music 
[  ] Florinda analysis  [x] Song choice  [  ] Music for validation [  ] Entrainment  [  ] Progressive muscle relax. [  ] Guided visualization [  ] Galina Chari  [  ] Patient instrument playing [  ] Stevenson Tapia writing [  ] Chad Hard along   [  ] Erum Giron  [  ] Sensory stimulation [  ] Active Listening  [  ] Music for spiritual support [  ] Making of CDs as gifts Session Observations:  Referral from Franchesca Laureano, Palliative . This music therapist (MT) spoke on the phone with the patient's spouse Anju Rios and he shared the patient's music preferences. Patient (pt) was alert lying in bed and her affect was flat. Her speech was garbled at times, but she was able to share the she had discomfort and to report on her pain. MT shared about speaking with her spouse and offered music therapy to pt. Pt accepted, and MT then confirmed pt's music preferences. MT sat at bedside and softly sang and played the Cryptonator song Silent Night. Pt smiled at the end of the song. MT asked pt if the song brought memories to mind and pt smiled again and nodded yes. MT asked pt to choose between two rock musicians by whom she'd like to hear a song next. Pt chose Rickie Reil and MT sang and played [address] with fredi. Pt again smiled at the end of the song and she expressed enjoyment in it. Pt chose to hear a Annamaria Renick song next. Pt's spouse entered as MT was singing and playing the last verse of This One's for the Girls. MT paused the song to greet him and he encouraged MT to finish the song, which MT did. Pt's affect was bright as MT concluded the session and she expressed gratitude for it. Will follow as able. FERMIN Frederick (Music Therapist-Board Certified) Spiritual Care Department Referral-based service

## 2018-12-06 NOTE — PROGRESS NOTES
Bedside shift change report given to Tootie Fontenot (oncoming nurse) by Yelitza Rubio (offgoing nurse). Report included the following information SBAR, Kardex, Intake/Output, MAR and Recent Results.

## 2018-12-06 NOTE — PROGRESS NOTES
Bedside shift change report given to Tana Black (oncoming nurse) by Ulysses Benavides (offgoing nurse). Report included the following information SBAR, Kardex, Intake/Output, MAR and Recent Results Wound care completed per order. PRN zofran and ultram given per [facility] (Ultram given prior to wound care per pt and family request)

## 2018-12-06 NOTE — PROGRESS NOTES
[facility]: [phone] ([phone]) [facility]: [phone]) Patient has been approved by Haylee for Palliative home visits when she does return home, if family want this service. Jovan Gleason who was in room to let him know but there was something going on with patient and he was not able to talk at this time. Palliative team will follow up tomorrow.

## 2018-12-06 NOTE — PROGRESS NOTES
I spoke with spouse who would like me to send the referrals to [place] where he wants a private room. We will meet at 10a tomorrow. Chart sent to these facilities.

## 2018-12-06 NOTE — TELEPHONE ENCOUNTER
Note: 12/6/18 @ 1:50 pm, spoke to [place], Call ref: 12/6/18Vanity; Per Hermes [address] and [phone] do NOT need prior authorization/pre certification or referrals.  Provided NPI and Tax ID - informed Elias Curry;

## 2018-12-07 PROBLEM — L89.150 PRESSURE INJURY OF SACRAL REGION, UNSTAGEABLE (HCC): Status: ACTIVE | Noted: 2018-01-01

## 2018-12-07 PROBLEM — L89.612 PRESSURE INJURY OF RIGHT HEEL, STAGE 2 (HCC): Status: ACTIVE | Noted: 2018-01-01

## 2018-12-07 NOTE — PROGRESS NOTES
[address] 
[phone] Medical Progress Note NAME: Saqib Lorenzo :  1955 MRM:  [id number] Date/Time: 2018  7:55 AM 
 
Subjective:  
 
Remains alert and conversant this am.  Appreciate extensive palliative care input. Family needs decide re DDNR and if they would accept feeding tube if necessary. At this point eating some w/o N/V. Continues wound care and abx. Appreciate renal and psych input. Will definitely need SNF and  has decided on [facility] [facility]. With culture result have changed abx to monotherapy with Zosyn. Have changed antidepressant to Remeron. Still issues with renal function and renal has adjusted fluids, etc. Creatinine slightly high. CO2 better. BS's better but still high. Will start low dose Lantus. Constipated. Past Medical History reviewed and unchanged from Admission History and Physical and/or prior progress note/electronic medical record Medications reviewed. ROS:   
 
General: postive for weakness  Drainage left side vulva Eyes: positive for left eye drainage and irritation Ear Nose and Throat: positive for ? odynophagia Respiratory:  positive for PIPER Cardiology:  negative for chest pain, palpitations, + for orthopnea, PND, edema, 
Gastrointestinal: positive for N/V, difficulties swallowing and gastroparesis Genitourinary: positive for incontinence Muskuloskeletal : negative for arthralgia, myalgia Neurological: positive for paraparesis, dysphasia and diminished mentation Objective:  
 
Last 24hrs VS reviewed since prior progress note. Most recent are: 
 
Visit Vitals /85 Pulse 62 Temp 97.6 °F (36.4 °C) Resp 16 Ht 5' (1.524 m) Wt 223 lb 8.7 oz (101.4 kg) SpO2 95% Breastfeeding? No  
BMI 43.66 kg/m² SpO2 Readings from Last 6 Encounters:  
18 95% 18 95% 18 93% 18 96% 18 97% 11/15/18 91% Intake/Output Summary (Last 24 hours) at 12/7/2018 8753 Last data filed at 12/7/2018 1609 Gross per 24 hour Intake 1380 ml Output 1075 ml Net 305 ml Physical Exam: 
 
General appearance: alert, uncooperative, no distress, slowed mentation, appears older than stated age Eyes: conjunctivitis left eye Neck: supple, symmetrical, trachea midline, no adenopathy and thyroid: not enlarged, symmetric, no tenderness/mass/nodules Lungs: clear to auscultation bilaterally Heart: regular rate and rhythm, S1, S2 normal, grade 2/6 systolic murmur, no click, rub or gallop Abdomen: morbidly obese. No appreciable mass or tenderness;  Drainage from left vulvar area and thickened abd wall Skin:  Significant sacral, buttock and perineal breakdown Extremities: 3+ brawny edema with scaling lichenified skin Neurologic: paraparesis, dysphasia, essentially nonverbal 
 
Data Review: 
 
Lab: 
 
BMP:  
Lab Results Component Value Date/Time  12/07/2018 02:33 AM  
 K 4.2 12/07/2018 02:33 AM  
  (H) 12/07/2018 02:33 AM  
 CO2 17 (L) 12/07/2018 02:33 AM  
 AGAP 10 12/07/2018 02:33 AM  
  (H) 12/07/2018 02:33 AM  
 BUN 44 (H) 12/07/2018 02:33 AM  
 CREA 2.20 (H) 12/07/2018 02:33 AM  
 GFRAA 27 (L) 12/07/2018 02:33 AM  
 GFRNA 22 (L) 12/07/2018 02:33 AM  
 
CBC:  
Lab Results Component Value Date/Time WBC 8.1 12/07/2018 02:33 AM  
 HGB 10.9 (L) 12/07/2018 02:33 AM  
 HCT 35.5 12/07/2018 02:33 AM  
  12/07/2018 02:33 AM  
 
Recent Glucose Results:  
Lab Results Component Value Date/Time  (H) 12/07/2018 02:33 AM  
 
All labs reviewed in past 24 hours Other pertinent lab: A1C > 10. TSH normal 
 
Imaging: 
 
Reviewed. CXR Assessment / Plan:  
 
Principal Problem: 
  FTT (failure to thrive) in adult (12/2/2018) Active Problems: 
  Right leg weakness (4/18/2013) Overview: R/O CVA, HNP, mononeuritis Di[facility]ropares[facility] () Stroke [facility]) (5/30/2014) Overview: Recurrent, bilateral 
 
  Acute renal failure (ARF) ([facility] (12/1/2018) Diabetic polyneuropathy ([facility] (4/20/2013) ASCVD (arteriosclerotic cardiovascular disease) (5/31/2014) Hypertension (7/24/2017) (HFpEF) heart failure with preserved ejection fraction ([address] (10/31/2017) DM (diabetes mellitus) ([facility] (12/1/2018) Vulvar abscess (12/3/2018) Morbid obesity ([facility] (1/26/2011) CKD (chronic kidney disease) stage 3, GFR 30-59 ml/min ([facility]) () Hyperlipemia () Aortic stenosis, mild (7/24/2017) GERD (gastroesophageal reflux disease) (10/11/2017) Severe episode of recurrent major depressive disorder, without psychotic features ([facility] (12/2/2018) Pressure injury of right heel, stage 2 (12/7/2018) Pressure injury of sacral region, unstageable ([facility] (12/7/2018) 1. Hydrate gently 2. Rx constpation 3. Changed to Zosyn 4. Antidepressant changed 5. Wound care 6. PT/OT/ST 7. Follow labs 8. Rio[place] 9. Low dose Lantus Care Plan discussed with: Patient, Family, Nursing Staff and Consultant/Specialist 
 
Discussed:  Care Plan Prophylaxis:  Hep SQ and H2B/PPI Disposition:  ??? 
___________________________________________________ Attending Physician: Bea Giordano MD

## 2018-12-07 NOTE — PROGRESS NOTES
Name: Loraine Duran MRN: [id number] : 1955 Assessment: 1. Acute kidney injury on chronic kidney disease III. 2.  Hypertension. Previous transient hypotension resolved 3. Diabetes type 2, uncontrolled with nephrotic range proteinuria. Suspect has nephrotic syndrome due to diabetic chronic kidney disease (urine Prot/Cr ratio- 7.9 grams) 4. Metabolic acidosis. 5.  Edema/anasarca- leg/abd wall edema + pleural effusions 6. Urinary tract infection. 7.  Recent stroke, failure to thrive and a paraparesis. 
  
Difficult scenario. A complex case. gentle IVF given for 2 days. No significant change in renal fxn. Acidosis is mild. Her wt has increased by 10 kg in last 3 days. Plan/Recommendations: 
D/C HCO3 drip Start  Bumex 2 mg every day from tomm and then increase to BID as needed Start NaHCO3 650 mg BID Needs optimization of diabetes control. Avoid nephrotoxins. No ACE inhibitor or ARB. Dose vancomycin based on levels. Target vancomycin levels of 15-20. 
  
Subjective: 
Resting. Taking po per  Bed bound. Slow to respond ROS:  
No nausea, no vomiting No chest pain, no shortness of breath Exam: 
Visit Vitals /56 (BP 1 Location: Right arm, BP Patient Position: At rest) Pulse 70 Temp 97.6 °F (36.4 °C) Resp 20 Ht 5' (1.524 m) Wt 101.4 kg (223 lb 8.7 oz) SpO2 95% Breastfeeding? No  
BMI 43.66 kg/m² WB/WN, older appearing, in NAD No icterus Dec BS 
RRR Soft, abd wall edema+ ++LE edema Riojas+ Alert awake. Flat affect Current Facility-Administered Medications Medication Dose Route Frequency Last Dose  polyethylene glycol (MIRALAX) packet 17 g  17 g Oral DAILY  docusate sodium (COLACE) capsule 100 mg  100 mg Oral BID    
  insulin glargine (LANTUS) injection 6 Units  6 Units SubCUTAneous DAILY 6 Units at 12/07/18 0921  
 sodium bicarbonate tablet 650 mg  650 mg Oral BID  mirtazapine (REMERON) tablet 7.5 mg  7.5 mg Oral QHS 7.5 mg at 12/06/18 2134  piperacillin-tazobactam (ZOSYN) 3.375 g in 0.9% sodium chloride (MBP/ADV) 100 mL  3.375 g IntraVENous Q8H 3.375 g at 12/07/18 0611  
 metoclopramide (REGLAN) 5 mg/5 mL oral syrup 10 mg  10 mg Oral TIDAC 10 mg at 12/07/18 0922  
 miconazole (SECURA) 2 % extra thick cream   Topical BID  acetaminophen (TYLENOL) solution 650 mg  650 mg Oral Q6H  mg at 12/05/18 2119 Or  acetaminophen (TYLENOL) tablet 650 mg  650 mg Oral Q6H PRN    
 traMADol (ULTRAM) tablet  mg   mg Oral Q6H PRN 50 mg at 12/07/18 1012  pantoprazole (PROTONIX) 40 mg in sodium chloride 0.9% 10 mL injection  40 mg IntraVENous Q12H 40 mg at 12/07/18 0916  
 mupirocin (BACTROBAN) 2 % ointment   Topical BID  ammonium lactate 5 % lotn 1 Each  1 Each Topical DAILY 1 Each at 12/07/18 2725  aspirin-dipyridamole (AGGRENOX)  mg per capsule 1 Cap  1 Cap Oral Q12H 1 Cap at 12/07/18 4053  atenolol (TENORMIN) tablet 50 mg  50 mg Oral DAILY 50 mg at 12/07/18 3256  zinc oxide-cod liver oil (DESITIN) 40 % paste   Topical PRN    
 sodium chloride (NS) flush 5-10 mL  5-10 mL IntraVENous Q8H 10 mL at 12/07/18 0526  sodium chloride (NS) flush 5-10 mL  5-10 mL IntraVENous PRN 10 mL at 12/05/18 1528  heparin (porcine) injection 7,500 Units  7,500 Units SubCUTAneous Q8H 7,500 Units at 12/07/18 0927  
 glucose chewable tablet 16 g  4 Tab Oral PRN    
 dextrose (D50W) injection syrg 12.5-25 g  25-50 mL IntraVENous PRN    
 glucagon (GLUCAGEN) injection 1 mg  1 mg IntraMUSCular PRN    
 insulin lispro (HUMALOG) injection   SubCUTAneous AC&HS Stopped at 12/07/18 0730  trimethoprim-polymyxin b (POLYTRIM) 10,000 unit- 1 mg/mL ophthalmic solution 1 Drop  1 Drop Both Eyes BID 1 Drop at 12/07/18 0931  
 dilTIAZem CD (CARDIZEM CD) capsule 240 mg  240 mg Oral DAILY 240 mg at 12/07/18 0180  ondansetron (ZOFRAN) injection 4 mg  4 mg IntraVENous Q6H PRN 4 mg at 12/06/18 2005 Labs/Data: 
 
Lab Results Component Value Date/Time WBC 8.1 12/07/2018 02:33 AM  
 Hemoglobin (POC) 8.5 07/24/2017 10:30 AM  
 HGB (POC) 11.4 (A) 08/13/2018 11:31 AM  
 HGB 10.9 (L) 12/07/2018 02:33 AM  
 HCT (POC) 35.7 (A) 08/13/2018 11:31 AM  
 HCT 35.5 12/07/2018 02:33 AM  
 PLATELET 245 95/57/0115 02:33 AM  
 MCV 94.9 12/07/2018 02:33 AM  
 
 
Lab Results Component Value Date/Time Sodium 143 12/07/2018 02:33 AM  
 Potassium 4.2 12/07/2018 02:33 AM  
 Chloride 116 (H) 12/07/2018 02:33 AM  
 CO2 17 (L) 12/07/2018 02:33 AM  
 Anion gap 10 12/07/2018 02:33 AM  
 Glucose 149 (H) 12/07/2018 02:33 AM  
 BUN 44 (H) 12/07/2018 02:33 AM  
 Creatinine 2.20 (H) 12/07/2018 02:33 AM  
 BUN/Creatinine ratio 20 12/07/2018 02:33 AM  
 GFR est AA 27 (L) 12/07/2018 02:33 AM  
 GFR est non-AA 22 (L) 12/07/2018 02:33 AM  
 Calcium 7.5 (L) 12/07/2018 02:33 AM  
 
 
Wt Readings from Last 3 Encounters:  
12/07/18 101.4 kg (223 lb 8.7 oz) 11/05/18 136.1 kg (300 lb)  
06/15/18 136.1 kg (300 lb) Intake/Output Summary (Last 24 hours) at 12/7/2018 1056 Last data filed at 12/7/2018 3515 Gross per 24 hour Intake 1380 ml Output 1075 ml Net 305 ml Patient seen and examined. Chart reviewed. Labs, data and other pertinent notes reviewed in last 24 hrs. PMH/SH/FH reviewed and unchanged compared to H&P Discussed with pt/ Tavares Jimenez MD

## 2018-12-07 NOTE — PROGRESS NOTES
Bedside shift change report given to Erik Rodríguez (oncoming nurse) by Shannan Cerda (offgoing nurse). Report included the following information SBAR, Kardex, Intake/Output, MAR and Recent Results.

## 2018-12-07 NOTE — PROGRESS NOTES
Long talk with spouse regarding d/c planning today at [facility]. Spouse states pt has long term disability thru the [facility] where she had worked in [facility] with the [employer]. Marielle Landry He works virtually with [employer]. He states they will not qualify for Medicaid. He does not want NHP but wants to care for her at home. He is concerned she has skilled needs he is unable to do at home. We discussed SNF vs [facility](nursing, PT, OT, HHA, and ). He will look into hiring some help at home if needed. I have pursued [facility] have concerns since therapy has signed off at present. We will try to skill her for nursing//wound care//and mast care.

## 2018-12-07 NOTE — DIABETES MGMT
DTC Consult Progress Note Recommendations/ Comments: chart reviewed for previous hospital BG management consult and hyperglycemia. Note blood sugars improving with Lantus 6 units daily. If blood sugars remain elevated during the day, patient may benefit from adding Tradjenta 5 mg daily acb. Current hospital DM medication: Lispro Correctional insulin with high sensitivity DTC will continue to follow patient as needed. ____________________________ Consult received for:   
               [x]           Hospital Blood Glucose Management Chart reviewed and initial evaluation complete on [address]. Patient is a 58 y.o. female with known diabetes- on Levemir 20 units BID at home per PTA list.  
 
 
A1c:  
Lab Results Component Value Date/Time Hemoglobin A1c 10.4 (H) 12/02/2018 03:35 AM  
 
 
Recent Glucose Results:  
Lab Results Component Value Date/Time  (H) 12/07/2018 02:33 AM  
 GLUCPOC 149 (H) 12/07/2018 07:27 AM  
 GLUCPOC 221 (H) 12/06/2018 09:08 PM  
 GLUCPOC 287 (H) 12/06/2018 06:09 PM  
  
 
Lab Results Component Value Date/Time Creatinine 2.20 (H) 12/07/2018 02:33 AM  
 
 
Active Orders Diet DIET DIABETIC CONSISTENT CARB Regular PO intake:  
Patient Vitals for the past 72 hrs: 
 % Diet Eaten 12/06/18 1500 100 % 12/06/18 0749 100 % 12/05/18 1245 80 % 12/05/18 0830 80 % Thank you. Marko Worley RD, CDE [facility] Office:  [phone] Time spent: 5 minutes

## 2018-12-08 NOTE — PROGRESS NOTES
Mast no putting out very much. Bladder scan shows 445 mls in bladder. Will readjust mast and send urine culture per orders.

## 2018-12-08 NOTE — PROGRESS NOTES
Pt sill lethargic, however with sternal rub now she responds \"thats enough! \" and pushes my hands away. Still will not open her eyes to voice.

## 2018-12-08 NOTE — PROGRESS NOTES
[address] [place] [address] 
[phone] Medical Progress Note NAME: Ammy Cabral :  1955 MRM:  [id number] Date/Time: 2018  8:57 AM 
 
Subjective:  
 
Remains alert and conversant this am. Some abdominal cramping but no N/V. Continues wound care and abx. Still issues with renal function and renal has adjusted fluids, etc. Creatinine slightly high at 2.27.  CO2 43. BS's better but still high with . Constipated. Past Medical History reviewed and unchanged from Admission History and Physical and/or prior progress note/electronic medical record Medications reviewed. ROS:   
 
General: postive for weakness  Drainage left side vulva Eyes: positive for left eye drainage and irritation Ear Nose and Throat: positive for ? odynophagia Respiratory:  positive for PIPER Cardiology:  negative for chest pain, palpitations, + for orthopnea, PND, edema, 
Gastrointestinal: positive for difficulties swallowing and gastroparesis Genitourinary: positive for incontinence Muskuloskeletal : negative for arthralgia, myalgia Neurological: positive for paraparesis, dysphasia and diminished mentation Objective:  
 
Last 24hrs VS reviewed since prior progress note. Most recent are: 
 
Visit Vitals BP (!) 149/97 (BP 1 Location: Right arm, BP Patient Position: At rest) Pulse 73 Temp 99.1 °F (37.3 °C) Resp 22 Ht 5' (1.524 m) Wt 222 lb 7.1 oz (100.9 kg) SpO2 96% Breastfeeding? No  
BMI 43.44 kg/m² SpO2 Readings from Last 6 Encounters:  
18 96% 18 95% 18 93% 18 96% 18 97% 11/15/18 91% Intake/Output Summary (Last 24 hours) at 2018 7150 Last data filed at 2018 0540 Gross per 24 hour Intake 100 ml Output  Net 100 ml Physical Exam: 
 
General appearance: alert,no distress, slowed mentation, appears older than stated age Eyes: conjunctivitis left eye Neck: supple, symmetrical, trachea midline, no adenopathy and thyroid: not enlarged, symmetric, no tenderness/mass/nodules Lungs: clear to auscultation bilaterally Heart: regular rate and rhythm, S1, S2 normal, grade 2/6 systolic murmur, no click, rub or gallop Abdomen: morbidly obese. No appreciable mass or tenderness;  Drainage from left vulvar area and thickened abd wall Skin:  Significant sacral, buttock and perineal breakdown Extremities: 3+ brawny edema with scaling lichenified skin Neurologic: paraparesis, dysphasia, essentially nonverbal 
 
Data Review: 
 
Lab: 
 
BMP:  
Lab Results Component Value Date/Time  12/08/2018 04:37 AM  
 K 4.3 12/08/2018 04:37 AM  
  (H) 12/08/2018 04:37 AM  
 CO2 19 (L) 12/08/2018 04:37 AM  
 AGAP 8 12/08/2018 04:37 AM  
  (H) 12/08/2018 04:37 AM  
 BUN 43 (H) 12/08/2018 04:37 AM  
 CREA 2.27 (H) 12/08/2018 04:37 AM  
 GFRAA 26 (L) 12/08/2018 04:37 AM  
 GFRNA 22 (L) 12/08/2018 04:37 AM  
 
CBC:  
Lab Results Component Value Date/Time WBC 11.3 (H) 12/08/2018 04:37 AM  
 HGB 12.9 12/08/2018 04:37 AM  
 HCT 43.1 12/08/2018 04:37 AM  
  12/08/2018 04:37 AM  
 
Recent Glucose Results:  
Lab Results Component Value Date/Time  (H) 12/08/2018 04:37 AM  
 
All labs reviewed in past 24 hours Other pertinent lab: A1C > 10. TSH normal 
 
Imaging: 
 
Reviewed. CXR Assessment / Plan:  
 
Principal Problem: 
  FTT (failure to thrive) in adult (12/2/2018) Active Problems: Morbid obesity ([address] (1/26/2011) Right leg weakness (4/18/2013) Overview: R/O CVA, HNP, mononeuritis CKD (chronic kidney disease) stage 3, GFR 30-59 ml/min ([facility]) () Hyperlipemia () Diabetic gastroparesis ([address] () Diabetic polyneuropathy ([address] (4/20/2013) Stroke [facility]) (5/30/2014) Overview: Recurrent, bilateral 
 
  ASCVD (arteriosclerotic cardiovascular disease) (5/31/2014) Hypertension (7/24/2017) Aortic stenosis, mild (7/24/2017) GERD (gastroesophageal reflux disease) (10/11/2017) (HFpEF) heart failure with preserved ejection fraction ([facility] (10/31/2017) DM (diabetes mellitus) ([facility] (12/1/2018) Acute renal failure (ARF) ([facility] (12/1/2018) Severe episode of recurrent major depressive disorder, without psychotic features ([facility] (12/2/2018) Vulvar abscess (12/3/2018) Pressure injury of right heel, stage 2 (12/7/2018) Pressure injury of sacral region, unstageable ([facility] (12/7/2018) 1. Rx constpation 2. Continue Zosyn 3. Continue antidepressant 4. Wound care 5. PT/OT/ST 6. Follow labs 7. Riojas 8. Low dose Lantus with SSI coverage Care Plan discussed with: Patient, Emily Gorman Discussed:  Care Plan Prophylaxis:  Hep SQ and H2B/PPI Disposition:  ??? 
___________________________________________________ Attending Physician: Aiyana Uribe MD

## 2018-12-08 NOTE — PROGRESS NOTES
Pt very lethargic today has not eaten or drank anything as of yet. Has not taken any of her pills. The  was concerned because she was not getting any fluids. I called and spoke with Dr. Hutchinson Media and he ordered NS at 75mls.

## 2018-12-08 NOTE — PROGRESS NOTES
Name: Cassie Alexander MRN: [id number] : 1955 Assessment: 1. Acute kidney injury on chronic kidney disease III. 2.  Hypertension. Previous transient hypotension resolved 3. Diabetes type 2, uncontrolled with nephrotic range proteinuria. Suspect has nephrotic syndrome due to diabetic chronic kidney disease (urine Prot/Cr ratio- 7.9 grams) 4. Metabolic acidosis. 5.  Edema/anasarca- leg/abd wall edema + pleural effusions 6. Urinary tract infection. 7.  Recent stroke, failure to thrive and a paraparesis. 
  
Difficult scenario. A complex case. gentle IVF given for 2 days. No significant change in renal fxn. Acidosis is mild. Her wt has increased by 10 kg in last 3 days. Plan/Recommendations: 
 
Poor UO. Urine looks like pus. Send repeat U/A and culture Bladder scan. Continue bumex for now 
 
 
  
Subjective: 
Resting. Taking po per  Bed bound. Slow to respond Exam: 
Visit Vitals BP (!) 149/97 (BP 1 Location: Right arm, BP Patient Position: At rest) Pulse 73 Temp 99.1 °F (37.3 °C) Resp 22 Ht 5' (1.524 m) Wt 100.9 kg (222 lb 7.1 oz) SpO2 96% Breastfeeding? No  
BMI 43.44 kg/m² WB/WN, older appearing, in NAD No icterus Dec BS 
RRR Soft, abd wall edema+ ++LE edema Riojas+ Alert awake. Flat affect Current Facility-Administered Medications Medication Dose Route Frequency Last Dose  polyethylene glycol (MIRALAX) packet 17 g  17 g Oral DAILY  docusate sodium (COLACE) capsule 100 mg  100 mg Oral BID  insulin glargine (LANTUS) injection 6 Units  6 Units SubCUTAneous DAILY 6 Units at 18 0845  sodium bicarbonate tablet 650 mg  650 mg Oral  mg at 18 1746  bumetanide (BUMEX) tablet 2 mg  2 mg Oral DAILY  piperacillin-tazobactam (ZOSYN) 3.375 g in 0.9% sodium chloride (MBP/ADV) 100 mL  3.375 g IntraVENous Q12H 3.375 g at 12/08/18 0247  mirtazapine (REMERON) tablet 7.5 mg  7.5 mg Oral QHS 7.5 mg at 12/08/18 0246  metoclopramide (REGLAN) 5 mg/5 mL oral syrup 10 mg  10 mg Oral TIDAC 10 mg at 12/07/18 1746  
 miconazole (SECURA) 2 % extra thick cream   Topical BID  acetaminophen (TYLENOL) solution 650 mg  650 mg Oral Q6H  mg at 12/05/18 2119 Or  acetaminophen (TYLENOL) tablet 650 mg  650 mg Oral Q6H PRN    
 traMADol (ULTRAM) tablet  mg   mg Oral Q6H  mg at 12/08/18 0246  pantoprazole (PROTONIX) 40 mg in sodium chloride 0.9% 10 mL injection  40 mg IntraVENous Q12H 40 mg at 12/08/18 0846  
 mupirocin (BACTROBAN) 2 % ointment   Topical BID  ammonium lactate 5 % lotn 1 Each  1 Each Topical DAILY 1 Each at 12/08/18 7883  aspirin-dipyridamole (AGGRENOX)  mg per capsule 1 Cap  1 Cap Oral Q12H 1 Cap at 12/07/18 2046  atenolol (TENORMIN) tablet 50 mg  50 mg Oral DAILY 50 mg at 12/07/18 2208  zinc oxide-cod liver oil (DESITIN) 40 % paste   Topical PRN    
 sodium chloride (NS) flush 5-10 mL  5-10 mL IntraVENous Q8H 10 mL at 12/08/18 0256  sodium chloride (NS) flush 5-10 mL  5-10 mL IntraVENous PRN 10 mL at 12/05/18 1528  heparin (porcine) injection 7,500 Units  7,500 Units SubCUTAneous Q8H 7,500 Units at 12/08/18 0246  
 glucose chewable tablet 16 g  4 Tab Oral PRN    
 dextrose (D50W) injection syrg 12.5-25 g  25-50 mL IntraVENous PRN    
 glucagon (GLUCAGEN) injection 1 mg  1 mg IntraMUSCular PRN    
 insulin lispro (HUMALOG) injection   SubCUTAneous AC&HS Stopped at 12/07/18 0730  trimethoprim-polymyxin b (POLYTRIM) 10,000 unit- 1 mg/mL ophthalmic solution 1 Drop  1 Drop Both Eyes BID 1 Drop at 12/08/18 6026  dilTIAZem CD (CARDIZEM CD) capsule 240 mg  240 mg Oral DAILY 240 mg at 12/07/18 0858  ondansetron (ZOFRAN) injection 4 mg  4 mg IntraVENous Q6H PRN 4 mg at 12/08/18 0246 Labs/Data: 
 
 Lab Results Component Value Date/Time WBC 11.3 (H) 12/08/2018 04:37 AM  
 Hemoglobin (POC) 8.5 07/24/2017 10:30 AM  
 HGB (POC) 11.4 (A) 08/13/2018 11:31 AM  
 HGB 12.9 12/08/2018 04:37 AM  
 HCT (POC) 35.7 (A) 08/13/2018 11:31 AM  
 HCT 43.1 12/08/2018 04:37 AM  
 PLATELET 780 45/69/0052 04:37 AM  
 MCV 95.4 12/08/2018 04:37 AM  
 
 
Lab Results Component Value Date/Time Sodium 145 12/08/2018 04:37 AM  
 Potassium 4.3 12/08/2018 04:37 AM  
 Chloride 118 (H) 12/08/2018 04:37 AM  
 CO2 19 (L) 12/08/2018 04:37 AM  
 Anion gap 8 12/08/2018 04:37 AM  
 Glucose 162 (H) 12/08/2018 04:37 AM  
 BUN 43 (H) 12/08/2018 04:37 AM  
 Creatinine 2.27 (H) 12/08/2018 04:37 AM  
 BUN/Creatinine ratio 19 12/08/2018 04:37 AM  
 GFR est AA 26 (L) 12/08/2018 04:37 AM  
 GFR est non-AA 22 (L) 12/08/2018 04:37 AM  
 Calcium 7.9 (L) 12/08/2018 04:37 AM  
 
 
Wt Readings from Last 3 Encounters:  
12/08/18 100.9 kg (222 lb 7.1 oz) 11/05/18 136.1 kg (300 lb)  
06/15/18 136.1 kg (300 lb) Intake/Output Summary (Last 24 hours) at 12/8/2018 1005 Last data filed at 12/8/2018 7576 Gross per 24 hour Intake 100 ml Output 450 ml Net -350 ml Patient seen and examined. Chart reviewed. Labs, data and other pertinent notes reviewed in last 24 hrs. PMH/SH/FH reviewed and unchanged compared to H&P Discussed with pt/ Lalo Fisher MD

## 2018-12-09 NOTE — PROGRESS NOTES
[address] [place] [address] 
[phone] Medical Progress Note NAME: Cally Seals :  1955 MRM:  [id number] Date/Time: 2018  7:38 AM 
 
Subjective:  
 
Less responsive yesterday but arousable this AM and refused blood draw. Some abdominal cramping yesterday and mast was obstructed. Culture was sent. Continues wound care and abx. Still issues with renal function and renal has adjusted fluids, etc.  BS's better - was 92 this AM. Afebrile Past Medical History reviewed and unchanged from Admission History and Physical and/or prior progress note/electronic medical record Medications reviewed. ROS:   
 
General: postive for weakness  Drainage left side vulva Eyes: positive for left eye drainage and irritation Ear Nose and Throat: positive for ? odynophagia Respiratory:  positive for PIPER Cardiology:  negative for chest pain, palpitations, + for orthopnea, PND, edema, 
Gastrointestinal: positive for difficulties swallowing and gastroparesis Genitourinary: positive for incontinence Muskuloskeletal : negative for arthralgia, myalgia Neurological: positive for paraparesis, dysphasia and diminished mentation Objective:  
 
Last 24hrs VS reviewed since prior progress note. Most recent are: 
 
Visit Vitals /73 (BP 1 Location: Right arm, BP Patient Position: At rest) Pulse 72 Temp 97.6 °F (36.4 °C) Resp 19 Ht 5' (1.524 m) Wt 218 lb 11.1 oz (99.2 kg) SpO2 96% Breastfeeding? No  
BMI 42.71 kg/m² SpO2 Readings from Last 6 Encounters:  
18 96% 18 95% 18 93% 18 96% 18 97% 11/15/18 91% Intake/Output Summary (Last 24 hours) at 2018 7477 Last data filed at 2018 4347 Gross per 24 hour Intake 443.75 ml Output 2175 ml Net -1731.25 ml Physical Exam: 
 
General appearance: alert,no distress, slowed mentation, appears older than stated age Eyes: conjunctivitis left eye better Neck: supple, symmetrical, trachea midline, no adenopathy and thyroid: not enlarged, symmetric, no tenderness/mass/nodules Lungs: clear to auscultation bilaterally Heart: regular rate and rhythm, S1, S2 normal, grade 2/6 systolic murmur, no click, rub or gallop Abdomen: morbidly obese. No appreciable mass or tenderness;  Drainage from left vulvar area and thickened abd wall Skin:  Significant sacral, buttock and perineal breakdown Extremities: 3+ brawny edema with scaling lichenified skin Neurologic: paraparesis, dysphasia, essentially nonverbal 
 
Data Review: 
 
Lab: 
 
BMP:  
No results found for: NA, K, CL, CO2, AGAP, GLU, BUN, CREA, GFRAA, GFRNA 
CBC:  
No results found for: WBC, HGB, HGBEXT, HCT, HCTEXT, PLT, PLTEXT, HGBEXT, HCTEXT, PLTEXT Recent Glucose Results: No results found for: GLU All labs reviewed in past 24 hours Other pertinent lab: A1C > 10. TSH normal 
 
Imaging: 
 
Reviewed. CXR Assessment / Plan:  
 
Principal Problem: 
  FTT (failure to thrive) in adult (12/2/2018) Active Problems: Morbid obesity ([facility] (1/26/2011) Right leg weakness (4/18/2013) Overview: R/O CVA, HNP, mononeuritis CKD (chronic kidney disease) stage 3, GFR 30-59 ml/min ([facility]) () Hyperlipemia () Diabetic gastroparesis ([facility] () Diabetic polyneuropathy ([facility] (4/20/2013) Stroke [facility]) (5/30/2014) Overview: Recurrent, bilateral 
 
  ASCVD (arteriosclerotic cardiovascular disease) (5/31/2014) Hypertension (7/24/2017) Aortic stenosis, mild (7/24/2017) GERD (gastroesophageal reflux disease) (10/11/2017) (HFpEF) heart failure with preserved ejection fraction ([address] (10/31/2017) DM (diabetes mellitus) ([address] (12/1/2018) Acute renal failure (ARF) ([address] (12/1/2018) Severe episode of recurrent major depressive disorder, without psychotic features ([facility] (12/2/2018) Vulvar abscess (12/3/2018) Pressure injury of right heel, stage 2 (12/7/2018) Pressure injury of sacral region, unstageable ([facility] (12/7/2018) 1. Rx constpation 2. Continue Zosyn 3. Continue antidepressant 4. Wound care 5. PT/OT/ST 6. Follow labs 7. Riojas draining better 8. Await urine culture 9. Low dose Lantus with SSI coverage Care Plan discussed with: Patient, Family Discussed:  Care Plan Prophylaxis:  Hep SQ and H2B/PPI Disposition:  ??? 
___________________________________________________ Attending Physician: Jaret Liz MD

## 2018-12-09 NOTE — PROGRESS NOTES
Name: Loraine Duran MRN: [id number] : 1955 Assessment: 1. Acute kidney injury on chronic kidney disease III. 2.  Hypertension. Previous transient hypotension resolved 3. Diabetes type 2, uncontrolled with nephrotic range proteinuria. Suspect has nephrotic syndrome due to diabetic chronic kidney disease (urine Prot/Cr ratio- 7.9 grams) 4. Metabolic acidosis. 5.  Edema/anasarca- leg/abd wall edema + pleural effusions 6. Urinary tract infection. 7.  Recent stroke, failure to thrive and a paraparesis. 
  
Difficult scenario. A complex case. gentle IVF given for 2 days. No significant change in renal fxn. Acidosis is mild. Her wt has increased by 10 kg in last 3 days. Plan/Recommendations: Riojas was obstructed Saturday. Flushed and now with good UO. Culture pending. Creatinine about the same. Continue bumex for now 
 
 
  
Subjective: 
Resting. Son says that she ate about 1/2 of breakfast.  More alert. No n/V. No dyspnea. Exam: 
Visit Vitals BP (!) 137/92 (BP 1 Location: Right arm, BP Patient Position: At rest) Pulse 70 Temp 97.5 °F (36.4 °C) Resp 14 Ht 5' (1.524 m) Wt 99.2 kg (218 lb 11.1 oz) SpO2 97% Breastfeeding? No  
BMI 42.71 kg/m² WB/WN, older appearing, in NAD No icterus Dec BS 
RRR Soft, abd wall edema+ ++LE edema Riojas+ Alert awake. Flat affect Current Facility-Administered Medications Medication Dose Route Frequency Last Dose  
 0.9% sodium chloride infusion  75 mL/hr IntraVENous CONTINUOUS 75 mL/hr at 18 1436  polyethylene glycol (MIRALAX) packet 17 g  17 g Oral DAILY  docusate sodium (COLACE) capsule 100 mg  100 mg Oral BID  insulin glargine (LANTUS) injection 6 Units  6 Units SubCUTAneous DAILY 6 Units at 18 9033  sodium bicarbonate tablet 650 mg  650 mg Oral BID Stopped at 12/08/18 1800  bumetanide (BUMEX) tablet 2 mg  2 mg Oral DAILY  piperacillin-tazobactam (ZOSYN) 3.375 g in 0.9% sodium chloride (MBP/ADV) 100 mL  3.375 g IntraVENous Q12H 3.375 g at 12/09/18 0241  mirtazapine (REMERON) tablet 7.5 mg  7.5 mg Oral QHS Stopped at 12/08/18 2200  
 metoclopramide (REGLAN) 5 mg/5 mL oral syrup 10 mg  10 mg Oral TIDAC Stopped at 12/08/18 1630  
 miconazole (SECURA) 2 % extra thick cream   Topical BID  acetaminophen (TYLENOL) solution 650 mg  650 mg Oral Q6H  mg at 12/05/18 2119 Or  acetaminophen (TYLENOL) tablet 650 mg  650 mg Oral Q6H PRN    
 traMADol (ULTRAM) tablet  mg   mg Oral Q6H  mg at 12/08/18 0246  pantoprazole (PROTONIX) 40 mg in sodium chloride 0.9% 10 mL injection  40 mg IntraVENous Q12H 40 mg at 12/08/18 2226  
 mupirocin (BACTROBAN) 2 % ointment   Topical BID  ammonium lactate 5 % lotn 1 Each  1 Each Topical DAILY 1 Each at 12/08/18 4729  aspirin-dipyridamole (AGGRENOX)  mg per capsule 1 Cap  1 Cap Oral Q12H Stopped at 12/08/18 2100  atenolol (TENORMIN) tablet 50 mg  50 mg Oral DAILY 50 mg at 12/07/18 7021  zinc oxide-cod liver oil (DESITIN) 40 % paste   Topical PRN    
 sodium chloride (NS) flush 5-10 mL  5-10 mL IntraVENous Q8H 10 mL at 12/09/18 0537  
 sodium chloride (NS) flush 5-10 mL  5-10 mL IntraVENous PRN 10 mL at 12/05/18 1528  heparin (porcine) injection 7,500 Units  7,500 Units SubCUTAneous Q8H 7,500 Units at 12/09/18 0240  
 glucose chewable tablet 16 g  4 Tab Oral PRN    
 dextrose (D50W) injection syrg 12.5-25 g  25-50 mL IntraVENous PRN    
 glucagon (GLUCAGEN) injection 1 mg  1 mg IntraMUSCular PRN    
 insulin lispro (HUMALOG) injection   SubCUTAneous AC&HS Stopped at 12/07/18 0730  trimethoprim-polymyxin b (POLYTRIM) 10,000 unit- 1 mg/mL ophthalmic solution 1 Drop  1 Drop Both Eyes BID 1 Drop at 12/08/18 1732  dilTIAZem CD (CARDIZEM CD) capsule 240 mg  240 mg Oral DAILY 240 mg at 12/07/18 0637  ondansetron (ZOFRAN) injection 4 mg  4 mg IntraVENous Q6H PRN 4 mg at 12/08/18 0246 Labs/Data: 
 
Lab Results Component Value Date/Time WBC 11.3 (H) 12/08/2018 04:37 AM  
 Hemoglobin (POC) 8.5 07/24/2017 10:30 AM  
 HGB (POC) 11.4 (A) 08/13/2018 11:31 AM  
 HGB 12.9 12/08/2018 04:37 AM  
 HCT (POC) 35.7 (A) 08/13/2018 11:31 AM  
 HCT 43.1 12/08/2018 04:37 AM  
 PLATELET 023 70/88/2811 04:37 AM  
 MCV 95.4 12/08/2018 04:37 AM  
 
 
Lab Results Component Value Date/Time Sodium 145 12/08/2018 04:37 AM  
 Potassium 4.3 12/08/2018 04:37 AM  
 Chloride 118 (H) 12/08/2018 04:37 AM  
 CO2 19 (L) 12/08/2018 04:37 AM  
 Anion gap 8 12/08/2018 04:37 AM  
 Glucose 162 (H) 12/08/2018 04:37 AM  
 BUN 43 (H) 12/08/2018 04:37 AM  
 Creatinine 2.27 (H) 12/08/2018 04:37 AM  
 BUN/Creatinine ratio 19 12/08/2018 04:37 AM  
 GFR est AA 26 (L) 12/08/2018 04:37 AM  
 GFR est non-AA 22 (L) 12/08/2018 04:37 AM  
 Calcium 7.9 (L) 12/08/2018 04:37 AM  
 
 
Wt Readings from Last 3 Encounters:  
12/09/18 99.2 kg (218 lb 11.1 oz) 11/05/18 136.1 kg (300 lb)  
06/15/18 136.1 kg (300 lb) Intake/Output Summary (Last 24 hours) at 12/9/2018 1011 Last data filed at 12/9/2018 4753 Gross per 24 hour Intake 443.75 ml Output 1925 ml Net -1481.25 ml Patient seen and examined. Chart reviewed. Labs, data and other pertinent notes reviewed in last 24 hrs. PMH/SH/FH reviewed and unchanged compared to H&P Discussed with pt/ Kwabena Wills MD

## 2018-12-10 PROBLEM — E11.21 NEPHROTIC SYNDROME DUE TO DIABETES MELLITUS (HCC): Status: ACTIVE | Noted: 2018-01-01

## 2018-12-10 NOTE — PROGRESS NOTES
[address] 
[phone] Medical Progress Note NAME: Franky Will :  1955 MRM:  [id number] Date/Time: 12/10/2018  7:49 AM 
 
Subjective:  
 
Less responsive yesterday but better this AM.   Cntinues wound care and abx. Still issues with renal function and Na 152. BS's better. Afebrile. Riojas working again. Urine C/S with yeast    
 
Past Medical History reviewed and unchanged from Admission History and Physical and/or prior progress note/electronic medical record Medications reviewed. ROS:   
 
General: postive for weakness  Drainage left side vulva Eyes: positive for left eye drainage and irritation Ear Nose and Throat: positive for ? odynophagia Respiratory:  positive for PIPER Cardiology:  negative for chest pain, palpitations, + for orthopnea, PND, edema, 
Gastrointestinal: positive for difficulties swallowing and gastroparesis Genitourinary: positive for incontinence Muskuloskeletal : negative for arthralgia, myalgia Neurological: positive for paraparesis, dysphasia and diminished mentation Objective:  
 
Last 24hrs VS reviewed since prior progress note. Most recent are: 
 
Visit Vitals /80 (BP 1 Location: Right arm, BP Patient Position: At rest) Pulse 77 Temp 97.9 °F (36.6 °C) Resp 16 Ht 5' (1.524 m) Wt 220 lb 7.4 oz (100 kg) SpO2 95% Breastfeeding? No  
BMI 43.06 kg/m² SpO2 Readings from Last 6 Encounters:  
12/10/18 95% 18 95% 18 93% 18 96% 18 97% 11/15/18 91% Intake/Output Summary (Last 24 hours) at 12/10/2018 3381 Last data filed at 12/10/2018 6166 Gross per 24 hour Intake 480 ml Output 1450 ml Net -970 ml Physical Exam: 
 
General appearance: alert,no distress, slowed mentation, appears older than stated age Eyes: conjunctivitis left eye better Neck: supple, symmetrical, trachea midline, no adenopathy and thyroid: not enlarged, symmetric, no tenderness/mass/nodules Lungs: clear to auscultation bilaterally Heart: regular rate and rhythm, S1, S2 normal, grade 2/6 systolic murmur, no click, rub or gallop Abdomen: morbidly obese. No appreciable mass or tenderness;  Drainage from left vulvar area and thickened abd wall Skin:  Significant sacral, buttock and perineal breakdown Extremities: 3+ brawny edema with scaling lichenified skin Neurologic: paraparesis, dysphasia, essentially nonverbal 
 
Data Review: 
 
Lab: 
 
BMP:  
Lab Results Component Value Date/Time  (H) 12/10/2018 04:37 AM  
 K 3.8 12/10/2018 04:37 AM  
  (H) 12/10/2018 04:37 AM  
 CO2 18 (L) 12/10/2018 04:37 AM  
 AGAP 10 12/10/2018 04:37 AM  
  (H) 12/10/2018 04:37 AM  
 BUN 34 (H) 12/10/2018 04:37 AM  
 CREA 2.10 (H) 12/10/2018 04:37 AM  
 GFRAA 29 (L) 12/10/2018 04:37 AM  
 GFRNA 24 (L) 12/10/2018 04:37 AM  
 
CBC:  
No results found for: WBC, HGB, HGBEXT, HCT, HCTEXT, PLT, PLTEXT, HGBEXT, HCTEXT, PLTEXT Recent Glucose Results:  
Lab Results Component Value Date/Time  (H) 12/10/2018 04:37 AM  
 
All labs reviewed in past 24 hours Other pertinent lab: A1C > 10. TSH normal 
 
Imaging: 
 
Reviewed. CXR Assessment / Plan:  
 
Principal Problem: 
  FTT (failure to thrive) in adult (12/2/2018) Active Problems: 
  Right leg weakness (4/18/2013) Overview: R/O CVA, HNP, mononeuritis Diabetic gastroparesis ([facility] () Stroke [facility]) (5/30/2014) Overview: Recurrent, bilateral 
 
  Acute renal failure (ARF) ([address] (12/1/2018) Diabetic polyneuropathy ([facility] (4/20/2013) ASCVD (arteriosclerotic cardiovascular disease) (5/31/2014) Hypertension (7/24/2017) (HFpEF) heart failure with preserved ejection fraction ([facility] (10/31/2017) DM (diabetes mellitus) ([facility] (12/1/2018) Vulvar abscess (12/3/2018) Morbid obesity ([facility] (1/26/2011) CKD (chronic kidney disease) stage 3, GFR 30-59 ml/min ([facility]) () Hyperlipemia () Aortic stenosis, mild (7/24/2017) GERD (gastroesophageal reflux disease) (10/11/2017) Severe episode of recurrent major depressive disorder, without psychotic features ([facility] (12/2/2018) Pressure injury of right heel, stage 2 (12/7/2018) Pressure injury of sacral region, unstageable ([facility] (12/7/2018) 1. Diflucan 2. Continue Zosyn 3. Continue antidepressant 4. Wound care 5. PT/OT/ST 6. Follow labs 7. Riojas draining better 8. Change IV to D5W 
9. Low dose Lantus with SSI coverage Care Plan discussed with: Patient, Family Discussed:  Care Plan Prophylaxis:  Hep SQ and H2B/PPI Disposition:  ??? 
___________________________________________________ Attending Physician: Jacolyn Spatz, MD

## 2018-12-10 NOTE — DIABETES MGMT
DTC Consult Progress Note Recommendations/ Comments: chart reviewed for previous hospital BG management consult and hypoglycemia - likely related to poor po intake past 24 hours continue with current regimen. Current hospital DM medication: Lispro Correctional insulin with high sensitivity DTC will continue to follow patient as needed. ____________________________ Consult received for:   
               [x]           Hospital Blood Glucose Management Chart reviewed and initial evaluation complete on [address]. Patient is a 58 y.o. female with known diabetes- on Levemir 20 units BID at home per PTA list.  
 
 
A1c:  
Lab Results Component Value Date/Time Hemoglobin A1c 10.4 (H) 12/02/2018 03:35 AM  
 
 
Recent Glucose Results:  
Lab Results Component Value Date/Time  (H) 12/10/2018 04:37 AM  
 GLUCPOC 166 (H) 12/10/2018 11:44 AM  
 GLUCPOC 132 (H) 12/10/2018 08:10 AM  
 GLUCPOC 194 (H) 12/09/2018 10:28 PM  
  
 
Lab Results Component Value Date/Time Creatinine 2.10 (H) 12/10/2018 04:37 AM  
 
 
Active Orders Diet DIET DIABETIC CONSISTENT CARB Regular PO intake:  
Patient Vitals for the past 72 hrs: 
 % Diet Eaten 12/09/18 0800 10 % Thank you. Ashley Bernstein RD [facility] Office:  [phone] Time spent: 5 minutes

## 2018-12-10 NOTE — PROGRESS NOTES
No reply from [facility] or [address] [facility] about accepting the pt. Therapy won't skill her as they have signed off. We are trying to skill the pt with wound care//msat care.

## 2018-12-10 NOTE — PROGRESS NOTES
NAME: Harley Murry :  1955 MRN:  [id number] Assessment :    Plan: GIRISH on CKD stage 3 HTN 
 
DM 2, uncontrolled Nephrotic syndrome secondary to DM Metabolic acidosis Edema/anasarca UTI Recent CVA Hypernatremia Creatinine stable at 2.1 to 2.3; mast (needing prn irrigation) Weight is up; inaccurate I&O Sodium 152; D5W initiated today On bumex 2 mg PO daily On PO bicarb Subjective: Chief Complaint:  In bed. States feeling better. Not a great historian.  in the room - he states her mast was clogged again this am (cleared with a 10-20 ml flush). Improved swelling. No pain. Review of Systems: 
 
Symptom Y/N Comments  Symptom Y/N Comments Fever/Chills    Chest Pain Poor Appetite    Edema y Cough    Abdominal Pain Sputum    Joint Pain SOB/PIPER n   Pruritis/Rash Nausea/vomit n   Tolerating PT/OT Diarrhea    Tolerating Diet Constipation    Other Could not obtain due to:   
 
Objective: VITALS:  
Last 24hrs VS reviewed since prior progress note. Most recent are: 
Visit Vitals /74 (BP 1 Location: Right arm, BP Patient Position: At rest) Pulse 74 Temp 97.5 °F (36.4 °C) Resp 18 Ht 5' (1.524 m) Wt 100 kg (220 lb 7.4 oz) SpO2 94% Breastfeeding? No  
BMI 43.06 kg/m² Intake/Output Summary (Last 24 hours) at 12/10/2018 0827 Last data filed at 12/10/2018 8952 Gross per 24 hour Intake  Output 1450 ml Net -1450 ml Telemetry Reviewed: PHYSICAL EXAM: 
General: Obese, no acute distress Resp:  CTA Bilaterally. No Wheezing/Rhonchi/Rales. No access. muscle use CV:  Regular  rhythm,  No murmur (), No Rubs, No Gallops. + edema GI:  Soft, Non distended, Non tender.  +Bowel sounds, no HSM Lab Data Reviewed: (see below) Medications Reviewed: (see below) PMH/SH reviewed - no change compared to H&P 
________________________________________________________________________ Care Plan discussed with: 
Patient Family RN Care Manager Consultant:     
 
  Comments >50% of visit spent in counseling and coordination of care    
 
________________________________________________________________________ Pattie Rojas MD  
 
Procedures: see electronic medical records for all procedures/Xrays and details which 
were not copied into this note but were reviewed prior to creation of Plan. LABS: 
Recent Labs 12/08/18 
9729 WBC 11.3* HGB 12.9 HCT 43.1  Recent Labs 12/10/18 
7649 12/08/18 
7379 * 145  
K 3.8 4.3 * 118* CO2 18* 19* BUN 34* 43* CREA 2.10* 2.27* * 162* CA 7.9* 7.9* No results for input(s): SGOT, GPT, AP, TBIL, TP, ALB, GLOB, GGT, AML, LPSE in the last 72 hours. No lab exists for component: AMYP, HLPSE No results for input(s): INR, PTP, APTT in the last 72 hours. No lab exists for component: INREXT No results for input(s): FE, TIBC, PSAT, FERR in the last 72 hours. Lab Results Component Value Date/Time Folate 19.6 10/27/2017 10:19 AM  
  
No results for input(s): PH, PCO2, PO2 in the last 72 hours. No results for input(s): CPK, CKMB in the last 72 hours. No lab exists for component: TROPONINI No components found for: [place] Lab Results Component Value Date/Time  Color YELLOW 12/08/2018 10:35 AM  
 Appearance TURBID (A) 12/08/2018 10:35 AM  
 Specific gravity 1.016 12/08/2018 10:35 AM  
 Specific gravity 1.016 12/08/2018 10:35 AM  
 pH (UA) 5.5 12/08/2018 10:35 AM  
 Protein 300 (A) 12/08/2018 10:35 AM  
 Glucose NEGATIVE  12/08/2018 10:35 AM  
 Ketone TRACE (A) 12/08/2018 10:35 AM  
 Bilirubin NEGATIVE  12/08/2018 10:35 AM  
 Urobilinogen 0.2 12/08/2018 10:35 AM  
 Nitrites NEGATIVE  12/08/2018 10:35 AM  
 Leukocyte Esterase LARGE (A) 12/08/2018 10:35 AM  
 Epithelial cells FEW 12/01/2018 04:14 PM  
 Bacteria 1+ (A) 12/08/2018 10:35 AM  
 WBC >100 (H) 12/08/2018 10:35 AM  
 RBC 10-20 12/08/2018 10:35 AM  
 
 
MEDICATIONS: 
Current Facility-Administered Medications Medication Dose Route Frequency  dextrose 5% infusion  75 mL/hr IntraVENous CONTINUOUS  
 fluconazole (DIFLUCAN) 200mg/100 mL IVPB (premix)  200 mg IntraVENous Q24H  polyethylene glycol (MIRALAX) packet 17 g  17 g Oral DAILY  docusate sodium (COLACE) capsule 100 mg  100 mg Oral BID  insulin glargine (LANTUS) injection 6 Units  6 Units SubCUTAneous DAILY  sodium bicarbonate tablet 650 mg  650 mg Oral BID  bumetanide (BUMEX) tablet 2 mg  2 mg Oral DAILY  piperacillin-tazobactam (ZOSYN) 3.375 g in 0.9% sodium chloride (MBP/ADV) 100 mL  3.375 g IntraVENous Q12H  mirtazapine (REMERON) tablet 7.5 mg  7.5 mg Oral QHS  metoclopramide (REGLAN) 5 mg/5 mL oral syrup 10 mg  10 mg Oral TIDAC  miconazole (SECURA) 2 % extra thick cream   Topical BID  acetaminophen (TYLENOL) solution 650 mg  650 mg Oral Q6H PRN Or  
 acetaminophen (TYLENOL) tablet 650 mg  650 mg Oral Q6H PRN  
 traMADol (ULTRAM) tablet  mg   mg Oral Q6H PRN  pantoprazole (PROTONIX) 40 mg in sodium chloride 0.9% 10 mL injection  40 mg IntraVENous Q12H  mupirocin (BACTROBAN) 2 % ointment   Topical BID  ammonium lactate 5 % lotn 1 Each  1 Each Topical DAILY  aspirin-dipyridamole (AGGRENOX)  mg per capsule 1 Cap  1 Cap Oral Q12H  
 atenolol (TENORMIN) tablet 50 mg  50 mg Oral DAILY  zinc oxide-cod liver oil (DESITIN) 40 % paste   Topical PRN  
 sodium chloride (NS) flush 5-10 mL  5-10 mL IntraVENous Q8H  
 sodium chloride (NS) flush 5-10 mL  5-10 mL IntraVENous PRN  
 heparin (porcine) injection 7,500 Units  7,500 Units SubCUTAneous Q8H  
 glucose chewable tablet 16 g  4 Tab Oral PRN  
  dextrose (D50W) injection syrg 12.5-25 g  25-50 mL IntraVENous PRN  
 glucagon (GLUCAGEN) injection 1 mg  1 mg IntraMUSCular PRN  
 insulin lispro (HUMALOG) injection   SubCUTAneous AC&HS  trimethoprim-polymyxin b (POLYTRIM) 10,000 unit- 1 mg/mL ophthalmic solution 1 Drop  1 Drop Both Eyes BID  dilTIAZem CD (CARDIZEM CD) capsule 240 mg  240 mg Oral DAILY  ondansetron (ZOFRAN) injection 4 mg  4 mg IntraVENous Q6H PRN

## 2018-12-10 NOTE — PROGRESS NOTES
Pharmacy Automatic Renal Dosing Protocol - Antimicrobials Indication for Antimicrobials: SSTI (Labial Abscess) + UTI Current Regimen of Each Antimicrobial: 
Zosyn 3.375 g IV Q8H (Start 18 - Day #4/7) Fluconazole 100 mg IV every 24 hours (12/10, day 1 of 7) Previous Antimicrobial Therapy: 
Vancomycin 1250 mg Q36H (Start Date 12/3/18; Day # 3) Levofloxacin 750 mg PO Q48H (Start 18; Day #4) Goal Level: VANCOMYCIN TROUGH GOAL RANGE Goal trough 10-15 Date Dose & Interval Measured (mcg/mL) Extrapolated (mcg/mL) Significant Cultures:  
18 Urine - E. Faecalis, Klebsiella- sens to Zosyn 12/3/18 Wound (labia)- yeast/ probable candida albicans, mixed carmen= FINAL 
12/3/18 wound abscess- light anaerobic bacteria, mixed commensal carmen= FINAL 
18 UCx - + yeast 
 
Radiology / Imaging results: (X-ray, CT scan or MRI):  
 
Paralysis, amputations, malnutrition: None noted Labs: 
Recent Labs 12/10/18 
1504 18 
4577 CREA 2.10* 2.27* BUN 34* 43* WBC  --  11.3* Temp (24hrs), Av.1 °F (36.7 °C), Min:97.5 °F (36.4 °C), Max:98.7 °F (37.1 °C) Creatinine Clearance (mL/min) or Dialysis: <20 ml/min Impression/Plan:  
· Zosyn  adjust to Q12H · Fluconazole 200 mg IV every 24 hours per protocol · Antimicrobial stop date 7 days Pharmacy will follow daily and adjust medications as appropriate for renal function and/or serum levels.  
 
Varsha Johnson, PHARMD

## 2018-12-10 NOTE — PROGRESS NOTES
Bedside and Verbal shift change report given to Ciarra SEGOVIA RN (oncoming nurse) by Beulah Briceño (offgoing nurse). Report included the following information SBAR, Kardex, Intake/Output, MAR and Recent Results.

## 2018-12-10 NOTE — PROGRESS NOTES
Bedside shift change report given to [facility] (oncoming nurse) by Ngozi Johnson (offgoing nurse). Report included the following information SBAR, Kardex, Intake/Output, MAR and Recent Results.

## 2018-12-11 NOTE — PROGRESS NOTES
NAME: Shelly Weber :  1955 MRN:  [id number] Assessment :    Plan: GIRISH on CKD stage 3 HTN 
 
DM 2, uncontrolled Nephrotic syndrome secondary to DM Metabolic acidosis Edema/anasarca UTI Recent CVA Hypernatremia-resolved Low K- replete prn Creatinine stable at 2.1 to 2.3; (1.99 today). mast (needing prn irrigation) Diuresed well. Wt is trending down Na improved to normal 
 
Ct IV D5W + KCl- reduce rate to 50 ml/hr Encourage oral hydration Ct Bumex 2 mg every day On PO bicarb Subjective: Chief Complaint: resting.  at bedside. Taking some po No n/v/cp or sob Review of Systems: 
 
Symptom Y/N Comments  Symptom Y/N Comments Fever/Chills    Chest Pain Poor Appetite    Edema y Cough    Abdominal Pain Sputum    Joint Pain SOB/PIPER n   Pruritis/Rash Nausea/vomit n   Tolerating PT/OT Diarrhea    Tolerating Diet Constipation    Other Could not obtain due to:   
 
Objective: VITALS:  
Last 24hrs VS reviewed since prior progress note. Most recent are: 
Visit Vitals /55 (BP 1 Location: Right arm, BP Patient Position: At rest) Pulse 67 Temp 97.9 °F (36.6 °C) Resp 18 Ht 5' (1.524 m) Wt 97.6 kg (215 lb 2.7 oz) SpO2 95% Breastfeeding? No  
BMI 42.02 kg/m² Intake/Output Summary (Last 24 hours) at 2018 1131 Last data filed at 2018 0073 Gross per 24 hour Intake 1950 ml Output 2850 ml Net -900 ml Telemetry Reviewed: PHYSICAL EXAM: 
General: Obese, no acute distress Resp:  CTA Bilaterally. No Wheezing/Rhonchi/Rales. No access. muscle use CV:  RRR, +SM, no rub GI:  Soft, NT, BS+ 
:  Mast+ Ext:  +edema Lab Data Reviewed: (see below) Medications Reviewed: (see below) PMH/SH reviewed - no change compared to H&P 
 ________________________________________________________________________ Care Plan discussed with: 
Patient y Family  y  RN Care Manager Consultant:     
 
  Comments >50% of visit spent in counseling and coordination of care    
 
________________________________________________________________________ Mayelin Meyers MD  
 
Procedures: see electronic medical records for all procedures/Xrays and details which 
were not copied into this note but were reviewed prior to creation of Plan. LABS: 
Recent Labs 12/11/18 
0547 WBC 10.5 HGB 10.6* HCT 33.9*  
 Recent Labs 12/11/18 
030 70 25 13 12/10/18 
6072  152* K 3.4* 3.8 * 124* CO2 18* 18* BUN 32* 34* CREA 1.99* 2.10* * 194* CA 7.4* 7.9*  
MG 1.3*  --   
PHOS 2.5*  -- No results for input(s): SGOT, GPT, AP, TBIL, TP, ALB, GLOB, GGT, AML, LPSE in the last 72 hours. No lab exists for component: AMYP, HLPSE No results for input(s): INR, PTP, APTT in the last 72 hours. No lab exists for component: INREXT, INREXT No results for input(s): FE, TIBC, PSAT, FERR in the last 72 hours. Lab Results Component Value Date/Time Folate 19.6 10/27/2017 10:19 AM  
  
No results for input(s): PH, PCO2, PO2 in the last 72 hours. No results for input(s): CPK, CKMB in the last 72 hours. No lab exists for component: TROPONINI No components found for: [place] Lab Results Component Value Date/Time  Color YELLOW 12/08/2018 10:35 AM  
 Appearance TURBID (A) 12/08/2018 10:35 AM  
 Specific gravity 1.016 12/08/2018 10:35 AM  
 Specific gravity 1.016 12/08/2018 10:35 AM  
 pH (UA) 5.5 12/08/2018 10:35 AM  
 Protein 300 (A) 12/08/2018 10:35 AM  
 Glucose NEGATIVE  12/08/2018 10:35 AM  
 Ketone TRACE (A) 12/08/2018 10:35 AM  
 Bilirubin NEGATIVE  12/08/2018 10:35 AM  
 Urobilinogen 0.2 12/08/2018 10:35 AM  
 Nitrites NEGATIVE  12/08/2018 10:35 AM  
 Leukocyte Esterase LARGE (A) 12/08/2018 10:35 AM  
 Epithelial cells FEW 12/01/2018 04:14 PM  
 Bacteria 1+ (A) 12/08/2018 10:35 AM  
 WBC >100 (H) 12/08/2018 10:35 AM  
 RBC 10-20 12/08/2018 10:35 AM  
 
 
MEDICATIONS: 
Current Facility-Administered Medications Medication Dose Route Frequency  dextrose 5% with KCl 20 mEq/L infusion   IntraVENous CONTINUOUS  
 fluconazole (DIFLUCAN) 200mg/100 mL IVPB (premix)  200 mg IntraVENous Q24H  polyethylene glycol (MIRALAX) packet 17 g  17 g Oral DAILY  docusate sodium (COLACE) capsule 100 mg  100 mg Oral BID  insulin glargine (LANTUS) injection 6 Units  6 Units SubCUTAneous DAILY  sodium bicarbonate tablet 650 mg  650 mg Oral BID  bumetanide (BUMEX) tablet 2 mg  2 mg Oral DAILY  piperacillin-tazobactam (ZOSYN) 3.375 g in 0.9% sodium chloride (MBP/ADV) 100 mL  3.375 g IntraVENous Q12H  mirtazapine (REMERON) tablet 7.5 mg  7.5 mg Oral QHS  metoclopramide (REGLAN) 5 mg/5 mL oral syrup 10 mg  10 mg Oral TIDAC  miconazole (SECURA) 2 % extra thick cream   Topical BID  acetaminophen (TYLENOL) solution 650 mg  650 mg Oral Q6H PRN Or  
 acetaminophen (TYLENOL) tablet 650 mg  650 mg Oral Q6H PRN  
 traMADol (ULTRAM) tablet  mg   mg Oral Q6H PRN  pantoprazole (PROTONIX) 40 mg in sodium chloride 0.9% 10 mL injection  40 mg IntraVENous Q12H  mupirocin (BACTROBAN) 2 % ointment   Topical BID  ammonium lactate 5 % lotn 1 Each  1 Each Topical DAILY  aspirin-dipyridamole (AGGRENOX)  mg per capsule 1 Cap  1 Cap Oral Q12H  
 atenolol (TENORMIN) tablet 50 mg  50 mg Oral DAILY  zinc oxide-cod liver oil (DESITIN) 40 % paste   Topical PRN  
 sodium chloride (NS) flush 5-10 mL  5-10 mL IntraVENous Q8H  
 sodium chloride (NS) flush 5-10 mL  5-10 mL IntraVENous PRN  
 heparin (porcine) injection 7,500 Units  7,500 Units SubCUTAneous Q8H  
 glucose chewable tablet 16 g  4 Tab Oral PRN  
  dextrose (D50W) injection syrg 12.5-25 g  25-50 mL IntraVENous PRN  
 glucagon (GLUCAGEN) injection 1 mg  1 mg IntraMUSCular PRN  
 insulin lispro (HUMALOG) injection   SubCUTAneous AC&HS  trimethoprim-polymyxin b (POLYTRIM) 10,000 unit- 1 mg/mL ophthalmic solution 1 Drop  1 Drop Both Eyes BID  dilTIAZem CD (CARDIZEM CD) capsule 240 mg  240 mg Oral DAILY  ondansetron (ZOFRAN) injection 4 mg  4 mg IntraVENous Q6H PRN

## 2018-12-11 NOTE — PROGRESS NOTES
Nutrition Assessment: 
 
INTERVENTIONS/RECOMMENDATIONS:  
Meals/Snacks: General/healthful diet: Continue current diet Supplements: Commercial supplement: Ensure pudding with medications; continue glucerna daily ASSESSMENT:  
Chart reviewed; patient in bed with breakfast at bedside. Didn't eat well at breakfast today but she has been eating better; 75% PO documented yesterday x 2. Has drank glucerna in the past so will continue daily for now for extra kcal/protein for wound healing. RN suggesting supplement to give with medications so will add ensure pudding daily as well. Low K+ today; KCl added to IVF. Low Mg and phos in need of repletion as well. Encourage intake of meals/supplements. Diet Order: Consistent carb(Glucerna daily) % Eaten:   
Patient Vitals for the past 72 hrs: 
 % Diet Eaten 12/10/18 1505 75 % 12/10/18 0813 75 % 12/09/18 0800 10 % Pertinent Medications: [x] Reviewed []Other: Bumex, Colace, Lantus, Humalog, Reglan, Remeron, Protonix, Miralax, D5% IVF + KCl Pertinent Labs: [x]Reviewed  []Other: -[phone], K+ 3.4, Mg 1.3, Phos 2.5 Food Allergies: [x]None []Other:   Last BM: 12/10  [x]Active     []Hyperactive  []Hypoactive       [] Absent  BS Skin:    [] Intact   [] Incision  [x] Breakdown (unstageable right heel)  []Edema   []Other: Anthropometrics: Height: 5' (152.4 cm) Weight: 97.6 kg (215 lb 2.7 oz) IBW (%IBW):   ( ) UBW (%UBW):   (  %) BMI: Body mass index is 42.02 kg/m². This BMI is indicative of: 
[]Underweight   []Normal   []Overweight   [] Obesity   [x] Extreme Obesity (BMI>40) Last Weight Metrics: 
Weight Loss Metrics 12/11/2018 11/5/2018 8/13/2018 6/15/2018 5/16/2018 4/27/2018 3/23/2018 Today's Wt 215 lb 2.7 oz 300 lb - 300 lb 300 lb 300 lb 300 lb BMI 42.02 kg/m2 58.59 kg/m2 - 58.59 kg/m2 58.59 kg/m2 58.59 kg/m2 58.59 kg/m2 Estimated Nutrition Needs (Based on): 2840 Kcals/day(BMR (1452) x 1. 2AF -250kcal) , 98 g(1.0 g/kg bw) Protein Carbohydrate: At Least 130 g/day  Fluids: 1500 mL/day Pt expected to meet estimated nutrient needs: [x]Yes []No 
 
NUTRITION DIAGNOSES:  
Problem:  Increased nutrient needs Etiology: related to wound healing Signs/Symptoms: as evidenced by unstageable right heel NUTRITION INTERVENTIONS: 
Meals/Snacks: General/healthful diet   Supplements: Commercial supplement GOAL:  
PO intake >50% of meals/supplements next 3-5 days NUTRITION MONITORING AND EVALUATION Food/Nutrient Intake Outcomes: Total energy intake Physical Signs/Symptoms Outcomes: Weight/weight change, Glucose profile, Electrolyte and renal profile, GI profile Previous Goal Met: 
 [] Met              [x] Progressing Towards Goal              [] Not Progressing Towards Goal  
Previous Recommendations: 
 [x] Implemented          [] Not Implemented          [] Not Applicable LEARNING NEEDS (Diet, Food/Nutrient-Drug Interaction):  
 [x] None Identified 
 [] Identified and Education Provided/Documented 
 [] Identified and Pt declined/was not appropriate Cultural, [employer]ar[employer], OR Ethnic Dietary Needs:  
 [x] None Identified 
 [] Identified and Addressed 
 
 [x] Interdisciplinary Care Plan Reviewed/Documented  
 [x] Discharge Planning: Consistent carb diet + ONS 1-2x/day as needed  
 [] Participated in Interdisciplinary Rounds NUTRITION RISK:  
 [] High              [x] Moderate           []  Low  []  Minimal/Uncompromised Jayme De Oliveira Pager [phone] Weekend Pager [phone]

## 2018-12-11 NOTE — WOUND CARE
Wound care asked by Dr Es York to [facility] wounds\". Patient's  concerned with staff's use of the Secura extra thick antifungal zinc cream. Patient incontinent of loose stool and patient's  concerned that the Secura is being put on too thick and that some nurse are \"scrubbing\" her skin to hard trying to remove cream.    Explained: that if Secura is re-applied on top of it's self it cakes up. But it is still a protective layer that does not have to be \"scrubbed\" off, as long as there is no fecal staining on cream - it is clean cream.    Will switch to ES Desitin cream, d/c Secura and use Nystatin cream to skin folds and groin.     Cristhian Avalos RN, [employer]

## 2018-12-11 NOTE — PROGRESS NOTES
Bedside and Verbal shift change report given to Maria Victoria Friedman (oncoming nurse) by Sudhir Johnson (offgoing nurse). Report included the following information SBAR, Kardex, Intake/Output, MAR and Recent Results.

## 2018-12-11 NOTE — PROGRESS NOTES
Palliative Medicine  [place]: [phone]  Formerly [facility]: [phone])    LCSW went to visit patient and . Patient initially asleep and  Devon Mask on the phone regarding work. LCSW spoke briefly to bedside RN, Prince Valdivia regarding patient. Palliative NP Temitope Teran met with patient and family earlier today. LCSW noted that visit today was for support and to catch up on how patient /  were doing.  Sondra King shared that he is still waiting for news on discharge, \"I don't know how long they will let us stay here\". He noted that they are waiting for placement. Patient seems comfortable, she noted she was \"alright\" when asked if mood  is \"down, happy or in the middle\". She smiled a bit during brief visit today. Explained that she is on an antidepressant that may help her mood. She seems brighter than she was the last time we met her. LCSW remembered that patient enjoys music and brought her CD player. Patient related that she enjoys \"rock n roll\".  noted that he does not like music on while he works, LCSW brought patient headphones so she can still enjoy the music. When the music was turned on, she had a large smile and seemed to enjoy it. Asked Sondra King to change the CD when it stops playing. Patient's code status remains full code. It may be helpful for Dr Patricia Edward to address this issue as patient is familiar with him and she has become emotional when palliative team has tried to address it. Spoke to CM, Major Black Hammock regarding patient. Still awaiting a SNF to respond.

## 2018-12-11 NOTE — PROGRESS NOTES
[employer] was closed yesterday due to inclement weather. Awaiting auth from them which may be doubtful. Discussed with spouse.

## 2018-12-11 NOTE — PROGRESS NOTES
[address] [place] [address] 
[phone] Medical Progress Note NAME: Cally Seals :  1955 MRM:  [id number] Date/Time: 2018  8:10 AM 
 
Subjective:  
 
Remains alert. Cntinues wound care and abx. Still issues with renal function but better and Na improved. BS's better. Afebrile. Riojas requiring intermittent flushing. Urine C/S with yeast   concerned re wounds and need for re-evaluation. Will ask wound care to see again. Past Medical History reviewed and unchanged from Admission History and Physical and/or prior progress note/electronic medical record Medications reviewed. ROS:   
 
General: postive for weakness  Drainage left side vulva Eyes: positive for left eye drainage and irritation Ear Nose and Throat: positive for ? odynophagia Respiratory:  positive for PIPER Cardiology:  negative for chest pain, palpitations, + for orthopnea, PND, edema, 
Gastrointestinal: positive for difficulties swallowing and gastroparesis Genitourinary: positive for incontinence Muskuloskeletal : negative for arthralgia, myalgia Neurological: positive for paraparesis, dysphasia and diminished mentation Objective:  
 
Last 24hrs VS reviewed since prior progress note. Most recent are: 
 
Visit Vitals /55 (BP 1 Location: Right arm, BP Patient Position: At rest) Pulse 67 Temp 97.9 °F (36.6 °C) Resp 18 Ht 5' (1.524 m) Wt 215 lb 2.7 oz (97.6 kg) SpO2 95% Breastfeeding? No  
BMI 42.02 kg/m² SpO2 Readings from Last 6 Encounters:  
18 95% 18 95% 18 93% 18 96% 18 97% 11/15/18 91% Intake/Output Summary (Last 24 hours) at 2018 7722 Last data filed at 2018 4208 Gross per 24 hour Intake 2310 ml Output 2850 ml Net -540 ml Physical Exam: 
 
General appearance: alert,no distress, slowed mentation, appears older than stated age Eyes: conjunctivitis left eye better Neck: supple, symmetrical, trachea midline, no adenopathy and thyroid: not enlarged, symmetric, no tenderness/mass/nodules Lungs: clear to auscultation bilaterally Heart: regular rate and rhythm, S1, S2 normal, grade 2/6 systolic murmur, no click, rub or gallop Abdomen: morbidly obese. No appreciable mass or tenderness;  Drainage from left vulvar area and thickened abd wall Skin:  Significant sacral, buttock and perineal breakdown Extremities: 3+ brawny edema with scaling lichenified skin Neurologic: paraparesis, dysphasia, essentially nonverbal 
 
Data Review: 
 
Lab: 
 
BMP:  
Lab Results Component Value Date/Time  12/11/2018 05:47 AM  
 K 3.4 (L) 12/11/2018 05:47 AM  
  (H) 12/11/2018 05:47 AM  
 CO2 18 (L) 12/11/2018 05:47 AM  
 AGAP 10 12/11/2018 05:47 AM  
  (H) 12/11/2018 05:47 AM  
 BUN 32 (H) 12/11/2018 05:47 AM  
 CREA 1.99 (H) 12/11/2018 05:47 AM  
 GFRAA 31 (L) 12/11/2018 05:47 AM  
 GFRNA 25 (L) 12/11/2018 05:47 AM  
 
CBC:  
Lab Results Component Value Date/Time WBC 10.5 12/11/2018 05:47 AM  
 HGB 10.6 (L) 12/11/2018 05:47 AM  
 HCT 33.9 (L) 12/11/2018 05:47 AM  
  12/11/2018 05:47 AM  
 
Recent Glucose Results:  
Lab Results Component Value Date/Time  (H) 12/11/2018 05:47 AM  
 
All labs reviewed in past 24 hours Other pertinent lab: A1C > 10. TSH normal 
 
Imaging: 
 
Reviewed. CXR Assessment / Plan:  
 
Principal Problem: 
  FTT (failure to thrive) in adult (12/2/2018) Active Problems: 
  Right leg weakness (4/18/2013) Overview: R/O CVA, HNP, mononeuritis Diabetic gastroparesis ([address] () Stroke [facility]) (5/30/2014) Overview: Recurrent, bilateral 
 
  Acute renal failure (ARF) ([address] (12/1/2018) Diabetic polyneuropathy ([facility] (4/20/2013) ASCVD (arteriosclerotic cardiovascular disease) (5/31/2014) Hypertension (7/24/2017) (HFpEF) heart failure with preserved ejection fraction ([facility] (10/31/2017) DM (diabetes mellitus) ([address] (12/1/2018) Vulvar abscess (12/3/2018) Nephrotic syndrome due to diabetes mellitus ([address] (12/10/2018) Morbid obesity ([address] (1/26/2011) CKD (chronic kidney disease) stage 3, GFR 30-59 ml/min ([facility]) () Hyperlipemia () Aortic stenosis, mild (7/24/2017) GERD (gastroesophageal reflux disease) (10/11/2017) Severe episode of recurrent major depressive disorder, without psychotic features ([address] (12/2/2018) Pressure injury of right heel, stage 2 (12/7/2018) Pressure injury of sacral region, unstageable ([facility] (12/7/2018) Hypoxia (3/29/2018) Acute cystitis without hematuria (8/13/2018) Overview: yeast 
 
 
 
1. Diflucan 2. Continue Zosyn 3. Continue antidepressant 4. Wound care re-evaluate 5. PT/OT/ST 6. Follow labs 7. Riojas draining better 8. Changed IV to D5W. .. Will add KCl 9. Low dose Lantus with SSI coverage Care Plan discussed with: Patient, Family Discussed:  Care Plan Prophylaxis:  Hep SQ and H2B/PPI Disposition:  ??? 
___________________________________________________ Attending Physician: William Alegre MD

## 2018-12-12 NOTE — PROGRESS NOTES
Palliative Medicine Consult  [place]: [phone] ([phone])    Patient Name: Osvaldo Ching  YOB: 1955    Date of Initial Consult: 12/3/18  Reason for Consult: care decisions  Requesting Provider: Dr. Shelby Muñoz   Primary Care Physician: Rodolfo Dubon MD     SUMMARY:   Osvaldo Ching is a 58 y.o. with a past history of poorly controlled diabetes with diabetic polyneuropathy, gastroparesis, CKD 2, prior CVA (2014), multiple chronic infarcts on MRI and volume loss, OHS, chronic diastolic CHF, gout, depression, who was admitted on 12/1/2018 from home with a diagnosis of 2 weeks of uncontrolled nausea and vomiting due to diabetic gastroparesis not controlled by diet modification and reglan tablets. . Current medical issues leading to Palliative Medicine involvement include: debilitating diabetic gastroparesis with ongoing nausea, vomiting, poor PO intake, not responding to home plan of diet modification and reglan. Now with profound depression related to ongoing medical illness/nausea. Patient also noted to have labial abscess. Patient is , lives with  Angelica Lawler. They have been together 37 years. Patient has son Maria Fernanda Mcmanus, two grandchildren       PALLIATIVE DIAGNOSES:   1. Advanced care planning discussion  2. Goals of care discussion  3. Depression, unspecified  4. Nausea         PLAN:   1. Patient was seen and evaluated, at bedside was her . 2.  Advanced care planning discussion-at time of visit patient has a full CODE STATUS. · Spent time discussing CPR with patient, after which she told me that she did not want CPR. Her , Mr. Mireya Manriquez was present during discussion and stated this was the first time she has said that. However I also discussed D DNR, patient declined to complete this document.   When I attempted to summarize visit, including her decision to have a DNR CODE STATUS, patient looked towards her , he attempted to encourage her to repeat her expressed wishes, however patient then told me she did not want to continue discussing CPR. · Patient and  identify Dr. Bob Jaramillo to be her primary physician outside of the hospital.  Due to patient's comfortability and trust in Dr. Bob Jaramillo, I recommend that he readdress her CODE STATUS during his next visit. 3.  Goals of care discussion-at this time goal is to be discharged to [facility] or [facility], hoping to receive skilled services. Patient's  stated he was informed that he was unlikely able to care for her in her current condition, he verbalizes hope that she would eventually be able to return home. · I did explain that patient had been referred to the home-based palliative medicine group, at that the patient was accepted. I explained that at the time patient returns home, the home-based Palliative Medicine group would be contacting him to schedule first appointment. Mr. Emma Schwab has the Palliative Medicine phone number. 4.  Depression, unspecified-on 12/6 patient was started on mirtazapine 7.5 mg p.o. nightly. Patient reported that she is feeling better, denied depression, denied hopelessness. Due to patient with both depression as well as nausea, will consider increasing mirtazapine to 15 mg nightly  5. Nausea-patient reported intermittent nausea, denies vomiting. Patient has been taking Reglan 10 mg 3 times a day. At time she presented to the hospital, she had stopped taking her medicines. Patient acknowledges frequency of nausea is significantly less than what it had been at time of admission, she does not have any medications for breakthrough nausea. However the mirtazapine can sometimes help reduce nausea, improve appetite as well as being an antidepressant.                   GOALS OF CARE / TREATMENT PREFERENCES:     GOALS OF CARE:  Patient/Health Care Proxy Stated Goals: Prolong life      TREATMENT PREFERENCES:   Code Status: Full Code    Advance Care Planning:      Primary Decision Maker (Health Care Agent): Pa Quispe  Relationship to patient:spouse  Phone number:cell [phone][] Named in a scanned document   [x] Legal Next of Kin  [] Guardian      Medical Interventions: Full interventions   Other Instructions:   Artificially Administered Nutrition: (Did not address during this visit)     Other:    As far as possible, the palliative care team has discussed with patient / health care proxy about goals of care / treatment preferences for patient. HISTORY:     History obtained from: chart/patient/    CHIEF COMPLAINT: Denies    HPI/SUBJECTIVE:    The patient is:   [x] Verbal and participatory  [] Non-participatory due to:     Patient denies pain at time of visit,  stated it had pain the other night due to urinary retention, has since resolved. Patient denies nausea at time of visit, however she does report feeling intermittently nauseous, denies recent vomiting. Acknowledged improvement in nausea status post restart of Reglan. Patient denied depression, stated she is feeling better, stated sleeping better, reported appetite has been a little better.     Clinical Pain Assessment (nonverbal scale for severity on nonverbal patients):   Clinical Pain Assessment  Severity: 0  Location: (Patient denied pain at time of visit, however acknowledged having had pain on 12/10 related to urinary retention, however this has resolved)  Character: Patient denied pain at time of visit, however acknowledged having had pain on 12/10 related to urinary retention, however this has resolved  Duration: Patient denied pain at time of visit, however acknowledged having had pain on 12/10 related to urinary retention, however this has resolved  Effect: Patient denied pain at time of visit, however acknowledged having had pain on 12/10 related to urinary retention, however this has resolved  Factors: Patient denied pain at time of visit, however acknowledged having had pain on 12/10 related to urinary retention, however this has resolved  Frequency: Patient denied pain at time of visit, however acknowledged having had pain on 12/10 related to urinary retention, however this has resolved     Activity (Movement): Lying quietly, normal position    Duration: for how long has pt been experiencing pain (e.g., 2 days, 1 month, years)  Frequency: how often pain is an issue (e.g., several times per day, once every few days, constant)     FUNCTIONAL ASSESSMENT:     Palliative Performance Scale (PPS):  PPS: 30       PSYCHOSOCIAL/SPIRITUAL SCREENING:     Palliative IDT has assessed this patient for cultural preferences / practices and a referral made as appropriate to needs (Cultural Services, Patient Advocacy, Ethics, etc.)    Any spiritual / Sikh concerns:  [] Yes /  [x] No    Caregiver Burnout:  [] Yes /  [x] No /  [] No Caregiver Present      Anticipatory grief assessment:   [x] Normal  / [] Maladaptive       ESAS Anxiety: Anxiety: 0    ESAS Depression: Depression: 0        REVIEW OF SYSTEMS:     Positive and pertinent negative findings in ROS are noted above in HPI. The following systems were [x] reviewed / [] unable to be reviewed as noted in HPI  Other findings are noted below. Systems: constitutional, ears/nose/mouth/throat, respiratory, gastrointestinal, genitourinary, musculoskeletal, integumentary, neurologic, psychiatric, endocrine. Positive findings noted below. Modified ESAS Completed by: provider   Fatigue: 0 Drowsiness: 0   Depression: 0 Pain: 0   Anxiety: 0 Nausea: 5   Anorexia: 4 Dyspnea: 0     Constipation: No     Stool Occurrence(s): 1        PHYSICAL EXAM:     From RN flowsheet:  Wt Readings from Last 3 Encounters:   12/11/18 215 lb 2.7 oz (97.6 kg)   11/05/18 300 lb (136.1 kg)   06/15/18 300 lb (136.1 kg)     Blood pressure 180/70, pulse 64, temperature 98.2 °F (36.8 °C), resp.  rate 18, height 5' (1.524 m), weight 215 lb 2.7 oz (97.6 kg), SpO2 95 %, not currently breastfeeding. Pain Scale 1: Numeric (0 - 10)  Pain Intensity 1: 2     Pain Location 1: Generalized  Pain Orientation 1: Anterior  Pain Description 1: Aching  Pain Intervention(s) 1: Medication (see MAR)  Last bowel movement, if known:     Constitutional: pt is awake, quiet, difficult to engage in conversation, mostly yes or no  Eyes: pupils equal, anicteric  No respiratory distress  Left hand contractures  Affect is flat but patient is pleasant and cooperative, normal eye contact  She demonstrates some insight into her medical issues     HISTORY:     Principal Problem:    FTT (failure to thrive) in adult (12/2/2018)    Active Problems:     Morbid obesity ([address] (1/26/2011)      Right leg weakness (4/18/2013)      Overview: R/O CVA, HNP, mononeuritis      CKD (chronic kidney disease) stage 3, GFR 30-59 ml/min ([facility]) ()      Hyperlipemia ()      Diabetic gastroparesis ([facility]) ()      Diabetic polyneuropathy ([address] (4/20/2013)      Stroke ([address] (5/30/2014)      Overview: Recurrent, bilateral      ASCVD (arteriosclerotic cardiovascular disease) (5/31/2014)      Hypertension (7/24/2017)      Aortic stenosis, mild (7/24/2017)      GERD (gastroesophageal reflux disease) (10/11/2017)      (HFpEF) heart failure with preserved ejection fraction ([address] (10/31/2017)      Hypoxia (3/29/2018)      Acute cystitis without hematuria (8/13/2018)      Overview: yeast      DM (diabetes mellitus) ([address] (12/1/2018)      Acute renal failure (ARF) ([address] (12/1/2018)      Severe episode of recurrent major depressive disorder, without psychotic features ([address] (12/2/2018)      Vulvar abscess (12/3/2018)      Pressure injury of right heel, stage 2 (12/7/2018)      Pressure injury of sacral region, unstageable ([address] (12/7/2018)      Nephrotic syndrome due to diabetes mellitus ([address] (12/10/2018)      Past Medical History:   Diagnosis Date    Acute combined systolic and diastolic heart failure, NYHA class 2 ([address] 7/24/2017  Anemia 2017    Aortic stenosis, mild 2017    Bilateral edema of lower extremity 2017    BMI 45.0-49.9, adult ([address] 2017    Chronic diastolic CHF (congestive heart failure), NYHA class 3 ([address] 2017    CKD (chronic kidney disease) 2018    CKD (chronic kidney disease) stage 2, GFR 60-89 ml/min     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 2 diabetes mellitus ([address] 2017    Diabetes ([address]     Fatty liver 2017    Gastroparesis     GERD (gastroesophageal reflux disease)     Gout     HTN (hypertension)     Hyperlipemia     Hyperparathyroidism ([address]     Hypertension 2017    Insomnia 2017    Low back pain 2017    N&V (nausea and vomiting) 2017    Nausea & vomiting     Obesity     Pelvic joint pain 2017    Peripheral neuropathy 2017    Sciatica     right    Severe right groin pain 2017    Sinus tachycardia 2017    Stage 2 chronic kidney disease due to benign hypertension 2017    Stasis dermatitis 2017    Stroke ([address] 2001    right parietal, left caudate    Thyroid disease     para thyroid(high calcium)    Ulcer of right fifth toe due to diabetes mellitus ([address] 2017      Past Surgical History:   Procedure Laterality Date    CHEST SURGERY PROCEDURE UNLISTED  10/24/13    PARATHYROID EXPLORATION     DELIVERY       HX AMPUTATION TOE  2018    HX  SECTION      HX CHOLECYSTECTOMY      HX OTHER SURGICAL      I&D right thigh abscess      Family History   Problem Relation Age of Onset    Cancer Mother         cervical    Diabetes Mother     Hypertension Mother     Stroke Mother 79    Diabetes Father     Hypertension Father     Kidney Disease Father     Diabetes Brother     Hypertension Brother     Stroke Brother 60      History reviewed, no pertinent family history.   Social History     Tobacco Use    Smoking status: Never Smoker    Smokeless tobacco: Never Used   Substance Use Topics    Alcohol use: Yes     Comment: rare     Allergies   Allergen Reactions    Zestril [Lisinopril] Swelling      Current Facility-Administered Medications   Medication Dose Route Frequency    dextrose 5% with KCl 20 mEq/L infusion   IntraVENous CONTINUOUS    fluconazole (DIFLUCAN) tablet 200 mg  200 mg Oral DAILY    nystatin (MYCOSTATIN) 100,000 unit/gram cream   Topical BID    polyethylene glycol (MIRALAX) packet 17 g  17 g Oral DAILY    docusate sodium (COLACE) capsule 100 mg  100 mg Oral BID    insulin glargine (LANTUS) injection 6 Units  6 Units SubCUTAneous DAILY    sodium bicarbonate tablet 650 mg  650 mg Oral BID    bumetanide (BUMEX) tablet 2 mg  2 mg Oral DAILY    piperacillin-tazobactam (ZOSYN) 3.375 g in 0.9% sodium chloride (MBP/ADV) 100 mL  3.375 g IntraVENous Q12H    mirtazapine (REMERON) tablet 7.5 mg  7.5 mg Oral QHS    metoclopramide (REGLAN) 5 mg/5 mL oral syrup 10 mg  10 mg Oral TIDAC    acetaminophen (TYLENOL) solution 650 mg  650 mg Oral Q6H PRN    Or    acetaminophen (TYLENOL) tablet 650 mg  650 mg Oral Q6H PRN    traMADol (ULTRAM) tablet  mg   mg Oral Q6H PRN    pantoprazole (PROTONIX) 40 mg in sodium chloride 0.9% 10 mL injection  40 mg IntraVENous Q12H    mupirocin (BACTROBAN) 2 % ointment   Topical BID    ammonium lactate 5 % lotn 1 Each  1 Each Topical DAILY    aspirin-dipyridamole (AGGRENOX)  mg per capsule 1 Cap  1 Cap Oral Q12H    atenolol (TENORMIN) tablet 50 mg  50 mg Oral DAILY    zinc oxide-cod liver oil (DESITIN) 40 % paste   Topical PRN    sodium chloride (NS) flush 5-10 mL  5-10 mL IntraVENous Q8H    sodium chloride (NS) flush 5-10 mL  5-10 mL IntraVENous PRN    heparin (porcine) injection 7,500 Units  7,500 Units SubCUTAneous Q8H    glucose chewable tablet 16 g  4 Tab Oral PRN    dextrose (D50W) injection syrg 12.5-25 g  25-50 mL IntraVENous PRN    glucagon (GLUCAGEN) injection 1 mg  1 mg IntraMUSCular PRN    insulin lispro (HUMALOG) injection   SubCUTAneous AC&HS    trimethoprim-polymyxin b (POLYTRIM) 10,000 unit- 1 mg/mL ophthalmic solution 1 Drop  1 Drop Both Eyes BID    dilTIAZem CD (CARDIZEM CD) capsule 240 mg  240 mg Oral DAILY    ondansetron (ZOFRAN) injection 4 mg  4 mg IntraVENous Q6H PRN          LAB AND IMAGING FINDINGS:     Lab Results   Component Value Date/Time    WBC 10.5 12/11/2018 05:47 AM    HGB 10.6 (L) 12/11/2018 05:47 AM    PLATELET 387 95/95/7312 05:47 AM     Lab Results   Component Value Date/Time    Sodium 143 12/11/2018 05:47 AM    Potassium 3.4 (L) 12/11/2018 05:47 AM    Chloride 115 (H) 12/11/2018 05:47 AM    CO2 18 (L) 12/11/2018 05:47 AM    BUN 32 (H) 12/11/2018 05:47 AM    Creatinine 1.99 (H) 12/11/2018 05:47 AM    Calcium 7.4 (L) 12/11/2018 05:47 AM    Magnesium 1.3 (L) 12/11/2018 05:47 AM    Phosphorus 2.5 (L) 12/11/2018 05:47 AM      Lab Results   Component Value Date/Time    AST (SGOT) 18 12/01/2018 12:25 PM    Alk.  phosphatase 115 12/01/2018 12:25 PM    Protein, total 6.5 12/01/2018 12:25 PM    Albumin 2.2 (L) 12/01/2018 12:25 PM    Globulin 4.3 (H) 12/01/2018 12:25 PM     Lab Results   Component Value Date/Time    INR 0.9 10/26/2017 07:58 PM    Prothrombin time 9.4 10/26/2017 07:58 PM    aPTT 24.0 10/26/2017 07:58 PM      Lab Results   Component Value Date/Time    Iron 45 10/27/2017 10:19 AM    TIBC 244 (L) 10/27/2017 10:19 AM    Iron % saturation 18 (L) 10/27/2017 10:19 AM    Ferritin 48 10/27/2017 10:19 AM      Lab Results   Component Value Date/Time    pH 7.34 (L) 12/03/2018 04:55 AM    PCO2 34 (L) 12/03/2018 04:55 AM    PO2 91 12/03/2018 04:55 AM     No components found for: [place]   Lab Results   Component Value Date/Time    CK 40 12/01/2018 12:25 PM    CK - MB 2.1 12/01/2018 12:25 PM                Total time: 25 min  Counseling / coordination time, spent as noted above: 20 min  > 50% counseling / coordination?: yes    Prolonged service was provided for  []30 min   []75 min in face to face time in the presence of the patient, spent as noted above. Time Start:   Time End:   Note: this can only be billed with 39515 (initial) or 16017 (follow up). If multiple start / stop times, list each separately.

## 2018-12-12 NOTE — PROGRESS NOTES
[employer] denied authorization for admission. [facility] offered private pay for $305/day. DNR noted. Will follow as needed and will discuss the above with spouse. 1500  Disposition discussed with spouse. He is deferring to MD whether or not to pay out of pocket for [facility]. He feels he can care for her at home if he knows what the wound care//mast directions are. The pt//spouse both signed the DDNR. I made copies and placed one on chart to be scanned into [facility]. He is interested in [facility]. We briefly discussed the benefits//support of hospice.   He is pleased to have been approved by the Palliative Care home based team.  He will discuss the above with MD in the am.

## 2018-12-12 NOTE — PROGRESS NOTES
Dr. Armin Vigil signed DNR forms for pt. Stating that she agreed to Code status of DNR. Forms in paper chart needing signature from pt. Or . Bedside shift change report given to JULIETTE Rivas (oncoming nurse) by Efraín Lewis (offgoing nurse). Report included the following information SBAR, Kardex, Procedure Summary, Intake/Output, MAR and Recent Results.

## 2018-12-12 NOTE — PROGRESS NOTES
NAME: Cassie Alexander        :  1955        MRN:  [id number]        Assessment :    Plan:  GIRISH on CKD stage 3    HTN    DM 2, uncontrolled    Nephrotic syndrome secondary to DM    Metabolic acidosis    Edema/anasarca    UTI    Recent CVA    Hypernatremia-resolved    Low K- replete prn Creatinine better (1.9 today). mast (needing prn irrigation)    Diuresed well. Wt is down  Na improved to normal    Ct IV D5W + KCl- at 50 ml/hr  Encourage oral hydration    Ct Bumex 2 mg every day    On PO bicarb       Subjective:     Chief Complaint: resting.  at bedside. Taking some po  No n/v/cp or sob    Review of Systems:    Symptom Y/N Comments  Symptom Y/N Comments   Fever/Chills    Chest Pain     Poor Appetite    Edema y    Cough    Abdominal Pain     Sputum    Joint Pain     SOB/PIPER n   Pruritis/Rash     Nausea/vomit n   Tolerating PT/OT     Diarrhea    Tolerating Diet     Constipation    Other       Could not obtain due to:      Objective:     VITALS:   Last 24hrs VS reviewed since prior progress note. Most recent are:  Visit Vitals  /66   Pulse 72   Temp 97.7 °F (36.5 °C)   Resp 16   Ht 5' (1.524 m)   Wt 98.1 kg (216 lb 4.3 oz)   SpO2 96%   Breastfeeding? No   BMI 42.24 kg/m²       Intake/Output Summary (Last 24 hours) at 2018 0708  Last data filed at 2018 0559  Gross per 24 hour   Intake 403.33 ml   Output 1800 ml   Net -1396.67 ml      Telemetry Reviewed:     PHYSICAL EXAM:  General: Obese, no acute distress  Resp:  CTA Bilaterally. No Wheezing/Rhonchi/Rales. No access.  muscle use  CV:  RRR, +SM, no rub  GI:  Soft, NT, BS+  :  Mast+  Ext:  +edema      Lab Data Reviewed: (see below)    Medications Reviewed: (see below)    PMH/SH reviewed - no change compared to H&P  ________________________________________________________________________  Care Plan discussed with:  Patient y    Family  y    RN     Care Manager                    Consultant:          Comments   >50% of visit spent in counseling and coordination of care       ________________________________________________________________________  Janette Duong MD     Procedures: see electronic medical records for all procedures/Xrays and details which  were not copied into this note but were reviewed prior to creation of Plan. LABS:  Recent Labs     12/11/18  0547   WBC 10.5   HGB 10.6*   HCT 33.9*        Recent Labs     12/12/18  0243 12/11/18  0547 12/10/18  0437    143 152*   K 3.5 3.4* 3.8   * 115* 124*   CO2 19* 18* 18*   BUN 27* 32* 34*   CREA 1.86* 1.99* 2.10*   * 164* 194*   CA 7.9* 7.4* 7.9*   MG  --  1.3*  --    PHOS  --  2.5*  --      No results for input(s): SGOT, GPT, AP, TBIL, TP, ALB, GLOB, GGT, AML, LPSE in the last 72 hours. No lab exists for component: AMYP, HLPSE  No results for input(s): INR, PTP, APTT in the last 72 hours. No lab exists for component: INREXT, INREXT   No results for input(s): FE, TIBC, PSAT, FERR in the last 72 hours. Lab Results   Component Value Date/Time    Folate 19.6 10/27/2017 10:19 AM      No results for input(s): PH, PCO2, PO2 in the last 72 hours. No results for input(s): CPK, CKMB in the last 72 hours.     No lab exists for component: TROPONINI  No components found for: [place]  Lab Results   Component Value Date/Time    Color YELLOW 12/08/2018 10:35 AM    Appearance TURBID (A) 12/08/2018 10:35 AM    Specific gravity 1.016 12/08/2018 10:35 AM    Specific gravity 1.016 12/08/2018 10:35 AM    pH (UA) 5.5 12/08/2018 10:35 AM    Protein 300 (A) 12/08/2018 10:35 AM    Glucose NEGATIVE  12/08/2018 10:35 AM    Ketone TRACE (A) 12/08/2018 10:35 AM    Bilirubin NEGATIVE  12/08/2018 10:35 AM    Urobilinogen 0.2 12/08/2018 10:35 AM    Nitrites NEGATIVE  12/08/2018 10:35 AM    Leukocyte Esterase LARGE (A) 12/08/2018 10:35 AM    Epithelial cells FEW 12/01/2018 04:14 PM    Bacteria 1+ (A) 12/08/2018 10:35 AM    WBC >100 (H) 12/08/2018 10:35 AM    RBC 10-20 12/08/2018 10:35 AM       MEDICATIONS:  Current Facility-Administered Medications   Medication Dose Route Frequency    dextrose 5% with KCl 20 mEq/L infusion   IntraVENous CONTINUOUS    fluconazole (DIFLUCAN) tablet 200 mg  200 mg Oral DAILY    nystatin (MYCOSTATIN) 100,000 unit/gram cream   Topical BID    polyethylene glycol (MIRALAX) packet 17 g  17 g Oral DAILY    docusate sodium (COLACE) capsule 100 mg  100 mg Oral BID    insulin glargine (LANTUS) injection 6 Units  6 Units SubCUTAneous DAILY    sodium bicarbonate tablet 650 mg  650 mg Oral BID    bumetanide (BUMEX) tablet 2 mg  2 mg Oral DAILY    piperacillin-tazobactam (ZOSYN) 3.375 g in 0.9% sodium chloride (MBP/ADV) 100 mL  3.375 g IntraVENous Q12H    mirtazapine (REMERON) tablet 7.5 mg  7.5 mg Oral QHS    metoclopramide (REGLAN) 5 mg/5 mL oral syrup 10 mg  10 mg Oral TIDAC    acetaminophen (TYLENOL) solution 650 mg  650 mg Oral Q6H PRN    Or    acetaminophen (TYLENOL) tablet 650 mg  650 mg Oral Q6H PRN    traMADol (ULTRAM) tablet  mg   mg Oral Q6H PRN    pantoprazole (PROTONIX) 40 mg in sodium chloride 0.9% 10 mL injection  40 mg IntraVENous Q12H    mupirocin (BACTROBAN) 2 % ointment   Topical BID    ammonium lactate 5 % lotn 1 Each  1 Each Topical DAILY    aspirin-dipyridamole (AGGRENOX)  mg per capsule 1 Cap  1 Cap Oral Q12H    atenolol (TENORMIN) tablet 50 mg  50 mg Oral DAILY    zinc oxide-cod liver oil (DESITIN) 40 % paste   Topical PRN    sodium chloride (NS) flush 5-10 mL  5-10 mL IntraVENous Q8H    sodium chloride (NS) flush 5-10 mL  5-10 mL IntraVENous PRN    heparin (porcine) injection 7,500 Units  7,500 Units SubCUTAneous Q8H    glucose chewable tablet 16 g  4 Tab Oral PRN    dextrose (D50W) injection syrg 12.5-25 g  25-50 mL IntraVENous PRN    glucagon (GLUCAGEN) injection 1 mg  1 mg IntraMUSCular PRN    insulin lispro (HUMALOG) injection   SubCUTAneous AC&HS    trimethoprim-polymyxin b (POLYTRIM) 10,000 unit- 1 mg/mL ophthalmic solution 1 Drop  1 Drop Both Eyes BID    dilTIAZem CD (CARDIZEM CD) capsule 240 mg  240 mg Oral DAILY    ondansetron (ZOFRAN) injection 4 mg  4 mg IntraVENous Q6H PRN

## 2018-12-12 NOTE — PROGRESS NOTES
[address]  [phone]      Medical Progress Note      NAME: Luli Ross   :  1955  MRM:  [id number]    Date/Time: 2018 7:18 AM      Subjective:     Remains alert. Cntinues wound care and abx. Still issues with renal function but better and Na improved. BS's better. Afebrile. Riojas requiring intermittent flushing. Urine C/S with yeast  Wound care altered. There is doubt about SNF placement. Spoke at [facility] DNR and she is agreeable. K+ 3.5 and will give oral Kcl as well. Past Medical History reviewed and unchanged from Admission History and Physical and/or prior progress note/electronic medical record    Medications reviewed. ROS:      General: postive for weakness  Drainage left side vulva  Eyes: positive for left eye drainage and irritation  Ear Nose and Throat: positive for ? odynophagia  Respiratory:  positive for PIPER  Cardiology:  negative for chest pain, palpitations, + for orthopnea, PND, edema,  Gastrointestinal: positive for difficulties swallowing and gastroparesis  Genitourinary: positive for incontinence  Muskuloskeletal : negative for arthralgia, myalgia  Neurological: positive for paraparesis, dysphasia and diminished mentation    Objective:     Last 24hrs VS reviewed since prior progress note. Most recent are:    Visit Vitals  /66   Pulse 72   Temp 97.7 °F (36.5 °C)   Resp 16   Ht 5' (1.524 m)   Wt 216 lb 4.3 oz (98.1 kg)   SpO2 96%   Breastfeeding?  No   BMI 42.24 kg/m²     SpO2 Readings from Last 6 Encounters:   18 96%   18 95%   18 93%   18 96%   18 97%   11/15/18 91%            Intake/Output Summary (Last 24 hours) at 2018 0717  Last data filed at 2018 0559  Gross per 24 hour   Intake 403.33 ml   Output 1800 ml   Net -1396.67 ml          Physical Exam:    General appearance: alert,no distress, slowed mentation, appears older than stated age  Eyes: conjunctivitis left eye better  Neck: supple, symmetrical, trachea midline, no adenopathy and thyroid: not enlarged, symmetric, no tenderness/mass/nodules  Lungs: clear to auscultation bilaterally  Heart: regular rate and rhythm, S1, S2 normal, grade 2/6 systolic murmur, no click, rub or gallop  Abdomen: morbidly obese. No appreciable mass or tenderness;  Drainage from left vulvar area and thickened abd wall  Skin:  Significant sacral, buttock and perineal breakdown  Extremities: 3+ brawny edema with scaling lichenified skin  Neurologic: paraparesis, dysphasia, essentially nonverbal    Data Review:    Lab:    BMP:   Lab Results   Component Value Date/Time     12/12/2018 02:43 AM    K 3.5 12/12/2018 02:43 AM     (H) 12/12/2018 02:43 AM    CO2 19 (L) 12/12/2018 02:43 AM    AGAP 9 12/12/2018 02:43 AM     (H) 12/12/2018 02:43 AM    BUN 27 (H) 12/12/2018 02:43 AM    CREA 1.86 (H) 12/12/2018 02:43 AM    GFRAA 33 (L) 12/12/2018 02:43 AM    GFRNA 27 (L) 12/12/2018 02:43 AM     CBC:   No results found for: WBC, HGB, HGBEXT, HCT, HCTEXT, PLT, PLTEXT, HGBEXT, HCTEXT, PLTEXT  Recent Glucose Results:   Lab Results   Component Value Date/Time     (H) 12/12/2018 02:43 AM     All labs reviewed in past 24 hours    Other pertinent lab: A1C > 10. TSH normal    Imaging:    Reviewed.   CXR      Assessment / Plan:     Principal Problem:    FTT (failure to thrive) in adult (12/2/2018)    Active Problems:    Right leg weakness (4/18/2013)      Overview: R/O CVA, HNP, mononeuritis      Diabetic gastroparesis ([address] ()      Stroke ([address] (5/30/2014)      Overview: Recurrent, bilateral      Acute renal failure (ARF) ([address] (12/1/2018)      Diabetic polyneuropathy ([address] (4/20/2013)      ASCVD (arteriosclerotic cardiovascular disease) (5/31/2014)      Hypertension (7/24/2017)      (HFpEF) heart failure with preserved ejection fraction ([facility] (10/31/2017)      DM (diabetes mellitus) ([facility] (12/1/2018)      Vulvar abscess (12/3/2018)      Nephrotic syndrome due to diabetes mellitus ([facility] (12/10/2018)      Morbid obesity ([facility] (1/26/2011)      CKD (chronic kidney disease) stage 3, GFR 30-59 ml/min ([facility]) ()      Hyperlipemia ()      Aortic stenosis, mild (7/24/2017)      GERD (gastroesophageal reflux disease) (10/11/2017)      Severe episode of recurrent major depressive disorder, without psychotic features ([facility] (12/2/2018)      Pressure injury of right heel, stage 2 (12/7/2018)      Pressure injury of sacral region, unstageable ([facility] (12/7/2018)      Hypoxia (3/29/2018)      Acute cystitis without hematuria (8/13/2018)      Overview: yeast        1. Diflucan  2. Continue Zosyn  3. Increase antidepressant  4. Wound care   5. PT/OT/ST  6. Follow labs  7. Riojas draining better  8. Changed IV to D5W + KCll  9. Low dose Lantus with SSI coverage  10.  DNR           Care Plan discussed with: Patient, Family    Discussed:  Care Plan    Prophylaxis:  Hep SQ and H2B/PPI    Disposition:  ???  ___________________________________________________    Attending Physician: Lianne Noguera MD

## 2018-12-13 NOTE — PROGRESS NOTES
[address] [place] [address]  [phone]      Medical Progress Note      NAME: Cally Seals   :  1955  MRM:  [id number]    Date/Time: 2018  7:33 AM      Subjective:     Remains alert. Cntinues wound care and abx. Still issues with renal function but better and Na improved. BS's better. Afebrile. Riojas requiring intermittent flushing. Urine C/S with yeast.  Insurance has denied SNF placement. Will get [facility] and train  for wound care at home. Didn't do as well yesterday with po and will see how does today. Will stop Zosyn and Diflucan finishes Saturday. Past Medical History reviewed and unchanged from Admission History and Physical and/or prior progress note/electronic medical record    Medications reviewed. ROS:      General: postive for weakness  Drainage left side vulva  Eyes: positive for left eye drainage and irritation  Ear Nose and Throat: positive for ? odynophagia  Respiratory:  positive for PIPER  Cardiology:  negative for chest pain, palpitations, + for orthopnea, PND, edema,  Gastrointestinal: positive for difficulties swallowing and gastroparesis  Genitourinary: positive for incontinence  Muskuloskeletal : negative for arthralgia, myalgia  Neurological: positive for paraparesis, dysphasia and diminished mentation    Objective:     Last 24hrs VS reviewed since prior progress note. Most recent are:    Visit Vitals  /69   Pulse 68   Temp 97.9 °F (36.6 °C)   Resp 16   Ht 5' (1.524 m)   Wt 212 lb 1.3 oz (96.2 kg)   SpO2 96%   Breastfeeding?  No   BMI 41.42 kg/m²     SpO2 Readings from Last 6 Encounters:   18 96%   18 95%   18 93%   18 96%   18 97%   11/15/18 91%            Intake/Output Summary (Last 24 hours) at 2018 0732  Last data filed at 2018 0555  Gross per 24 hour   Intake 0 ml   Output 2100 ml   Net -2100 ml          Physical Exam:    General appearance: alert,no distress, slowed mentation, appears older than stated age  Eyes: conjunctivitis left eye better  Neck: supple, symmetrical, trachea midline, no adenopathy and thyroid: not enlarged, symmetric, no tenderness/mass/nodules  Lungs: clear to auscultation bilaterally  Heart: regular rate and rhythm, S1, S2 normal, grade 2/6 systolic murmur, no click, rub or gallop  Abdomen: morbidly obese. No appreciable mass or tenderness;  Drainage from left vulvar area and thickened abd wall  Skin:  Significant sacral, buttock and perineal breakdown  Extremities: 3+ brawny edema with scaling lichenified skin  Neurologic: paraparesis, dysphasia, essentially nonverbal    Data Review:    Lab:    BMP:   Lab Results   Component Value Date/Time     (H) 12/13/2018 03:01 AM    K 3.5 12/13/2018 03:01 AM     (H) 12/13/2018 03:01 AM    CO2 21 12/13/2018 03:01 AM    AGAP 8 12/13/2018 03:01 AM     (H) 12/13/2018 03:01 AM    BUN 25 (H) 12/13/2018 03:01 AM    CREA 1.84 (H) 12/13/2018 03:01 AM    GFRAA 33 (L) 12/13/2018 03:01 AM    GFRNA 28 (L) 12/13/2018 03:01 AM     CBC:   No results found for: WBC, HGB, HGBEXT, HCT, HCTEXT, PLT, PLTEXT, HGBEXT, HCTEXT, PLTEXT  Recent Glucose Results:   Lab Results   Component Value Date/Time     (H) 12/13/2018 03:01 AM     All labs reviewed in past 24 hours    Other pertinent lab: A1C > 10. TSH normal    Imaging:    Reviewed.   CXR      Assessment / Plan:     Principal Problem:    FTT (failure to thrive) in adult (12/2/2018)    Active Problems:    Right leg weakness (4/18/2013)      Overview: R/O CVA, HNP, mononeuritis      Diabetic gastroparesis ([address] ()      Stroke ([address] (5/30/2014)      Overview: Recurrent, bilateral      Acute renal failure (ARF) ([address] (12/1/2018)      Diabetic polyneuropathy ([address] (4/20/2013)      ASCVD (arteriosclerotic cardiovascular disease) (5/31/2014)      Hypertension (7/24/2017)      (HFpEF) heart failure with preserved ejection fraction ([facility] (10/31/2017) DM (diabetes mellitus) [facility] (12/1/2018)      Vulvar abscess (12/3/2018)      Nephrotic syndrome due to diabetes mellitus ([facility] (12/10/2018)      Morbid obesity ([facility] (1/26/2011)      CKD (chronic kidney disease) stage 3, GFR 30-59 ml/min ([facility]) ()      Hyperlipemia ()      Aortic stenosis, mild (7/24/2017)      GERD (gastroesophageal reflux disease) (10/11/2017)      Severe episode of recurrent major depressive disorder, without psychotic features ([facility] (12/2/2018)      Pressure injury of right heel, stage 2 (12/7/2018)      Pressure injury of sacral region, unstageable ([facility] (12/7/2018)      Hypoxia (3/29/2018)      Acute cystitis without hematuria (8/13/2018)      Overview: yeast        1. Diflucan  2. Stop Zosyn  3. Continue antidepressant  4. Wound care   5. PT/OT/ST  6. Follow labs  7. Riojas draining better  8. Home health and family educationj  9.  Low dose Lantus with SSI coverage           Care Plan discussed with: Patient, Family    Discussed:  Care Plan    Prophylaxis:  Hep SQ and H2B/PPI    Disposition:  Home with [facility]  ___________________________________________________    Attending Physician: Ruthann Ching MD

## 2018-12-13 NOTE — PROGRESS NOTES
Bedside shift change report given to JULIETTE Rivas (oncoming nurse) by Martina Alegre (offgoing nurse). Report included the following information SBAR, Kardex, Procedure Summary, Intake/Output, MAR and Recent Results.

## 2018-12-13 NOTE — PROGRESS NOTES
Chart reviewed. Updates//orders sent to [facility]. Will aim for tentative 11a Monday [facility] ambulance to home with [facility] if all in agreement and pt is stable. [facility] has accepted the case. 10a  D/C plans discussed with staff who will provide education to spouse regarding mast and wound care. MD-please write specific wound care order//frequency.

## 2018-12-13 NOTE — PROGRESS NOTES
Bedside shift change report given to Earnest Contreras (oncoming nurse) by Licha Scales (offgoing nurse). Report included the following information SBAR, Kardex, Procedure Summary, MAR and Recent Results.

## 2018-12-13 NOTE — PROGRESS NOTES
NAME: Rey Russell        :  1955        MRN:  [id number]        Assessment :    Plan:  GIRISH on CKD stage 3    HTN    DM 2, uncontrolled    Nephrotic syndrome secondary to DM    Metabolic acidosis    Edema/anasarca    UTI    Recent CVA    Hypernatremia-resolved    Low K- replete prn Creatinine better (1.8 today). mast (prn irrigation - hasn't needed in a couple of days)    Diuresed well. Wt is down  Na mildly high    Ct IV D5W + KCl- at 50 ml/hr  Encourage oral hydration    Ct Bumex 2 mg every day    On PO bicarb       Subjective:     Chief Complaint: resting.  at bedside. Taking some po  No n/v/cp or sob. Not very talkative. Lying flat in bed, sleeping at the time of each of my visits this week. Review of Systems:    Symptom Y/N Comments  Symptom Y/N Comments   Fever/Chills    Chest Pain     Poor Appetite    Edema y    Cough    Abdominal Pain     Sputum    Joint Pain     SOB/PIPER n   Pruritis/Rash     Nausea/vomit n   Tolerating PT/OT     Diarrhea    Tolerating Diet     Constipation    Other       Could not obtain due to:      Objective:     VITALS:   Last 24hrs VS reviewed since prior progress note. Most recent are:  Visit Vitals  /69   Pulse 68   Temp 97.9 °F (36.6 °C)   Resp 16   Ht 5' (1.524 m)   Wt 96.2 kg (212 lb 1.3 oz)   SpO2 96%   Breastfeeding? No   BMI 41.42 kg/m²       Intake/Output Summary (Last 24 hours) at 2018 0718  Last data filed at 2018 0555  Gross per 24 hour   Intake 0 ml   Output 2100 ml   Net -2100 ml      Telemetry Reviewed:     PHYSICAL EXAM:  General: Obese, no acute distress  Resp:  CTA Bilaterally. No Wheezing/Rhonchi/Rales. No access.  muscle use  CV:  RRR, +SM, no rub  GI:  Soft, NT, BS+  :  Mast+  Ext:  +edema      Lab Data Reviewed: (see below)    Medications Reviewed: (see below)    PMH/SH reviewed - no change compared to H&P  ________________________________________________________________________  Care Plan discussed with:  Patient y    Family  y    RN     Care Manager                    Consultant:          Comments   >50% of visit spent in counseling and coordination of care       ________________________________________________________________________  Matt Monroy MD     Procedures: see electronic medical records for all procedures/Xrays and details which  were not copied into this note but were reviewed prior to creation of Plan. LABS:  Recent Labs     12/11/18  0547   WBC 10.5   HGB 10.6*   HCT 33.9*        Recent Labs     12/13/18  0301 12/12/18  0243 12/11/18  0547   * 144 143   K 3.5 3.5 3.4*   * 116* 115*   CO2 21 19* 18*   BUN 25* 27* 32*   CREA 1.84* 1.86* 1.99*   * 156* 164*   CA 7.2* 7.9* 7.4*   MG  --   --  1.3*   PHOS  --   --  2.5*     No results for input(s): SGOT, GPT, AP, TBIL, TP, ALB, GLOB, GGT, AML, LPSE in the last 72 hours. No lab exists for component: AMYP, HLPSE  No results for input(s): INR, PTP, APTT in the last 72 hours. No lab exists for component: INREXT, INREXT   No results for input(s): FE, TIBC, PSAT, FERR in the last 72 hours. Lab Results   Component Value Date/Time    Folate 19.6 10/27/2017 10:19 AM      No results for input(s): PH, PCO2, PO2 in the last 72 hours. No results for input(s): CPK, CKMB in the last 72 hours.     No lab exists for component: TROPONINI  No components found for: [place]  Lab Results   Component Value Date/Time    Color YELLOW 12/08/2018 10:35 AM    Appearance TURBID (A) 12/08/2018 10:35 AM    Specific gravity 1.016 12/08/2018 10:35 AM    Specific gravity 1.016 12/08/2018 10:35 AM    pH (UA) 5.5 12/08/2018 10:35 AM    Protein 300 (A) 12/08/2018 10:35 AM    Glucose NEGATIVE  12/08/2018 10:35 AM    Ketone TRACE (A) 12/08/2018 10:35 AM    Bilirubin NEGATIVE  12/08/2018 10:35 AM    Urobilinogen 0.2 12/08/2018 10:35 AM    Nitrites NEGATIVE  12/08/2018 10:35 AM    Leukocyte Esterase LARGE (A) 12/08/2018 10:35 AM    Epithelial cells FEW 12/01/2018 04:14 PM    Bacteria 1+ (A) 12/08/2018 10:35 AM    WBC >100 (H) 12/08/2018 10:35 AM    RBC 10-20 12/08/2018 10:35 AM       MEDICATIONS:  Current Facility-Administered Medications   Medication Dose Route Frequency    mirtazapine (REMERON) tablet 15 mg  15 mg Oral QHS    potassium chloride (KLOR-CON) packet 20 mEq  20 mEq Oral DAILY    pantoprazole (PROTONIX) tablet 40 mg  40 mg Oral ACB&D    piperacillin-tazobactam (ZOSYN) 3.375 g in 0.9% sodium chloride (MBP/ADV) 100 mL  3.375 g IntraVENous Q8H    dextrose 5% with KCl 20 mEq/L infusion   IntraVENous CONTINUOUS    fluconazole (DIFLUCAN) tablet 200 mg  200 mg Oral DAILY    nystatin (MYCOSTATIN) 100,000 unit/gram cream   Topical BID    polyethylene glycol (MIRALAX) packet 17 g  17 g Oral DAILY    docusate sodium (COLACE) capsule 100 mg  100 mg Oral BID    insulin glargine (LANTUS) injection 6 Units  6 Units SubCUTAneous DAILY    sodium bicarbonate tablet 650 mg  650 mg Oral BID    bumetanide (BUMEX) tablet 2 mg  2 mg Oral DAILY    metoclopramide (REGLAN) 5 mg/5 mL oral syrup 10 mg  10 mg Oral TIDAC    acetaminophen (TYLENOL) solution 650 mg  650 mg Oral Q6H PRN    Or    acetaminophen (TYLENOL) tablet 650 mg  650 mg Oral Q6H PRN    traMADol (ULTRAM) tablet  mg   mg Oral Q6H PRN    mupirocin (BACTROBAN) 2 % ointment   Topical BID    ammonium lactate 5 % lotn 1 Each  1 Each Topical DAILY    aspirin-dipyridamole (AGGRENOX)  mg per capsule 1 Cap  1 Cap Oral Q12H    atenolol (TENORMIN) tablet 50 mg  50 mg Oral DAILY    zinc oxide-cod liver oil (DESITIN) 40 % paste   Topical PRN    sodium chloride (NS) flush 5-10 mL  5-10 mL IntraVENous Q8H    sodium chloride (NS) flush 5-10 mL  5-10 mL IntraVENous PRN    heparin (porcine) injection 7,500 Units  7,500 Units SubCUTAneous Q8H    glucose chewable tablet 16 g  4 Tab Oral PRN    dextrose (D50W) injection syrg 12.5-25 g  25-50 mL IntraVENous PRN    glucagon (GLUCAGEN) injection 1 mg  1 mg IntraMUSCular PRN    insulin lispro (HUMALOG) injection   SubCUTAneous AC&HS    trimethoprim-polymyxin b (POLYTRIM) 10,000 unit- 1 mg/mL ophthalmic solution 1 Drop  1 Drop Both Eyes BID    dilTIAZem CD (CARDIZEM CD) capsule 240 mg  240 mg Oral DAILY    ondansetron (ZOFRAN) injection 4 mg  4 mg IntraVENous Q6H PRN

## 2018-12-14 NOTE — PROGRESS NOTES
Nutrition Assessment:    INTERVENTIONS/RECOMMENDATIONS:   Meals/Snacks: General/healthful diet: Consistent carbohydrate diet   Supplements: Commercial supplement: Discontinue ensure pudding, continue Glucerna daily    ASSESSMENT:   Chart reviewed; patient lying in bed at time of visit with  at bedside. Patient didn't respond to most questions and  helped to answer. Fair PO intake reported; patient will state she is hungry but then only eats part of her meal. Encouraged patient that small, more frequent meals/snacks may be beneficial in preventing nausea that she struggled with PTA. Patient didn't respond.  states she does not like the ensure pudding so will discontinue. Continue Glucerna daily. Noted MD is aiming for Monday discharge. Encouraged intake of meals and advised of RD availability if patient were to have any nutrition questions/concerns. Diet Order: Consistent carb(Ensure pudding and glucerna daily)  % Eaten:    Patient Vitals for the past 72 hrs:   % Diet Eaten   12/13/18 0844 50 %     Pertinent Medications: [x] Reviewed []Other: Bumex, Colace, Lantus, Humalog, Reglan, Remeron, Protonix, KCl, Miralax, D5% IVF  Pertinent Labs: [x]Reviewed  []Other:-[phone]  Food Allergies: [x]None []Other:     Last BM: 12/13 []Active     []Hyperactive  []Hypoactive       [] Absent  BS  Skin:    [] Intact   [] Incision  [x] Breakdown   []Edema   []Other:    Anthropometrics: Height: 5' (152.4 cm) Weight: 96.8 kg (213 lb 6.5 oz)    IBW (%IBW):   ( ) UBW (%UBW):   (  %)    BMI: Body mass index is 41.68 kg/m².     This BMI is indicative of:  []Underweight   []Normal   []Overweight   [] Obesity   [x] Extreme Obesity (BMI>40)  Last Weight Metrics:  Weight Loss Metrics 12/14/2018 11/5/2018 8/13/2018 6/15/2018 5/16/2018 4/27/2018 3/23/2018   Today's Wt 213 lb 6.5 oz 300 lb - 300 lb 300 lb 300 lb 300 lb   BMI 41.68 kg/m2 58.59 kg/m2 - 58.59 kg/m2 58.59 kg/m2 58.59 kg/m2 58.59 kg/m2       Estimated Nutrition Needs (Based on): 1480 Kcals/day(BMR (1442) x 1. 2AF -250kcal) , 97 g(1.0 g/kg bw) Protein  Carbohydrate: At Least 130 g/day  Fluids: 1500 mL/day     Pt expected to meet estimated nutrient needs: [x]Yes []No    NUTRITION DIAGNOSES:   Problem:  Increased nutrient needs      Etiology: related to wound healing     Signs/Symptoms: as evidenced by unstageable right heel      NUTRITION INTERVENTIONS:  Meals/Snacks: General/healthful diet   Supplements: Commercial supplement              GOAL:   PO intake >505 of meals/supplement next 5-7 days    NUTRITION MONITORING AND EVALUATION   Food/Nutrient Intake Outcomes:  Total energy intake  Physical Signs/Symptoms Outcomes: Weight/weight change, Glucose profile, Electrolyte and renal profile, GI profile    Previous Goal Met:   [x] Met              [] Progressing Towards Goal              [] Not Progressing Towards Goal   Previous Recommendations:   [x] Implemented          [] Not Implemented          [] Not Applicable    LEARNING NEEDS (Diet, Food/Nutrient-Drug Interaction):    [x] None Identified   [] Identified and Education Provided/Documented   [] Identified and Pt declined/was not appropriate     Cultural, Jain, OR Ethnic Dietary Needs:    [x] None Identified   [] Identified and Addressed     [x] Interdisciplinary Care Plan Reviewed/Documented    [x] Discharge Planning: CCD + ONS 1-2x/day as needed    [] Participated in Interdisciplinary Rounds    NUTRITION RISK:    [] High              [] Moderate           [x]  Low  []  Minimal/Uncompromised      Dorina Beauchamp  Pager [phone]             Weekend Pager [phone]

## 2018-12-14 NOTE — PROGRESS NOTES
Case discussed with BRODY Choudhury who will try to obtain insurance auth for Monday ambulance as spouse says he was charged ~$500 for each of the last 2 ambulance transports. [address], Marnie Shafer at [phone] called me and states [facility] at [phone], [facility] at [phone], and [facility] at [phone] ambulance services are in contract with pt's insurance. If she uses [facility] there will be a low tier//high deductible(cost pt more) than if she uses those in network. The Benefits number is [phone]. After much discussion, Marnie Shafer spent ~20-30 min while I was on the phone with him to work up the [address] number which is [phone]. I've ecin'd [facility] to transport pt at 11a Monday. Once confirmed, [facility] needs to be canceled. Glory with [facility] bought them) confirmed 1145 Monday transport.   She has the [address] number

## 2018-12-14 NOTE — PROGRESS NOTES
YUE contacted [facility]  (medical management) to obtain auth. SW had to contact [facility] for their NPI [id number], no transport code provided by [facility]. YUE provided pt info for auth. Per [facility] staff, pt info would need to be reviewed by the MD for approval.  YUE provided [place] with the assigned CM contact number for final decision regarding auth. Assigned CM will continue to follow and assist with additional discharge needs.      uYsra Carvajal, DANIA  Ext [phone]

## 2018-12-14 NOTE — PROGRESS NOTES
NAME: Yana Burnette        :  1955        MRN:  [id number]        Assessment :    Plan:  GIRISH on CKD stage 3    HTN    DM 2, uncontrolled    Nephrotic syndrome secondary to DM    Metabolic acidosis    Edema/anasarca    UTI    Recent CVA    Hypernatremia-resolved    Low K- replete prn Creatinine better (1.9 today). mast (prn irrigation - hasn't needed in a couple of days)    Diuresed well. Wt is down  Na mildly high    Ct IV D5W + KCl- at 50 ml/hr  Encourage oral hydration    Ct Bumex 2 mg every day    On PO bicarb    Home on Monday? I will sign off, but please call with questions or changes. Subjective:     Chief Complaint: resting.  at bedside. Taking some po  No n/v/cp or sob. Not very talkative. Lying flat in bed, sleeping at the time of each of my visits this week. Review of Systems:    Symptom Y/N Comments  Symptom Y/N Comments   Fever/Chills    Chest Pain     Poor Appetite    Edema y    Cough    Abdominal Pain     Sputum    Joint Pain     SOB/PIPER n   Pruritis/Rash     Nausea/vomit n   Tolerating PT/OT     Diarrhea    Tolerating Diet     Constipation    Other       Could not obtain due to:      Objective:     VITALS:   Last 24hrs VS reviewed since prior progress note. Most recent are:  Visit Vitals  /86 (BP 1 Location: Right arm, BP Patient Position: At rest)   Pulse 70   Temp 98.1 °F (36.7 °C)   Resp 18   Ht 5' (1.524 m)   Wt 96.8 kg (213 lb 6.5 oz)   SpO2 95%   Breastfeeding? No   BMI 41.68 kg/m²       Intake/Output Summary (Last 24 hours) at 2018 1531  Last data filed at 2018 1416  Gross per 24 hour   Intake 1020 ml   Output 2100 ml   Net -1080 ml      Telemetry Reviewed:     PHYSICAL EXAM:  General: Obese, no acute distress  Resp:  CTA Bilaterally. No Wheezing/Rhonchi/Rales. No access.  muscle use  CV:  RRR, +SM, no rub  GI:  Soft, NT, BS+  :  Mast+  Ext:  +edema      Lab Data Reviewed: (see below)    Medications Reviewed: (see below)    PMH/SH reviewed - no change compared to H&P  ________________________________________________________________________  Care Plan discussed with:  Patient y    Family  y    RN     Care Manager                    Consultant:          Comments   >50% of visit spent in counseling and coordination of care       ________________________________________________________________________  Matt Monroy MD     Procedures: see electronic medical records for all procedures/Xrays and details which  were not copied into this note but were reviewed prior to creation of Plan. LABS:  No results for input(s): WBC, HGB, HCT, PLT, HGBEXT, HCTEXT, PLTEXT, HGBEXT, HCTEXT, PLTEXT in the last 72 hours. Recent Labs     12/14/18  0249 12/13/18  0301 12/12/18  0243    146* 144   K 4.2 3.5 3.5   * 117* 116*   CO2 19* 21 19*   BUN 25* 25* 27*   CREA 1.90* 1.84* 1.86*   * 137* 156*   CA 7.8* 7.2* 7.9*     No results for input(s): SGOT, GPT, AP, TBIL, TP, ALB, GLOB, GGT, AML, LPSE in the last 72 hours. No lab exists for component: AMYP, HLPSE  No results for input(s): INR, PTP, APTT in the last 72 hours. No lab exists for component: INREXT, INREXT   No results for input(s): FE, TIBC, PSAT, FERR in the last 72 hours. Lab Results   Component Value Date/Time    Folate 19.6 10/27/2017 10:19 AM      No results for input(s): PH, PCO2, PO2 in the last 72 hours. No results for input(s): CPK, CKMB in the last 72 hours.     No lab exists for component: TROPONINI  No components found for: [place]  Lab Results   Component Value Date/Time    Color YELLOW 12/08/2018 10:35 AM    Appearance TURBID (A) 12/08/2018 10:35 AM    Specific gravity 1.016 12/08/2018 10:35 AM    Specific gravity 1.016 12/08/2018 10:35 AM    pH (UA) 5.5 12/08/2018 10:35 AM    Protein 300 (A) 12/08/2018 10:35 AM    Glucose NEGATIVE  12/08/2018 10:35 AM    Ketone TRACE (A) 12/08/2018 10:35 AM Bilirubin NEGATIVE  12/08/2018 10:35 AM    Urobilinogen 0.2 12/08/2018 10:35 AM    Nitrites NEGATIVE  12/08/2018 10:35 AM    Leukocyte Esterase LARGE (A) 12/08/2018 10:35 AM    Epithelial cells FEW 12/01/2018 04:14 PM    Bacteria 1+ (A) 12/08/2018 10:35 AM    WBC >100 (H) 12/08/2018 10:35 AM    RBC 10-20 12/08/2018 10:35 AM       MEDICATIONS:  Current Facility-Administered Medications   Medication Dose Route Frequency    mirtazapine (REMERON) tablet 15 mg  15 mg Oral QHS    potassium chloride (KLOR-CON) packet 20 mEq  20 mEq Oral DAILY    pantoprazole (PROTONIX) tablet 40 mg  40 mg Oral ACB&D    dextrose 5% with KCl 20 mEq/L infusion   IntraVENous CONTINUOUS    fluconazole (DIFLUCAN) tablet 200 mg  200 mg Oral DAILY    nystatin (MYCOSTATIN) 100,000 unit/gram cream   Topical BID    polyethylene glycol (MIRALAX) packet 17 g  17 g Oral DAILY    docusate sodium (COLACE) capsule 100 mg  100 mg Oral BID    insulin glargine (LANTUS) injection 6 Units  6 Units SubCUTAneous DAILY    sodium bicarbonate tablet 650 mg  650 mg Oral BID    bumetanide (BUMEX) tablet 2 mg  2 mg Oral DAILY    metoclopramide (REGLAN) 5 mg/5 mL oral syrup 10 mg  10 mg Oral TIDAC    acetaminophen (TYLENOL) solution 650 mg  650 mg Oral Q6H PRN    Or    acetaminophen (TYLENOL) tablet 650 mg  650 mg Oral Q6H PRN    traMADol (ULTRAM) tablet  mg   mg Oral Q6H PRN    mupirocin (BACTROBAN) 2 % ointment   Topical BID    ammonium lactate 5 % lotn 1 Each  1 Each Topical DAILY    aspirin-dipyridamole (AGGRENOX)  mg per capsule 1 Cap  1 Cap Oral Q12H    atenolol (TENORMIN) tablet 50 mg  50 mg Oral DAILY    zinc oxide-cod liver oil (DESITIN) 40 % paste   Topical PRN    sodium chloride (NS) flush 5-10 mL  5-10 mL IntraVENous Q8H    sodium chloride (NS) flush 5-10 mL  5-10 mL IntraVENous PRN    heparin (porcine) injection 7,500 Units  7,500 Units SubCUTAneous Q8H    glucose chewable tablet 16 g  4 Tab Oral PRN    dextrose (D50W) injection syrg 12.5-25 g  25-50 mL IntraVENous PRN    glucagon (GLUCAGEN) injection 1 mg  1 mg IntraMUSCular PRN    insulin lispro (HUMALOG) injection   SubCUTAneous AC&HS    trimethoprim-polymyxin b (POLYTRIM) 10,000 unit- 1 mg/mL ophthalmic solution 1 Drop  1 Drop Both Eyes BID    dilTIAZem CD (CARDIZEM CD) capsule 240 mg  240 mg Oral DAILY    ondansetron (ZOFRAN) injection 4 mg  4 mg IntraVENous Q6H PRN

## 2018-12-14 NOTE — PROGRESS NOTES
[address] [place] [address]  [phone]      Medical Progress Note      NAME: Ean Wagner   :  1955  MRM:  [id number]    Date/Time: 2018  7:16 AM      Subjective:     Remains alert. Cntinues wound care and abx. Still issues with renal function but better and Na improved. BS's better. Afebrile. Riojas requiring intermittent flushing. Urine C/S with yeast.  Insurance has denied SNF placement. Will get [facility] and train  for wound care at home. Did better yesterday with po and will see how does today. Will stop Zosyn and Diflucan finishes Saturday. Aim for Monday D/C    Past Medical History reviewed and unchanged from Admission History and Physical and/or prior progress note/electronic medical record    Medications reviewed. ROS:      General: postive for weakness  Drainage left side vulva  Eyes: positive for left eye drainage and irritation  Ear Nose and Throat: positive for ? odynophagia  Respiratory:  positive for PIPER  Cardiology:  negative for chest pain, palpitations, + for orthopnea, PND, edema,  Gastrointestinal: positive for difficulties swallowing and gastroparesis  Genitourinary: positive for incontinence  Muskuloskeletal : negative for arthralgia, myalgia  Neurological: positive for paraparesis, dysphasia and diminished mentation    Objective:     Last 24hrs VS reviewed since prior progress note. Most recent are:    Visit Vitals  /79   Pulse 67   Temp 97.2 °F (36.2 °C)   Resp 17   Ht 5' (1.524 m)   Wt 213 lb 6.5 oz (96.8 kg)   SpO2 99%   Breastfeeding?  No   BMI 41.68 kg/m²     SpO2 Readings from Last 6 Encounters:   18 99%   18 95%   18 93%   18 96%   18 97%   11/15/18 91%            Intake/Output Summary (Last 24 hours) at 2018 0716  Last data filed at 2018 5448  Gross per 24 hour   Intake 1260 ml   Output 1725 ml   Net -465 ml          Physical Exam:    General appearance: alert,no distress, slowed mentation, appears older than stated age  Eyes: conjunctivitis left eye better  Neck: supple, symmetrical, trachea midline, no adenopathy and thyroid: not enlarged, symmetric, no tenderness/mass/nodules  Lungs: clear to auscultation bilaterally  Heart: regular rate and rhythm, S1, S2 normal, grade 2/6 systolic murmur, no click, rub or gallop  Abdomen: morbidly obese. No appreciable mass or tenderness;  Drainage from left vulvar area and thickened abd wall  Skin:  Significant sacral, buttock and perineal breakdown  Extremities: 3+ brawny edema with scaling lichenified skin  Neurologic: paraparesis, dysphasia, essentially nonverbal    Data Review:    Lab:    BMP:   Lab Results   Component Value Date/Time     12/14/2018 02:49 AM    K 4.2 12/14/2018 02:49 AM     (H) 12/14/2018 02:49 AM    CO2 19 (L) 12/14/2018 02:49 AM    AGAP 9 12/14/2018 02:49 AM     (H) 12/14/2018 02:49 AM    BUN 25 (H) 12/14/2018 02:49 AM    CREA 1.90 (H) 12/14/2018 02:49 AM    GFRAA 32 (L) 12/14/2018 02:49 AM    GFRNA 27 (L) 12/14/2018 02:49 AM     CBC:   No results found for: WBC, HGB, HGBEXT, HCT, HCTEXT, PLT, PLTEXT, HGBEXT, HCTEXT, PLTEXT  Recent Glucose Results:   Lab Results   Component Value Date/Time     (H) 12/14/2018 02:49 AM     All labs reviewed in past 24 hours    Other pertinent lab: A1C > 10. TSH normal    Imaging:    Reviewed.   CXR      Assessment / Plan:     Principal Problem:    FTT (failure to thrive) in adult (12/2/2018)    Active Problems:    Right leg weakness (4/18/2013)      Overview: R/O CVA, HNP, mononeuritis      Diabetic gastroparesis ([address] ()      Stroke ([address] (5/30/2014)      Overview: Recurrent, bilateral      Acute renal failure (ARF) ([address] (12/1/2018)      Diabetic polyneuropathy ([address] (4/20/2013)      ASCVD (arteriosclerotic cardiovascular disease) (5/31/2014)      Hypertension (7/24/2017)      (HFpEF) heart failure with preserved ejection fraction ([facility] (10/31/2017)      DM (diabetes mellitus) ([facility] (12/1/2018)      Vulvar abscess (12/3/2018)      Nephrotic syndrome due to diabetes mellitus ([facility] (12/10/2018)      Morbid obesity ([facility] (1/26/2011)      CKD (chronic kidney disease) stage 3, GFR 30-59 ml/min ([facility]) ()      Hyperlipemia ()      Aortic stenosis, mild (7/24/2017)      GERD (gastroesophageal reflux disease) (10/11/2017)      Severe episode of recurrent major depressive disorder, without psychotic features ([facility] (12/2/2018)      Pressure injury of right heel, stage 2 (12/7/2018)      Pressure injury of sacral region, unstageable ([facility] (12/7/2018)      Hypoxia (3/29/2018)      Acute cystitis without hematuria (8/13/2018)      Overview: yeast        1. Diflucan  2. Stop Zosyn  3. Continue antidepressant  4. Wound care   5. PT/OT/ST  6. Follow labs  7. Riojas draining better  8. Home health and family educationj  9.  Low dose Lantus with SSI coverage           Care Plan discussed with: Patient, Family    Discussed:  Care Plan    Prophylaxis:  Hep SQ and H2B/PPI    Disposition:  Home with [facility] [place]  ___________________________________________________    Attending Physician: Taylor Candelario MD

## 2018-12-14 NOTE — PROGRESS NOTES
Bedside shift change report given to Thomas Colbert (oncoming nurse) by Amy Cervantes (offgoing nurse). Report included the following information SBAR, Kardex, Intake/Output, MAR and Recent Results.

## 2018-12-14 NOTE — PROGRESS NOTES
Bedside shift change report given to [facility] (oncoming nurse) by Jignesh Juarez (offgoing nurse). Report included the following information SBAR, Kardex, Intake/Output, MAR and Recent Results.

## 2018-12-14 NOTE — DIABETES MGMT
DTC Consult Progress Note    Recommendations/ Comments: chart reviewed for hospital glucose management - no changes or suggestions at this time. Current hospital DM medication: Lantus 6 units daily, Lispro Correctional insulin with high sensitivity      DTC will continue to follow patient as needed. ____________________________    Consult received for:                   [x]           Hospital Blood Glucose Management    Chart reviewed and initial evaluation complete on Ja Sprague. Patient is a 58 y.o. female with known diabetes- on Levemir 20 units BID at home per PTA list.       A1c:   Lab Results   Component Value Date/Time    Hemoglobin A1c 10.4 (H) 12/02/2018 03:35 AM       Recent Glucose Results:   Lab Results   Component Value Date/Time     (H) 12/14/2018 02:49 AM    GLUCPOC 170 (H) 12/14/2018 07:29 AM    GLUCPOC 211 (H) 12/13/2018 09:44 PM    GLUCPOC 159 (H) 12/13/2018 04:44 PM        Lab Results   Component Value Date/Time    Creatinine 1.90 (H) 12/14/2018 02:49 AM       Active Orders   Diet    DIET DIABETIC CONSISTENT CARB Regular        PO intake:   Patient Vitals for the past 72 hrs:   % Diet Eaten   12/13/18 0844 50 %     Thank you.   [facility]  Office:  [phone]      Time spent: 5 minutes

## 2018-12-15 NOTE — PROGRESS NOTES
Bedside shift change report given to Costa Serna (oncoming nurse) by Edd Shin (offgoing nurse). Report included the following information SBAR, Kardex, Intake/Output, MAR and Recent Results. Anticipated D/C Monday.

## 2018-12-15 NOTE — PROGRESS NOTES
0115 - Attempted to obtain patient labwork with no success. Pt and  asking that no further attempts be made at this time. Bedside shift change report given to [facility] (oncoming nurse) by Laly Guillen (offgoing nurse). Report included the following information SBAR, Kardex, Intake/Output, MAR and Recent Results.

## 2018-12-16 NOTE — PROGRESS NOTES
Bedside shift change report given to Anthony Elliott (oncoming nurse) by Chuy Mosqueda (offgoing nurse). Report included the following information SBAR, Kardex, Intake/Output, MAR and Recent Results.

## 2018-12-16 NOTE — PROGRESS NOTES
[address] [place] [address]  [phone]      Medical Progress Note      NAME: Shelly Weber   :  1955  MRM:  [id number]    Date/Time: 2018  11:17 AM      Subjective:     Chart reviewed and patient seen and examined this AM and D/W her  and her nurse and all events noted. She is alert and currently voices no c/o to me although her  states that she c/o abdominal discomfort to him. She denies CP or SOB or other cardio/respiratory c/o. She denies any uncontrolled pain. She remains on specialty bed with continued wound care and abx. She still has issues with renal function but better and her Na has improved; however  had them stop attempting to get blood yesterday AM after 2 unsuccessful attempts and he wants to not have blood done unless \"absolutely necessary\". She remains afebrile and her mast is requiring intermittent flushing. Urine C/S with yeast and completed Diflucan yesterday. Insurance has denied SNF placement. [facility]  for wound care at home. She has apparently done better with po although taking in only a little and will see how does today. Aim for Monday D/C    Past Medical History reviewed and unchanged from Admission History and Physical and/or prior progress note/electronic medical record    Medications reviewed.     ROS:      General: postive for weakness  Drainage left side vulva  Eyes: positive for left eye drainage and irritation  Ear Nose and Throat: positive for ? odynophagia  Respiratory:  positive for PIPER  Cardiology:  negative for chest pain, palpitations, + for orthopnea, PND, edema,  Gastrointestinal: positive for difficulties swallowing and gastroparesis and per her  abdominal pain earlier this AM but not now per my questioning  Genitourinary: positive for incontinence  Muskuloskeletal : negative for arthralgia, myalgia  Neurological: positive for paraparesis, dysphasia and diminished mentation  Remainder of ROS negative    Objective:     Last 24hrs VS reviewed since prior progress note. Most recent are:    Visit Vitals  BP (!) 138/94 (BP 1 Location: Left arm, BP Patient Position: At rest)   Pulse 72   Temp 99 °F (37.2 °C)   Resp 18   Ht 5' (1.524 m)   Wt 210 lb 5.1 oz (95.4 kg)   SpO2 95%   Breastfeeding? No   BMI 41.08 kg/m²     SpO2 Readings from Last 6 Encounters:   12/16/18 95%   11/29/18 95%   11/27/18 93%   11/20/18 96%   11/16/18 97%   11/15/18 91%            Intake/Output Summary (Last 24 hours) at 12/16/2018 1117  Last data filed at 12/16/2018 0557  Gross per 24 hour   Intake 960 ml   Output 1800 ml   Net -840 ml          Physical Exam:    General appearance: alert,no distress, slowed mentation, appears older than stated age  Eyes: conjunctivitis left eye improving  ENT: negative for thrush  Neck: supple, symmetrical, trachea midline, no adenopathy and thyroid: not enlarged, symmetric, no tenderness/mass/nodules  Lungs: clear to auscultation bilaterally  Heart: regular rate and rhythm, S1, S2 normal, grade 2/6 systolic murmur, no click, rub or gallop  Abdomen: morbidly obese. Soft and non tender to diffuse palpation, No appreciable mass or organomegaly;  Drainage from left vulvar area and thickened abd wall  :  Riojas with some sediment  Skin:  Significant sacral, buttock and perineal breakdown  Extremities: 3+ brawny edema with scaling lichenified skin  Neurologic: paraparesis, dysphasia, essentially nonverbal    Data Review:    Lab:    BMP:   No results found for: NA, K, CL, CO2, AGAP, GLU, BUN, CREA, GFRAA, GFRNA  CBC:   No results found for: WBC, HGB, HGBEXT, HCT, HCTEXT, PLT, PLTEXT, HGBEXT, HCTEXT, PLTEXT  Recent Glucose Results:   No results found for: GLU  All labs reviewed in past 24 hours    Other pertinent lab: A1C > 10. TSH normal    Imaging:    Reviewed.   CXR      Assessment / Plan:     Principal Problem:    FTT (failure to thrive) in adult (12/2/2018)    Active Problems: Morbid obesity ([address] (1/26/2011)      Right leg weakness (4/18/2013)      Overview: R/O CVA, HNP, mononeuritis      CKD (chronic kidney disease) stage 3, GFR 30-59 ml/min ([facility]) ()      Hyperlipemia ()      Diabetic gastroparesis ([facility]) ()      Diabetic polyneuropathy ([address] (4/20/2013)      Stroke ([address] (5/30/2014)      Overview: Recurrent, bilateral      ASCVD (arteriosclerotic cardiovascular disease) (5/31/2014)      Hypertension (7/24/2017)      Aortic stenosis, mild (7/24/2017)      GERD (gastroesophageal reflux disease) (10/11/2017)      (HFpEF) heart failure with preserved ejection fraction ([address] (10/31/2017)      Hypoxia (3/29/2018)      Acute cystitis without hematuria (8/13/2018)      Overview: yeast      DM (diabetes mellitus) ([address] (12/1/2018)      Acute renal failure (ARF) ([address] (12/1/2018)      Severe episode of recurrent major depressive disorder, without psychotic features ([address] (12/2/2018)      Vulvar abscess (12/3/2018)      Pressure injury of right heel, stage 2 (12/7/2018)      Pressure injury of sacral region, unstageable ([address] (12/7/2018)      Nephrotic syndrome due to diabetes mellitus ([address] (12/10/2018)        1. Completed Diflucan yesterday  2. Stopped Zosyn on 12/14  3. Has Zofran and Reglan ordered PRN so use as needed  4. Continue antidepressant  5. Continue wound care   6. PT/OT/ST  7. Follow labs  8. Riojas draining better  9. Home health and family educationj  10.  Low dose Lantus with SSI coverage           Care Plan discussed with: Patient, Family    Discussed:  Care Plan    Prophylaxis:  Hep SQ and H2B/PPI    [facility]  ___________________________________________________    Attending Physician: Kumar Evans MD

## 2018-12-17 NOTE — PROGRESS NOTES
Discharge instructions, prescriptions and follow up appointments were reviewed with  patient's spouse who verbalized an understanding. An opportunity for questions and clarification was provided for. Extra supplies for wound care and cath leg securement were sent home with patient.

## 2018-12-17 NOTE — PROGRESS NOTES
[address]  [phone]      Medical Progress Note      NAME: Isis Lizarraga   :  1955  MRM:  [id number]    Date/Time: 2018  11:17 AM      Subjective:     Awake and alert. Eating OK. All set for D/C including HH. See D/C instruction sheet. Past Medical History reviewed and unchanged from Admission History and Physical and/or prior progress note/electronic medical record    Medications reviewed. ROS:      General: postive for weakness  Drainage left side vulva  Eyes: positive for left eye drainage and irritation  Ear Nose and Throat: positive for ? odynophagia  Respiratory:  positive for PIPER  Cardiology:  negative for chest pain, palpitations, + for orthopnea, PND, edema,  Gastrointestinal: positive for difficulties swallowing and gastroparesis and per her  abdominal pain earlier this AM but not now per my questioning  Genitourinary: positive for incontinence  Muskuloskeletal : negative for arthralgia, myalgia  Neurological: positive for paraparesis, dysphasia and diminished mentation  Remainder of ROS negative    Objective:     Last 24hrs VS reviewed since prior progress note. Most recent are:    Visit Vitals  /79   Pulse 62   Temp 97.9 °F (36.6 °C)   Resp 16   Ht 5' (1.524 m)   Wt 210 lb 1.6 oz (95.3 kg)   SpO2 96%   Breastfeeding?  No   BMI 41.03 kg/m²     SpO2 Readings from Last 6 Encounters:   18 96%   18 95%   18 93%   18 96%   18 97%   11/15/18 91%            Intake/Output Summary (Last 24 hours) at 2018 0715  Last data filed at 2018 0640  Gross per 24 hour   Intake 440 ml   Output 1450 ml   Net -1010 ml          Physical Exam:    General appearance: alert,no distress, slowed mentation, appears older than stated age  Eyes: conjunctivitis left eye improving  ENT: negative for thrush  Neck: supple, symmetrical, trachea midline, no adenopathy and thyroid: not enlarged, symmetric, no tenderness/mass/nodules  Lungs: clear to auscultation bilaterally  Heart: regular rate and rhythm, S1, S2 normal, grade 2/6 systolic murmur, no click, rub or gallop  Abdomen: morbidly obese. Soft and non tender to diffuse palpation, No appreciable mass or organomegaly;  Drainage from left vulvar area and thickened abd wall  :  Riojas with some sediment  Skin:  Significant sacral, buttock and perineal breakdown  Extremities: 3+ brawny edema with scaling lichenified skin  Neurologic: paraparesis, dysphasia, essentially nonverbal    Data Review:    Lab:    BMP:   No results found for: NA, K, CL, CO2, AGAP, GLU, BUN, CREA, GFRAA, GFRNA  CBC:   No results found for: WBC, HGB, HGBEXT, HCT, HCTEXT, PLT, PLTEXT, HGBEXT, HCTEXT, PLTEXT  Recent Glucose Results:   No results found for: GLU  All labs reviewed in past 24 hours    Other pertinent lab: A1C > 10. TSH normal    Imaging:    Reviewed.   CXR      Assessment / Plan:     Principal Problem:    FTT (failure to thrive) in adult (12/2/2018)    Active Problems:    Right leg weakness (4/18/2013)      Overview: R/O CVA, HNP, mononeuritis      Diabetic gastroparesis ([address] ()      Stroke ([address] (5/30/2014)      Overview: Recurrent, bilateral      Acute renal failure (ARF) ([address] (12/1/2018)      Diabetic polyneuropathy ([address] (4/20/2013)      ASCVD (arteriosclerotic cardiovascular disease) (5/31/2014)      Hypertension (7/24/2017)      (HFpEF) heart failure with preserved ejection fraction ([address] (10/31/2017)      DM (diabetes mellitus) ([address] (12/1/2018)      Vulvar abscess (12/3/2018)      Nephrotic syndrome due to diabetes mellitus ([address] (12/10/2018)      Morbid obesity ([address] (1/26/2011)      CKD (chronic kidney disease) stage 3, GFR 30-59 ml/min ([facility]) ()      Hyperlipemia ()      Aortic stenosis, mild (7/24/2017)      GERD (gastroesophageal reflux disease) (10/11/2017)      Severe episode of recurrent major depressive disorder, without psychotic features [facility] (12/2/2018)      Pressure injury of right heel, stage 2 (12/7/2018)      Pressure injury of sacral region, unstageable ([address] (12/7/2018)      Hypoxia (3/29/2018)      Acute cystitis without hematuria (8/13/2018)      Overview: yeast        1. Completed Diflucan yesterday  2. Stopped Zosyn on 12/14  3. Has Zofran  ordered PRN so use as needed  4. Continue antidepressant  5. Continue wound care   6. PT/OT/ST. .. HH  7. Home health and family educationj  8. Low dose Lantus with SSI coverage  9.  D/C today           Care Plan discussed with: Patient, Family    Discussed:  Care Plan    Prophylaxis:  Hep SQ and H2B/PPI    Disposition:  Home with [facility]  ___________________________________________________    Attending Physician: Timothy Hopkins MD

## 2018-12-17 NOTE — DISCHARGE INSTRUCTIONS
[phone]  [phone]      Patient Discharge Instructions    Osvaldo Ching / [id number] : 1955    Admitted 2018 Discharged: 2018     Principal Problem:    FTT (failure to thrive) in adult (2018)    Active Problems:    Right leg weakness (2013)      Overview: R/O CVA, HNP, mononeuritis      Diabetic gastroparesis ([address] ()      Stroke ([address] (2014)      Overview: Recurrent, bilateral      Acute renal failure (ARF) ([address] (2018)      Diabetic polyneuropathy ([address] (2013)      ASCVD (arteriosclerotic cardiovascular disease) (2014)      Hypertension (2017)      (HFpEF) heart failure with preserved ejection fraction ([address] (10/31/2017)      DM (diabetes mellitus) ([address] (2018)      Vulvar abscess (12/3/2018)      Nephrotic syndrome due to diabetes mellitus ([address] (12/10/2018)      Morbid obesity ([address] (2011)      CKD (chronic kidney disease) stage 3, GFR 30-59 ml/min ([facility]) ()      Hyperlipemia ()      Aortic stenosis, mild (2017)      GERD (gastroesophageal reflux disease) (10/11/2017)      Severe episode of recurrent major depressive disorder, without psychotic features ([address] (2018)      Pressure injury of right heel, stage 2 (2018)      Pressure injury of sacral region, unstageable ([address] (2018)      Hypoxia (3/29/2018)      Acute cystitis without hematuria (2018)      Overview: yeast          Allergies   Allergen Reactions    Zestril [Lisinopril] Swelling       · It is important that you take the medication exactly as they are prescribed. · Do not take other medications without consulting your doctor. What to do at Next Level of Care    Disposition:  HOME    Recommended diet: Diabetic Diet    Recommended activity: Activity as tolerated    Wound Care:  Lac-hydrin lotion is to loosen and remove thick plaques of dead skin on BLE  . Right heel epitheliazed unstageable PI leave open to air and floated. Left labia abscess: BID cleanse with dermal wound cleanser spray and wipe clean. Apply Mupirocin/Bactroban ointment to wound and cover with cotton gauze 4x4 - just tuck in in over the                               wound. change BID and after BM's. Buttocks, posterior thighs: GENTLY cleanse skin with soap and water, pat dry and apply ES Desitin zinc cream and Nystatin cream    Lab:  none    Other:  FSBS 3 times a day before meals and give insulin as indicated;  Routine Riojas care. Oxygen:  none          Information obtained by :  I understand that if any problems occur once I am at home I am to contact my physician. I understand and acknowledge receipt of the instructions indicated above.                                                                                                                                            Physician's or R.N.'s Signature                                                                  Date/Time                                                                                                                                              Patient or Representative Signature                                                          Date/Time

## 2018-12-17 NOTE — PROGRESS NOTES
All in agreement with [facility] ambulance transport to home at 1145 this morning with [facility]. Please send d/c instructions, facesheet, ambulance form, and DDNR. Thanks    10a  D/C plans discussed with spouse//pt who are in agreement with d/c to home today. He feels comfortable with doing the wound//mast care. Staff will send home the supplies he will need for the next day or so.

## 2018-12-17 NOTE — TELEPHONE ENCOUNTER
Requested Prescriptions     Pending Prescriptions Disp Refills    traMADol (ULTRAM) 50 mg tablet 50 Tab 0     Sig: Take 1 Tab by mouth every six (6) hours as needed. Max Daily Amount: 200 mg.

## 2018-12-17 NOTE — PALLIATIVE CARE DISCHARGE
Goals of Care/Treatment Preferences    The Palliative Medicine team was consulted as part of your/your loved one's care in the hospital. Our team is a supportive service; we strive to relieve suffering and improve quality of life. The Palliative team of Maria D He LCSW and Dr Satnam Díaz were involved in your care at the hospital.     We reviewed advance care planning information, which includes the followin [address] is[de-identified] Legal Next of Kin  Confirm Advance Directive: None  Patient Would Like to Complete Advance Directive: No     We discussed that if you are not able to make decisions for yourself, then your  Johnathon Bell would be your decision maker. Patient/Health Care Proxy Stated Goals: Prolong life    We reviewed / discussed your code status as: DNR     Full Code means perform CPR in the event of cardiac arrest.      DNR means do NOT perform CPR in the event of cardiac arrest.      Partial Code means you have specific preferences, please discuss with your healthcare team.      No Order means this issue was not addressed / resolved during your stay    Medical Interventions: Full interventions  Other Instructions: We talked about Home Based Program via Palliative Care could be a resource for you when you are home. The  is Salome Martinez at [phone]. Artificially Administered Nutrition: (Did not address during this visit)    Because of the importance of this information, we are providing you with a printed copy to share with other healthcare providers after this hospitalization is complete.

## 2018-12-17 NOTE — PROGRESS NOTES
Palliative Medicine  [place]: [phone] ([phone]  [facility]: [phone] ([phone])    LCSW met briefly with patient and  Handy Vann. Patient aware that she will be going home today. She has a smile on her face and seems pleased to leave. Patient appeared comfortable, no issues noted at this time. Encouraged both patient and  to use music at home as a therapeutic aid, as patient seemed to enjoy that in the hospital.  noted that they don't watch much TV or listen to music at home. He seems to be busy with his work, while patient is in bed, without much stimulation. Educated  on the benefits of music for patient. Not sure how much he will follow suggestions, he tends to do what he feels is best.     Discussed Home Based Palliative team as being available to him and patient. Patient's insurance has authorized these visits. Melissa Lennon palliative number to call #[phone] and to ask for Wilma Gurrola as contact to get set up with this program. He seemed interested in this. Wished them both well. Palliative care continues to be available to this patient and family as needed.

## 2018-12-17 NOTE — DISCHARGE SUMMARY
[facility] [address]    Marlee Bertrand  MR#: [id number]  : 1955  ACCOUNT #: [de-identified]   ADMIT DATE: 2018  DISCHARGE DATE: 2018    PATIENT PROFILE:  The patient is a 59-year-old  female admitted with failure to thrive associated with severe diabetic gastroparesis and inability to eat. HISTORY OF PRESENT ILLNESS:  This patient presented to the emergency room with decreased intake and progressive weakness. Prior to the hospitalization, she had been refusing to eat or take her pills. She was known to have a severe diabetic gastroparesis, which responds to Reglan, but she had not been taking this. She had been unable to tolerate even liquids without it. Her  had tried to crush her pills and add it to juice, which improved her symptoms some, but she was still unable to take enough to maintain nutrition and was becoming weaker and weaker. She was essentially bedridden at home and was having issues with urinary and fecal incontinence resulting in a severe pressure type injury to her buttocks as well as an increasing rash in her groin and the development of a vulvar abscess. She was subsequently admitted for further evaluation and therapy. Other problems were as detailed in the admission history and physical.    PHYSICAL EXAMINATION:    GENERAL:  At the time of admission, she was cooperative and did not appear in distress. VITAL SIGNS:  Blood pressure 110/70, pulse 85, respirations 14, temperature 98.1, O2 saturation 99%. HEENT:  Generally unremarkable. NECK:  Supple, without adenopathy. CHEST:  Clear to P and A. CARDIOVASCULAR:  Regular rate and rhythm. Grade II/VI systolic murmur consistent with aortic stenosis. ABDOMEN:  Soft, nontender, nondistended. EXTREMITIES:  Brawny edema with scaling of her lower extremities.   SKIN:  Revealed a deep tissue injury/pressure injury of the buttocks as well as rashes in the groin and a vulvar abscess. NEUROLOGIC:  Face symmetric. Bilateral lower extremity weakness. Some mild left upper extremity weakness as well. Sensation intact. LABORATORY DATA:  Hemoglobin initially 13.8, WBC 13,900. By the time of discharge, white blood count 10,500 with a hemoglobin of 10.6. Urinalysis greater than 100 WBCs per high power field. Initial CMP remarkable for a BUN of 50, creatinine of 1.87. By the time of discharge, BUN 32, creatinine 1.99. Lactic acid 1.1. Troponin 0.10. A1c 10.4. Urine creatinine 59.8, urine protein 475, protein-creatinine ratio 7.9, indicating nephrotic syndrome. Vancomycin random levels ranged between 18.1 and 22.5. TSH 1.91. Urine culture initially grew klebsiella and enterococcus sensitive to Levaquin. Wound culture grew moderate anaerobic gram-negative rods and then subsequently skin carmen. Followup urine culture grew Candida. CT of the head without contrast revealed no acute changes. Right parietal encephalomalacia as per usual.  CT scan of the abdomen and pelvis revealed no acute intra-abdominal abnormalities. There was anterior abdominal wall soft tissue swelling and skin thickening on the left side. HOSPITAL COURSE:  The patient was admitted and begun on hydration. She was also begun on Levaquin for what appeared to be a UTI. Subsequently, vancomycin and Zosyn were added because of the vulvar abscess. She then developed yeast in her urine and Diflucan was added as well. She completed the courses of antibiotics as well as the Diflucan and remained off antibiotics for the latter part of the hospitalization. She was seen in consultation by Gastroenterology and was started on Reglan IV. Dr. Lucinda Goetz did not feel she warranted an EGD. He did suggest a CT scan, which was negative. In the initial portion of the hospitalization, she remained relatively lethargic and not eating.   She was seen in consultation by Palliative Care, who helped greatly in the family making decisions as to future care. They elected to try Reglan and if she could eat, not consider PEG tube, drainage and J-tube feeding. Everyone believed this was an appropriate decision. She actually was able to eat with the Reglan, although her intake tended to vary. She was seen in consultation by Nephrology because of the renal dysfunction and the metabolic acidosis and begun on sodium bicarbonate as well as gentle IV hydration. This stabilized her renal function and improved her metabolic acidosis. She was seen by Wound Care with wound care initiated. It was elected to place a Riojas since it was felt that this was needed to allow for healing of her buttocks and groin wounds. This was to be maintained at discharge. She was also seen in consultation by Dr. Monserrat Barton because of the vulvar abscess and underwent incision and drainage with near full healing of this by the time of discharge. With all of the above problems addressed and under treatment, it was felt that maximum hospital benefit had been reached. Also during the hospitalization, she was continued on Bumex because of the issues with the heart failure with preserved ejection fraction. FINAL DIAGNOSES:  1. Failure to thrive. 2.  Severe diabetic gastroparesis. 3.  Morbid obesity. 4.  History of stroke with residual lower extremity weakness. 5.  Hypertension. 6.  Aortic stenosis. 7.  Heart failure with preserved ejection fraction. 8.  Diabetes with complications including gastroparesis and nephropathy. 9.  Severe depression. 10.  Chronic kidney disease stage III. 11.  Vulvar abscess. 12.  Pressure injury to the sacral region, unstageable, present on admission. 13.  Urinary tract infection, treated. 14.  Yeast urinary tract infection, treated. 15.  Nephrotic syndrome due to diabetes. PROCEDURES:  CT scan of the abdomen. CT scan of the head. Incision and drainage of vulvar abscess.     CONSULTATIONS:  General Surgery, Nephrology, Gastroenterology and Palliative Care. CONDITION AT DISCHARGE:  Improved. PROGNOSIS:  Guarded, given the multitude of issues and her immobility. DISCHARGE PLANS:  1.  Diabetic diet. 2.  Activity as tolerated. 3.  Home Health to provide a Riojas care, wound care, nursing management and possible PT and OT. 4.  Follow up in the office in 1 week's time. 5.  Medications to include:  Metoclopramide 5 mg per 5 mL syrup 10 mL 3 times a day, mirtazapine 15 mg daily, mupirocin ointment to the vulva twice a day, MiraLax 1 packet daily, potassium chloride 1 packet (20 mEq) daily, sodium bicarbonate 650 mg twice a day, tramadol 50 mg every 6 hours as needed for pain, Levemir 10 units at bedtime, nystatin topical cream to the groin and buttocks and skin folds daily, Lac-Hydrin 12% to the legs daily, Aggrenox 1 twice a day, atenolol 50 mg at bedtime, bumetanide 2 mg daily, diltiazem  mg daily, Humalog sliding scale blood sugar 140-199, 2 units; 200-249 3 units; 250-299, 5 units, 300-349, 7 units; greater than 350, 10 units, Zofran ODT 4 mg every 8 hours as needed for nausea, Protonix 40 mg daily, slow Fe 142 mg daily, Extra Strength Tylenol 1000 mg every 6 hours as needed for pain, Desitin applied to the buttocks daily. 6.  Fingerstick blood sugars 3 times a day before meals and give insulin as indicated per sliding scale. 7.  Routine Riojas care. 8.  Activity as tolerated.   9.  Wound care including Lac-Hydrin lotion to her lower extremities, the right heel to be left open to air and floated after the Lac-Hydrin; left labial abscess, cleanse twice a day with dermal wound cleanser, sprayed and wiped cleaned and mupirocin ointment applied; and wound care to the buttocks, posterior thighs, gently cleansing the skin with soap and water, patting dry and applying Extra Strength Desitin zinc cream and nystatin cream.    DISPOSITION:  Home with Genesis Lawrence MD FRANCESCO/MARIANA  D: 12/17/2018 07:58     T: 12/17/2018 09:04  JOB #: [id number]  CC: Tino Iraheta MD

## 2018-12-17 NOTE — PROGRESS NOTES
Bedside and Verbal shift change report given to [place] (oncoming nurse) by Abeba Martines (offgoing nurse). Report included the following information SBAR, Kardex, Intake/Output, MAR and Recent Results. Patient refusing to have her AM lab work drawn. Patient also refusing morning Heparin.

## 2018-12-20 NOTE — TELEPHONE ENCOUNTER
Lori Glover nurse with [facility] called reporting that Ms Armas's Bp today is 208/110 and 240/90, she did not take her Atenolol for the last two days, but did take it this morning. Lori Glover advised that per Dr Dylon Kate to just watch her Bp.

## 2018-12-26 NOTE — PROGRESS NOTES
Hospital Follow Up       HPI:  Miguel A Dumont is a 58y.o. year old female who is here for a follow up visit for hospitalization transition of care. She was last seen by me on 12/17/2018. Discharged on: 12/17/2018 and called within 48 hours of discharge by nursing staff. Diagnosis in hospital: FTT, diabetic gastroparesis    Complications in hospital:  Dehydration, vulvar abscess, DTI buttocks, depression    Medication reconciliation was completed this visit and medication changes include the following: none. .. See D/C instructions    Discharge Summary reviewed. She reports the following:    Ms. Leti Toscano returns subsequent to her recent hospitalization for gastroparesis, dehydration, failure to thrive, etc.  Ultimately she was able to take medications when she was able to take Reglan. She was treated for UTI, as well as dehydration and depression. She was seen by palliative care and she made decisions to want to continue to try to treat herself. She began taking her medications. She did receive wound care because of deep tissue injuries to her buttocks. She also had a Riojas placed to keep from soiling that area. She has since returned home and gone pretty much back to what she was doing before with taking very few of her medications. She has not been wearing her oxygen. She comes in here today hypertensive, borderline hypoxic and it is very difficult to tell whether she wants to continue taking her medications or not, but she says she does. I do not see much else that we can do for her if she is not going to take her medications. She is making a decision whether she voices it or not to not follow the recommended course of therapy and in the process I think she will slowly deteriorate and ultimately die. She seems to understand this and said before she left that she would go back to taking her medications. Whether that will be true or not remains to be seen.   We did elect to check her CBC and CMP today and asked her to return in a few weeks' time relative to how she is doing taking her medications, as well as what her blood pressure and what her diabetes is. She has even been refusing her insulin recently. She is not due for an A1c yet as that was checked while she was in the hospital and found to be greater than 10. At this point I have very little else that I can offer her and it is really up to her whether she continues to try to be treated or not. Her  has tried everything he can to get a straight answer out of her as to whether she wants to continue treating effectively or not. Apparently the communication between them is relatively poor at times, however. We will just have to see where she settles out and see where she stands in the next few days' time. We will call her about her labs.           Visit Vitals  BP (!) 178/92 (BP 1 Location: Left arm, BP Patient Position: Sitting)   Pulse 79   Temp 97.6 °F (36.4 °C) (Oral)   SpO2 (!) 86%       Historical Data    Patient Active Problem List    Diagnosis Date Noted    FTT (failure to thrive) in adult 12/02/2018     Priority: 1 - One    Acute renal failure (ARF) ([address] 12/01/2018     Priority: 1 - One    GIRISH (acute kidney injury) ([address] 03/16/2018     Priority: 1 - One    Sepsis ([address] 03/05/2018     Priority: 1 - One    Obesity hypoventilation syndrome ([address] 02/19/2018     Priority: 1 - One    Chronic diastolic CHF (congestive heart failure), NYHA class 3 ([address] 07/24/2017     Priority: 1 - One    Stroke ([address] 05/30/2014     Priority: 1 - One    Right leg weakness 04/18/2013     Priority: 1 - One    Diabetic gastroparesis ([address]      Priority: 1 - One    Nephrotic syndrome due to diabetes mellitus ([address] 12/10/2018     Priority: 2 - Two    Vulvar abscess 12/03/2018     Priority: 2 - Two    DM (diabetes mellitus) ([address] 12/01/2018     Priority: 2 - Two    Subacute osteomyelitis of right foot ([address] 03/12/2018     Priority: 2 - Two  CVA, old, aphasia 03/09/2018     Priority: 2 - Two    Venous stasis dermatitis of both lower extremities 11/09/2017     Priority: 2 - Two    (HFpEF) heart failure with preserved ejection fraction ([address] 10/31/2017     Priority: 2 - Two    Anemia 07/24/2017     Priority: 2 - Two    Ulcer of right fifth toe due to diabetes mellitus ([address] 07/24/2017     Priority: 2 - Two    Peripheral neuropathy 07/24/2017     Priority: 2 - Two    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 2 diabetes mellitus ([address] 07/24/2017     Priority: 2 - Two    Stage 2 chronic kidney disease due to benign hypertension 07/24/2017     Priority: 2 - Two    Hypertension 07/24/2017     Priority: 2 - Two    Bilateral edema of lower extremity 07/24/2017     Priority: 2 - Two    ASCVD (arteriosclerotic cardiovascular disease) 05/31/2014     Priority: 2 - Two    Diabetic polyneuropathy ([address] 04/20/2013     Priority: 2 - Two    Diabetes mellitus type II, uncontrolled ([address] 04/18/2013     Priority: 2 - Two    Diabetes ([facility] 01/26/2011     Priority: 2 - Two    Severe episode of recurrent major depressive disorder, without psychotic features ([address] 12/02/2018     Priority: 3 - Three    B12 deficiency 10/31/2017     Priority: 3 - Three    GERD (gastroesophageal reflux disease) 10/11/2017     Priority: 3 - Three    Fatty liver 07/24/2017     Priority: 3 - Three    Stasis dermatitis 07/24/2017     Priority: 3 - Three    Aortic stenosis, mild 07/24/2017     Priority: 3 - Three    CKD (chronic kidney disease) stage 3, GFR 30-59 ml/min ([facility])      Priority: 3 - Three    Hyperlipemia      Priority: 3 - Three    Morbid obesity ([facility] 01/26/2011     Priority: 3 - Three    Pressure injury of right heel, stage 2 12/07/2018     Priority: 4 - Four    Pressure injury of sacral region, unstageable ([address] 12/07/2018     Priority: 4 - Four    Gout 10/11/2017     Priority: 4 - Four    History of hyperparathyroidism 10/11/2017     Priority: 4 - Four    Sinus tachycardia 2017     Priority: 4 - Four    Low back pain 2017     Priority: 4 - Four    Insomnia 2017     Priority: 5 - Five    BMI 45.0-49.9, adult ([facility] 2017     Priority: 5 - Five    Generalized abdominal pain 2018    Acute cystitis without hematuria 2018    Bradycardia 2018    Hypoxia 2018    Type 2 diabetes mellitus with diabetic neuropathy ([facility] 2018    PE (physical exam), annual 10/11/2017       Past Surgical History:   Procedure Laterality Date    CHEST SURGERY PROCEDURE UNLISTED  10/24/13    PARATHYROID EXPLORATION     DELIVERY       HX AMPUTATION TOE  2018    HX  SECTION      HX CHOLECYSTECTOMY      HX OTHER SURGICAL      I&D right thigh abscess            Allergies   Allergen Reactions    Zestril [Lisinopril] Swelling        Social History     Socioeconomic History    Marital status:      Spouse name: Not on file    Number of children: Not on file    Years of education: Not on file    Highest education level: Not on file   Social Needs    Financial resource strain: Not on file    Food insecurity - worry: Not on file    Food insecurity - inability: Not on file   [employer] needs - medical: Not on file   [employer] needs - non-medical: Not on file   Occupational History    Not on file   Tobacco Use    Smoking status: Never Smoker    Smokeless tobacco: Never Used   Substance and Sexual Activity    Alcohol use: Yes     Comment: rare    Drug use: Yes     Types: Prescription, OTC    Sexual activity: Not on file   Other Topics Concern    Not on file   Social History Narrative    Not on file        Review of Systems              Constitutional:  She denies fever, weight loss, sweats or fatigue. HEENT:  No blurred or double vision, headache or dizziness. No difficulty with swallowing, taste, speech or smell. Respiratory:  No cough, wheezing or shortness of breath. No sputum production.   Cardiac: Denies chest pain, palpitations, unexplained indigestion, syncope, edema, PND or orthopnea. GI:  No changes in bowel movements, no abdominal pain, no bloating, anorexia, nausea, vomiting or heartburn. :  No frequency or dysuria. Denies incontinence. Extremities:  No joint pain, stiffness or swelling. Skin: rash,etc.on buttocks  Neurological:  Lower extremity weakness    Vitals:    12/26/18 0955   BP: (!) 178/92   Pulse: 79   Temp: 97.6 °F (36.4 °C)   TempSrc: Oral   SpO2: (!) 86%   PainSc:   5   PainLoc: Buttocks       There is no height or weight on file to calculate BMI. Physical Examination:              General Appearance:  Well-developed, well-nourished, no acute  distress. HEENT:      Ears:  The TMs and ear canals were clear. Eyes:  The pupillary responses were normal.  Extraocular muscle function intact. Lids and conjunctiva not injected. Neck:  Supple without thyromegaly or adenopathy. No JVD noted. Lungs:  Clear to auscultation and percussion. Cardiac:  Regular rate and rhythm without murmur. GI: nontender w/o mass. Normal BS's. Extremities:  No clubbing, cyanosis or edema. Skin:  DTI buttocks  Neurological:  LE weakness; dysphasia    1. FTT (failure to thrive) in adult    - AMB POC COMPREHENSIVE METABOLIC PANEL; Future    2. Diabetic gastroparesis ([address]    - AMB POC COMPREHENSIVE METABOLIC PANEL; Future    3. Chronic diastolic CHF (congestive heart failure), NYHA class 3 ([address]      4. Nephrotic syndrome due to diabetes mellitus (HCC)    - AMB POC COMPREHENSIVE METABOLIC PANEL; Future    5. Essential hypertension    - AMB POC COMPLETE CBC, AUTOMATED; Future      I have reviewed the patient's medical history in detail and updated the computerized patient record. We had a prolonged discussion about these complex clinical issues and went over the various important aspects to consider. All questions were answered.      Advised her to call back or return to office if symptoms do not improve, change in nature, or persist.    She was given an after visit summary or informed of [facility] Access which includes patient instructions, diagnoses, current medications, & vitals. She expressed understanding with the diagnosis and plan.       Resume meds    FU 4 weeks

## 2018-12-26 NOTE — PROGRESS NOTES
Reviewed record in preparation for visit and have obtained necessary documentation. Identified pt with two pt identifiers(name and ). Chief Complaint   Patient presents with   [facility] Follow Up        Coordination of Care Questionnaire:  :     1) Have you been to an emergency room, urgent care clinic since your last visit? Yes     Hospitalized since your last visit? Yes               2) Have you seen or consulted any other health care providers outside of [address] [place] [address] since your last visit?  No

## 2018-12-31 NOTE — PROGRESS NOTES
Received call from Albna Trujillo with [facility] reporting patient has /100. Patient is refusing to take her medications, but states she does not want to die. Her output is less than 500ml/day.  states she takes sips during the day and eats \"a meal a day\". Ms. Nato Hathaway states patient does not want to be on hospice or palliative care. Dr. Rajiv Henao informed.

## 2019-01-01 ENCOUNTER — HOME CARE VISIT (OUTPATIENT)
Dept: HOME HEALTH SERVICES | Facility: HOME HEALTH | Age: 64
End: 2019-01-01
Payer: COMMERCIAL

## 2019-01-01 ENCOUNTER — HOME CARE VISIT (OUTPATIENT)
Dept: SCHEDULING | Facility: HOME HEALTH | Age: 64
End: 2019-01-01
Payer: COMMERCIAL

## 2019-01-01 VITALS
TEMPERATURE: 97.8 F | OXYGEN SATURATION: 90 % | SYSTOLIC BLOOD PRESSURE: 230 MMHG | RESPIRATION RATE: 20 BRPM | DIASTOLIC BLOOD PRESSURE: 110 MMHG | HEART RATE: 67 BPM

## 2019-01-01 VITALS
HEART RATE: 72 BPM | SYSTOLIC BLOOD PRESSURE: 180 MMHG | DIASTOLIC BLOOD PRESSURE: 100 MMHG | RESPIRATION RATE: 19 BRPM | TEMPERATURE: 97.3 F | OXYGEN SATURATION: 98 %

## 2019-01-01 VITALS
DIASTOLIC BLOOD PRESSURE: 102 MMHG | TEMPERATURE: 97.9 F | OXYGEN SATURATION: 96 % | SYSTOLIC BLOOD PRESSURE: 182 MMHG | HEART RATE: 96 BPM

## 2019-01-01 VITALS
HEART RATE: 64 BPM | RESPIRATION RATE: 17 BRPM | OXYGEN SATURATION: 94 % | TEMPERATURE: 97.7 F | DIASTOLIC BLOOD PRESSURE: 90 MMHG | SYSTOLIC BLOOD PRESSURE: 200 MMHG

## 2019-01-01 DIAGNOSIS — R52 PAIN: ICD-10-CM

## 2019-01-01 PROCEDURE — G0300 HHS/HOSPICE OF LPN EA 15 MIN: HCPCS

## 2019-01-01 PROCEDURE — G0299 HHS/HOSPICE OF RN EA 15 MIN: HCPCS

## 2019-01-01 RX ORDER — TRAMADOL HYDROCHLORIDE 50 MG/1
TABLET ORAL
Qty: 50 TAB | Refills: 0 | Status: SHIPPED | OUTPATIENT
Start: 2019-01-01

## 2019-01-07 NOTE — TELEPHONE ENCOUNTER
Home health called stating that Ms Armas's bp was 230/110. Per Dr Elza Brown can send Nitropaste 1\" q 6hours to pharmacy.

## 2020-08-24 NOTE — PROGRESS NOTES
Bedside and Verbal shift change report given to [facility] RN (oncoming nurse) by Ashlee Perea RN (offgoing nurse). Report included the following information SBAR, Kardex, Intake/Output, MAR and Recent Results. Monitor.

## 2021-10-26 NOTE — ED NOTES
Spouse states that over the past two weeks patient has refused to eat or drink anything by mouth, since then patient has used pure wick for urine collection. He also states since patient has refused to eat her stools have been loose and he applies \"cream\" to perineum due to redness. Statement Selected

## 2022-12-23 NOTE — PROGRESS NOTES
All in agreement with d/c to home today at 0930 via [facility] ambulance. Please send emar, d/c instructions, Rx, facesheet, and ambulance form. Thanks    08  Staff-please send home with wound care supplies per MD order. Please copy wound care orders to d/c instructions. Called patient to inform.  LMM

## 2024-05-25 NOTE — PROGRESS NOTES
[facility] Palliative Medicine Office  Nursing Note  [phone]  Fax [phone]     Name:  Ean Wagner  YOB: 1955     Nurse called pt to follow up on Palliative Home referral.  No answer, left message. Pt had been referred to Palliative Home 11/21/18 by [address] [place] [address] nurse Duane Coto. It took over a week for [employer] to approve a home visit. Unfortunately by the time pt's insurance company approved the home visit, pt had been admitted to [address] [place] [address]. Pt was hospitalized 12/1/18-12/17/18 with failure to thrive (associated with severe diabetic gastroparesis and inability to eat). Pt was seen by inpt Palliative Medicine team during her hospitalization (Dr. Alejandro Godwin and Handy Padilla SW- see Dr. Samantha Tsai 12/4/18 consult note). Addendum:  Pt's  returned the call on 12/28/18. Pt was able to make it in to see PCP Don Begin on 12/26/18.  says some days pt is doing better and then other days she goes back to not taking her meds and not wanting to take care of herself. Nurse explained that if pt gets weaker again and isn't able to make it in to Dr. Cande Siddiqui office,  can call our office and we can revisit the option of a Palliative Home visit.  voices understanding.     Wilma Gurrola, CASSANDRAN, RN  Clinical Referral Navigator
Speaking Coherently

## (undated) DEVICE — SUTURE VCRL SZ 3-0 L27IN ABSRB VLT L26MM SH 1/2 CIR J316H

## (undated) DEVICE — BANDAGE COMPR SELF ADH 5 YDX4 IN TAN STRL PREMIERPRO LF

## (undated) DEVICE — TRAY PREP DRY W/ PREM GLV 2 APPL 6 SPNG 2 UNDPD 1 OVERWRAP

## (undated) DEVICE — REM POLYHESIVE ADULT PATIENT RETURN ELECTRODE: Brand: VALLEYLAB

## (undated) DEVICE — DEVON™ KNEE AND BODY STRAP 60" X 3" (1.5 M X 7.6 CM): Brand: DEVON

## (undated) DEVICE — CURITY PLAIN PACKING STRIP: Brand: CURITY

## (undated) DEVICE — BASIC PACK: Brand: CONVERTORS

## (undated) DEVICE — NEEDLE HYPO 25GA L1.5IN BVL ORIENTED ECLIPSE

## (undated) DEVICE — KERLIX BANDAGE ROLL: Brand: KERLIX

## (undated) DEVICE — DRAPE,EXTREMITY,89X128,STERILE: Brand: MEDLINE

## (undated) DEVICE — HANDLE LT SNAP ON ULT DURABLE LENS FOR TRUMPF ALC DISPOSABLE

## (undated) DEVICE — Device

## (undated) DEVICE — CURITY NON-ADHERENT STRIPS: Brand: CURITY

## (undated) DEVICE — INFECTION CONTROL KIT SYS

## (undated) DEVICE — GOWN,SIRUS,NONRNF,SETINSLV,XL,20/CS: Brand: MEDLINE

## (undated) DEVICE — NEEDLE HYPO 18GA L1.5IN PNK S STL HUB POLYPR SHLD REG BVL

## (undated) DEVICE — DRAPE SHT 3 QTR PROXIMA 53X77 --

## (undated) DEVICE — SUT ETHLN 3-0 18IN PS2 BLK --

## (undated) DEVICE — GAUZE SPONGES,12 PLY: Brand: CURITY

## (undated) DEVICE — TOWEL,OR,DSP,ST,BLUE,STD,2/PK,40PK/CS: Brand: MEDLINE

## (undated) DEVICE — ZIMMER® STERILE DISPOSABLE TOURNIQUET CUFF WITH PROTECTIVE SLEEVE AND PLC, DUAL PORT, SINGLE BLADDER, 18 IN. (46 CM)

## (undated) DEVICE — SYR 10ML LUER LOK 1/5ML GRAD --

## (undated) DEVICE — (D)PREP SKN CHLRAPRP APPL 26ML -- CONVERT TO ITEM 371833

## (undated) DEVICE — STERILE POLYISOPRENE POWDER-FREE SURGICAL GLOVES: Brand: PROTEXIS

## (undated) DEVICE — SUTURE VCRL SZ 4-0 L27IN ABSRB UD L17MM RB-1 1/2 CIR J214H

## (undated) DEVICE — PRECISION (9.0 X 0.51 X 31.0MM)